# Patient Record
Sex: MALE | Race: ASIAN | NOT HISPANIC OR LATINO | URBAN - METROPOLITAN AREA
[De-identification: names, ages, dates, MRNs, and addresses within clinical notes are randomized per-mention and may not be internally consistent; named-entity substitution may affect disease eponyms.]

---

## 2017-01-19 ENCOUNTER — OUTPATIENT (OUTPATIENT)
Dept: OUTPATIENT SERVICES | Facility: HOSPITAL | Age: 77
LOS: 1 days | Discharge: HOME | End: 2017-01-19

## 2017-01-19 DIAGNOSIS — C34.90 MALIGNANT NEOPLASM OF UNSPECIFIED PART OF UNSPECIFIED BRONCHUS OR LUNG: ICD-10-CM

## 2017-01-19 DIAGNOSIS — C44.311 BASAL CELL CARCINOMA OF SKIN OF NOSE: Chronic | ICD-10-CM

## 2017-01-22 ENCOUNTER — RESULT REVIEW (OUTPATIENT)
Age: 77
End: 2017-01-22

## 2017-01-23 ENCOUNTER — APPOINTMENT (OUTPATIENT)
Dept: HEMATOLOGY ONCOLOGY | Facility: CLINIC | Age: 77
End: 2017-01-23

## 2017-01-23 VITALS
OXYGEN SATURATION: 95 % | RESPIRATION RATE: 16 BRPM | HEART RATE: 89 BPM | TEMPERATURE: 98.2 F | BODY MASS INDEX: 24.55 KG/M2 | SYSTOLIC BLOOD PRESSURE: 149 MMHG | DIASTOLIC BLOOD PRESSURE: 91 MMHG | WEIGHT: 166.23 LBS

## 2017-01-23 LAB
ALBUMIN SERPL ELPH-MCNC: 4.3 G/DL — SIGNIFICANT CHANGE UP (ref 3.3–5)
ALP SERPL-CCNC: 69 U/L — SIGNIFICANT CHANGE UP (ref 40–120)
ALT FLD-CCNC: 21 U/L — SIGNIFICANT CHANGE UP (ref 10–45)
ANION GAP SERPL CALC-SCNC: 15 MMOL/L — SIGNIFICANT CHANGE UP (ref 5–17)
AST SERPL-CCNC: 24 U/L — SIGNIFICANT CHANGE UP (ref 10–40)
BILIRUB SERPL-MCNC: 0.8 MG/DL — SIGNIFICANT CHANGE UP (ref 0.2–1.2)
BUN SERPL-MCNC: 21 MG/DL — SIGNIFICANT CHANGE UP (ref 7–23)
CALCIUM SERPL-MCNC: 9.7 MG/DL — SIGNIFICANT CHANGE UP (ref 8.4–10.5)
CEA SERPL-MCNC: 4.3 NG/ML — HIGH (ref 0–3.8)
CHLORIDE SERPL-SCNC: 101 MMOL/L — SIGNIFICANT CHANGE UP (ref 96–108)
CO2 SERPL-SCNC: 29 MMOL/L — SIGNIFICANT CHANGE UP (ref 22–31)
CREAT SERPL-MCNC: 1.12 MG/DL — SIGNIFICANT CHANGE UP (ref 0.5–1.3)
GLUCOSE SERPL-MCNC: 112 MG/DL — HIGH (ref 70–99)
HCT VFR BLD CALC: 45.1 % — SIGNIFICANT CHANGE UP (ref 39–50)
HGB BLD-MCNC: 14.8 G/DL — SIGNIFICANT CHANGE UP (ref 13–17)
MCHC RBC-ENTMCNC: 30.2 PG — SIGNIFICANT CHANGE UP (ref 27–34)
MCHC RBC-ENTMCNC: 32.8 GM/DL — SIGNIFICANT CHANGE UP (ref 32–36)
MCV RBC AUTO: 92.1 FL — SIGNIFICANT CHANGE UP (ref 80–100)
PLATELET # BLD AUTO: 207 K/UL — SIGNIFICANT CHANGE UP (ref 150–400)
POTASSIUM SERPL-MCNC: 3.9 MMOL/L — SIGNIFICANT CHANGE UP (ref 3.5–5.3)
POTASSIUM SERPL-SCNC: 3.9 MMOL/L — SIGNIFICANT CHANGE UP (ref 3.5–5.3)
PROT SERPL-MCNC: 7.4 G/DL — SIGNIFICANT CHANGE UP (ref 6–8.3)
RBC # BLD: 4.9 M/UL — SIGNIFICANT CHANGE UP (ref 4.2–5.8)
RBC # FLD: 12.4 % — SIGNIFICANT CHANGE UP (ref 10.3–14.5)
SODIUM SERPL-SCNC: 145 MMOL/L — SIGNIFICANT CHANGE UP (ref 135–145)
WBC # BLD: 9.6 K/UL — SIGNIFICANT CHANGE UP (ref 3.8–10.5)
WBC # FLD AUTO: 9.6 K/UL — SIGNIFICANT CHANGE UP (ref 3.8–10.5)

## 2017-01-24 LAB
BASOPHILS # BLD AUTO: 0 K/UL — SIGNIFICANT CHANGE UP (ref 0–0.2)
BASOPHILS NFR BLD AUTO: 0.4 % — SIGNIFICANT CHANGE UP (ref 0–2)
EOSINOPHIL # BLD AUTO: 0.3 K/UL — SIGNIFICANT CHANGE UP (ref 0–0.5)
EOSINOPHIL NFR BLD AUTO: 2.7 % — SIGNIFICANT CHANGE UP (ref 0–6)
LYMPHOCYTES # BLD AUTO: 1.9 K/UL — SIGNIFICANT CHANGE UP (ref 1–3.3)
LYMPHOCYTES # BLD AUTO: 20 % — SIGNIFICANT CHANGE UP (ref 13–44)
MONOCYTES # BLD AUTO: 0.7 K/UL — SIGNIFICANT CHANGE UP (ref 0–0.9)
MONOCYTES NFR BLD AUTO: 7 % — SIGNIFICANT CHANGE UP (ref 2–14)
NEUTROPHILS # BLD AUTO: 6.8 K/UL — SIGNIFICANT CHANGE UP (ref 1.8–7.4)
NEUTROPHILS NFR BLD AUTO: 70 % — SIGNIFICANT CHANGE UP (ref 43–77)

## 2017-02-13 ENCOUNTER — RX RENEWAL (OUTPATIENT)
Age: 77
End: 2017-02-13

## 2017-02-24 ENCOUNTER — RX RENEWAL (OUTPATIENT)
Age: 77
End: 2017-02-24

## 2017-02-28 ENCOUNTER — OUTPATIENT (OUTPATIENT)
Dept: OUTPATIENT SERVICES | Facility: HOSPITAL | Age: 77
LOS: 1 days | Discharge: HOME | End: 2017-02-28

## 2017-02-28 DIAGNOSIS — C34.90 MALIGNANT NEOPLASM OF UNSPECIFIED PART OF UNSPECIFIED BRONCHUS OR LUNG: ICD-10-CM

## 2017-02-28 DIAGNOSIS — C44.311 BASAL CELL CARCINOMA OF SKIN OF NOSE: Chronic | ICD-10-CM

## 2017-03-01 ENCOUNTER — OUTPATIENT (OUTPATIENT)
Dept: OUTPATIENT SERVICES | Facility: HOSPITAL | Age: 77
LOS: 1 days | Discharge: HOME | End: 2017-03-01

## 2017-03-01 DIAGNOSIS — C44.311 BASAL CELL CARCINOMA OF SKIN OF NOSE: Chronic | ICD-10-CM

## 2017-03-01 DIAGNOSIS — C34.90 MALIGNANT NEOPLASM OF UNSPECIFIED PART OF UNSPECIFIED BRONCHUS OR LUNG: ICD-10-CM

## 2017-03-02 ENCOUNTER — INPATIENT (INPATIENT)
Facility: HOSPITAL | Age: 77
LOS: 10 days | Discharge: HOME | End: 2017-03-13
Attending: HOSPITALIST | Admitting: HOSPITALIST
Payer: MEDICAID

## 2017-03-02 ENCOUNTER — APPOINTMENT (OUTPATIENT)
Dept: HEMATOLOGY ONCOLOGY | Facility: CLINIC | Age: 77
End: 2017-03-02

## 2017-03-02 VITALS
SYSTOLIC BLOOD PRESSURE: 188 MMHG | HEART RATE: 118 BPM | OXYGEN SATURATION: 99 % | TEMPERATURE: 98 F | DIASTOLIC BLOOD PRESSURE: 131 MMHG | RESPIRATION RATE: 20 BRPM

## 2017-03-02 DIAGNOSIS — R07.9 CHEST PAIN, UNSPECIFIED: ICD-10-CM

## 2017-03-02 DIAGNOSIS — R04.2 HEMOPTYSIS: ICD-10-CM

## 2017-03-02 DIAGNOSIS — J44.9 CHRONIC OBSTRUCTIVE PULMONARY DISEASE, UNSPECIFIED: ICD-10-CM

## 2017-03-02 DIAGNOSIS — K21.9 GASTRO-ESOPHAGEAL REFLUX DISEASE WITHOUT ESOPHAGITIS: ICD-10-CM

## 2017-03-02 DIAGNOSIS — C34.90 MALIGNANT NEOPLASM OF UNSPECIFIED PART OF UNSPECIFIED BRONCHUS OR LUNG: ICD-10-CM

## 2017-03-02 DIAGNOSIS — C44.311 BASAL CELL CARCINOMA OF SKIN OF NOSE: Chronic | ICD-10-CM

## 2017-03-02 DIAGNOSIS — E11.9 TYPE 2 DIABETES MELLITUS WITHOUT COMPLICATIONS: ICD-10-CM

## 2017-03-02 DIAGNOSIS — Z79.01 LONG TERM (CURRENT) USE OF ANTICOAGULANTS: ICD-10-CM

## 2017-03-02 DIAGNOSIS — E78.5 HYPERLIPIDEMIA, UNSPECIFIED: ICD-10-CM

## 2017-03-02 DIAGNOSIS — I10 ESSENTIAL (PRIMARY) HYPERTENSION: ICD-10-CM

## 2017-03-02 DIAGNOSIS — N40.0 BENIGN PROSTATIC HYPERPLASIA WITHOUT LOWER URINARY TRACT SYMPTOMS: ICD-10-CM

## 2017-03-02 DIAGNOSIS — K56.7 ILEUS, UNSPECIFIED: ICD-10-CM

## 2017-03-02 LAB
ALBUMIN SERPL ELPH-MCNC: 3.8 G/DL — SIGNIFICANT CHANGE UP (ref 3.3–5)
ALP SERPL-CCNC: 89 U/L — SIGNIFICANT CHANGE UP (ref 40–120)
ALT FLD-CCNC: 24 U/L — SIGNIFICANT CHANGE UP (ref 4–41)
APTT BLD: 32.2 SEC — SIGNIFICANT CHANGE UP (ref 27.5–37.4)
AST SERPL-CCNC: 34 U/L — SIGNIFICANT CHANGE UP (ref 4–40)
BASE EXCESS BLDV CALC-SCNC: 0.4 MMOL/L — SIGNIFICANT CHANGE UP
BASOPHILS # BLD AUTO: 0.04 K/UL — SIGNIFICANT CHANGE UP (ref 0–0.2)
BASOPHILS NFR BLD AUTO: 0.4 % — SIGNIFICANT CHANGE UP (ref 0–2)
BILIRUB SERPL-MCNC: 1.6 MG/DL — HIGH (ref 0.2–1.2)
BLOOD GAS VENOUS - CREATININE: 1.1 MG/DL — SIGNIFICANT CHANGE UP (ref 0.5–1.3)
BUN SERPL-MCNC: 21 MG/DL — SIGNIFICANT CHANGE UP (ref 7–23)
CALCIUM SERPL-MCNC: 9.5 MG/DL — SIGNIFICANT CHANGE UP (ref 8.4–10.5)
CHLORIDE BLDV-SCNC: 100 MMOL/L — SIGNIFICANT CHANGE UP (ref 96–108)
CHLORIDE SERPL-SCNC: 100 MMOL/L — SIGNIFICANT CHANGE UP (ref 98–107)
CK MB BLD-MCNC: 2.03 NG/ML — SIGNIFICANT CHANGE UP (ref 1–6.6)
CK MB BLD-MCNC: 2.44 NG/ML — SIGNIFICANT CHANGE UP (ref 1–6.6)
CK SERPL-CCNC: 109 U/L — SIGNIFICANT CHANGE UP (ref 30–200)
CK SERPL-CCNC: 123 U/L — SIGNIFICANT CHANGE UP (ref 30–200)
CO2 SERPL-SCNC: 25 MMOL/L — SIGNIFICANT CHANGE UP (ref 22–31)
CREAT SERPL-MCNC: 1.25 MG/DL — SIGNIFICANT CHANGE UP (ref 0.5–1.3)
EOSINOPHIL # BLD AUTO: 0.16 K/UL — SIGNIFICANT CHANGE UP (ref 0–0.5)
EOSINOPHIL NFR BLD AUTO: 1.6 % — SIGNIFICANT CHANGE UP (ref 0–6)
GAS PNL BLDV: 121 MMOL/L — LOW (ref 136–146)
GLUCOSE BLDV-MCNC: 137 — HIGH (ref 70–99)
GLUCOSE SERPL-MCNC: 144 MG/DL — HIGH (ref 70–99)
HCO3 BLDV-SCNC: 23 MMOL/L — SIGNIFICANT CHANGE UP (ref 20–27)
HCT VFR BLD CALC: 48.6 % — SIGNIFICANT CHANGE UP (ref 39–50)
HCT VFR BLDV CALC: 51.2 % — HIGH (ref 39–51)
HGB BLD-MCNC: 16.1 G/DL — SIGNIFICANT CHANGE UP (ref 13–17)
HGB BLDV-MCNC: 16.7 G/DL — SIGNIFICANT CHANGE UP (ref 13–17)
IMM GRANULOCYTES NFR BLD AUTO: 0.3 % — SIGNIFICANT CHANGE UP (ref 0–1.5)
INR BLD: 1.07 — SIGNIFICANT CHANGE UP (ref 0.87–1.18)
LACTATE BLDV-MCNC: 2.9 MMOL/L — HIGH (ref 0.5–2)
LYMPHOCYTES # BLD AUTO: 2.75 K/UL — SIGNIFICANT CHANGE UP (ref 1–3.3)
LYMPHOCYTES # BLD AUTO: 26.6 % — SIGNIFICANT CHANGE UP (ref 13–44)
MCHC RBC-ENTMCNC: 30.5 PG — SIGNIFICANT CHANGE UP (ref 27–34)
MCHC RBC-ENTMCNC: 33.1 % — SIGNIFICANT CHANGE UP (ref 32–36)
MCV RBC AUTO: 92 FL — SIGNIFICANT CHANGE UP (ref 80–100)
MONOCYTES # BLD AUTO: 0.59 K/UL — SIGNIFICANT CHANGE UP (ref 0–0.9)
MONOCYTES NFR BLD AUTO: 5.7 % — SIGNIFICANT CHANGE UP (ref 2–14)
NEUTROPHILS # BLD AUTO: 6.75 K/UL — SIGNIFICANT CHANGE UP (ref 1.8–7.4)
NEUTROPHILS NFR BLD AUTO: 65.4 % — SIGNIFICANT CHANGE UP (ref 43–77)
PCO2 BLDV: 52 MMHG — HIGH (ref 41–51)
PH BLDV: 7.32 PH — SIGNIFICANT CHANGE UP (ref 7.32–7.43)
PLATELET # BLD AUTO: 185 K/UL — SIGNIFICANT CHANGE UP (ref 150–400)
PMV BLD: 10.9 FL — SIGNIFICANT CHANGE UP (ref 7–13)
PO2 BLDV: 33 MMHG — LOW (ref 35–40)
POTASSIUM BLDV-SCNC: > 14 MMOL/L — CRITICAL HIGH (ref 3.4–4.5)
POTASSIUM SERPL-MCNC: 4.3 MMOL/L — SIGNIFICANT CHANGE UP (ref 3.5–5.3)
POTASSIUM SERPL-SCNC: 4.3 MMOL/L — SIGNIFICANT CHANGE UP (ref 3.5–5.3)
PROT SERPL-MCNC: 7.7 G/DL — SIGNIFICANT CHANGE UP (ref 6–8.3)
PROTHROM AB SERPL-ACNC: 12.2 SEC — SIGNIFICANT CHANGE UP (ref 10–13.1)
RBC # BLD: 5.28 M/UL — SIGNIFICANT CHANGE UP (ref 4.2–5.8)
RBC # FLD: 13.5 % — SIGNIFICANT CHANGE UP (ref 10.3–14.5)
SAO2 % BLDV: 62 % — SIGNIFICANT CHANGE UP (ref 60–85)
SODIUM SERPL-SCNC: 142 MMOL/L — SIGNIFICANT CHANGE UP (ref 135–145)
TROPONIN T SERPL-MCNC: < 0.06 NG/ML — SIGNIFICANT CHANGE UP (ref 0–0.06)
TROPONIN T SERPL-MCNC: < 0.06 NG/ML — SIGNIFICANT CHANGE UP (ref 0–0.06)
WBC # BLD: 10.32 K/UL — SIGNIFICANT CHANGE UP (ref 3.8–10.5)
WBC # FLD AUTO: 10.32 K/UL — SIGNIFICANT CHANGE UP (ref 3.8–10.5)

## 2017-03-02 PROCEDURE — 71275 CT ANGIOGRAPHY CHEST: CPT | Mod: 26

## 2017-03-02 PROCEDURE — 71010: CPT | Mod: 26

## 2017-03-02 PROCEDURE — 99223 1ST HOSP IP/OBS HIGH 75: CPT | Mod: GC

## 2017-03-02 RX ORDER — DEXTROSE 50 % IN WATER 50 %
1 SYRINGE (ML) INTRAVENOUS ONCE
Qty: 0 | Refills: 0 | Status: DISCONTINUED | OUTPATIENT
Start: 2017-03-02 | End: 2017-03-13

## 2017-03-02 RX ORDER — ONDANSETRON 8 MG/1
4 TABLET, FILM COATED ORAL EVERY 8 HOURS
Qty: 0 | Refills: 0 | Status: DISCONTINUED | OUTPATIENT
Start: 2017-03-02 | End: 2017-03-08

## 2017-03-02 RX ORDER — SIMVASTATIN 20 MG/1
20 TABLET, FILM COATED ORAL AT BEDTIME
Qty: 0 | Refills: 0 | Status: DISCONTINUED | OUTPATIENT
Start: 2017-03-02 | End: 2017-03-13

## 2017-03-02 RX ORDER — LORATADINE 10 MG/1
10 TABLET ORAL DAILY
Qty: 0 | Refills: 0 | Status: DISCONTINUED | OUTPATIENT
Start: 2017-03-02 | End: 2017-03-02

## 2017-03-02 RX ORDER — DEXTROSE 50 % IN WATER 50 %
25 SYRINGE (ML) INTRAVENOUS ONCE
Qty: 0 | Refills: 0 | Status: DISCONTINUED | OUTPATIENT
Start: 2017-03-02 | End: 2017-03-13

## 2017-03-02 RX ORDER — INSULIN LISPRO 100/ML
VIAL (ML) SUBCUTANEOUS EVERY 6 HOURS
Qty: 0 | Refills: 0 | Status: DISCONTINUED | OUTPATIENT
Start: 2017-03-02 | End: 2017-03-05

## 2017-03-02 RX ORDER — MORPHINE SULFATE 50 MG/1
4 CAPSULE, EXTENDED RELEASE ORAL ONCE
Qty: 0 | Refills: 0 | Status: DISCONTINUED | OUTPATIENT
Start: 2017-03-02 | End: 2017-03-02

## 2017-03-02 RX ORDER — SODIUM CHLORIDE 9 MG/ML
1000 INJECTION INTRAMUSCULAR; INTRAVENOUS; SUBCUTANEOUS ONCE
Qty: 0 | Refills: 0 | Status: COMPLETED | OUTPATIENT
Start: 2017-03-02 | End: 2017-03-02

## 2017-03-02 RX ORDER — HYDROCORTISONE 1 %
1 OINTMENT (GRAM) TOPICAL EVERY 8 HOURS
Qty: 0 | Refills: 0 | Status: DISCONTINUED | OUTPATIENT
Start: 2017-03-02 | End: 2017-03-13

## 2017-03-02 RX ORDER — SUCRALFATE 1 G
1 TABLET ORAL THREE TIMES A DAY
Qty: 0 | Refills: 0 | Status: DISCONTINUED | OUTPATIENT
Start: 2017-03-02 | End: 2017-03-07

## 2017-03-02 RX ORDER — TAMSULOSIN HYDROCHLORIDE 0.4 MG/1
0.4 CAPSULE ORAL AT BEDTIME
Qty: 0 | Refills: 0 | Status: DISCONTINUED | OUTPATIENT
Start: 2017-03-02 | End: 2017-03-13

## 2017-03-02 RX ORDER — MORPHINE SULFATE 50 MG/1
2 CAPSULE, EXTENDED RELEASE ORAL ONCE
Qty: 0 | Refills: 0 | Status: DISCONTINUED | OUTPATIENT
Start: 2017-03-02 | End: 2017-03-02

## 2017-03-02 RX ORDER — LORATADINE 10 MG/1
10 TABLET ORAL DAILY
Qty: 0 | Refills: 0 | Status: DISCONTINUED | OUTPATIENT
Start: 2017-03-02 | End: 2017-03-13

## 2017-03-02 RX ORDER — CLINDAMYCIN PHOSPHATE GEL USP, 1% 10 MG/G
1 GEL TOPICAL THREE TIMES A DAY
Qty: 0 | Refills: 0 | Status: DISCONTINUED | OUTPATIENT
Start: 2017-03-02 | End: 2017-03-13

## 2017-03-02 RX ORDER — GLUCAGON INJECTION, SOLUTION 0.5 MG/.1ML
1 INJECTION, SOLUTION SUBCUTANEOUS ONCE
Qty: 0 | Refills: 0 | Status: DISCONTINUED | OUTPATIENT
Start: 2017-03-02 | End: 2017-03-13

## 2017-03-02 RX ORDER — HYDROMORPHONE HYDROCHLORIDE 2 MG/ML
0.5 INJECTION INTRAMUSCULAR; INTRAVENOUS; SUBCUTANEOUS ONCE
Qty: 0 | Refills: 0 | Status: DISCONTINUED | OUTPATIENT
Start: 2017-03-02 | End: 2017-03-02

## 2017-03-02 RX ORDER — DEXTROSE 50 % IN WATER 50 %
12.5 SYRINGE (ML) INTRAVENOUS ONCE
Qty: 0 | Refills: 0 | Status: DISCONTINUED | OUTPATIENT
Start: 2017-03-02 | End: 2017-03-13

## 2017-03-02 RX ORDER — ENOXAPARIN SODIUM 100 MG/ML
40 INJECTION SUBCUTANEOUS EVERY 24 HOURS
Qty: 0 | Refills: 0 | Status: DISCONTINUED | OUTPATIENT
Start: 2017-03-02 | End: 2017-03-13

## 2017-03-02 RX ORDER — ACETAMINOPHEN 500 MG
650 TABLET ORAL EVERY 6 HOURS
Qty: 0 | Refills: 0 | Status: DISCONTINUED | OUTPATIENT
Start: 2017-03-02 | End: 2017-03-13

## 2017-03-02 RX ORDER — SODIUM CHLORIDE 9 MG/ML
1000 INJECTION, SOLUTION INTRAVENOUS
Qty: 0 | Refills: 0 | Status: DISCONTINUED | OUTPATIENT
Start: 2017-03-02 | End: 2017-03-13

## 2017-03-02 RX ORDER — FENTANYL CITRATE 50 UG/ML
1 INJECTION INTRAVENOUS
Qty: 0 | Refills: 0 | Status: DISCONTINUED | OUTPATIENT
Start: 2017-03-02 | End: 2017-03-07

## 2017-03-02 RX ORDER — OXYCODONE HYDROCHLORIDE 5 MG/1
10 TABLET ORAL EVERY 6 HOURS
Qty: 0 | Refills: 0 | Status: DISCONTINUED | OUTPATIENT
Start: 2017-03-02 | End: 2017-03-05

## 2017-03-02 RX ORDER — LOSARTAN POTASSIUM 100 MG/1
12.5 TABLET, FILM COATED ORAL DAILY
Qty: 0 | Refills: 0 | Status: DISCONTINUED | OUTPATIENT
Start: 2017-03-02 | End: 2017-03-03

## 2017-03-02 RX ORDER — OXYCODONE HYDROCHLORIDE 5 MG/1
5 TABLET ORAL EVERY 4 HOURS
Qty: 0 | Refills: 0 | Status: DISCONTINUED | OUTPATIENT
Start: 2017-03-02 | End: 2017-03-02

## 2017-03-02 RX ORDER — CHOLECALCIFEROL (VITAMIN D3) 125 MCG
1000 CAPSULE ORAL
Qty: 0 | Refills: 0 | Status: DISCONTINUED | OUTPATIENT
Start: 2017-03-02 | End: 2017-03-02

## 2017-03-02 RX ORDER — SODIUM CHLORIDE 9 MG/ML
1000 INJECTION INTRAMUSCULAR; INTRAVENOUS; SUBCUTANEOUS
Qty: 0 | Refills: 0 | Status: DISCONTINUED | OUTPATIENT
Start: 2017-03-02 | End: 2017-03-03

## 2017-03-02 RX ADMIN — Medication 1 APPLICATION(S): at 23:24

## 2017-03-02 RX ADMIN — OXYCODONE HYDROCHLORIDE 5 MILLIGRAM(S): 5 TABLET ORAL at 16:44

## 2017-03-02 RX ADMIN — TAMSULOSIN HYDROCHLORIDE 0.4 MILLIGRAM(S): 0.4 CAPSULE ORAL at 21:13

## 2017-03-02 RX ADMIN — LORATADINE 10 MILLIGRAM(S): 10 TABLET ORAL at 21:11

## 2017-03-02 RX ADMIN — MORPHINE SULFATE 4 MILLIGRAM(S): 50 CAPSULE, EXTENDED RELEASE ORAL at 08:15

## 2017-03-02 RX ADMIN — CLINDAMYCIN PHOSPHATE GEL USP, 1% 1 APPLICATION(S): 10 GEL TOPICAL at 23:23

## 2017-03-02 RX ADMIN — MORPHINE SULFATE 2 MILLIGRAM(S): 50 CAPSULE, EXTENDED RELEASE ORAL at 08:20

## 2017-03-02 RX ADMIN — OXYCODONE HYDROCHLORIDE 5 MILLIGRAM(S): 5 TABLET ORAL at 22:00

## 2017-03-02 RX ADMIN — LOSARTAN POTASSIUM 12.5 MILLIGRAM(S): 100 TABLET, FILM COATED ORAL at 22:24

## 2017-03-02 RX ADMIN — MORPHINE SULFATE 4 MILLIGRAM(S): 50 CAPSULE, EXTENDED RELEASE ORAL at 08:04

## 2017-03-02 RX ADMIN — MORPHINE SULFATE 2 MILLIGRAM(S): 50 CAPSULE, EXTENDED RELEASE ORAL at 08:38

## 2017-03-02 RX ADMIN — OXYCODONE HYDROCHLORIDE 5 MILLIGRAM(S): 5 TABLET ORAL at 21:11

## 2017-03-02 RX ADMIN — SIMVASTATIN 20 MILLIGRAM(S): 20 TABLET, FILM COATED ORAL at 21:11

## 2017-03-02 RX ADMIN — HYDROMORPHONE HYDROCHLORIDE 0.5 MILLIGRAM(S): 2 INJECTION INTRAMUSCULAR; INTRAVENOUS; SUBCUTANEOUS at 23:22

## 2017-03-02 RX ADMIN — HYDROMORPHONE HYDROCHLORIDE 0.5 MILLIGRAM(S): 2 INJECTION INTRAMUSCULAR; INTRAVENOUS; SUBCUTANEOUS at 23:34

## 2017-03-02 RX ADMIN — Medication 1 GRAM(S): at 21:13

## 2017-03-02 RX ADMIN — SODIUM CHLORIDE 100 MILLILITER(S): 9 INJECTION INTRAMUSCULAR; INTRAVENOUS; SUBCUTANEOUS at 18:11

## 2017-03-02 RX ADMIN — ENOXAPARIN SODIUM 40 MILLIGRAM(S): 100 INJECTION SUBCUTANEOUS at 21:12

## 2017-03-02 RX ADMIN — SODIUM CHLORIDE 1000 MILLILITER(S): 9 INJECTION INTRAMUSCULAR; INTRAVENOUS; SUBCUTANEOUS at 10:22

## 2017-03-02 RX ADMIN — SODIUM CHLORIDE 100 MILLILITER(S): 9 INJECTION INTRAMUSCULAR; INTRAVENOUS; SUBCUTANEOUS at 21:13

## 2017-03-02 RX ADMIN — OXYCODONE HYDROCHLORIDE 5 MILLIGRAM(S): 5 TABLET ORAL at 17:22

## 2017-03-02 NOTE — H&P ADULT. - PROBLEM SELECTOR PLAN 1
-NPO, NG tube if continues to vomit   -maintenance IVF   -nausea control w/ zofran  -limit opiod use if possible -NPO, NG tube if continues to vomit   -maintenance IVF   -nausea control w/ zofran  -limit opioid use if possible  -Surgical evaluation given ileus

## 2017-03-02 NOTE — ED ADULT NURSE NOTE - CHIEF COMPLAINT QUOTE
Pt c/o midsternal sharp CP x2 days, Pt appears diaphoretic/uncomfortable, also reports c+/SOB, n/v intermitt, hx Stage IV Lung CA treated at [facility], on oral chemo, also DM2. Kaitlyne spkg, son @ bedside to translate.  EKG obtained in triage. Charge RN Notified to pt condition, brought to rm 24.

## 2017-03-02 NOTE — ED PROVIDER NOTE - ATTENDING CONTRIBUTION TO CARE
pt with episodes of pain and hemoptysis with history of CA.  Concern is for PE vs erosion of mass into vessel or airway in lung.  Exam shows pt uncomfortable but with no significant lung findings.  As such will get CT looking for PE.  Will require admission.

## 2017-03-02 NOTE — PATIENT PROFILE ADULT. - IF HCP IS NOT ON CHART, IDENTIFY THE PERSONS NAME AND PHONE NUMBER CONTACTED TO BRING IN DOCUMENT
03/05/17 ADELINE spoke to patient's grandson Dileep Sainz who stated patient's son is patient's HCP and will tell patient's son to bring document

## 2017-03-02 NOTE — ED PROVIDER NOTE - FAMILY HISTORY
<<-----Click on this checkbox to enter Family History Family history of liver cancer     Sibling  Still living? Yes, Estimated age: Age Unknown  Family history of stomach cancer, Age at diagnosis: Age Unknown

## 2017-03-02 NOTE — ED ADULT TRIAGE NOTE - CHIEF COMPLAINT QUOTE
Pt c/o midsternal sharp CP x2 days, Pt appears diaphoretic/uncomfortable, also reports c+/SOB, n/v intermitt, hx Stage IV Lung CA treated at [facility], on oral chemo, also DM2. EKG obtained in triage. Pt c/o midsternal sharp CP x2 days, Pt appears diaphoretic/uncomfortable, also reports c+/SOB, n/v intermitt, hx Stage IV Lung CA treated at [facility], on oral chemo, also DM2. Kaitlyne spkg, son @ bedside to translate.  EKG obtained in triage. Charge RN Notified to pt condition. Pt c/o midsternal sharp CP x2 days, Pt appears diaphoretic/uncomfortable, also reports c+/SOB, n/v intermitt, hx Stage IV Lung CA treated at [facility], on oral chemo, also DM2. Kaitlyne spkg, son @ bedside to translate.  EKG obtained in triage. Charge RN Notified to pt condition, brought to rm 24.

## 2017-03-02 NOTE — ED PROVIDER NOTE - PROGRESS NOTE DETAILS
Herve: pt resting with family at the bedside. Continues to c/o epigastric and left sided c/p. Both have been present for a period of time. Prefers family translation. No SOB, diaphoresis or change in mental status. BP stable, pt aware and alert to surroundings. Appropriate answers to questions. Rapid regular HR ~90, /79. No recurrence of reported hemoptysis. Awaiting CT study to try and R/O PE vs Dissection. Will admit after testing, to insure appropriate service. patient reports improvement in symptoms though still with some abd pain, no further episodes of hemoptysis, CT neg for PE and dissection, likely ca eroding into bronchial tree. will admit for further w/u.

## 2017-03-02 NOTE — H&P ADULT. - GASTROINTESTINAL DETAILS
no distention/no guarding/no organomegaly/no rigidity/soft/no masses palpable/no rebound tenderness/mild epigastric tenderness

## 2017-03-02 NOTE — ED PROVIDER NOTE - MEDICAL DECISION MAKING DETAILS
76M with chest pain, hemoptysis. Given active malignancy, pulmonary embolism concerning potential diagnosis. Ddx also includes bleeding from pulmonary mass with hemoptysis, aortic dissection. Will obtain cbc, cmp, cardiac enzymes, coags, ekg, cxr, cta, admission. 76M with chest pain, hemoptysis. Given active malignancy, pulmonary embolism concerning potential diagnosis. Ddx also includes bleeding from pulmonary mass with hemoptysis, aortic dissection. Will obtain cbc, cmp, cardiac enzymes, coags, ekg, cxr, cta, admission. Provide analgesia.

## 2017-03-02 NOTE — H&P ADULT. - PROBLEM SELECTOR PLAN 4
-will hold tarceva 2/2 to above  -discuss with onc -will hold Tarceva 2/2 to above  -discuss with onc

## 2017-03-02 NOTE — H&P ADULT. - PROBLEM SELECTOR PLAN 3
-likely 2/2 to progressively worsening lung ca, doubt acs at this time, will r/o ACS   -trop negx1- EKG w/o ischemic changes  -trend Vanna

## 2017-03-02 NOTE — H&P ADULT. - LAB RESULTS AND INTERPRETATION
CBC, BMP, coags WNL. Total bili 1.6.   lactate 2.9. Trop neg x1. Labs personally reviewed, CBC, BMP, coags WNL. Total bili 1.6.   lactate 2.9. Trop neg x1.

## 2017-03-02 NOTE — H&P ADULT. - ATTENDING COMMENTS
I personally saw and evaluated the patient at bedside. I used [employer] Par[place]onese-speaking [id number].  I agree with the above A&P. In brief, this is  Cantonese speaking M w/ hx DM2, HTN BPH, COPD not on home O2, and Stage IV lung adenocarcinoma dx 4/2016 on daily Tarceva, former-smoker presenting with hemoptysis x 1 and chest pain as well as nausea, vomiting, and epigastric pain since yesterday. Pt reported he had BM yesterday to resident but to me stated he was not passing gas and could not remember his last BM. Hemoptysis and chest pain are not uncommon for him 2/2 to his adenocarcinoma. CT-angio C/A/P negative for PE but revealed small bowel ileus which I suspect is related to chronic narcotic use from malignancy pain.  Will keep NPO, IVF, surgical evaluation.  Pt's oncologist notified patient is here. Will contact oncology in AM given likely malignancy-related cause of hemoptysis and chest pain. Asked pt to avoid narcotics as able but if he was in pain he could take them.  Case d/w pt, wife, and son.

## 2017-03-02 NOTE — H&P ADULT. - ASSESSMENT
75 yo Cantonese speaking M w/ hx HTN, HLD, T2DM, BPH, COPD not on home O2, and Stage IV lung adenocarcinoma dx 4/2016 on daily tarceva p/w one episode of hemoptysis and sharp CP likely 2/2 lung ca progression and worsening N/V over the past week 2/2 small bowel ileus in setting of chronic opioid use, currently hemodynamically stable:

## 2017-03-02 NOTE — H&P ADULT. - HISTORY OF PRESENT ILLNESS
77 yo Cantonese speaking M w/ hx HTN, HLD, T2DM, BPH, COPD not on home O2, and Stage IV lung adenocarcinoma dx 4/2016 on daily tarceva p/w progessively worsening N/V over the past week, one episode of hemoptysis and sharp CP. Patient has had similar CP in the past, described as sharp, cutting like sensation, non-radiating, not related to position or worse with exertion. Improved w/ home oxycodone use and morphine in the ED. Pt has had no cough, fevers, sick contacts, SOB, LE swelling, recent travel. Episode of hemoptysis yesterday x1. Small amount of blood, described as table spoon. Last episode occurred months ago. N/V has been occuring throughout the day, patient unable to keep food and liquid down w/o vomitting. Unrelieved by home anti-nausea medications. Able to pass gas and last bowel movment day PTA. Pt has been on tarceva for at least the past 9 months per family without significant N/V. Pt treats rash associated w/ tarceva w/ steroid creams.     In ED vitals: 75 yo Cantonese speaking M w/ hx HTN, HLD, T2DM, BPH, COPD not on home O2, and Stage IV lung adenocarcinoma dx 4/2016 on daily tarceva p/w progessively worsening N/V over the past week, one episode of hemoptysis and sharp CP. Patient has had similar CP in the past, described as sharp, cutting like sensation, non-radiating, not related to position or worse with exertion. Improved w/ home oxycodone use and morphine in the ED. Pt has had no cough, fevers, sick contacts, SOB, LE swelling, recent travel. Episode of hemoptysis yesterday x1. Small amount of blood, described as table spoon. Last episode occurred months ago. N/V has been occuring throughout the day, patient unable to keep food and liquid down w/o vomitting. Unrelieved by home anti-nausea medications. Able to pass gas and last bowel movment day PTA. Pt has been on tarceva for at least the past 9 months per family without significant N/V. Pt treats rash associated w/ tarceva w/ steroid creams.     In ED vitals: tmep 98.1, HR, 118, /131, RR20, 99 RA. CBC, BMP, coags WNL. Total bili 1.6.   lactate 2.9. Trop neg x1.  EKG NSR w/ no ischemic chagnes. CXR w/ linear atelectasis. CTA chest w/ no PE or aortic dissection. Interval increase in size LLL nodule .4 to 1.4 mm, w/ small interval increase left upper lobe mass. S/p 1L NS bolus and morphine. 75 yo Cantonese speaking M w/ hx HTN, HLD, T2DM, BPH, COPD not on home O2, and Stage IV lung adenocarcinoma dx 4/2016 on daily tarceva p/w progessively worsening N/V over the past week, one episode of hemoptysis and sharp CP. Patient has had similar CP in the past, described as sharp, cutting like sensation, non-radiating, not related to position or worse with exertion. Improved w/ home oxycodone use and morphine in the ED. Pt has had no cough, fevers, sick contacts, SOB, LE swelling, recent travel. Episode of hemoptysis yesterday x1. Small amount of blood, described as table spoon. Last episode occurred months ago. N/V has been occuring throughout the day, patient unable to keep food and liquid down w/o vomitting. Unrelieved by home anti-nausea medications. Associated w/ intermittent epigastric abdominal pain, non-radiating. Able to pass gas and last bowel movment day PTA. Pt has been on tarceva for at least the past 9 months per family without significant N/V. Pt treats rash associated w/ tarceva w/ steroid creams.     In ED vitals: tmep 98.1, HR, 118, /131, RR20, 99 RA. CBC, BMP, coags WNL. Total bili 1.6.   lactate 2.9. Trop neg x1.  EKG NSR w/ no ischemic chagnes. CXR w/ linear atelectasis. CTA chest w/ no PE or aortic dissection. Interval increase in size LLL nodule .4 to 1.4 mm, w/ small interval increase left upper lobe mass. S/p 1L NS bolus and morphine. 75 yo Cantonese speaking M w/ hx HTN, HLD, T2DM, BPH, COPD not on home O2, and Stage IV lung adenocarcinoma dx 4/2016 on daily tarceva p/w progessively worsening N/V over the past week, one episode of hemoptysis and sharp CP. Patient has had similar CP in the past, described as sharp, cutting like sensation, non-radiating, not related to position or worse with exertion. Improved w/ home oxycodone use and morphine in the ED. Pt has had no cough, fevers, sick contacts, SOB, LE swelling, recent travel. Episode of hemoptysis yesterday x1. Small amount of blood, described as table spoon. Last episode occurred months ago. N/V has been occuring throughout the day, patient unable to keep food and liquid down w/o vomitting. Unrelieved by home anti-nausea medications. Associated w/ intermittent epigastric abdominal pain, non-radiating. Able to pass gas and last bowel movment day PTA. Pt has been on tarceva for at least the past 9 months per family without significant N/V. Pt treats rash associated w/ tarceva w/ steroid creams.     In ED vitals: tmep 98.1, HR, 118, /131, RR20, 99 RA. CBC, BMP, coags WNL. Total bili 1.6.   lactate 2.9. Trop neg x1.  EKG NSR w/ no ischemic chagnes. CXR w/ linear atelectasis. CTA chest w/ no PE or aortic dissection, mildly dilated small bowel loops likely representing ileus. Interval increase in size LLL nodule .4 to 1.4 mm, w/ small interval increase left upper lobe mass. S/p 1L NS bolus and morphine. 77 yo Cantonese speaking M w/ hx HTN, HLD, T2DM, BPH, COPD not on home O2, and Stage IV lung adenocarcinoma dx 4/2016 on daily tarceva p/w progessively worsening N/V over the past week, one episode of hemoptysis and sharp CP. Patient has had similar CP in the past, described as sharp, cutting like sensation, non-radiating, not related to position or worse with exertion. Improved w/ home oxycodone use and morphine in the ED. Pt has had no cough, fevers, sick contacts, SOB, LE swelling, recent travel. Episode of hemoptysis yesterday x1. Small amount of blood, described as table spoon. Last episode occurred months ago. N/V has been occuring throughout the day, patient unable to keep food and liquid down w/o vomitting. Unrelieved by home anti-nausea medications. Associated w/ intermittent epigastric abdominal pain, non-radiating. Able to pass gas and last bowel movment day PTA. Pt has been on Tarceva for at least the past 9 months per family without significant N/V. Pt treats rash associated w/ Tarceva w/ steroid creams. Patient denies fevers, chills, sick contacts.    In ED vitals: tmep 98.1, HR, 118, /131, RR20, 99 RA. CBC, BMP, coags WNL. Total bili 1.6.   lactate 2.9. Trop neg x1.  EKG NSR w/ no ischemic chagnes. CXR w/ linear atelectasis. CTA chest w/ no PE or aortic dissection, mildly dilated small bowel loops likely representing ileus. Interval increase in size LLL nodule .4 to 1.4 mm, w/ small interval increase left upper lobe mass. S/p 1L NS bolus and morphine.

## 2017-03-02 NOTE — ED ADULT NURSE NOTE - OBJECTIVE STATEMENT
received to room 24. mandarin/cantonese speaking. requests for son to translate. complains of generalized chest pain that started this am, associated with abd pain, nausea, vomiting, and shortness of breath. appears very uncomfortable. pain is constant, sharp, non radiating. states he vomited several times today and yesterday coughed up bright red blood. states stool is normal, non-bloody. also feels dizzy and weak, this is baseline. NSR on monitor. awaits md leung. received to room 24. mandarin/cantonese speaking. requests for son to translate. complains of generalized chest pain that started this am, associated with abd pain, nausea, vomiting, and shortness of breath. appears very uncomfortable. pain is constant, sharp, non radiating. states he vomited several times today and yesterday coughed up bright red blood. states stool is normal, non-bloody. also feels dizzy and weak, this is baseline. no edema. NSR on monitor. awaits md leung. labs sent. iv access established. medicated as ordered.

## 2017-03-02 NOTE — H&P ADULT. - PROBLEM SELECTOR PLAN 2
-one episode yesterday, likely 2/2 progessive lung ca, CTA chest negative for PE  -trend H/H, transfuse hgb<7, active T&S  -hold tarceva -one episode yesterday, likely 2/2 progessive lung ca, CTA chest negative for PE  -trend H/H, transfuse hgb<7, active T&S  -hold Tarceva

## 2017-03-02 NOTE — H&P ADULT. - RADIOLOGY RESULTS AND INTERPRETATION
CBC, BMP, coags WNL. Total bili 1.6.   lactate 2.9. Trop neg x1. CXR w/ linear atelectasis. CTA chest w/ no PE or aortic dissection, mildly dilated small bowel loops likely representing ileus. Interval increase in size LLL nodule .4 to 1.4 mm, w/ small interval increase left upper lobe mass. Labs personally reviewed, CXR w/ linear atelectasis. CTA chest w/ no PE or aortic dissection, mildly dilated small bowel loops likely representing ileus. Interval increase in size LLL nodule .4 to 1.4 mm, w/ small interval increase left upper lobe mass.

## 2017-03-02 NOTE — ED PROVIDER NOTE - OBJECTIVE STATEMENT
76M PMH HTN, DM, stage 4 lung CA currently on chemo (last dose yesterday 10am) p/w 2 days sharp nonradiating substernal chest pain a/w hemoptysis, now this morning also with mid abdominal pain (also nonradiating) and 1 episode emesis. No dark/bloody stools. No diarrhea, fever/chills/SOB. No prior hx of similar symptoms. No recent travel, leg swelling or rashes. No hx of DVT/PE in the past.

## 2017-03-02 NOTE — ED PROVIDER NOTE - PMH
BPH (benign prostatic hyperplasia)    COPD (chronic obstructive pulmonary disease)    DM (diabetes mellitus)    GERD (gastroesophageal reflux disease)    HLD (hyperlipidemia)    HTN (hypertension)    Lung cancer

## 2017-03-03 LAB
BUN SERPL-MCNC: 12 MG/DL — SIGNIFICANT CHANGE UP (ref 7–23)
CALCIUM SERPL-MCNC: 8.5 MG/DL — SIGNIFICANT CHANGE UP (ref 8.4–10.5)
CHLORIDE SERPL-SCNC: 106 MMOL/L — SIGNIFICANT CHANGE UP (ref 98–107)
CO2 SERPL-SCNC: 22 MMOL/L — SIGNIFICANT CHANGE UP (ref 22–31)
CREAT SERPL-MCNC: 0.82 MG/DL — SIGNIFICANT CHANGE UP (ref 0.5–1.3)
GLUCOSE SERPL-MCNC: 115 MG/DL — HIGH (ref 70–99)
HBA1C BLD-MCNC: 5.6 % — SIGNIFICANT CHANGE UP (ref 4–5.6)
HCT VFR BLD CALC: 43.8 % — SIGNIFICANT CHANGE UP (ref 39–50)
HGB BLD-MCNC: 14.3 G/DL — SIGNIFICANT CHANGE UP (ref 13–17)
MAGNESIUM SERPL-MCNC: 1.7 MG/DL — SIGNIFICANT CHANGE UP (ref 1.6–2.6)
MCHC RBC-ENTMCNC: 29.8 PG — SIGNIFICANT CHANGE UP (ref 27–34)
MCHC RBC-ENTMCNC: 32.6 % — SIGNIFICANT CHANGE UP (ref 32–36)
MCV RBC AUTO: 91.3 FL — SIGNIFICANT CHANGE UP (ref 80–100)
PHOSPHATE SERPL-MCNC: 2.7 MG/DL — SIGNIFICANT CHANGE UP (ref 2.5–4.5)
PLATELET # BLD AUTO: 141 K/UL — LOW (ref 150–400)
PMV BLD: 10.1 FL — SIGNIFICANT CHANGE UP (ref 7–13)
POTASSIUM SERPL-MCNC: 3.6 MMOL/L — SIGNIFICANT CHANGE UP (ref 3.5–5.3)
POTASSIUM SERPL-SCNC: 3.6 MMOL/L — SIGNIFICANT CHANGE UP (ref 3.5–5.3)
RBC # BLD: 4.8 M/UL — SIGNIFICANT CHANGE UP (ref 4.2–5.8)
RBC # FLD: 13.3 % — SIGNIFICANT CHANGE UP (ref 10.3–14.5)
SODIUM SERPL-SCNC: 142 MMOL/L — SIGNIFICANT CHANGE UP (ref 135–145)
WBC # BLD: 7.5 K/UL — SIGNIFICANT CHANGE UP (ref 3.8–10.5)
WBC # FLD AUTO: 7.5 K/UL — SIGNIFICANT CHANGE UP (ref 3.8–10.5)

## 2017-03-03 PROCEDURE — 99233 SBSQ HOSP IP/OBS HIGH 50: CPT | Mod: GC

## 2017-03-03 RX ORDER — HYDRALAZINE HCL 50 MG
5 TABLET ORAL ONCE
Qty: 0 | Refills: 0 | Status: COMPLETED | OUTPATIENT
Start: 2017-03-03 | End: 2017-03-03

## 2017-03-03 RX ORDER — MAGNESIUM SULFATE 500 MG/ML
1 VIAL (ML) INJECTION ONCE
Qty: 0 | Refills: 0 | Status: COMPLETED | OUTPATIENT
Start: 2017-03-03 | End: 2017-03-03

## 2017-03-03 RX ORDER — LOSARTAN POTASSIUM 100 MG/1
25 TABLET, FILM COATED ORAL DAILY
Qty: 0 | Refills: 0 | Status: DISCONTINUED | OUTPATIENT
Start: 2017-03-03 | End: 2017-03-13

## 2017-03-03 RX ORDER — POTASSIUM CHLORIDE 20 MEQ
10 PACKET (EA) ORAL
Qty: 0 | Refills: 0 | Status: COMPLETED | OUTPATIENT
Start: 2017-03-03 | End: 2017-03-03

## 2017-03-03 RX ORDER — OXYCODONE HYDROCHLORIDE 5 MG/1
5 TABLET ORAL EVERY 6 HOURS
Qty: 0 | Refills: 0 | Status: DISCONTINUED | OUTPATIENT
Start: 2017-03-03 | End: 2017-03-05

## 2017-03-03 RX ORDER — SODIUM CHLORIDE 9 MG/ML
1000 INJECTION INTRAMUSCULAR; INTRAVENOUS; SUBCUTANEOUS
Qty: 0 | Refills: 0 | Status: DISCONTINUED | OUTPATIENT
Start: 2017-03-03 | End: 2017-03-04

## 2017-03-03 RX ADMIN — Medication 1 APPLICATION(S): at 12:46

## 2017-03-03 RX ADMIN — Medication 100 MILLIEQUIVALENT(S): at 13:36

## 2017-03-03 RX ADMIN — CLINDAMYCIN PHOSPHATE GEL USP, 1% 1 APPLICATION(S): 10 GEL TOPICAL at 06:27

## 2017-03-03 RX ADMIN — OXYCODONE HYDROCHLORIDE 10 MILLIGRAM(S): 5 TABLET ORAL at 18:14

## 2017-03-03 RX ADMIN — Medication 1 GRAM(S): at 06:23

## 2017-03-03 RX ADMIN — OXYCODONE HYDROCHLORIDE 5 MILLIGRAM(S): 5 TABLET ORAL at 08:10

## 2017-03-03 RX ADMIN — Medication 1 GRAM(S): at 12:46

## 2017-03-03 RX ADMIN — SODIUM CHLORIDE 100 MILLILITER(S): 9 INJECTION INTRAMUSCULAR; INTRAVENOUS; SUBCUTANEOUS at 14:56

## 2017-03-03 RX ADMIN — Medication 100 GRAM(S): at 10:15

## 2017-03-03 RX ADMIN — ENOXAPARIN SODIUM 40 MILLIGRAM(S): 100 INJECTION SUBCUTANEOUS at 21:25

## 2017-03-03 RX ADMIN — SODIUM CHLORIDE 100 MILLILITER(S): 9 INJECTION INTRAMUSCULAR; INTRAVENOUS; SUBCUTANEOUS at 22:00

## 2017-03-03 RX ADMIN — SODIUM CHLORIDE 100 MILLILITER(S): 9 INJECTION INTRAMUSCULAR; INTRAVENOUS; SUBCUTANEOUS at 06:23

## 2017-03-03 RX ADMIN — TAMSULOSIN HYDROCHLORIDE 0.4 MILLIGRAM(S): 0.4 CAPSULE ORAL at 21:25

## 2017-03-03 RX ADMIN — CLINDAMYCIN PHOSPHATE GEL USP, 1% 1 APPLICATION(S): 10 GEL TOPICAL at 13:36

## 2017-03-03 RX ADMIN — LORATADINE 10 MILLIGRAM(S): 10 TABLET ORAL at 12:45

## 2017-03-03 RX ADMIN — Medication 1 APPLICATION(S): at 21:26

## 2017-03-03 RX ADMIN — Medication 1 APPLICATION(S): at 06:25

## 2017-03-03 RX ADMIN — ONDANSETRON 4 MILLIGRAM(S): 8 TABLET, FILM COATED ORAL at 07:00

## 2017-03-03 RX ADMIN — Medication 5 MILLIGRAM(S): at 01:04

## 2017-03-03 RX ADMIN — FENTANYL CITRATE 1 PATCH: 50 INJECTION INTRAVENOUS at 06:24

## 2017-03-03 RX ADMIN — LOSARTAN POTASSIUM 25 MILLIGRAM(S): 100 TABLET, FILM COATED ORAL at 06:24

## 2017-03-03 RX ADMIN — CLINDAMYCIN PHOSPHATE GEL USP, 1% 1 APPLICATION(S): 10 GEL TOPICAL at 21:26

## 2017-03-03 RX ADMIN — OXYCODONE HYDROCHLORIDE 10 MILLIGRAM(S): 5 TABLET ORAL at 17:14

## 2017-03-03 RX ADMIN — Medication 100 MILLIEQUIVALENT(S): at 11:32

## 2017-03-03 RX ADMIN — OXYCODONE HYDROCHLORIDE 5 MILLIGRAM(S): 5 TABLET ORAL at 01:15

## 2017-03-03 RX ADMIN — OXYCODONE HYDROCHLORIDE 5 MILLIGRAM(S): 5 TABLET ORAL at 02:00

## 2017-03-03 RX ADMIN — SIMVASTATIN 20 MILLIGRAM(S): 20 TABLET, FILM COATED ORAL at 21:25

## 2017-03-03 RX ADMIN — Medication 1 GRAM(S): at 21:25

## 2017-03-03 RX ADMIN — Medication 100 MILLIEQUIVALENT(S): at 12:45

## 2017-03-03 RX ADMIN — OXYCODONE HYDROCHLORIDE 5 MILLIGRAM(S): 5 TABLET ORAL at 09:00

## 2017-03-03 RX ADMIN — SODIUM CHLORIDE 100 MILLILITER(S): 9 INJECTION INTRAMUSCULAR; INTRAVENOUS; SUBCUTANEOUS at 08:30

## 2017-03-03 NOTE — PROVIDER CONTACT NOTE (OTHER) - RECOMMENDATIONS
will administer cordividol as ordered and medicate for pain will administer cozaar as ordered and medicate for pain

## 2017-03-04 LAB
ALBUMIN SERPL ELPH-MCNC: 3.1 G/DL — LOW (ref 3.3–5)
ALP SERPL-CCNC: 72 U/L — SIGNIFICANT CHANGE UP (ref 40–120)
ALT FLD-CCNC: 17 U/L — SIGNIFICANT CHANGE UP (ref 4–41)
AST SERPL-CCNC: 22 U/L — SIGNIFICANT CHANGE UP (ref 4–40)
BILIRUB SERPL-MCNC: 1 MG/DL — SIGNIFICANT CHANGE UP (ref 0.2–1.2)
BUN SERPL-MCNC: 10 MG/DL — SIGNIFICANT CHANGE UP (ref 7–23)
CALCIUM SERPL-MCNC: 8.6 MG/DL — SIGNIFICANT CHANGE UP (ref 8.4–10.5)
CHLORIDE SERPL-SCNC: 107 MMOL/L — SIGNIFICANT CHANGE UP (ref 98–107)
CO2 SERPL-SCNC: 23 MMOL/L — SIGNIFICANT CHANGE UP (ref 22–31)
CREAT SERPL-MCNC: 0.8 MG/DL — SIGNIFICANT CHANGE UP (ref 0.5–1.3)
GLUCOSE SERPL-MCNC: 80 MG/DL — SIGNIFICANT CHANGE UP (ref 70–99)
HCT VFR BLD CALC: 43.7 % — SIGNIFICANT CHANGE UP (ref 39–50)
HGB BLD-MCNC: 14.5 G/DL — SIGNIFICANT CHANGE UP (ref 13–17)
MAGNESIUM SERPL-MCNC: 1.9 MG/DL — SIGNIFICANT CHANGE UP (ref 1.6–2.6)
MCHC RBC-ENTMCNC: 30.5 PG — SIGNIFICANT CHANGE UP (ref 27–34)
MCHC RBC-ENTMCNC: 33.2 % — SIGNIFICANT CHANGE UP (ref 32–36)
MCV RBC AUTO: 92 FL — SIGNIFICANT CHANGE UP (ref 80–100)
PHOSPHATE SERPL-MCNC: 2.2 MG/DL — LOW (ref 2.5–4.5)
PLATELET # BLD AUTO: 147 K/UL — LOW (ref 150–400)
PMV BLD: 10.6 FL — SIGNIFICANT CHANGE UP (ref 7–13)
POTASSIUM SERPL-MCNC: 3.9 MMOL/L — SIGNIFICANT CHANGE UP (ref 3.5–5.3)
POTASSIUM SERPL-SCNC: 3.9 MMOL/L — SIGNIFICANT CHANGE UP (ref 3.5–5.3)
PROT SERPL-MCNC: 6.3 G/DL — SIGNIFICANT CHANGE UP (ref 6–8.3)
RBC # BLD: 4.75 M/UL — SIGNIFICANT CHANGE UP (ref 4.2–5.8)
RBC # FLD: 13.1 % — SIGNIFICANT CHANGE UP (ref 10.3–14.5)
SODIUM SERPL-SCNC: 143 MMOL/L — SIGNIFICANT CHANGE UP (ref 135–145)
WBC # BLD: 6.88 K/UL — SIGNIFICANT CHANGE UP (ref 3.8–10.5)
WBC # FLD AUTO: 6.88 K/UL — SIGNIFICANT CHANGE UP (ref 3.8–10.5)

## 2017-03-04 PROCEDURE — 93010 ELECTROCARDIOGRAM REPORT: CPT

## 2017-03-04 PROCEDURE — 99233 SBSQ HOSP IP/OBS HIGH 50: CPT | Mod: GC

## 2017-03-04 PROCEDURE — 99497 ADVNCD CARE PLAN 30 MIN: CPT | Mod: GC

## 2017-03-04 RX ORDER — POTASSIUM PHOSPHATE, MONOBASIC POTASSIUM PHOSPHATE, DIBASIC 236; 224 MG/ML; MG/ML
15 INJECTION, SOLUTION INTRAVENOUS ONCE
Qty: 0 | Refills: 0 | Status: COMPLETED | OUTPATIENT
Start: 2017-03-04 | End: 2017-03-04

## 2017-03-04 RX ORDER — MORPHINE SULFATE 50 MG/1
2 CAPSULE, EXTENDED RELEASE ORAL ONCE
Qty: 0 | Refills: 0 | Status: DISCONTINUED | OUTPATIENT
Start: 2017-03-04 | End: 2017-03-04

## 2017-03-04 RX ORDER — SODIUM CHLORIDE 9 MG/ML
1000 INJECTION, SOLUTION INTRAVENOUS
Qty: 0 | Refills: 0 | Status: DISCONTINUED | OUTPATIENT
Start: 2017-03-04 | End: 2017-03-05

## 2017-03-04 RX ORDER — SODIUM CHLORIDE 9 MG/ML
1000 INJECTION, SOLUTION INTRAVENOUS
Qty: 0 | Refills: 0 | Status: DISCONTINUED | OUTPATIENT
Start: 2017-03-04 | End: 2017-03-04

## 2017-03-04 RX ADMIN — Medication 1 GRAM(S): at 06:30

## 2017-03-04 RX ADMIN — Medication 1 GRAM(S): at 14:34

## 2017-03-04 RX ADMIN — SIMVASTATIN 20 MILLIGRAM(S): 20 TABLET, FILM COATED ORAL at 21:49

## 2017-03-04 RX ADMIN — MORPHINE SULFATE 2 MILLIGRAM(S): 50 CAPSULE, EXTENDED RELEASE ORAL at 22:01

## 2017-03-04 RX ADMIN — OXYCODONE HYDROCHLORIDE 10 MILLIGRAM(S): 5 TABLET ORAL at 03:30

## 2017-03-04 RX ADMIN — CLINDAMYCIN PHOSPHATE GEL USP, 1% 1 APPLICATION(S): 10 GEL TOPICAL at 21:52

## 2017-03-04 RX ADMIN — Medication 1 APPLICATION(S): at 06:31

## 2017-03-04 RX ADMIN — SODIUM CHLORIDE 75 MILLILITER(S): 9 INJECTION, SOLUTION INTRAVENOUS at 09:34

## 2017-03-04 RX ADMIN — Medication 1 APPLICATION(S): at 21:49

## 2017-03-04 RX ADMIN — CLINDAMYCIN PHOSPHATE GEL USP, 1% 1 APPLICATION(S): 10 GEL TOPICAL at 06:31

## 2017-03-04 RX ADMIN — MORPHINE SULFATE 2 MILLIGRAM(S): 50 CAPSULE, EXTENDED RELEASE ORAL at 09:34

## 2017-03-04 RX ADMIN — LOSARTAN POTASSIUM 25 MILLIGRAM(S): 100 TABLET, FILM COATED ORAL at 06:31

## 2017-03-04 RX ADMIN — LORATADINE 10 MILLIGRAM(S): 10 TABLET ORAL at 12:05

## 2017-03-04 RX ADMIN — Medication 1 GRAM(S): at 21:49

## 2017-03-04 RX ADMIN — OXYCODONE HYDROCHLORIDE 10 MILLIGRAM(S): 5 TABLET ORAL at 17:50

## 2017-03-04 RX ADMIN — OXYCODONE HYDROCHLORIDE 10 MILLIGRAM(S): 5 TABLET ORAL at 02:49

## 2017-03-04 RX ADMIN — OXYCODONE HYDROCHLORIDE 10 MILLIGRAM(S): 5 TABLET ORAL at 09:08

## 2017-03-04 RX ADMIN — MORPHINE SULFATE 2 MILLIGRAM(S): 50 CAPSULE, EXTENDED RELEASE ORAL at 09:50

## 2017-03-04 RX ADMIN — OXYCODONE HYDROCHLORIDE 10 MILLIGRAM(S): 5 TABLET ORAL at 18:45

## 2017-03-04 RX ADMIN — ENOXAPARIN SODIUM 40 MILLIGRAM(S): 100 INJECTION SUBCUTANEOUS at 21:50

## 2017-03-04 RX ADMIN — TAMSULOSIN HYDROCHLORIDE 0.4 MILLIGRAM(S): 0.4 CAPSULE ORAL at 21:49

## 2017-03-04 RX ADMIN — CLINDAMYCIN PHOSPHATE GEL USP, 1% 1 APPLICATION(S): 10 GEL TOPICAL at 14:34

## 2017-03-04 RX ADMIN — OXYCODONE HYDROCHLORIDE 10 MILLIGRAM(S): 5 TABLET ORAL at 09:35

## 2017-03-04 RX ADMIN — POTASSIUM PHOSPHATE, MONOBASIC POTASSIUM PHOSPHATE, DIBASIC 62.5 MILLIMOLE(S): 236; 224 INJECTION, SOLUTION INTRAVENOUS at 12:05

## 2017-03-04 RX ADMIN — Medication 1 APPLICATION(S): at 14:32

## 2017-03-04 RX ADMIN — MORPHINE SULFATE 2 MILLIGRAM(S): 50 CAPSULE, EXTENDED RELEASE ORAL at 21:50

## 2017-03-04 RX ADMIN — ONDANSETRON 4 MILLIGRAM(S): 8 TABLET, FILM COATED ORAL at 09:39

## 2017-03-04 RX ADMIN — SODIUM CHLORIDE 75 MILLILITER(S): 9 INJECTION, SOLUTION INTRAVENOUS at 21:50

## 2017-03-05 ENCOUNTER — TRANSCRIPTION ENCOUNTER (OUTPATIENT)
Age: 77
End: 2017-03-05

## 2017-03-05 LAB
BUN SERPL-MCNC: 8 MG/DL — SIGNIFICANT CHANGE UP (ref 7–23)
CALCIUM SERPL-MCNC: 8.6 MG/DL — SIGNIFICANT CHANGE UP (ref 8.4–10.5)
CHLORIDE SERPL-SCNC: 106 MMOL/L — SIGNIFICANT CHANGE UP (ref 98–107)
CO2 SERPL-SCNC: 26 MMOL/L — SIGNIFICANT CHANGE UP (ref 22–31)
CREAT SERPL-MCNC: 0.88 MG/DL — SIGNIFICANT CHANGE UP (ref 0.5–1.3)
GLUCOSE SERPL-MCNC: 139 MG/DL — HIGH (ref 70–99)
MAGNESIUM SERPL-MCNC: 1.8 MG/DL — SIGNIFICANT CHANGE UP (ref 1.6–2.6)
PHOSPHATE SERPL-MCNC: 2.5 MG/DL — SIGNIFICANT CHANGE UP (ref 2.5–4.5)
POTASSIUM SERPL-MCNC: 3.6 MMOL/L — SIGNIFICANT CHANGE UP (ref 3.5–5.3)
POTASSIUM SERPL-SCNC: 3.6 MMOL/L — SIGNIFICANT CHANGE UP (ref 3.5–5.3)
SODIUM SERPL-SCNC: 145 MMOL/L — SIGNIFICANT CHANGE UP (ref 135–145)

## 2017-03-05 PROCEDURE — 74000: CPT | Mod: 26

## 2017-03-05 PROCEDURE — 99233 SBSQ HOSP IP/OBS HIGH 50: CPT | Mod: GC

## 2017-03-05 RX ORDER — MORPHINE SULFATE 50 MG/1
2 CAPSULE, EXTENDED RELEASE ORAL ONCE
Qty: 0 | Refills: 0 | Status: DISCONTINUED | OUTPATIENT
Start: 2017-03-05 | End: 2017-03-05

## 2017-03-05 RX ORDER — OXYCODONE HYDROCHLORIDE 5 MG/1
10 TABLET ORAL EVERY 4 HOURS
Qty: 0 | Refills: 0 | Status: DISCONTINUED | OUTPATIENT
Start: 2017-03-05 | End: 2017-03-09

## 2017-03-05 RX ORDER — SENNA PLUS 8.6 MG/1
2 TABLET ORAL AT BEDTIME
Qty: 0 | Refills: 0 | Status: DISCONTINUED | OUTPATIENT
Start: 2017-03-05 | End: 2017-03-13

## 2017-03-05 RX ORDER — OXYCODONE HYDROCHLORIDE 5 MG/1
5 TABLET ORAL EVERY 4 HOURS
Qty: 0 | Refills: 0 | Status: DISCONTINUED | OUTPATIENT
Start: 2017-03-05 | End: 2017-03-09

## 2017-03-05 RX ORDER — INSULIN LISPRO 100/ML
VIAL (ML) SUBCUTANEOUS
Qty: 0 | Refills: 0 | Status: DISCONTINUED | OUTPATIENT
Start: 2017-03-05 | End: 2017-03-13

## 2017-03-05 RX ORDER — DOCUSATE SODIUM 100 MG
100 CAPSULE ORAL
Qty: 0 | Refills: 0 | Status: DISCONTINUED | OUTPATIENT
Start: 2017-03-05 | End: 2017-03-07

## 2017-03-05 RX ORDER — INSULIN LISPRO 100/ML
VIAL (ML) SUBCUTANEOUS AT BEDTIME
Qty: 0 | Refills: 0 | Status: DISCONTINUED | OUTPATIENT
Start: 2017-03-05 | End: 2017-03-13

## 2017-03-05 RX ADMIN — Medication 1 APPLICATION(S): at 23:18

## 2017-03-05 RX ADMIN — Medication 1 GRAM(S): at 14:54

## 2017-03-05 RX ADMIN — CLINDAMYCIN PHOSPHATE GEL USP, 1% 1 APPLICATION(S): 10 GEL TOPICAL at 07:01

## 2017-03-05 RX ADMIN — SENNA PLUS 2 TABLET(S): 8.6 TABLET ORAL at 22:15

## 2017-03-05 RX ADMIN — OXYCODONE HYDROCHLORIDE 10 MILLIGRAM(S): 5 TABLET ORAL at 12:37

## 2017-03-05 RX ADMIN — MORPHINE SULFATE 2 MILLIGRAM(S): 50 CAPSULE, EXTENDED RELEASE ORAL at 07:52

## 2017-03-05 RX ADMIN — CLINDAMYCIN PHOSPHATE GEL USP, 1% 1 APPLICATION(S): 10 GEL TOPICAL at 14:54

## 2017-03-05 RX ADMIN — Medication 1 APPLICATION(S): at 07:01

## 2017-03-05 RX ADMIN — MORPHINE SULFATE 2 MILLIGRAM(S): 50 CAPSULE, EXTENDED RELEASE ORAL at 08:05

## 2017-03-05 RX ADMIN — CLINDAMYCIN PHOSPHATE GEL USP, 1% 1 APPLICATION(S): 10 GEL TOPICAL at 23:18

## 2017-03-05 RX ADMIN — LORATADINE 10 MILLIGRAM(S): 10 TABLET ORAL at 12:40

## 2017-03-05 RX ADMIN — OXYCODONE HYDROCHLORIDE 10 MILLIGRAM(S): 5 TABLET ORAL at 11:39

## 2017-03-05 RX ADMIN — SIMVASTATIN 20 MILLIGRAM(S): 20 TABLET, FILM COATED ORAL at 22:16

## 2017-03-05 RX ADMIN — OXYCODONE HYDROCHLORIDE 10 MILLIGRAM(S): 5 TABLET ORAL at 04:30

## 2017-03-05 RX ADMIN — Medication 100 MILLIGRAM(S): at 17:40

## 2017-03-05 RX ADMIN — LOSARTAN POTASSIUM 25 MILLIGRAM(S): 100 TABLET, FILM COATED ORAL at 07:12

## 2017-03-05 RX ADMIN — Medication 1 APPLICATION(S): at 14:54

## 2017-03-05 RX ADMIN — OXYCODONE HYDROCHLORIDE 10 MILLIGRAM(S): 5 TABLET ORAL at 03:45

## 2017-03-05 RX ADMIN — OXYCODONE HYDROCHLORIDE 10 MILLIGRAM(S): 5 TABLET ORAL at 15:39

## 2017-03-05 RX ADMIN — OXYCODONE HYDROCHLORIDE 5 MILLIGRAM(S): 5 TABLET ORAL at 23:19

## 2017-03-05 RX ADMIN — SODIUM CHLORIDE 75 MILLILITER(S): 9 INJECTION, SOLUTION INTRAVENOUS at 07:12

## 2017-03-05 RX ADMIN — Medication 1 GRAM(S): at 22:15

## 2017-03-05 RX ADMIN — TAMSULOSIN HYDROCHLORIDE 0.4 MILLIGRAM(S): 0.4 CAPSULE ORAL at 22:16

## 2017-03-05 RX ADMIN — ENOXAPARIN SODIUM 40 MILLIGRAM(S): 100 INJECTION SUBCUTANEOUS at 22:15

## 2017-03-05 RX ADMIN — OXYCODONE HYDROCHLORIDE 10 MILLIGRAM(S): 5 TABLET ORAL at 16:35

## 2017-03-05 RX ADMIN — Medication 1 GRAM(S): at 07:12

## 2017-03-05 NOTE — PROVIDER CONTACT NOTE (MEDICATION) - ASSESSMENT
pain management satisfactoruy post pain med bp remains elevated despite med given
bp remains high see flow shheet for detailed data
pain med ordered every 6 hours as needed, dose not yet due
75 yo Cantonese speaking M p/w progressively worsening N/V over the past week, one episode of hemoptysis and sharp CP. Issue 1: Cardiovascular adverse events for Tarceva reported per Roxana: Cerebrovascular accidents, MI, and myocardial ischemia; cardiac issues in pt must be ruled out. GI perforation may be other cause of chest pain, as this is GI adverse reaction per Roxana. Presentation of GI perforation include some degree of neck pain/dysphagia or chest/abdominal discomfort. If GI perforation definitively diagnosed, permanently discontinue Tarceva.  Issue 2: Pt currently on sucralfate and Vitamin D. Drug-Drug interaction is present between these 2 medications, as Vit D analogs may increase serum concentration of Sucralfate. Specifically, the absorption of aluminum from sucralfate may be increased, leading to possible aluminum toxicity.

## 2017-03-05 NOTE — PROVIDER CONTACT NOTE (MEDICATION) - RECOMMENDATIONS
md to evaluate med regimen and order additional med
md to order additional dose of morphine
pain med to be given dilaudid x1 dose
Based on proper diagnosis, evaluate Ariel's contribution as specified above. If CP related to acid reflux, avoid Tarveca and use of a PPI, use H2 blocker instead (i.e.:ranitidine). Separate dosing.

## 2017-03-05 NOTE — DISCHARGE NOTE ADULT - SECONDARY DIAGNOSIS.
Chest pain, unspecified type Hemoptysis Type 2 diabetes mellitus without complication, without long-term current use of insulin Essential hypertension Benign prostatic hyperplasia, presence of lower urinary tract symptoms unspecified, unspecified morphology Hyperlipidemia, unspecified hyperlipidemia type Malignant neoplasm of lung, unspecified laterality, unspecified part of lung

## 2017-03-05 NOTE — DISCHARGE NOTE ADULT - CARE PROVIDER_API CALL
Demario Cydney  [facility]  Phone: (   )    -  Fax: (   )    - Cydney Hanks  [facility]  Phone: (   )    -  Fax: (   )    -    [facility],   Phone: [phone]  Fax: (   )    -

## 2017-03-05 NOTE — DISCHARGE NOTE ADULT - MEDICATION SUMMARY - MEDICATIONS TO CHANGE
I will SWITCH the dose or number of times a day I take the medications listed below when I get home from the hospital:    fentaNYL 50 mcg/hr transdermal film, extended release  -- 1 patch by transdermal patch every 72 hours

## 2017-03-05 NOTE — DISCHARGE NOTE ADULT - PLAN OF CARE
medically managed follow-up at [facility] with Dr. Hanks follow-up Noe Continue to take your Tarceva as prescribed. Continue to take flomax as prescribed. Continue to take benicar as prescribed. Continue to take metformin as prescribed. Continue to take tarceva as prescribed and follow-up with Dr. Hanks at [facility]. You were admitted for chest pain that was found to be secondary to your underlying malignancy. You had no pulmonary embolism seen on CAT scan of the chest or cardiac etiology of the chest pain. Please continue to take your pain medications as needed and as prescribed. You were admitted for nausea and vomiting found to be caused by abdominal ileus. This resolved by eating nothing for several days and advancing your diet slowly. Your nausea was controlled with zofran as needed and you were given IV fluids. Please try to limit the use of your pain medications and only use them when needed. Please follow-up at [facility]. You were admitted for nausea and vomiting found to be caused by abdominal ileus. This resolved by eating nothing for several days and advancing your diet slowly. Your nausea was controlled with zofran as needed and you were given IV fluids. Please try to limit the use of your pain medications and only use them when needed. Please follow-up at [facility]. Continue bowel regimen. You were admitted for nausea and vomiting found to be caused by abdominal ileus. This resolved by eating nothing for several days and advancing your diet slowly. Your nausea was controlled with zofran with meals and  as needed and you were given IV fluids. Please try to limit the use of your pain medications and only use them when needed. Please follow-up at [facility]. Continue bowel regimen.

## 2017-03-05 NOTE — DISCHARGE NOTE ADULT - MEDICATION SUMMARY - MEDICATIONS TO TAKE
I will START or STAY ON the medications listed below when I get home from the hospital:    Hydrocortisone 2.5% External Cream  -- Apply on skin to affected area every 8 hours  -- Indication: For Skin care    oxyCODONE 10 mg oral tablet  -- 1 tab(s) by mouth every 4 hours, As needed, Severe Pain (7 - 10) MDD:6 tabs  -- Indication: For Pain    Benicar 5 mg oral tablet  -- 1 tab(s) by mouth once a day  -- Indication: For HTN    Flomax 0.4 mg oral capsule  -- 1 cap(s) by mouth once a day   -- Indication: For BPH    metFORMIN 500 mg oral tablet  -- 1 tab(s) by mouth 2 times a day  -- Indication: For DM    metoclopramide 10 mg oral tablet  -- 1 tab(s) by mouth 4 times a day (before meals and at bedtime), As Needed  -- Indication: For Nausea    Claritin 10 mg oral tablet  -- 1 tab(s) by mouth once a day  -- Indication: For antihistamine    simvastatin 20 mg oral tablet  -- 1 tab(s) by mouth once a day (at bedtime)  -- Indication: For HLD    clindamycin 1% topical gel  -- Apply on skin to affected area 3 times a day  -- Indication: For Skin care    Tarceva 150 mg oral tablet  -- 1 tab(s) by mouth once a day  -- Indication: For Benign prostatic hyperplasia, presence of lower urinary tract symptoms unspecified, unspecified morphology    docusate sodium 100 mg oral capsule  -- 1 cap(s) by mouth 3 times a day  -- Indication: For Constipation    polyethylene glycol 3350 oral powder for reconstitution  -- 17 gram(s) by mouth 2 times a day  -- Indication: For Constipation    senna oral tablet  -- 2 tab(s) by mouth once a day (at bedtime)  -- Indication: For Constipation    sucralfate 1 g oral tablet  -- 1 tab(s) by mouth 3 times a day  -- Indication: For Gastroesophageal reflux disease, esophagitis presence not specified    Vitamin D3 1000 intl units oral tablet  -- 1 tab(s) by mouth 2 times a day  -- Indication: For Supplement I will START or STAY ON the medications listed below when I get home from the hospital:    Hydrocortisone 2.5% External Cream  -- Apply on skin to affected area every 8 hours  -- Indication: For Skin care    dexamethasone 4 mg oral tablet  -- 1 tab(s) by mouth every 12 hours  -- Indication: For Nausea    fentaNYL 75 mcg/hr transdermal film, extended release  -- 1 patch by transdermal patch every 72 hours MDD:1 patch every 3 days  -- Indication: For Pain    oxyCODONE 10 mg oral tablet  -- 1 tab(s) by mouth every 4 hours, As needed, Severe Pain (7 - 10) MDD:6 tabs  -- Indication: For Pain    Benicar 5 mg oral tablet  -- 1 tab(s) by mouth once a day  -- Indication: For HTN    Flomax 0.4 mg oral capsule  -- 1 cap(s) by mouth once a day   -- Indication: For BPH    metFORMIN 500 mg oral tablet  -- 1 tab(s) by mouth 2 times a day  -- Indication: For DM    metoclopramide 10 mg oral tablet  -- 1 tab(s) by mouth 4 times a day (before meals and at bedtime), As Needed  -- Indication: For Nausea    Zofran ODT 4 mg oral tablet, disintegrating  -- 1 tab(s) by mouth every 8 hours  -- Indication: For Nausea    Claritin 10 mg oral tablet  -- 1 tab(s) by mouth once a day  -- Indication: For antihistamine    simvastatin 20 mg oral tablet  -- 1 tab(s) by mouth once a day (at bedtime)  -- Indication: For HLD    clindamycin 1% topical gel  -- Apply on skin to affected area 3 times a day  -- Indication: For Skin care    Tarceva 150 mg oral tablet  -- 1 tab(s) by mouth once a day  -- Indication: For Benign prostatic hyperplasia, presence of lower urinary tract symptoms unspecified, unspecified morphology    famotidine 20 mg oral tablet  -- 1 tab(s) by mouth 2 times a day  -- Indication: For GI PPx    docusate sodium 100 mg oral capsule  -- 1 cap(s) by mouth 3 times a day  -- Indication: For Constipation    polyethylene glycol 3350 oral powder for reconstitution  -- 17 gram(s) by mouth 2 times a day  -- Indication: For Constipation    senna oral tablet  -- 2 tab(s) by mouth once a day (at bedtime)  -- Indication: For Constipation    sucralfate 1 g oral tablet  -- 1 tab(s) by mouth 3 times a day  -- Indication: For Gastroesophageal reflux disease, esophagitis presence not specified    pantoprazole 40 mg oral delayed release tablet  -- 1 tab(s) by mouth once a day (before a meal)  -- Indication: For Gastroesophageal reflux disease, esophagitis presence not specified    Vitamin D3 1000 intl units oral tablet  -- 1 tab(s) by mouth 2 times a day  -- Indication: For Supplement

## 2017-03-05 NOTE — DISCHARGE NOTE ADULT - HOSPITAL COURSE
75 yo Cantonese speaking M w/ hx HTN, HLD, T2DM, BPH, COPD not on home O2, and Stage IV lung adenocarcinoma dx 4/2016 on daily tarceva p/w progessively worsening N/V over the past week, one episode of hemoptysis and sharp CP. Patient has had similar CP in the past, described as sharp, cutting like sensation, non-radiating, not related to position or worse with exertion. Improved w/ home oxycodone use and morphine in the ED. Pt has had no cough, fevers, sick contacts, SOB, LE swelling, recent travel. Episode of hemoptysis yesterday x1. Small amount of blood, described as table spoon. Last episode occurred months ago. N/V has been occuring throughout the day, patient unable to keep food and liquid down w/o vomitting. Unrelieved by home anti-nausea medications. Associated w/ intermittent epigastric abdominal pain, non-radiating. Able to pass gas and last bowel movment day PTA. Pt has been on Tarceva for at least the past 9 months per family without significant N/V. Pt treats rash associated w/ Tarceva w/ steroid creams. Patient denies fevers, chills, sick contacts.    In ED vitals: tmep 98.1, HR, 118, /131, RR20, 99 RA. CBC, BMP, coags WNL. Total bili 1.6.   lactate 2.9. Trop neg x1.  EKG NSR w/ no ischemic chagnes. CXR w/ linear atelectasis. CTA chest w/ no PE or aortic dissection, mildly dilated small bowel loops likely representing ileus. Interval increase in size LLL nodule .4 to 1.4 mm, w/ small interval increase left upper lobe mass. S/p 1L NS bolus and morphine. 77 yo [place]uzhenese speaking M w/ hx HTN, HLD, T2DM, BPH, COPD not on home O2, and Stage IV lung adenocarcinoma dx 4/2016 on daily tarceva p/w progessively worsening N/V over the past week, one episode of hemoptysis and sharp CP. Patient has had similar CP in the past, described as sharp, cutting like sensation, non-radiating, not related to position or worse with exertion. Improved w/ home oxycodone use and morphine in the ED. Pt has had no cough, fevers, sick contacts, SOB, LE swelling, recent travel. Episode of hemoptysis yesterday x1. Small amount of blood, described as table spoon in quantity. Last episode occurred months ago. N/V has been occuring throughout the day, patient unable to keep food and liquid down w/o vomitting. Unrelieved by home anti-nausea medications. Associated w/ intermittent epigastric abdominal pain, non-radiating. Able to pass gas and last bowel movment day PTA. Pt has been on Tarceva for at least the past 9 months per family without significant N/V. Pt treats rash associated w/ Tarceva w/ steroid creams. Patient denies fevers, chills, sick contacts.    In ED vitals: temp 98.1, HR, 118, /131, RR20, 99 RA. CBC, BMP, coags WNL. Total bili 1.6.   lactate 2.9. Trop neg x1.  EKG NSR w/ no ischemic chagnes. CXR w/ linear atelectasis. CTA C/A/P w/ no PE or aortic dissection, mildly dilated small bowel loops likely representing ileus. Interval increase in size LLL nodule .4 to 1.4 mm, w/ small interval increase left upper lobe mass. S/p 1L NS bolus and morphine. Admitted to medicine. 75 yo [place] speaking M w/ hx HTN, HLD, T2DM, BPH, COPD not on home O2, and Stage IV lung adenocarcinoma dx 4/2016 on daily tarceva p/w progessively worsening N/V over the past week, one episode of hemoptysis and sharp CP. Patient has had similar CP in the past, described as sharp, cutting like sensation, non-radiating, not related to position or worse with exertion. Improved w/ home oxycodone use and morphine in the ED. Pt has had no cough, fevers, sick contacts, SOB, LE swelling, recent travel. Episode of hemoptysis yesterday x1. Small amount of blood, described as table spoon in quantity. Last episode occurred months ago. N/V has been occuring throughout the day, patient unable to keep food and liquid down w/o vomitting. Unrelieved by home anti-nausea medications. Associated w/ intermittent epigastric abdominal pain, non-radiating. Able to pass gas and last bowel movment day PTA. Pt has been on Tarceva for at least the past 9 months per family without significant N/V. Pt treats rash associated w/ Tarceva w/ steroid creams. Patient denies fevers, chills, sick contacts.    In ED vitals: temp 98.1, HR, 118, /131, RR20, 99 RA. CBC, BMP, coags WNL. Total bili 1.6.   lactate 2.9. Trop neg x1.  EKG NSR w/ no ischemic chagnes. CXR w/ linear atelectasis. CTA C/A/P w/ no PE or aortic dissection, mildly dilated small bowel loops likely representing ileus. Interval increase in size LLL nodule .4 to 1.4 mm, w/ small interval increase left upper lobe mass. S/p 1L NS bolus and morphine. Admitted to medicine.     General surgery was consulted for patien'ts ileus. Oncology saw patient and recommended discontinuing Tarceva until ileus resolved. Patient kept NPO, nausea controlled with zofran. Pain controlled fentanyl patch and PO oxycodone, IV morphine pushes as needed. Repeat EKG for chest pain remained unchanged from previous w/ no ischemic changes. Abn x-ray showed no signs of obstruction. Patient's diet was advanced as tolerated. He had no episodes of hemoptysis and remained hemodynamically stable throughout the admission.   Patient is medically stable for discharge with follow-up at [facility] with Dr. Hanks. 75 yo [place]hen[place] speaking M w/ hx HTN, HLD, T2DM, BPH, COPD not on home O2, and Stage IV lung adenocarcinoma dx 4/2016 on daily tarceva p/w progessively worsening N/V over the past week, one episode of hemoptysis and sharp CP. Patient has had similar CP in the past, described as sharp, cutting like sensation, non-radiating, not related to position or worse with exertion. Improved w/ home oxycodone use and morphine in the ED. Pt has had no cough, fevers, sick contacts, SOB, LE swelling, recent travel. Episode of hemoptysis yesterday x1. Small amount of blood, described as table spoon in quantity. Last episode occurred months ago. N/V has been occuring throughout the day, patient unable to keep food and liquid down w/o vomitting. Unrelieved by home anti-nausea medications. Associated w/ intermittent epigastric abdominal pain, non-radiating. Able to pass gas and last bowel movment day PTA. Pt has been on Tarceva for at least the past 9 months per family without significant N/V. Pt treats rash associated w/ Tarceva w/ steroid creams. Patient denies fevers, chills, sick contacts.    In ED vitals: temp 98.1, HR, 118, /131, RR20, 99 RA. CBC, BMP, coags WNL. Total bili 1.6.   lactate 2.9. Trop neg x1.  EKG NSR w/ no ischemic chagnes. CXR w/ linear atelectasis. CTA C/A/P w/ no PE or aortic dissection, mildly dilated small bowel loops likely representing ileus. Interval increase in size LLL nodule .4 to 1.4 mm, w/ small interval increase left upper lobe mass. S/p 1L NS bolus and morphine. Admitted to medicine.     General surgery was consulted for patien'ts ileus. Oncology saw patient and recommended discontinuing Tarceva until ileus resolved. Patient kept NPO, nausea itially  controlled with zofran. Pain controlled fentanyl patch and PO oxycodone, IV morphine pushes as needed. Repeat EKG for chest pain remained unchanged from previous w/ no ischemic changes. Abn x-ray showed no signs of obstruction. Patient's diet was advanced as tolerated. He had no episodes of hemoptysis and remained hemodynamically stable throughout the admission.   Patient after resolution of Ileus had N/V that was intractable preventing disposition. Seen By Gi and Oncology. Treated with Protonix/Malloax/ Zofran/Reglan but eventually responded to decadron 4 mg bid and Zofran before meal.  Patient now optimized for HOME 3/13/17  Patient is medically stable for discharge with follow-up at [facility] with Dr. Hanks. 77 yo [place] speaking M w/ hx HTN, HLD, T2DM, BPH, COPD not on home O2, and Stage IV lung adenocarcinoma dx 4/2016 on daily tarceva p/w progessively worsening N/V over the past week, one episode of hemoptysis and sharp CP. Patient has had similar CP in the past, described as sharp, cutting like sensation, non-radiating, not related to position or worse with exertion. Improved w/ home oxycodone use and morphine in the ED. Pt has had no cough, fevers, sick contacts, SOB, LE swelling, recent travel. Episode of hemoptysis yesterday x1. Small amount of blood, described as table spoon in quantity. Last episode occurred months ago. N/V has been occuring throughout the day, patient unable to keep food and liquid down w/o vomitting. Unrelieved by home anti-nausea medications. Associated w/ intermittent epigastric abdominal pain, non-radiating. Able to pass gas and last bowel movment day PTA. Pt has been on Tarceva for at least the past 9 months per family without significant N/V. Pt treats rash associated w/ Tarceva w/ steroid creams. Patient denies fevers, chills, sick contacts.    In ED vitals: temp 98.1, HR, 118, /131, RR20, 99 RA. CBC, BMP, coags WNL. Total bili 1.6.   lactate 2.9. Trop neg x1.  EKG NSR w/ no ischemic chagnes. CXR w/ linear atelectasis. CTA C/A/P w/ no PE or aortic dissection, mildly dilated small bowel loops likely representing ileus. Interval increase in size LLL nodule .4 to 1.4 mm, w/ small interval increase left upper lobe mass. S/p 1L NS bolus and morphine. Admitted to medicine.     General surgery was consulted for patien'ts ileus. Oncology saw patient and recommended discontinuing Tarceva until ileus resolved. Patient kept NPO, nausea itially  controlled with zofran. Pain controlled fentanyl patch and PO oxycodone, IV morphine pushes as needed. Repeat EKG for chest pain remained unchanged from previous w/ no ischemic changes. Abn x-ray showed no signs of obstruction. Patient's diet was advanced as tolerated. He had no episodes of hemoptysis and remained hemodynamically stable throughout the admission.   Patient after resolution of Ileus had N/V that was intractable preventing disposition. Seen By Gi and Oncology. Treated with Protonix/Malloax/ Zofran/Reglan but eventually responded to decadron 4 mg bid and Zofran before meal.  Patient now optimized for HOME 3/13/17  Patient is medically stable for discharge with follow-up at [facility] with Dr. Hanks.   Hospital course and d/c plans d/w grand-daughter Karen [phone] 75 yo [place]hen[place] speaking M w/ hx HTN, HLD, T2DM, BPH, COPD not on home O2, and Stage IV lung adenocarcinoma dx 4/2016 on daily tarceva p/w progessively worsening N/V over the past week, one episode of hemoptysis and sharp CP. Patient has had similar CP in the past, described as sharp, cutting like sensation, non-radiating, not related to position or worse with exertion. Improved w/ home oxycodone use and morphine in the ED. Pt has had no cough, fevers, sick contacts, SOB, LE swelling, recent travel. Episode of hemoptysis yesterday x1. Small amount of blood, described as table spoon in quantity. Last episode occurred months ago. N/V has been occuring throughout the day, patient unable to keep food and liquid down w/o vomitting. Unrelieved by home anti-nausea medications. Associated w/ intermittent epigastric abdominal pain, non-radiating. Able to pass gas and last bowel movment day PTA. Pt has been on Tarceva for at least the past 9 months per family without significant N/V. Pt treats rash associated w/ Tarceva w/ steroid creams. Patient denies fevers, chills, sick contacts.    In ED vitals: temp 98.1, HR, 118, /131, RR20, 99 RA. CBC, BMP, coags WNL. Total bili 1.6.   lactate 2.9. Trop neg x1.  EKG NSR w/ no ischemic chagnes. CXR w/ linear atelectasis. CTA C/A/P w/ no PE or aortic dissection, mildly dilated small bowel loops likely representing ileus. Interval increase in size LLL nodule .4 to 1.4 mm, w/ small interval increase left upper lobe mass. S/p 1L NS bolus and morphine. Admitted to medicine.     General surgery was consulted for patien'ts ileus. Oncology saw patient and recommended discontinuing Tarceva until ileus resolved. Patient kept NPO, nausea itially  controlled with zofran. Pain controlled fentanyl patch and PO oxycodone, IV morphine pushes as needed. Repeat EKG for chest pain remained unchanged from previous w/ no ischemic changes. Abn x-ray showed no signs of obstruction. Patient's diet was advanced as tolerated. He had no episodes of hemoptysis and remained hemodynamically stable throughout the admission.   Patient after resolution of Ileus had N/V that was intractable preventing disposition. Seen By Gi and Oncology. Treated with Protonix/Malloax/ Zofran/Reglan but eventually responded to decadron 4 mg bid and Zofran before meal.  Patient now optimized for HOME 3/13/17  Patient is medically stable for discharge with follow-up at [facility] with Dr. Hanks.   Hospital course and d/c plans d/w grand-daughter Karen [phone]      I Stop checked: [id number]

## 2017-03-05 NOTE — DISCHARGE NOTE ADULT - CARE PLAN
Principal Discharge DX:	Ileus  Secondary Diagnosis:	Chest pain, unspecified type  Secondary Diagnosis:	Hemoptysis  Secondary Diagnosis:	Type 2 diabetes mellitus without complication, without long-term current use of insulin  Secondary Diagnosis:	Essential hypertension  Secondary Diagnosis:	Benign prostatic hyperplasia, presence of lower urinary tract symptoms unspecified, unspecified morphology  Secondary Diagnosis:	Hyperlipidemia, unspecified hyperlipidemia type Principal Discharge DX:	Ileus  Goal:	medically managed  Instructions for follow-up, activity and diet:	You were admitted for nausea and vomiting found to be caused by abdominal ileus. This resolved by eating nothing for several days and advancing your diet slowly. Your nausea was controlled with zofran as needed and you were given IV fluids. Please try to limit the use of your pain medications and only use them when needed. Please follow-up at [facility].  Secondary Diagnosis:	Chest pain, unspecified type  Goal:	medically managed  Instructions for follow-up, activity and diet:	You were admitted for chest pain that was found to be secondary to your underlying malignancy. You had no pulmonary embolism seen on CAT scan of the chest or cardiac etiology of the chest pain. Please continue to take your pain medications as needed and as prescribed.  Secondary Diagnosis:	Hemoptysis  Goal:	follow-up at [facility] with Dr. Hanks  Instructions for follow-up, activity and diet:	Continue to take tarceva as prescribed and follow-up with Dr. Hanks at [facility].  Secondary Diagnosis:	Type 2 diabetes mellitus without complication, without long-term current use of insulin  Goal:	medically managed  Instructions for follow-up, activity and diet:	Continue to take metformin as prescribed.  Secondary Diagnosis:	Essential hypertension  Goal:	medically managed  Instructions for follow-up, activity and diet:	Continue to take benicar as prescribed.  Secondary Diagnosis:	Benign prostatic hyperplasia, presence of lower urinary tract symptoms unspecified, unspecified morphology  Goal:	medically managed  Instructions for follow-up, activity and diet:	Continue to take flomax as prescribed.  Secondary Diagnosis:	Malignant neoplasm of lung, unspecified laterality, unspecified part of lung  Goal:	follow-up [facility]  Instructions for follow-up, activity and diet:	Continue to take your Tarceva as prescribed. Principal Discharge DX:	Ileus  Goal:	medically managed  Instructions for follow-up, activity and diet:	You were admitted for nausea and vomiting found to be caused by abdominal ileus. This resolved by eating nothing for several days and advancing your diet slowly. Your nausea was controlled with zofran as needed and you were given IV fluids. Please try to limit the use of your pain medications and only use them when needed. Please follow-up at [facility].  Secondary Diagnosis:	Chest pain, unspecified type  Goal:	medically managed  Instructions for follow-up, activity and diet:	You were admitted for chest pain that was found to be secondary to your underlying malignancy. You had no pulmonary embolism seen on CAT scan of the chest or cardiac etiology of the chest pain. Please continue to take your pain medications as needed and as prescribed.  Secondary Diagnosis:	Hemoptysis  Goal:	follow-up at [facility] with Dr. Hanks  Instructions for follow-up, activity and diet:	Continue to take tarceva as prescribed and follow-up with Dr. Hanks at [facility].  Secondary Diagnosis:	Type 2 diabetes mellitus without complication, without long-term current use of insulin  Goal:	medically managed  Instructions for follow-up, activity and diet:	Continue to take metformin as prescribed.  Secondary Diagnosis:	Essential hypertension  Goal:	medically managed  Instructions for follow-up, activity and diet:	Continue to take benicar as prescribed.  Secondary Diagnosis:	Benign prostatic hyperplasia, presence of lower urinary tract symptoms unspecified, unspecified morphology  Goal:	medically managed  Instructions for follow-up, activity and diet:	Continue to take flomax as prescribed.  Secondary Diagnosis:	Malignant neoplasm of lung, unspecified laterality, unspecified part of lung  Goal:	follow-up [facility]  Instructions for follow-up, activity and diet:	Continue to take your Tarceva as prescribed. Principal Discharge DX:	Ileus  Goal:	medically managed  Instructions for follow-up, activity and diet:	You were admitted for nausea and vomiting found to be caused by abdominal ileus. This resolved by eating nothing for several days and advancing your diet slowly. Your nausea was controlled with zofran as needed and you were given IV fluids. Please try to limit the use of your pain medications and only use them when needed. Please follow-up at [facility].  Secondary Diagnosis:	Chest pain, unspecified type  Goal:	medically managed  Instructions for follow-up, activity and diet:	You were admitted for chest pain that was found to be secondary to your underlying malignancy. You had no pulmonary embolism seen on CAT scan of the chest or cardiac etiology of the chest pain. Please continue to take your pain medications as needed and as prescribed.  Secondary Diagnosis:	Hemoptysis  Goal:	follow-up at [facility] with Dr. Hanks  Instructions for follow-up, activity and diet:	Continue to take tarceva as prescribed and follow-up with Dr. Hanks at [facility].  Secondary Diagnosis:	Type 2 diabetes mellitus without complication, without long-term current use of insulin  Goal:	medically managed  Instructions for follow-up, activity and diet:	Continue to take metformin as prescribed.  Secondary Diagnosis:	Essential hypertension  Goal:	medically managed  Instructions for follow-up, activity and diet:	Continue to take benicar as prescribed.  Secondary Diagnosis:	Benign prostatic hyperplasia, presence of lower urinary tract symptoms unspecified, unspecified morphology  Goal:	medically managed  Instructions for follow-up, activity and diet:	Continue to take flomax as prescribed.  Secondary Diagnosis:	Malignant neoplasm of lung, unspecified laterality, unspecified part of lung  Goal:	follow-up [facility]  Instructions for follow-up, activity and diet:	Continue to take your Tarceva as prescribed. Principal Discharge DX:	Ileus  Goal:	medically managed  Instructions for follow-up, activity and diet:	You were admitted for nausea and vomiting found to be caused by abdominal ileus. This resolved by eating nothing for several days and advancing your diet slowly. Your nausea was controlled with zofran as needed and you were given IV fluids. Please try to limit the use of your pain medications and only use them when needed. Please follow-up at [facility]. Continue bowel regimen.  Secondary Diagnosis:	Chest pain, unspecified type  Goal:	medically managed  Instructions for follow-up, activity and diet:	You were admitted for chest pain that was found to be secondary to your underlying malignancy. You had no pulmonary embolism seen on CAT scan of the chest or cardiac etiology of the chest pain. Please continue to take your pain medications as needed and as prescribed.  Secondary Diagnosis:	Hemoptysis  Goal:	follow-up at [facility] with Dr. Hanks  Instructions for follow-up, activity and diet:	Continue to take tarceva as prescribed and follow-up with Dr. Hanks at [facility].  Secondary Diagnosis:	Type 2 diabetes mellitus without complication, without long-term current use of insulin  Goal:	medically managed  Instructions for follow-up, activity and diet:	Continue to take metformin as prescribed.  Secondary Diagnosis:	Essential hypertension  Goal:	medically managed  Instructions for follow-up, activity and diet:	Continue to take benicar as prescribed.  Secondary Diagnosis:	Benign prostatic hyperplasia, presence of lower urinary tract symptoms unspecified, unspecified morphology  Goal:	medically managed  Instructions for follow-up, activity and diet:	Continue to take flomax as prescribed.  Secondary Diagnosis:	Malignant neoplasm of lung, unspecified laterality, unspecified part of lung  Goal:	follow-up [facility]  Instructions for follow-up, activity and diet:	Continue to take your Tarceva as prescribed. Principal Discharge DX:	Ileus  Goal:	medically managed  Instructions for follow-up, activity and diet:	You were admitted for nausea and vomiting found to be caused by abdominal ileus. This resolved by eating nothing for several days and advancing your diet slowly. Your nausea was controlled with zofran with meals and  as needed and you were given IV fluids. Please try to limit the use of your pain medications and only use them when needed. Please follow-up at [facility]. Continue bowel regimen.  Secondary Diagnosis:	Chest pain, unspecified type  Goal:	medically managed  Instructions for follow-up, activity and diet:	You were admitted for chest pain that was found to be secondary to your underlying malignancy. You had no pulmonary embolism seen on CAT scan of the chest or cardiac etiology of the chest pain. Please continue to take your pain medications as needed and as prescribed.  Secondary Diagnosis:	Hemoptysis  Goal:	follow-up at [facility] with Dr. Hanks  Instructions for follow-up, activity and diet:	Continue to take tarceva as prescribed and follow-up with Dr. Hanks at [facility].  Secondary Diagnosis:	Type 2 diabetes mellitus without complication, without long-term current use of insulin  Goal:	medically managed  Instructions for follow-up, activity and diet:	Continue to take metformin as prescribed.  Secondary Diagnosis:	Essential hypertension  Goal:	medically managed  Instructions for follow-up, activity and diet:	Continue to take benicar as prescribed.  Secondary Diagnosis:	Benign prostatic hyperplasia, presence of lower urinary tract symptoms unspecified, unspecified morphology  Goal:	medically managed  Instructions for follow-up, activity and diet:	Continue to take flomax as prescribed.  Secondary Diagnosis:	Malignant neoplasm of lung, unspecified laterality, unspecified part of lung  Goal:	follow-up [facility]  Instructions for follow-up, activity and diet:	Continue to take your Tarceva as prescribed. Principal Discharge DX:	Ileus  Goal:	medically managed  Instructions for follow-up, activity and diet:	You were admitted for nausea and vomiting found to be caused by abdominal ileus. This resolved by eating nothing for several days and advancing your diet slowly. Your nausea was controlled with zofran with meals and  as needed and you were given IV fluids. Please try to limit the use of your pain medications and only use them when needed. Please follow-up at [facility]. Continue bowel regimen.  Secondary Diagnosis:	Chest pain, unspecified type  Goal:	medically managed  Instructions for follow-up, activity and diet:	You were admitted for chest pain that was found to be secondary to your underlying malignancy. You had no pulmonary embolism seen on CAT scan of the chest or cardiac etiology of the chest pain. Please continue to take your pain medications as needed and as prescribed.  Secondary Diagnosis:	Hemoptysis  Goal:	follow-up at [facility] with Dr. Hanks  Instructions for follow-up, activity and diet:	Continue to take tarceva as prescribed and follow-up with Dr. Hanks at [facility].  Secondary Diagnosis:	Type 2 diabetes mellitus without complication, without long-term current use of insulin  Goal:	medically managed  Instructions for follow-up, activity and diet:	Continue to take metformin as prescribed.  Secondary Diagnosis:	Essential hypertension  Goal:	medically managed  Instructions for follow-up, activity and diet:	Continue to take benicar as prescribed.  Secondary Diagnosis:	Benign prostatic hyperplasia, presence of lower urinary tract symptoms unspecified, unspecified morphology  Goal:	medically managed  Instructions for follow-up, activity and diet:	Continue to take flomax as prescribed.  Secondary Diagnosis:	Malignant neoplasm of lung, unspecified laterality, unspecified part of lung  Goal:	follow-up [facility]  Instructions for follow-up, activity and diet:	Continue to take your Tarceva as prescribed. Principal Discharge DX:	Ileus  Goal:	medically managed  Instructions for follow-up, activity and diet:	You were admitted for nausea and vomiting found to be caused by abdominal ileus. This resolved by eating nothing for several days and advancing your diet slowly. Your nausea was controlled with zofran with meals and  as needed and you were given IV fluids. Please try to limit the use of your pain medications and only use them when needed. Please follow-up at [facility]. Continue bowel regimen.  Secondary Diagnosis:	Chest pain, unspecified type  Goal:	medically managed  Instructions for follow-up, activity and diet:	You were admitted for chest pain that was found to be secondary to your underlying malignancy. You had no pulmonary embolism seen on CAT scan of the chest or cardiac etiology of the chest pain. Please continue to take your pain medications as needed and as prescribed.  Secondary Diagnosis:	Hemoptysis  Goal:	follow-up at [facility] with Dr. Hanks  Instructions for follow-up, activity and diet:	Continue to take tarceva as prescribed and follow-up with Dr. Hanks at [facility].  Secondary Diagnosis:	Type 2 diabetes mellitus without complication, without long-term current use of insulin  Goal:	medically managed  Instructions for follow-up, activity and diet:	Continue to take metformin as prescribed.  Secondary Diagnosis:	Essential hypertension  Goal:	medically managed  Instructions for follow-up, activity and diet:	Continue to take benicar as prescribed.  Secondary Diagnosis:	Benign prostatic hyperplasia, presence of lower urinary tract symptoms unspecified, unspecified morphology  Goal:	medically managed  Instructions for follow-up, activity and diet:	Continue to take flomax as prescribed.  Secondary Diagnosis:	Malignant neoplasm of lung, unspecified laterality, unspecified part of lung  Goal:	follow-up [facility]  Instructions for follow-up, activity and diet:	Continue to take your Tarceva as prescribed. Principal Discharge DX:	Ileus  Goal:	medically managed  Instructions for follow-up, activity and diet:	You were admitted for nausea and vomiting found to be caused by abdominal ileus. This resolved by eating nothing for several days and advancing your diet slowly. Your nausea was controlled with zofran with meals and  as needed and you were given IV fluids. Please try to limit the use of your pain medications and only use them when needed. Please follow-up at [facility]. Continue bowel regimen.  Secondary Diagnosis:	Chest pain, unspecified type  Goal:	medically managed  Instructions for follow-up, activity and diet:	You were admitted for chest pain that was found to be secondary to your underlying malignancy. You had no pulmonary embolism seen on CAT scan of the chest or cardiac etiology of the chest pain. Please continue to take your pain medications as needed and as prescribed.  Secondary Diagnosis:	Hemoptysis  Goal:	follow-up at [facility] with Dr. Hanks  Instructions for follow-up, activity and diet:	Continue to take tarceva as prescribed and follow-up with Dr. Hanks at [facility].  Secondary Diagnosis:	Type 2 diabetes mellitus without complication, without long-term current use of insulin  Goal:	medically managed  Instructions for follow-up, activity and diet:	Continue to take metformin as prescribed.  Secondary Diagnosis:	Essential hypertension  Goal:	medically managed  Instructions for follow-up, activity and diet:	Continue to take benicar as prescribed.  Secondary Diagnosis:	Benign prostatic hyperplasia, presence of lower urinary tract symptoms unspecified, unspecified morphology  Goal:	medically managed  Instructions for follow-up, activity and diet:	Continue to take flomax as prescribed.  Secondary Diagnosis:	Malignant neoplasm of lung, unspecified laterality, unspecified part of lung  Goal:	follow-up [facility]  Instructions for follow-up, activity and diet:	Continue to take your Tarceva as prescribed. Principal Discharge DX:	Ileus  Goal:	medically managed  Instructions for follow-up, activity and diet:	You were admitted for nausea and vomiting found to be caused by abdominal ileus. This resolved by eating nothing for several days and advancing your diet slowly. Your nausea was controlled with zofran with meals and  as needed and you were given IV fluids. Please try to limit the use of your pain medications and only use them when needed. Please follow-up at [facility]. Continue bowel regimen.  Secondary Diagnosis:	Chest pain, unspecified type  Goal:	medically managed  Instructions for follow-up, activity and diet:	You were admitted for chest pain that was found to be secondary to your underlying malignancy. You had no pulmonary embolism seen on CAT scan of the chest or cardiac etiology of the chest pain. Please continue to take your pain medications as needed and as prescribed.  Secondary Diagnosis:	Hemoptysis  Goal:	follow-up at [facility] with Dr. Hanks  Instructions for follow-up, activity and diet:	Continue to take tarceva as prescribed and follow-up with Dr. Hanks at [facility].  Secondary Diagnosis:	Type 2 diabetes mellitus without complication, without long-term current use of insulin  Goal:	medically managed  Instructions for follow-up, activity and diet:	Continue to take metformin as prescribed.  Secondary Diagnosis:	Essential hypertension  Goal:	medically managed  Instructions for follow-up, activity and diet:	Continue to take benicar as prescribed.  Secondary Diagnosis:	Benign prostatic hyperplasia, presence of lower urinary tract symptoms unspecified, unspecified morphology  Goal:	medically managed  Instructions for follow-up, activity and diet:	Continue to take flomax as prescribed.  Secondary Diagnosis:	Malignant neoplasm of lung, unspecified laterality, unspecified part of lung  Goal:	follow-up [facility]  Instructions for follow-up, activity and diet:	Continue to take your Tarceva as prescribed. Principal Discharge DX:	Ileus  Goal:	medically managed  Instructions for follow-up, activity and diet:	You were admitted for nausea and vomiting found to be caused by abdominal ileus. This resolved by eating nothing for several days and advancing your diet slowly. Your nausea was controlled with zofran with meals and  as needed and you were given IV fluids. Please try to limit the use of your pain medications and only use them when needed. Please follow-up at [facility]. Continue bowel regimen.  Secondary Diagnosis:	Chest pain, unspecified type  Goal:	medically managed  Instructions for follow-up, activity and diet:	You were admitted for chest pain that was found to be secondary to your underlying malignancy. You had no pulmonary embolism seen on CAT scan of the chest or cardiac etiology of the chest pain. Please continue to take your pain medications as needed and as prescribed.  Secondary Diagnosis:	Hemoptysis  Goal:	follow-up at [facility] with Dr. Hanks  Instructions for follow-up, activity and diet:	Continue to take tarceva as prescribed and follow-up with Dr. Hanks at [facility].  Secondary Diagnosis:	Type 2 diabetes mellitus without complication, without long-term current use of insulin  Goal:	medically managed  Instructions for follow-up, activity and diet:	Continue to take metformin as prescribed.  Secondary Diagnosis:	Essential hypertension  Goal:	medically managed  Instructions for follow-up, activity and diet:	Continue to take benicar as prescribed.  Secondary Diagnosis:	Benign prostatic hyperplasia, presence of lower urinary tract symptoms unspecified, unspecified morphology  Goal:	medically managed  Instructions for follow-up, activity and diet:	Continue to take flomax as prescribed.  Secondary Diagnosis:	Malignant neoplasm of lung, unspecified laterality, unspecified part of lung  Goal:	follow-up [facility]  Instructions for follow-up, activity and diet:	Continue to take your Tarceva as prescribed. Principal Discharge DX:	Ileus  Goal:	medically managed  Instructions for follow-up, activity and diet:	You were admitted for nausea and vomiting found to be caused by abdominal ileus. This resolved by eating nothing for several days and advancing your diet slowly. Your nausea was controlled with zofran with meals and  as needed and you were given IV fluids. Please try to limit the use of your pain medications and only use them when needed. Please follow-up at [facility]. Continue bowel regimen.  Secondary Diagnosis:	Chest pain, unspecified type  Goal:	medically managed  Instructions for follow-up, activity and diet:	You were admitted for chest pain that was found to be secondary to your underlying malignancy. You had no pulmonary embolism seen on CAT scan of the chest or cardiac etiology of the chest pain. Please continue to take your pain medications as needed and as prescribed.  Secondary Diagnosis:	Hemoptysis  Goal:	follow-up at [facility] with Dr. Hanks  Instructions for follow-up, activity and diet:	Continue to take tarceva as prescribed and follow-up with Dr. Hanks at [facility].  Secondary Diagnosis:	Type 2 diabetes mellitus without complication, without long-term current use of insulin  Goal:	medically managed  Instructions for follow-up, activity and diet:	Continue to take metformin as prescribed.  Secondary Diagnosis:	Essential hypertension  Goal:	medically managed  Instructions for follow-up, activity and diet:	Continue to take benicar as prescribed.  Secondary Diagnosis:	Benign prostatic hyperplasia, presence of lower urinary tract symptoms unspecified, unspecified morphology  Goal:	medically managed  Instructions for follow-up, activity and diet:	Continue to take flomax as prescribed.  Secondary Diagnosis:	Malignant neoplasm of lung, unspecified laterality, unspecified part of lung  Goal:	follow-up [facility]  Instructions for follow-up, activity and diet:	Continue to take your Tarceva as prescribed.

## 2017-03-05 NOTE — PROVIDER CONTACT NOTE (MEDICATION) - BACKGROUND
High risk patient, as determined by history of HTN, HLD, T2DM, BPH, COPD not on home O2, and Stage IV lung adenocarcinoma (diagnosed 4/2016) on daily Tarceva  to be admitted to [facility].
chest pain continued complaint pain med with some relief but pain level at present 6/10
freq c/o pain
 said he will check with resident to be sure it is to be given.

## 2017-03-05 NOTE — PROVIDER CONTACT NOTE (MEDICATION) - SITUATION
Pharmacist performed medication history on behalf of provider.
bp elevated 189/93
bp elevated pt c/o pain
pt requesting additiional pain medicaton
K= 3.6  Mg=1.7

## 2017-03-05 NOTE — DISCHARGE NOTE ADULT - PROVIDER TOKENS
FREE:[LAST:[Demario],FIRST:[Cydney],PHONE:[(   )    -],FAX:[(   )    -],ADDRESS:[[facility]]] FREE:[LAST:[Demario],FIRST:[Cydney],PHONE:[(   )    -],FAX:[(   )    -],ADDRESS:[[facility]]],FREE:[LAST:[[facility]],PHONE:[phone]],FAX:[(   )    -]]

## 2017-03-05 NOTE — DISCHARGE NOTE ADULT - PATIENT PORTAL LINK FT
“You can access the Follow[facility] Patient Portal, offered by [facility], by registering with the following website: [email or web address]”

## 2017-03-05 NOTE — PROVIDER CONTACT NOTE (OTHER) - DATE AND TIME:
02-Mar-2017 21:00
03-Mar-2017 06:45
04-Mar-2017
05-Mar-2017 10:00
05-Mar-2017 15:00
03-Mar-2017 10:00

## 2017-03-06 LAB
BUN SERPL-MCNC: 5 MG/DL — LOW (ref 7–23)
CALCIUM SERPL-MCNC: 8.7 MG/DL — SIGNIFICANT CHANGE UP (ref 8.4–10.5)
CHLORIDE SERPL-SCNC: 107 MMOL/L — SIGNIFICANT CHANGE UP (ref 98–107)
CO2 SERPL-SCNC: 24 MMOL/L — SIGNIFICANT CHANGE UP (ref 22–31)
CREAT SERPL-MCNC: 0.87 MG/DL — SIGNIFICANT CHANGE UP (ref 0.5–1.3)
GLUCOSE SERPL-MCNC: 100 MG/DL — HIGH (ref 70–99)
HCT VFR BLD CALC: 41.7 % — SIGNIFICANT CHANGE UP (ref 39–50)
HGB BLD-MCNC: 13.5 G/DL — SIGNIFICANT CHANGE UP (ref 13–17)
MAGNESIUM SERPL-MCNC: 1.9 MG/DL — SIGNIFICANT CHANGE UP (ref 1.6–2.6)
MCHC RBC-ENTMCNC: 29.6 PG — SIGNIFICANT CHANGE UP (ref 27–34)
MCHC RBC-ENTMCNC: 32.4 % — SIGNIFICANT CHANGE UP (ref 32–36)
MCV RBC AUTO: 91.4 FL — SIGNIFICANT CHANGE UP (ref 80–100)
PHOSPHATE SERPL-MCNC: 3 MG/DL — SIGNIFICANT CHANGE UP (ref 2.5–4.5)
PLATELET # BLD AUTO: 140 K/UL — LOW (ref 150–400)
PMV BLD: 10.5 FL — SIGNIFICANT CHANGE UP (ref 7–13)
POTASSIUM SERPL-MCNC: 3.8 MMOL/L — SIGNIFICANT CHANGE UP (ref 3.5–5.3)
POTASSIUM SERPL-SCNC: 3.8 MMOL/L — SIGNIFICANT CHANGE UP (ref 3.5–5.3)
RBC # BLD: 4.56 M/UL — SIGNIFICANT CHANGE UP (ref 4.2–5.8)
RBC # FLD: 13.3 % — SIGNIFICANT CHANGE UP (ref 10.3–14.5)
SODIUM SERPL-SCNC: 144 MMOL/L — SIGNIFICANT CHANGE UP (ref 135–145)
WBC # BLD: 5.81 K/UL — SIGNIFICANT CHANGE UP (ref 3.8–10.5)
WBC # FLD AUTO: 5.81 K/UL — SIGNIFICANT CHANGE UP (ref 3.8–10.5)

## 2017-03-06 PROCEDURE — 99223 1ST HOSP IP/OBS HIGH 75: CPT

## 2017-03-06 PROCEDURE — 99233 SBSQ HOSP IP/OBS HIGH 50: CPT

## 2017-03-06 RX ADMIN — ENOXAPARIN SODIUM 40 MILLIGRAM(S): 100 INJECTION SUBCUTANEOUS at 22:45

## 2017-03-06 RX ADMIN — OXYCODONE HYDROCHLORIDE 10 MILLIGRAM(S): 5 TABLET ORAL at 19:11

## 2017-03-06 RX ADMIN — FENTANYL CITRATE 1 PATCH: 50 INJECTION INTRAVENOUS at 05:59

## 2017-03-06 RX ADMIN — OXYCODONE HYDROCHLORIDE 10 MILLIGRAM(S): 5 TABLET ORAL at 23:59

## 2017-03-06 RX ADMIN — OXYCODONE HYDROCHLORIDE 10 MILLIGRAM(S): 5 TABLET ORAL at 18:11

## 2017-03-06 RX ADMIN — CLINDAMYCIN PHOSPHATE GEL USP, 1% 1 APPLICATION(S): 10 GEL TOPICAL at 18:11

## 2017-03-06 RX ADMIN — CLINDAMYCIN PHOSPHATE GEL USP, 1% 1 APPLICATION(S): 10 GEL TOPICAL at 22:46

## 2017-03-06 RX ADMIN — SENNA PLUS 2 TABLET(S): 8.6 TABLET ORAL at 22:46

## 2017-03-06 RX ADMIN — Medication 1 GRAM(S): at 22:46

## 2017-03-06 RX ADMIN — OXYCODONE HYDROCHLORIDE 5 MILLIGRAM(S): 5 TABLET ORAL at 06:53

## 2017-03-06 RX ADMIN — OXYCODONE HYDROCHLORIDE 5 MILLIGRAM(S): 5 TABLET ORAL at 05:53

## 2017-03-06 RX ADMIN — Medication 1 APPLICATION(S): at 22:46

## 2017-03-06 RX ADMIN — LOSARTAN POTASSIUM 25 MILLIGRAM(S): 100 TABLET, FILM COATED ORAL at 05:57

## 2017-03-06 RX ADMIN — OXYCODONE HYDROCHLORIDE 5 MILLIGRAM(S): 5 TABLET ORAL at 00:19

## 2017-03-06 RX ADMIN — Medication 1 APPLICATION(S): at 08:43

## 2017-03-06 RX ADMIN — Medication 1 GRAM(S): at 13:29

## 2017-03-06 RX ADMIN — OXYCODONE HYDROCHLORIDE 10 MILLIGRAM(S): 5 TABLET ORAL at 13:10

## 2017-03-06 RX ADMIN — SIMVASTATIN 20 MILLIGRAM(S): 20 TABLET, FILM COATED ORAL at 22:46

## 2017-03-06 RX ADMIN — CLINDAMYCIN PHOSPHATE GEL USP, 1% 1 APPLICATION(S): 10 GEL TOPICAL at 08:42

## 2017-03-06 RX ADMIN — OXYCODONE HYDROCHLORIDE 10 MILLIGRAM(S): 5 TABLET ORAL at 12:10

## 2017-03-06 RX ADMIN — Medication 100 MILLIGRAM(S): at 05:57

## 2017-03-06 RX ADMIN — Medication 1 GRAM(S): at 05:57

## 2017-03-06 RX ADMIN — Medication 100 MILLIGRAM(S): at 18:11

## 2017-03-06 RX ADMIN — Medication 1 APPLICATION(S): at 18:12

## 2017-03-06 RX ADMIN — LORATADINE 10 MILLIGRAM(S): 10 TABLET ORAL at 12:10

## 2017-03-06 RX ADMIN — TAMSULOSIN HYDROCHLORIDE 0.4 MILLIGRAM(S): 0.4 CAPSULE ORAL at 22:46

## 2017-03-07 LAB
BASOPHILS # BLD AUTO: 0.02 K/UL — SIGNIFICANT CHANGE UP (ref 0–0.2)
BASOPHILS NFR BLD AUTO: 0.3 % — SIGNIFICANT CHANGE UP (ref 0–2)
BUN SERPL-MCNC: 8 MG/DL — SIGNIFICANT CHANGE UP (ref 7–23)
CALCIUM SERPL-MCNC: 8.9 MG/DL — SIGNIFICANT CHANGE UP (ref 8.4–10.5)
CHLORIDE SERPL-SCNC: 105 MMOL/L — SIGNIFICANT CHANGE UP (ref 98–107)
CO2 SERPL-SCNC: 25 MMOL/L — SIGNIFICANT CHANGE UP (ref 22–31)
CREAT SERPL-MCNC: 0.88 MG/DL — SIGNIFICANT CHANGE UP (ref 0.5–1.3)
EOSINOPHIL # BLD AUTO: 0.19 K/UL — SIGNIFICANT CHANGE UP (ref 0–0.5)
EOSINOPHIL NFR BLD AUTO: 2.6 % — SIGNIFICANT CHANGE UP (ref 0–6)
GLUCOSE SERPL-MCNC: 120 MG/DL — HIGH (ref 70–99)
HCT VFR BLD CALC: 41.9 % — SIGNIFICANT CHANGE UP (ref 39–50)
HGB BLD-MCNC: 13.7 G/DL — SIGNIFICANT CHANGE UP (ref 13–17)
IMM GRANULOCYTES NFR BLD AUTO: 0.3 % — SIGNIFICANT CHANGE UP (ref 0–1.5)
LYMPHOCYTES # BLD AUTO: 2.24 K/UL — SIGNIFICANT CHANGE UP (ref 1–3.3)
LYMPHOCYTES # BLD AUTO: 30.3 % — SIGNIFICANT CHANGE UP (ref 13–44)
MCHC RBC-ENTMCNC: 30.1 PG — SIGNIFICANT CHANGE UP (ref 27–34)
MCHC RBC-ENTMCNC: 32.7 % — SIGNIFICANT CHANGE UP (ref 32–36)
MCV RBC AUTO: 92.1 FL — SIGNIFICANT CHANGE UP (ref 80–100)
MONOCYTES # BLD AUTO: 0.43 K/UL — SIGNIFICANT CHANGE UP (ref 0–0.9)
MONOCYTES NFR BLD AUTO: 5.8 % — SIGNIFICANT CHANGE UP (ref 2–14)
NEUTROPHILS # BLD AUTO: 4.5 K/UL — SIGNIFICANT CHANGE UP (ref 1.8–7.4)
NEUTROPHILS NFR BLD AUTO: 60.7 % — SIGNIFICANT CHANGE UP (ref 43–77)
PLATELET # BLD AUTO: 144 K/UL — LOW (ref 150–400)
PMV BLD: 10.8 FL — SIGNIFICANT CHANGE UP (ref 7–13)
POTASSIUM SERPL-MCNC: 4 MMOL/L — SIGNIFICANT CHANGE UP (ref 3.5–5.3)
POTASSIUM SERPL-SCNC: 4 MMOL/L — SIGNIFICANT CHANGE UP (ref 3.5–5.3)
RBC # BLD: 4.55 M/UL — SIGNIFICANT CHANGE UP (ref 4.2–5.8)
RBC # FLD: 13.1 % — SIGNIFICANT CHANGE UP (ref 10.3–14.5)
SODIUM SERPL-SCNC: 146 MMOL/L — HIGH (ref 135–145)
WBC # BLD: 7.4 K/UL — SIGNIFICANT CHANGE UP (ref 3.8–10.5)
WBC # FLD AUTO: 7.4 K/UL — SIGNIFICANT CHANGE UP (ref 3.8–10.5)

## 2017-03-07 PROCEDURE — 99232 SBSQ HOSP IP/OBS MODERATE 35: CPT

## 2017-03-07 PROCEDURE — 99233 SBSQ HOSP IP/OBS HIGH 50: CPT

## 2017-03-07 RX ORDER — DOCUSATE SODIUM 100 MG
100 CAPSULE ORAL THREE TIMES A DAY
Qty: 0 | Refills: 0 | Status: DISCONTINUED | OUTPATIENT
Start: 2017-03-07 | End: 2017-03-13

## 2017-03-07 RX ORDER — FENTANYL CITRATE 50 UG/ML
1 INJECTION INTRAVENOUS
Qty: 0 | Refills: 0 | Status: DISCONTINUED | OUTPATIENT
Start: 2017-03-07 | End: 2017-03-09

## 2017-03-07 RX ORDER — OXYCODONE HYDROCHLORIDE 5 MG/1
5 TABLET ORAL ONCE
Qty: 0 | Refills: 0 | Status: DISCONTINUED | OUTPATIENT
Start: 2017-03-07 | End: 2017-03-07

## 2017-03-07 RX ORDER — POLYETHYLENE GLYCOL 3350 17 G/17G
17 POWDER, FOR SOLUTION ORAL DAILY
Qty: 0 | Refills: 0 | Status: DISCONTINUED | OUTPATIENT
Start: 2017-03-07 | End: 2017-03-08

## 2017-03-07 RX ORDER — MORPHINE SULFATE 50 MG/1
4 CAPSULE, EXTENDED RELEASE ORAL ONCE
Qty: 0 | Refills: 0 | Status: DISCONTINUED | OUTPATIENT
Start: 2017-03-07 | End: 2017-03-07

## 2017-03-07 RX ORDER — PANTOPRAZOLE SODIUM 20 MG/1
40 TABLET, DELAYED RELEASE ORAL
Qty: 0 | Refills: 0 | Status: DISCONTINUED | OUTPATIENT
Start: 2017-03-07 | End: 2017-03-13

## 2017-03-07 RX ORDER — LIDOCAINE 4 G/100G
1 CREAM TOPICAL DAILY
Qty: 0 | Refills: 0 | Status: DISCONTINUED | OUTPATIENT
Start: 2017-03-07 | End: 2017-03-13

## 2017-03-07 RX ORDER — SUCRALFATE 1 G
1 TABLET ORAL
Qty: 0 | Refills: 0 | Status: DISCONTINUED | OUTPATIENT
Start: 2017-03-07 | End: 2017-03-09

## 2017-03-07 RX ADMIN — CLINDAMYCIN PHOSPHATE GEL USP, 1% 1 APPLICATION(S): 10 GEL TOPICAL at 05:51

## 2017-03-07 RX ADMIN — LIDOCAINE 1 PATCH: 4 CREAM TOPICAL at 22:36

## 2017-03-07 RX ADMIN — CLINDAMYCIN PHOSPHATE GEL USP, 1% 1 APPLICATION(S): 10 GEL TOPICAL at 22:37

## 2017-03-07 RX ADMIN — Medication 100 MILLIGRAM(S): at 22:37

## 2017-03-07 RX ADMIN — LORATADINE 10 MILLIGRAM(S): 10 TABLET ORAL at 12:18

## 2017-03-07 RX ADMIN — MORPHINE SULFATE 4 MILLIGRAM(S): 50 CAPSULE, EXTENDED RELEASE ORAL at 10:19

## 2017-03-07 RX ADMIN — Medication 1 GRAM(S): at 17:23

## 2017-03-07 RX ADMIN — LIDOCAINE 1 PATCH: 4 CREAM TOPICAL at 10:19

## 2017-03-07 RX ADMIN — SENNA PLUS 2 TABLET(S): 8.6 TABLET ORAL at 22:37

## 2017-03-07 RX ADMIN — Medication 1 APPLICATION(S): at 05:51

## 2017-03-07 RX ADMIN — Medication 1 GRAM(S): at 12:17

## 2017-03-07 RX ADMIN — OXYCODONE HYDROCHLORIDE 10 MILLIGRAM(S): 5 TABLET ORAL at 20:28

## 2017-03-07 RX ADMIN — Medication 1 APPLICATION(S): at 13:01

## 2017-03-07 RX ADMIN — OXYCODONE HYDROCHLORIDE 10 MILLIGRAM(S): 5 TABLET ORAL at 08:09

## 2017-03-07 RX ADMIN — ENOXAPARIN SODIUM 40 MILLIGRAM(S): 100 INJECTION SUBCUTANEOUS at 22:37

## 2017-03-07 RX ADMIN — FENTANYL CITRATE 1 PATCH: 50 INJECTION INTRAVENOUS at 12:59

## 2017-03-07 RX ADMIN — Medication 1 GRAM(S): at 05:52

## 2017-03-07 RX ADMIN — Medication 100 MILLIGRAM(S): at 05:51

## 2017-03-07 RX ADMIN — TAMSULOSIN HYDROCHLORIDE 0.4 MILLIGRAM(S): 0.4 CAPSULE ORAL at 22:37

## 2017-03-07 RX ADMIN — SIMVASTATIN 20 MILLIGRAM(S): 20 TABLET, FILM COATED ORAL at 22:37

## 2017-03-07 RX ADMIN — POLYETHYLENE GLYCOL 3350 17 GRAM(S): 17 POWDER, FOR SOLUTION ORAL at 12:18

## 2017-03-07 RX ADMIN — MORPHINE SULFATE 4 MILLIGRAM(S): 50 CAPSULE, EXTENDED RELEASE ORAL at 10:34

## 2017-03-07 RX ADMIN — ONDANSETRON 4 MILLIGRAM(S): 8 TABLET, FILM COATED ORAL at 10:25

## 2017-03-07 RX ADMIN — Medication 100 MILLIGRAM(S): at 13:01

## 2017-03-07 RX ADMIN — LOSARTAN POTASSIUM 25 MILLIGRAM(S): 100 TABLET, FILM COATED ORAL at 05:52

## 2017-03-07 RX ADMIN — CLINDAMYCIN PHOSPHATE GEL USP, 1% 1 APPLICATION(S): 10 GEL TOPICAL at 13:01

## 2017-03-07 RX ADMIN — FENTANYL CITRATE 1 PATCH: 50 INJECTION INTRAVENOUS at 13:00

## 2017-03-07 RX ADMIN — Medication 1 APPLICATION(S): at 22:37

## 2017-03-07 RX ADMIN — OXYCODONE HYDROCHLORIDE 10 MILLIGRAM(S): 5 TABLET ORAL at 00:29

## 2017-03-07 RX ADMIN — OXYCODONE HYDROCHLORIDE 10 MILLIGRAM(S): 5 TABLET ORAL at 19:28

## 2017-03-07 RX ADMIN — OXYCODONE HYDROCHLORIDE 10 MILLIGRAM(S): 5 TABLET ORAL at 07:39

## 2017-03-08 PROCEDURE — 99233 SBSQ HOSP IP/OBS HIGH 50: CPT | Mod: GC

## 2017-03-08 PROCEDURE — 74000: CPT | Mod: 26

## 2017-03-08 PROCEDURE — 99233 SBSQ HOSP IP/OBS HIGH 50: CPT

## 2017-03-08 RX ORDER — MORPHINE SULFATE 50 MG/1
4 CAPSULE, EXTENDED RELEASE ORAL
Qty: 0 | Refills: 0 | Status: DISCONTINUED | OUTPATIENT
Start: 2017-03-08 | End: 2017-03-09

## 2017-03-08 RX ORDER — FAMOTIDINE 10 MG/ML
20 INJECTION INTRAVENOUS
Qty: 0 | Refills: 0 | Status: DISCONTINUED | OUTPATIENT
Start: 2017-03-08 | End: 2017-03-09

## 2017-03-08 RX ORDER — POLYETHYLENE GLYCOL 3350 17 G/17G
17 POWDER, FOR SOLUTION ORAL
Qty: 0 | Refills: 0 | Status: DISCONTINUED | OUTPATIENT
Start: 2017-03-08 | End: 2017-03-13

## 2017-03-08 RX ORDER — ONDANSETRON 8 MG/1
4 TABLET, FILM COATED ORAL EVERY 6 HOURS
Qty: 0 | Refills: 0 | Status: DISCONTINUED | OUTPATIENT
Start: 2017-03-08 | End: 2017-03-08

## 2017-03-08 RX ORDER — METOCLOPRAMIDE HCL 10 MG
10 TABLET ORAL EVERY 6 HOURS
Qty: 0 | Refills: 0 | Status: DISCONTINUED | OUTPATIENT
Start: 2017-03-08 | End: 2017-03-13

## 2017-03-08 RX ADMIN — POLYETHYLENE GLYCOL 3350 17 GRAM(S): 17 POWDER, FOR SOLUTION ORAL at 18:37

## 2017-03-08 RX ADMIN — Medication 1 APPLICATION(S): at 21:44

## 2017-03-08 RX ADMIN — Medication 1 GRAM(S): at 12:23

## 2017-03-08 RX ADMIN — Medication 1 APPLICATION(S): at 06:40

## 2017-03-08 RX ADMIN — ENOXAPARIN SODIUM 40 MILLIGRAM(S): 100 INJECTION SUBCUTANEOUS at 21:44

## 2017-03-08 RX ADMIN — Medication 100 MILLIGRAM(S): at 06:40

## 2017-03-08 RX ADMIN — FAMOTIDINE 20 MILLIGRAM(S): 10 INJECTION INTRAVENOUS at 18:37

## 2017-03-08 RX ADMIN — TAMSULOSIN HYDROCHLORIDE 0.4 MILLIGRAM(S): 0.4 CAPSULE ORAL at 21:44

## 2017-03-08 RX ADMIN — Medication 1 GRAM(S): at 06:40

## 2017-03-08 RX ADMIN — CLINDAMYCIN PHOSPHATE GEL USP, 1% 1 APPLICATION(S): 10 GEL TOPICAL at 06:39

## 2017-03-08 RX ADMIN — Medication 1 APPLICATION(S): at 14:22

## 2017-03-08 RX ADMIN — OXYCODONE HYDROCHLORIDE 5 MILLIGRAM(S): 5 TABLET ORAL at 18:36

## 2017-03-08 RX ADMIN — ONDANSETRON 4 MILLIGRAM(S): 8 TABLET, FILM COATED ORAL at 09:08

## 2017-03-08 RX ADMIN — LORATADINE 10 MILLIGRAM(S): 10 TABLET ORAL at 12:23

## 2017-03-08 RX ADMIN — Medication 10 MILLIGRAM(S): at 18:37

## 2017-03-08 RX ADMIN — OXYCODONE HYDROCHLORIDE 5 MILLIGRAM(S): 5 TABLET ORAL at 19:30

## 2017-03-08 RX ADMIN — Medication 100 MILLIGRAM(S): at 21:44

## 2017-03-08 RX ADMIN — OXYCODONE HYDROCHLORIDE 5 MILLIGRAM(S): 5 TABLET ORAL at 13:04

## 2017-03-08 RX ADMIN — OXYCODONE HYDROCHLORIDE 10 MILLIGRAM(S): 5 TABLET ORAL at 06:46

## 2017-03-08 RX ADMIN — OXYCODONE HYDROCHLORIDE 5 MILLIGRAM(S): 5 TABLET ORAL at 12:23

## 2017-03-08 RX ADMIN — LIDOCAINE 1 PATCH: 4 CREAM TOPICAL at 23:25

## 2017-03-08 RX ADMIN — SENNA PLUS 2 TABLET(S): 8.6 TABLET ORAL at 21:44

## 2017-03-08 RX ADMIN — CLINDAMYCIN PHOSPHATE GEL USP, 1% 1 APPLICATION(S): 10 GEL TOPICAL at 21:44

## 2017-03-08 RX ADMIN — CLINDAMYCIN PHOSPHATE GEL USP, 1% 1 APPLICATION(S): 10 GEL TOPICAL at 14:24

## 2017-03-08 RX ADMIN — Medication 10 MILLIGRAM(S): at 21:44

## 2017-03-08 RX ADMIN — LOSARTAN POTASSIUM 25 MILLIGRAM(S): 100 TABLET, FILM COATED ORAL at 06:40

## 2017-03-08 RX ADMIN — PANTOPRAZOLE SODIUM 40 MILLIGRAM(S): 20 TABLET, DELAYED RELEASE ORAL at 06:40

## 2017-03-08 RX ADMIN — OXYCODONE HYDROCHLORIDE 10 MILLIGRAM(S): 5 TABLET ORAL at 07:33

## 2017-03-08 RX ADMIN — Medication 1 GRAM(S): at 18:36

## 2017-03-08 RX ADMIN — Medication 100 MILLIGRAM(S): at 14:22

## 2017-03-08 RX ADMIN — LIDOCAINE 1 PATCH: 4 CREAM TOPICAL at 12:23

## 2017-03-08 RX ADMIN — SIMVASTATIN 20 MILLIGRAM(S): 20 TABLET, FILM COATED ORAL at 21:44

## 2017-03-08 NOTE — PROVIDER CONTACT NOTE (OTHER) - ACTION/TREATMENT ORDERED:
ADS to let me know if labs necessary
Begin with clears; advance as tolerated
oncology to see pt
Will follow and reassess
will sabrina monitor and reassess
Will continue to flow and reassess.
Will continue to follow and reassess.
md to evaluate pt will closely monitor

## 2017-03-08 NOTE — PROVIDER CONTACT NOTE (OTHER) - SITUATION
Pt has been on po chemo pill tarceva for about 9months. Last dose was wed.
Pt with no diet order
bp elevated 192/104 pulse 81
chest pain Bp 176/96
no BM since 3/1
pt c/o nausea and pain
pt refused heparin sub q

## 2017-03-08 NOTE — PROVIDER CONTACT NOTE (OTHER) - REASON
No labs ordered
Patient without BM since 3/1
bp elevated, pt c/o mid sterna lchest pain
chest pain
diet order
nausea
pt refused heparin luque/bq
Son wants oncology to see pt

## 2017-03-09 LAB
BASOPHILS # BLD AUTO: 0.02 K/UL — SIGNIFICANT CHANGE UP (ref 0–0.2)
BASOPHILS NFR BLD AUTO: 0.3 % — SIGNIFICANT CHANGE UP (ref 0–2)
BUN SERPL-MCNC: 11 MG/DL — SIGNIFICANT CHANGE UP (ref 7–23)
CALCIUM SERPL-MCNC: 9 MG/DL — SIGNIFICANT CHANGE UP (ref 8.4–10.5)
CHLORIDE SERPL-SCNC: 104 MMOL/L — SIGNIFICANT CHANGE UP (ref 98–107)
CO2 SERPL-SCNC: 27 MMOL/L — SIGNIFICANT CHANGE UP (ref 22–31)
CREAT SERPL-MCNC: 1.02 MG/DL — SIGNIFICANT CHANGE UP (ref 0.5–1.3)
EOSINOPHIL # BLD AUTO: 0.12 K/UL — SIGNIFICANT CHANGE UP (ref 0–0.5)
EOSINOPHIL NFR BLD AUTO: 1.9 % — SIGNIFICANT CHANGE UP (ref 0–6)
GLUCOSE SERPL-MCNC: 140 MG/DL — HIGH (ref 70–99)
HCT VFR BLD CALC: 40.2 % — SIGNIFICANT CHANGE UP (ref 39–50)
HGB BLD-MCNC: 13 G/DL — SIGNIFICANT CHANGE UP (ref 13–17)
IMM GRANULOCYTES NFR BLD AUTO: 0 % — SIGNIFICANT CHANGE UP (ref 0–1.5)
LYMPHOCYTES # BLD AUTO: 1.83 K/UL — SIGNIFICANT CHANGE UP (ref 1–3.3)
LYMPHOCYTES # BLD AUTO: 29.1 % — SIGNIFICANT CHANGE UP (ref 13–44)
MCHC RBC-ENTMCNC: 30 PG — SIGNIFICANT CHANGE UP (ref 27–34)
MCHC RBC-ENTMCNC: 32.3 % — SIGNIFICANT CHANGE UP (ref 32–36)
MCV RBC AUTO: 92.6 FL — SIGNIFICANT CHANGE UP (ref 80–100)
MONOCYTES # BLD AUTO: 0.3 K/UL — SIGNIFICANT CHANGE UP (ref 0–0.9)
MONOCYTES NFR BLD AUTO: 4.8 % — SIGNIFICANT CHANGE UP (ref 2–14)
NEUTROPHILS # BLD AUTO: 4.01 K/UL — SIGNIFICANT CHANGE UP (ref 1.8–7.4)
NEUTROPHILS NFR BLD AUTO: 63.9 % — SIGNIFICANT CHANGE UP (ref 43–77)
PLATELET # BLD AUTO: 147 K/UL — LOW (ref 150–400)
PMV BLD: 10.2 FL — SIGNIFICANT CHANGE UP (ref 7–13)
POTASSIUM SERPL-MCNC: 3.9 MMOL/L — SIGNIFICANT CHANGE UP (ref 3.5–5.3)
POTASSIUM SERPL-SCNC: 3.9 MMOL/L — SIGNIFICANT CHANGE UP (ref 3.5–5.3)
RBC # BLD: 4.34 M/UL — SIGNIFICANT CHANGE UP (ref 4.2–5.8)
RBC # FLD: 13.2 % — SIGNIFICANT CHANGE UP (ref 10.3–14.5)
SODIUM SERPL-SCNC: 143 MMOL/L — SIGNIFICANT CHANGE UP (ref 135–145)
WBC # BLD: 6.28 K/UL — SIGNIFICANT CHANGE UP (ref 3.8–10.5)
WBC # FLD AUTO: 6.28 K/UL — SIGNIFICANT CHANGE UP (ref 3.8–10.5)

## 2017-03-09 PROCEDURE — 93010 ELECTROCARDIOGRAM REPORT: CPT

## 2017-03-09 PROCEDURE — 99233 SBSQ HOSP IP/OBS HIGH 50: CPT

## 2017-03-09 RX ORDER — MORPHINE SULFATE 50 MG/1
4 CAPSULE, EXTENDED RELEASE ORAL
Qty: 0 | Refills: 0 | Status: DISCONTINUED | OUTPATIENT
Start: 2017-03-09 | End: 2017-03-11

## 2017-03-09 RX ORDER — FENTANYL CITRATE 50 UG/ML
1 INJECTION INTRAVENOUS
Qty: 0 | Refills: 0 | Status: DISCONTINUED | OUTPATIENT
Start: 2017-03-09 | End: 2017-03-13

## 2017-03-09 RX ORDER — SUCRALFATE 1 G
1 TABLET ORAL
Qty: 0 | Refills: 0 | Status: DISCONTINUED | OUTPATIENT
Start: 2017-03-09 | End: 2017-03-13

## 2017-03-09 RX ORDER — OXYCODONE HYDROCHLORIDE 5 MG/1
10 TABLET ORAL EVERY 4 HOURS
Qty: 0 | Refills: 0 | Status: DISCONTINUED | OUTPATIENT
Start: 2017-03-09 | End: 2017-03-13

## 2017-03-09 RX ORDER — OXYCODONE HYDROCHLORIDE 5 MG/1
5 TABLET ORAL EVERY 4 HOURS
Qty: 0 | Refills: 0 | Status: DISCONTINUED | OUTPATIENT
Start: 2017-03-09 | End: 2017-03-13

## 2017-03-09 RX ORDER — FENTANYL CITRATE 50 UG/ML
1 INJECTION INTRAVENOUS
Qty: 0 | Refills: 0 | Status: DISCONTINUED | OUTPATIENT
Start: 2017-03-09 | End: 2017-03-09

## 2017-03-09 RX ORDER — FAMOTIDINE 10 MG/ML
20 INJECTION INTRAVENOUS
Qty: 0 | Refills: 0 | Status: DISCONTINUED | OUTPATIENT
Start: 2017-03-09 | End: 2017-03-13

## 2017-03-09 RX ADMIN — CLINDAMYCIN PHOSPHATE GEL USP, 1% 1 APPLICATION(S): 10 GEL TOPICAL at 06:38

## 2017-03-09 RX ADMIN — Medication 1 APPLICATION(S): at 06:38

## 2017-03-09 RX ADMIN — Medication 1 APPLICATION(S): at 23:50

## 2017-03-09 RX ADMIN — Medication 100 MILLIGRAM(S): at 22:04

## 2017-03-09 RX ADMIN — Medication 10 MILLIGRAM(S): at 23:50

## 2017-03-09 RX ADMIN — OXYCODONE HYDROCHLORIDE 5 MILLIGRAM(S): 5 TABLET ORAL at 18:37

## 2017-03-09 RX ADMIN — FAMOTIDINE 20 MILLIGRAM(S): 10 INJECTION INTRAVENOUS at 06:38

## 2017-03-09 RX ADMIN — OXYCODONE HYDROCHLORIDE 5 MILLIGRAM(S): 5 TABLET ORAL at 07:43

## 2017-03-09 RX ADMIN — FAMOTIDINE 20 MILLIGRAM(S): 10 INJECTION INTRAVENOUS at 18:36

## 2017-03-09 RX ADMIN — TAMSULOSIN HYDROCHLORIDE 0.4 MILLIGRAM(S): 0.4 CAPSULE ORAL at 22:04

## 2017-03-09 RX ADMIN — OXYCODONE HYDROCHLORIDE 5 MILLIGRAM(S): 5 TABLET ORAL at 22:42

## 2017-03-09 RX ADMIN — Medication 1 GRAM(S): at 22:04

## 2017-03-09 RX ADMIN — CLINDAMYCIN PHOSPHATE GEL USP, 1% 1 APPLICATION(S): 10 GEL TOPICAL at 22:05

## 2017-03-09 RX ADMIN — FENTANYL CITRATE 1 PATCH: 50 INJECTION INTRAVENOUS at 17:24

## 2017-03-09 RX ADMIN — Medication 10 MILLIGRAM(S): at 18:37

## 2017-03-09 RX ADMIN — Medication 1 GRAM(S): at 18:28

## 2017-03-09 RX ADMIN — FENTANYL CITRATE 1 PATCH: 50 INJECTION INTRAVENOUS at 14:45

## 2017-03-09 RX ADMIN — LORATADINE 10 MILLIGRAM(S): 10 TABLET ORAL at 12:20

## 2017-03-09 RX ADMIN — Medication 100 MILLIGRAM(S): at 06:38

## 2017-03-09 RX ADMIN — FENTANYL CITRATE 1 PATCH: 50 INJECTION INTRAVENOUS at 14:44

## 2017-03-09 RX ADMIN — OXYCODONE HYDROCHLORIDE 5 MILLIGRAM(S): 5 TABLET ORAL at 06:43

## 2017-03-09 RX ADMIN — Medication 10 MILLIGRAM(S): at 06:38

## 2017-03-09 RX ADMIN — LIDOCAINE 1 PATCH: 4 CREAM TOPICAL at 12:21

## 2017-03-09 RX ADMIN — Medication 1 APPLICATION(S): at 18:29

## 2017-03-09 RX ADMIN — POLYETHYLENE GLYCOL 3350 17 GRAM(S): 17 POWDER, FOR SOLUTION ORAL at 18:37

## 2017-03-09 RX ADMIN — SENNA PLUS 2 TABLET(S): 8.6 TABLET ORAL at 22:04

## 2017-03-09 RX ADMIN — LOSARTAN POTASSIUM 25 MILLIGRAM(S): 100 TABLET, FILM COATED ORAL at 06:38

## 2017-03-09 RX ADMIN — CLINDAMYCIN PHOSPHATE GEL USP, 1% 1 APPLICATION(S): 10 GEL TOPICAL at 18:28

## 2017-03-09 RX ADMIN — SIMVASTATIN 20 MILLIGRAM(S): 20 TABLET, FILM COATED ORAL at 22:05

## 2017-03-09 RX ADMIN — Medication 1 GRAM(S): at 12:20

## 2017-03-09 RX ADMIN — ENOXAPARIN SODIUM 40 MILLIGRAM(S): 100 INJECTION SUBCUTANEOUS at 22:04

## 2017-03-09 RX ADMIN — Medication 1 GRAM(S): at 06:38

## 2017-03-09 RX ADMIN — OXYCODONE HYDROCHLORIDE 5 MILLIGRAM(S): 5 TABLET ORAL at 12:20

## 2017-03-09 RX ADMIN — OXYCODONE HYDROCHLORIDE 5 MILLIGRAM(S): 5 TABLET ORAL at 19:13

## 2017-03-09 RX ADMIN — OXYCODONE HYDROCHLORIDE 5 MILLIGRAM(S): 5 TABLET ORAL at 13:15

## 2017-03-09 RX ADMIN — Medication 10 MILLIGRAM(S): at 12:20

## 2017-03-09 RX ADMIN — LIDOCAINE 1 PATCH: 4 CREAM TOPICAL at 23:26

## 2017-03-09 RX ADMIN — Medication 100 MILLIGRAM(S): at 14:45

## 2017-03-09 RX ADMIN — OXYCODONE HYDROCHLORIDE 5 MILLIGRAM(S): 5 TABLET ORAL at 23:42

## 2017-03-09 RX ADMIN — POLYETHYLENE GLYCOL 3350 17 GRAM(S): 17 POWDER, FOR SOLUTION ORAL at 06:43

## 2017-03-09 RX ADMIN — PANTOPRAZOLE SODIUM 40 MILLIGRAM(S): 20 TABLET, DELAYED RELEASE ORAL at 06:38

## 2017-03-10 LAB
BASOPHILS # BLD AUTO: 0.01 K/UL — SIGNIFICANT CHANGE UP (ref 0–0.2)
BASOPHILS NFR BLD AUTO: 0.1 % — SIGNIFICANT CHANGE UP (ref 0–2)
BUN SERPL-MCNC: 8 MG/DL — SIGNIFICANT CHANGE UP (ref 7–23)
CALCIUM SERPL-MCNC: 9.1 MG/DL — SIGNIFICANT CHANGE UP (ref 8.4–10.5)
CHLORIDE SERPL-SCNC: 102 MMOL/L — SIGNIFICANT CHANGE UP (ref 98–107)
CO2 SERPL-SCNC: 27 MMOL/L — SIGNIFICANT CHANGE UP (ref 22–31)
CREAT SERPL-MCNC: 0.91 MG/DL — SIGNIFICANT CHANGE UP (ref 0.5–1.3)
EOSINOPHIL # BLD AUTO: 0.08 K/UL — SIGNIFICANT CHANGE UP (ref 0–0.5)
EOSINOPHIL NFR BLD AUTO: 0.7 % — SIGNIFICANT CHANGE UP (ref 0–6)
GLUCOSE SERPL-MCNC: 134 MG/DL — HIGH (ref 70–99)
HCT VFR BLD CALC: 42.1 % — SIGNIFICANT CHANGE UP (ref 39–50)
HGB BLD-MCNC: 13.7 G/DL — SIGNIFICANT CHANGE UP (ref 13–17)
IMM GRANULOCYTES NFR BLD AUTO: 0.2 % — SIGNIFICANT CHANGE UP (ref 0–1.5)
LYMPHOCYTES # BLD AUTO: 1.57 K/UL — SIGNIFICANT CHANGE UP (ref 1–3.3)
LYMPHOCYTES # BLD AUTO: 14.1 % — SIGNIFICANT CHANGE UP (ref 13–44)
MCHC RBC-ENTMCNC: 30.2 PG — SIGNIFICANT CHANGE UP (ref 27–34)
MCHC RBC-ENTMCNC: 32.5 % — SIGNIFICANT CHANGE UP (ref 32–36)
MCV RBC AUTO: 92.7 FL — SIGNIFICANT CHANGE UP (ref 80–100)
MONOCYTES # BLD AUTO: 0.43 K/UL — SIGNIFICANT CHANGE UP (ref 0–0.9)
MONOCYTES NFR BLD AUTO: 3.9 % — SIGNIFICANT CHANGE UP (ref 2–14)
NEUTROPHILS # BLD AUTO: 9.01 K/UL — HIGH (ref 1.8–7.4)
NEUTROPHILS NFR BLD AUTO: 81 % — HIGH (ref 43–77)
PLATELET # BLD AUTO: 152 K/UL — SIGNIFICANT CHANGE UP (ref 150–400)
PMV BLD: 10.5 FL — SIGNIFICANT CHANGE UP (ref 7–13)
POTASSIUM SERPL-MCNC: 3.8 MMOL/L — SIGNIFICANT CHANGE UP (ref 3.5–5.3)
POTASSIUM SERPL-SCNC: 3.8 MMOL/L — SIGNIFICANT CHANGE UP (ref 3.5–5.3)
RBC # BLD: 4.54 M/UL — SIGNIFICANT CHANGE UP (ref 4.2–5.8)
RBC # FLD: 13.2 % — SIGNIFICANT CHANGE UP (ref 10.3–14.5)
SODIUM SERPL-SCNC: 143 MMOL/L — SIGNIFICANT CHANGE UP (ref 135–145)
WBC # BLD: 11.12 K/UL — HIGH (ref 3.8–10.5)
WBC # FLD AUTO: 11.12 K/UL — HIGH (ref 3.8–10.5)

## 2017-03-10 PROCEDURE — 99222 1ST HOSP IP/OBS MODERATE 55: CPT | Mod: GC

## 2017-03-10 PROCEDURE — 99233 SBSQ HOSP IP/OBS HIGH 50: CPT

## 2017-03-10 RX ORDER — DEXAMETHASONE 0.5 MG/5ML
4 ELIXIR ORAL EVERY 12 HOURS
Qty: 0 | Refills: 0 | Status: DISCONTINUED | OUTPATIENT
Start: 2017-03-11 | End: 2017-03-13

## 2017-03-10 RX ORDER — ONDANSETRON 8 MG/1
4 TABLET, FILM COATED ORAL EVERY 6 HOURS
Qty: 0 | Refills: 0 | Status: DISCONTINUED | OUTPATIENT
Start: 2017-03-10 | End: 2017-03-11

## 2017-03-10 RX ORDER — DEXAMETHASONE 0.5 MG/5ML
4 ELIXIR ORAL EVERY 12 HOURS
Qty: 0 | Refills: 0 | Status: COMPLETED | OUTPATIENT
Start: 2017-03-10 | End: 2017-03-11

## 2017-03-10 RX ADMIN — LIDOCAINE 1 PATCH: 4 CREAM TOPICAL at 23:11

## 2017-03-10 RX ADMIN — POLYETHYLENE GLYCOL 3350 17 GRAM(S): 17 POWDER, FOR SOLUTION ORAL at 05:23

## 2017-03-10 RX ADMIN — Medication 10 MILLIGRAM(S): at 17:32

## 2017-03-10 RX ADMIN — Medication 100 MILLIGRAM(S): at 14:46

## 2017-03-10 RX ADMIN — Medication 100 MILLIGRAM(S): at 21:57

## 2017-03-10 RX ADMIN — CLINDAMYCIN PHOSPHATE GEL USP, 1% 1 APPLICATION(S): 10 GEL TOPICAL at 21:51

## 2017-03-10 RX ADMIN — Medication 1 GRAM(S): at 12:26

## 2017-03-10 RX ADMIN — MORPHINE SULFATE 4 MILLIGRAM(S): 50 CAPSULE, EXTENDED RELEASE ORAL at 03:55

## 2017-03-10 RX ADMIN — SENNA PLUS 2 TABLET(S): 8.6 TABLET ORAL at 21:57

## 2017-03-10 RX ADMIN — LOSARTAN POTASSIUM 25 MILLIGRAM(S): 100 TABLET, FILM COATED ORAL at 05:21

## 2017-03-10 RX ADMIN — LORATADINE 10 MILLIGRAM(S): 10 TABLET ORAL at 11:58

## 2017-03-10 RX ADMIN — Medication 1 GRAM(S): at 17:30

## 2017-03-10 RX ADMIN — FAMOTIDINE 20 MILLIGRAM(S): 10 INJECTION INTRAVENOUS at 05:27

## 2017-03-10 RX ADMIN — ENOXAPARIN SODIUM 40 MILLIGRAM(S): 100 INJECTION SUBCUTANEOUS at 21:52

## 2017-03-10 RX ADMIN — Medication 10 MILLIGRAM(S): at 05:24

## 2017-03-10 RX ADMIN — TAMSULOSIN HYDROCHLORIDE 0.4 MILLIGRAM(S): 0.4 CAPSULE ORAL at 21:52

## 2017-03-10 RX ADMIN — MORPHINE SULFATE 4 MILLIGRAM(S): 50 CAPSULE, EXTENDED RELEASE ORAL at 03:40

## 2017-03-10 RX ADMIN — Medication 1 APPLICATION(S): at 21:52

## 2017-03-10 RX ADMIN — Medication 1: at 12:26

## 2017-03-10 RX ADMIN — Medication 4 MILLIGRAM(S): at 17:30

## 2017-03-10 RX ADMIN — Medication 30 MILLILITER(S): at 05:18

## 2017-03-10 RX ADMIN — OXYCODONE HYDROCHLORIDE 10 MILLIGRAM(S): 5 TABLET ORAL at 22:50

## 2017-03-10 RX ADMIN — Medication 1 APPLICATION(S): at 09:20

## 2017-03-10 RX ADMIN — CLINDAMYCIN PHOSPHATE GEL USP, 1% 1 APPLICATION(S): 10 GEL TOPICAL at 05:23

## 2017-03-10 RX ADMIN — Medication 1 GRAM(S): at 09:24

## 2017-03-10 RX ADMIN — Medication 10 MILLIGRAM(S): at 11:59

## 2017-03-10 RX ADMIN — Medication 100 MILLIGRAM(S): at 05:23

## 2017-03-10 RX ADMIN — LIDOCAINE 1 PATCH: 4 CREAM TOPICAL at 11:59

## 2017-03-10 RX ADMIN — PANTOPRAZOLE SODIUM 40 MILLIGRAM(S): 20 TABLET, DELAYED RELEASE ORAL at 05:27

## 2017-03-10 RX ADMIN — Medication 1 GRAM(S): at 21:52

## 2017-03-10 RX ADMIN — Medication 10 MILLIGRAM(S): at 23:11

## 2017-03-10 RX ADMIN — POLYETHYLENE GLYCOL 3350 17 GRAM(S): 17 POWDER, FOR SOLUTION ORAL at 17:32

## 2017-03-10 RX ADMIN — Medication 1 APPLICATION(S): at 14:46

## 2017-03-10 RX ADMIN — OXYCODONE HYDROCHLORIDE 10 MILLIGRAM(S): 5 TABLET ORAL at 21:50

## 2017-03-10 RX ADMIN — OXYCODONE HYDROCHLORIDE 10 MILLIGRAM(S): 5 TABLET ORAL at 05:17

## 2017-03-10 RX ADMIN — OXYCODONE HYDROCHLORIDE 10 MILLIGRAM(S): 5 TABLET ORAL at 06:17

## 2017-03-10 RX ADMIN — CLINDAMYCIN PHOSPHATE GEL USP, 1% 1 APPLICATION(S): 10 GEL TOPICAL at 14:45

## 2017-03-10 RX ADMIN — FAMOTIDINE 20 MILLIGRAM(S): 10 INJECTION INTRAVENOUS at 17:32

## 2017-03-10 NOTE — DIETITIAN INITIAL EVALUATION ADULT. - NS AS NUTRI INTERV MEALS SNACK
1) Advance diet as medically feasible and tolerated- Soft     2) If patient is able to tolerate, consider addition of Glucerna Shake supplements    3) If patient persists with inability to tolerate adequate PO, consider temporary alternate means of nutrition support     4) Although unable to meet criteria for diagnosis of malnutrition at this time due to limited history obtained, patient remains at risk for malnutrition and dietitian remains available for further recommendations as needed

## 2017-03-10 NOTE — DIETITIAN INITIAL EVALUATION ADULT. - OTHER INFO
Nutrition assessment completed for length of stay; admitted for hemoptysis. Patient with Stage IV lung adenocarcinoma (diagnosed in 4/2016), presents with persistent nausea and vomiting. Patient is Cantonese-speaking, as this Dietitian also fluent patient prefers speaking without using  services. Diet just advanced from Clear LIquids to Full liquids; as discussed with RN, patient is eager to receive food despite nausea. Reports that he cannot tolerate ice water which further exacerbated symptoms of GI distress this morning. He would like to try receiving more solids and has preference for warm fluids only. Patient cannot recall UBW nor report any known weight changes. No food allergies and no difficulty chewing/swallowing reported otherwise.

## 2017-03-10 NOTE — DIETITIAN INITIAL EVALUATION ADULT. - PROBLEM SELECTOR PLAN 1
-NPO, NG tube if continues to vomit   -maintenance IVF   -nausea control w/ zofran  -limit opioid use if possible  -Surgical evaluation given ileus

## 2017-03-10 NOTE — DIETITIAN INITIAL EVALUATION ADULT. - PROBLEM SELECTOR PLAN 2
-one episode yesterday, likely 2/2 progessive lung ca, CTA chest negative for PE  -trend H/H, transfuse hgb<7, active T&S  -hold Tarceva

## 2017-03-11 LAB
BUN SERPL-MCNC: 8 MG/DL — SIGNIFICANT CHANGE UP (ref 7–23)
CALCIUM SERPL-MCNC: 9 MG/DL — SIGNIFICANT CHANGE UP (ref 8.4–10.5)
CHLORIDE SERPL-SCNC: 103 MMOL/L — SIGNIFICANT CHANGE UP (ref 98–107)
CO2 SERPL-SCNC: 25 MMOL/L — SIGNIFICANT CHANGE UP (ref 22–31)
CREAT SERPL-MCNC: 0.88 MG/DL — SIGNIFICANT CHANGE UP (ref 0.5–1.3)
GLUCOSE SERPL-MCNC: 138 MG/DL — HIGH (ref 70–99)
HCT VFR BLD CALC: 40.8 % — SIGNIFICANT CHANGE UP (ref 39–50)
HGB BLD-MCNC: 13.1 G/DL — SIGNIFICANT CHANGE UP (ref 13–17)
MCHC RBC-ENTMCNC: 30 PG — SIGNIFICANT CHANGE UP (ref 27–34)
MCHC RBC-ENTMCNC: 32.1 % — SIGNIFICANT CHANGE UP (ref 32–36)
MCV RBC AUTO: 93.4 FL — SIGNIFICANT CHANGE UP (ref 80–100)
PLATELET # BLD AUTO: 168 K/UL — SIGNIFICANT CHANGE UP (ref 150–400)
PMV BLD: 10.4 FL — SIGNIFICANT CHANGE UP (ref 7–13)
POTASSIUM SERPL-MCNC: 4.7 MMOL/L — SIGNIFICANT CHANGE UP (ref 3.5–5.3)
POTASSIUM SERPL-SCNC: 4.7 MMOL/L — SIGNIFICANT CHANGE UP (ref 3.5–5.3)
RBC # BLD: 4.37 M/UL — SIGNIFICANT CHANGE UP (ref 4.2–5.8)
RBC # FLD: 13.3 % — SIGNIFICANT CHANGE UP (ref 10.3–14.5)
SODIUM SERPL-SCNC: 142 MMOL/L — SIGNIFICANT CHANGE UP (ref 135–145)
WBC # BLD: 11.48 K/UL — HIGH (ref 3.8–10.5)
WBC # FLD AUTO: 11.48 K/UL — HIGH (ref 3.8–10.5)

## 2017-03-11 PROCEDURE — 99232 SBSQ HOSP IP/OBS MODERATE 35: CPT

## 2017-03-11 RX ORDER — ONDANSETRON 8 MG/1
4 TABLET, FILM COATED ORAL THREE TIMES A DAY
Qty: 0 | Refills: 0 | Status: DISCONTINUED | OUTPATIENT
Start: 2017-03-11 | End: 2017-03-13

## 2017-03-11 RX ADMIN — LORATADINE 10 MILLIGRAM(S): 10 TABLET ORAL at 12:48

## 2017-03-11 RX ADMIN — LIDOCAINE 1 PATCH: 4 CREAM TOPICAL at 12:46

## 2017-03-11 RX ADMIN — Medication 100 MILLIGRAM(S): at 22:06

## 2017-03-11 RX ADMIN — ONDANSETRON 4 MILLIGRAM(S): 8 TABLET, FILM COATED ORAL at 14:28

## 2017-03-11 RX ADMIN — Medication 1 GRAM(S): at 12:46

## 2017-03-11 RX ADMIN — FAMOTIDINE 20 MILLIGRAM(S): 10 INJECTION INTRAVENOUS at 05:49

## 2017-03-11 RX ADMIN — Medication 1 GRAM(S): at 08:58

## 2017-03-11 RX ADMIN — Medication 4 MILLIGRAM(S): at 17:20

## 2017-03-11 RX ADMIN — CLINDAMYCIN PHOSPHATE GEL USP, 1% 1 APPLICATION(S): 10 GEL TOPICAL at 22:07

## 2017-03-11 RX ADMIN — CLINDAMYCIN PHOSPHATE GEL USP, 1% 1 APPLICATION(S): 10 GEL TOPICAL at 14:28

## 2017-03-11 RX ADMIN — TAMSULOSIN HYDROCHLORIDE 0.4 MILLIGRAM(S): 0.4 CAPSULE ORAL at 22:06

## 2017-03-11 RX ADMIN — OXYCODONE HYDROCHLORIDE 10 MILLIGRAM(S): 5 TABLET ORAL at 12:47

## 2017-03-11 RX ADMIN — ONDANSETRON 4 MILLIGRAM(S): 8 TABLET, FILM COATED ORAL at 22:06

## 2017-03-11 RX ADMIN — Medication 10 MILLIGRAM(S): at 17:20

## 2017-03-11 RX ADMIN — Medication 1 APPLICATION(S): at 22:06

## 2017-03-11 RX ADMIN — Medication 100 MILLIGRAM(S): at 05:49

## 2017-03-11 RX ADMIN — SIMVASTATIN 20 MILLIGRAM(S): 20 TABLET, FILM COATED ORAL at 22:06

## 2017-03-11 RX ADMIN — SENNA PLUS 2 TABLET(S): 8.6 TABLET ORAL at 22:06

## 2017-03-11 RX ADMIN — POLYETHYLENE GLYCOL 3350 17 GRAM(S): 17 POWDER, FOR SOLUTION ORAL at 05:53

## 2017-03-11 RX ADMIN — Medication 1 APPLICATION(S): at 05:52

## 2017-03-11 RX ADMIN — Medication 1: at 12:49

## 2017-03-11 RX ADMIN — Medication 100 MILLIGRAM(S): at 14:29

## 2017-03-11 RX ADMIN — Medication 1 APPLICATION(S): at 14:29

## 2017-03-11 RX ADMIN — Medication 1: at 17:20

## 2017-03-11 RX ADMIN — Medication 10 MILLIGRAM(S): at 23:44

## 2017-03-11 RX ADMIN — ENOXAPARIN SODIUM 40 MILLIGRAM(S): 100 INJECTION SUBCUTANEOUS at 22:06

## 2017-03-11 RX ADMIN — CLINDAMYCIN PHOSPHATE GEL USP, 1% 1 APPLICATION(S): 10 GEL TOPICAL at 05:53

## 2017-03-11 RX ADMIN — Medication 10 MILLIGRAM(S): at 05:49

## 2017-03-11 RX ADMIN — Medication 4 MILLIGRAM(S): at 05:49

## 2017-03-11 RX ADMIN — FAMOTIDINE 20 MILLIGRAM(S): 10 INJECTION INTRAVENOUS at 17:20

## 2017-03-11 RX ADMIN — PANTOPRAZOLE SODIUM 40 MILLIGRAM(S): 20 TABLET, DELAYED RELEASE ORAL at 05:52

## 2017-03-11 RX ADMIN — POLYETHYLENE GLYCOL 3350 17 GRAM(S): 17 POWDER, FOR SOLUTION ORAL at 17:20

## 2017-03-11 RX ADMIN — Medication 10 MILLIGRAM(S): at 12:48

## 2017-03-11 RX ADMIN — LOSARTAN POTASSIUM 25 MILLIGRAM(S): 100 TABLET, FILM COATED ORAL at 05:49

## 2017-03-11 RX ADMIN — OXYCODONE HYDROCHLORIDE 10 MILLIGRAM(S): 5 TABLET ORAL at 06:50

## 2017-03-11 RX ADMIN — OXYCODONE HYDROCHLORIDE 10 MILLIGRAM(S): 5 TABLET ORAL at 13:17

## 2017-03-11 RX ADMIN — Medication 1 GRAM(S): at 22:06

## 2017-03-11 RX ADMIN — Medication 1 GRAM(S): at 17:20

## 2017-03-11 RX ADMIN — OXYCODONE HYDROCHLORIDE 10 MILLIGRAM(S): 5 TABLET ORAL at 05:50

## 2017-03-12 PROCEDURE — 99233 SBSQ HOSP IP/OBS HIGH 50: CPT

## 2017-03-12 RX ADMIN — ONDANSETRON 4 MILLIGRAM(S): 8 TABLET, FILM COATED ORAL at 14:40

## 2017-03-12 RX ADMIN — LIDOCAINE 1 PATCH: 4 CREAM TOPICAL at 23:20

## 2017-03-12 RX ADMIN — Medication 10 MILLIGRAM(S): at 06:23

## 2017-03-12 RX ADMIN — LIDOCAINE 1 PATCH: 4 CREAM TOPICAL at 11:05

## 2017-03-12 RX ADMIN — Medication 10 MILLIGRAM(S): at 11:06

## 2017-03-12 RX ADMIN — FAMOTIDINE 20 MILLIGRAM(S): 10 INJECTION INTRAVENOUS at 17:46

## 2017-03-12 RX ADMIN — ENOXAPARIN SODIUM 40 MILLIGRAM(S): 100 INJECTION SUBCUTANEOUS at 21:42

## 2017-03-12 RX ADMIN — TAMSULOSIN HYDROCHLORIDE 0.4 MILLIGRAM(S): 0.4 CAPSULE ORAL at 21:42

## 2017-03-12 RX ADMIN — POLYETHYLENE GLYCOL 3350 17 GRAM(S): 17 POWDER, FOR SOLUTION ORAL at 06:22

## 2017-03-12 RX ADMIN — Medication 1 GRAM(S): at 21:42

## 2017-03-12 RX ADMIN — LORATADINE 10 MILLIGRAM(S): 10 TABLET ORAL at 11:05

## 2017-03-12 RX ADMIN — Medication 1 APPLICATION(S): at 14:40

## 2017-03-12 RX ADMIN — FENTANYL CITRATE 1 PATCH: 50 INJECTION INTRAVENOUS at 14:42

## 2017-03-12 RX ADMIN — Medication 4 MILLIGRAM(S): at 06:23

## 2017-03-12 RX ADMIN — OXYCODONE HYDROCHLORIDE 10 MILLIGRAM(S): 5 TABLET ORAL at 01:45

## 2017-03-12 RX ADMIN — ONDANSETRON 4 MILLIGRAM(S): 8 TABLET, FILM COATED ORAL at 06:29

## 2017-03-12 RX ADMIN — Medication 10 MILLIGRAM(S): at 23:20

## 2017-03-12 RX ADMIN — Medication 2: at 12:39

## 2017-03-12 RX ADMIN — Medication 1 APPLICATION(S): at 06:22

## 2017-03-12 RX ADMIN — Medication 1: at 17:45

## 2017-03-12 RX ADMIN — CLINDAMYCIN PHOSPHATE GEL USP, 1% 1 APPLICATION(S): 10 GEL TOPICAL at 21:43

## 2017-03-12 RX ADMIN — Medication 100 MILLIGRAM(S): at 14:40

## 2017-03-12 RX ADMIN — Medication 1 APPLICATION(S): at 23:20

## 2017-03-12 RX ADMIN — OXYCODONE HYDROCHLORIDE 10 MILLIGRAM(S): 5 TABLET ORAL at 11:06

## 2017-03-12 RX ADMIN — Medication 1 GRAM(S): at 17:45

## 2017-03-12 RX ADMIN — Medication 10 MILLIGRAM(S): at 17:45

## 2017-03-12 RX ADMIN — Medication 4 MILLIGRAM(S): at 17:46

## 2017-03-12 RX ADMIN — Medication 1 GRAM(S): at 11:05

## 2017-03-12 RX ADMIN — Medication 100 MILLIGRAM(S): at 06:23

## 2017-03-12 RX ADMIN — OXYCODONE HYDROCHLORIDE 10 MILLIGRAM(S): 5 TABLET ORAL at 03:45

## 2017-03-12 RX ADMIN — LOSARTAN POTASSIUM 25 MILLIGRAM(S): 100 TABLET, FILM COATED ORAL at 06:23

## 2017-03-12 RX ADMIN — ONDANSETRON 4 MILLIGRAM(S): 8 TABLET, FILM COATED ORAL at 21:42

## 2017-03-12 RX ADMIN — POLYETHYLENE GLYCOL 3350 17 GRAM(S): 17 POWDER, FOR SOLUTION ORAL at 17:46

## 2017-03-12 RX ADMIN — SIMVASTATIN 20 MILLIGRAM(S): 20 TABLET, FILM COATED ORAL at 21:42

## 2017-03-12 RX ADMIN — CLINDAMYCIN PHOSPHATE GEL USP, 1% 1 APPLICATION(S): 10 GEL TOPICAL at 14:40

## 2017-03-12 RX ADMIN — OXYCODONE HYDROCHLORIDE 10 MILLIGRAM(S): 5 TABLET ORAL at 11:36

## 2017-03-12 RX ADMIN — LIDOCAINE 1 PATCH: 4 CREAM TOPICAL at 00:00

## 2017-03-12 RX ADMIN — FAMOTIDINE 20 MILLIGRAM(S): 10 INJECTION INTRAVENOUS at 06:23

## 2017-03-12 RX ADMIN — Medication 1 GRAM(S): at 08:58

## 2017-03-12 RX ADMIN — CLINDAMYCIN PHOSPHATE GEL USP, 1% 1 APPLICATION(S): 10 GEL TOPICAL at 06:22

## 2017-03-12 RX ADMIN — PANTOPRAZOLE SODIUM 40 MILLIGRAM(S): 20 TABLET, DELAYED RELEASE ORAL at 06:23

## 2017-03-12 RX ADMIN — FENTANYL CITRATE 1 PATCH: 50 INJECTION INTRAVENOUS at 14:44

## 2017-03-13 VITALS
HEART RATE: 74 BPM | SYSTOLIC BLOOD PRESSURE: 110 MMHG | RESPIRATION RATE: 18 BRPM | TEMPERATURE: 98 F | OXYGEN SATURATION: 100 % | DIASTOLIC BLOOD PRESSURE: 61 MMHG

## 2017-03-13 LAB
BUN SERPL-MCNC: 21 MG/DL — SIGNIFICANT CHANGE UP (ref 7–23)
CALCIUM SERPL-MCNC: 8.4 MG/DL — SIGNIFICANT CHANGE UP (ref 8.4–10.5)
CHLORIDE SERPL-SCNC: 104 MMOL/L — SIGNIFICANT CHANGE UP (ref 98–107)
CO2 SERPL-SCNC: 27 MMOL/L — SIGNIFICANT CHANGE UP (ref 22–31)
CREAT SERPL-MCNC: 0.87 MG/DL — SIGNIFICANT CHANGE UP (ref 0.5–1.3)
GLUCOSE SERPL-MCNC: 182 MG/DL — HIGH (ref 70–99)
HCT VFR BLD CALC: 39.4 % — SIGNIFICANT CHANGE UP (ref 39–50)
HGB BLD-MCNC: 12.6 G/DL — LOW (ref 13–17)
MCHC RBC-ENTMCNC: 29.9 PG — SIGNIFICANT CHANGE UP (ref 27–34)
MCHC RBC-ENTMCNC: 32 % — SIGNIFICANT CHANGE UP (ref 32–36)
MCV RBC AUTO: 93.6 FL — SIGNIFICANT CHANGE UP (ref 80–100)
PLATELET # BLD AUTO: 190 K/UL — SIGNIFICANT CHANGE UP (ref 150–400)
PMV BLD: 10.1 FL — SIGNIFICANT CHANGE UP (ref 7–13)
POTASSIUM SERPL-MCNC: 4.2 MMOL/L — SIGNIFICANT CHANGE UP (ref 3.5–5.3)
POTASSIUM SERPL-SCNC: 4.2 MMOL/L — SIGNIFICANT CHANGE UP (ref 3.5–5.3)
RBC # BLD: 4.21 M/UL — SIGNIFICANT CHANGE UP (ref 4.2–5.8)
RBC # FLD: 13.6 % — SIGNIFICANT CHANGE UP (ref 10.3–14.5)
SODIUM SERPL-SCNC: 141 MMOL/L — SIGNIFICANT CHANGE UP (ref 135–145)
WBC # BLD: 14.19 K/UL — HIGH (ref 3.8–10.5)
WBC # FLD AUTO: 14.19 K/UL — HIGH (ref 3.8–10.5)

## 2017-03-13 PROCEDURE — 99239 HOSP IP/OBS DSCHRG MGMT >30: CPT

## 2017-03-13 RX ORDER — FENTANYL CITRATE 50 UG/ML
1 INJECTION INTRAVENOUS
Qty: 5 | Refills: 0 | OUTPATIENT
Start: 2017-03-13 | End: 2017-04-12

## 2017-03-13 RX ORDER — PANTOPRAZOLE SODIUM 20 MG/1
1 TABLET, DELAYED RELEASE ORAL
Qty: 30 | Refills: 0 | OUTPATIENT
Start: 2017-03-13 | End: 2017-04-12

## 2017-03-13 RX ORDER — DEXAMETHASONE 0.5 MG/5ML
1 ELIXIR ORAL
Qty: 4 | Refills: 0 | OUTPATIENT
Start: 2017-03-13 | End: 2017-03-15

## 2017-03-13 RX ORDER — ONDANSETRON 8 MG/1
1 TABLET, FILM COATED ORAL
Qty: 90 | Refills: 0 | OUTPATIENT
Start: 2017-03-13 | End: 2017-04-12

## 2017-03-13 RX ORDER — OXYCODONE HYDROCHLORIDE 5 MG/1
1 TABLET ORAL
Qty: 0 | Refills: 0 | COMMUNITY

## 2017-03-13 RX ORDER — SENNA PLUS 8.6 MG/1
2 TABLET ORAL
Qty: 0 | Refills: 0 | COMMUNITY
Start: 2017-03-13

## 2017-03-13 RX ORDER — FENTANYL CITRATE 50 UG/ML
1 INJECTION INTRAVENOUS
Qty: 0 | Refills: 0 | COMMUNITY

## 2017-03-13 RX ORDER — DOCUSATE SODIUM 100 MG
1 CAPSULE ORAL
Qty: 0 | Refills: 0 | COMMUNITY
Start: 2017-03-13

## 2017-03-13 RX ORDER — OXYCODONE HYDROCHLORIDE 5 MG/1
1 TABLET ORAL
Qty: 42 | Refills: 0 | OUTPATIENT
Start: 2017-03-13 | End: 2017-03-20

## 2017-03-13 RX ORDER — FAMOTIDINE 10 MG/ML
1 INJECTION INTRAVENOUS
Qty: 60 | Refills: 0 | OUTPATIENT
Start: 2017-03-13 | End: 2017-04-12

## 2017-03-13 RX ORDER — POLYETHYLENE GLYCOL 3350 17 G/17G
17 POWDER, FOR SOLUTION ORAL
Qty: 0 | Refills: 0 | COMMUNITY
Start: 2017-03-13

## 2017-03-13 RX ADMIN — Medication 100 MILLIGRAM(S): at 06:04

## 2017-03-13 RX ADMIN — CLINDAMYCIN PHOSPHATE GEL USP, 1% 1 APPLICATION(S): 10 GEL TOPICAL at 06:06

## 2017-03-13 RX ADMIN — ONDANSETRON 4 MILLIGRAM(S): 8 TABLET, FILM COATED ORAL at 15:11

## 2017-03-13 RX ADMIN — Medication 2: at 12:29

## 2017-03-13 RX ADMIN — POLYETHYLENE GLYCOL 3350 17 GRAM(S): 17 POWDER, FOR SOLUTION ORAL at 06:05

## 2017-03-13 RX ADMIN — Medication 4 MILLIGRAM(S): at 06:04

## 2017-03-13 RX ADMIN — Medication 1 GRAM(S): at 06:05

## 2017-03-13 RX ADMIN — CLINDAMYCIN PHOSPHATE GEL USP, 1% 1 APPLICATION(S): 10 GEL TOPICAL at 15:11

## 2017-03-13 RX ADMIN — Medication 1 APPLICATION(S): at 06:06

## 2017-03-13 RX ADMIN — OXYCODONE HYDROCHLORIDE 10 MILLIGRAM(S): 5 TABLET ORAL at 10:31

## 2017-03-13 RX ADMIN — LORATADINE 10 MILLIGRAM(S): 10 TABLET ORAL at 11:11

## 2017-03-13 RX ADMIN — PANTOPRAZOLE SODIUM 40 MILLIGRAM(S): 20 TABLET, DELAYED RELEASE ORAL at 06:04

## 2017-03-13 RX ADMIN — ONDANSETRON 4 MILLIGRAM(S): 8 TABLET, FILM COATED ORAL at 06:06

## 2017-03-13 RX ADMIN — Medication 10 MILLIGRAM(S): at 06:05

## 2017-03-13 RX ADMIN — Medication 100 MILLIGRAM(S): at 15:10

## 2017-03-13 RX ADMIN — Medication 1 GRAM(S): at 11:10

## 2017-03-13 RX ADMIN — Medication 10 MILLIGRAM(S): at 11:11

## 2017-03-13 RX ADMIN — LOSARTAN POTASSIUM 25 MILLIGRAM(S): 100 TABLET, FILM COATED ORAL at 06:04

## 2017-03-13 RX ADMIN — Medication 1 APPLICATION(S): at 15:11

## 2017-03-13 RX ADMIN — LIDOCAINE 1 PATCH: 4 CREAM TOPICAL at 11:10

## 2017-03-13 RX ADMIN — OXYCODONE HYDROCHLORIDE 10 MILLIGRAM(S): 5 TABLET ORAL at 09:31

## 2017-03-13 RX ADMIN — FAMOTIDINE 20 MILLIGRAM(S): 10 INJECTION INTRAVENOUS at 06:05

## 2017-03-15 ENCOUNTER — RESULT REVIEW (OUTPATIENT)
Age: 77
End: 2017-03-15

## 2017-03-16 ENCOUNTER — APPOINTMENT (OUTPATIENT)
Dept: HEMATOLOGY ONCOLOGY | Facility: CLINIC | Age: 77
End: 2017-03-16

## 2017-03-16 VITALS
BODY MASS INDEX: 23.9 KG/M2 | RESPIRATION RATE: 16 BRPM | SYSTOLIC BLOOD PRESSURE: 142 MMHG | WEIGHT: 161.82 LBS | OXYGEN SATURATION: 93 % | TEMPERATURE: 97.3 F | HEART RATE: 106 BPM | DIASTOLIC BLOOD PRESSURE: 91 MMHG

## 2017-03-16 LAB
ALBUMIN SERPL ELPH-MCNC: 4.1 G/DL — SIGNIFICANT CHANGE UP (ref 3.3–5)
ALP SERPL-CCNC: 63 U/L — SIGNIFICANT CHANGE UP (ref 40–120)
ALT FLD-CCNC: 39 U/L — SIGNIFICANT CHANGE UP (ref 10–45)
ANION GAP SERPL CALC-SCNC: 17 MMOL/L — SIGNIFICANT CHANGE UP (ref 5–17)
APTT BLD: 29.1 SEC — SIGNIFICANT CHANGE UP (ref 27.5–37.4)
AST SERPL-CCNC: 19 U/L — SIGNIFICANT CHANGE UP (ref 10–40)
BASOPHILS # BLD AUTO: 0.1 K/UL — SIGNIFICANT CHANGE UP (ref 0–0.2)
BASOPHILS NFR BLD AUTO: 0.3 % — SIGNIFICANT CHANGE UP (ref 0–2)
BILIRUB SERPL-MCNC: 0.5 MG/DL — SIGNIFICANT CHANGE UP (ref 0.2–1.2)
BUN SERPL-MCNC: 24 MG/DL — HIGH (ref 7–23)
CALCIUM SERPL-MCNC: 9.8 MG/DL — SIGNIFICANT CHANGE UP (ref 8.4–10.5)
CHLORIDE SERPL-SCNC: 101 MMOL/L — SIGNIFICANT CHANGE UP (ref 96–108)
CO2 SERPL-SCNC: 30 MMOL/L — SIGNIFICANT CHANGE UP (ref 22–31)
CREAT SERPL-MCNC: 1.21 MG/DL — SIGNIFICANT CHANGE UP (ref 0.5–1.3)
EOSINOPHIL # BLD AUTO: 0.2 K/UL — SIGNIFICANT CHANGE UP (ref 0–0.5)
EOSINOPHIL NFR BLD AUTO: 1.2 % — SIGNIFICANT CHANGE UP (ref 0–6)
GLUCOSE SERPL-MCNC: 143 MG/DL — HIGH (ref 70–99)
HCT VFR BLD CALC: 45.4 % — SIGNIFICANT CHANGE UP (ref 39–50)
HGB BLD-MCNC: 14.6 G/DL — SIGNIFICANT CHANGE UP (ref 13–17)
INR BLD: 1.04 RATIO — SIGNIFICANT CHANGE UP (ref 0.88–1.16)
LYMPHOCYTES # BLD AUTO: 15.9 % — SIGNIFICANT CHANGE UP (ref 13–44)
LYMPHOCYTES # BLD AUTO: 2.8 K/UL — SIGNIFICANT CHANGE UP (ref 1–3.3)
MCHC RBC-ENTMCNC: 30.3 PG — SIGNIFICANT CHANGE UP (ref 27–34)
MCHC RBC-ENTMCNC: 32.2 G/DL — SIGNIFICANT CHANGE UP (ref 32–36)
MCV RBC AUTO: 94.2 FL — SIGNIFICANT CHANGE UP (ref 80–100)
MONOCYTES # BLD AUTO: 1.3 K/UL — HIGH (ref 0–0.9)
MONOCYTES NFR BLD AUTO: 7.4 % — SIGNIFICANT CHANGE UP (ref 2–14)
NEUTROPHILS # BLD AUTO: 13.2 K/UL — HIGH (ref 1.8–7.4)
NEUTROPHILS NFR BLD AUTO: 75.3 % — SIGNIFICANT CHANGE UP (ref 43–77)
PLATELET # BLD AUTO: 224 K/UL — SIGNIFICANT CHANGE UP (ref 150–400)
POTASSIUM SERPL-MCNC: 4 MMOL/L — SIGNIFICANT CHANGE UP (ref 3.5–5.3)
POTASSIUM SERPL-SCNC: 4 MMOL/L — SIGNIFICANT CHANGE UP (ref 3.5–5.3)
PROT SERPL-MCNC: 7.1 G/DL — SIGNIFICANT CHANGE UP (ref 6–8.3)
PROTHROM AB SERPL-ACNC: 11.8 SEC — SIGNIFICANT CHANGE UP (ref 10–13.1)
RBC # BLD: 4.82 M/UL — SIGNIFICANT CHANGE UP (ref 4.2–5.8)
RBC # FLD: 12.7 % — SIGNIFICANT CHANGE UP (ref 10.3–14.5)
SODIUM SERPL-SCNC: 148 MMOL/L — HIGH (ref 135–145)
WBC # BLD: 17.5 K/UL — HIGH (ref 3.8–10.5)
WBC # FLD AUTO: 17.5 K/UL — HIGH (ref 3.8–10.5)

## 2017-03-20 ENCOUNTER — APPOINTMENT (OUTPATIENT)
Dept: INTERVENTIONAL RADIOLOGY/VASCULAR | Facility: CLINIC | Age: 77
End: 2017-03-20

## 2017-03-20 VITALS
RESPIRATION RATE: 16 BRPM | WEIGHT: 155 LBS | HEIGHT: 69 IN | OXYGEN SATURATION: 94 % | HEART RATE: 83 BPM | BODY MASS INDEX: 22.96 KG/M2 | DIASTOLIC BLOOD PRESSURE: 79 MMHG | SYSTOLIC BLOOD PRESSURE: 122 MMHG

## 2017-03-22 ENCOUNTER — APPOINTMENT (OUTPATIENT)
Dept: HEMATOLOGY ONCOLOGY | Facility: CLINIC | Age: 77
End: 2017-03-22

## 2017-04-03 ENCOUNTER — INPATIENT (INPATIENT)
Facility: HOSPITAL | Age: 77
LOS: 2 days | Discharge: HOME HEALTH | DRG: 176 | End: 2017-04-06
Attending: INTERNAL MEDICINE | Admitting: INTERNAL MEDICINE
Payer: MEDICAID

## 2017-04-03 VITALS
SYSTOLIC BLOOD PRESSURE: 178 MMHG | OXYGEN SATURATION: 98 % | DIASTOLIC BLOOD PRESSURE: 98 MMHG | HEART RATE: 105 BPM | RESPIRATION RATE: 16 BRPM

## 2017-04-03 DIAGNOSIS — C34.90 MALIGNANT NEOPLASM OF UNSPECIFIED PART OF UNSPECIFIED BRONCHUS OR LUNG: ICD-10-CM

## 2017-04-03 DIAGNOSIS — I26.99 OTHER PULMONARY EMBOLISM WITHOUT ACUTE COR PULMONALE: ICD-10-CM

## 2017-04-03 DIAGNOSIS — C44.311 BASAL CELL CARCINOMA OF SKIN OF NOSE: Chronic | ICD-10-CM

## 2017-04-03 DIAGNOSIS — I10 ESSENTIAL (PRIMARY) HYPERTENSION: ICD-10-CM

## 2017-04-03 LAB
ALBUMIN SERPL ELPH-MCNC: 3.8 G/DL — SIGNIFICANT CHANGE UP (ref 3.3–5)
ALP SERPL-CCNC: 66 U/L — SIGNIFICANT CHANGE UP (ref 40–120)
ALT FLD-CCNC: 20 U/L RC — SIGNIFICANT CHANGE UP (ref 10–45)
ANION GAP SERPL CALC-SCNC: 16 MMOL/L — SIGNIFICANT CHANGE UP (ref 5–17)
APPEARANCE UR: CLEAR — SIGNIFICANT CHANGE UP
APTT BLD: 30.7 SEC — SIGNIFICANT CHANGE UP (ref 27.5–37.4)
AST SERPL-CCNC: 53 U/L — HIGH (ref 10–40)
BASOPHILS # BLD AUTO: 0 K/UL — SIGNIFICANT CHANGE UP (ref 0–0.2)
BASOPHILS NFR BLD AUTO: 0.3 % — SIGNIFICANT CHANGE UP (ref 0–2)
BILIRUB SERPL-MCNC: 0.9 MG/DL — SIGNIFICANT CHANGE UP (ref 0.2–1.2)
BILIRUB UR-MCNC: NEGATIVE — SIGNIFICANT CHANGE UP
BUN SERPL-MCNC: 18 MG/DL — SIGNIFICANT CHANGE UP (ref 7–23)
CALCIUM SERPL-MCNC: 9.3 MG/DL — SIGNIFICANT CHANGE UP (ref 8.4–10.5)
CHLORIDE SERPL-SCNC: 103 MMOL/L — SIGNIFICANT CHANGE UP (ref 96–108)
CK MB BLD-MCNC: 1.3 % — SIGNIFICANT CHANGE UP (ref 0–3.5)
CK MB CFR SERPL CALC: 1.8 NG/ML — SIGNIFICANT CHANGE UP (ref 0–6.7)
CK SERPL-CCNC: 136 U/L — SIGNIFICANT CHANGE UP (ref 30–200)
CO2 SERPL-SCNC: 22 MMOL/L — SIGNIFICANT CHANGE UP (ref 22–31)
COLOR SPEC: YELLOW — SIGNIFICANT CHANGE UP
CREAT SERPL-MCNC: 1.05 MG/DL — SIGNIFICANT CHANGE UP (ref 0.5–1.3)
D DIMER BLD IA.RAPID-MCNC: 2511 NG/ML DDU — HIGH
DIFF PNL FLD: NEGATIVE — SIGNIFICANT CHANGE UP
EOSINOPHIL # BLD AUTO: 0 K/UL — SIGNIFICANT CHANGE UP (ref 0–0.5)
EOSINOPHIL NFR BLD AUTO: 0.5 % — SIGNIFICANT CHANGE UP (ref 0–6)
GAS PNL BLDV: SIGNIFICANT CHANGE UP
GLUCOSE SERPL-MCNC: 132 MG/DL — HIGH (ref 70–99)
GLUCOSE UR QL: NEGATIVE — SIGNIFICANT CHANGE UP
HCT VFR BLD CALC: 44.7 % — SIGNIFICANT CHANGE UP (ref 39–50)
HGB BLD-MCNC: 14.6 G/DL — SIGNIFICANT CHANGE UP (ref 13–17)
INR BLD: 1.08 RATIO — SIGNIFICANT CHANGE UP (ref 0.88–1.16)
KETONES UR-MCNC: ABNORMAL
LEUKOCYTE ESTERASE UR-ACNC: NEGATIVE — SIGNIFICANT CHANGE UP
LYMPHOCYTES # BLD AUTO: 1.4 K/UL — SIGNIFICANT CHANGE UP (ref 1–3.3)
LYMPHOCYTES # BLD AUTO: 14.3 % — SIGNIFICANT CHANGE UP (ref 13–44)
MCHC RBC-ENTMCNC: 30.3 PG — SIGNIFICANT CHANGE UP (ref 27–34)
MCHC RBC-ENTMCNC: 32.7 GM/DL — SIGNIFICANT CHANGE UP (ref 32–36)
MCV RBC AUTO: 92.9 FL — SIGNIFICANT CHANGE UP (ref 80–100)
MONOCYTES # BLD AUTO: 0.5 K/UL — SIGNIFICANT CHANGE UP (ref 0–0.9)
MONOCYTES NFR BLD AUTO: 5.4 % — SIGNIFICANT CHANGE UP (ref 2–14)
NEUTROPHILS # BLD AUTO: 7.6 K/UL — HIGH (ref 1.8–7.4)
NEUTROPHILS NFR BLD AUTO: 79.6 % — HIGH (ref 43–77)
NITRITE UR-MCNC: NEGATIVE — SIGNIFICANT CHANGE UP
PH UR: 7 — SIGNIFICANT CHANGE UP (ref 4.8–8)
PLATELET # BLD AUTO: 193 K/UL — SIGNIFICANT CHANGE UP (ref 150–400)
POTASSIUM SERPL-MCNC: 6.1 MMOL/L — HIGH (ref 3.5–5.3)
POTASSIUM SERPL-SCNC: 6.1 MMOL/L — HIGH (ref 3.5–5.3)
PROT SERPL-MCNC: 7.4 G/DL — SIGNIFICANT CHANGE UP (ref 6–8.3)
PROT UR-MCNC: SIGNIFICANT CHANGE UP
PROTHROM AB SERPL-ACNC: 11.7 SEC — SIGNIFICANT CHANGE UP (ref 9.8–12.7)
RBC # BLD: 4.81 M/UL — SIGNIFICANT CHANGE UP (ref 4.2–5.8)
RBC # FLD: 12.6 % — SIGNIFICANT CHANGE UP (ref 10.3–14.5)
RBC CASTS # UR COMP ASSIST: SIGNIFICANT CHANGE UP /HPF (ref 0–2)
SODIUM SERPL-SCNC: 141 MMOL/L — SIGNIFICANT CHANGE UP (ref 135–145)
SP GR SPEC: 1.02 — SIGNIFICANT CHANGE UP (ref 1.01–1.02)
TROPONIN T SERPL-MCNC: <0.01 NG/ML — SIGNIFICANT CHANGE UP (ref 0–0.06)
UROBILINOGEN FLD QL: 1
WBC # BLD: 9.5 K/UL — SIGNIFICANT CHANGE UP (ref 3.8–10.5)
WBC # FLD AUTO: 9.5 K/UL — SIGNIFICANT CHANGE UP (ref 3.8–10.5)
WBC UR QL: SIGNIFICANT CHANGE UP /HPF (ref 0–5)

## 2017-04-03 PROCEDURE — 99285 EMERGENCY DEPT VISIT HI MDM: CPT

## 2017-04-03 PROCEDURE — 99223 1ST HOSP IP/OBS HIGH 75: CPT

## 2017-04-03 PROCEDURE — 70450 CT HEAD/BRAIN W/O DYE: CPT | Mod: 26

## 2017-04-03 PROCEDURE — 71010: CPT | Mod: 26

## 2017-04-03 PROCEDURE — 99223 1ST HOSP IP/OBS HIGH 75: CPT | Mod: AI

## 2017-04-03 PROCEDURE — 71275 CT ANGIOGRAPHY CHEST: CPT | Mod: 26

## 2017-04-03 RX ORDER — ENOXAPARIN SODIUM 100 MG/ML
70 INJECTION SUBCUTANEOUS EVERY 12 HOURS
Qty: 0 | Refills: 0 | Status: DISCONTINUED | OUTPATIENT
Start: 2017-04-03 | End: 2017-04-05

## 2017-04-03 RX ORDER — ENOXAPARIN SODIUM 100 MG/ML
68 INJECTION SUBCUTANEOUS ONCE
Qty: 0 | Refills: 0 | Status: DISCONTINUED | OUTPATIENT
Start: 2017-04-03 | End: 2017-04-03

## 2017-04-03 RX ORDER — MORPHINE SULFATE 50 MG/1
4 CAPSULE, EXTENDED RELEASE ORAL ONCE
Qty: 0 | Refills: 0 | Status: DISCONTINUED | OUTPATIENT
Start: 2017-04-03 | End: 2017-04-03

## 2017-04-03 RX ORDER — ENOXAPARIN SODIUM 100 MG/ML
70 INJECTION SUBCUTANEOUS ONCE
Qty: 0 | Refills: 0 | Status: COMPLETED | OUTPATIENT
Start: 2017-04-03 | End: 2017-04-03

## 2017-04-03 RX ORDER — ACETAMINOPHEN 500 MG
1000 TABLET ORAL ONCE
Qty: 0 | Refills: 0 | Status: COMPLETED | OUTPATIENT
Start: 2017-04-03 | End: 2017-04-03

## 2017-04-03 RX ADMIN — Medication 400 MILLIGRAM(S): at 18:00

## 2017-04-03 RX ADMIN — Medication 1000 MILLIGRAM(S): at 19:17

## 2017-04-03 RX ADMIN — MORPHINE SULFATE 4 MILLIGRAM(S): 50 CAPSULE, EXTENDED RELEASE ORAL at 22:01

## 2017-04-03 RX ADMIN — ENOXAPARIN SODIUM 70 MILLIGRAM(S): 100 INJECTION SUBCUTANEOUS at 23:23

## 2017-04-03 NOTE — ED PROVIDER NOTE - MEDICAL DECISION MAKING DETAILS
Dr. Gutierrez (Attending Physician)  [facility] lung ca, htn, DM, pw intermittent nonexertional nonpleuritic chest pain and fatigue.  Denies fever, chills, ha, nausea, vomiting, blurred vision, neck pain.  Normal neuro exam strength and sensation normal, pupils equal and reactive, lungs clear.  Will CTA chest to eval PE, CT brain to eval for mets. Check labs including BNP and trop. ECG with sinus tachycardia but no signs of PE.

## 2017-04-03 NOTE — H&P ADULT. - ASSESSMENT
76 with PMH lung ca on tarceva, HTN, HLD, DM, GERD, BPH p/w chest discomfort, dizziness and nausea admitted with b/l acute PE and uncontrolled HTN.

## 2017-04-03 NOTE — ED PROVIDER NOTE - ATTENDING CONTRIBUTION TO CARE
Dr. Gutierrez (Attending Physician)  I performed a history and physical exam of the patient and discussed their management with the resident. I reviewed the resident's note and agree with the documented findings and plan of care. My medical decision making and observations are found above.

## 2017-04-03 NOTE — H&P ADULT. - PMH
Diabetes    Hypertension    Lung cancer BPH (benign prostatic hypertrophy)    Diabetes    GERD (gastroesophageal reflux disease)    HLD (hyperlipidemia)    Hypertension    Lung cancer

## 2017-04-03 NOTE — H&P ADULT. - PROBLEM SELECTOR PLAN 1
acute b/l PE likely due to underlying malignancy  - will treat with Lovenox 1mg/kg Q12 for now  - Onc to discuss possible oral regimen upon discharge, since compliance is a problem with him

## 2017-04-03 NOTE — H&P ADULT. - HISTORY OF PRESENT ILLNESS
76 with PMH lung ca on tarceva, htn, DM, found by EMS lying on grass this AM, Has had chest pain since last night, nonradiating, sometimes dull sometimes sharp, dyspnea, not exertional, happens at rest. Has had episodes of chest pain for several weeks. Patient is [place] speaking, phone  and family at bedside used for translation.    ROS: Denies fever, palpitations, chills, recent sickness, HA, vision changes, cough, abdominal pain, n/v/d/c, dysuria, hematuria, rash, new joint aches, sick contacts, and recent travel Patient is [facility] Chinese speaking, son (Parmjit Davidson [phone]) at bedside used for translation per patient's request.    76 with PMH lung ca on tarceva, HTN, HLD, DM, GERD, BPH BIBA found sitting on grass this AM.  Patient has sharp chest pain across at baseline due to lung cancer, on Fentanyl patch and oxycodone prn at home, but this am, he had an episode of chest discomfort (not similar to baseline pain) "uncomfortable feeling" which he never had before, occurred when he went out for a walk near an elementary school yard.  It was non radiation, associated with some lightheadedness and nausea, but no SOB or diaphoresis. He then sat down on the grass, lean his head against the pole, when someone called EMS.  Denies fever, palpitations, chills, recent sickness, HA, vision changes, LOC, cough, abdominal pain, dysuria.

## 2017-04-03 NOTE — ED ADULT NURSE REASSESSMENT NOTE - NS ED NURSE REASSESS COMMENT FT1
Received report from LORA horn. Safety checked. No c/o pain or discomfort at this time. Comfort care provided. Pt encouraged to call for assist when needed. pending dispo

## 2017-04-03 NOTE — ED PROVIDER NOTE - OBJECTIVE STATEMENT
Patient is 76 with PMH lung ca on tarceva, htn, DM, found by EMS lying on grass this AM, told EMS he was just going for a walk and laid down because he was tired. Has had chest pain since last night, nonradiating, sometimes dull sometimes sharp, dyspnea, not exertional, happens at rest. Has had episodes of chest pain for several weeks.     PMD: Susana Elizondo  Onc: Cydney Hanks  ROS: Denies fever, palpitations, chills, recent sickness, HA, vision changes, cough, SOB, chest pain, abdominal pain, n/v/d/c, dysuria, hematuria, rash, new joint aches, sick contacts, and recent travel. Patient is 76 with PMH lung ca on tarceva, htn, DM, found by EMS lying on grass this AM, told EMS he was just going for a walk and laid down because he was tired. Has had chest pain since last night, nonradiating, sometimes dull sometimes sharp, dyspnea, not exertional, happens at rest. Has had episodes of chest pain for several weeks. Patient is [place] speaking, phone  and family at bedside used for translation.    PMD: Susana Elizondo  Onc: Cydney Hanks  ROS: Denies fever, palpitations, chills, recent sickness, HA, vision changes, cough, abdominal pain, n/v/d/c, dysuria, hematuria, rash, new joint aches, sick contacts, and recent travel.

## 2017-04-03 NOTE — ED ADULT NURSE NOTE - OBJECTIVE STATEMENT
Pt is a 77 yo M who was brought to the ED via EMS c/o chest pain and dizziness since last night. Pt was found lying in a schoolyard by his house where he normally goes walking, and per EMS, the school staff had difficulty arousing patient. Pt states he was walking and felt dizzy and tired so he lay down to rest. Pt denies head inj/loc, no headache/palpitations, no diaphoresis, no n/v.

## 2017-04-03 NOTE — H&P ADULT. - NEGATIVE NEUROLOGICAL SYMPTOMS
no confusion/no weakness/no loss of consciousness/no headache/no focal seizures/no difficulty walking

## 2017-04-03 NOTE — H&P ADULT. - PROBLEM SELECTOR PLAN 2
uncontrolled, has not been taking anti HTN med for weeks, partly due to dizziness and non compliance  - will resume ARB and monitor vitals closely

## 2017-04-03 NOTE — H&P ADULT. - PROBLEM SELECTOR PLAN 3
Onc madhu appreciated  - will stop Tarceva since refractory, NSCLCA progressed on Tarceva  - plan to start chemo as outpatient  - pain control with Fentanyl patch (25mcg patch and 12 mcg patch =37mcg).  Patient has been cutting 75mcg patch to half, pharmacy advised not to do.

## 2017-04-03 NOTE — H&P ADULT. - FAMILY HISTORY
Mother  Still living? No  Family history of liver cancer, Age at diagnosis: Age Unknown     Sibling  Still living? Yes, Estimated age: Age Unknown  Family history of diabetes mellitus, Age at diagnosis: Age Unknown

## 2017-04-04 DIAGNOSIS — I82.409 ACUTE EMBOLISM AND THROMBOSIS OF UNSPECIFIED DEEP VEINS OF UNSPECIFIED LOWER EXTREMITY: ICD-10-CM

## 2017-04-04 DIAGNOSIS — E11.9 TYPE 2 DIABETES MELLITUS WITHOUT COMPLICATIONS: ICD-10-CM

## 2017-04-04 DIAGNOSIS — D04.9 CARCINOMA IN SITU OF SKIN, UNSPECIFIED: Chronic | ICD-10-CM

## 2017-04-04 DIAGNOSIS — E78.5 HYPERLIPIDEMIA, UNSPECIFIED: ICD-10-CM

## 2017-04-04 DIAGNOSIS — K21.9 GASTRO-ESOPHAGEAL REFLUX DISEASE WITHOUT ESOPHAGITIS: ICD-10-CM

## 2017-04-04 DIAGNOSIS — N40.0 BENIGN PROSTATIC HYPERPLASIA WITHOUT LOWER URINARY TRACT SYMPTOMS: ICD-10-CM

## 2017-04-04 LAB
ANION GAP SERPL CALC-SCNC: 13 MMOL/L — SIGNIFICANT CHANGE UP (ref 5–17)
BASOPHILS # BLD AUTO: 0 K/UL — SIGNIFICANT CHANGE UP (ref 0–0.2)
BASOPHILS NFR BLD AUTO: 0.4 % — SIGNIFICANT CHANGE UP (ref 0–2)
BUN SERPL-MCNC: 13 MG/DL — SIGNIFICANT CHANGE UP (ref 7–23)
CALCIUM SERPL-MCNC: 9 MG/DL — SIGNIFICANT CHANGE UP (ref 8.4–10.5)
CHLORIDE SERPL-SCNC: 106 MMOL/L — SIGNIFICANT CHANGE UP (ref 96–108)
CO2 SERPL-SCNC: 25 MMOL/L — SIGNIFICANT CHANGE UP (ref 22–31)
CREAT SERPL-MCNC: 0.77 MG/DL — SIGNIFICANT CHANGE UP (ref 0.5–1.3)
CULTURE RESULTS: SIGNIFICANT CHANGE UP
EOSINOPHIL # BLD AUTO: 0.2 K/UL — SIGNIFICANT CHANGE UP (ref 0–0.5)
EOSINOPHIL NFR BLD AUTO: 2.4 % — SIGNIFICANT CHANGE UP (ref 0–6)
GLUCOSE SERPL-MCNC: 104 MG/DL — HIGH (ref 70–99)
HCT VFR BLD CALC: 42.3 % — SIGNIFICANT CHANGE UP (ref 39–50)
HGB BLD-MCNC: 14 G/DL — SIGNIFICANT CHANGE UP (ref 13–17)
LYMPHOCYTES # BLD AUTO: 1.7 K/UL — SIGNIFICANT CHANGE UP (ref 1–3.3)
LYMPHOCYTES # BLD AUTO: 22.9 % — SIGNIFICANT CHANGE UP (ref 13–44)
MCHC RBC-ENTMCNC: 30.7 PG — SIGNIFICANT CHANGE UP (ref 27–34)
MCHC RBC-ENTMCNC: 33.1 GM/DL — SIGNIFICANT CHANGE UP (ref 32–36)
MCV RBC AUTO: 92.6 FL — SIGNIFICANT CHANGE UP (ref 80–100)
MONOCYTES # BLD AUTO: 0.6 K/UL — SIGNIFICANT CHANGE UP (ref 0–0.9)
MONOCYTES NFR BLD AUTO: 7.6 % — SIGNIFICANT CHANGE UP (ref 2–14)
NEUTROPHILS # BLD AUTO: 4.9 K/UL — SIGNIFICANT CHANGE UP (ref 1.8–7.4)
NEUTROPHILS NFR BLD AUTO: 66.8 % — SIGNIFICANT CHANGE UP (ref 43–77)
PLATELET # BLD AUTO: 196 K/UL — SIGNIFICANT CHANGE UP (ref 150–400)
POTASSIUM SERPL-MCNC: 3.8 MMOL/L — SIGNIFICANT CHANGE UP (ref 3.5–5.3)
POTASSIUM SERPL-SCNC: 3.8 MMOL/L — SIGNIFICANT CHANGE UP (ref 3.5–5.3)
RBC # BLD: 4.57 M/UL — SIGNIFICANT CHANGE UP (ref 4.2–5.8)
RBC # FLD: 12.4 % — SIGNIFICANT CHANGE UP (ref 10.3–14.5)
SODIUM SERPL-SCNC: 144 MMOL/L — SIGNIFICANT CHANGE UP (ref 135–145)
SPECIMEN SOURCE: SIGNIFICANT CHANGE UP
WBC # BLD: 7.3 K/UL — SIGNIFICANT CHANGE UP (ref 3.8–10.5)
WBC # FLD AUTO: 7.3 K/UL — SIGNIFICANT CHANGE UP (ref 3.8–10.5)

## 2017-04-04 PROCEDURE — 99233 SBSQ HOSP IP/OBS HIGH 50: CPT

## 2017-04-04 RX ORDER — INSULIN LISPRO 100/ML
VIAL (ML) SUBCUTANEOUS
Qty: 0 | Refills: 0 | Status: DISCONTINUED | OUTPATIENT
Start: 2017-04-04 | End: 2017-04-06

## 2017-04-04 RX ORDER — SODIUM CHLORIDE 9 MG/ML
1000 INJECTION, SOLUTION INTRAVENOUS
Qty: 0 | Refills: 0 | Status: DISCONTINUED | OUTPATIENT
Start: 2017-04-04 | End: 2017-04-04

## 2017-04-04 RX ORDER — DEXTROSE 50 % IN WATER 50 %
25 SYRINGE (ML) INTRAVENOUS ONCE
Qty: 0 | Refills: 0 | Status: DISCONTINUED | OUTPATIENT
Start: 2017-04-04 | End: 2017-04-04

## 2017-04-04 RX ORDER — PANTOPRAZOLE SODIUM 20 MG/1
40 TABLET, DELAYED RELEASE ORAL
Qty: 0 | Refills: 0 | Status: DISCONTINUED | OUTPATIENT
Start: 2017-04-04 | End: 2017-04-06

## 2017-04-04 RX ORDER — DOCUSATE SODIUM 100 MG
100 CAPSULE ORAL THREE TIMES A DAY
Qty: 0 | Refills: 0 | Status: DISCONTINUED | OUTPATIENT
Start: 2017-04-04 | End: 2017-04-06

## 2017-04-04 RX ORDER — ONDANSETRON 8 MG/1
4 TABLET, FILM COATED ORAL EVERY 6 HOURS
Qty: 0 | Refills: 0 | Status: DISCONTINUED | OUTPATIENT
Start: 2017-04-04 | End: 2017-04-06

## 2017-04-04 RX ORDER — FENTANYL CITRATE 50 UG/ML
0.5 INJECTION INTRAVENOUS
Qty: 0 | Refills: 0 | Status: DISCONTINUED | OUTPATIENT
Start: 2017-04-04 | End: 2017-04-04

## 2017-04-04 RX ORDER — DEXTROSE 50 % IN WATER 50 %
1 SYRINGE (ML) INTRAVENOUS ONCE
Qty: 0 | Refills: 0 | Status: DISCONTINUED | OUTPATIENT
Start: 2017-04-04 | End: 2017-04-04

## 2017-04-04 RX ORDER — SIMVASTATIN 20 MG/1
20 TABLET, FILM COATED ORAL AT BEDTIME
Qty: 0 | Refills: 0 | Status: DISCONTINUED | OUTPATIENT
Start: 2017-04-04 | End: 2017-04-06

## 2017-04-04 RX ORDER — GLUCAGON INJECTION, SOLUTION 0.5 MG/.1ML
1 INJECTION, SOLUTION SUBCUTANEOUS ONCE
Qty: 0 | Refills: 0 | Status: DISCONTINUED | OUTPATIENT
Start: 2017-04-04 | End: 2017-04-04

## 2017-04-04 RX ORDER — FENTANYL CITRATE 50 UG/ML
1 INJECTION INTRAVENOUS
Qty: 0 | Refills: 0 | Status: DISCONTINUED | OUTPATIENT
Start: 2017-04-04 | End: 2017-04-06

## 2017-04-04 RX ORDER — FAMOTIDINE 10 MG/ML
20 INJECTION INTRAVENOUS
Qty: 0 | Refills: 0 | Status: DISCONTINUED | OUTPATIENT
Start: 2017-04-04 | End: 2017-04-06

## 2017-04-04 RX ORDER — SENNA PLUS 8.6 MG/1
2 TABLET ORAL AT BEDTIME
Qty: 0 | Refills: 0 | Status: DISCONTINUED | OUTPATIENT
Start: 2017-04-04 | End: 2017-04-06

## 2017-04-04 RX ORDER — CLINDAMYCIN PHOSPHATE GEL USP, 1% 10 MG/G
1 GEL TOPICAL THREE TIMES A DAY
Qty: 0 | Refills: 0 | Status: DISCONTINUED | OUTPATIENT
Start: 2017-04-04 | End: 2017-04-06

## 2017-04-04 RX ORDER — METOCLOPRAMIDE HCL 10 MG
10 TABLET ORAL
Qty: 0 | Refills: 0 | Status: DISCONTINUED | OUTPATIENT
Start: 2017-04-04 | End: 2017-04-06

## 2017-04-04 RX ORDER — INSULIN LISPRO 100/ML
VIAL (ML) SUBCUTANEOUS AT BEDTIME
Qty: 0 | Refills: 0 | Status: DISCONTINUED | OUTPATIENT
Start: 2017-04-04 | End: 2017-04-06

## 2017-04-04 RX ORDER — TAMSULOSIN HYDROCHLORIDE 0.4 MG/1
0.4 CAPSULE ORAL AT BEDTIME
Qty: 0 | Refills: 0 | Status: DISCONTINUED | OUTPATIENT
Start: 2017-04-04 | End: 2017-04-06

## 2017-04-04 RX ORDER — DEXTROSE 50 % IN WATER 50 %
12.5 SYRINGE (ML) INTRAVENOUS ONCE
Qty: 0 | Refills: 0 | Status: DISCONTINUED | OUTPATIENT
Start: 2017-04-04 | End: 2017-04-04

## 2017-04-04 RX ORDER — LOSARTAN POTASSIUM 100 MG/1
25 TABLET, FILM COATED ORAL DAILY
Qty: 0 | Refills: 0 | Status: DISCONTINUED | OUTPATIENT
Start: 2017-04-04 | End: 2017-04-06

## 2017-04-04 RX ORDER — HYDROCORTISONE 1 %
1 OINTMENT (GRAM) TOPICAL THREE TIMES A DAY
Qty: 0 | Refills: 0 | Status: DISCONTINUED | OUTPATIENT
Start: 2017-04-04 | End: 2017-04-06

## 2017-04-04 RX ORDER — CHOLECALCIFEROL (VITAMIN D3) 125 MCG
1000 CAPSULE ORAL
Qty: 0 | Refills: 0 | Status: DISCONTINUED | OUTPATIENT
Start: 2017-04-04 | End: 2017-04-06

## 2017-04-04 RX ORDER — OXYCODONE HYDROCHLORIDE 5 MG/1
10 TABLET ORAL EVERY 6 HOURS
Qty: 0 | Refills: 0 | Status: DISCONTINUED | OUTPATIENT
Start: 2017-04-04 | End: 2017-04-05

## 2017-04-04 RX ADMIN — ENOXAPARIN SODIUM 70 MILLIGRAM(S): 100 INJECTION SUBCUTANEOUS at 05:41

## 2017-04-04 RX ADMIN — Medication 1 APPLICATION(S): at 05:42

## 2017-04-04 RX ADMIN — Medication 100 MILLIGRAM(S): at 05:42

## 2017-04-04 RX ADMIN — PANTOPRAZOLE SODIUM 40 MILLIGRAM(S): 20 TABLET, DELAYED RELEASE ORAL at 09:26

## 2017-04-04 RX ADMIN — FAMOTIDINE 20 MILLIGRAM(S): 10 INJECTION INTRAVENOUS at 05:42

## 2017-04-04 RX ADMIN — Medication 1000 UNIT(S): at 05:42

## 2017-04-04 RX ADMIN — OXYCODONE HYDROCHLORIDE 10 MILLIGRAM(S): 5 TABLET ORAL at 19:20

## 2017-04-04 RX ADMIN — FENTANYL CITRATE 1 PATCH: 50 INJECTION INTRAVENOUS at 03:08

## 2017-04-04 RX ADMIN — FAMOTIDINE 20 MILLIGRAM(S): 10 INJECTION INTRAVENOUS at 17:21

## 2017-04-04 RX ADMIN — Medication 10 MILLIGRAM(S): at 11:27

## 2017-04-04 RX ADMIN — LOSARTAN POTASSIUM 25 MILLIGRAM(S): 100 TABLET, FILM COATED ORAL at 05:42

## 2017-04-04 RX ADMIN — Medication 1 APPLICATION(S): at 23:33

## 2017-04-04 RX ADMIN — Medication 1000 UNIT(S): at 17:21

## 2017-04-04 RX ADMIN — CLINDAMYCIN PHOSPHATE GEL USP, 1% 1 APPLICATION(S): 10 GEL TOPICAL at 23:33

## 2017-04-04 RX ADMIN — Medication 1: at 13:46

## 2017-04-04 RX ADMIN — Medication 1 APPLICATION(S): at 14:02

## 2017-04-04 RX ADMIN — Medication 100 MILLIGRAM(S): at 22:39

## 2017-04-04 RX ADMIN — Medication 100 MILLIGRAM(S): at 13:46

## 2017-04-04 RX ADMIN — OXYCODONE HYDROCHLORIDE 10 MILLIGRAM(S): 5 TABLET ORAL at 21:10

## 2017-04-04 RX ADMIN — TAMSULOSIN HYDROCHLORIDE 0.4 MILLIGRAM(S): 0.4 CAPSULE ORAL at 22:39

## 2017-04-04 RX ADMIN — CLINDAMYCIN PHOSPHATE GEL USP, 1% 1 APPLICATION(S): 10 GEL TOPICAL at 05:42

## 2017-04-04 RX ADMIN — CLINDAMYCIN PHOSPHATE GEL USP, 1% 1 APPLICATION(S): 10 GEL TOPICAL at 14:02

## 2017-04-04 RX ADMIN — SENNA PLUS 2 TABLET(S): 8.6 TABLET ORAL at 22:39

## 2017-04-04 RX ADMIN — ENOXAPARIN SODIUM 70 MILLIGRAM(S): 100 INJECTION SUBCUTANEOUS at 17:21

## 2017-04-04 RX ADMIN — SIMVASTATIN 20 MILLIGRAM(S): 20 TABLET, FILM COATED ORAL at 22:39

## 2017-04-04 NOTE — DIETITIAN INITIAL EVALUATION ADULT. - NS AS NUTRI INTERV ED CONTENT
Discussed importance of adequate intake and wt maintenance. Discussed importance of adequate intake & consuming protein w/ all meals and snacks for optimal nutrition & glycemic control.

## 2017-04-04 NOTE — DIETITIAN INITIAL EVALUATION ADULT. - OTHER INFO
pt seen for consult for FTT. pt and son report he weighed about 162 pounds 2.5 months ago and has been losing wt, did not know exactly how much wt but has noticed his face "getting skinnier." pt reports NKFA. Was taking vitamin D3 supplement at home. Reports pt has started to take a supplement at home but could not recall name, willing to take Glucerna shake ordered here- will provide flavors for preference check. Reports he was not checking his Finger sticks at home and has not seen his primary care doctor in a few months and is unsure of A1C. Noted pt was taking Metformin at home- pt and son confirmed.

## 2017-04-04 NOTE — DIETITIAN INITIAL EVALUATION ADULT. - DIET TYPE
c Glucerna shake x 3 daily/DASH/TLC (sodium and cholesterol restricted diet)/consistent carbohydrate (no snacks)

## 2017-04-04 NOTE — DIETITIAN INITIAL EVALUATION ADULT. - PHYSICAL APPEARANCE
thin appearance; nutrition focused physical exam conducted: mild temporal wasting, moderate wasting noted around shoulders and calves; mild-moderate fat loss around orbital and buccal region

## 2017-04-04 NOTE — DIETITIAN INITIAL EVALUATION ADULT. - ORAL INTAKE PTA
fair/usual intake: breakfast: congee c sweet potato; lunch: noodles c broth; dinner: rice, vegetables and chicken or fish; pt does not like beef since he was a teenager

## 2017-04-04 NOTE — DIETITIAN INITIAL EVALUATION ADULT. - SOURCE
family/significant other/son, Parmjit Emeli ([phone]) family/significant other/son, Parmjit Sainz ([phone]) acted as  on pt phone per pt request- pt speaks [place]

## 2017-04-04 NOTE — DIETITIAN INITIAL EVALUATION ADULT. - ADHERENCE
fair/consistent CHO, DASH diet- pt does not add salt at the table but it is used in cooking; avoids concentrated sweets but does not have any particular eating pattern

## 2017-04-04 NOTE — DIETITIAN INITIAL EVALUATION ADULT. - FACTORS AFF FOOD INTAKE
son reports pt did consume 1/2 turkey sandwich today and all of soup brought in from outside; pt reports overall appetite is suboptimal- was eating smaller amounts at home too; denies any chewing/swallowing issues-reports he wears dentures and needs to have them readjusted but is chewing without any issues

## 2017-04-05 LAB — HBA1C BLD-MCNC: 6.3 % — HIGH (ref 4–5.6)

## 2017-04-05 PROCEDURE — 99233 SBSQ HOSP IP/OBS HIGH 50: CPT

## 2017-04-05 PROCEDURE — 99232 SBSQ HOSP IP/OBS MODERATE 35: CPT

## 2017-04-05 RX ORDER — ENOXAPARIN SODIUM 100 MG/ML
100 INJECTION SUBCUTANEOUS DAILY
Qty: 0 | Refills: 0 | Status: DISCONTINUED | OUTPATIENT
Start: 2017-04-06 | End: 2017-04-06

## 2017-04-05 RX ORDER — OXYCODONE HYDROCHLORIDE 5 MG/1
10 TABLET ORAL EVERY 4 HOURS
Qty: 0 | Refills: 0 | Status: DISCONTINUED | OUTPATIENT
Start: 2017-04-05 | End: 2017-04-06

## 2017-04-05 RX ORDER — ENOXAPARIN SODIUM 100 MG/ML
70 INJECTION SUBCUTANEOUS ONCE
Qty: 0 | Refills: 0 | Status: COMPLETED | OUTPATIENT
Start: 2017-04-05 | End: 2017-04-05

## 2017-04-05 RX ADMIN — Medication 1 APPLICATION(S): at 05:50

## 2017-04-05 RX ADMIN — ENOXAPARIN SODIUM 70 MILLIGRAM(S): 100 INJECTION SUBCUTANEOUS at 05:51

## 2017-04-05 RX ADMIN — Medication 1 APPLICATION(S): at 21:13

## 2017-04-05 RX ADMIN — Medication 1000 UNIT(S): at 05:52

## 2017-04-05 RX ADMIN — CLINDAMYCIN PHOSPHATE GEL USP, 1% 1 APPLICATION(S): 10 GEL TOPICAL at 05:51

## 2017-04-05 RX ADMIN — TAMSULOSIN HYDROCHLORIDE 0.4 MILLIGRAM(S): 0.4 CAPSULE ORAL at 21:11

## 2017-04-05 RX ADMIN — Medication 1000 UNIT(S): at 17:30

## 2017-04-05 RX ADMIN — Medication 100 MILLIGRAM(S): at 13:03

## 2017-04-05 RX ADMIN — Medication 100 MILLIGRAM(S): at 21:11

## 2017-04-05 RX ADMIN — OXYCODONE HYDROCHLORIDE 10 MILLIGRAM(S): 5 TABLET ORAL at 18:05

## 2017-04-05 RX ADMIN — SENNA PLUS 2 TABLET(S): 8.6 TABLET ORAL at 21:11

## 2017-04-05 RX ADMIN — CLINDAMYCIN PHOSPHATE GEL USP, 1% 1 APPLICATION(S): 10 GEL TOPICAL at 13:03

## 2017-04-05 RX ADMIN — Medication 100 MILLIGRAM(S): at 05:52

## 2017-04-05 RX ADMIN — CLINDAMYCIN PHOSPHATE GEL USP, 1% 1 APPLICATION(S): 10 GEL TOPICAL at 21:13

## 2017-04-05 RX ADMIN — SIMVASTATIN 20 MILLIGRAM(S): 20 TABLET, FILM COATED ORAL at 21:11

## 2017-04-05 RX ADMIN — Medication 1 APPLICATION(S): at 13:03

## 2017-04-05 RX ADMIN — LOSARTAN POTASSIUM 25 MILLIGRAM(S): 100 TABLET, FILM COATED ORAL at 05:51

## 2017-04-05 RX ADMIN — FAMOTIDINE 20 MILLIGRAM(S): 10 INJECTION INTRAVENOUS at 21:11

## 2017-04-05 RX ADMIN — ENOXAPARIN SODIUM 70 MILLIGRAM(S): 100 INJECTION SUBCUTANEOUS at 17:30

## 2017-04-05 RX ADMIN — Medication 1: at 13:02

## 2017-04-05 RX ADMIN — OXYCODONE HYDROCHLORIDE 10 MILLIGRAM(S): 5 TABLET ORAL at 13:07

## 2017-04-05 RX ADMIN — FAMOTIDINE 20 MILLIGRAM(S): 10 INJECTION INTRAVENOUS at 13:03

## 2017-04-05 RX ADMIN — OXYCODONE HYDROCHLORIDE 10 MILLIGRAM(S): 5 TABLET ORAL at 14:00

## 2017-04-05 NOTE — PHYSICAL THERAPY INITIAL EVALUATION ADULT - ADDITIONAL COMMENTS
Pt lives at home with spouse and son & family, +flight of stairs in home, PTA ind amb and ADls, +std cane at home. family able to assist as needed

## 2017-04-05 NOTE — PHYSICAL THERAPY INITIAL EVALUATION ADULT - PERTINENT HX OF CURRENT PROBLEM, REHAB EVAL
Pt is a 76 male admitted to [facility] 4/3/17 for sharp chest pain (baseline 2* lung Ca), +fentanyl patch and oxycodone prn at home. Pt with chest discomfort "uncomfortable feeling." Non-radiation, some lightheadedness, nausea, no SOB or diaphoresis.

## 2017-04-05 NOTE — PHYSICAL THERAPY INITIAL EVALUATION ADULT - ACTIVE RANGE OF MOTION EXAMINATION, REHAB EVAL
Left UE Active ROM was WFL (within functional limits)/Left LE Active ROM was WFL (within functional limits)/BUE and BLE WFL/Right UE Active ROM was WFL (within functional limits)/Right LE Active ROM was WFL (within functional limits)

## 2017-04-06 VITALS
SYSTOLIC BLOOD PRESSURE: 166 MMHG | OXYGEN SATURATION: 96 % | HEART RATE: 77 BPM | RESPIRATION RATE: 18 BRPM | DIASTOLIC BLOOD PRESSURE: 93 MMHG | TEMPERATURE: 98 F

## 2017-04-06 PROCEDURE — 99239 HOSP IP/OBS DSCHRG MGMT >30: CPT

## 2017-04-06 RX ORDER — FENTANYL CITRATE 50 UG/ML
1 INJECTION INTRAVENOUS
Qty: 5 | Refills: 0 | OUTPATIENT
Start: 2017-04-06 | End: 2017-04-21

## 2017-04-06 RX ORDER — OXYCODONE HYDROCHLORIDE 5 MG/1
1 TABLET ORAL
Qty: 40 | Refills: 0 | OUTPATIENT
Start: 2017-04-06 | End: 2017-04-16

## 2017-04-06 RX ORDER — ENOXAPARIN SODIUM 100 MG/ML
100 INJECTION SUBCUTANEOUS
Qty: 1 | Refills: 0 | OUTPATIENT
Start: 2017-04-06 | End: 2017-05-06

## 2017-04-06 RX ORDER — OLMESARTAN MEDOXOMIL 5 MG/1
1 TABLET, FILM COATED ORAL
Qty: 30 | Refills: 0 | OUTPATIENT
Start: 2017-04-06 | End: 2017-05-06

## 2017-04-06 RX ORDER — FENTANYL CITRATE 50 UG/ML
1 INJECTION INTRAVENOUS
Qty: 0 | Refills: 0 | Status: DISCONTINUED | OUTPATIENT
Start: 2017-04-06 | End: 2017-04-06

## 2017-04-06 RX ADMIN — ENOXAPARIN SODIUM 100 MILLIGRAM(S): 100 INJECTION SUBCUTANEOUS at 09:34

## 2017-04-06 RX ADMIN — Medication 30 MILLILITER(S): at 16:39

## 2017-04-06 RX ADMIN — FAMOTIDINE 20 MILLIGRAM(S): 10 INJECTION INTRAVENOUS at 17:50

## 2017-04-06 RX ADMIN — FENTANYL CITRATE 1 PATCH: 50 INJECTION INTRAVENOUS at 16:45

## 2017-04-06 RX ADMIN — Medication: at 18:14

## 2017-04-06 RX ADMIN — Medication 1 APPLICATION(S): at 05:51

## 2017-04-06 RX ADMIN — PANTOPRAZOLE SODIUM 40 MILLIGRAM(S): 20 TABLET, DELAYED RELEASE ORAL at 05:50

## 2017-04-06 RX ADMIN — CLINDAMYCIN PHOSPHATE GEL USP, 1% 1 APPLICATION(S): 10 GEL TOPICAL at 13:06

## 2017-04-06 RX ADMIN — OXYCODONE HYDROCHLORIDE 10 MILLIGRAM(S): 5 TABLET ORAL at 13:05

## 2017-04-06 RX ADMIN — OXYCODONE HYDROCHLORIDE 10 MILLIGRAM(S): 5 TABLET ORAL at 09:40

## 2017-04-06 RX ADMIN — OXYCODONE HYDROCHLORIDE 10 MILLIGRAM(S): 5 TABLET ORAL at 08:40

## 2017-04-06 RX ADMIN — Medication 1000 UNIT(S): at 05:50

## 2017-04-06 RX ADMIN — FENTANYL CITRATE 1 PATCH: 50 INJECTION INTRAVENOUS at 16:49

## 2017-04-06 RX ADMIN — Medication 1000 UNIT(S): at 17:50

## 2017-04-06 RX ADMIN — Medication 1 APPLICATION(S): at 13:06

## 2017-04-06 RX ADMIN — Medication 100 MILLIGRAM(S): at 13:06

## 2017-04-06 RX ADMIN — LOSARTAN POTASSIUM 25 MILLIGRAM(S): 100 TABLET, FILM COATED ORAL at 05:50

## 2017-04-06 RX ADMIN — FAMOTIDINE 20 MILLIGRAM(S): 10 INJECTION INTRAVENOUS at 05:50

## 2017-04-06 RX ADMIN — Medication 100 MILLIGRAM(S): at 05:50

## 2017-04-06 RX ADMIN — CLINDAMYCIN PHOSPHATE GEL USP, 1% 1 APPLICATION(S): 10 GEL TOPICAL at 05:51

## 2017-04-06 RX ADMIN — OXYCODONE HYDROCHLORIDE 10 MILLIGRAM(S): 5 TABLET ORAL at 13:53

## 2017-04-06 NOTE — DISCHARGE NOTE ADULT - CARE PROVIDERS DIRECT ADDRESSES
[email or web address]

## 2017-04-06 NOTE — DISCHARGE NOTE ADULT - ADDITIONAL INSTRUCTIONS
Follow-up with Oncology for Lovenox refill and further monitoring Follow-up with Oncology Dr. Hanks as scheduled 4/10/17, and   for Lovenox refill.

## 2017-04-06 NOTE — DISCHARGE NOTE ADULT - PATIENT PORTAL LINK FT
“You can access the FollowHealth Patient Portal, offered by [facility], by registering with the following website: [email or web address]”

## 2017-04-06 NOTE — DISCHARGE NOTE ADULT - MEDICATION SUMMARY - MEDICATIONS TO TAKE
I will START or STAY ON the medications listed below when I get home from the hospital:    oxyCODONE 10 mg oral tablet  -- 1 tab(s) by mouth every 6 hours prn  -- Indication: For Pain     fentanyl topical  -- 1 patch subcutaneous every 72 hours  -- Indication: For Pain     Benicar 5 mg oral tablet  -- 1 tab(s) by mouth once a day  -- Indication: For Htn    Flomax 0.4 mg oral capsule  -- 1 cap(s) by mouth once a day  -- Indication: For BPH (benign prostatic hypertrophy)    enoxaparin 100 mg/mL injectable solution  -- 100 milligram(s) injectable once a day  -- It is very important that you take or use this exactly as directed.  Do not skip doses or discontinue unless directed by your doctor.    -- Indication: For Pulmonary embolism    metFORMIN 500 mg oral tablet  -- 1 tab(s) by mouth 2 times a day  -- Indication: For Diabetes    ondansetron 4 mg oral tablet, disintegrating  -- 1 tab(s) by mouth 3 times a day  -- Indication: For n/v    metoclopramide 10 mg oral tablet  --  by mouth   -- Indication: For n/v    Zocor 20 mg oral tablet  -- 1 tab(s) by mouth once a day (at bedtime)  -- Indication: For HLD (hyperlipidemia)    clindamycin 1% topical gel  -- Apply on skin to affected area 3 times a day  -- Indication: For rash     hydrocortisone butyrate 0.1% topical cream  -- Apply on skin to affected area 3 times a day  -- Indication: For rash     famotidine 20 mg oral tablet  -- 1 tab(s) by mouth 2 times a day  -- Indication: For GERD (gastroesophageal reflux disease)    Colace 100 mg oral capsule  -- 1 cap(s) by mouth 3 times a day  -- Indication: For constipation     senna 8.6 mg oral tablet  -- 1 tab(s) by mouth once a day (at bedtime)  -- Indication: For constipation     pantoprazole 40 mg oral delayed release tablet  -- 1 tab(s) by mouth once a day  -- Indication: For GERD (gastroesophageal reflux disease)    Vitamin D3 1000 intl units oral capsule  -- 1 cap(s) by mouth 2 times a day  -- Indication: For Supplement I will START or STAY ON the medications listed below when I get home from the hospital:    fentaNYL 50 mcg/hr transdermal film, extended release  -- 1 patch by transdermal patch every 72 hours MDD:1 patch  -- Indication: For Pain    oxyCODONE 10 mg oral tablet  -- 1 tab(s) by mouth every 6 hours as needed for pain MDD:4 tabs  -- Indication: For Pain    Benicar 5 mg oral tablet  -- 1 tab(s) by mouth once a day  -- Indication: For Htn    Flomax 0.4 mg oral capsule  -- 1 cap(s) by mouth once a day  -- Indication: For BPH (benign prostatic hypertrophy)    enoxaparin 100 mg/mL injectable solution  -- 100 milligram(s) injectable once a day  -- It is very important that you take or use this exactly as directed.  Do not skip doses or discontinue unless directed by your doctor.    -- Indication: For Pulmonary embolism    metFORMIN 500 mg oral tablet  -- 1 tab(s) by mouth 2 times a day  -- Indication: For Dm2    ondansetron 4 mg oral tablet, disintegrating  -- 1 tab(s) by mouth 3 times a day  -- Indication: For n/v    metoclopramide 10 mg oral tablet  --  by mouth   -- Indication: For n/v     Zocor 20 mg oral tablet  -- 1 tab(s) by mouth once a day (at bedtime)  -- Indication: For Hyperlipedemia     clindamycin 1% topical gel  -- Apply on skin to affected area 3 times a day  -- Indication: For rash     hydrocortisone butyrate 0.1% topical cream  -- Apply on skin to affected area 3 times a day  -- Indication: For rash     famotidine 20 mg oral tablet  -- 1 tab(s) by mouth 2 times a day  -- Indication: For GERD (gastroesophageal reflux disease)    Colace 100 mg oral capsule  -- 1 cap(s) by mouth 3 times a day  -- Indication: For constipation     senna 8.6 mg oral tablet  -- 1 tab(s) by mouth once a day (at bedtime)  -- Indication: For constipation     pantoprazole 40 mg oral delayed release tablet  -- 1 tab(s) by mouth once a day  -- Indication: For GERD (gastroesophageal reflux disease)    Vitamin D3 1000 intl units oral capsule  -- 1 cap(s) by mouth 2 times a day  -- Indication: For Supplement

## 2017-04-06 NOTE — DISCHARGE NOTE ADULT - PLAN OF CARE
continue Lovenox injection as prescribed Follow-up with Oncology for Lovenox refill and further monitoring continue home medication HgA1C this admission.  Make sure you get your HgA1c checked every three months.  If you take oral diabetes medications, check your blood glucose two times a day.  If you take insulin, check your blood glucose before meals and at bedtime.  It's important not to skip any meals.  Keep a log of your blood glucose results and always take it with you to your doctor appointments.  Keep a list of your current medications including injectables and over the counter medications and bring this medication list with you to all your doctor appointments.  If you have not seen your ophthalmologist this year call for appointment.  Check your feet daily for redness, sores, or openings. Do not self treat. If no improvement in two days call your primary care physician for an appointment.  Low blood sugar (hypoglycemia) is a blood sugar below 70mg/dl. Check your blood sugar if you feel signs/symptoms of hypoglycemia. If your blood sugar is below 70 take 15 grams of carbohydrates (ex 4 oz of apple juice, 3-4 glucose tablets, or 4-6 oz of regular soda) wait 15 minutes and repeat blood sugar to make sure it comes up above 70.  If your blood sugar is above 70 and you are due for a meal, have a meal.  If you are not due for a meal have a snack.  This snack helps keeps your blood sugar at a safe range. Follow up with PCP for treatment goals, continue medication, have liver function testing every 3 months as anti lipid medications can cause liver irritation, eat low fat, low cholesterol meals Low salt diet  Activity as tolerated.  Take all medication as prescribed.  Follow up with your medical doctor for routine blood pressure monitoring at your next visit.  Notify your doctor if you have any of the following symptoms:   Dizziness, Lightheadedness, Blurry vision, Headache, Chest pain, Shortness of breath Follow-up with your primary care provider

## 2017-04-06 NOTE — DISCHARGE NOTE ADULT - CARE PROVIDER_API CALL
Kimberly Peck (DO), HematologyOncology; Internal Medicine  [address] [place] [address]  Phone: [phone]  Fax: [phone] Kimberly Peck (DO), HematologyOncology; Internal Medicine  [address] [place] [address]  Phone: [phone]  Fax: [phone]

## 2017-04-06 NOTE — DISCHARGE NOTE ADULT - CARE PLAN
Principal Discharge DX:	Pulmonary embolism  Secondary Diagnosis:	Diabetes  Secondary Diagnosis:	HLD (hyperlipidemia)  Secondary Diagnosis:	Hypertension  Secondary Diagnosis:	Lung cancer  Secondary Diagnosis:	GERD (gastroesophageal reflux disease) Principal Discharge DX:	Pulmonary embolism  Goal:	continue Lovenox injection as prescribed  Instructions for follow-up, activity and diet:	Follow-up with Oncology for Lovenox refill and further monitoring  Secondary Diagnosis:	Diabetes  Goal:	continue home medication  Instructions for follow-up, activity and diet:	HgA1C this admission.  Make sure you get your HgA1c checked every three months.  If you take oral diabetes medications, check your blood glucose two times a day.  If you take insulin, check your blood glucose before meals and at bedtime.  It's important not to skip any meals.  Keep a log of your blood glucose results and always take it with you to your doctor appointments.  Keep a list of your current medications including injectables and over the counter medications and bring this medication list with you to all your doctor appointments.  If you have not seen your ophthalmologist this year call for appointment.  Check your feet daily for redness, sores, or openings. Do not self treat. If no improvement in two days call your primary care physician for an appointment.  Low blood sugar (hypoglycemia) is a blood sugar below 70mg/dl. Check your blood sugar if you feel signs/symptoms of hypoglycemia. If your blood sugar is below 70 take 15 grams of carbohydrates (ex 4 oz of apple juice, 3-4 glucose tablets, or 4-6 oz of regular soda) wait 15 minutes and repeat blood sugar to make sure it comes up above 70.  If your blood sugar is above 70 and you are due for a meal, have a meal.  If you are not due for a meal have a snack.  This snack helps keeps your blood sugar at a safe range.  Secondary Diagnosis:	HLD (hyperlipidemia)  Goal:	continue home medication  Instructions for follow-up, activity and diet:	Follow up with PCP for treatment goals, continue medication, have liver function testing every 3 months as anti lipid medications can cause liver irritation, eat low fat, low cholesterol meals  Secondary Diagnosis:	Hypertension  Goal:	continue home medication  Instructions for follow-up, activity and diet:	Low salt diet  Activity as tolerated.  Take all medication as prescribed.  Follow up with your medical doctor for routine blood pressure monitoring at your next visit.  Notify your doctor if you have any of the following symptoms:   Dizziness, Lightheadedness, Blurry vision, Headache, Chest pain, Shortness of breath  Secondary Diagnosis:	Lung cancer  Instructions for follow-up, activity and diet:	Follow-up with your primary care provider  Secondary Diagnosis:	GERD (gastroesophageal reflux disease)  Goal:	continue home medication

## 2017-04-06 NOTE — DISCHARGE NOTE ADULT - MEDICATION SUMMARY - MEDICATIONS TO STOP TAKING
I will STOP taking the medications listed below when I get home from the hospital:    minocycline 100 mg oral tablet  -- 1 tab(s) by mouth twice a day    Tarceva  --  by mouth

## 2017-04-06 NOTE — DISCHARGE NOTE ADULT - MEDICATION SUMMARY - MEDICATIONS TO CHANGE
I will SWITCH the dose or number of times a day I take the medications listed below when I get home from the hospital:  None I will SWITCH the dose or number of times a day I take the medications listed below when I get home from the hospital:    oxyCODONE 10 mg oral tablet  -- 1 tab(s) by mouth every 6 hours prn    fentanyl topical  -- 75 mcg patch

## 2017-04-06 NOTE — DISCHARGE NOTE ADULT - HOSPITAL COURSE
**********Attending to complete************* Metastatic lung cancer    Acute PE 77 yo with metastatic lung ca on Tarceva pw chest pain and dizziness.     CTA revealed right upper lobe segmental/subsegmental and left lower lobe segmental pulmonary emboli.    Pt initially admitted to tele. Started on lovenox. Pulse ox and vital monitored, remained hemodynamically stable.     Had persistent pain from mets, fentanyl patch increased.     D/jack with follow up.     Diagnoses: Acute pulmonary embolism; Lung cancer; Uncontrolled htn; Pain due to metastases; Lung metastases; Lymph node metastases;  DM-2; Moderate protein calorie malnutrition; GERD 77 yo with metastatic lung ca on Tarceva pw chest pain and dizziness.     CTA revealed right upper lobe segmental/subsegmental and left lower lobe segmental pulmonary emboli.    Pt initially admitted to tele. Started on lovenox. Pulse ox and vital monitored, remained hemodynamically stable.     Had persistent pain from mets, fentanyl patch increased. Seen by onc, recommended d/cing Tarceva given progression of disease, to f/u outpt to discuss treatment.     D/jack with follow up.     Diagnoses: Acute pulmonary embolism; Near syncope; Lung cancer; Uncontrolled htn; Pain due to metastases; Lung metastases; Lymph node metastases;  DM-2; Moderate protein calorie malnutrition; GERD 77 yo with metastatic lung ca on Tarceva pw chest pain and dizziness.     CTA revealed right upper lobe segmental/subsegmental and left lower lobe segmental pulmonary emboli.    Pt initially admitted to tele. Started on lovenox. Pulse ox and vital monitored, remained hemodynamically stable.     Had persistent pain from mets, fentanyl patch increased. Seen by onc, recommended d/cing Tarceva given progression of disease, to f/u outpt to discuss treatment.     D/jack with follow up.     Diagnoses: Acute pulmonary embolism; Near syncope; Lung cancer; Uncontrolled htn; Pain due to metastases; Lung metastases; Lymph node metastases; COPD; DM-2; Moderate protein calorie malnutrition; GERD

## 2017-04-07 ENCOUNTER — OUTPATIENT (OUTPATIENT)
Dept: OUTPATIENT SERVICES | Facility: HOSPITAL | Age: 77
LOS: 1 days | Discharge: HOME | End: 2017-04-07

## 2017-04-07 DIAGNOSIS — C44.311 BASAL CELL CARCINOMA OF SKIN OF NOSE: Chronic | ICD-10-CM

## 2017-04-07 DIAGNOSIS — C34.90 MALIGNANT NEOPLASM OF UNSPECIFIED PART OF UNSPECIFIED BRONCHUS OR LUNG: ICD-10-CM

## 2017-04-07 DIAGNOSIS — D04.9 CARCINOMA IN SITU OF SKIN, UNSPECIFIED: Chronic | ICD-10-CM

## 2017-04-09 ENCOUNTER — RESULT REVIEW (OUTPATIENT)
Age: 77
End: 2017-04-09

## 2017-04-10 ENCOUNTER — APPOINTMENT (OUTPATIENT)
Dept: HEMATOLOGY ONCOLOGY | Facility: CLINIC | Age: 77
End: 2017-04-10

## 2017-04-10 VITALS
BODY MASS INDEX: 22.79 KG/M2 | RESPIRATION RATE: 16 BRPM | WEIGHT: 154.32 LBS | DIASTOLIC BLOOD PRESSURE: 71 MMHG | OXYGEN SATURATION: 94 % | HEART RATE: 72 BPM | SYSTOLIC BLOOD PRESSURE: 105 MMHG | TEMPERATURE: 97.6 F

## 2017-04-10 LAB
ALBUMIN SERPL ELPH-MCNC: 4 G/DL — SIGNIFICANT CHANGE UP (ref 3.3–5)
ALP SERPL-CCNC: 60 U/L — SIGNIFICANT CHANGE UP (ref 40–120)
ALT FLD-CCNC: 20 U/L — SIGNIFICANT CHANGE UP (ref 10–45)
ANION GAP SERPL CALC-SCNC: 13 MMOL/L — SIGNIFICANT CHANGE UP (ref 5–17)
APTT BLD: 33.5 SEC — SIGNIFICANT CHANGE UP (ref 27.5–37.4)
AST SERPL-CCNC: 19 U/L — SIGNIFICANT CHANGE UP (ref 10–40)
BILIRUB SERPL-MCNC: 0.3 MG/DL — SIGNIFICANT CHANGE UP (ref 0.2–1.2)
BUN SERPL-MCNC: 17 MG/DL — SIGNIFICANT CHANGE UP (ref 7–23)
CALCIUM SERPL-MCNC: 9.4 MG/DL — SIGNIFICANT CHANGE UP (ref 8.4–10.5)
CEA SERPL-MCNC: 3.8 NG/ML — SIGNIFICANT CHANGE UP (ref 0–3.8)
CHLORIDE SERPL-SCNC: 101 MMOL/L — SIGNIFICANT CHANGE UP (ref 96–108)
CO2 SERPL-SCNC: 27 MMOL/L — SIGNIFICANT CHANGE UP (ref 22–31)
CREAT SERPL-MCNC: 0.92 MG/DL — SIGNIFICANT CHANGE UP (ref 0.5–1.3)
GLUCOSE SERPL-MCNC: 147 MG/DL — HIGH (ref 70–99)
HCT VFR BLD CALC: 40.4 % — SIGNIFICANT CHANGE UP (ref 39–50)
HGB BLD-MCNC: 13.6 G/DL — SIGNIFICANT CHANGE UP (ref 13–17)
INR BLD: 0.95 RATIO — SIGNIFICANT CHANGE UP (ref 0.88–1.16)
MCHC RBC-ENTMCNC: 30.4 PG — SIGNIFICANT CHANGE UP (ref 27–34)
MCHC RBC-ENTMCNC: 33.5 G/DL — SIGNIFICANT CHANGE UP (ref 32–36)
MCV RBC AUTO: 90.6 FL — SIGNIFICANT CHANGE UP (ref 80–100)
PLATELET # BLD AUTO: 168 K/UL — SIGNIFICANT CHANGE UP (ref 150–400)
POTASSIUM SERPL-MCNC: 4.2 MMOL/L — SIGNIFICANT CHANGE UP (ref 3.5–5.3)
POTASSIUM SERPL-SCNC: 4.2 MMOL/L — SIGNIFICANT CHANGE UP (ref 3.5–5.3)
PROT SERPL-MCNC: 6.4 G/DL — SIGNIFICANT CHANGE UP (ref 6–8.3)
PROTHROM AB SERPL-ACNC: 10.7 SEC — SIGNIFICANT CHANGE UP (ref 10–13.1)
RBC # BLD: 4.46 M/UL — SIGNIFICANT CHANGE UP (ref 4.2–5.8)
RBC # FLD: 12.4 % — SIGNIFICANT CHANGE UP (ref 10.3–14.5)
SODIUM SERPL-SCNC: 141 MMOL/L — SIGNIFICANT CHANGE UP (ref 135–145)
WBC # BLD: 6.8 K/UL — SIGNIFICANT CHANGE UP (ref 3.8–10.5)
WBC # FLD AUTO: 6.8 K/UL — SIGNIFICANT CHANGE UP (ref 3.8–10.5)

## 2017-04-11 LAB
BASOPHILS # BLD AUTO: 0 K/UL — SIGNIFICANT CHANGE UP (ref 0–0.2)
BASOPHILS NFR BLD AUTO: 0.3 % — SIGNIFICANT CHANGE UP (ref 0–2)
EOSINOPHIL # BLD AUTO: 0.1 K/UL — SIGNIFICANT CHANGE UP (ref 0–0.5)
EOSINOPHIL NFR BLD AUTO: 1.6 % — SIGNIFICANT CHANGE UP (ref 0–6)
HAV IGM SER-ACNC: SIGNIFICANT CHANGE UP
HBV CORE AB SER-ACNC: REACTIVE
HBV CORE IGM SER-ACNC: SIGNIFICANT CHANGE UP
HBV SURFACE AB SER-ACNC: REACTIVE
HBV SURFACE AG SER-ACNC: SIGNIFICANT CHANGE UP
HBV SURFACE AG SER-ACNC: SIGNIFICANT CHANGE UP
HCV AB S/CO SERPL IA: 0.22 S/CO — SIGNIFICANT CHANGE UP
HCV AB SERPL-IMP: SIGNIFICANT CHANGE UP
LYMPHOCYTES # BLD AUTO: 2 K/UL — SIGNIFICANT CHANGE UP (ref 1–3.3)
LYMPHOCYTES # BLD AUTO: 29.5 % — SIGNIFICANT CHANGE UP (ref 13–44)
MONOCYTES # BLD AUTO: 0.4 K/UL — SIGNIFICANT CHANGE UP (ref 0–0.9)
MONOCYTES NFR BLD AUTO: 6.4 % — SIGNIFICANT CHANGE UP (ref 2–14)
NEUTROPHILS # BLD AUTO: 4.2 K/UL — SIGNIFICANT CHANGE UP (ref 1.8–7.4)
NEUTROPHILS NFR BLD AUTO: 62.2 % — SIGNIFICANT CHANGE UP (ref 43–77)

## 2017-04-16 ENCOUNTER — RESULT REVIEW (OUTPATIENT)
Age: 77
End: 2017-04-16

## 2017-04-17 ENCOUNTER — APPOINTMENT (OUTPATIENT)
Dept: HEMATOLOGY ONCOLOGY | Facility: CLINIC | Age: 77
End: 2017-04-17

## 2017-04-17 LAB
HBV CORE IGM SER-ACNC: SIGNIFICANT CHANGE UP
HCT VFR BLD CALC: 39 % — SIGNIFICANT CHANGE UP (ref 39–50)
HGB BLD-MCNC: 13.1 G/DL — SIGNIFICANT CHANGE UP (ref 13–17)
MCHC RBC-ENTMCNC: 31.1 PG — SIGNIFICANT CHANGE UP (ref 27–34)
MCHC RBC-ENTMCNC: 33.5 G/DL — SIGNIFICANT CHANGE UP (ref 32–36)
MCV RBC AUTO: 92.9 FL — SIGNIFICANT CHANGE UP (ref 80–100)
PLATELET # BLD AUTO: 175 K/UL — SIGNIFICANT CHANGE UP (ref 150–400)
RBC # BLD: 4.2 M/UL — SIGNIFICANT CHANGE UP (ref 4.2–5.8)
RBC # FLD: 12.6 % — SIGNIFICANT CHANGE UP (ref 10.3–14.5)
WBC # BLD: 6.4 K/UL — SIGNIFICANT CHANGE UP (ref 3.8–10.5)
WBC # FLD AUTO: 6.4 K/UL — SIGNIFICANT CHANGE UP (ref 3.8–10.5)

## 2017-04-18 LAB
HBV E AB SER-ACNC: POSITIVE
HBV E AG SER-ACNC: NEGATIVE — SIGNIFICANT CHANGE UP

## 2017-04-19 LAB
HBV DNA # SERPL NAA+PROBE: SIGNIFICANT CHANGE UP IU/ML
HBV DNA SERPL NAA+PROBE-LOG#: ABNORMAL LOGIU/ML

## 2017-04-20 ENCOUNTER — APPOINTMENT (OUTPATIENT)
Dept: HEMATOLOGY ONCOLOGY | Facility: CLINIC | Age: 77
End: 2017-04-20

## 2017-04-20 VITALS
BODY MASS INDEX: 23.6 KG/M2 | RESPIRATION RATE: 16 BRPM | DIASTOLIC BLOOD PRESSURE: 78 MMHG | TEMPERATURE: 98.6 F | HEART RATE: 77 BPM | OXYGEN SATURATION: 93 % | WEIGHT: 159.83 LBS | SYSTOLIC BLOOD PRESSURE: 127 MMHG

## 2017-04-20 RX ORDER — ERLOTINIB HYDROCHLORIDE 100 MG/1
0 TABLET, FILM COATED ORAL
Qty: 0 | Refills: 0 | COMMUNITY

## 2017-04-20 RX ORDER — MINOCYCLINE HYDROCHLORIDE 45 MG/1
1 TABLET, EXTENDED RELEASE ORAL
Qty: 0 | Refills: 0 | COMMUNITY

## 2017-04-20 RX ORDER — OXYCODONE HYDROCHLORIDE 5 MG/1
1 TABLET ORAL
Qty: 0 | Refills: 0 | COMMUNITY

## 2017-04-20 RX ORDER — ONDANSETRON 8 MG/1
1 TABLET, FILM COATED ORAL
Qty: 0 | Refills: 0 | COMMUNITY

## 2017-04-20 RX ORDER — METOCLOPRAMIDE HCL 10 MG
0 TABLET ORAL
Qty: 0 | Refills: 0 | COMMUNITY

## 2017-04-20 RX ORDER — OLMESARTAN MEDOXOMIL 5 MG/1
1 TABLET, FILM COATED ORAL
Qty: 0 | Refills: 0 | COMMUNITY

## 2017-04-23 ENCOUNTER — RESULT REVIEW (OUTPATIENT)
Age: 77
End: 2017-04-23

## 2017-04-24 ENCOUNTER — APPOINTMENT (OUTPATIENT)
Dept: INFUSION THERAPY | Facility: HOSPITAL | Age: 77
End: 2017-04-24

## 2017-04-24 PROBLEM — I10 ESSENTIAL (PRIMARY) HYPERTENSION: Chronic | Status: ACTIVE | Noted: 2017-04-03

## 2017-04-24 PROBLEM — E11.9 TYPE 2 DIABETES MELLITUS WITHOUT COMPLICATIONS: Chronic | Status: ACTIVE | Noted: 2017-04-03

## 2017-04-24 LAB
HCT VFR BLD CALC: 40.6 % — SIGNIFICANT CHANGE UP (ref 39–50)
HGB BLD-MCNC: 13.2 G/DL — SIGNIFICANT CHANGE UP (ref 13–17)
MCHC RBC-ENTMCNC: 29.8 PG — SIGNIFICANT CHANGE UP (ref 27–34)
MCHC RBC-ENTMCNC: 32.6 G/DL — SIGNIFICANT CHANGE UP (ref 32–36)
MCV RBC AUTO: 91.3 FL — SIGNIFICANT CHANGE UP (ref 80–100)
PLATELET # BLD AUTO: 203 K/UL — SIGNIFICANT CHANGE UP (ref 150–400)
RBC # BLD: 4.44 M/UL — SIGNIFICANT CHANGE UP (ref 4.2–5.8)
RBC # FLD: 12.8 % — SIGNIFICANT CHANGE UP (ref 10.3–14.5)
WBC # BLD: 19.7 K/UL — HIGH (ref 3.8–10.5)
WBC # FLD AUTO: 19.7 K/UL — HIGH (ref 3.8–10.5)

## 2017-04-24 RX ORDER — TAMSULOSIN HYDROCHLORIDE 0.4 MG/1
1 CAPSULE ORAL
Qty: 0 | Refills: 0 | COMMUNITY

## 2017-04-24 RX ORDER — PANTOPRAZOLE SODIUM 20 MG/1
1 TABLET, DELAYED RELEASE ORAL
Qty: 0 | Refills: 0 | COMMUNITY

## 2017-04-25 ENCOUNTER — RX RENEWAL (OUTPATIENT)
Age: 77
End: 2017-04-25

## 2017-04-25 DIAGNOSIS — R11.2 NAUSEA WITH VOMITING, UNSPECIFIED: ICD-10-CM

## 2017-04-25 DIAGNOSIS — Z51.11 ENCOUNTER FOR ANTINEOPLASTIC CHEMOTHERAPY: ICD-10-CM

## 2017-04-29 ENCOUNTER — RX RENEWAL (OUTPATIENT)
Age: 77
End: 2017-04-29

## 2017-05-01 ENCOUNTER — APPOINTMENT (OUTPATIENT)
Dept: HEMATOLOGY ONCOLOGY | Facility: CLINIC | Age: 77
End: 2017-05-01

## 2017-05-01 ENCOUNTER — RESULT REVIEW (OUTPATIENT)
Age: 77
End: 2017-05-01

## 2017-05-01 VITALS
WEIGHT: 157.41 LBS | DIASTOLIC BLOOD PRESSURE: 76 MMHG | SYSTOLIC BLOOD PRESSURE: 111 MMHG | OXYGEN SATURATION: 95 % | TEMPERATURE: 97.6 F | RESPIRATION RATE: 16 BRPM | HEART RATE: 83 BPM | BODY MASS INDEX: 23.25 KG/M2

## 2017-05-02 ENCOUNTER — APPOINTMENT (OUTPATIENT)
Dept: HEMATOLOGY ONCOLOGY | Facility: CLINIC | Age: 77
End: 2017-05-02

## 2017-05-02 LAB
ALBUMIN SERPL ELPH-MCNC: 3.9 G/DL — SIGNIFICANT CHANGE UP (ref 3.3–5)
ALP SERPL-CCNC: 52 U/L — SIGNIFICANT CHANGE UP (ref 40–120)
ALT FLD-CCNC: 24 U/L — SIGNIFICANT CHANGE UP (ref 10–45)
ANION GAP SERPL CALC-SCNC: 13 MMOL/L — SIGNIFICANT CHANGE UP (ref 5–17)
AST SERPL-CCNC: 20 U/L — SIGNIFICANT CHANGE UP (ref 10–40)
BILIRUB SERPL-MCNC: 0.2 MG/DL — SIGNIFICANT CHANGE UP (ref 0.2–1.2)
BUN SERPL-MCNC: 16 MG/DL — SIGNIFICANT CHANGE UP (ref 7–23)
CALCIUM SERPL-MCNC: 9 MG/DL — SIGNIFICANT CHANGE UP (ref 8.4–10.5)
CEA SERPL-MCNC: 4.9 NG/ML — HIGH (ref 0–3.8)
CHLORIDE SERPL-SCNC: 99 MMOL/L — SIGNIFICANT CHANGE UP (ref 96–108)
CO2 SERPL-SCNC: 29 MMOL/L — SIGNIFICANT CHANGE UP (ref 22–31)
CREAT SERPL-MCNC: 0.89 MG/DL — SIGNIFICANT CHANGE UP (ref 0.5–1.3)
GLUCOSE SERPL-MCNC: 103 MG/DL — HIGH (ref 70–99)
HCT VFR BLD CALC: 38.6 % — LOW (ref 39–50)
HGB BLD-MCNC: 13.2 G/DL — SIGNIFICANT CHANGE UP (ref 13–17)
MCHC RBC-ENTMCNC: 31.4 PG — SIGNIFICANT CHANGE UP (ref 27–34)
MCHC RBC-ENTMCNC: 34.1 G/DL — SIGNIFICANT CHANGE UP (ref 32–36)
MCV RBC AUTO: 92.1 FL — SIGNIFICANT CHANGE UP (ref 80–100)
PLATELET # BLD AUTO: 146 K/UL — LOW (ref 150–400)
POTASSIUM SERPL-MCNC: 4.1 MMOL/L — SIGNIFICANT CHANGE UP (ref 3.5–5.3)
POTASSIUM SERPL-SCNC: 4.1 MMOL/L — SIGNIFICANT CHANGE UP (ref 3.5–5.3)
PROT SERPL-MCNC: 6.7 G/DL — SIGNIFICANT CHANGE UP (ref 6–8.3)
RBC # BLD: 4.19 M/UL — LOW (ref 4.2–5.8)
RBC # FLD: 12.6 % — SIGNIFICANT CHANGE UP (ref 10.3–14.5)
SODIUM SERPL-SCNC: 141 MMOL/L — SIGNIFICANT CHANGE UP (ref 135–145)
WBC # BLD: 4.8 K/UL — SIGNIFICANT CHANGE UP (ref 3.8–10.5)
WBC # FLD AUTO: 4.8 K/UL — SIGNIFICANT CHANGE UP (ref 3.8–10.5)

## 2017-05-03 LAB
BASOPHILS # BLD AUTO: 0 K/UL — SIGNIFICANT CHANGE UP (ref 0–0.2)
BASOPHILS NFR BLD AUTO: 0.4 % — SIGNIFICANT CHANGE UP (ref 0–2)
EOSINOPHIL # BLD AUTO: 0.1 K/UL — SIGNIFICANT CHANGE UP (ref 0–0.5)
EOSINOPHIL NFR BLD AUTO: 1.4 % — SIGNIFICANT CHANGE UP (ref 0–6)
LYMPHOCYTES # BLD AUTO: 1.6 K/UL — SIGNIFICANT CHANGE UP (ref 1–3.3)
LYMPHOCYTES # BLD AUTO: 34 % — SIGNIFICANT CHANGE UP (ref 13–44)
MONOCYTES # BLD AUTO: 0.5 K/UL — SIGNIFICANT CHANGE UP (ref 0–0.9)
MONOCYTES NFR BLD AUTO: 10.7 % — SIGNIFICANT CHANGE UP (ref 2–14)
NEUTROPHILS # BLD AUTO: 2.6 K/UL — SIGNIFICANT CHANGE UP (ref 1.8–7.4)
NEUTROPHILS NFR BLD AUTO: 53.5 % — SIGNIFICANT CHANGE UP (ref 43–77)

## 2017-05-12 ENCOUNTER — APPOINTMENT (OUTPATIENT)
Dept: GERIATRICS | Facility: CLINIC | Age: 77
End: 2017-05-12

## 2017-05-12 ENCOUNTER — OUTPATIENT (OUTPATIENT)
Dept: OUTPATIENT SERVICES | Facility: HOSPITAL | Age: 77
LOS: 1 days | Discharge: HOME | End: 2017-05-12

## 2017-05-12 VITALS
DIASTOLIC BLOOD PRESSURE: 93 MMHG | TEMPERATURE: 98.2 F | RESPIRATION RATE: 16 BRPM | WEIGHT: 160.94 LBS | SYSTOLIC BLOOD PRESSURE: 148 MMHG | OXYGEN SATURATION: 94 % | BODY MASS INDEX: 23.77 KG/M2

## 2017-05-12 DIAGNOSIS — D04.9 CARCINOMA IN SITU OF SKIN, UNSPECIFIED: Chronic | ICD-10-CM

## 2017-05-12 DIAGNOSIS — C34.90 MALIGNANT NEOPLASM OF UNSPECIFIED PART OF UNSPECIFIED BRONCHUS OR LUNG: ICD-10-CM

## 2017-05-12 DIAGNOSIS — C44.311 BASAL CELL CARCINOMA OF SKIN OF NOSE: Chronic | ICD-10-CM

## 2017-05-15 ENCOUNTER — APPOINTMENT (OUTPATIENT)
Dept: INFUSION THERAPY | Facility: HOSPITAL | Age: 77
End: 2017-05-15

## 2017-05-15 ENCOUNTER — RESULT REVIEW (OUTPATIENT)
Age: 77
End: 2017-05-15

## 2017-05-15 LAB
HCT VFR BLD CALC: 39.3 % — SIGNIFICANT CHANGE UP (ref 39–50)
HGB BLD-MCNC: 13.6 G/DL — SIGNIFICANT CHANGE UP (ref 13–17)
MCHC RBC-ENTMCNC: 31.4 PG — SIGNIFICANT CHANGE UP (ref 27–34)
MCHC RBC-ENTMCNC: 34.7 G/DL — SIGNIFICANT CHANGE UP (ref 32–36)
MCV RBC AUTO: 90.7 FL — SIGNIFICANT CHANGE UP (ref 80–100)
PLATELET # BLD AUTO: 272 K/UL — SIGNIFICANT CHANGE UP (ref 150–400)
RBC # BLD: 4.34 M/UL — SIGNIFICANT CHANGE UP (ref 4.2–5.8)
RBC # FLD: 13.1 % — SIGNIFICANT CHANGE UP (ref 10.3–14.5)
WBC # BLD: 16.2 K/UL — HIGH (ref 3.8–10.5)
WBC # FLD AUTO: 16.2 K/UL — HIGH (ref 3.8–10.5)

## 2017-05-15 RX ORDER — CHOLECALCIFEROL (VITAMIN D3) 125 MCG
1 CAPSULE ORAL
Qty: 0 | Refills: 0 | COMMUNITY

## 2017-05-15 RX ORDER — CLINDAMYCIN PHOSPHATE GEL USP, 1% 10 MG/G
1 GEL TOPICAL
Qty: 0 | Refills: 0 | COMMUNITY

## 2017-05-15 RX ORDER — SIMVASTATIN 20 MG/1
1 TABLET, FILM COATED ORAL
Qty: 0 | Refills: 0 | COMMUNITY

## 2017-05-15 RX ORDER — SUCRALFATE 1 G
1 TABLET ORAL
Qty: 0 | Refills: 0 | COMMUNITY

## 2017-05-15 RX ORDER — HYDROCORTISONE 1 %
1 OINTMENT (GRAM) TOPICAL
Qty: 0 | Refills: 0 | COMMUNITY

## 2017-05-15 RX ORDER — ERLOTINIB HYDROCHLORIDE 100 MG/1
1 TABLET, FILM COATED ORAL
Qty: 0 | Refills: 0 | COMMUNITY

## 2017-05-15 RX ORDER — METFORMIN HYDROCHLORIDE 850 MG/1
1 TABLET ORAL
Qty: 0 | Refills: 0 | COMMUNITY

## 2017-05-15 RX ORDER — METOCLOPRAMIDE HCL 10 MG
1 TABLET ORAL
Qty: 0 | Refills: 0 | COMMUNITY

## 2017-05-15 RX ORDER — LORATADINE 10 MG/1
1 TABLET ORAL
Qty: 0 | Refills: 0 | COMMUNITY

## 2017-05-16 DIAGNOSIS — Z51.11 ENCOUNTER FOR ANTINEOPLASTIC CHEMOTHERAPY: ICD-10-CM

## 2017-05-16 DIAGNOSIS — R11.2 NAUSEA WITH VOMITING, UNSPECIFIED: ICD-10-CM

## 2017-05-26 ENCOUNTER — APPOINTMENT (OUTPATIENT)
Dept: GERIATRICS | Facility: CLINIC | Age: 77
End: 2017-05-26

## 2017-06-01 ENCOUNTER — APPOINTMENT (OUTPATIENT)
Dept: HEMATOLOGY ONCOLOGY | Facility: CLINIC | Age: 77
End: 2017-06-01

## 2017-06-01 ENCOUNTER — RESULT REVIEW (OUTPATIENT)
Age: 77
End: 2017-06-01

## 2017-06-01 VITALS
DIASTOLIC BLOOD PRESSURE: 89 MMHG | SYSTOLIC BLOOD PRESSURE: 160 MMHG | BODY MASS INDEX: 23.99 KG/M2 | RESPIRATION RATE: 16 BRPM | OXYGEN SATURATION: 96 % | TEMPERATURE: 98.2 F | WEIGHT: 162.48 LBS | HEART RATE: 83 BPM

## 2017-06-01 VITALS — DIASTOLIC BLOOD PRESSURE: 91 MMHG | SYSTOLIC BLOOD PRESSURE: 150 MMHG

## 2017-06-01 LAB
ALBUMIN SERPL ELPH-MCNC: 4.2 G/DL — SIGNIFICANT CHANGE UP (ref 3.3–5)
ALP SERPL-CCNC: 57 U/L — SIGNIFICANT CHANGE UP (ref 40–120)
ALT FLD-CCNC: 25 U/L — SIGNIFICANT CHANGE UP (ref 10–45)
ANION GAP SERPL CALC-SCNC: 16 MMOL/L — SIGNIFICANT CHANGE UP (ref 5–17)
AST SERPL-CCNC: 21 U/L — SIGNIFICANT CHANGE UP (ref 10–40)
BILIRUB SERPL-MCNC: 0.2 MG/DL — SIGNIFICANT CHANGE UP (ref 0.2–1.2)
BUN SERPL-MCNC: 13 MG/DL — SIGNIFICANT CHANGE UP (ref 7–23)
CALCIUM SERPL-MCNC: 9.1 MG/DL — SIGNIFICANT CHANGE UP (ref 8.4–10.5)
CHLORIDE SERPL-SCNC: 103 MMOL/L — SIGNIFICANT CHANGE UP (ref 96–108)
CO2 SERPL-SCNC: 24 MMOL/L — SIGNIFICANT CHANGE UP (ref 22–31)
CREAT SERPL-MCNC: 0.95 MG/DL — SIGNIFICANT CHANGE UP (ref 0.5–1.3)
GLUCOSE SERPL-MCNC: 177 MG/DL — HIGH (ref 70–99)
POTASSIUM SERPL-MCNC: 4.2 MMOL/L — SIGNIFICANT CHANGE UP (ref 3.5–5.3)
POTASSIUM SERPL-SCNC: 4.2 MMOL/L — SIGNIFICANT CHANGE UP (ref 3.5–5.3)
PROT SERPL-MCNC: 7.2 G/DL — SIGNIFICANT CHANGE UP (ref 6–8.3)
SODIUM SERPL-SCNC: 143 MMOL/L — SIGNIFICANT CHANGE UP (ref 135–145)

## 2017-06-02 LAB
BASOPHILS # BLD AUTO: 0 K/UL — SIGNIFICANT CHANGE UP (ref 0–0.2)
BASOPHILS NFR BLD AUTO: 0.2 % — SIGNIFICANT CHANGE UP (ref 0–2)
EOSINOPHIL # BLD AUTO: 0.1 K/UL — SIGNIFICANT CHANGE UP (ref 0–0.5)
EOSINOPHIL NFR BLD AUTO: 1.6 % — SIGNIFICANT CHANGE UP (ref 0–6)
LYMPHOCYTES # BLD AUTO: 2 K/UL — SIGNIFICANT CHANGE UP (ref 1–3.3)
LYMPHOCYTES # BLD AUTO: 45.6 % — HIGH (ref 13–44)
MONOCYTES # BLD AUTO: 0.6 K/UL — SIGNIFICANT CHANGE UP (ref 0–0.9)
MONOCYTES NFR BLD AUTO: 12.5 % — SIGNIFICANT CHANGE UP (ref 2–14)
NEUTROPHILS # BLD AUTO: 1.8 K/UL — SIGNIFICANT CHANGE UP (ref 1.8–7.4)
NEUTROPHILS NFR BLD AUTO: 40 % — LOW (ref 43–77)

## 2017-06-05 ENCOUNTER — RESULT REVIEW (OUTPATIENT)
Age: 77
End: 2017-06-05

## 2017-06-05 ENCOUNTER — APPOINTMENT (OUTPATIENT)
Dept: INFUSION THERAPY | Facility: HOSPITAL | Age: 77
End: 2017-06-05

## 2017-06-05 LAB
HCT VFR BLD CALC: 40.2 % — SIGNIFICANT CHANGE UP (ref 39–50)
HGB BLD-MCNC: 13.7 G/DL — SIGNIFICANT CHANGE UP (ref 13–17)
MCHC RBC-ENTMCNC: 31.2 PG — SIGNIFICANT CHANGE UP (ref 27–34)
MCHC RBC-ENTMCNC: 34 G/DL — SIGNIFICANT CHANGE UP (ref 32–36)
MCV RBC AUTO: 91.9 FL — SIGNIFICANT CHANGE UP (ref 80–100)
PLATELET # BLD AUTO: 246 K/UL — SIGNIFICANT CHANGE UP (ref 150–400)
RBC # BLD: 4.37 M/UL — SIGNIFICANT CHANGE UP (ref 4.2–5.8)
RBC # FLD: 13.7 % — SIGNIFICANT CHANGE UP (ref 10.3–14.5)
WBC # BLD: 11.8 K/UL — HIGH (ref 3.8–10.5)
WBC # FLD AUTO: 11.8 K/UL — HIGH (ref 3.8–10.5)

## 2017-06-12 ENCOUNTER — RESULT REVIEW (OUTPATIENT)
Age: 77
End: 2017-06-12

## 2017-06-12 ENCOUNTER — APPOINTMENT (OUTPATIENT)
Dept: GERIATRICS | Facility: CLINIC | Age: 77
End: 2017-06-12

## 2017-06-12 ENCOUNTER — OUTPATIENT (OUTPATIENT)
Dept: OUTPATIENT SERVICES | Facility: HOSPITAL | Age: 77
LOS: 1 days | Discharge: HOME | End: 2017-06-12

## 2017-06-12 ENCOUNTER — APPOINTMENT (OUTPATIENT)
Dept: HEMATOLOGY ONCOLOGY | Facility: CLINIC | Age: 77
End: 2017-06-12

## 2017-06-12 VITALS
HEIGHT: 69.02 IN | RESPIRATION RATE: 16 BRPM | SYSTOLIC BLOOD PRESSURE: 106 MMHG | TEMPERATURE: 98.4 F | DIASTOLIC BLOOD PRESSURE: 69 MMHG | BODY MASS INDEX: 23.54 KG/M2 | HEART RATE: 96 BPM | OXYGEN SATURATION: 96 % | WEIGHT: 158.95 LBS

## 2017-06-12 VITALS
BODY MASS INDEX: 23.54 KG/M2 | DIASTOLIC BLOOD PRESSURE: 69 MMHG | SYSTOLIC BLOOD PRESSURE: 106 MMHG | HEART RATE: 96 BPM | OXYGEN SATURATION: 96 % | TEMPERATURE: 98.4 F | RESPIRATION RATE: 16 BRPM | HEIGHT: 69.02 IN | WEIGHT: 158.95 LBS

## 2017-06-12 DIAGNOSIS — D04.9 CARCINOMA IN SITU OF SKIN, UNSPECIFIED: Chronic | ICD-10-CM

## 2017-06-12 DIAGNOSIS — B37.0 CANDIDAL STOMATITIS: ICD-10-CM

## 2017-06-12 DIAGNOSIS — C44.311 BASAL CELL CARCINOMA OF SKIN OF NOSE: Chronic | ICD-10-CM

## 2017-06-12 DIAGNOSIS — C34.90 MALIGNANT NEOPLASM OF UNSPECIFIED PART OF UNSPECIFIED BRONCHUS OR LUNG: ICD-10-CM

## 2017-06-12 LAB
ALBUMIN SERPL ELPH-MCNC: 4.2 G/DL — SIGNIFICANT CHANGE UP (ref 3.3–5)
ALP SERPL-CCNC: 53 U/L — SIGNIFICANT CHANGE UP (ref 40–120)
ALT FLD-CCNC: 24 U/L — SIGNIFICANT CHANGE UP (ref 10–45)
ANION GAP SERPL CALC-SCNC: 15 MMOL/L — SIGNIFICANT CHANGE UP (ref 5–17)
AST SERPL-CCNC: 25 U/L — SIGNIFICANT CHANGE UP (ref 10–40)
BILIRUB SERPL-MCNC: 0.4 MG/DL — SIGNIFICANT CHANGE UP (ref 0.2–1.2)
BUN SERPL-MCNC: 19 MG/DL — SIGNIFICANT CHANGE UP (ref 7–23)
CALCIUM SERPL-MCNC: 9.8 MG/DL — SIGNIFICANT CHANGE UP (ref 8.4–10.5)
CHLORIDE SERPL-SCNC: 97 MMOL/L — SIGNIFICANT CHANGE UP (ref 96–108)
CO2 SERPL-SCNC: 24 MMOL/L — SIGNIFICANT CHANGE UP (ref 22–31)
CREAT SERPL-MCNC: 1.06 MG/DL — SIGNIFICANT CHANGE UP (ref 0.5–1.3)
GLUCOSE SERPL-MCNC: 187 MG/DL — HIGH (ref 70–99)
HCT VFR BLD CALC: 37.4 % — LOW (ref 39–50)
HGB BLD-MCNC: 13.1 G/DL — SIGNIFICANT CHANGE UP (ref 13–17)
MCHC RBC-ENTMCNC: 32.3 PG — SIGNIFICANT CHANGE UP (ref 27–34)
MCHC RBC-ENTMCNC: 34.9 G/DL — SIGNIFICANT CHANGE UP (ref 32–36)
MCV RBC AUTO: 92.4 FL — SIGNIFICANT CHANGE UP (ref 80–100)
PLATELET # BLD AUTO: 100 K/UL — LOW (ref 150–400)
POTASSIUM SERPL-MCNC: 4.7 MMOL/L — SIGNIFICANT CHANGE UP (ref 3.5–5.3)
POTASSIUM SERPL-SCNC: 4.7 MMOL/L — SIGNIFICANT CHANGE UP (ref 3.5–5.3)
PROT SERPL-MCNC: 6.8 G/DL — SIGNIFICANT CHANGE UP (ref 6–8.3)
RBC # BLD: 4.04 M/UL — LOW (ref 4.2–5.8)
RBC # FLD: 13.2 % — SIGNIFICANT CHANGE UP (ref 10.3–14.5)
SODIUM SERPL-SCNC: 136 MMOL/L — SIGNIFICANT CHANGE UP (ref 135–145)
WBC # BLD: 6.4 K/UL — SIGNIFICANT CHANGE UP (ref 3.8–10.5)
WBC # FLD AUTO: 6.4 K/UL — SIGNIFICANT CHANGE UP (ref 3.8–10.5)

## 2017-06-12 RX ORDER — NYSTATIN 100000 [USP'U]/ML
100000 SUSPENSION ORAL 4 TIMES DAILY
Qty: 1 | Refills: 0 | Status: DISCONTINUED | COMMUNITY
Start: 2017-06-12 | End: 2017-06-12

## 2017-06-22 ENCOUNTER — RESULT REVIEW (OUTPATIENT)
Age: 77
End: 2017-06-22

## 2017-06-22 ENCOUNTER — APPOINTMENT (OUTPATIENT)
Dept: HEMATOLOGY ONCOLOGY | Facility: CLINIC | Age: 77
End: 2017-06-22

## 2017-06-22 VITALS
WEIGHT: 154.98 LBS | BODY MASS INDEX: 22.88 KG/M2 | DIASTOLIC BLOOD PRESSURE: 75 MMHG | TEMPERATURE: 98.6 F | HEART RATE: 100 BPM | SYSTOLIC BLOOD PRESSURE: 110 MMHG | RESPIRATION RATE: 18 BRPM | OXYGEN SATURATION: 95 %

## 2017-06-22 LAB
BASOPHILS # BLD AUTO: 0 K/UL — SIGNIFICANT CHANGE UP (ref 0–0.2)
BASOPHILS NFR BLD AUTO: 0.1 % — SIGNIFICANT CHANGE UP (ref 0–2)
EOSINOPHIL # BLD AUTO: 0.1 K/UL — SIGNIFICANT CHANGE UP (ref 0–0.5)
EOSINOPHIL NFR BLD AUTO: 0.4 % — SIGNIFICANT CHANGE UP (ref 0–6)
HCT VFR BLD CALC: 38.2 % — LOW (ref 39–50)
HGB BLD-MCNC: 13.4 G/DL — SIGNIFICANT CHANGE UP (ref 13–17)
LYMPHOCYTES # BLD AUTO: 1.3 K/UL — SIGNIFICANT CHANGE UP (ref 1–3.3)
LYMPHOCYTES # BLD AUTO: 8.5 % — LOW (ref 13–44)
MCHC RBC-ENTMCNC: 32.6 PG — SIGNIFICANT CHANGE UP (ref 27–34)
MCHC RBC-ENTMCNC: 35.2 G/DL — SIGNIFICANT CHANGE UP (ref 32–36)
MCV RBC AUTO: 92.8 FL — SIGNIFICANT CHANGE UP (ref 80–100)
MONOCYTES # BLD AUTO: 0.8 K/UL — SIGNIFICANT CHANGE UP (ref 0–0.9)
MONOCYTES NFR BLD AUTO: 5.4 % — SIGNIFICANT CHANGE UP (ref 2–14)
NEUTROPHILS # BLD AUTO: 13.3 K/UL — HIGH (ref 1.8–7.4)
NEUTROPHILS NFR BLD AUTO: 85.5 % — HIGH (ref 43–77)
PLATELET # BLD AUTO: 156 K/UL — SIGNIFICANT CHANGE UP (ref 150–400)
RBC # BLD: 4.12 M/UL — LOW (ref 4.2–5.8)
RBC # FLD: 14.4 % — SIGNIFICANT CHANGE UP (ref 10.3–14.5)
WBC # BLD: 15.6 K/UL — HIGH (ref 3.8–10.5)
WBC # FLD AUTO: 15.6 K/UL — HIGH (ref 3.8–10.5)

## 2017-06-22 RX ORDER — DEXAMETHASONE 4 MG/1
4 TABLET ORAL DAILY
Qty: 5 | Refills: 0 | Status: DISCONTINUED | COMMUNITY
Start: 2017-04-20 | End: 2017-06-22

## 2017-06-26 ENCOUNTER — RESULT REVIEW (OUTPATIENT)
Age: 77
End: 2017-06-26

## 2017-06-26 ENCOUNTER — APPOINTMENT (OUTPATIENT)
Dept: INFUSION THERAPY | Facility: HOSPITAL | Age: 77
End: 2017-06-26

## 2017-06-26 LAB
HCT VFR BLD CALC: 34.8 % — LOW (ref 39–50)
HGB BLD-MCNC: 12.2 G/DL — LOW (ref 13–17)
MCHC RBC-ENTMCNC: 32.4 PG — SIGNIFICANT CHANGE UP (ref 27–34)
MCHC RBC-ENTMCNC: 35.2 G/DL — SIGNIFICANT CHANGE UP (ref 32–36)
MCV RBC AUTO: 92 FL — SIGNIFICANT CHANGE UP (ref 80–100)
PLATELET # BLD AUTO: 201 K/UL — SIGNIFICANT CHANGE UP (ref 150–400)
RBC # BLD: 3.78 M/UL — LOW (ref 4.2–5.8)
RBC # FLD: 14.2 % — SIGNIFICANT CHANGE UP (ref 10.3–14.5)
WBC # BLD: 16.1 K/UL — HIGH (ref 3.8–10.5)
WBC # FLD AUTO: 16.1 K/UL — HIGH (ref 3.8–10.5)

## 2017-06-26 RX ORDER — OLMESARTAN MEDOXOMIL 5 MG/1
1 TABLET, FILM COATED ORAL
Qty: 0 | Refills: 0 | COMMUNITY

## 2017-06-26 RX ORDER — CLINDAMYCIN PHOSPHATE GEL USP, 1% 10 MG/G
1 GEL TOPICAL
Qty: 0 | Refills: 0 | COMMUNITY

## 2017-06-27 DIAGNOSIS — Z51.11 ENCOUNTER FOR ANTINEOPLASTIC CHEMOTHERAPY: ICD-10-CM

## 2017-06-27 DIAGNOSIS — R11.2 NAUSEA WITH VOMITING, UNSPECIFIED: ICD-10-CM

## 2017-06-30 ENCOUNTER — INPATIENT (INPATIENT)
Facility: HOSPITAL | Age: 77
LOS: 2 days | Discharge: HOME | DRG: 948 | End: 2017-07-03
Attending: HOSPITALIST | Admitting: HOSPITALIST
Payer: MEDICAID

## 2017-06-30 VITALS
DIASTOLIC BLOOD PRESSURE: 88 MMHG | OXYGEN SATURATION: 93 % | RESPIRATION RATE: 20 BRPM | TEMPERATURE: 99 F | HEART RATE: 116 BPM | SYSTOLIC BLOOD PRESSURE: 131 MMHG

## 2017-06-30 DIAGNOSIS — D04.9 CARCINOMA IN SITU OF SKIN, UNSPECIFIED: Chronic | ICD-10-CM

## 2017-06-30 DIAGNOSIS — E78.5 HYPERLIPIDEMIA, UNSPECIFIED: ICD-10-CM

## 2017-06-30 DIAGNOSIS — R07.9 CHEST PAIN, UNSPECIFIED: ICD-10-CM

## 2017-06-30 DIAGNOSIS — C34.90 MALIGNANT NEOPLASM OF UNSPECIFIED PART OF UNSPECIFIED BRONCHUS OR LUNG: ICD-10-CM

## 2017-06-30 DIAGNOSIS — C44.311 BASAL CELL CARCINOMA OF SKIN OF NOSE: Chronic | ICD-10-CM

## 2017-06-30 DIAGNOSIS — Z29.9 ENCOUNTER FOR PROPHYLACTIC MEASURES, UNSPECIFIED: ICD-10-CM

## 2017-06-30 DIAGNOSIS — I26.99 OTHER PULMONARY EMBOLISM WITHOUT ACUTE COR PULMONALE: ICD-10-CM

## 2017-06-30 DIAGNOSIS — N40.0 BENIGN PROSTATIC HYPERPLASIA WITHOUT LOWER URINARY TRACT SYMPTOMS: ICD-10-CM

## 2017-06-30 DIAGNOSIS — E11.8 TYPE 2 DIABETES MELLITUS WITH UNSPECIFIED COMPLICATIONS: ICD-10-CM

## 2017-06-30 LAB
APTT BLD: 30.8 SEC — SIGNIFICANT CHANGE UP (ref 27.5–37.4)
BASE EXCESS BLDV CALC-SCNC: 5.9 MMOL/L — HIGH (ref -2–2)
BASOPHILS # BLD AUTO: 0 K/UL — SIGNIFICANT CHANGE UP (ref 0–0.2)
BASOPHILS NFR BLD AUTO: 0.1 % — SIGNIFICANT CHANGE UP (ref 0–2)
CA-I SERPL-SCNC: 1.24 MMOL/L — SIGNIFICANT CHANGE UP (ref 1.12–1.3)
CHLORIDE BLDV-SCNC: 101 MMOL/L — SIGNIFICANT CHANGE UP (ref 96–108)
CK MB BLD-MCNC: 3.7 % — HIGH (ref 0–3.5)
CK MB CFR SERPL CALC: 1.1 NG/ML — SIGNIFICANT CHANGE UP (ref 0–6.7)
CK SERPL-CCNC: 30 U/L — SIGNIFICANT CHANGE UP (ref 30–200)
CO2 BLDV-SCNC: 32 MMOL/L — HIGH (ref 22–30)
EOSINOPHIL # BLD AUTO: 0 K/UL — SIGNIFICANT CHANGE UP (ref 0–0.5)
EOSINOPHIL NFR BLD AUTO: 0.3 % — SIGNIFICANT CHANGE UP (ref 0–6)
GAS PNL BLDV: 139 MMOL/L — SIGNIFICANT CHANGE UP (ref 136–145)
GAS PNL BLDV: SIGNIFICANT CHANGE UP
GLUCOSE BLDV-MCNC: 156 MG/DL — HIGH (ref 70–99)
HCO3 BLDV-SCNC: 31 MMOL/L — HIGH (ref 21–29)
HCT VFR BLD CALC: 39.4 % — SIGNIFICANT CHANGE UP (ref 39–50)
HCT VFR BLDA CALC: 41 % — SIGNIFICANT CHANGE UP (ref 39–50)
HGB BLD CALC-MCNC: 13.5 G/DL — SIGNIFICANT CHANGE UP (ref 13–17)
HGB BLD-MCNC: 13.4 G/DL — SIGNIFICANT CHANGE UP (ref 13–17)
INR BLD: 1.13 RATIO — SIGNIFICANT CHANGE UP (ref 0.88–1.16)
LACTATE BLDV-MCNC: 2.4 MMOL/L — HIGH (ref 0.7–2)
LACTATE SERPL-SCNC: 2.8 MMOL/L — HIGH (ref 0.7–2)
LYMPHOCYTES # BLD AUTO: 1.2 K/UL — SIGNIFICANT CHANGE UP (ref 1–3.3)
LYMPHOCYTES # BLD AUTO: 15.6 % — SIGNIFICANT CHANGE UP (ref 13–44)
MCHC RBC-ENTMCNC: 32 PG — SIGNIFICANT CHANGE UP (ref 27–34)
MCHC RBC-ENTMCNC: 33.9 GM/DL — SIGNIFICANT CHANGE UP (ref 32–36)
MCV RBC AUTO: 94.5 FL — SIGNIFICANT CHANGE UP (ref 80–100)
MONOCYTES # BLD AUTO: 0.1 K/UL — SIGNIFICANT CHANGE UP (ref 0–0.9)
MONOCYTES NFR BLD AUTO: 0.8 % — LOW (ref 2–14)
NEUTROPHILS # BLD AUTO: 6.6 K/UL — SIGNIFICANT CHANGE UP (ref 1.8–7.4)
NEUTROPHILS NFR BLD AUTO: 83.1 % — HIGH (ref 43–77)
NT-PROBNP SERPL-SCNC: 102 PG/ML — SIGNIFICANT CHANGE UP (ref 0–300)
PCO2 BLDV: 47 MMHG — SIGNIFICANT CHANGE UP (ref 35–50)
PH BLDV: 7.43 — SIGNIFICANT CHANGE UP (ref 7.35–7.45)
PLATELET # BLD AUTO: 184 K/UL — SIGNIFICANT CHANGE UP (ref 150–400)
PO2 BLDV: 68 MMHG — HIGH (ref 25–45)
POTASSIUM BLDV-SCNC: 3.6 MMOL/L — SIGNIFICANT CHANGE UP (ref 3.5–5)
PROTHROM AB SERPL-ACNC: 12.3 SEC — SIGNIFICANT CHANGE UP (ref 9.8–12.7)
RBC # BLD: 4.17 M/UL — LOW (ref 4.2–5.8)
RBC # FLD: 14 % — SIGNIFICANT CHANGE UP (ref 10.3–14.5)
SAO2 % BLDV: 94 % — HIGH (ref 67–88)
TROPONIN T SERPL-MCNC: <0.01 NG/ML — SIGNIFICANT CHANGE UP (ref 0–0.06)
TROPONIN T SERPL-MCNC: <0.01 NG/ML — SIGNIFICANT CHANGE UP (ref 0–0.06)
WBC # BLD: 8 K/UL — SIGNIFICANT CHANGE UP (ref 3.8–10.5)
WBC # FLD AUTO: 8 K/UL — SIGNIFICANT CHANGE UP (ref 3.8–10.5)

## 2017-06-30 PROCEDURE — 71275 CT ANGIOGRAPHY CHEST: CPT | Mod: 26

## 2017-06-30 PROCEDURE — 99223 1ST HOSP IP/OBS HIGH 75: CPT

## 2017-06-30 PROCEDURE — 93010 ELECTROCARDIOGRAM REPORT: CPT

## 2017-06-30 PROCEDURE — 71020: CPT | Mod: 26

## 2017-06-30 PROCEDURE — 99285 EMERGENCY DEPT VISIT HI MDM: CPT | Mod: 25

## 2017-06-30 RX ORDER — SIMVASTATIN 20 MG/1
20 TABLET, FILM COATED ORAL AT BEDTIME
Qty: 0 | Refills: 0 | Status: DISCONTINUED | OUTPATIENT
Start: 2017-06-30 | End: 2017-07-03

## 2017-06-30 RX ORDER — SODIUM CHLORIDE 9 MG/ML
1000 INJECTION, SOLUTION INTRAVENOUS
Qty: 0 | Refills: 0 | Status: DISCONTINUED | OUTPATIENT
Start: 2017-06-30 | End: 2017-07-03

## 2017-06-30 RX ORDER — SODIUM CHLORIDE 9 MG/ML
1000 INJECTION INTRAMUSCULAR; INTRAVENOUS; SUBCUTANEOUS ONCE
Qty: 0 | Refills: 0 | Status: COMPLETED | OUTPATIENT
Start: 2017-06-30 | End: 2017-06-30

## 2017-06-30 RX ORDER — METOCLOPRAMIDE HCL 10 MG
10 TABLET ORAL
Qty: 0 | Refills: 0 | Status: DISCONTINUED | OUTPATIENT
Start: 2017-06-30 | End: 2017-07-03

## 2017-06-30 RX ORDER — SENNA PLUS 8.6 MG/1
2 TABLET ORAL AT BEDTIME
Qty: 0 | Refills: 0 | Status: DISCONTINUED | OUTPATIENT
Start: 2017-06-30 | End: 2017-07-03

## 2017-06-30 RX ORDER — FLUCONAZOLE 150 MG/1
100 TABLET ORAL DAILY
Qty: 0 | Refills: 0 | Status: DISCONTINUED | OUTPATIENT
Start: 2017-06-30 | End: 2017-07-03

## 2017-06-30 RX ORDER — PANTOPRAZOLE SODIUM 20 MG/1
40 TABLET, DELAYED RELEASE ORAL
Qty: 0 | Refills: 0 | Status: DISCONTINUED | OUTPATIENT
Start: 2017-06-30 | End: 2017-07-03

## 2017-06-30 RX ORDER — DEXTROSE 50 % IN WATER 50 %
25 SYRINGE (ML) INTRAVENOUS ONCE
Qty: 0 | Refills: 0 | Status: DISCONTINUED | OUTPATIENT
Start: 2017-06-30 | End: 2017-07-03

## 2017-06-30 RX ORDER — INSULIN LISPRO 100/ML
VIAL (ML) SUBCUTANEOUS
Qty: 0 | Refills: 0 | Status: DISCONTINUED | OUTPATIENT
Start: 2017-06-30 | End: 2017-07-03

## 2017-06-30 RX ORDER — ENOXAPARIN SODIUM 100 MG/ML
100 INJECTION SUBCUTANEOUS DAILY
Qty: 0 | Refills: 0 | Status: DISCONTINUED | OUTPATIENT
Start: 2017-06-30 | End: 2017-07-01

## 2017-06-30 RX ORDER — DEXAMETHASONE 0.5 MG/5ML
4 ELIXIR ORAL DAILY
Qty: 0 | Refills: 0 | Status: DISCONTINUED | OUTPATIENT
Start: 2017-06-30 | End: 2017-07-03

## 2017-06-30 RX ORDER — GLUCAGON INJECTION, SOLUTION 0.5 MG/.1ML
1 INJECTION, SOLUTION SUBCUTANEOUS ONCE
Qty: 0 | Refills: 0 | Status: DISCONTINUED | OUTPATIENT
Start: 2017-06-30 | End: 2017-07-03

## 2017-06-30 RX ORDER — TAMSULOSIN HYDROCHLORIDE 0.4 MG/1
0.4 CAPSULE ORAL AT BEDTIME
Qty: 0 | Refills: 0 | Status: DISCONTINUED | OUTPATIENT
Start: 2017-06-30 | End: 2017-07-03

## 2017-06-30 RX ORDER — FOLIC ACID 0.8 MG
1 TABLET ORAL DAILY
Qty: 0 | Refills: 0 | Status: DISCONTINUED | OUTPATIENT
Start: 2017-06-30 | End: 2017-07-03

## 2017-06-30 RX ORDER — SIMETHICONE 80 MG/1
80 TABLET, CHEWABLE ORAL EVERY 6 HOURS
Qty: 0 | Refills: 0 | Status: DISCONTINUED | OUTPATIENT
Start: 2017-06-30 | End: 2017-07-03

## 2017-06-30 RX ORDER — MORPHINE SULFATE 50 MG/1
4 CAPSULE, EXTENDED RELEASE ORAL ONCE
Qty: 0 | Refills: 0 | Status: DISCONTINUED | OUTPATIENT
Start: 2017-06-30 | End: 2017-06-30

## 2017-06-30 RX ORDER — METFORMIN HYDROCHLORIDE 850 MG/1
1 TABLET ORAL
Qty: 0 | Refills: 0 | COMMUNITY

## 2017-06-30 RX ORDER — IBUPROFEN 200 MG
200 TABLET ORAL EVERY 6 HOURS
Qty: 0 | Refills: 0 | Status: DISCONTINUED | OUTPATIENT
Start: 2017-06-30 | End: 2017-07-01

## 2017-06-30 RX ORDER — MORPHINE SULFATE 50 MG/1
4 CAPSULE, EXTENDED RELEASE ORAL EVERY 4 HOURS
Qty: 0 | Refills: 0 | Status: DISCONTINUED | OUTPATIENT
Start: 2017-06-30 | End: 2017-07-02

## 2017-06-30 RX ORDER — ONDANSETRON 8 MG/1
4 TABLET, FILM COATED ORAL THREE TIMES A DAY
Qty: 0 | Refills: 0 | Status: DISCONTINUED | OUTPATIENT
Start: 2017-06-30 | End: 2017-07-03

## 2017-06-30 RX ORDER — DOCUSATE SODIUM 100 MG
100 CAPSULE ORAL THREE TIMES A DAY
Qty: 0 | Refills: 0 | Status: DISCONTINUED | OUTPATIENT
Start: 2017-06-30 | End: 2017-07-03

## 2017-06-30 RX ORDER — FAMOTIDINE 10 MG/ML
20 INJECTION INTRAVENOUS
Qty: 0 | Refills: 0 | Status: DISCONTINUED | OUTPATIENT
Start: 2017-06-30 | End: 2017-07-03

## 2017-06-30 RX ORDER — TENOFOVIR DISOPROXIL FUMARATE 300 MG/1
300 TABLET, FILM COATED ORAL DAILY
Qty: 0 | Refills: 0 | Status: DISCONTINUED | OUTPATIENT
Start: 2017-06-30 | End: 2017-07-03

## 2017-06-30 RX ORDER — OXYCODONE HYDROCHLORIDE 5 MG/1
15 TABLET ORAL EVERY 4 HOURS
Qty: 0 | Refills: 0 | Status: DISCONTINUED | OUTPATIENT
Start: 2017-06-30 | End: 2017-07-03

## 2017-06-30 RX ORDER — DEXTROSE 50 % IN WATER 50 %
1 SYRINGE (ML) INTRAVENOUS ONCE
Qty: 0 | Refills: 0 | Status: DISCONTINUED | OUTPATIENT
Start: 2017-06-30 | End: 2017-07-03

## 2017-06-30 RX ORDER — CHOLECALCIFEROL (VITAMIN D3) 125 MCG
1 CAPSULE ORAL
Qty: 0 | Refills: 0 | COMMUNITY

## 2017-06-30 RX ORDER — SENNA PLUS 8.6 MG/1
1 TABLET ORAL
Qty: 0 | Refills: 0 | COMMUNITY

## 2017-06-30 RX ORDER — DEXTROSE 50 % IN WATER 50 %
12.5 SYRINGE (ML) INTRAVENOUS ONCE
Qty: 0 | Refills: 0 | Status: DISCONTINUED | OUTPATIENT
Start: 2017-06-30 | End: 2017-07-03

## 2017-06-30 RX ADMIN — SENNA PLUS 2 TABLET(S): 8.6 TABLET ORAL at 23:28

## 2017-06-30 RX ADMIN — MORPHINE SULFATE 4 MILLIGRAM(S): 50 CAPSULE, EXTENDED RELEASE ORAL at 17:00

## 2017-06-30 RX ADMIN — MORPHINE SULFATE 4 MILLIGRAM(S): 50 CAPSULE, EXTENDED RELEASE ORAL at 09:43

## 2017-06-30 RX ADMIN — OXYCODONE HYDROCHLORIDE 15 MILLIGRAM(S): 5 TABLET ORAL at 20:06

## 2017-06-30 RX ADMIN — ENOXAPARIN SODIUM 100 MILLIGRAM(S): 100 INJECTION SUBCUTANEOUS at 17:02

## 2017-06-30 RX ADMIN — FLUCONAZOLE 100 MILLIGRAM(S): 150 TABLET ORAL at 23:30

## 2017-06-30 RX ADMIN — Medication 1 MILLIGRAM(S): at 17:03

## 2017-06-30 RX ADMIN — Medication 10 MILLIGRAM(S): at 17:03

## 2017-06-30 RX ADMIN — MORPHINE SULFATE 4 MILLIGRAM(S): 50 CAPSULE, EXTENDED RELEASE ORAL at 16:25

## 2017-06-30 RX ADMIN — SIMVASTATIN 20 MILLIGRAM(S): 20 TABLET, FILM COATED ORAL at 23:29

## 2017-06-30 RX ADMIN — OXYCODONE HYDROCHLORIDE 15 MILLIGRAM(S): 5 TABLET ORAL at 20:41

## 2017-06-30 RX ADMIN — TAMSULOSIN HYDROCHLORIDE 0.4 MILLIGRAM(S): 0.4 CAPSULE ORAL at 23:29

## 2017-06-30 RX ADMIN — Medication 4 MILLIGRAM(S): at 17:03

## 2017-06-30 RX ADMIN — SIMETHICONE 80 MILLIGRAM(S): 80 TABLET, CHEWABLE ORAL at 20:06

## 2017-06-30 RX ADMIN — MORPHINE SULFATE 4 MILLIGRAM(S): 50 CAPSULE, EXTENDED RELEASE ORAL at 09:06

## 2017-06-30 RX ADMIN — FAMOTIDINE 20 MILLIGRAM(S): 10 INJECTION INTRAVENOUS at 17:03

## 2017-06-30 RX ADMIN — TENOFOVIR DISOPROXIL FUMARATE 300 MILLIGRAM(S): 300 TABLET, FILM COATED ORAL at 23:26

## 2017-06-30 RX ADMIN — SODIUM CHLORIDE 1000 MILLILITER(S): 9 INJECTION INTRAMUSCULAR; INTRAVENOUS; SUBCUTANEOUS at 09:43

## 2017-06-30 RX ADMIN — Medication 100 MILLIGRAM(S): at 23:29

## 2017-06-30 NOTE — H&P ADULT - NSHPLABSRESULTS_GEN_ALL_CORE
Troponin T, Serum: <0.01: Reference Interval for Troponin T    Complete Blood Count + Automated Diff (06.30.17 @ 09:02)    WBC Count: 8.0 K/uL    RBC Count: 4.17 M/uL    Hemoglobin: 13.4 g/dL    Hematocrit: 39.4 %    Mean Cell Volume: 94.5 fl    Mean Cell Hemoglobin: 32.0 pg    Mean Cell Hemoglobin Conc: 33.9 gm/dL    Red Cell Distrib Width: 14.0 %    Platelet Count - Automated: 184 K/uL    Auto Neutrophil #: 6.6 K/uL    Auto Lymphocyte #: 1.2 K/uL    Auto Monocyte #: 0.1 K/uL    Auto Eosinophil #: 0.0 K/uL    Auto Basophil #: 0.0 K/uL    Auto Neutrophil %: 83.1: Differential percentages must be correlated with absolute numbers for  clinical significance. %    Auto Lymphocyte %: 15.6 %    Auto Monocyte %: 0.8 %    Auto Eosinophil %: 0.3 %    Auto Basophil %: 0.1 %    Comprehensive Metabolic Panel (06.30.17 @ 09:02)    Sodium, Serum: 141 mmol/L    Potassium, Serum: 3.8 mmol/L    Chloride, Serum: 100 mmol/L    Carbon Dioxide, Serum: 28 mmol/L    Anion Gap, Serum: 13 mmol/L    Blood Urea Nitrogen, Serum: 16 mg/dL    Creatinine, Serum: 0.80 mg/dL    Glucose, Serum: 156 mg/dL    Calcium, Total Serum: 9.9 mg/dL    Protein Total, Serum: 6.6 g/dL    Albumin, Serum: 3.9 g/dL    Bilirubin Total, Serum: 0.3 mg/dL    Alkaline Phosphatase, Serum: 43 U/L    Aspartate Aminotransferase (AST/SGOT): 15 U/L    Alanine Aminotransferase (ALT/SGPT): 17 U/L RC    EKG : personally reviewed; no acute ischemic changes.    CT ANGIO CHEST (W)AW IC                     No pulmonary embolus is noted.    3.4 cm lobulated opacity in the left upper lobe likely corresponds to the   patient's known history of lung cancer.    Increase in number of multiple pulmonary nodules suggestive of metastasis.

## 2017-06-30 NOTE — ED ADULT NURSE NOTE - OBJECTIVE STATEMENT
76 y old male with a history of metastatic lung CA (last chemo 6/26), PE, DM, HTN presents to ED with chest pain. Patient states that pain has been present for several days but today became intolerable so came to ED. Last month patient was taken off of transdermal fentanyl (50mgc/hr) and switched to PO oxycodone and morphine. Patient also reports several episodes of vomiting last night. +MAST.  Patient denies fever, abdominal pain, diarrhea.

## 2017-06-30 NOTE — ED PROVIDER NOTE - CONSTITUTIONAL, MLM
normal... , awake, alert, oriented to person, place, time/situation and in no apparent distress. look chronically ill

## 2017-06-30 NOTE — H&P ADULT - HISTORY OF PRESENT ILLNESS
Patient is [facility] Chinese speaking, son (Parmjit Davidson [phone]) at bedside used for translation per patient's request.    76 with PMH lung ca on tarceva, HTN, HLD, DM, GERD, BPH BIBA found sitting on grass this AM.  Patient has sharp chest pain across at baseline due to lung cancer, on Fentanyl patch and oxycodone prn at home, but this am, he had an episode of chest discomfort (not similar to baseline pain) "uncomfortable feeling" which he never had before, occurred when he went out for a walk [place].  It was non radiation, associated with some lightheadedness and nausea, but no SOB or diaphoresis. He then sat down on the grass, lean his head against the pole, when someone called EMS.  Denies fever, palpitations, chills, recent sickness, HA, vision changes, LOC, cough, abdominal pain, dysuria. Patient is Chinese speaking; Translation done by his son (Parmjit Davidson [phone]) ; per patient's request.    76M with h/o  Lung CA, previously  on Tarceva, now on chemotherapy ( s/p 4 cycles) , newly diagnosed PE (4/2017)  on Lovenox, HTN, HLD, DMT2, GERD, BPH who presents with c/o worsening chest pain and SOB x 2 days. CP is right sided, sharp, intermittent , non exertional, no aggravating or relieving factors .  Patient has chest pain at baseline due to lung cancer and was previously on Fentanyl patch and oxycodone prn until about 1 month ago when Fentanyl patch was discontinued because pt had lost about 20lbs. It was replaced with Morphine ER 15 mg tid. Oxycodone 10mg q4 prn was continued. Pt reports that the new pain regimen does not control his pain. He also reports new abd pain which he believes started at about the same time Morphine was introduced. He was recently referred to Pain Management and follows with Dr Best Marquez.  Pt also reports associated SOB which is non exertional. He denies palpitations, diaphoresis, nausea, dizziness.   Denies fever, chills, recent sickness, HA, vision changes, LOC, cough, abdominal pain, dysuria. Patient is Chinese speaking; Translation done by his son (Parmjit Davidson [phone]) ; per patient's request.    76M with h/o  Lung CA, previously  on Tarceva, now on chemotherapy ( s/p 4 cycles) , newly diagnosed PE (4/2017)  on Lovenox, HTN, HLD, DMT2, GERD, BPH who presents with c/o worsening chest pain and SOB x 2 days. CP is right sided, sharp, intermittent , non exertional, no aggravating or relieving factors .  Patient has chest pain at baseline due to lung cancer and was previously on Fentanyl patch and oxycodone prn until about 1 month ago when Fentanyl patch was discontinued because pt had lost about 20lbs. It was replaced with Morphine ER 15 mg tid. Oxycodone 10mg q4 prn was continued. Pt reports that the new pain regimen does not control his pain. He also reports new abd pain which he believes started at about the same time Morphine was introduced. He was recently referred to Pain Management and follows with Dr Best Marquez.  Pt also reports associated SOB which is non exertional. He denies palpitations, diaphoresis, nausea, dizziness.   Denies fever, chills, recent sickness, HA, vision changes, LOC, cough, abdominal pain, dysuria.  Currently reports that CP is about 5/10, notes that SOB has resolved.    ED course  VS : 131/88  116  20  T 99F O2 93% on room air  Labs : wbc 8 trop < 0.01  bnp 102  EKG : sinus tachycardia, no acute ischemic changes  CT angio chest : No pulmonary embolus is noted.  3.4 cm lobulated opacity in the left upper lobe likely corresponds to the   patient's known history of lung cancer.  Increase in number of multiple pulmonary nodules suggestive of metastasis.  Treatment : Morphine 4 mg IVP x 1, Patient is Chinese speaking; Translation done by his son (Parmjit Davidson [phone]) ; per patient's request.    76M with h/o  Lung CA, previously  on Tarceva, now on chemotherapy  , newly diagnosed PE (4/2017)  on Lovenox, HTN, HLD, DMT2, GERD, BPH who presents with c/o worsening chest pain and SOB x 2 days. CP is right sided, sharp, intermittent , non exertional, no aggravating or relieving factors .  Patient has chest pain at baseline due to lung cancer and was previously on Fentanyl patch and oxycodone prn until about 1 month ago when Fentanyl patch was discontinued because pt had lost about 20lbs. It was replaced with Morphine ER 15 mg tid. Oxycodone 10mg q4 prn was continued. Pt reports that the new pain regimen does not control his pain. He also reports new abd pain which he believes started at about the same time Morphine was introduced. He was recently referred to Pain Management and follows with Dr Best Marquez.  Pt also reports associated SOB which is non exertional. He denies palpitations, diaphoresis, nausea, dizziness.   Denies fever, chills, recent sickness, HA, vision changes, LOC, cough, abdominal pain, dysuria.  Currently reports that CP is about 5/10, notes that SOB has resolved.    ED course  VS : 131/88  116  20  T 99F O2 93% on room air  Labs : wbc 8 trop < 0.01  bnp 102  EKG : sinus tachycardia, no acute ischemic changes  CT angio chest : No pulmonary embolus is noted.  3.4 cm lobulated opacity in the left upper lobe likely corresponds to the   patient's known history of lung cancer.  Increase in number of multiple pulmonary nodules suggestive of metastasis.  Treatment : Morphine 4 mg IVP x 1, 76M with h/o  Lung CA, previously  on Tarceva, now on chemotherapy  , newly diagnosed PE (4/2017)  on Lovenox, HTN, HLD, DMT2, GERD, BPH who presents with c/o worsening chest pain and SOB x 2 days. CP is right sided, sharp, intermittent , non exertional, no aggravating or relieving factors .  Patient has chest pain at baseline due to lung cancer and was previously on Fentanyl patch and oxycodone prn until about 1 month ago when Fentanyl patch was discontinued because pt had lost about 20lbs. It was replaced with Morphine ER 15 mg tid. Oxycodone 10mg q4 prn was continued. Pt reports that the new pain regimen does not control his pain. He also reports new abd pain which he believes started at about the same time Morphine was introduced. He was recently referred to Pain Management and follows with Dr Best Marquez.  Pt also reports associated SOB which is non exertional. He denies palpitations, diaphoresis, nausea, dizziness.   Denies fever, chills, recent sickness, HA, vision changes, LOC, cough, abdominal pain, dysuria.  Currently reports that CP is about 5/10, notes that SOB has resolved.    ED course  VS : 131/88  116  20  T 99F O2 93% on room air  Labs : wbc 8 trop < 0.01  bnp 102  EKG : sinus tachycardia, no acute ischemic changes  CT angio chest : No pulmonary embolus is noted.  3.4 cm lobulated opacity in the left upper lobe likely corresponds to the   patient's known history of lung cancer.  Increase in number of multiple pulmonary nodules suggestive of metastasis.  Treatment : Morphine 4 mg IVP x 1,

## 2017-06-30 NOTE — CONSULT NOTE ADULT - ASSESSMENT
76 M w/ met NSCL CA, s/p Tarceva with POD, now s/p 4 cycles of Carbo/Pemetrexed a/w worsening chest pain, found to have worsening metastatic disease

## 2017-06-30 NOTE — H&P ADULT - FAMILY HISTORY
<<-----Click on this checkbox to enter Family History Family history of liver cancer     Family history of liver cancer, Mother      Sibling  Still living? Yes, Estimated age: Age Unknown  Family history of stomach cancer, Age at diagnosis: Age Unknown     Sibling  Still living? Yes, Estimated age: Age Unknown  Family history of diabetes mellitus, Age at diagnosis: Age Unknown

## 2017-06-30 NOTE — CONSULT NOTE ADULT - SUBJECTIVE AND OBJECTIVE BOX
76M with h/o metastatic Lung CA with EGFR mutated, progressed on on Tarceva, started on Carboplatin/Pemetrexed in 2017, s/p 4 cycles (last dose ), PE (dx in 2017) on Lovenox. Pt has chronic chest pain, follows with palliative as out pt and recently had pain regimen adjusted. Pt now a/w worsening chest pain and SOB x 2 days. CP is right sided, sharp, intermittent , non exertional, no aggravating or relieving factors. Pt reports that the new pain regimen does not control his pain. + associated SOB which is non exertional. He denies palpitations, diaphoresis, nausea, dizziness. CTA in ER c/w progression of pulmonary mets.     Denies fever, chills, recent sickness, HA, vision changes, LOC, cough, abdominal pain, dysuria.    ROS: all other ROS as negative except as aboe.       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 1000 milliLiter(s) (50 mL/Hr) IV Continuous <Continuous>  dextrose 50% Injectable 12.5 Gram(s) IV Push once  dextrose 50% Injectable 25 Gram(s) IV Push once  dextrose 50% Injectable 25 Gram(s) IV Push once  enoxaparin Injectable 100 milliGRAM(s) SubCutaneous daily  dexamethasone     Tablet 4 milliGRAM(s) Oral daily  tamsulosin 0.4 milliGRAM(s) Oral at bedtime  fluconAZOLE   Tablet 100 milliGRAM(s) Oral daily  simvastatin 20 milliGRAM(s) Oral at bedtime  tenofovir 300 milliGRAM(s) Oral daily  famotidine    Tablet 20 milliGRAM(s) Oral two times a day  docusate sodium 100 milliGRAM(s) Oral three times a day  senna 2 Tablet(s) Oral at bedtime  pantoprazole    Tablet 40 milliGRAM(s) Oral before breakfast  folic acid 1 milliGRAM(s) Oral daily    MEDICATIONS  (PRN):  morphine  - Injectable 4 milliGRAM(s) IV Push every 4 hours PRN Severe Pain (7 - 10)  oxyCODONE IR 15 milliGRAM(s) Oral every 4 hours PRN Moderate Pain (4 - 6)  dextrose Gel 1 Dose(s) Oral once PRN Blood Glucose LESS THAN 70 milliGRAM(s)/deciliter  glucagon  Injectable 1 milliGRAM(s) IntraMuscular once PRN Glucose LESS THAN 70 milligrams/deciliter  ibuprofen  Tablet 200 milliGRAM(s) Oral every 6 hours PRN mild pain ( 1-3)  metoclopramide 10 milliGRAM(s) Oral Before meals and at bedtime PRN nausea  ondansetron   Disintegrating Tablet 4 milliGRAM(s) Oral three times a day PRN Nausea and/or Vomiting  simethicone 80 milliGRAM(s) Chew every 6 hours PRN Dyspepsia      Allergies    No Known Allergies    Intolerances        Vital Signs Last 24 Hrs  T(C): 36.7 (2017 16:55), Max: 37.2 (2017 08:14)  T(F): 98 (2017 16:55), Max: 99 (2017 08:14)  HR: 81 (2017 16:55) (81 - 116)  BP: 134/84 (2017 16:55) (126/81 - 173/86)  BP(mean): 97 (2017 14:14) (97 - 97)  RR: 16 (2017 16:55) (16 - 20)  SpO2: 95% (2017 16:55) (93% - 98%)    PHYSICAL EXAM  General: adult in NAD  HEENT: clear oropharynx, anicteric sclera, pink conjunctiva  Neck: supple  CV: normal S1/S2 with no murmur rubs or gallops  Lungs: positive air movement b/l ant lungs,clear to auscultation, no wheezes, no rales  Abdomen: soft non-tender non-distended, no hepatosplenomegaly  Ext: no clubbing cyanosis or edema  Skin: no rashes and no petechiae  Neuro: alert and oriented X 4, no focal deficits      LABS:                          13.4   8.0   )-----------( 184      ( 2017 09:02 )             39.4         Mean Cell Volume : 94.5 fl  Mean Cell Hemoglobin : 32.0 pg  Mean Cell Hemoglobin Concentration : 33.9 gm/dL  Auto Neutrophil # : 6.6 K/uL  Auto Lymphocyte # : 1.2 K/uL  Auto Monocyte # : 0.1 K/uL  Auto Eosinophil # : 0.0 K/uL  Auto Basophil # : 0.0 K/uL  Auto Neutrophil % : 83.1 %  Auto Lymphocyte % : 15.6 %  Auto Monocyte % : 0.8 %  Auto Eosinophil % : 0.3 %  Auto Basophil % : 0.1 %      Serial CBC's   @ 09:02  Hct-39.4 / Hgb-13.4 / Plat-184 / RBC-4.17 / WBC-8.0          141  |  100  |  16  ----------------------------<  156<H>  3.8   |  28  |  0.80    Ca    9.9      2017 09:02    TPro  6.6  /  Alb  3.9  /  TBili  0.3  /  DBili  x   /  AST  15  /  ALT  17  /  AlkPhos  43        PT/INR - ( 2017 09:02 )   PT: 12.3 sec;   INR: 1.13 ratio         PTT - ( 2017 09:02 )  PTT:30.8 sec      RADIOLOGY & ADDITIONAL STUDIES:< from: CT Angio Chest w/ IV Cont (17 @ 10:23) >  EXAM:  CT ANGIO CHEST (W)AW IC                            PROCEDURE DATE:  2017            INTERPRETATION:  Clinical information: History of lung cancer. Patient   now has chest pain. Evaluate for pulmonary embolus. Exam is compared to   previous study of 4/3/2017.    CT angiogram of the chest was obtained following administration of   intravenous contrast. Approximately 90 cc of Omnipaque 350 was   administered and 10 cc was discarded. Coronal, sagittal and MIP images   were submitted for review.    No hilar and/or mediastinal adenopathy is noted.     Heart is normal in size. No pericardial effusion is noted. Pulmonary   arteries are normal in caliber. No filling defects are noted. The   previously noted filling defects are no longerpresent.    No endobronchial lesions are noted. Centrilobular emphysema is noted   bilaterally. Compared to the previous examination, the lobulated opacity   in the left upper lobe has increased in size. On the current examination   it measures 3.4 x2 cm while on the previous examination it measured   approximately 1.6 x 1.5 cm. Multiple nodules, some with cavitation, are   noted within both lungs, more so in the left lower lobe. They have   increased in number when compared to previous exam. Nopleural effusions   are noted.    Below the diaphragm, visualized portions of the abdomen are unremarkable.     Degenerative changes of the spine are noted.    Impression: No pulmonary embolus is noted.    3.4 cm lobulated opacity in the left upper lobe likely corresponds to the   patient's known history of lung cancer.    Increase in number of multiple pulmonary nodules suggestive of metastasis.                    KATHLEEN GACRIA M.D., ATTENDING RADIOLOGIST  This document has been electronically signed. 2017 11:29AM          < end of copied text > 76M with h/o metastatic Lung CA with EGFR mutated, progressed on on Tarceva, started on Carboplatin/Pemetrexed in 2017, s/p 4 cycles (last dose ), PE (dx in 2017) on Lovenox. Pt has chronic chest pain, follows with palliative as out pt and recently had pain regimen adjusted. Pt now a/w worsening chest pain and SOB x 2 days. CP is right sided, sharp, intermittent , non exertional, no aggravating or relieving factors. Pt reports that the new pain regimen does not control his pain. + associated SOB which is non exertional. He denies palpitations, diaphoresis, nausea, dizziness. CTA in ER c/w progression of pulmonary mets.     Currently, c/o burning in chest and epigastric abdominal pain. Last BM yesterday. No constipation or diarrhea.     Denies fever, chills, recent sickness, HA, vision changes, LOC, cough, dysuria.    ROS: all other ROS as negative except as aboe.       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 1000 milliLiter(s) (50 mL/Hr) IV Continuous <Continuous>  dextrose 50% Injectable 12.5 Gram(s) IV Push once  dextrose 50% Injectable 25 Gram(s) IV Push once  dextrose 50% Injectable 25 Gram(s) IV Push once  enoxaparin Injectable 100 milliGRAM(s) SubCutaneous daily  dexamethasone     Tablet 4 milliGRAM(s) Oral daily  tamsulosin 0.4 milliGRAM(s) Oral at bedtime  fluconAZOLE   Tablet 100 milliGRAM(s) Oral daily  simvastatin 20 milliGRAM(s) Oral at bedtime  tenofovir 300 milliGRAM(s) Oral daily  famotidine    Tablet 20 milliGRAM(s) Oral two times a day  docusate sodium 100 milliGRAM(s) Oral three times a day  senna 2 Tablet(s) Oral at bedtime  pantoprazole    Tablet 40 milliGRAM(s) Oral before breakfast  folic acid 1 milliGRAM(s) Oral daily    MEDICATIONS  (PRN):  morphine  - Injectable 4 milliGRAM(s) IV Push every 4 hours PRN Severe Pain (7 - 10)  oxyCODONE IR 15 milliGRAM(s) Oral every 4 hours PRN Moderate Pain (4 - 6)  dextrose Gel 1 Dose(s) Oral once PRN Blood Glucose LESS THAN 70 milliGRAM(s)/deciliter  glucagon  Injectable 1 milliGRAM(s) IntraMuscular once PRN Glucose LESS THAN 70 milligrams/deciliter  ibuprofen  Tablet 200 milliGRAM(s) Oral every 6 hours PRN mild pain ( 1-3)  metoclopramide 10 milliGRAM(s) Oral Before meals and at bedtime PRN nausea  ondansetron   Disintegrating Tablet 4 milliGRAM(s) Oral three times a day PRN Nausea and/or Vomiting  simethicone 80 milliGRAM(s) Chew every 6 hours PRN Dyspepsia      Allergies    No Known Allergies    Intolerances        Vital Signs Last 24 Hrs  T(C): 36.7 (2017 16:55), Max: 37.2 (2017 08:14)  T(F): 98 (2017 16:55), Max: 99 (2017 08:14)  HR: 81 (2017 16:55) (81 - 116)  BP: 134/84 (2017 16:55) (126/81 - 173/86)  BP(mean): 97 (2017 14:14) (97 - 97)  RR: 16 (2017 16:55) (16 - 20)  SpO2: 95% (2017 16:55) (93% - 98%)    PHYSICAL EXAM  General: adult in NAD  HEENT: clear oropharynx, anicteric sclera, pink conjunctiva  Neck: supple  CV: normal S1/S2 with no murmur rubs or gallops  Lungs: positive air movement b/l ant lungs,clear to auscultation, no wheezes, no rales  Abdomen: soft TTP in epigastric area non-distended, no hepatosplenomegaly  Ext: no clubbing cyanosis or edema  Skin: no rashes and no petechiae  Neuro: alert and oriented X 4, no focal deficits      LABS:                          13.4   8.0   )-----------( 184      ( 2017 09:02 )             39.4         Mean Cell Volume : 94.5 fl  Mean Cell Hemoglobin : 32.0 pg  Mean Cell Hemoglobin Concentration : 33.9 gm/dL  Auto Neutrophil # : 6.6 K/uL  Auto Lymphocyte # : 1.2 K/uL  Auto Monocyte # : 0.1 K/uL  Auto Eosinophil # : 0.0 K/uL  Auto Basophil # : 0.0 K/uL  Auto Neutrophil % : 83.1 %  Auto Lymphocyte % : 15.6 %  Auto Monocyte % : 0.8 %  Auto Eosinophil % : 0.3 %  Auto Basophil % : 0.1 %      Serial CBC's   @ 09:02  Hct-39.4 / Hgb-13.4 / Plat-184 / RBC-4.17 / WBC-8.0          141  |  100  |  16  ----------------------------<  156<H>  3.8   |  28  |  0.80    Ca    9.9      2017 09:02    TPro  6.6  /  Alb  3.9  /  TBili  0.3  /  DBili  x   /  AST  15  /  ALT  17  /  AlkPhos  43        PT/INR - ( 2017 09:02 )   PT: 12.3 sec;   INR: 1.13 ratio         PTT - ( 2017 09:02 )  PTT:30.8 sec      RADIOLOGY & ADDITIONAL STUDIES:< from: CT Angio Chest w/ IV Cont (17 @ 10:23) >  EXAM:  CT ANGIO CHEST (W)AW IC                            PROCEDURE DATE:  2017            INTERPRETATION:  Clinical information: History of lung cancer. Patient   now has chest pain. Evaluate for pulmonary embolus. Exam is compared to   previous study of 4/3/2017.    CT angiogram of the chest was obtained following administration of   intravenous contrast. Approximately 90 cc of Omnipaque 350 was   administered and 10 cc was discarded. Coronal, sagittal and MIP images   were submitted for review.    No hilar and/or mediastinal adenopathy is noted.     Heart is normal in size. No pericardial effusion is noted. Pulmonary   arteries are normal in caliber. No filling defects are noted. The   previously noted filling defects are no longerpresent.    No endobronchial lesions are noted. Centrilobular emphysema is noted   bilaterally. Compared to the previous examination, the lobulated opacity   in the left upper lobe has increased in size. On the current examination   it measures 3.4 x2 cm while on the previous examination it measured   approximately 1.6 x 1.5 cm. Multiple nodules, some with cavitation, are   noted within both lungs, more so in the left lower lobe. They have   increased in number when compared to previous exam. Nopleural effusions   are noted.    Below the diaphragm, visualized portions of the abdomen are unremarkable.     Degenerative changes of the spine are noted.    Impression: No pulmonary embolus is noted.    3.4 cm lobulated opacity in the left upper lobe likely corresponds to the   patient's known history of lung cancer.    Increase in number of multiple pulmonary nodules suggestive of metastasis.                    KATHLEEN GARCIA M.D., ATTENDING RADIOLOGIST  This document has been electronically signed. 2017 11:29AM          < end of copied text >

## 2017-06-30 NOTE — H&P ADULT - NEGATIVE CARDIOVASCULAR SYMPTOMS
no orthopnea/no palpitations/no dyspnea on exertion/no peripheral edema/no paroxysmal nocturnal dyspnea

## 2017-06-30 NOTE — H&P ADULT - NSHPSOCIALHISTORY_GEN_ALL_CORE
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

## 2017-06-30 NOTE — CONSULT NOTE ADULT - PROBLEM SELECTOR RECOMMENDATION 9
POD noted after 4 cycles of Carbo/Pem. Plan for immunotherapy as out pt as long as pt's performance status allows it.   f/u with Dr. Tracy upon d/c

## 2017-06-30 NOTE — ED ADULT NURSE NOTE - PSH
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  s/p resection L face  Basal cell carcinoma of nostril  left nostril

## 2017-06-30 NOTE — CONSULT NOTE ADULT - PROBLEM SELECTOR RECOMMENDATION 2
Cancer related pain with ?superimposed GERD (on decadron)  Famotidine daily, Maalax PRN  IV morphine effective in controlling pain for approximately 5-7 hours per son.   Would d/c Motrin  Will need titration of home regimen based on usage of opiods in the hospital. Consult palliative care if pt's pain is not well controlled.   To f/u with Dr. Robertson.

## 2017-06-30 NOTE — ED PROVIDER NOTE - OBJECTIVE STATEMENT
76 y old male with a history of metastatic  lung ca treated at [facility] by dr Hanks [id number] last chemo 6/26 ,Hx of PE ds 4/17 , DM ,hypertension came in complains of severe chest pain and SOB ,has been on Fentanyl patch for pain but not working ,No fever or chills .has been on Tarciva and lovenox

## 2017-06-30 NOTE — H&P ADULT - PROBLEM SELECTOR PLAN 1
likely 2/2 to lung CA, new metastastic lesions  Morphine 4 mg IVP q 4 prn for severe pain  Oxycodone IR 15 mg PO q 4 prn for mod pain

## 2017-06-30 NOTE — H&P ADULT - ASSESSMENT
76M with h/o  Lung CA, previously  on Tarceva, now on chemotherapy  , newly diagnosed PE (4/2017)  on Lovenox, HTN, HLD, DMT2, GERD, BPH who presents with c/o worsening chest pain and SOB x 2 days. Physical exam is notable for periumbilical abd tenderness. CE are negative x 1. CTA chest is negative for PE and demonstrates known left upper lobe lesion with an increase in numberof pulmonary nodules suggestive of metastasis.

## 2017-06-30 NOTE — ED ADULT NURSE NOTE - PMH
BPH (benign prostatic hyperplasia)    BPH (benign prostatic hypertrophy)    COPD (chronic obstructive pulmonary disease)    Diabetes    DM (diabetes mellitus)    GERD (gastroesophageal reflux disease)    GERD (gastroesophageal reflux disease)    HLD (hyperlipidemia)    HLD (hyperlipidemia)    HTN (hypertension)    Hypertension    Lung cancer    Lung cancer

## 2017-07-01 ENCOUNTER — OUTPATIENT (OUTPATIENT)
Dept: OUTPATIENT SERVICES | Facility: HOSPITAL | Age: 77
LOS: 1 days | End: 2017-07-01
Payer: MEDICAID

## 2017-07-01 DIAGNOSIS — C44.311 BASAL CELL CARCINOMA OF SKIN OF NOSE: Chronic | ICD-10-CM

## 2017-07-01 DIAGNOSIS — D04.9 CARCINOMA IN SITU OF SKIN, UNSPECIFIED: Chronic | ICD-10-CM

## 2017-07-01 LAB
ANION GAP SERPL CALC-SCNC: 14 MMOL/L — SIGNIFICANT CHANGE UP (ref 5–17)
BUN SERPL-MCNC: 16 MG/DL — SIGNIFICANT CHANGE UP (ref 7–23)
CALCIUM SERPL-MCNC: 9.8 MG/DL — SIGNIFICANT CHANGE UP (ref 8.4–10.5)
CHLORIDE SERPL-SCNC: 97 MMOL/L — SIGNIFICANT CHANGE UP (ref 96–108)
CK MB BLD-MCNC: 3.4 % — SIGNIFICANT CHANGE UP (ref 0–3.5)
CK MB CFR SERPL CALC: 1 NG/ML — SIGNIFICANT CHANGE UP (ref 0–6.7)
CK SERPL-CCNC: 29 U/L — LOW (ref 30–200)
CO2 SERPL-SCNC: 27 MMOL/L — SIGNIFICANT CHANGE UP (ref 22–31)
CREAT SERPL-MCNC: 0.77 MG/DL — SIGNIFICANT CHANGE UP (ref 0.5–1.3)
GLUCOSE SERPL-MCNC: 189 MG/DL — HIGH (ref 70–99)
HBA1C BLD-MCNC: 7.2 % — HIGH (ref 4–5.6)
HCT VFR BLD CALC: 36.8 % — LOW (ref 39–50)
HGB BLD-MCNC: 12.1 G/DL — LOW (ref 13–17)
MCHC RBC-ENTMCNC: 30.5 PG — SIGNIFICANT CHANGE UP (ref 27–34)
MCHC RBC-ENTMCNC: 32.9 GM/DL — SIGNIFICANT CHANGE UP (ref 32–36)
MCV RBC AUTO: 92.7 FL — SIGNIFICANT CHANGE UP (ref 80–100)
PLATELET # BLD AUTO: 177 K/UL — SIGNIFICANT CHANGE UP (ref 150–400)
POTASSIUM SERPL-MCNC: 4.3 MMOL/L — SIGNIFICANT CHANGE UP (ref 3.5–5.3)
POTASSIUM SERPL-SCNC: 4.3 MMOL/L — SIGNIFICANT CHANGE UP (ref 3.5–5.3)
RBC # BLD: 3.97 M/UL — LOW (ref 4.2–5.8)
RBC # FLD: 14.6 % — HIGH (ref 10.3–14.5)
SODIUM SERPL-SCNC: 138 MMOL/L — SIGNIFICANT CHANGE UP (ref 135–145)
TROPONIN T SERPL-MCNC: <0.01 NG/ML — SIGNIFICANT CHANGE UP (ref 0–0.06)
WBC # BLD: 5.52 K/UL — SIGNIFICANT CHANGE UP (ref 3.8–10.5)
WBC # FLD AUTO: 5.52 K/UL — SIGNIFICANT CHANGE UP (ref 3.8–10.5)

## 2017-07-01 PROCEDURE — 93010 ELECTROCARDIOGRAM REPORT: CPT

## 2017-07-01 PROCEDURE — 99233 SBSQ HOSP IP/OBS HIGH 50: CPT

## 2017-07-01 RX ORDER — ENOXAPARIN SODIUM 100 MG/ML
110 INJECTION SUBCUTANEOUS DAILY
Qty: 0 | Refills: 0 | Status: DISCONTINUED | OUTPATIENT
Start: 2017-07-01 | End: 2017-07-03

## 2017-07-01 RX ADMIN — FLUCONAZOLE 100 MILLIGRAM(S): 150 TABLET ORAL at 11:54

## 2017-07-01 RX ADMIN — Medication 1: at 11:54

## 2017-07-01 RX ADMIN — Medication 100 MILLIGRAM(S): at 21:46

## 2017-07-01 RX ADMIN — FAMOTIDINE 20 MILLIGRAM(S): 10 INJECTION INTRAVENOUS at 17:10

## 2017-07-01 RX ADMIN — SENNA PLUS 2 TABLET(S): 8.6 TABLET ORAL at 21:46

## 2017-07-01 RX ADMIN — OXYCODONE HYDROCHLORIDE 15 MILLIGRAM(S): 5 TABLET ORAL at 22:00

## 2017-07-01 RX ADMIN — Medication 4 MILLIGRAM(S): at 06:15

## 2017-07-01 RX ADMIN — TENOFOVIR DISOPROXIL FUMARATE 300 MILLIGRAM(S): 300 TABLET, FILM COATED ORAL at 11:55

## 2017-07-01 RX ADMIN — Medication 1: at 07:59

## 2017-07-01 RX ADMIN — FAMOTIDINE 20 MILLIGRAM(S): 10 INJECTION INTRAVENOUS at 06:15

## 2017-07-01 RX ADMIN — PANTOPRAZOLE SODIUM 40 MILLIGRAM(S): 20 TABLET, DELAYED RELEASE ORAL at 06:15

## 2017-07-01 RX ADMIN — OXYCODONE HYDROCHLORIDE 15 MILLIGRAM(S): 5 TABLET ORAL at 06:45

## 2017-07-01 RX ADMIN — OXYCODONE HYDROCHLORIDE 15 MILLIGRAM(S): 5 TABLET ORAL at 06:14

## 2017-07-01 RX ADMIN — MORPHINE SULFATE 4 MILLIGRAM(S): 50 CAPSULE, EXTENDED RELEASE ORAL at 02:55

## 2017-07-01 RX ADMIN — MORPHINE SULFATE 4 MILLIGRAM(S): 50 CAPSULE, EXTENDED RELEASE ORAL at 14:15

## 2017-07-01 RX ADMIN — SIMVASTATIN 20 MILLIGRAM(S): 20 TABLET, FILM COATED ORAL at 21:46

## 2017-07-01 RX ADMIN — MORPHINE SULFATE 4 MILLIGRAM(S): 50 CAPSULE, EXTENDED RELEASE ORAL at 14:30

## 2017-07-01 RX ADMIN — MORPHINE SULFATE 4 MILLIGRAM(S): 50 CAPSULE, EXTENDED RELEASE ORAL at 02:38

## 2017-07-01 RX ADMIN — TAMSULOSIN HYDROCHLORIDE 0.4 MILLIGRAM(S): 0.4 CAPSULE ORAL at 21:46

## 2017-07-01 RX ADMIN — Medication 100 MILLIGRAM(S): at 06:15

## 2017-07-01 RX ADMIN — ENOXAPARIN SODIUM 110 MILLIGRAM(S): 100 INJECTION SUBCUTANEOUS at 11:53

## 2017-07-01 RX ADMIN — Medication 100 MILLIGRAM(S): at 13:45

## 2017-07-01 RX ADMIN — Medication 2: at 17:10

## 2017-07-01 RX ADMIN — Medication 1 MILLIGRAM(S): at 11:54

## 2017-07-01 RX ADMIN — OXYCODONE HYDROCHLORIDE 15 MILLIGRAM(S): 5 TABLET ORAL at 22:40

## 2017-07-01 NOTE — PROGRESS NOTE ADULT - SUBJECTIVE AND OBJECTIVE BOX
Patient is a 76y old  Male who presents with a chief complaint of chest pain, SOB (30 Jun 2017 14:14)      SUBJECTIVE / OVERNIGHT EVENTS: Pt seen with family at bedside.  No acute events overnight. Pt required Morphine 4 mg IVP twice and Oxycodone IR 15 mg PO twice over the past 12 hrs.  Seen by Hem/Onc; reccs appreciated.    MEDICATIONS  (STANDING):  insulin lispro (HumaLOG) corrective regimen sliding scale   SubCutaneous three times a day before meals  dextrose 5%. 1000 milliLiter(s) (50 mL/Hr) IV Continuous <Continuous>  dextrose 50% Injectable 12.5 Gram(s) IV Push once  dextrose 50% Injectable 25 Gram(s) IV Push once  dextrose 50% Injectable 25 Gram(s) IV Push once  dexamethasone     Tablet 4 milliGRAM(s) Oral daily  tamsulosin 0.4 milliGRAM(s) Oral at bedtime  fluconAZOLE   Tablet 100 milliGRAM(s) Oral daily  simvastatin 20 milliGRAM(s) Oral at bedtime  tenofovir 300 milliGRAM(s) Oral daily  famotidine    Tablet 20 milliGRAM(s) Oral two times a day  docusate sodium 100 milliGRAM(s) Oral three times a day  senna 2 Tablet(s) Oral at bedtime  pantoprazole    Tablet 40 milliGRAM(s) Oral before breakfast  folic acid 1 milliGRAM(s) Oral daily  enoxaparin Injectable 110 milliGRAM(s) SubCutaneous daily    MEDICATIONS  (PRN):  morphine  - Injectable 4 milliGRAM(s) IV Push every 4 hours PRN Severe Pain (7 - 10)  oxyCODONE IR 15 milliGRAM(s) Oral every 4 hours PRN Moderate Pain (4 - 6)  dextrose Gel 1 Dose(s) Oral once PRN Blood Glucose LESS THAN 70 milliGRAM(s)/deciliter  glucagon  Injectable 1 milliGRAM(s) IntraMuscular once PRN Glucose LESS THAN 70 milligrams/deciliter  metoclopramide 10 milliGRAM(s) Oral Before meals and at bedtime PRN nausea  ondansetron   Disintegrating Tablet 4 milliGRAM(s) Oral three times a day PRN Nausea and/or Vomiting  simethicone 80 milliGRAM(s) Chew every 6 hours PRN Dyspepsia  aluminum hydroxide/magnesium hydroxide/simethicone Suspension 30 milliLiter(s) Oral every 4 hours PRN Dyspepsia      Vital Signs Last 24 Hrs  T(C): 36.9 (07-01-17 @ 14:25), Max: 36.9 (06-30-17 @ 19:56)  HR: 87 (07-01-17 @ 14:25) (81 - 90)  BP: 110/70 (07-01-17 @ 14:25) (110/70 - 146/89)  RR: 16 (07-01-17 @ 14:25) (16 - 18)  SpO2: 95% (07-01-17 @ 14:25) (95% - 98%)  CAPILLARY BLOOD GLUCOSE  192 (01 Jul 2017 11:49)  153 (01 Jul 2017 07:31)  243 (30 Jun 2017 22:22)  150 (30 Jun 2017 16:54)        I&O's Summary    30 Jun 2017 07:01  -  01 Jul 2017 07:00  --------------------------------------------------------  IN: 540 mL / OUT: 450 mL / NET: 90 mL    01 Jul 2017 07:01  -  01 Jul 2017 15:12  --------------------------------------------------------  IN: 360 mL / OUT: 600 mL / NET: -240 mL        PHYSICAL EXAM:  GENERAL: NAD, well-developed  HEAD:  Atraumatic, Normocephalic  EYES: EOMI, PERRLA, conjunctiva and sclera clear  NECK: Supple, No JVD  CHEST/LUNG: Clear to auscultation bilaterally; No wheeze  HEART: Regular rate and rhythm; No murmurs, rubs, or gallops  ABDOMEN: Soft, Nontender, Nondistended; Bowel sounds present  EXTREMITIES:  2+ Peripheral Pulses, No clubbing, cyanosis, or edema  PSYCH: AAOx3  NEUROLOGY: non-focal  SKIN: No rashes or lesions    LABS:                        12.1   5.52  )-----------( 177      ( 01 Jul 2017 08:52 )             36.8     07-01    138  |  97  |  16  ----------------------------<  189<H>  4.3   |  27  |  0.77    Ca    9.8      01 Jul 2017 04:40    TPro  6.6  /  Alb  3.9  /  TBili  0.3  /  DBili  x   /  AST  15  /  ALT  17  /  AlkPhos  43  06-30    PT/INR - ( 30 Jun 2017 09:02 )   PT: 12.3 sec;   INR: 1.13 ratio         PTT - ( 30 Jun 2017 09:02 )  PTT:30.8 sec  CARDIAC MARKERS ( 01 Jul 2017 04:40 )  x     / <0.01 ng/mL / 29 U/L / x     / 1.0 ng/mL  CARDIAC MARKERS ( 30 Jun 2017 21:50 )  x     / <0.01 ng/mL / 30 U/L / x     / 1.1 ng/mL  CARDIAC MARKERS ( 30 Jun 2017 09:02 )  x     / <0.01 ng/mL / x     / x     / x              RADIOLOGY & ADDITIONAL TESTS:    Consultant(s) Notes Reviewed:  Hem/Onc

## 2017-07-01 NOTE — PROVIDER CONTACT NOTE (OTHER) - ASSESSMENT
phone used access #[id number] & [id number], c/o chest pain non radiating to arms, denies nausea/vomiting or shortness of breath. c/o chest pain 8/10 on 10 scale that reduced to 4/10

## 2017-07-01 NOTE — CHART NOTE - NSCHARTNOTEFT_GEN_A_CORE
Night Medicine PA    Notified by RN; Pt. is complaining of chest pain. Pt. is Cantonese so  phone used. Seen and examined at bedside. Pt. states that pain is rated 8/10 and is nonradiating. Pain is located in right portion of the chest and is neither alleviated nor provoked by any factors. Pain is described as sharp and nonexertional. Pt. does have a history of chest pain associated with lung cancer and was admitted for the same type of pain. Cardiac enzymes after two sets have so far been negative. Denies sob, abdominal pain, N/V, diarrhea, lightheadedness, palpitations, and syncope. Pt. is alert, attentive, and follows commands.     Vital Signs Last 24 Hrs  T(C): 36.7 (06-30-17 @ 20:41), Max: 37.2 (06-30-17 @ 08:14)  HR: 87 (07-01-17 @ 02:17) (81 - 116)  BP: 121/76 (07-01-17 @ 02:17) (121/76 - 173/86)  RR: 17 (07-01-17 @ 02:17) (16 - 20)  SpO2: 97% (07-01-17 @ 02:17) (93% - 98%)  CAPILLARY BLOOD GLUCOSE  243 (30 Jun 2017 22:22)    PHYSICAL EXAM:  GENERAL: A+O x3; NAD, well-developed  CHEST/LUNG: Clear to auscultation bilaterally; No wheeze  HEART: S1, S2. Regular rate and rhythm; No murmurs, rubs, or gallops  ABDOMEN: Soft, Nontender, Nondistended; Bowel sounds present    LABS:                        13.4   8.0   )-----------( 184      ( 30 Jun 2017 09:02 )             39.4     06-30    141  |  100  |  16  ----------------------------<  156<H>  3.8   |  28  |  0.80    Ca    9.9      30 Jun 2017 09:02    TPro  6.6  /  Alb  3.9  /  TBili  0.3  /  DBili  x   /  AST  15  /  ALT  17  /  AlkPhos  43  06-30    PT/INR - ( 30 Jun 2017 09:02 )   PT: 12.3 sec;   INR: 1.13 ratio         PTT - ( 30 Jun 2017 09:02 )  PTT:30.8 sec  CARDIAC MARKERS ( 30 Jun 2017 21:50 )  x     / <0.01 ng/mL / 30 U/L / x     / 1.1 ng/mL  CARDIAC MARKERS ( 30 Jun 2017 09:02 )  x     / <0.01 ng/mL / x     / x     / x        EKG: NSR at 88 bpm       Assessment/Plan:  Pt. is a 76M with h/o  Lung CA, previously  on Tarceva, now on chemotherapy  , newly diagnosed PE (4/2017)  on Lovenox, HTN, HLD, DMT2, GERD, BPH who presents with c/o worsening chest pain and SOB x 2 days is now being seen for acute chest pain.  1) chest pain r/o ACS vs secondary to lung malignancy   -Pt. seen and examined at bedside. Currently stable.  -EKG is normal and unchanged from previous EKG   -Cardiac enzymes, bmp, cbc sent to lab  -Pt. is prescribed IV morphine PRN 4 mg  -Will continue to monitor patient throughout the night and on telemetry  -F/u w/ primary team in AM.    -Sabas Driver PA-C. #[id number]

## 2017-07-02 ENCOUNTER — APPOINTMENT (OUTPATIENT)
Dept: CT IMAGING | Facility: IMAGING CENTER | Age: 77
End: 2017-07-02

## 2017-07-02 PROCEDURE — 99233 SBSQ HOSP IP/OBS HIGH 50: CPT

## 2017-07-02 RX ORDER — MORPHINE SULFATE 50 MG/1
30 CAPSULE, EXTENDED RELEASE ORAL EVERY 12 HOURS
Qty: 0 | Refills: 0 | Status: DISCONTINUED | OUTPATIENT
Start: 2017-07-02 | End: 2017-07-03

## 2017-07-02 RX ADMIN — Medication 1: at 17:18

## 2017-07-02 RX ADMIN — SENNA PLUS 2 TABLET(S): 8.6 TABLET ORAL at 21:09

## 2017-07-02 RX ADMIN — MORPHINE SULFATE 30 MILLIGRAM(S): 50 CAPSULE, EXTENDED RELEASE ORAL at 18:00

## 2017-07-02 RX ADMIN — OXYCODONE HYDROCHLORIDE 15 MILLIGRAM(S): 5 TABLET ORAL at 03:45

## 2017-07-02 RX ADMIN — FAMOTIDINE 20 MILLIGRAM(S): 10 INJECTION INTRAVENOUS at 17:19

## 2017-07-02 RX ADMIN — Medication 4 MILLIGRAM(S): at 05:33

## 2017-07-02 RX ADMIN — PANTOPRAZOLE SODIUM 40 MILLIGRAM(S): 20 TABLET, DELAYED RELEASE ORAL at 05:35

## 2017-07-02 RX ADMIN — Medication 100 MILLIGRAM(S): at 05:34

## 2017-07-02 RX ADMIN — ENOXAPARIN SODIUM 110 MILLIGRAM(S): 100 INJECTION SUBCUTANEOUS at 11:32

## 2017-07-02 RX ADMIN — SIMVASTATIN 20 MILLIGRAM(S): 20 TABLET, FILM COATED ORAL at 21:09

## 2017-07-02 RX ADMIN — FLUCONAZOLE 100 MILLIGRAM(S): 150 TABLET ORAL at 11:30

## 2017-07-02 RX ADMIN — Medication 1 MILLIGRAM(S): at 11:30

## 2017-07-02 RX ADMIN — TAMSULOSIN HYDROCHLORIDE 0.4 MILLIGRAM(S): 0.4 CAPSULE ORAL at 21:09

## 2017-07-02 RX ADMIN — Medication 100 MILLIGRAM(S): at 13:17

## 2017-07-02 RX ADMIN — OXYCODONE HYDROCHLORIDE 15 MILLIGRAM(S): 5 TABLET ORAL at 20:40

## 2017-07-02 RX ADMIN — Medication 2: at 11:30

## 2017-07-02 RX ADMIN — OXYCODONE HYDROCHLORIDE 15 MILLIGRAM(S): 5 TABLET ORAL at 20:09

## 2017-07-02 RX ADMIN — OXYCODONE HYDROCHLORIDE 15 MILLIGRAM(S): 5 TABLET ORAL at 03:07

## 2017-07-02 RX ADMIN — Medication 100 MILLIGRAM(S): at 21:09

## 2017-07-02 RX ADMIN — Medication 1: at 07:34

## 2017-07-02 RX ADMIN — TENOFOVIR DISOPROXIL FUMARATE 300 MILLIGRAM(S): 300 TABLET, FILM COATED ORAL at 11:30

## 2017-07-02 RX ADMIN — MORPHINE SULFATE 30 MILLIGRAM(S): 50 CAPSULE, EXTENDED RELEASE ORAL at 17:19

## 2017-07-02 RX ADMIN — FAMOTIDINE 20 MILLIGRAM(S): 10 INJECTION INTRAVENOUS at 05:34

## 2017-07-02 NOTE — PROGRESS NOTE ADULT - SUBJECTIVE AND OBJECTIVE BOX
Patient is a 76y old  Male who presents with a chief complaint of chest pain, SOB (30 Jun 2017 14:14)      SUBJECTIVE / OVERNIGHT EVENTS:   No acute events overnight. Reports improvement in pain on current regimen.      MEDICATIONS  (STANDING):  insulin lispro (HumaLOG) corrective regimen sliding scale   SubCutaneous three times a day before meals  dextrose 5%. 1000 milliLiter(s) (50 mL/Hr) IV Continuous <Continuous>  dextrose 50% Injectable 12.5 Gram(s) IV Push once  dextrose 50% Injectable 25 Gram(s) IV Push once  dextrose 50% Injectable 25 Gram(s) IV Push once  dexamethasone     Tablet 4 milliGRAM(s) Oral daily  tamsulosin 0.4 milliGRAM(s) Oral at bedtime  fluconAZOLE   Tablet 100 milliGRAM(s) Oral daily  simvastatin 20 milliGRAM(s) Oral at bedtime  tenofovir 300 milliGRAM(s) Oral daily  famotidine    Tablet 20 milliGRAM(s) Oral two times a day  docusate sodium 100 milliGRAM(s) Oral three times a day  senna 2 Tablet(s) Oral at bedtime  pantoprazole    Tablet 40 milliGRAM(s) Oral before breakfast  folic acid 1 milliGRAM(s) Oral daily  enoxaparin Injectable 110 milliGRAM(s) SubCutaneous daily    MEDICATIONS  (PRN):  morphine  - Injectable 4 milliGRAM(s) IV Push every 4 hours PRN Severe Pain (7 - 10)  oxyCODONE IR 15 milliGRAM(s) Oral every 4 hours PRN Moderate Pain (4 - 6)  dextrose Gel 1 Dose(s) Oral once PRN Blood Glucose LESS THAN 70 milliGRAM(s)/deciliter  glucagon  Injectable 1 milliGRAM(s) IntraMuscular once PRN Glucose LESS THAN 70 milligrams/deciliter  metoclopramide 10 milliGRAM(s) Oral Before meals and at bedtime PRN nausea  ondansetron   Disintegrating Tablet 4 milliGRAM(s) Oral three times a day PRN Nausea and/or Vomiting  simethicone 80 milliGRAM(s) Chew every 6 hours PRN Dyspepsia  aluminum hydroxide/magnesium hydroxide/simethicone Suspension 30 milliLiter(s) Oral every 4 hours PRN Dyspepsia      Vital Signs Last 24 Hrs  T(C): 36.9 (07-01-17 @ 14:25), Max: 36.9 (06-30-17 @ 19:56)  HR: 87 (07-01-17 @ 14:25) (81 - 90)  BP: 110/70 (07-01-17 @ 14:25) (110/70 - 146/89)  RR: 16 (07-01-17 @ 14:25) (16 - 18)  SpO2: 95% (07-01-17 @ 14:25) (95% - 98%)  CAPILLARY BLOOD GLUCOSE  192 (01 Jul 2017 11:49)  153 (01 Jul 2017 07:31)  243 (30 Jun 2017 22:22)  150 (30 Jun 2017 16:54)        I&O's Summary    30 Jun 2017 07:01  -  01 Jul 2017 07:00  --------------------------------------------------------  IN: 540 mL / OUT: 450 mL / NET: 90 mL    01 Jul 2017 07:01  -  01 Jul 2017 15:12  --------------------------------------------------------  IN: 360 mL / OUT: 600 mL / NET: -240 mL        PHYSICAL EXAM:  GENERAL: NAD, well-developed  HEAD:  Atraumatic, Normocephalic  EYES: EOMI, PERRLA, conjunctiva and sclera clear  NECK: Supple, No JVD  CHEST/LUNG: Clear to auscultation bilaterally; No wheeze  HEART: Regular rate and rhythm; No murmurs, rubs, or gallops  ABDOMEN: Soft, Nontender, Nondistended; Bowel sounds present  EXTREMITIES:  2+ Peripheral Pulses, No clubbing, cyanosis, or edema  PSYCH: AAOx3  NEUROLOGY: non-focal  SKIN: No rashes or lesions    LABS:                        12.1   5.52  )-----------( 177      ( 01 Jul 2017 08:52 )             36.8     07-01    138  |  97  |  16  ----------------------------<  189<H>  4.3   |  27  |  0.77    Ca    9.8      01 Jul 2017 04:40    TPro  6.6  /  Alb  3.9  /  TBili  0.3  /  DBili  x   /  AST  15  /  ALT  17  /  AlkPhos  43  06-30    PT/INR - ( 30 Jun 2017 09:02 )   PT: 12.3 sec;   INR: 1.13 ratio         PTT - ( 30 Jun 2017 09:02 )  PTT:30.8 sec  CARDIAC MARKERS ( 01 Jul 2017 04:40 )  x     / <0.01 ng/mL / 29 U/L / x     / 1.0 ng/mL  CARDIAC MARKERS ( 30 Jun 2017 21:50 )  x     / <0.01 ng/mL / 30 U/L / x     / 1.1 ng/mL  CARDIAC MARKERS ( 30 Jun 2017 09:02 )  x     / <0.01 ng/mL / x     / x     / x              RADIOLOGY & ADDITIONAL TESTS:    Consultant(s) Notes Reviewed:  Hem/Onc

## 2017-07-03 VITALS
RESPIRATION RATE: 16 BRPM | TEMPERATURE: 98 F | OXYGEN SATURATION: 96 % | DIASTOLIC BLOOD PRESSURE: 77 MMHG | SYSTOLIC BLOOD PRESSURE: 119 MMHG | HEART RATE: 84 BPM

## 2017-07-03 DIAGNOSIS — R69 ILLNESS, UNSPECIFIED: ICD-10-CM

## 2017-07-03 PROCEDURE — 99232 SBSQ HOSP IP/OBS MODERATE 35: CPT

## 2017-07-03 PROCEDURE — 99239 HOSP IP/OBS DSCHRG MGMT >30: CPT

## 2017-07-03 RX ORDER — OXYCODONE HYDROCHLORIDE 5 MG/1
0 TABLET ORAL
Qty: 42 | Refills: 0 | COMMUNITY

## 2017-07-03 RX ORDER — OXYCODONE HYDROCHLORIDE 5 MG/1
1 TABLET ORAL
Qty: 18 | Refills: 0 | OUTPATIENT
Start: 2017-07-03 | End: 2017-07-06

## 2017-07-03 RX ORDER — IBUPROFEN 200 MG
1 TABLET ORAL
Qty: 0 | Refills: 0 | COMMUNITY

## 2017-07-03 RX ORDER — MORPHINE SULFATE 50 MG/1
1 CAPSULE, EXTENDED RELEASE ORAL
Qty: 10 | Refills: 0 | OUTPATIENT
Start: 2017-07-03 | End: 2017-07-08

## 2017-07-03 RX ADMIN — Medication 100 MILLIGRAM(S): at 05:28

## 2017-07-03 RX ADMIN — MORPHINE SULFATE 30 MILLIGRAM(S): 50 CAPSULE, EXTENDED RELEASE ORAL at 05:28

## 2017-07-03 RX ADMIN — MORPHINE SULFATE 30 MILLIGRAM(S): 50 CAPSULE, EXTENDED RELEASE ORAL at 06:02

## 2017-07-03 RX ADMIN — PANTOPRAZOLE SODIUM 40 MILLIGRAM(S): 20 TABLET, DELAYED RELEASE ORAL at 05:27

## 2017-07-03 RX ADMIN — OXYCODONE HYDROCHLORIDE 15 MILLIGRAM(S): 5 TABLET ORAL at 11:37

## 2017-07-03 RX ADMIN — Medication 100 MILLIGRAM(S): at 13:22

## 2017-07-03 RX ADMIN — Medication 4 MILLIGRAM(S): at 05:29

## 2017-07-03 RX ADMIN — Medication 1 MILLIGRAM(S): at 11:28

## 2017-07-03 RX ADMIN — Medication 3: at 11:44

## 2017-07-03 RX ADMIN — ONDANSETRON 4 MILLIGRAM(S): 8 TABLET, FILM COATED ORAL at 05:28

## 2017-07-03 RX ADMIN — OXYCODONE HYDROCHLORIDE 15 MILLIGRAM(S): 5 TABLET ORAL at 12:30

## 2017-07-03 RX ADMIN — FLUCONAZOLE 100 MILLIGRAM(S): 150 TABLET ORAL at 11:28

## 2017-07-03 RX ADMIN — Medication 2: at 08:03

## 2017-07-03 RX ADMIN — FAMOTIDINE 20 MILLIGRAM(S): 10 INJECTION INTRAVENOUS at 05:27

## 2017-07-03 RX ADMIN — TENOFOVIR DISOPROXIL FUMARATE 300 MILLIGRAM(S): 300 TABLET, FILM COATED ORAL at 11:28

## 2017-07-03 RX ADMIN — ENOXAPARIN SODIUM 110 MILLIGRAM(S): 100 INJECTION SUBCUTANEOUS at 11:28

## 2017-07-03 NOTE — PROGRESS NOTE ADULT - PROBLEM SELECTOR PLAN 1
likely 2/2 to lung CA, new metastastic lesions  c/w Morphine ER 30 mg PO bid  c/w Oxycodone 15 mg q 4 prn  - f/u Dr Limon
likely 2/2 to lung CA, new metastastic lesions  continue current pain regimen
likely 2/2 to lung CA, new metastastic lesions  will start Morphine ER 30 mg PO bid  c/w Oxycodone 15 mg q 4 prn
POD noted on recent scan. Plan to f/u with oncology as out pt for next line of treatment which will likely be immunotherapy.  f/u with Dr. Tracy at [facility] [place]

## 2017-07-03 NOTE — DISCHARGE NOTE ADULT - ADDITIONAL INSTRUCTIONS
Follow up with Primary care doctor in one week  Follow up with Dr. Tracy at [facility]  Please see Dr Limon this week, to get longer prescription for pain medications (pain management)

## 2017-07-03 NOTE — DISCHARGE NOTE ADULT - CARE PROVIDER_API CALL
Nery Kramer), [facility] Medicine; Internal Medicine  [address] [place] [address]  Phone: [phone]  Fax: [phone]    Austin Tracy), Internal MedHematology Onc  [address] [place] [address]  Phone: [phone]  Fax: [phone]

## 2017-07-03 NOTE — PROGRESS NOTE ADULT - SUBJECTIVE AND OBJECTIVE BOX
Patient is a 76y old  Male who presents with a chief complaint of chest pain, SOB (30 Jun 2017 14:14)        SUBJECTIVE / OVERNIGHT EVENTS: Pain is improved, better controlled. Denies fevers, chills, cough, sob. Will d/w family, planning to d/c home today.      MEDICATIONS  (STANDING):  insulin lispro (HumaLOG) corrective regimen sliding scale   SubCutaneous three times a day before meals  dextrose 5%. 1000 milliLiter(s) (50 mL/Hr) IV Continuous <Continuous>  dextrose 50% Injectable 12.5 Gram(s) IV Push once  dextrose 50% Injectable 25 Gram(s) IV Push once  dextrose 50% Injectable 25 Gram(s) IV Push once  dexamethasone     Tablet 4 milliGRAM(s) Oral daily  tamsulosin 0.4 milliGRAM(s) Oral at bedtime  fluconAZOLE   Tablet 100 milliGRAM(s) Oral daily  simvastatin 20 milliGRAM(s) Oral at bedtime  tenofovir 300 milliGRAM(s) Oral daily  famotidine    Tablet 20 milliGRAM(s) Oral two times a day  docusate sodium 100 milliGRAM(s) Oral three times a day  senna 2 Tablet(s) Oral at bedtime  pantoprazole    Tablet 40 milliGRAM(s) Oral before breakfast  folic acid 1 milliGRAM(s) Oral daily  enoxaparin Injectable 110 milliGRAM(s) SubCutaneous daily  morphine ER Tablet 30 milliGRAM(s) Oral every 12 hours    MEDICATIONS  (PRN):  oxyCODONE IR 15 milliGRAM(s) Oral every 4 hours PRN Moderate Pain (4 - 6)  dextrose Gel 1 Dose(s) Oral once PRN Blood Glucose LESS THAN 70 milliGRAM(s)/deciliter  glucagon  Injectable 1 milliGRAM(s) IntraMuscular once PRN Glucose LESS THAN 70 milligrams/deciliter  metoclopramide 10 milliGRAM(s) Oral Before meals and at bedtime PRN nausea  ondansetron   Disintegrating Tablet 4 milliGRAM(s) Oral three times a day PRN Nausea and/or Vomiting  simethicone 80 milliGRAM(s) Chew every 6 hours PRN Dyspepsia  aluminum hydroxide/magnesium hydroxide/simethicone Suspension 30 milliLiter(s) Oral every 4 hours PRN Dyspepsia      Vital Signs Last 24 Hrs  T(C): 36.6 (07-03-17 @ 04:14), Max: 36.6 (07-03-17 @ 04:14)  HR: 83 (07-03-17 @ 04:14) (73 - 83)  BP: 115/73 (07-03-17 @ 04:14) (115/73 - 117/79)  RR: 18 (07-03-17 @ 04:14) (16 - 18)  SpO2: 94% (07-03-17 @ 04:14) (94% - 98%)  CAPILLARY BLOOD GLUCOSE  260 (03 Jul 2017 11:40)  204 (03 Jul 2017 07:37)  221 (02 Jul 2017 22:28)  176 (02 Jul 2017 17:09)        I&O's Summary    02 Jul 2017 07:01  -  03 Jul 2017 07:00  --------------------------------------------------------  IN: 600 mL / OUT: 0 mL / NET: 600 mL    03 Jul 2017 07:01  -  03 Jul 2017 12:49  --------------------------------------------------------  IN: 360 mL / OUT: 0 mL / NET: 360 mL        PHYSICAL EXAM  GENERAL: NAD, well-developed  CHEST/LUNG: Clear to auscultation bilaterally; No wheeze  HEART: Regular rate and rhythm; No murmurs  ABDOMEN: Soft, Nontender, Nondistended; Bowel sounds present  EXTREMITIES:  No edema    LABS:                    RADIOLOGY & ADDITIONAL TESTS:    Imaging Personally Reviewed:  Consultant(s) Notes Reviewed:    Care Discussed with Consultants/Other Providers:

## 2017-07-03 NOTE — PROGRESS NOTE ADULT - PROBLEM SELECTOR PROBLEM 1
Chest pain, unspecified type
Malignant neoplasm of lung, unspecified laterality, unspecified part of lung

## 2017-07-03 NOTE — DISCHARGE NOTE ADULT - PLAN OF CARE
Follow up with Dr. Tracy at [facility] You pain is related to progression of your lung cancer. We increased your pain medications. Please see Dr Limon this week, to get longer prescription for pain medications Continue to take lovenox as prescribed. Follow up at [facility] for further treatment

## 2017-07-03 NOTE — DISCHARGE NOTE ADULT - PATIENT PORTAL LINK FT
“You can access the Follow[employer] Patient Portal, offered by [facility], by registering with the following website: [email or web address]”

## 2017-07-03 NOTE — PROGRESS NOTE ADULT - ASSESSMENT
76M with h/o  Lung CA, previously  on Tarceva, now on chemotherapy  , newly diagnosed PE (4/2017)  on Lovenox, HTN, HLD, DMT2, GERD, BPH being managed for chest pain likely 2/2 new lung metastasis.  Pain is currently fairly well controlled on Morphine 4 mg IVP q 4 prn (severe pain) and Oxycodone 15 mg q 4 prn for moderate pain.
76M with h/o  Lung CA, previously  on Tarceva, now on chemotherapy  , newly diagnosed PE (4/2017)  on Lovenox, HTN, HLD, DMT2, GERD, BPH who presents with c/o worsening chest pain and SOB x 2 days. CE are negative x 3 CTA chest is negative for PE and demonstrates increase in number of pulmonary nodules suggestive of metastasis. Pain is currently fairly well controlled on Morphine 4 mg IVP q 4 prn (severe pain) and Oxycodone 15 mg q 4 prn for moderate pain.
76M with h/o  Lung CA, previously  on Tarceva, s/p 4 cycles of Carbo/Pemetrexed  , newly diagnosed PE (4/2017)  on Lovenox, HTN, HLD, DMT2, GERD, BPH being managed for chest pain likely 2/2 new lung metastasis.  Pain is currently well controlled on PO meds. Plan to d/c home today.
76 M w/ met NSCL CA, s/p Tarceva with POD, now s/p 4 cycles of Carbo/Pemetrexed a/w worsening chest pain, found to have worsening metastatic disease

## 2017-07-03 NOTE — PROGRESS NOTE ADULT - SUBJECTIVE AND OBJECTIVE BOX
Chief Complaint: lung cancer     INTERVAL HPI/OVERNIGHT EVENTS: Used  phone - please note, pt's dialect is [facility]. Pain better controlled. Rates 4/5    MEDICATIONS  (STANDING):  insulin lispro (HumaLOG) corrective regimen sliding scale   SubCutaneous three times a day before meals  dextrose 5%. 1000 milliLiter(s) (50 mL/Hr) IV Continuous <Continuous>  dextrose 50% Injectable 12.5 Gram(s) IV Push once  dextrose 50% Injectable 25 Gram(s) IV Push once  dextrose 50% Injectable 25 Gram(s) IV Push once  dexamethasone     Tablet 4 milliGRAM(s) Oral daily  tamsulosin 0.4 milliGRAM(s) Oral at bedtime  fluconAZOLE   Tablet 100 milliGRAM(s) Oral daily  simvastatin 20 milliGRAM(s) Oral at bedtime  tenofovir 300 milliGRAM(s) Oral daily  famotidine    Tablet 20 milliGRAM(s) Oral two times a day  docusate sodium 100 milliGRAM(s) Oral three times a day  senna 2 Tablet(s) Oral at bedtime  pantoprazole    Tablet 40 milliGRAM(s) Oral before breakfast  folic acid 1 milliGRAM(s) Oral daily  enoxaparin Injectable 110 milliGRAM(s) SubCutaneous daily  morphine ER Tablet 30 milliGRAM(s) Oral every 12 hours    MEDICATIONS  (PRN):  oxyCODONE IR 15 milliGRAM(s) Oral every 4 hours PRN Moderate Pain (4 - 6)  dextrose Gel 1 Dose(s) Oral once PRN Blood Glucose LESS THAN 70 milliGRAM(s)/deciliter  glucagon  Injectable 1 milliGRAM(s) IntraMuscular once PRN Glucose LESS THAN 70 milligrams/deciliter  metoclopramide 10 milliGRAM(s) Oral Before meals and at bedtime PRN nausea  ondansetron   Disintegrating Tablet 4 milliGRAM(s) Oral three times a day PRN Nausea and/or Vomiting  simethicone 80 milliGRAM(s) Chew every 6 hours PRN Dyspepsia  aluminum hydroxide/magnesium hydroxide/simethicone Suspension 30 milliLiter(s) Oral every 4 hours PRN Dyspepsia      Allergies    No Known Allergies    Intolerances        ROS: as above     Vital Signs Last 24 Hrs  T(C): 36.6 (03 Jul 2017 04:14), Max: 36.6 (03 Jul 2017 04:14)  T(F): 97.9 (03 Jul 2017 04:14), Max: 97.9 (03 Jul 2017 04:14)  HR: 83 (03 Jul 2017 04:14) (73 - 83)  BP: 115/73 (03 Jul 2017 04:14) (115/73 - 117/79)  BP(mean): --  RR: 18 (03 Jul 2017 04:14) (16 - 18)  SpO2: 94% (03 Jul 2017 04:14) (94% - 98%)    Physical Exam:   AAO x 3, NAD   RRR S1S2  CTA b/l   soft NTNDBS+  no c/c/e    LABS: Chief Complaint: lung cancer     INTERVAL HPI/OVERNIGHT EVENTS: Used  phone - please note, [facility]. chest pain better controlled. Rates 4/5. Still feels SOB with exertion.     MEDICATIONS  (STANDING):  insulin lispro (HumaLOG) corrective regimen sliding scale   SubCutaneous three times a day before meals  dextrose 5%. 1000 milliLiter(s) (50 mL/Hr) IV Continuous <Continuous>  dextrose 50% Injectable 12.5 Gram(s) IV Push once  dextrose 50% Injectable 25 Gram(s) IV Push once  dextrose 50% Injectable 25 Gram(s) IV Push once  dexamethasone     Tablet 4 milliGRAM(s) Oral daily  tamsulosin 0.4 milliGRAM(s) Oral at bedtime  fluconAZOLE   Tablet 100 milliGRAM(s) Oral daily  simvastatin 20 milliGRAM(s) Oral at bedtime  tenofovir 300 milliGRAM(s) Oral daily  famotidine    Tablet 20 milliGRAM(s) Oral two times a day  docusate sodium 100 milliGRAM(s) Oral three times a day  senna 2 Tablet(s) Oral at bedtime  pantoprazole    Tablet 40 milliGRAM(s) Oral before breakfast  folic acid 1 milliGRAM(s) Oral daily  enoxaparin Injectable 110 milliGRAM(s) SubCutaneous daily  morphine ER Tablet 30 milliGRAM(s) Oral every 12 hours    MEDICATIONS  (PRN):  oxyCODONE IR 15 milliGRAM(s) Oral every 4 hours PRN Moderate Pain (4 - 6)  dextrose Gel 1 Dose(s) Oral once PRN Blood Glucose LESS THAN 70 milliGRAM(s)/deciliter  glucagon  Injectable 1 milliGRAM(s) IntraMuscular once PRN Glucose LESS THAN 70 milligrams/deciliter  metoclopramide 10 milliGRAM(s) Oral Before meals and at bedtime PRN nausea  ondansetron   Disintegrating Tablet 4 milliGRAM(s) Oral three times a day PRN Nausea and/or Vomiting  simethicone 80 milliGRAM(s) Chew every 6 hours PRN Dyspepsia  aluminum hydroxide/magnesium hydroxide/simethicone Suspension 30 milliLiter(s) Oral every 4 hours PRN Dyspepsia      Allergies    No Known Allergies    Intolerances        ROS: as above     Vital Signs Last 24 Hrs  T(C): 36.6 (03 Jul 2017 04:14), Max: 36.6 (03 Jul 2017 04:14)  T(F): 97.9 (03 Jul 2017 04:14), Max: 97.9 (03 Jul 2017 04:14)  HR: 83 (03 Jul 2017 04:14) (73 - 83)  BP: 115/73 (03 Jul 2017 04:14) (115/73 - 117/79)  BP(mean): --  RR: 18 (03 Jul 2017 04:14) (16 - 18)  SpO2: 94% (03 Jul 2017 04:14) (94% - 98%)    Physical Exam:   AAO x 3, NAD   RRR S1S2  CTA b/l   soft NTNDBS+  no c/c/e    LABS:

## 2017-07-03 NOTE — DISCHARGE NOTE ADULT - MEDICATION SUMMARY - MEDICATIONS TO TAKE
I will START or STAY ON the medications listed below when I get home from the hospital:    dexamethasone 4 mg oral tablet  -- 1  by mouth   -- Indication: For Lung cancer    morphine 30 mg/12 hr oral tablet, extended release  -- 1 tab(s) by mouth every 12 hours MDD:2  -- Indication: For Pain control    oxyCODONE 15 mg oral tablet  -- 1 tab(s) by mouth every 4 hours, As needed, Moderate Pain (4 - 6) MDD:6  -- Indication: For Pain control    Flomax 0.4 mg oral capsule  -- 1 cap(s) by mouth once a day   -- Indication: For BPH    Lovenox 100 mg/mL injectable solution  -- 100 milligram(s) injectable once a day  -- Indication: For Pulmonary embolism    metFORMIN 500 mg oral tablet  -- 1 tab(s) by mouth once a day  -- Indication: For Diabetes    metoclopramide 10 mg oral tablet  -- 1 tab(s) by mouth 4 times a day (before meals and at bedtime), As Needed  -- Indication: For Anti-nausea    ondansetron 4 mg oral tablet, disintegrating  -- 1 tab(s) by mouth 3 times a day, As Needed  -- Indication: For Anti-nausea    fluconazole 100 mg oral tablet  -- 1 tab(s) by mouth once a day  -- Indication: For Prophylactic    simvastatin 20 mg oral tablet  -- 1 tab(s) by mouth once a day (at bedtime)  -- Indication: For HLD    Viread 300 mg oral tablet  -- 1 tab(s) by mouth once a day  -- Indication: For Prophylactic     famotidine 20 mg oral tablet  -- 1 tab(s) by mouth 2 times a day  -- Indication: For GERD    Colace 100 mg oral capsule  -- 1 cap(s) by mouth 3 times a day  -- Indication: For Constipation    senna 8.6 mg oral tablet  -- 2 tab(s) by mouth once a day (at bedtime)  -- Indication: For Constipation    simethicone 80 mg oral tablet, chewable  -- 1 tab(s) by mouth 4 times a day (after meals and at bedtime), As Needed  -- Indication: For GERD    Protonix 40 mg oral delayed release tablet  -- 1 tab(s) by mouth once a day  -- Indication: For GERD    folic acid 1 mg oral tablet  -- 1 tab(s) by mouth once a day  -- Indication: For Supplement

## 2017-07-03 NOTE — DISCHARGE NOTE ADULT - CARE PLAN
Principal Discharge DX:	Lung cancer  Goal:	Follow up at [facility] for further treatment  Instructions for follow-up, activity and diet:	Follow up with Dr. Tracy at [facility]  Secondary Diagnosis:	Pain  Instructions for follow-up, activity and diet:	You pain is related to progression of your lung cancer. We increased your pain medications. Please see Dr Limon this week, to get longer prescription for pain medications  Secondary Diagnosis:	Other pulmonary embolism without acute cor pulmonale, unspecified chronicity  Instructions for follow-up, activity and diet:	Continue to take lovenox as prescribed. Principal Discharge DX:	Lung cancer  Goal:	Follow up at [facility] for further treatment  Instructions for follow-up, activity and diet:	Follow up with Dr. Tracy at [facility]  Secondary Diagnosis:	Pain  Instructions for follow-up, activity and diet:	You pain is related to progression of your lung cancer. We increased your pain medications. Please see Dr Limon this week, to get longer prescription for pain medications  Secondary Diagnosis:	Other pulmonary embolism without acute cor pulmonale, unspecified chronicity  Instructions for follow-up, activity and diet:	Continue to take lovenox as prescribed. Principal Discharge DX:	Lung cancer  Goal:	Follow up at [facility] for further treatment  Instructions for follow-up, activity and diet:	Follow up with Dr. Tracy at [facility]  Secondary Diagnosis:	Pain  Instructions for follow-up, activity and diet:	You pain is related to progression of your lung cancer. We increased your pain medications. Please see Dr Limon this week, to get longer prescription for pain medications  Secondary Diagnosis:	Other pulmonary embolism without acute cor pulmonale, unspecified chronicity  Instructions for follow-up, activity and diet:	Continue to take lovenox as prescribed.

## 2017-07-03 NOTE — PROGRESS NOTE ADULT - PROBLEM SELECTOR PROBLEM 2
Chest pain
Malignant neoplasm of lung, unspecified laterality, unspecified part of lung

## 2017-07-03 NOTE — DISCHARGE NOTE ADULT - MEDICATION SUMMARY - MEDICATIONS TO STOP TAKING
I will STOP taking the medications listed below when I get home from the hospital:    ibuprofen 200 mg oral tablet  -- 1 tab(s) by mouth every 6 hours, As Needed

## 2017-07-03 NOTE — DISCHARGE NOTE ADULT - HOSPITAL COURSE
Patient admitted w/ chest pain, r/o for ACS, new PE, was found to have new lung nodules c/w lung mets. Pain medications were increased and patient is more comfortable now. Was seen by onc here. Plan to follow up with onc and palliative out-pt.

## 2017-07-03 NOTE — PROGRESS NOTE ADULT - PROBLEM SELECTOR PLAN 2
with new metastastic lesions  Seen by Onc; reccs noted
with new metastastic lesions  Seen by Onc; reccs noted  Plan for immunotherapy as out pt  To follow up Dr Tracy on discharge
with new metastastic lesions  Seen by Onc; reccs noted  To follow up Dr Tracy on discharge
Improved with new pain regimen. Plan for d/c today.  f/u with Dr. Robertson for out pt palliative care

## 2017-07-03 NOTE — DISCHARGE NOTE ADULT - MEDICATION SUMMARY - MEDICATIONS TO CHANGE
I will SWITCH the dose or number of times a day I take the medications listed below when I get home from the hospital:    morphine 15 mg/8 hr oral tablet, extended release  -- 1 tab(s) by mouth every 8 hours    oxyCODONE 5 mg oral tablet  -- 1-2 tabs by mouth every 4-6 hours as needed

## 2017-07-06 ENCOUNTER — APPOINTMENT (OUTPATIENT)
Dept: HEMATOLOGY ONCOLOGY | Facility: CLINIC | Age: 77
End: 2017-07-06

## 2017-07-06 VITALS
BODY MASS INDEX: 23.17 KG/M2 | RESPIRATION RATE: 16 BRPM | HEART RATE: 88 BPM | WEIGHT: 156.97 LBS | SYSTOLIC BLOOD PRESSURE: 121 MMHG | OXYGEN SATURATION: 96 % | DIASTOLIC BLOOD PRESSURE: 80 MMHG | TEMPERATURE: 98.3 F

## 2017-07-07 ENCOUNTER — APPOINTMENT (OUTPATIENT)
Dept: GERIATRICS | Facility: CLINIC | Age: 77
End: 2017-07-07

## 2017-07-07 ENCOUNTER — OUTPATIENT (OUTPATIENT)
Dept: OUTPATIENT SERVICES | Facility: HOSPITAL | Age: 77
LOS: 1 days | End: 2017-07-07
Payer: MEDICAID

## 2017-07-07 VITALS
HEART RATE: 90 BPM | OXYGEN SATURATION: 96 % | BODY MASS INDEX: 23.43 KG/M2 | DIASTOLIC BLOOD PRESSURE: 70 MMHG | SYSTOLIC BLOOD PRESSURE: 110 MMHG | RESPIRATION RATE: 16 BRPM | TEMPERATURE: 98 F | WEIGHT: 158.73 LBS

## 2017-07-07 DIAGNOSIS — C44.311 BASAL CELL CARCINOMA OF SKIN OF NOSE: Chronic | ICD-10-CM

## 2017-07-07 DIAGNOSIS — D04.9 CARCINOMA IN SITU OF SKIN, UNSPECIFIED: Chronic | ICD-10-CM

## 2017-07-07 PROCEDURE — 88342 IMHCHEM/IMCYTCHM 1ST ANTB: CPT

## 2017-07-10 ENCOUNTER — OUTPATIENT (OUTPATIENT)
Dept: OUTPATIENT SERVICES | Facility: HOSPITAL | Age: 77
LOS: 1 days | Discharge: HOME | End: 2017-07-10

## 2017-07-10 DIAGNOSIS — C34.90 MALIGNANT NEOPLASM OF UNSPECIFIED PART OF UNSPECIFIED BRONCHUS OR LUNG: ICD-10-CM

## 2017-07-10 DIAGNOSIS — D04.9 CARCINOMA IN SITU OF SKIN, UNSPECIFIED: Chronic | ICD-10-CM

## 2017-07-10 DIAGNOSIS — C44.311 BASAL CELL CARCINOMA OF SKIN OF NOSE: Chronic | ICD-10-CM

## 2017-07-13 ENCOUNTER — APPOINTMENT (OUTPATIENT)
Dept: INFUSION THERAPY | Facility: HOSPITAL | Age: 77
End: 2017-07-13

## 2017-07-13 ENCOUNTER — RESULT REVIEW (OUTPATIENT)
Age: 77
End: 2017-07-13

## 2017-07-13 ENCOUNTER — APPOINTMENT (OUTPATIENT)
Dept: HEMATOLOGY ONCOLOGY | Facility: CLINIC | Age: 77
End: 2017-07-13

## 2017-07-13 VITALS
OXYGEN SATURATION: 98 % | TEMPERATURE: 98 F | HEIGHT: 69.02 IN | DIASTOLIC BLOOD PRESSURE: 88 MMHG | WEIGHT: 157.63 LBS | HEART RATE: 84 BPM | BODY MASS INDEX: 23.35 KG/M2 | SYSTOLIC BLOOD PRESSURE: 144 MMHG | RESPIRATION RATE: 16 BRPM

## 2017-07-13 LAB
HCT VFR BLD CALC: 33.8 % — LOW (ref 39–50)
HGB BLD-MCNC: 12.2 G/DL — LOW (ref 13–17)
MCHC RBC-ENTMCNC: 34.1 PG — HIGH (ref 27–34)
MCHC RBC-ENTMCNC: 36 G/DL — SIGNIFICANT CHANGE UP (ref 32–36)
MCV RBC AUTO: 94.6 FL — SIGNIFICANT CHANGE UP (ref 80–100)
PLATELET # BLD AUTO: 158 K/UL — SIGNIFICANT CHANGE UP (ref 150–400)
RBC # BLD: 3.58 M/UL — LOW (ref 4.2–5.8)
RBC # FLD: 14.8 % — HIGH (ref 10.3–14.5)
WBC # BLD: 5.7 K/UL — SIGNIFICANT CHANGE UP (ref 3.8–10.5)
WBC # FLD AUTO: 5.7 K/UL — SIGNIFICANT CHANGE UP (ref 3.8–10.5)

## 2017-07-13 RX ORDER — DEXAMETHASONE 0.5 MG/5ML
1 ELIXIR ORAL
Qty: 0 | Refills: 0 | COMMUNITY

## 2017-07-14 DIAGNOSIS — Z51.11 ENCOUNTER FOR ANTINEOPLASTIC CHEMOTHERAPY: ICD-10-CM

## 2017-07-24 ENCOUNTER — APPOINTMENT (OUTPATIENT)
Dept: GERIATRICS | Facility: CLINIC | Age: 77
End: 2017-07-24

## 2017-07-24 VITALS
TEMPERATURE: 97.8 F | BODY MASS INDEX: 23.69 KG/M2 | DIASTOLIC BLOOD PRESSURE: 86 MMHG | OXYGEN SATURATION: 94 % | HEART RATE: 86 BPM | WEIGHT: 160.47 LBS | RESPIRATION RATE: 16 BRPM | SYSTOLIC BLOOD PRESSURE: 130 MMHG

## 2017-07-24 DIAGNOSIS — K59.00 CONSTIPATION, UNSPECIFIED: ICD-10-CM

## 2017-07-27 ENCOUNTER — APPOINTMENT (OUTPATIENT)
Dept: HEMATOLOGY ONCOLOGY | Facility: CLINIC | Age: 77
End: 2017-07-27

## 2017-07-27 VITALS
RESPIRATION RATE: 16 BRPM | TEMPERATURE: 97.7 F | HEART RATE: 94 BPM | WEIGHT: 158.73 LBS | SYSTOLIC BLOOD PRESSURE: 110 MMHG | BODY MASS INDEX: 23.43 KG/M2 | DIASTOLIC BLOOD PRESSURE: 70 MMHG | OXYGEN SATURATION: 94 %

## 2017-08-01 PROCEDURE — G9001: CPT

## 2017-08-03 ENCOUNTER — APPOINTMENT (OUTPATIENT)
Dept: INFUSION THERAPY | Facility: HOSPITAL | Age: 77
End: 2017-08-03

## 2017-08-03 ENCOUNTER — RESULT REVIEW (OUTPATIENT)
Age: 77
End: 2017-08-03

## 2017-08-03 LAB
ALBUMIN SERPL ELPH-MCNC: 4 G/DL — SIGNIFICANT CHANGE UP (ref 3.3–5)
ALP SERPL-CCNC: 55 U/L — SIGNIFICANT CHANGE UP (ref 40–120)
ALT FLD-CCNC: 20 U/L — SIGNIFICANT CHANGE UP (ref 10–45)
ANION GAP SERPL CALC-SCNC: 17 MMOL/L — SIGNIFICANT CHANGE UP (ref 5–17)
AST SERPL-CCNC: 16 U/L — SIGNIFICANT CHANGE UP (ref 10–40)
BILIRUB SERPL-MCNC: 0.4 MG/DL — SIGNIFICANT CHANGE UP (ref 0.2–1.2)
BUN SERPL-MCNC: 14 MG/DL — SIGNIFICANT CHANGE UP (ref 7–23)
CALCIUM SERPL-MCNC: 9.5 MG/DL — SIGNIFICANT CHANGE UP (ref 8.4–10.5)
CHLORIDE SERPL-SCNC: 98 MMOL/L — SIGNIFICANT CHANGE UP (ref 96–108)
CO2 SERPL-SCNC: 24 MMOL/L — SIGNIFICANT CHANGE UP (ref 22–31)
CREAT SERPL-MCNC: 0.95 MG/DL — SIGNIFICANT CHANGE UP (ref 0.5–1.3)
GLUCOSE SERPL-MCNC: 176 MG/DL — HIGH (ref 70–99)
HCT VFR BLD CALC: 32.1 % — LOW (ref 39–50)
HGB BLD-MCNC: 11.2 G/DL — LOW (ref 13–17)
MAGNESIUM SERPL-MCNC: 1.6 MG/DL — SIGNIFICANT CHANGE UP (ref 1.6–2.6)
MCHC RBC-ENTMCNC: 33.2 PG — SIGNIFICANT CHANGE UP (ref 27–34)
MCHC RBC-ENTMCNC: 34.7 G/DL — SIGNIFICANT CHANGE UP (ref 32–36)
MCV RBC AUTO: 95.6 FL — SIGNIFICANT CHANGE UP (ref 80–100)
PHOSPHATE SERPL-MCNC: 3.4 MG/DL — SIGNIFICANT CHANGE UP (ref 2.5–4.5)
PLATELET # BLD AUTO: 137 K/UL — LOW (ref 150–400)
POTASSIUM SERPL-MCNC: 4.2 MMOL/L — SIGNIFICANT CHANGE UP (ref 3.5–5.3)
POTASSIUM SERPL-SCNC: 4.2 MMOL/L — SIGNIFICANT CHANGE UP (ref 3.5–5.3)
PROT SERPL-MCNC: 6.6 G/DL — SIGNIFICANT CHANGE UP (ref 6–8.3)
RBC # BLD: 3.36 M/UL — LOW (ref 4.2–5.8)
RBC # FLD: 15.6 % — HIGH (ref 10.3–14.5)
SODIUM SERPL-SCNC: 139 MMOL/L — SIGNIFICANT CHANGE UP (ref 135–145)
WBC # BLD: 10.2 K/UL — SIGNIFICANT CHANGE UP (ref 3.8–10.5)
WBC # FLD AUTO: 10.2 K/UL — SIGNIFICANT CHANGE UP (ref 3.8–10.5)

## 2017-08-04 DIAGNOSIS — R11.2 NAUSEA WITH VOMITING, UNSPECIFIED: ICD-10-CM

## 2017-08-07 ENCOUNTER — OUTPATIENT (OUTPATIENT)
Dept: OUTPATIENT SERVICES | Facility: HOSPITAL | Age: 77
LOS: 1 days | Discharge: HOME | End: 2017-08-07

## 2017-08-07 ENCOUNTER — APPOINTMENT (OUTPATIENT)
Dept: HEMATOLOGY ONCOLOGY | Facility: CLINIC | Age: 77
End: 2017-08-07

## 2017-08-07 DIAGNOSIS — D04.9 CARCINOMA IN SITU OF SKIN, UNSPECIFIED: Chronic | ICD-10-CM

## 2017-08-07 DIAGNOSIS — C44.311 BASAL CELL CARCINOMA OF SKIN OF NOSE: Chronic | ICD-10-CM

## 2017-08-07 DIAGNOSIS — C34.90 MALIGNANT NEOPLASM OF UNSPECIFIED PART OF UNSPECIFIED BRONCHUS OR LUNG: ICD-10-CM

## 2017-08-10 ENCOUNTER — RESULT REVIEW (OUTPATIENT)
Age: 77
End: 2017-08-10

## 2017-08-10 ENCOUNTER — APPOINTMENT (OUTPATIENT)
Dept: HEMATOLOGY ONCOLOGY | Facility: CLINIC | Age: 77
End: 2017-08-10

## 2017-08-10 VITALS
WEIGHT: 161.38 LBS | HEART RATE: 88 BPM | HEIGHT: 69.02 IN | SYSTOLIC BLOOD PRESSURE: 151 MMHG | DIASTOLIC BLOOD PRESSURE: 87 MMHG | TEMPERATURE: 97.8 F | BODY MASS INDEX: 23.9 KG/M2 | RESPIRATION RATE: 16 BRPM | OXYGEN SATURATION: 92 %

## 2017-08-10 LAB
ALBUMIN SERPL ELPH-MCNC: 4.2 G/DL — SIGNIFICANT CHANGE UP (ref 3.3–5)
ALP SERPL-CCNC: 58 U/L — SIGNIFICANT CHANGE UP (ref 40–120)
ALT FLD-CCNC: 23 U/L — SIGNIFICANT CHANGE UP (ref 10–45)
ANION GAP SERPL CALC-SCNC: 13 MMOL/L — SIGNIFICANT CHANGE UP (ref 5–17)
AST SERPL-CCNC: 23 U/L — SIGNIFICANT CHANGE UP (ref 10–40)
BASOPHILS # BLD AUTO: 0 K/UL — SIGNIFICANT CHANGE UP (ref 0–0.2)
BASOPHILS NFR BLD AUTO: 0.1 % — SIGNIFICANT CHANGE UP (ref 0–2)
BILIRUB SERPL-MCNC: 0.5 MG/DL — SIGNIFICANT CHANGE UP (ref 0.2–1.2)
BUN SERPL-MCNC: 11 MG/DL — SIGNIFICANT CHANGE UP (ref 7–23)
CALCIUM SERPL-MCNC: 8.9 MG/DL — SIGNIFICANT CHANGE UP (ref 8.4–10.5)
CHLORIDE SERPL-SCNC: 96 MMOL/L — SIGNIFICANT CHANGE UP (ref 96–108)
CO2 SERPL-SCNC: 27 MMOL/L — SIGNIFICANT CHANGE UP (ref 22–31)
CREAT SERPL-MCNC: 0.93 MG/DL — SIGNIFICANT CHANGE UP (ref 0.5–1.3)
EOSINOPHIL # BLD AUTO: 0 K/UL — SIGNIFICANT CHANGE UP (ref 0–0.5)
EOSINOPHIL NFR BLD AUTO: 0.3 % — SIGNIFICANT CHANGE UP (ref 0–6)
GLUCOSE SERPL-MCNC: 175 MG/DL — HIGH (ref 70–99)
HBA1C BLD-MCNC: 6.6 % — HIGH (ref 4–5.6)
HCT VFR BLD CALC: 34.3 % — LOW (ref 39–50)
HGB BLD-MCNC: 11.4 G/DL — LOW (ref 13–17)
LYMPHOCYTES # BLD AUTO: 1.4 K/UL — SIGNIFICANT CHANGE UP (ref 1–3.3)
LYMPHOCYTES # BLD AUTO: 12 % — LOW (ref 13–44)
MCHC RBC-ENTMCNC: 31.8 PG — SIGNIFICANT CHANGE UP (ref 27–34)
MCHC RBC-ENTMCNC: 33.1 G/DL — SIGNIFICANT CHANGE UP (ref 32–36)
MCV RBC AUTO: 96.1 FL — SIGNIFICANT CHANGE UP (ref 80–100)
MONOCYTES # BLD AUTO: 0.7 K/UL — SIGNIFICANT CHANGE UP (ref 0–0.9)
MONOCYTES NFR BLD AUTO: 6 % — SIGNIFICANT CHANGE UP (ref 2–14)
NEUTROPHILS # BLD AUTO: 9.2 K/UL — HIGH (ref 1.8–7.4)
NEUTROPHILS NFR BLD AUTO: 81.6 % — HIGH (ref 43–77)
PLATELET # BLD AUTO: 167 K/UL — SIGNIFICANT CHANGE UP (ref 150–400)
POTASSIUM SERPL-MCNC: 4 MMOL/L — SIGNIFICANT CHANGE UP (ref 3.5–5.3)
POTASSIUM SERPL-SCNC: 4 MMOL/L — SIGNIFICANT CHANGE UP (ref 3.5–5.3)
PROT SERPL-MCNC: 7.1 G/DL — SIGNIFICANT CHANGE UP (ref 6–8.3)
RBC # BLD: 3.57 M/UL — LOW (ref 4.2–5.8)
RBC # FLD: 15.1 % — HIGH (ref 10.3–14.5)
SODIUM SERPL-SCNC: 136 MMOL/L — SIGNIFICANT CHANGE UP (ref 135–145)
WBC # BLD: 11.3 K/UL — HIGH (ref 3.8–10.5)
WBC # FLD AUTO: 11.3 K/UL — HIGH (ref 3.8–10.5)

## 2017-08-11 LAB — TSH SERPL-MCNC: 0.67 UIU/ML — SIGNIFICANT CHANGE UP (ref 0.27–4.2)

## 2017-08-24 ENCOUNTER — RESULT REVIEW (OUTPATIENT)
Age: 77
End: 2017-08-24

## 2017-08-24 ENCOUNTER — APPOINTMENT (OUTPATIENT)
Dept: HEMATOLOGY ONCOLOGY | Facility: CLINIC | Age: 77
End: 2017-08-24

## 2017-08-24 ENCOUNTER — APPOINTMENT (OUTPATIENT)
Dept: INFUSION THERAPY | Facility: HOSPITAL | Age: 77
End: 2017-08-24

## 2017-08-24 RX ORDER — FAMOTIDINE 10 MG/ML
1 INJECTION INTRAVENOUS
Qty: 0 | Refills: 0 | COMMUNITY

## 2017-08-24 RX ORDER — DOCUSATE SODIUM 100 MG
1 CAPSULE ORAL
Qty: 0 | Refills: 0 | COMMUNITY

## 2017-08-24 RX ORDER — ONDANSETRON 8 MG/1
1 TABLET, FILM COATED ORAL
Qty: 0 | Refills: 0 | COMMUNITY

## 2017-08-25 DIAGNOSIS — Z51.11 ENCOUNTER FOR ANTINEOPLASTIC CHEMOTHERAPY: ICD-10-CM

## 2017-08-28 ENCOUNTER — APPOINTMENT (OUTPATIENT)
Dept: GERIATRICS | Facility: CLINIC | Age: 77
End: 2017-08-28
Payer: MEDICAID

## 2017-08-28 VITALS
OXYGEN SATURATION: 94 % | HEART RATE: 105 BPM | WEIGHT: 159.83 LBS | SYSTOLIC BLOOD PRESSURE: 119 MMHG | DIASTOLIC BLOOD PRESSURE: 81 MMHG | TEMPERATURE: 97.4 F | BODY MASS INDEX: 23.59 KG/M2 | RESPIRATION RATE: 17 BRPM

## 2017-08-28 PROCEDURE — 99215 OFFICE O/P EST HI 40 MIN: CPT

## 2017-09-12 ENCOUNTER — OUTPATIENT (OUTPATIENT)
Dept: OUTPATIENT SERVICES | Facility: HOSPITAL | Age: 77
LOS: 1 days | Discharge: HOME | End: 2017-09-12

## 2017-09-12 DIAGNOSIS — C44.311 BASAL CELL CARCINOMA OF SKIN OF NOSE: Chronic | ICD-10-CM

## 2017-09-12 DIAGNOSIS — D04.9 CARCINOMA IN SITU OF SKIN, UNSPECIFIED: Chronic | ICD-10-CM

## 2017-09-12 DIAGNOSIS — C34.90 MALIGNANT NEOPLASM OF UNSPECIFIED PART OF UNSPECIFIED BRONCHUS OR LUNG: ICD-10-CM

## 2017-09-14 ENCOUNTER — APPOINTMENT (OUTPATIENT)
Dept: INFUSION THERAPY | Facility: HOSPITAL | Age: 77
End: 2017-09-14

## 2017-09-14 ENCOUNTER — RESULT REVIEW (OUTPATIENT)
Age: 77
End: 2017-09-14

## 2017-09-14 ENCOUNTER — APPOINTMENT (OUTPATIENT)
Dept: HEMATOLOGY ONCOLOGY | Facility: CLINIC | Age: 77
End: 2017-09-14

## 2017-09-14 VITALS
RESPIRATION RATE: 18 BRPM | SYSTOLIC BLOOD PRESSURE: 126 MMHG | DIASTOLIC BLOOD PRESSURE: 83 MMHG | WEIGHT: 164.02 LBS | BODY MASS INDEX: 24.21 KG/M2 | HEART RATE: 80 BPM | TEMPERATURE: 97.2 F | OXYGEN SATURATION: 96 %

## 2017-09-14 LAB
ALBUMIN SERPL ELPH-MCNC: 3.9 G/DL — SIGNIFICANT CHANGE UP (ref 3.3–5)
ALP SERPL-CCNC: 56 U/L — SIGNIFICANT CHANGE UP (ref 40–120)
ALT FLD-CCNC: 16 U/L — SIGNIFICANT CHANGE UP (ref 10–45)
ANION GAP SERPL CALC-SCNC: 15 MMOL/L — SIGNIFICANT CHANGE UP (ref 5–17)
AST SERPL-CCNC: 19 U/L — SIGNIFICANT CHANGE UP (ref 10–40)
BILIRUB SERPL-MCNC: 0.2 MG/DL — SIGNIFICANT CHANGE UP (ref 0.2–1.2)
BUN SERPL-MCNC: 10 MG/DL — SIGNIFICANT CHANGE UP (ref 7–23)
CALCIUM SERPL-MCNC: 9.4 MG/DL — SIGNIFICANT CHANGE UP (ref 8.4–10.5)
CHLORIDE SERPL-SCNC: 103 MMOL/L — SIGNIFICANT CHANGE UP (ref 96–108)
CO2 SERPL-SCNC: 25 MMOL/L — SIGNIFICANT CHANGE UP (ref 22–31)
CREAT SERPL-MCNC: 1.03 MG/DL — SIGNIFICANT CHANGE UP (ref 0.5–1.3)
GLUCOSE SERPL-MCNC: 199 MG/DL — HIGH (ref 70–99)
MAGNESIUM SERPL-MCNC: 1.9 MG/DL — SIGNIFICANT CHANGE UP (ref 1.6–2.6)
PHOSPHATE SERPL-MCNC: 3.3 MG/DL — SIGNIFICANT CHANGE UP (ref 2.5–4.5)
POTASSIUM SERPL-MCNC: 4.3 MMOL/L — SIGNIFICANT CHANGE UP (ref 3.5–5.3)
POTASSIUM SERPL-SCNC: 4.3 MMOL/L — SIGNIFICANT CHANGE UP (ref 3.5–5.3)
PROT SERPL-MCNC: 6.7 G/DL — SIGNIFICANT CHANGE UP (ref 6–8.3)
SODIUM SERPL-SCNC: 143 MMOL/L — SIGNIFICANT CHANGE UP (ref 135–145)

## 2017-09-15 DIAGNOSIS — Z51.11 ENCOUNTER FOR ANTINEOPLASTIC CHEMOTHERAPY: ICD-10-CM

## 2017-09-25 ENCOUNTER — APPOINTMENT (OUTPATIENT)
Dept: CT IMAGING | Facility: IMAGING CENTER | Age: 77
End: 2017-09-25

## 2017-09-25 ENCOUNTER — APPOINTMENT (OUTPATIENT)
Dept: GERIATRICS | Facility: CLINIC | Age: 77
End: 2017-09-25
Payer: MEDICAID

## 2017-09-25 VITALS
SYSTOLIC BLOOD PRESSURE: 118 MMHG | TEMPERATURE: 97.9 F | WEIGHT: 160.06 LBS | HEART RATE: 79 BPM | BODY MASS INDEX: 23.63 KG/M2 | OXYGEN SATURATION: 96 % | RESPIRATION RATE: 18 BRPM | DIASTOLIC BLOOD PRESSURE: 79 MMHG

## 2017-09-25 DIAGNOSIS — R14.2 ERUCTATION: ICD-10-CM

## 2017-09-25 PROCEDURE — 99215 OFFICE O/P EST HI 40 MIN: CPT

## 2017-09-25 RX ORDER — MINERAL OIL/UREA/PEG/WATER
2.5-1 LOTION (ML) TOPICAL 3 TIMES DAILY
Qty: 1 | Refills: 0 | Status: ACTIVE | COMMUNITY
Start: 2017-09-25 | End: 1900-01-01

## 2017-09-28 PROBLEM — R14.2 BELCHING: Status: ACTIVE | Noted: 2017-05-12

## 2017-10-04 NOTE — ED ADULT NURSE NOTE - INTERPRETATION SERVICES DECLINED
Patient/Caregiver requests family/friend to interpret. (0) understands/communicates without difficulty

## 2017-10-05 ENCOUNTER — APPOINTMENT (OUTPATIENT)
Dept: INFUSION THERAPY | Facility: HOSPITAL | Age: 77
End: 2017-10-05

## 2017-10-09 ENCOUNTER — APPOINTMENT (OUTPATIENT)
Dept: HEMATOLOGY ONCOLOGY | Facility: CLINIC | Age: 77
End: 2017-10-09

## 2017-10-09 VITALS
SYSTOLIC BLOOD PRESSURE: 105 MMHG | OXYGEN SATURATION: 95 % | BODY MASS INDEX: 23.92 KG/M2 | WEIGHT: 162.04 LBS | HEART RATE: 83 BPM | TEMPERATURE: 97.8 F | DIASTOLIC BLOOD PRESSURE: 71 MMHG | RESPIRATION RATE: 16 BRPM

## 2017-10-10 ENCOUNTER — OUTPATIENT (OUTPATIENT)
Dept: OUTPATIENT SERVICES | Facility: HOSPITAL | Age: 77
LOS: 1 days | Discharge: HOME | End: 2017-10-10

## 2017-10-10 DIAGNOSIS — D04.9 CARCINOMA IN SITU OF SKIN, UNSPECIFIED: Chronic | ICD-10-CM

## 2017-10-10 DIAGNOSIS — C34.90 MALIGNANT NEOPLASM OF UNSPECIFIED PART OF UNSPECIFIED BRONCHUS OR LUNG: ICD-10-CM

## 2017-10-10 DIAGNOSIS — C44.311 BASAL CELL CARCINOMA OF SKIN OF NOSE: Chronic | ICD-10-CM

## 2017-10-12 ENCOUNTER — FORM ENCOUNTER (OUTPATIENT)
Age: 77
End: 2017-10-12

## 2017-10-12 ENCOUNTER — TRANSCRIPTION ENCOUNTER (OUTPATIENT)
Age: 77
End: 2017-10-12

## 2017-10-13 ENCOUNTER — APPOINTMENT (OUTPATIENT)
Dept: CT IMAGING | Facility: IMAGING CENTER | Age: 77
End: 2017-10-13
Payer: MEDICAID

## 2017-10-13 ENCOUNTER — OUTPATIENT (OUTPATIENT)
Dept: OUTPATIENT SERVICES | Facility: HOSPITAL | Age: 77
LOS: 1 days | End: 2017-10-13
Payer: MEDICAID

## 2017-10-13 DIAGNOSIS — D04.9 CARCINOMA IN SITU OF SKIN, UNSPECIFIED: Chronic | ICD-10-CM

## 2017-10-13 DIAGNOSIS — C44.311 BASAL CELL CARCINOMA OF SKIN OF NOSE: Chronic | ICD-10-CM

## 2017-10-13 DIAGNOSIS — Z00.8 ENCOUNTER FOR OTHER GENERAL EXAMINATION: ICD-10-CM

## 2017-10-13 DIAGNOSIS — C34.90 MALIGNANT NEOPLASM OF UNSPECIFIED PART OF UNSPECIFIED BRONCHUS OR LUNG: ICD-10-CM

## 2017-10-13 PROCEDURE — 82565 ASSAY OF CREATININE: CPT

## 2017-10-13 PROCEDURE — 71260 CT THORAX DX C+: CPT | Mod: 26

## 2017-10-13 PROCEDURE — 71260 CT THORAX DX C+: CPT

## 2017-10-16 ENCOUNTER — APPOINTMENT (OUTPATIENT)
Dept: HEMATOLOGY ONCOLOGY | Facility: CLINIC | Age: 77
End: 2017-10-16

## 2017-10-16 ENCOUNTER — RESULT REVIEW (OUTPATIENT)
Age: 77
End: 2017-10-16

## 2017-10-16 VITALS
TEMPERATURE: 97.7 F | OXYGEN SATURATION: 96 % | RESPIRATION RATE: 16 BRPM | BODY MASS INDEX: 23.92 KG/M2 | HEART RATE: 86 BPM | DIASTOLIC BLOOD PRESSURE: 82 MMHG | WEIGHT: 162.04 LBS | SYSTOLIC BLOOD PRESSURE: 135 MMHG

## 2017-10-16 LAB
ALBUMIN SERPL ELPH-MCNC: 4.8 G/DL — SIGNIFICANT CHANGE UP (ref 3.3–5)
ALP SERPL-CCNC: 85 U/L — SIGNIFICANT CHANGE UP (ref 40–120)
ALT FLD-CCNC: 23 U/L — SIGNIFICANT CHANGE UP (ref 10–45)
ANION GAP SERPL CALC-SCNC: 16 MMOL/L — SIGNIFICANT CHANGE UP (ref 5–17)
APTT BLD: 29.7 SEC — SIGNIFICANT CHANGE UP (ref 27.5–37.4)
AST SERPL-CCNC: 14 U/L — SIGNIFICANT CHANGE UP (ref 10–40)
BASOPHILS # BLD AUTO: 0 K/UL — SIGNIFICANT CHANGE UP (ref 0–0.2)
BILIRUB SERPL-MCNC: 0.2 MG/DL — SIGNIFICANT CHANGE UP (ref 0.2–1.2)
BUN SERPL-MCNC: 23 MG/DL — SIGNIFICANT CHANGE UP (ref 7–23)
CALCIUM SERPL-MCNC: 10.1 MG/DL — SIGNIFICANT CHANGE UP (ref 8.4–10.5)
CHLORIDE SERPL-SCNC: 102 MMOL/L — SIGNIFICANT CHANGE UP (ref 96–108)
CO2 SERPL-SCNC: 26 MMOL/L — SIGNIFICANT CHANGE UP (ref 22–31)
CREAT SERPL-MCNC: 1.03 MG/DL — SIGNIFICANT CHANGE UP (ref 0.5–1.3)
EOSINOPHIL # BLD AUTO: 0.1 K/UL — SIGNIFICANT CHANGE UP (ref 0–0.5)
GLUCOSE SERPL-MCNC: 163 MG/DL — HIGH (ref 70–99)
INR BLD: 0.91 RATIO — SIGNIFICANT CHANGE UP (ref 0.88–1.16)
LYMPHOCYTES # BLD AUTO: 10 % — LOW (ref 13–44)
LYMPHOCYTES # BLD AUTO: 2.2 K/UL — SIGNIFICANT CHANGE UP (ref 1–3.3)
MONOCYTES # BLD AUTO: 1.3 K/UL — HIGH (ref 0–0.9)
MONOCYTES NFR BLD AUTO: 4 % — SIGNIFICANT CHANGE UP (ref 2–14)
NEUTROPHILS # BLD AUTO: 22.9 K/UL — HIGH (ref 1.8–7.4)
NEUTROPHILS NFR BLD AUTO: 86 % — HIGH (ref 43–77)
PLAT MORPH BLD: NORMAL — SIGNIFICANT CHANGE UP
POTASSIUM SERPL-MCNC: 4.6 MMOL/L — SIGNIFICANT CHANGE UP (ref 3.5–5.3)
POTASSIUM SERPL-SCNC: 4.6 MMOL/L — SIGNIFICANT CHANGE UP (ref 3.5–5.3)
PROT SERPL-MCNC: 8.2 G/DL — SIGNIFICANT CHANGE UP (ref 6–8.3)
PROTHROM AB SERPL-ACNC: 10.3 SEC — SIGNIFICANT CHANGE UP (ref 10–13.1)
RBC BLD AUTO: SIGNIFICANT CHANGE UP
SODIUM SERPL-SCNC: 144 MMOL/L — SIGNIFICANT CHANGE UP (ref 135–145)

## 2017-10-16 RX ORDER — GUAIFENESIN 600 MG/1
600 TABLET, EXTENDED RELEASE ORAL
Qty: 1 | Refills: 3 | Status: DISCONTINUED | COMMUNITY
Start: 2017-08-10 | End: 2017-10-16

## 2017-10-16 RX ORDER — LACTOSE-REDUCED FOOD
LIQUID (ML) ORAL
Qty: 1 | Refills: 0 | Status: DISCONTINUED | COMMUNITY
Start: 2017-05-01 | End: 2017-10-16

## 2017-10-16 RX ORDER — SUCRALFATE 1 G/1
1 TABLET ORAL
Refills: 0 | Status: DISCONTINUED | COMMUNITY
End: 2017-10-16

## 2017-10-16 RX ORDER — PANTOPRAZOLE SODIUM 40 MG/1
40 TABLET, DELAYED RELEASE ORAL
Refills: 0 | Status: DISCONTINUED | COMMUNITY
End: 2017-10-16

## 2017-10-16 RX ORDER — FLUCONAZOLE 100 MG/1
100 TABLET ORAL
Qty: 14 | Refills: 0 | Status: DISCONTINUED | COMMUNITY
Start: 2017-06-12 | End: 2017-10-16

## 2017-10-16 RX ORDER — PANTOPRAZOLE 40 MG/1
40 TABLET, DELAYED RELEASE ORAL TWICE DAILY
Qty: 60 | Refills: 3 | Status: DISCONTINUED | COMMUNITY
Start: 2017-05-12 | End: 2017-10-16

## 2017-10-16 RX ORDER — ONDANSETRON 4 MG/1
4 TABLET ORAL EVERY 6 HOURS
Qty: 360 | Refills: 3 | Status: DISCONTINUED | COMMUNITY
End: 2017-10-16

## 2017-10-16 RX ORDER — OMEGA-3/DHA/EPA/FISH OIL 500-1000MG
500 CAPSULE ORAL DAILY
Qty: 1 | Refills: 3 | Status: DISCONTINUED | COMMUNITY
Start: 2017-07-27 | End: 2017-10-16

## 2017-10-16 RX ORDER — SIMETHICONE 80 MG/1
80 TABLET, CHEWABLE ORAL
Qty: 120 | Refills: 0 | Status: DISCONTINUED | COMMUNITY
Start: 2017-05-12 | End: 2017-10-16

## 2017-10-16 RX ORDER — FAMOTIDINE 20 MG/1
20 TABLET, FILM COATED ORAL
Refills: 0 | Status: DISCONTINUED | COMMUNITY
End: 2017-10-16

## 2017-10-16 RX ORDER — ALBUTEROL SULFATE 108 UG/1
108 (90 BASE) AEROSOL, METERED RESPIRATORY (INHALATION)
Qty: 1 | Refills: 3 | Status: DISCONTINUED | COMMUNITY
Start: 2017-08-24 | End: 2017-10-16

## 2017-10-23 ENCOUNTER — APPOINTMENT (OUTPATIENT)
Dept: GERIATRICS | Facility: CLINIC | Age: 77
End: 2017-10-23
Payer: MEDICAID

## 2017-10-23 VITALS
DIASTOLIC BLOOD PRESSURE: 80 MMHG | TEMPERATURE: 97 F | OXYGEN SATURATION: 94 % | WEIGHT: 163.14 LBS | SYSTOLIC BLOOD PRESSURE: 120 MMHG | HEART RATE: 83 BPM | RESPIRATION RATE: 16 BRPM | BODY MASS INDEX: 24.08 KG/M2

## 2017-10-23 PROCEDURE — 99215 OFFICE O/P EST HI 40 MIN: CPT

## 2017-10-24 ENCOUNTER — APPOINTMENT (OUTPATIENT)
Dept: DERMATOLOGY | Facility: CLINIC | Age: 77
End: 2017-10-24
Payer: MEDICAID

## 2017-10-24 VITALS — SYSTOLIC BLOOD PRESSURE: 108 MMHG | BODY MASS INDEX: 23.62 KG/M2 | WEIGHT: 160 LBS | DIASTOLIC BLOOD PRESSURE: 64 MMHG

## 2017-10-24 DIAGNOSIS — H91.90 UNSPECIFIED HEARING LOSS, UNSPECIFIED EAR: ICD-10-CM

## 2017-10-24 DIAGNOSIS — Z91.89 OTHER SPECIFIED PERSONAL RISK FACTORS, NOT ELSEWHERE CLASSIFIED: ICD-10-CM

## 2017-10-24 PROCEDURE — 99203 OFFICE O/P NEW LOW 30 MIN: CPT | Mod: GC

## 2017-10-26 ENCOUNTER — APPOINTMENT (OUTPATIENT)
Dept: INFUSION THERAPY | Facility: HOSPITAL | Age: 77
End: 2017-10-26

## 2017-10-30 ENCOUNTER — APPOINTMENT (OUTPATIENT)
Dept: HEMATOLOGY ONCOLOGY | Facility: CLINIC | Age: 77
End: 2017-10-30

## 2017-11-10 ENCOUNTER — APPOINTMENT (OUTPATIENT)
Dept: GERIATRICS | Facility: CLINIC | Age: 77
End: 2017-11-10
Payer: MEDICAID

## 2017-11-10 VITALS
HEART RATE: 86 BPM | DIASTOLIC BLOOD PRESSURE: 78 MMHG | WEIGHT: 159.83 LBS | SYSTOLIC BLOOD PRESSURE: 125 MMHG | RESPIRATION RATE: 16 BRPM | BODY MASS INDEX: 23.95 KG/M2 | TEMPERATURE: 97.5 F | OXYGEN SATURATION: 94 % | HEIGHT: 68.7 IN

## 2017-11-10 DIAGNOSIS — E11.9 TYPE 2 DIABETES MELLITUS W/OUT COMPLICATIONS: ICD-10-CM

## 2017-11-10 PROCEDURE — 99215 OFFICE O/P EST HI 40 MIN: CPT

## 2017-11-13 ENCOUNTER — OUTPATIENT (OUTPATIENT)
Dept: OUTPATIENT SERVICES | Facility: HOSPITAL | Age: 77
LOS: 1 days | Discharge: HOME | End: 2017-11-13

## 2017-11-13 DIAGNOSIS — C44.311 BASAL CELL CARCINOMA OF SKIN OF NOSE: Chronic | ICD-10-CM

## 2017-11-13 DIAGNOSIS — C34.90 MALIGNANT NEOPLASM OF UNSPECIFIED PART OF UNSPECIFIED BRONCHUS OR LUNG: ICD-10-CM

## 2017-11-13 DIAGNOSIS — D04.9 CARCINOMA IN SITU OF SKIN, UNSPECIFIED: Chronic | ICD-10-CM

## 2017-11-16 ENCOUNTER — RESULT REVIEW (OUTPATIENT)
Age: 77
End: 2017-11-16

## 2017-11-16 ENCOUNTER — APPOINTMENT (OUTPATIENT)
Dept: HEMATOLOGY ONCOLOGY | Facility: CLINIC | Age: 77
End: 2017-11-16

## 2017-11-16 ENCOUNTER — APPOINTMENT (OUTPATIENT)
Dept: INFUSION THERAPY | Facility: HOSPITAL | Age: 77
End: 2017-11-16

## 2017-11-16 VITALS
BODY MASS INDEX: 24.1 KG/M2 | TEMPERATURE: 98.2 F | WEIGHT: 161.82 LBS | OXYGEN SATURATION: 96 % | DIASTOLIC BLOOD PRESSURE: 97 MMHG | RESPIRATION RATE: 16 BRPM | HEART RATE: 84 BPM | SYSTOLIC BLOOD PRESSURE: 142 MMHG

## 2017-11-16 LAB
HCT VFR BLD CALC: 41.1 % — SIGNIFICANT CHANGE UP (ref 39–50)
HGB BLD-MCNC: 13.9 G/DL — SIGNIFICANT CHANGE UP (ref 13–17)
MCHC RBC-ENTMCNC: 30.7 PG — SIGNIFICANT CHANGE UP (ref 27–34)
MCHC RBC-ENTMCNC: 33.7 G/DL — SIGNIFICANT CHANGE UP (ref 32–36)
MCV RBC AUTO: 91 FL — SIGNIFICANT CHANGE UP (ref 80–100)
PLATELET # BLD AUTO: 120 K/UL — LOW (ref 150–400)
RBC # BLD: 4.52 M/UL — SIGNIFICANT CHANGE UP (ref 4.2–5.8)
RBC # FLD: 12.7 % — SIGNIFICANT CHANGE UP (ref 10.3–14.5)
WBC # BLD: 10.2 K/UL — SIGNIFICANT CHANGE UP (ref 3.8–10.5)
WBC # FLD AUTO: 10.2 K/UL — SIGNIFICANT CHANGE UP (ref 3.8–10.5)

## 2017-11-17 LAB
ALBUMIN SERPL ELPH-MCNC: 4.1 G/DL — SIGNIFICANT CHANGE UP (ref 3.3–5)
ALP SERPL-CCNC: 60 U/L — SIGNIFICANT CHANGE UP (ref 40–120)
ALT FLD-CCNC: 24 U/L — SIGNIFICANT CHANGE UP (ref 10–45)
ANION GAP SERPL CALC-SCNC: 18 MMOL/L — HIGH (ref 5–17)
AST SERPL-CCNC: 17 U/L — SIGNIFICANT CHANGE UP (ref 10–40)
BASOPHILS # BLD AUTO: 0 K/UL — SIGNIFICANT CHANGE UP (ref 0–0.2)
BASOPHILS NFR BLD AUTO: 0.2 % — SIGNIFICANT CHANGE UP (ref 0–2)
BILIRUB SERPL-MCNC: 0.2 MG/DL — SIGNIFICANT CHANGE UP (ref 0.2–1.2)
BUN SERPL-MCNC: 17 MG/DL — SIGNIFICANT CHANGE UP (ref 7–23)
CALCIUM SERPL-MCNC: 9.5 MG/DL — SIGNIFICANT CHANGE UP (ref 8.4–10.5)
CHLORIDE SERPL-SCNC: 97 MMOL/L — SIGNIFICANT CHANGE UP (ref 96–108)
CO2 SERPL-SCNC: 24 MMOL/L — SIGNIFICANT CHANGE UP (ref 22–31)
CREAT SERPL-MCNC: 1.15 MG/DL — SIGNIFICANT CHANGE UP (ref 0.5–1.3)
EOSINOPHIL # BLD AUTO: 0 K/UL — SIGNIFICANT CHANGE UP (ref 0–0.5)
EOSINOPHIL NFR BLD AUTO: 0.4 % — SIGNIFICANT CHANGE UP (ref 0–6)
GLUCOSE SERPL-MCNC: 263 MG/DL — HIGH (ref 70–99)
LYMPHOCYTES # BLD AUTO: 1.6 K/UL — SIGNIFICANT CHANGE UP (ref 1–3.3)
LYMPHOCYTES # BLD AUTO: 15.7 % — SIGNIFICANT CHANGE UP (ref 13–44)
MONOCYTES # BLD AUTO: 0.4 K/UL — SIGNIFICANT CHANGE UP (ref 0–0.9)
MONOCYTES NFR BLD AUTO: 4.4 % — SIGNIFICANT CHANGE UP (ref 2–14)
NEUTROPHILS # BLD AUTO: 8.1 K/UL — HIGH (ref 1.8–7.4)
NEUTROPHILS NFR BLD AUTO: 79.3 % — HIGH (ref 43–77)
POTASSIUM SERPL-MCNC: 3.9 MMOL/L — SIGNIFICANT CHANGE UP (ref 3.5–5.3)
POTASSIUM SERPL-SCNC: 3.9 MMOL/L — SIGNIFICANT CHANGE UP (ref 3.5–5.3)
PROT SERPL-MCNC: 6.3 G/DL — SIGNIFICANT CHANGE UP (ref 6–8.3)
SODIUM SERPL-SCNC: 139 MMOL/L — SIGNIFICANT CHANGE UP (ref 135–145)

## 2017-11-17 NOTE — ED PROVIDER NOTE - MEDICAL DECISION MAKING DETAILS
Patient is calling in for a refill of her medication. She would like a quantity of 50. Please call when this has been completed.   76yoM hx dm, htn, hld, lung ca diagnosed last yr, on tarciva and lovenox for PE that he had in april, came in with severe CP, SOB, tachycardia, with no acute changes on EKG. doubtful cardiac event, but will send enzymes and bnp. propbably pain related to mets. will obtain blood work, INR, CXR, CTA, even though PE is doubtful. and pt is on anticoagulation.-ZR

## 2017-11-30 DIAGNOSIS — G89.3 NEOPLASM RELATED PAIN (ACUTE) (CHRONIC): ICD-10-CM

## 2017-12-07 ENCOUNTER — OUTPATIENT (OUTPATIENT)
Dept: OUTPATIENT SERVICES | Facility: HOSPITAL | Age: 77
LOS: 1 days | Discharge: HOME | End: 2017-12-07
Payer: MEDICAID

## 2017-12-07 DIAGNOSIS — C44.311 BASAL CELL CARCINOMA OF SKIN OF NOSE: Chronic | ICD-10-CM

## 2017-12-07 DIAGNOSIS — C34.90 MALIGNANT NEOPLASM OF UNSPECIFIED PART OF UNSPECIFIED BRONCHUS OR LUNG: ICD-10-CM

## 2017-12-07 DIAGNOSIS — D04.9 CARCINOMA IN SITU OF SKIN, UNSPECIFIED: Chronic | ICD-10-CM

## 2017-12-08 ENCOUNTER — APPOINTMENT (OUTPATIENT)
Dept: GERIATRICS | Facility: CLINIC | Age: 77
End: 2017-12-08
Payer: MEDICAID

## 2017-12-08 VITALS
RESPIRATION RATE: 16 BRPM | HEART RATE: 82 BPM | BODY MASS INDEX: 23.81 KG/M2 | DIASTOLIC BLOOD PRESSURE: 78 MMHG | OXYGEN SATURATION: 97 % | SYSTOLIC BLOOD PRESSURE: 120 MMHG | WEIGHT: 159.81 LBS | TEMPERATURE: 96.9 F

## 2017-12-08 DIAGNOSIS — R21 RASH AND OTHER NONSPECIFIC SKIN ERUPTION: ICD-10-CM

## 2017-12-08 DIAGNOSIS — M17.10 UNILATERAL PRIMARY OSTEOARTHRITIS, UNSPECIFIED KNEE: ICD-10-CM

## 2017-12-08 PROCEDURE — 99215 OFFICE O/P EST HI 40 MIN: CPT

## 2017-12-08 PROCEDURE — 93010 ELECTROCARDIOGRAM REPORT: CPT

## 2017-12-14 ENCOUNTER — RESULT REVIEW (OUTPATIENT)
Age: 77
End: 2017-12-14

## 2017-12-14 ENCOUNTER — APPOINTMENT (OUTPATIENT)
Dept: HEMATOLOGY ONCOLOGY | Facility: CLINIC | Age: 77
End: 2017-12-14

## 2017-12-14 VITALS
SYSTOLIC BLOOD PRESSURE: 127 MMHG | BODY MASS INDEX: 23.84 KG/M2 | HEART RATE: 99 BPM | WEIGHT: 160.06 LBS | DIASTOLIC BLOOD PRESSURE: 87 MMHG | OXYGEN SATURATION: 95 % | TEMPERATURE: 97.5 F | RESPIRATION RATE: 16 BRPM

## 2017-12-14 LAB
ALBUMIN SERPL ELPH-MCNC: 4 G/DL — SIGNIFICANT CHANGE UP (ref 3.3–5)
ALP SERPL-CCNC: 63 U/L — SIGNIFICANT CHANGE UP (ref 40–120)
ALT FLD-CCNC: 26 U/L — SIGNIFICANT CHANGE UP (ref 10–45)
ANION GAP SERPL CALC-SCNC: 14 MMOL/L — SIGNIFICANT CHANGE UP (ref 5–17)
AST SERPL-CCNC: 15 U/L — SIGNIFICANT CHANGE UP (ref 10–40)
BASOPHILS # BLD AUTO: 0.1 K/UL — SIGNIFICANT CHANGE UP (ref 0–0.2)
BASOPHILS NFR BLD AUTO: 0.5 % — SIGNIFICANT CHANGE UP (ref 0–2)
BILIRUB SERPL-MCNC: 0.5 MG/DL — SIGNIFICANT CHANGE UP (ref 0.2–1.2)
BUN SERPL-MCNC: 11 MG/DL — SIGNIFICANT CHANGE UP (ref 7–23)
CALCIUM SERPL-MCNC: 9.5 MG/DL — SIGNIFICANT CHANGE UP (ref 8.4–10.5)
CHLORIDE SERPL-SCNC: 101 MMOL/L — SIGNIFICANT CHANGE UP (ref 96–108)
CO2 SERPL-SCNC: 26 MMOL/L — SIGNIFICANT CHANGE UP (ref 22–31)
CREAT SERPL-MCNC: 1.07 MG/DL — SIGNIFICANT CHANGE UP (ref 0.5–1.3)
EOSINOPHIL # BLD AUTO: 0.1 K/UL — SIGNIFICANT CHANGE UP (ref 0–0.5)
EOSINOPHIL NFR BLD AUTO: 0.6 % — SIGNIFICANT CHANGE UP (ref 0–6)
GLUCOSE SERPL-MCNC: 151 MG/DL — HIGH (ref 70–99)
HCT VFR BLD CALC: 40 % — SIGNIFICANT CHANGE UP (ref 39–50)
HGB BLD-MCNC: 13.7 G/DL — SIGNIFICANT CHANGE UP (ref 13–17)
LYMPHOCYTES # BLD AUTO: 1.7 K/UL — SIGNIFICANT CHANGE UP (ref 1–3.3)
LYMPHOCYTES # BLD AUTO: 16.4 % — SIGNIFICANT CHANGE UP (ref 13–44)
MAGNESIUM SERPL-MCNC: 1.7 MG/DL — SIGNIFICANT CHANGE UP (ref 1.6–2.6)
MCHC RBC-ENTMCNC: 31 PG — SIGNIFICANT CHANGE UP (ref 27–34)
MCHC RBC-ENTMCNC: 34.1 G/DL — SIGNIFICANT CHANGE UP (ref 32–36)
MCV RBC AUTO: 90.9 FL — SIGNIFICANT CHANGE UP (ref 80–100)
MONOCYTES # BLD AUTO: 0.6 K/UL — SIGNIFICANT CHANGE UP (ref 0–0.9)
MONOCYTES NFR BLD AUTO: 6 % — SIGNIFICANT CHANGE UP (ref 2–14)
NEUTROPHILS # BLD AUTO: 8 K/UL — HIGH (ref 1.8–7.4)
NEUTROPHILS NFR BLD AUTO: 76.6 % — SIGNIFICANT CHANGE UP (ref 43–77)
PHOSPHATE SERPL-MCNC: 2.6 MG/DL — SIGNIFICANT CHANGE UP (ref 2.5–4.5)
PLATELET # BLD AUTO: 145 K/UL — LOW (ref 150–400)
POTASSIUM SERPL-MCNC: 4.2 MMOL/L — SIGNIFICANT CHANGE UP (ref 3.5–5.3)
POTASSIUM SERPL-SCNC: 4.2 MMOL/L — SIGNIFICANT CHANGE UP (ref 3.5–5.3)
PROT SERPL-MCNC: 6.7 G/DL — SIGNIFICANT CHANGE UP (ref 6–8.3)
RBC # BLD: 4.4 M/UL — SIGNIFICANT CHANGE UP (ref 4.2–5.8)
RBC # FLD: 13.7 % — SIGNIFICANT CHANGE UP (ref 10.3–14.5)
SODIUM SERPL-SCNC: 141 MMOL/L — SIGNIFICANT CHANGE UP (ref 135–145)
WBC # BLD: 10.4 K/UL — SIGNIFICANT CHANGE UP (ref 3.8–10.5)
WBC # FLD AUTO: 10.4 K/UL — SIGNIFICANT CHANGE UP (ref 3.8–10.5)

## 2017-12-14 RX ORDER — FOLIC ACID 1 MG/1
1 TABLET ORAL DAILY
Qty: 30 | Refills: 5 | Status: DISCONTINUED | COMMUNITY
Start: 2017-04-20 | End: 2017-12-14

## 2017-12-14 RX ORDER — PREDNISONE 20 MG/1
20 TABLET ORAL DAILY
Qty: 120 | Refills: 0 | Status: DISCONTINUED | COMMUNITY
Start: 2017-10-09 | End: 2017-12-14

## 2017-12-15 PROCEDURE — 82550 ASSAY OF CK (CPK): CPT

## 2017-12-15 PROCEDURE — 83036 HEMOGLOBIN GLYCOSYLATED A1C: CPT

## 2017-12-15 PROCEDURE — 84484 ASSAY OF TROPONIN QUANT: CPT

## 2017-12-15 PROCEDURE — 82947 ASSAY GLUCOSE BLOOD QUANT: CPT

## 2017-12-15 PROCEDURE — G0378: CPT

## 2017-12-15 PROCEDURE — 83605 ASSAY OF LACTIC ACID: CPT

## 2017-12-15 PROCEDURE — 96374 THER/PROPH/DIAG INJ IV PUSH: CPT | Mod: XU

## 2017-12-15 PROCEDURE — 82803 BLOOD GASES ANY COMBINATION: CPT

## 2017-12-15 PROCEDURE — 99285 EMERGENCY DEPT VISIT HI MDM: CPT | Mod: 25

## 2017-12-15 PROCEDURE — 93005 ELECTROCARDIOGRAM TRACING: CPT

## 2017-12-15 PROCEDURE — 71275 CT ANGIOGRAPHY CHEST: CPT

## 2017-12-15 PROCEDURE — 80048 BASIC METABOLIC PNL TOTAL CA: CPT

## 2017-12-15 PROCEDURE — 82330 ASSAY OF CALCIUM: CPT

## 2017-12-15 PROCEDURE — 85027 COMPLETE CBC AUTOMATED: CPT

## 2017-12-15 PROCEDURE — 84132 ASSAY OF SERUM POTASSIUM: CPT

## 2017-12-15 PROCEDURE — 71046 X-RAY EXAM CHEST 2 VIEWS: CPT

## 2017-12-15 PROCEDURE — 85014 HEMATOCRIT: CPT

## 2017-12-15 PROCEDURE — 82435 ASSAY OF BLOOD CHLORIDE: CPT

## 2017-12-15 PROCEDURE — 82553 CREATINE MB FRACTION: CPT

## 2017-12-15 PROCEDURE — 80053 COMPREHEN METABOLIC PANEL: CPT

## 2017-12-15 PROCEDURE — 83880 ASSAY OF NATRIURETIC PEPTIDE: CPT

## 2017-12-15 PROCEDURE — 85730 THROMBOPLASTIN TIME PARTIAL: CPT

## 2017-12-15 PROCEDURE — 85610 PROTHROMBIN TIME: CPT

## 2017-12-15 PROCEDURE — 84295 ASSAY OF SERUM SODIUM: CPT

## 2017-12-18 ENCOUNTER — MOBILE ON CALL (OUTPATIENT)
Age: 77
End: 2017-12-18

## 2017-12-27 ENCOUNTER — APPOINTMENT (OUTPATIENT)
Dept: INTERVENTIONAL RADIOLOGY/VASCULAR | Facility: CLINIC | Age: 77
End: 2017-12-27

## 2017-12-27 ENCOUNTER — EMERGENCY (EMERGENCY)
Facility: HOSPITAL | Age: 77
LOS: 1 days | Discharge: HOME | End: 2017-12-27
Attending: EMERGENCY MEDICINE | Admitting: EMERGENCY MEDICINE
Payer: MEDICAID

## 2017-12-27 VITALS
TEMPERATURE: 98 F | DIASTOLIC BLOOD PRESSURE: 80 MMHG | RESPIRATION RATE: 18 BRPM | SYSTOLIC BLOOD PRESSURE: 127 MMHG | HEART RATE: 91 BPM | OXYGEN SATURATION: 96 %

## 2017-12-27 VITALS
RESPIRATION RATE: 17 BRPM | TEMPERATURE: 98 F | DIASTOLIC BLOOD PRESSURE: 68 MMHG | WEIGHT: 158.07 LBS | HEART RATE: 101 BPM | SYSTOLIC BLOOD PRESSURE: 107 MMHG | OXYGEN SATURATION: 95 %

## 2017-12-27 DIAGNOSIS — R21 RASH AND OTHER NONSPECIFIC SKIN ERUPTION: ICD-10-CM

## 2017-12-27 DIAGNOSIS — D04.9 CARCINOMA IN SITU OF SKIN, UNSPECIFIED: Chronic | ICD-10-CM

## 2017-12-27 DIAGNOSIS — C34.90 MALIGNANT NEOPLASM OF UNSPECIFIED PART OF UNSPECIFIED BRONCHUS OR LUNG: ICD-10-CM

## 2017-12-27 DIAGNOSIS — C44.311 BASAL CELL CARCINOMA OF SKIN OF NOSE: Chronic | ICD-10-CM

## 2017-12-27 LAB
ALBUMIN SERPL ELPH-MCNC: 3.9 G/DL — SIGNIFICANT CHANGE UP (ref 3.3–5)
ALP SERPL-CCNC: 64 U/L — SIGNIFICANT CHANGE UP (ref 40–120)
ALT FLD-CCNC: 20 U/L RC — SIGNIFICANT CHANGE UP (ref 10–45)
ANION GAP SERPL CALC-SCNC: 11 MMOL/L — SIGNIFICANT CHANGE UP (ref 5–17)
AST SERPL-CCNC: 18 U/L — SIGNIFICANT CHANGE UP (ref 10–40)
BASOPHILS # BLD AUTO: 0 K/UL — SIGNIFICANT CHANGE UP (ref 0–0.2)
BASOPHILS NFR BLD AUTO: 0.3 % — SIGNIFICANT CHANGE UP (ref 0–2)
BILIRUB SERPL-MCNC: 0.3 MG/DL — SIGNIFICANT CHANGE UP (ref 0.2–1.2)
BUN SERPL-MCNC: 12 MG/DL — SIGNIFICANT CHANGE UP (ref 7–23)
CALCIUM SERPL-MCNC: 9.4 MG/DL — SIGNIFICANT CHANGE UP (ref 8.4–10.5)
CHLORIDE SERPL-SCNC: 102 MMOL/L — SIGNIFICANT CHANGE UP (ref 96–108)
CO2 SERPL-SCNC: 28 MMOL/L — SIGNIFICANT CHANGE UP (ref 22–31)
CREAT SERPL-MCNC: 1.01 MG/DL — SIGNIFICANT CHANGE UP (ref 0.5–1.3)
EOSINOPHIL # BLD AUTO: 0.1 K/UL — SIGNIFICANT CHANGE UP (ref 0–0.5)
EOSINOPHIL NFR BLD AUTO: 1.4 % — SIGNIFICANT CHANGE UP (ref 0–6)
GLUCOSE SERPL-MCNC: 155 MG/DL — HIGH (ref 70–99)
HCT VFR BLD CALC: 40.7 % — SIGNIFICANT CHANGE UP (ref 39–50)
HGB BLD-MCNC: 13.8 G/DL — SIGNIFICANT CHANGE UP (ref 13–17)
LYMPHOCYTES # BLD AUTO: 1.6 K/UL — SIGNIFICANT CHANGE UP (ref 1–3.3)
LYMPHOCYTES # BLD AUTO: 20.6 % — SIGNIFICANT CHANGE UP (ref 13–44)
MCHC RBC-ENTMCNC: 32 PG — SIGNIFICANT CHANGE UP (ref 27–34)
MCHC RBC-ENTMCNC: 34 GM/DL — SIGNIFICANT CHANGE UP (ref 32–36)
MCV RBC AUTO: 94 FL — SIGNIFICANT CHANGE UP (ref 80–100)
MONOCYTES # BLD AUTO: 0.5 K/UL — SIGNIFICANT CHANGE UP (ref 0–0.9)
MONOCYTES NFR BLD AUTO: 6.3 % — SIGNIFICANT CHANGE UP (ref 2–14)
NEUTROPHILS # BLD AUTO: 5.6 K/UL — SIGNIFICANT CHANGE UP (ref 1.8–7.4)
NEUTROPHILS NFR BLD AUTO: 71.3 % — SIGNIFICANT CHANGE UP (ref 43–77)
PLATELET # BLD AUTO: 213 K/UL — SIGNIFICANT CHANGE UP (ref 150–400)
POTASSIUM SERPL-MCNC: 4.2 MMOL/L — SIGNIFICANT CHANGE UP (ref 3.5–5.3)
POTASSIUM SERPL-SCNC: 4.2 MMOL/L — SIGNIFICANT CHANGE UP (ref 3.5–5.3)
PROT SERPL-MCNC: 7.4 G/DL — SIGNIFICANT CHANGE UP (ref 6–8.3)
RBC # BLD: 4.33 M/UL — SIGNIFICANT CHANGE UP (ref 4.2–5.8)
RBC # FLD: 13.5 % — SIGNIFICANT CHANGE UP (ref 10.3–14.5)
SODIUM SERPL-SCNC: 141 MMOL/L — SIGNIFICANT CHANGE UP (ref 135–145)
WBC # BLD: 7.8 K/UL — SIGNIFICANT CHANGE UP (ref 3.8–10.5)
WBC # FLD AUTO: 7.8 K/UL — SIGNIFICANT CHANGE UP (ref 3.8–10.5)

## 2017-12-27 PROCEDURE — 87040 BLOOD CULTURE FOR BACTERIA: CPT

## 2017-12-27 PROCEDURE — 74177 CT ABD & PELVIS W/CONTRAST: CPT

## 2017-12-27 PROCEDURE — 99284 EMERGENCY DEPT VISIT MOD MDM: CPT | Mod: 25

## 2017-12-27 PROCEDURE — 71010: CPT | Mod: 26

## 2017-12-27 PROCEDURE — 96374 THER/PROPH/DIAG INJ IV PUSH: CPT | Mod: XU

## 2017-12-27 PROCEDURE — 85027 COMPLETE CBC AUTOMATED: CPT

## 2017-12-27 PROCEDURE — 80053 COMPREHEN METABOLIC PANEL: CPT

## 2017-12-27 PROCEDURE — 71045 X-RAY EXAM CHEST 1 VIEW: CPT

## 2017-12-27 PROCEDURE — 99285 EMERGENCY DEPT VISIT HI MDM: CPT

## 2017-12-27 PROCEDURE — 74177 CT ABD & PELVIS W/CONTRAST: CPT | Mod: 26

## 2017-12-27 PROCEDURE — 96375 TX/PRO/DX INJ NEW DRUG ADDON: CPT | Mod: XU

## 2017-12-27 PROCEDURE — 71260 CT THORAX DX C+: CPT | Mod: 26

## 2017-12-27 PROCEDURE — 71260 CT THORAX DX C+: CPT

## 2017-12-27 RX ORDER — DIPHENHYDRAMINE HCL 50 MG
25 CAPSULE ORAL ONCE
Qty: 0 | Refills: 0 | Status: COMPLETED | OUTPATIENT
Start: 2017-12-27 | End: 2017-12-27

## 2017-12-27 RX ORDER — ONDANSETRON 8 MG/1
4 TABLET, FILM COATED ORAL ONCE
Qty: 0 | Refills: 0 | Status: COMPLETED | OUTPATIENT
Start: 2017-12-27 | End: 2017-12-27

## 2017-12-27 RX ORDER — SODIUM CHLORIDE 9 MG/ML
1000 INJECTION INTRAMUSCULAR; INTRAVENOUS; SUBCUTANEOUS ONCE
Qty: 0 | Refills: 0 | Status: COMPLETED | OUTPATIENT
Start: 2017-12-27 | End: 2017-12-27

## 2017-12-27 RX ADMIN — ONDANSETRON 4 MILLIGRAM(S): 8 TABLET, FILM COATED ORAL at 12:26

## 2017-12-27 RX ADMIN — Medication 25 MILLIGRAM(S): at 12:26

## 2017-12-27 RX ADMIN — SODIUM CHLORIDE 1000 MILLILITER(S): 9 INJECTION INTRAMUSCULAR; INTRAVENOUS; SUBCUTANEOUS at 12:26

## 2017-12-27 NOTE — ED PROVIDER NOTE - OBJECTIVE STATEMENT
76 y/o M pmhx primary lung CA previously on Tarceva then on Tecentriq, currently not on therapy pending lung biopsy, presenting for worsening rash for the last 10 days. Pt's son translates at patient's request. Pt reports that he has developed rash to the palms of his hands that has been causing sloughing of the skin for many months, rash to the rest of body has been present for 10 days. Rash is red, very itchy and occasionally painful for patient. It is located around his neckline, pantline, belly button and on his sides. He states something similar occurred after a chemo treatment once, but now he has not been on any treatment for over 2 months and does not know why this started. He denies any new detergents, sheets, clothing. He states that he was scheduled today for a lung biopsy to determine what treatment would be appropriate for him to start next, but wasn't sure if he should move forward with that without knowing what was going on with his rash. He denies any fevers, chills, nausea, vomiting, diarrhea.

## 2017-12-27 NOTE — ED PROVIDER NOTE - PLAN OF CARE
1. Continue home medication regimen   2. Take benadryl as directed as needed for itching. Use hydrocortisone cream to affected areas to decrease itching   3. Follow-up with your oncologist this week for continued evaluation   4. Return to the ER for any new or worsening symptoms

## 2017-12-27 NOTE — ED PROVIDER NOTE - PROGRESS NOTE DETAILS
patient's ct c/a/p without any acute disease, showing increase in size of lung mass. discussed with oncology fellow as they have not come to see patient. paged dermatology again to discuss with them patient seeing them in clinic tomorrow. advised on taking benadryl and use of steroid cream to affected area until follow-up with dermatology. patient's son aware and understands nina perez with plan. -Zuleika Trejo PA-C

## 2017-12-27 NOTE — CONSULT NOTE ADULT - PROBLEM SELECTOR RECOMMENDATION 2
Adenocarcinoma of the lung s/p 3 lines of therapy with progression of disease. No treatment since September  - patient to reschedule IR appointment for biopsy evaluation.  - will follow up with Dr Tracy as an outpatient.

## 2017-12-27 NOTE — ED PROVIDER NOTE - NS ED ROS FT
Constitutional: No fever or chills  Eyes: No visual changes, eye pain or redness  HEENT: No throat pain, ear pain, nasal pain. No nose bleeding.  CV: No chest pain or lower extremity edema  Resp: No SOB no cough  GI: No abd pain. No nausea or vomiting. No diarrhea. No constipation.   : No dysuria, hematuria.   MSK: No musculoskeletal pain  Skin: +rash to palms, neck line and pant line and trunk, red and itchy  Neuro: No headache. No numbness or tingling. No weakness.

## 2017-12-27 NOTE — ED PROVIDER NOTE - MEDICAL DECISION MAKING DETAILS
76 y/o M pmhx lung CA not on treatment now pending lung biopsy to determine appropriate treatment p/w rash for 10 days to body appearing ? contact dermatitis given distribution of neck line and pant line and without any recent chemotherapy. +abdominal tenderness and distension causing nausea. will discuss with oncology, set up derm follow-up, obtain labs, ct a/p control itch and pain and reassess. 76 y/o M pmhx lung CA not on treatment now pending lung biopsy to determine appropriate treatment p/w rash for 10 days to body appearing ? contact dermatitis given distribution of neck line and pant line and without any recent chemotherapy. +abdominal tenderness and distension causing nausea. will discuss with oncology, set up derm follow-up, obtain labs, ct a/p control itch and pain and reassess.  Attending Kimble: 76 y/o male diagnosed with lung ca with mets being evaluated for new treatment with h/o rash in the past prsenting with rash to abdomen, and dryness with peeling to hands. +uticaria. no recent abx. rash does not appear infectious, less likely cellulitis. negative nickosky sign. no recent abx use making drug reaction unlikely additionally pt has not started no chemo regiment. suspect likely dermatiis. d/w dermatology who will see pt first thing next day. additionally pt did have abdominal ttp on exam. ct perormed to further evaluate which was negative. plan for pt to follow up with derm

## 2017-12-27 NOTE — ED PROVIDER NOTE - CARE PLAN
Principal Discharge DX:	Contact dermatitis  Instructions for follow-up, activity and diet:	1. Continue home medication regimen   2. Take benadryl as directed as needed for itching. Use hydrocortisone cream to affected areas to decrease itching   3. Follow-up with your oncologist this week for continued evaluation   4. Return to the ER for any new or worsening symptoms

## 2017-12-27 NOTE — ED ADULT NURSE NOTE - OBJECTIVE STATEMENT
77 y m came to the ed with a rash on his body. family states the rash started 10 days ago on his hands and has now spread to his torso with spots worse around the neck, and groin area. states the rash is itchy and painful. abdomen is soft but tender which patient states has been there for two weeks. skin peeling on hands. denies any fevers, chills, chest pain, sob. skin is warm and dry. states having this once in the past which was cured with steroids.

## 2017-12-27 NOTE — ED PROVIDER NOTE - PHYSICAL EXAMINATION
GEN: Well Appearing, Nontoxic, NAD  HEENT: Symm Facies, PERRL, EOMI, MMM, posterior pharynx clear  CV: No JVD/Bruits or stridor;  RRR w/o m/g/r  RESP: CTAB w/o w/r/r  ABD: mild tenderness to mildly distended abdomen, caused nausea when palpating. No CVAT, no RUQ tenderness.   EXT: No lower extremity edema or calf tenderness. WWP, palpable pulses. FROMx4  SKIN: Maculopapular, erythematous raised rash to neck line, pant line, patch on R side of trunk and around/inside umbilicus. No cellulitic areas. No warmth.   Neuro: Grossly intact, AOX3 with normal speech, CN II-XII intact; Sensation intact, motor 5/5 throughout; gait normal GEN: Well Appearing, Nontoxic, NAD  HEENT: Symm Facies, PERRL, EOMI, MMM, posterior pharynx clear  CV: No JVD/Bruits or stridor;  RRR w/o m/g/r  RESP: CTAB w/o w/r/r  ABD: mild tenderness to mildly distended abdomen, caused nausea when palpating. No CVAT, no RUQ tenderness.   EXT: No lower extremity edema or calf tenderness. WWP, palpable pulses. FROMx4  SKIN: Maculopapular, erythematous raised rash to neck line, pant line, patch on R side of trunk and around/inside umbilicus. No cellulitic areas. No warmth.   Neuro: Grossly intact, AOX3 with normal speech, CN II-XII intact; Sensation intact, motor 5/5 throughout; gait normal  Attending Kimble: Gen: NAD, heent: atrauamtic, eomi, perrla, mmm, op pink, uvula midline, neck; nttp, no nuchal rigidity, chest: nttp, no crepitus, cv: rrr, no murmurs, lungs: ctab, abd: soft,mild ttp LLQ, nondistended, no peritoneal signs, +BS, no guarding, ext: wwp, neg homans, skin:erythema to umbilical area and suprapubic area with dryness, peeling to palms and soles, negative nickolsky signno lesions to mucous membranes, neuro: awake and alert, following commands, speech clear, sensation and strength intact, no focal deficits

## 2017-12-27 NOTE — CONSULT NOTE ADULT - PROBLEM SELECTOR RECOMMENDATION 9
Patient has a history of desquamation of hands with atezolizumab though he is now 3 months out from his last infusion (9/14/17). Rash may be related to atezo v a different drug (history of chinese herb use) v infection.  - appreciate dermatology input

## 2017-12-27 NOTE — CONSULT NOTE ADULT - SUBJECTIVE AND OBJECTIVE BOX
Oncology Consult Note    HPI:  78yo M with EGFR mutated NSCLC s/p 3 lines of therapy who was sent to the ER for rash. Son and wife at bedside, with son translating. The patient noted peeling of his hands about 1 month prior to this visit. It initially improved with steroids from his oncologist, Dr Tracy. In the past 10 days the peeling got worse followed by appearance of a rash. The rash started 3 days prior on his abdomen and spread from there to his chest, neck and groin. It is pruritis and he reports tenderness over the right side of the abdomen. His mouth dry but he reports no oral pain/ulcers. The patient, of note, has been coughing for frequently and had 1-2 episodes of hemoptysis in the past few days. No recent episodes of epistaxis and he had 1-2 episodes of hemoptysis a few months ago as well. Other than the recent course of steroids the patient has not had any new prescribed medications. His son does say that he took a chinese herb recently (cannot remember the name) to try and boost the immune system. He denies fevers but possibly had the chills one day prior to presenting to the ER. No recent travel or sick contacts. No history of underlying skin disorder such as psoriasis    In terms of his NSCLC: He was diagnosed with stage IV EGFR mutated adenocarcinoma. Initial therapy with erlotinib in early  followed by Carboplatin/Pemetrexed. Most recent therapy was with atezolizumab which he received from - 17. This was discontinued due to progression of disease. It is also noted that he was having dry skin/peeling of hands while receiving the atezolizumab. Currently he was to be evaluated by IR for potential lung biopsy today, to determine if he is again EGFR mutated. T790M negative.    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    REVIEW OF SYSTEMS:    CONSTITUTIONAL: No weakness, fevers or chills  EYES/ENT: No visual changes;  No vertigo or throat pain   NECK: No pain or stiffness  RESPIRATORY: cough, hemoptysis; No shortness of breath  CARDIOVASCULAR: No chest pain or palpitations  GASTROINTESTINAL: No abdominal or epigastric pain. No nausea, vomiting, or hematemesis; No diarrhea or constipation.  GENITOURINARY: No dysuria, frequency or hematuria  NEUROLOGICAL: No numbness or weakness  SKIN: dry skin, rash.    All other review of systems is negative unless indicated above.      Weight (kg): 71.7 ( @ 11:29)    T(F): 98.2 (17 @ 12:11), Max: 98.4 (17 @ 11:29)  HR: 94 (17 @ 12:11)  BP: 142/92 (17 @ 12:11)  RR: 19 (17 @ 12:11)  SpO2: 98% (17 @ 12:11)  Wt(kg): --    GENERAL: NAD, well-developed  HEAD:  Atraumatic, Normocephalic  EYES: EOMI, PERRLA, conjunctiva and sclera clear  ENT: oropharynx dry but clear - no erythema, ulcers. + dentures  NECK: Supple, No JVD, No LAD  CHEST/LUNG: Clear to auscultation bilaterally  HEART: Regular rate and rhythm  ABDOMEN: Soft, tender to palpation of RUQ and right flank.  EXTREMITIES:  2+ Peripheral Pulses, No clubbing, cyanosis, or edema  NEUROLOGY: non-focal, AOx3  SKIN: desquamation of bilateral hands, non tender, no swelling. dry skin on soles of feet, no desquamation. discrete areas of raised erythema (RUQ/right flank, suprapubic and bilateral groin, left side of abdomen) RUQ lesion specifically tender to palpation. fine maculopapular rash across abdomen between larger patches described above. maculopapular rash extends across chest and back of the neck along the hair line. No evidence of rash along the back, buttocks or lower extremities. dry skin on elbows.                           13.8   7.8   )-----------( 213      ( 27 Dec 2017 12:17 )             40.7           141  |  102  |  12  ----------------------------<  155<H>  4.2   |  28  |  1.01    Ca    9.4      27 Dec 2017 12:17    TPro  7.4  /  Alb  3.9  /  TBili  0.3  /  DBili  x   /  AST  18  /  ALT  20  /  AlkPhos  64   Oncology Consult Note    HPI:  76yo M with EGFR mutated NSCLC s/p 3 lines of therapy who was sent to the ER for rash. Son and wife at bedside, with son translating. The patient noted peeling of his hands about 1 month prior to this visit. It initially improved with steroids from his oncologist, Dr Tracy. In the past 10 days the peeling got worse followed by appearance of a rash. The rash started 3 days prior on his abdomen and spread from there to his chest, neck and groin. It is pruritic and he reports tenderness over the right side of the abdomen. His mouth dry but he reports no oral pain/ulcers. The patient, of note, has been coughing for frequently and had 1-2 episodes of hemoptysis in the past few days. No recent episodes of epistaxis and he had 1-2 episodes of hemoptysis a few months ago as well. Other than the recent course of steroids the patient has not had any new prescribed medications. His son does say that he took a chinese herb recently (cannot remember the name) to try and boost the immune system. He denies fevers but possibly had the chills one day prior to presenting to the ER. No recent travel or sick contacts. No history of underlying skin disorder such as psoriasis    In terms of his NSCLC: He was diagnosed with stage IV EGFR mutated adenocarcinoma. Initial therapy with erlotinib in early  followed by Carboplatin/Pemetrexed. Most recent therapy was with atezolizumab which he received from - 17. This was discontinued due to progression of disease. It is also noted that he was having dry skin/peeling of hands while receiving the atezolizumab. Currently he was to be evaluated by IR for potential lung biopsy today, to determine if he is again EGFR mutated. T790M negative.    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    REVIEW OF SYSTEMS:    CONSTITUTIONAL: No weakness, fevers or chills  EYES/ENT: No visual changes;  No vertigo or throat pain   NECK: No pain or stiffness  RESPIRATORY: cough, hemoptysis; No shortness of breath  CARDIOVASCULAR: No chest pain or palpitations  GASTROINTESTINAL: No abdominal or epigastric pain. No nausea, vomiting, or hematemesis; No diarrhea or constipation.  GENITOURINARY: No dysuria, frequency or hematuria  NEUROLOGICAL: No numbness or weakness  SKIN: dry skin, rash.    All other review of systems is negative unless indicated above.      Weight (kg): 71.7 ( @ 11:29)    T(F): 98.2 (17 @ 12:11), Max: 98.4 (17 @ 11:29)  HR: 94 (17 @ 12:11)  BP: 142/92 (17 @ 12:11)  RR: 19 (17 @ 12:11)  SpO2: 98% (17 @ 12:11)  Wt(kg): --    GENERAL: NAD, well-developed  HEAD:  Atraumatic, Normocephalic  EYES: EOMI, PERRLA, conjunctiva and sclera clear  ENT: oropharynx dry but clear - no erythema, ulcers. + dentures  NECK: Supple, No JVD, No LAD  CHEST/LUNG: Clear to auscultation bilaterally  HEART: Regular rate and rhythm  ABDOMEN: Soft, tender to palpation of RUQ and right flank.  EXTREMITIES:  2+ Peripheral Pulses, No clubbing, cyanosis, or edema  NEUROLOGY: non-focal, AOx3  SKIN: desquamation of bilateral hands, non tender, no swelling. dry skin on soles of feet, no desquamation. discrete areas of raised erythema (RUQ/right flank, suprapubic and bilateral groin, left side of abdomen) RUQ lesion specifically tender to palpation. fine maculopapular rash across abdomen between larger patches described above. maculopapular rash extends across chest and back of the neck along the hair line. No evidence of rash along the back, buttocks or lower extremities. dry skin on elbows.                           13.8   7.8   )-----------( 213      ( 27 Dec 2017 12:17 )             40.7           141  |  102  |  12  ----------------------------<  155<H>  4.2   |  28  |  1.01    Ca    9.4      27 Dec 2017 12:17    TPro  7.4  /  Alb  3.9  /  TBili  0.3  /  DBili  x   /  AST  18  /  ALT  20  /  AlkPhos  64   Oncology Consult Note    HPI:  78yo M with EGFR mutated NSCLC s/p 3 lines of therapy who was sent to the ER for rash. Son and wife at bedside, with son translating. The patient noted peeling of his hands about 1 month prior to this visit. It initially improved with steroids from his oncologist, Dr Tracy. In the past 10 days the peeling got worse followed by appearance of a rash. The rash started 3 days prior on his abdomen and spread from there to his chest, neck and groin. It is pruritic and he reports tenderness over the right side of the abdomen. His mouth dry but he reports no oral pain/ulcers. The patient, of note, has been coughing for frequently and had 1-2 episodes of hemoptysis in the past few days. No recent episodes of epistaxis and he had 1-2 episodes of hemoptysis a few months ago as well. Other than the recent course of steroids the patient has not had any new prescribed medications. His son does say that he took a chinese herb recently (cannot remember the name) to try and boost the immune system. He denies fevers but possibly had the chills one day prior to presenting to the ER. No recent travel or sick contacts. No history of underlying skin disorder such as psoriasis    In terms of his NSCLC: He was diagnosed with stage IV EGFR mutated adenocarcinoma. Initial therapy with erlotinib in early  followed by Carboplatin/Pemetrexed. Most recent therapy was with atezolizumab which he received from - 17. This was discontinued due to progression of disease. It is also noted that he was having dry skin/peeling of hands while receiving the atezolizumab. Currently he was to be evaluated by IR for potential lung biopsy today, to determine if he is again EGFR mutated. T790M negative.    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    REVIEW OF SYSTEMS:    CONSTITUTIONAL: No weakness, fevers or chills  EYES/ENT: No visual changes;  No vertigo or throat pain   NECK: No pain or stiffness  RESPIRATORY: cough, hemoptysis; No shortness of breath  CARDIOVASCULAR: No chest pain or palpitations  GASTROINTESTINAL: No abdominal or epigastric pain. No nausea, vomiting, or hematemesis; No diarrhea or constipation.  GENITOURINARY: No dysuria, frequency or hematuria  NEUROLOGICAL: No numbness or weakness  SKIN: dry skin, rash.    All other review of systems is negative unless indicated above.      Weight (kg): 71.7 ( @ 11:29)    T(F): 98.2 (17 @ 12:11), Max: 98.4 (17 @ 11:29)  HR: 94 (17 @ 12:11)  BP: 142/92 (17 @ 12:11)  RR: 19 (17 @ 12:11)  SpO2: 98% (17 @ 12:11)  Wt(kg): --    GENERAL: NAD, well-developed  HEAD:  Atraumatic, Normocephalic  EYES: EOMI, PERRLA, conjunctiva and sclera clear  ENT: oropharynx dry but clear - no erythema, ulcers. + dentures  NECK: Supple, No JVD, No LAD  CHEST/LUNG: Clear to auscultation bilaterally  HEART: Regular rate and rhythm  ABDOMEN: Soft, tender to palpation of RUQ and right flank.  EXTREMITIES:  2+ Peripheral Pulses, No clubbing, cyanosis, or edema  NEUROLOGY: non-focal, AOx3  SKIN: desquamation of bilateral hands, non tender, no swelling. dry skin on soles of feet, no desquamation. discrete areas of raised erythema (RUQ/right flank, umbilicus suprapubic and bilateral groin, left side of abdomen) RUQ lesion specifically tender to palpation. fine maculopapular rash across abdomen between larger patches described above. maculopapular rash extends across chest and back of the neck along the hair line. No evidence of rash along the back, buttocks or lower extremities. dry skin on elbows.                           13.8   7.8   )-----------( 213      ( 27 Dec 2017 12:17 )             40.7           141  |  102  |  12  ----------------------------<  155<H>  4.2   |  28  |  1.01    Ca    9.4      27 Dec 2017 12:17    TPro  7.4  /  Alb  3.9  /  TBili  0.3  /  DBili  x   /  AST  18  /  ALT  20  /  AlkPhos  64

## 2017-12-28 ENCOUNTER — CHART COPY (OUTPATIENT)
Age: 77
End: 2017-12-28

## 2017-12-28 ENCOUNTER — APPOINTMENT (OUTPATIENT)
Dept: INTERVENTIONAL RADIOLOGY/VASCULAR | Facility: CLINIC | Age: 77
End: 2017-12-28
Payer: MEDICAID

## 2017-12-28 ENCOUNTER — APPOINTMENT (OUTPATIENT)
Dept: DERMATOLOGY | Facility: CLINIC | Age: 77
End: 2017-12-28
Payer: MEDICAID

## 2017-12-28 VITALS
DIASTOLIC BLOOD PRESSURE: 78 MMHG | HEIGHT: 68.7 IN | HEART RATE: 99 BPM | RESPIRATION RATE: 18 BRPM | SYSTOLIC BLOOD PRESSURE: 120 MMHG | OXYGEN SATURATION: 95 %

## 2017-12-28 VITALS — BODY MASS INDEX: 23.54 KG/M2 | WEIGHT: 158 LBS

## 2017-12-28 VITALS — DIASTOLIC BLOOD PRESSURE: 64 MMHG | SYSTOLIC BLOOD PRESSURE: 108 MMHG

## 2017-12-28 PROCEDURE — 99214 OFFICE O/P EST MOD 30 MIN: CPT

## 2017-12-28 PROCEDURE — 99213 OFFICE O/P EST LOW 20 MIN: CPT

## 2017-12-29 LAB
APTT BLD: 36.7 SEC
INR PPP: 0.95 RATIO
PT BLD: 10.7 SEC

## 2018-01-01 ENCOUNTER — FORM ENCOUNTER (OUTPATIENT)
Age: 78
End: 2018-01-01

## 2018-01-01 LAB
CULTURE RESULTS: SIGNIFICANT CHANGE UP
CULTURE RESULTS: SIGNIFICANT CHANGE UP
SPECIMEN SOURCE: SIGNIFICANT CHANGE UP
SPECIMEN SOURCE: SIGNIFICANT CHANGE UP

## 2018-01-02 ENCOUNTER — FORM ENCOUNTER (OUTPATIENT)
Age: 78
End: 2018-01-02

## 2018-01-02 ENCOUNTER — OUTPATIENT (OUTPATIENT)
Dept: OUTPATIENT SERVICES | Facility: HOSPITAL | Age: 78
LOS: 1 days | End: 2018-01-02
Payer: MEDICAID

## 2018-01-02 ENCOUNTER — RESULT REVIEW (OUTPATIENT)
Age: 78
End: 2018-01-02

## 2018-01-02 DIAGNOSIS — C44.311 BASAL CELL CARCINOMA OF SKIN OF NOSE: Chronic | ICD-10-CM

## 2018-01-02 DIAGNOSIS — D04.9 CARCINOMA IN SITU OF SKIN, UNSPECIFIED: Chronic | ICD-10-CM

## 2018-01-02 DIAGNOSIS — C34.90 MALIGNANT NEOPLASM OF UNSPECIFIED PART OF UNSPECIFIED BRONCHUS OR LUNG: ICD-10-CM

## 2018-01-02 LAB — GLUCOSE BLDC GLUCOMTR-MCNC: 136 MG/DL — HIGH (ref 70–99)

## 2018-01-02 PROCEDURE — 32405: CPT

## 2018-01-02 PROCEDURE — 77012 CT SCAN FOR NEEDLE BIOPSY: CPT | Mod: 26,59

## 2018-01-02 PROCEDURE — 71045 X-RAY EXAM CHEST 1 VIEW: CPT | Mod: 26

## 2018-01-02 PROCEDURE — 32557 INSERT CATH PLEURA W/ IMAGE: CPT | Mod: LT

## 2018-01-02 PROCEDURE — 71045 X-RAY EXAM CHEST 1 VIEW: CPT | Mod: 26,77

## 2018-01-02 PROCEDURE — 88305 TISSUE EXAM BY PATHOLOGIST: CPT | Mod: 26

## 2018-01-03 ENCOUNTER — APPOINTMENT (OUTPATIENT)
Dept: RADIOLOGY | Facility: HOSPITAL | Age: 78
End: 2018-01-03

## 2018-01-03 ENCOUNTER — APPOINTMENT (OUTPATIENT)
Dept: INTERVENTIONAL RADIOLOGY/VASCULAR | Facility: CLINIC | Age: 78
End: 2018-01-03
Payer: MEDICAID

## 2018-01-03 ENCOUNTER — CHART COPY (OUTPATIENT)
Age: 78
End: 2018-01-03

## 2018-01-03 ENCOUNTER — OUTPATIENT (OUTPATIENT)
Dept: OUTPATIENT SERVICES | Facility: HOSPITAL | Age: 78
LOS: 1 days | End: 2018-01-03
Payer: MEDICAID

## 2018-01-03 VITALS
DIASTOLIC BLOOD PRESSURE: 80 MMHG | OXYGEN SATURATION: 95 % | SYSTOLIC BLOOD PRESSURE: 118 MMHG | BODY MASS INDEX: 23.95 KG/M2 | WEIGHT: 158 LBS | HEART RATE: 90 BPM | RESPIRATION RATE: 18 BRPM | HEIGHT: 68 IN

## 2018-01-03 VITALS — HEART RATE: 89 BPM | DIASTOLIC BLOOD PRESSURE: 88 MMHG | OXYGEN SATURATION: 95 % | SYSTOLIC BLOOD PRESSURE: 128 MMHG

## 2018-01-03 DIAGNOSIS — C44.311 BASAL CELL CARCINOMA OF SKIN OF NOSE: Chronic | ICD-10-CM

## 2018-01-03 DIAGNOSIS — C34.90 MALIGNANT NEOPLASM OF UNSPECIFIED PART OF UNSPECIFIED BRONCHUS OR LUNG: ICD-10-CM

## 2018-01-03 DIAGNOSIS — J93.9 PNEUMOTHORAX, UNSPECIFIED: ICD-10-CM

## 2018-01-03 DIAGNOSIS — D04.9 CARCINOMA IN SITU OF SKIN, UNSPECIFIED: Chronic | ICD-10-CM

## 2018-01-03 LAB — NON-GYNECOLOGICAL CYTOLOGY STUDY: SIGNIFICANT CHANGE UP

## 2018-01-03 PROCEDURE — 99214 OFFICE O/P EST MOD 30 MIN: CPT

## 2018-01-03 PROCEDURE — 71046 X-RAY EXAM CHEST 2 VIEWS: CPT | Mod: 26

## 2018-01-03 PROCEDURE — 71046 X-RAY EXAM CHEST 2 VIEWS: CPT | Mod: 26,77

## 2018-01-05 ENCOUNTER — APPOINTMENT (OUTPATIENT)
Dept: GERIATRICS | Facility: CLINIC | Age: 78
End: 2018-01-05
Payer: MEDICAID

## 2018-01-05 ENCOUNTER — APPOINTMENT (OUTPATIENT)
Dept: RADIOLOGY | Facility: IMAGING CENTER | Age: 78
End: 2018-01-05
Payer: MEDICAID

## 2018-01-05 ENCOUNTER — OUTPATIENT (OUTPATIENT)
Dept: OUTPATIENT SERVICES | Facility: HOSPITAL | Age: 78
LOS: 1 days | End: 2018-01-05
Payer: MEDICAID

## 2018-01-05 VITALS
TEMPERATURE: 97 F | RESPIRATION RATE: 16 BRPM | DIASTOLIC BLOOD PRESSURE: 80 MMHG | WEIGHT: 160.92 LBS | OXYGEN SATURATION: 93 % | BODY MASS INDEX: 24.47 KG/M2 | SYSTOLIC BLOOD PRESSURE: 120 MMHG | HEART RATE: 102 BPM

## 2018-01-05 DIAGNOSIS — C44.311 BASAL CELL CARCINOMA OF SKIN OF NOSE: Chronic | ICD-10-CM

## 2018-01-05 DIAGNOSIS — R06.00 DYSPNEA, UNSPECIFIED: ICD-10-CM

## 2018-01-05 DIAGNOSIS — D04.9 CARCINOMA IN SITU OF SKIN, UNSPECIFIED: Chronic | ICD-10-CM

## 2018-01-05 PROCEDURE — 99215 OFFICE O/P EST HI 40 MIN: CPT

## 2018-01-05 PROCEDURE — 71046 X-RAY EXAM CHEST 2 VIEWS: CPT | Mod: 26

## 2018-01-05 PROCEDURE — 71046 X-RAY EXAM CHEST 2 VIEWS: CPT

## 2018-01-10 LAB — MISCELLANEOUS TEST NAME: SIGNIFICANT CHANGE UP

## 2018-01-11 ENCOUNTER — APPOINTMENT (OUTPATIENT)
Dept: DERMATOLOGY | Facility: CLINIC | Age: 78
End: 2018-01-11
Payer: MEDICAID

## 2018-01-11 VITALS — SYSTOLIC BLOOD PRESSURE: 122 MMHG | DIASTOLIC BLOOD PRESSURE: 86 MMHG

## 2018-01-11 DIAGNOSIS — R91.8 OTHER NONSPECIFIC ABNORMAL FINDING OF LUNG FIELD: ICD-10-CM

## 2018-01-11 DIAGNOSIS — C34.90 MALIGNANT NEOPLASM OF UNSPECIFIED PART OF UNSPECIFIED BRONCHUS OR LUNG: ICD-10-CM

## 2018-01-11 DIAGNOSIS — J95.811 POSTPROCEDURAL PNEUMOTHORAX: ICD-10-CM

## 2018-01-11 PROCEDURE — 99212 OFFICE O/P EST SF 10 MIN: CPT

## 2018-01-16 ENCOUNTER — OUTPATIENT (OUTPATIENT)
Dept: OUTPATIENT SERVICES | Facility: HOSPITAL | Age: 78
LOS: 1 days | Discharge: HOME | End: 2018-01-16

## 2018-01-16 DIAGNOSIS — C44.311 BASAL CELL CARCINOMA OF SKIN OF NOSE: Chronic | ICD-10-CM

## 2018-01-16 DIAGNOSIS — D04.9 CARCINOMA IN SITU OF SKIN, UNSPECIFIED: Chronic | ICD-10-CM

## 2018-01-16 DIAGNOSIS — C34.90 MALIGNANT NEOPLASM OF UNSPECIFIED PART OF UNSPECIFIED BRONCHUS OR LUNG: ICD-10-CM

## 2018-01-18 ENCOUNTER — RESULT REVIEW (OUTPATIENT)
Age: 78
End: 2018-01-18

## 2018-01-18 ENCOUNTER — APPOINTMENT (OUTPATIENT)
Dept: HEMATOLOGY ONCOLOGY | Facility: CLINIC | Age: 78
End: 2018-01-18

## 2018-01-18 VITALS
DIASTOLIC BLOOD PRESSURE: 77 MMHG | SYSTOLIC BLOOD PRESSURE: 177 MMHG | HEART RATE: 86 BPM | OXYGEN SATURATION: 95 % | RESPIRATION RATE: 16 BRPM | BODY MASS INDEX: 24.47 KG/M2 | TEMPERATURE: 97.6 F | WEIGHT: 160.94 LBS

## 2018-01-29 LAB — NON-GYNECOLOGICAL CYTOLOGY STUDY: SIGNIFICANT CHANGE UP

## 2018-02-01 ENCOUNTER — APPOINTMENT (OUTPATIENT)
Dept: HEMATOLOGY ONCOLOGY | Facility: CLINIC | Age: 78
End: 2018-02-01

## 2018-02-01 LAB — NON-GYNECOLOGICAL CYTOLOGY STUDY: SIGNIFICANT CHANGE UP

## 2018-02-08 ENCOUNTER — RESULT REVIEW (OUTPATIENT)
Age: 78
End: 2018-02-08

## 2018-02-08 ENCOUNTER — APPOINTMENT (OUTPATIENT)
Dept: HEMATOLOGY ONCOLOGY | Facility: CLINIC | Age: 78
End: 2018-02-08

## 2018-02-08 VITALS
HEART RATE: 91 BPM | BODY MASS INDEX: 24.47 KG/M2 | SYSTOLIC BLOOD PRESSURE: 134 MMHG | DIASTOLIC BLOOD PRESSURE: 84 MMHG | RESPIRATION RATE: 16 BRPM | WEIGHT: 160.93 LBS | TEMPERATURE: 98.6 F | OXYGEN SATURATION: 96 %

## 2018-02-16 ENCOUNTER — APPOINTMENT (OUTPATIENT)
Dept: GERIATRICS | Facility: CLINIC | Age: 78
End: 2018-02-16

## 2018-02-27 ENCOUNTER — MEDICATION RENEWAL (OUTPATIENT)
Age: 78
End: 2018-02-27

## 2018-02-27 ENCOUNTER — OUTPATIENT (OUTPATIENT)
Dept: OUTPATIENT SERVICES | Facility: HOSPITAL | Age: 78
LOS: 1 days | Discharge: HOME | End: 2018-02-27

## 2018-02-27 DIAGNOSIS — D04.9 CARCINOMA IN SITU OF SKIN, UNSPECIFIED: Chronic | ICD-10-CM

## 2018-02-27 DIAGNOSIS — C44.311 BASAL CELL CARCINOMA OF SKIN OF NOSE: Chronic | ICD-10-CM

## 2018-02-27 DIAGNOSIS — C34.90 MALIGNANT NEOPLASM OF UNSPECIFIED PART OF UNSPECIFIED BRONCHUS OR LUNG: ICD-10-CM

## 2018-02-27 RX ORDER — TRIAMCINOLONE ACETONIDE 1 MG/G
0.1 OINTMENT TOPICAL
Qty: 454 | Refills: 0 | Status: ACTIVE | COMMUNITY
Start: 2017-12-28 | End: 1900-01-01

## 2018-03-05 ENCOUNTER — RESULT REVIEW (OUTPATIENT)
Age: 78
End: 2018-03-05

## 2018-03-05 ENCOUNTER — APPOINTMENT (OUTPATIENT)
Dept: INFUSION THERAPY | Facility: HOSPITAL | Age: 78
End: 2018-03-05

## 2018-03-05 ENCOUNTER — APPOINTMENT (OUTPATIENT)
Dept: HEMATOLOGY ONCOLOGY | Facility: CLINIC | Age: 78
End: 2018-03-05

## 2018-03-05 VITALS
OXYGEN SATURATION: 96 % | TEMPERATURE: 97.5 F | DIASTOLIC BLOOD PRESSURE: 62 MMHG | RESPIRATION RATE: 16 BRPM | WEIGHT: 152 LBS | BODY MASS INDEX: 23.11 KG/M2 | HEART RATE: 107 BPM | SYSTOLIC BLOOD PRESSURE: 93 MMHG

## 2018-03-05 LAB
ALBUMIN SERPL ELPH-MCNC: 4.2 G/DL — SIGNIFICANT CHANGE UP (ref 3.3–5)
ALP SERPL-CCNC: 52 U/L — SIGNIFICANT CHANGE UP (ref 40–120)
ALT FLD-CCNC: 18 U/L — SIGNIFICANT CHANGE UP (ref 10–45)
ANION GAP SERPL CALC-SCNC: 17 MMOL/L — SIGNIFICANT CHANGE UP (ref 5–17)
AST SERPL-CCNC: 20 U/L — SIGNIFICANT CHANGE UP (ref 10–40)
BASOPHILS # BLD AUTO: 0 K/UL — SIGNIFICANT CHANGE UP (ref 0–0.2)
BASOPHILS NFR BLD AUTO: 0.4 % — SIGNIFICANT CHANGE UP (ref 0–2)
BILIRUB SERPL-MCNC: 0.3 MG/DL — SIGNIFICANT CHANGE UP (ref 0.2–1.2)
BUN SERPL-MCNC: 7 MG/DL — SIGNIFICANT CHANGE UP (ref 7–23)
CALCIUM SERPL-MCNC: 9.6 MG/DL — SIGNIFICANT CHANGE UP (ref 8.4–10.5)
CHLORIDE SERPL-SCNC: 103 MMOL/L — SIGNIFICANT CHANGE UP (ref 96–108)
CO2 SERPL-SCNC: 25 MMOL/L — SIGNIFICANT CHANGE UP (ref 22–31)
CREAT SERPL-MCNC: 1.22 MG/DL — SIGNIFICANT CHANGE UP (ref 0.5–1.3)
EOSINOPHIL # BLD AUTO: 0.1 K/UL — SIGNIFICANT CHANGE UP (ref 0–0.5)
EOSINOPHIL NFR BLD AUTO: 1.1 % — SIGNIFICANT CHANGE UP (ref 0–6)
GLUCOSE SERPL-MCNC: 86 MG/DL — SIGNIFICANT CHANGE UP (ref 70–99)
HCT VFR BLD CALC: 43.3 % — SIGNIFICANT CHANGE UP (ref 39–50)
HGB BLD-MCNC: 14.8 G/DL — SIGNIFICANT CHANGE UP (ref 13–17)
LYMPHOCYTES # BLD AUTO: 1.6 K/UL — SIGNIFICANT CHANGE UP (ref 1–3.3)
LYMPHOCYTES # BLD AUTO: 17.8 % — SIGNIFICANT CHANGE UP (ref 13–44)
MAGNESIUM SERPL-MCNC: 1.8 MG/DL — SIGNIFICANT CHANGE UP (ref 1.6–2.6)
MCHC RBC-ENTMCNC: 31.4 PG — SIGNIFICANT CHANGE UP (ref 27–34)
MCHC RBC-ENTMCNC: 34.2 G/DL — SIGNIFICANT CHANGE UP (ref 32–36)
MCV RBC AUTO: 91.8 FL — SIGNIFICANT CHANGE UP (ref 80–100)
MONOCYTES # BLD AUTO: 0.5 K/UL — SIGNIFICANT CHANGE UP (ref 0–0.9)
MONOCYTES NFR BLD AUTO: 5.5 % — SIGNIFICANT CHANGE UP (ref 2–14)
NEUTROPHILS # BLD AUTO: 6.5 K/UL — SIGNIFICANT CHANGE UP (ref 1.8–7.4)
NEUTROPHILS NFR BLD AUTO: 75.1 % — SIGNIFICANT CHANGE UP (ref 43–77)
PHOSPHATE SERPL-MCNC: 2.8 MG/DL — SIGNIFICANT CHANGE UP (ref 2.5–4.5)
PLATELET # BLD AUTO: 233 K/UL — SIGNIFICANT CHANGE UP (ref 150–400)
POTASSIUM SERPL-MCNC: 3.7 MMOL/L — SIGNIFICANT CHANGE UP (ref 3.5–5.3)
POTASSIUM SERPL-SCNC: 3.7 MMOL/L — SIGNIFICANT CHANGE UP (ref 3.5–5.3)
PROT SERPL-MCNC: 7.2 G/DL — SIGNIFICANT CHANGE UP (ref 6–8.3)
RBC # BLD: 4.71 M/UL — SIGNIFICANT CHANGE UP (ref 4.2–5.8)
RBC # FLD: 12.1 % — SIGNIFICANT CHANGE UP (ref 10.3–14.5)
SODIUM SERPL-SCNC: 145 MMOL/L — SIGNIFICANT CHANGE UP (ref 135–145)
WBC # BLD: 8.7 K/UL — SIGNIFICANT CHANGE UP (ref 3.8–10.5)
WBC # FLD AUTO: 8.7 K/UL — SIGNIFICANT CHANGE UP (ref 3.8–10.5)

## 2018-03-05 RX ORDER — PANTOPRAZOLE SODIUM 20 MG/1
1 TABLET, DELAYED RELEASE ORAL
Qty: 0 | Refills: 0 | COMMUNITY

## 2018-03-05 RX ORDER — METOCLOPRAMIDE HCL 10 MG
1 TABLET ORAL
Qty: 0 | Refills: 0 | COMMUNITY

## 2018-03-06 DIAGNOSIS — E86.0 DEHYDRATION: ICD-10-CM

## 2018-03-09 ENCOUNTER — APPOINTMENT (OUTPATIENT)
Dept: DERMATOLOGY | Facility: CLINIC | Age: 78
End: 2018-03-09
Payer: MEDICAID

## 2018-03-09 VITALS — SYSTOLIC BLOOD PRESSURE: 120 MMHG | DIASTOLIC BLOOD PRESSURE: 64 MMHG

## 2018-03-09 DIAGNOSIS — L71.0 PERIORAL DERMATITIS: ICD-10-CM

## 2018-03-09 PROCEDURE — 99213 OFFICE O/P EST LOW 20 MIN: CPT

## 2018-03-09 RX ORDER — MUPIROCIN 20 MG/G
2 OINTMENT TOPICAL
Qty: 1 | Refills: 0 | Status: ACTIVE | COMMUNITY
Start: 2018-03-09 | End: 1900-01-01

## 2018-03-19 ENCOUNTER — APPOINTMENT (OUTPATIENT)
Dept: HEMATOLOGY ONCOLOGY | Facility: CLINIC | Age: 78
End: 2018-03-19

## 2018-03-19 VITALS
RESPIRATION RATE: 16 BRPM | OXYGEN SATURATION: 92 % | TEMPERATURE: 97.5 F | WEIGHT: 152.12 LBS | DIASTOLIC BLOOD PRESSURE: 69 MMHG | SYSTOLIC BLOOD PRESSURE: 109 MMHG | HEART RATE: 98 BPM | BODY MASS INDEX: 23.13 KG/M2

## 2018-03-19 DIAGNOSIS — R19.7 DIARRHEA, UNSPECIFIED: ICD-10-CM

## 2018-03-19 RX ORDER — AFATINIB 40 MG/1
40 TABLET, FILM COATED ORAL DAILY
Qty: 30 | Refills: 5 | Status: DISCONTINUED | COMMUNITY
Start: 2018-02-08 | End: 2018-03-19

## 2018-03-29 ENCOUNTER — APPOINTMENT (OUTPATIENT)
Dept: HEMATOLOGY ONCOLOGY | Facility: CLINIC | Age: 78
End: 2018-03-29

## 2018-03-29 VITALS
WEIGHT: 151.99 LBS | RESPIRATION RATE: 16 BRPM | HEART RATE: 93 BPM | SYSTOLIC BLOOD PRESSURE: 130 MMHG | OXYGEN SATURATION: 97 % | TEMPERATURE: 97.8 F | DIASTOLIC BLOOD PRESSURE: 85 MMHG | BODY MASS INDEX: 23.11 KG/M2

## 2018-04-09 ENCOUNTER — OUTPATIENT (OUTPATIENT)
Dept: OUTPATIENT SERVICES | Facility: HOSPITAL | Age: 78
LOS: 1 days | Discharge: HOME | End: 2018-04-09

## 2018-04-09 DIAGNOSIS — C44.311 BASAL CELL CARCINOMA OF SKIN OF NOSE: Chronic | ICD-10-CM

## 2018-04-09 DIAGNOSIS — D04.9 CARCINOMA IN SITU OF SKIN, UNSPECIFIED: Chronic | ICD-10-CM

## 2018-04-09 DIAGNOSIS — C34.90 MALIGNANT NEOPLASM OF UNSPECIFIED PART OF UNSPECIFIED BRONCHUS OR LUNG: ICD-10-CM

## 2018-04-12 ENCOUNTER — APPOINTMENT (OUTPATIENT)
Dept: HEMATOLOGY ONCOLOGY | Facility: CLINIC | Age: 78
End: 2018-04-12

## 2018-04-23 ENCOUNTER — RX RENEWAL (OUTPATIENT)
Age: 78
End: 2018-04-23

## 2018-04-26 ENCOUNTER — APPOINTMENT (OUTPATIENT)
Dept: HEMATOLOGY ONCOLOGY | Facility: CLINIC | Age: 78
End: 2018-04-26

## 2018-04-26 VITALS
HEART RATE: 93 BPM | BODY MASS INDEX: 23.97 KG/M2 | DIASTOLIC BLOOD PRESSURE: 75 MMHG | WEIGHT: 157.63 LBS | OXYGEN SATURATION: 96 % | RESPIRATION RATE: 16 BRPM | SYSTOLIC BLOOD PRESSURE: 123 MMHG | TEMPERATURE: 98.2 F

## 2018-04-27 ENCOUNTER — APPOINTMENT (OUTPATIENT)
Dept: DERMATOLOGY | Facility: CLINIC | Age: 78
End: 2018-04-27
Payer: MEDICAID

## 2018-04-27 VITALS — DIASTOLIC BLOOD PRESSURE: 64 MMHG | SYSTOLIC BLOOD PRESSURE: 102 MMHG

## 2018-04-27 DIAGNOSIS — L30.9 DERMATITIS, UNSPECIFIED: ICD-10-CM

## 2018-04-27 PROCEDURE — 99213 OFFICE O/P EST LOW 20 MIN: CPT

## 2018-05-03 RX ORDER — ALOGLIPTIN AND METFORMIN HYDROCHLORIDE 12.5; 5 MG/1; MG/1
12.5-5 TABLET, FILM COATED ORAL
Qty: 30 | Refills: 0 | Status: COMPLETED | COMMUNITY
Start: 2018-01-20

## 2018-05-03 RX ORDER — LIDOCAINE AND PRILOCAINE 25; 25 MG/G; MG/G
2.5-2.5 CREAM TOPICAL
Qty: 30 | Refills: 0 | Status: COMPLETED | COMMUNITY
Start: 2018-03-03

## 2018-05-29 ENCOUNTER — OUTPATIENT (OUTPATIENT)
Dept: OUTPATIENT SERVICES | Facility: HOSPITAL | Age: 78
LOS: 1 days | Discharge: HOME | End: 2018-05-29

## 2018-05-29 DIAGNOSIS — C34.90 MALIGNANT NEOPLASM OF UNSPECIFIED PART OF UNSPECIFIED BRONCHUS OR LUNG: ICD-10-CM

## 2018-05-29 DIAGNOSIS — D04.9 CARCINOMA IN SITU OF SKIN, UNSPECIFIED: Chronic | ICD-10-CM

## 2018-05-29 DIAGNOSIS — C44.311 BASAL CELL CARCINOMA OF SKIN OF NOSE: Chronic | ICD-10-CM

## 2018-06-04 ENCOUNTER — RESULT REVIEW (OUTPATIENT)
Age: 78
End: 2018-06-04

## 2018-06-04 ENCOUNTER — APPOINTMENT (OUTPATIENT)
Dept: GERIATRICS | Facility: CLINIC | Age: 78
End: 2018-06-04
Payer: MEDICAID

## 2018-06-04 ENCOUNTER — APPOINTMENT (OUTPATIENT)
Dept: HEMATOLOGY ONCOLOGY | Facility: CLINIC | Age: 78
End: 2018-06-04

## 2018-06-04 VITALS
RESPIRATION RATE: 16 BRPM | WEIGHT: 154.32 LBS | OXYGEN SATURATION: 93 % | SYSTOLIC BLOOD PRESSURE: 125 MMHG | BODY MASS INDEX: 23.46 KG/M2 | DIASTOLIC BLOOD PRESSURE: 83 MMHG | HEART RATE: 98 BPM | TEMPERATURE: 97.7 F

## 2018-06-04 DIAGNOSIS — F32.9 MAJOR DEPRESSIVE DISORDER, SINGLE EPISODE, UNSPECIFIED: ICD-10-CM

## 2018-06-04 LAB
ALBUMIN SERPL ELPH-MCNC: 4.2 G/DL — SIGNIFICANT CHANGE UP (ref 3.3–5)
ALP SERPL-CCNC: 49 U/L — SIGNIFICANT CHANGE UP (ref 40–120)
ALT FLD-CCNC: 7 U/L — LOW (ref 10–45)
ANION GAP SERPL CALC-SCNC: 15 MMOL/L — SIGNIFICANT CHANGE UP (ref 5–17)
AST SERPL-CCNC: 13 U/L — SIGNIFICANT CHANGE UP (ref 10–40)
BASOPHILS # BLD AUTO: 0 K/UL — SIGNIFICANT CHANGE UP (ref 0–0.2)
BASOPHILS NFR BLD AUTO: 0.1 % — SIGNIFICANT CHANGE UP (ref 0–2)
BILIRUB SERPL-MCNC: 0.2 MG/DL — SIGNIFICANT CHANGE UP (ref 0.2–1.2)
BUN SERPL-MCNC: 14 MG/DL — SIGNIFICANT CHANGE UP (ref 7–23)
CALCIUM SERPL-MCNC: 9.4 MG/DL — SIGNIFICANT CHANGE UP (ref 8.4–10.5)
CHLORIDE SERPL-SCNC: 102 MMOL/L — SIGNIFICANT CHANGE UP (ref 96–108)
CO2 SERPL-SCNC: 25 MMOL/L — SIGNIFICANT CHANGE UP (ref 22–31)
CREAT SERPL-MCNC: 1.11 MG/DL — SIGNIFICANT CHANGE UP (ref 0.5–1.3)
EOSINOPHIL # BLD AUTO: 0.1 K/UL — SIGNIFICANT CHANGE UP (ref 0–0.5)
EOSINOPHIL NFR BLD AUTO: 0.7 % — SIGNIFICANT CHANGE UP (ref 0–6)
GLUCOSE SERPL-MCNC: 135 MG/DL — HIGH (ref 70–99)
LYMPHOCYTES # BLD AUTO: 1.6 K/UL — SIGNIFICANT CHANGE UP (ref 1–3.3)
LYMPHOCYTES # BLD AUTO: 11.4 % — LOW (ref 13–44)
MONOCYTES # BLD AUTO: 0.5 K/UL — SIGNIFICANT CHANGE UP (ref 0–0.9)
MONOCYTES NFR BLD AUTO: 3.9 % — SIGNIFICANT CHANGE UP (ref 2–14)
NEUTROPHILS # BLD AUTO: 11.5 K/UL — HIGH (ref 1.8–7.4)
NEUTROPHILS NFR BLD AUTO: 83.9 % — HIGH (ref 43–77)
POTASSIUM SERPL-MCNC: 4.3 MMOL/L — SIGNIFICANT CHANGE UP (ref 3.5–5.3)
POTASSIUM SERPL-SCNC: 4.3 MMOL/L — SIGNIFICANT CHANGE UP (ref 3.5–5.3)
PROT SERPL-MCNC: 7.1 G/DL — SIGNIFICANT CHANGE UP (ref 6–8.3)
SODIUM SERPL-SCNC: 142 MMOL/L — SIGNIFICANT CHANGE UP (ref 135–145)

## 2018-06-04 PROCEDURE — 99215 OFFICE O/P EST HI 40 MIN: CPT

## 2018-06-04 RX ORDER — SIMVASTATIN 10 MG/1
10 TABLET, FILM COATED ORAL
Qty: 30 | Refills: 0 | Status: COMPLETED | COMMUNITY
Start: 2018-01-20

## 2018-06-04 RX ORDER — FLUTICASONE FUROATE 100 UG/1
100 POWDER RESPIRATORY (INHALATION)
Qty: 30 | Refills: 0 | Status: DISCONTINUED | COMMUNITY
Start: 2018-01-20 | End: 2018-06-04

## 2018-06-04 RX ORDER — METOPROLOL SUCCINATE 25 MG/1
25 TABLET, EXTENDED RELEASE ORAL
Qty: 30 | Refills: 0 | Status: COMPLETED | COMMUNITY
Start: 2018-01-20

## 2018-06-07 ENCOUNTER — FORM ENCOUNTER (OUTPATIENT)
Age: 78
End: 2018-06-07

## 2018-06-08 ENCOUNTER — OUTPATIENT (OUTPATIENT)
Dept: OUTPATIENT SERVICES | Facility: HOSPITAL | Age: 78
LOS: 1 days | End: 2018-06-08
Payer: MEDICAID

## 2018-06-08 ENCOUNTER — APPOINTMENT (OUTPATIENT)
Dept: CT IMAGING | Facility: IMAGING CENTER | Age: 78
End: 2018-06-08

## 2018-06-08 DIAGNOSIS — C44.311 BASAL CELL CARCINOMA OF SKIN OF NOSE: Chronic | ICD-10-CM

## 2018-06-08 DIAGNOSIS — C34.90 MALIGNANT NEOPLASM OF UNSPECIFIED PART OF UNSPECIFIED BRONCHUS OR LUNG: ICD-10-CM

## 2018-06-08 DIAGNOSIS — D04.9 CARCINOMA IN SITU OF SKIN, UNSPECIFIED: Chronic | ICD-10-CM

## 2018-06-08 PROCEDURE — 71250 CT THORAX DX C-: CPT

## 2018-06-08 PROCEDURE — 71250 CT THORAX DX C-: CPT | Mod: 26

## 2018-06-14 ENCOUNTER — APPOINTMENT (OUTPATIENT)
Dept: HEMATOLOGY ONCOLOGY | Facility: CLINIC | Age: 78
End: 2018-06-14
Payer: MEDICAID

## 2018-06-14 VITALS
TEMPERATURE: 97.6 F | RESPIRATION RATE: 16 BRPM | HEART RATE: 88 BPM | SYSTOLIC BLOOD PRESSURE: 112 MMHG | BODY MASS INDEX: 22.59 KG/M2 | DIASTOLIC BLOOD PRESSURE: 74 MMHG | OXYGEN SATURATION: 96 % | WEIGHT: 148.57 LBS

## 2018-06-14 PROCEDURE — 99215 OFFICE O/P EST HI 40 MIN: CPT

## 2018-06-14 RX ORDER — BLOOD SUGAR DIAGNOSTIC
STRIP MISCELLANEOUS
Qty: 100 | Refills: 0 | Status: DISCONTINUED | COMMUNITY
Start: 2018-01-27

## 2018-06-14 RX ORDER — SOFT LENS DISINFECTANT
SOLUTION, NON-ORAL MISCELLANEOUS
Qty: 1 | Refills: 0 | Status: ACTIVE | COMMUNITY
Start: 2018-06-14 | End: 1900-01-01

## 2018-06-28 ENCOUNTER — OUTPATIENT (OUTPATIENT)
Dept: OUTPATIENT SERVICES | Facility: HOSPITAL | Age: 78
LOS: 1 days | Discharge: HOME | End: 2018-06-28

## 2018-06-28 DIAGNOSIS — C44.311 BASAL CELL CARCINOMA OF SKIN OF NOSE: Chronic | ICD-10-CM

## 2018-06-28 DIAGNOSIS — D04.9 CARCINOMA IN SITU OF SKIN, UNSPECIFIED: Chronic | ICD-10-CM

## 2018-06-28 DIAGNOSIS — C34.90 MALIGNANT NEOPLASM OF UNSPECIFIED PART OF UNSPECIFIED BRONCHUS OR LUNG: ICD-10-CM

## 2018-07-01 ENCOUNTER — OUTPATIENT (OUTPATIENT)
Dept: OUTPATIENT SERVICES | Facility: HOSPITAL | Age: 78
LOS: 1 days | End: 2018-07-01
Payer: MEDICAID

## 2018-07-01 DIAGNOSIS — C44.311 BASAL CELL CARCINOMA OF SKIN OF NOSE: Chronic | ICD-10-CM

## 2018-07-01 DIAGNOSIS — D04.9 CARCINOMA IN SITU OF SKIN, UNSPECIFIED: Chronic | ICD-10-CM

## 2018-07-02 ENCOUNTER — APPOINTMENT (OUTPATIENT)
Dept: GERIATRICS | Facility: CLINIC | Age: 78
End: 2018-07-02

## 2018-07-05 ENCOUNTER — INPATIENT (INPATIENT)
Facility: HOSPITAL | Age: 78
LOS: 9 days | Discharge: HOME | DRG: 871 | End: 2018-07-15
Attending: INTERNAL MEDICINE | Admitting: HOSPITALIST
Payer: MEDICAID

## 2018-07-05 ENCOUNTER — APPOINTMENT (OUTPATIENT)
Dept: HEMATOLOGY ONCOLOGY | Facility: CLINIC | Age: 78
End: 2018-07-05

## 2018-07-05 VITALS
RESPIRATION RATE: 24 BRPM | TEMPERATURE: 101 F | DIASTOLIC BLOOD PRESSURE: 79 MMHG | SYSTOLIC BLOOD PRESSURE: 137 MMHG | HEART RATE: 145 BPM | OXYGEN SATURATION: 90 %

## 2018-07-05 DIAGNOSIS — C44.311 BASAL CELL CARCINOMA OF SKIN OF NOSE: Chronic | ICD-10-CM

## 2018-07-05 DIAGNOSIS — A41.9 SEPSIS, UNSPECIFIED ORGANISM: ICD-10-CM

## 2018-07-05 DIAGNOSIS — D04.9 CARCINOMA IN SITU OF SKIN, UNSPECIFIED: Chronic | ICD-10-CM

## 2018-07-05 LAB
ALBUMIN SERPL ELPH-MCNC: 3.8 G/DL — SIGNIFICANT CHANGE UP (ref 3.3–5)
ALP SERPL-CCNC: 56 U/L — SIGNIFICANT CHANGE UP (ref 40–120)
ALT FLD-CCNC: 8 U/L — LOW (ref 10–45)
ANION GAP SERPL CALC-SCNC: 14 MMOL/L — SIGNIFICANT CHANGE UP (ref 5–17)
ANION GAP SERPL CALC-SCNC: 15 MMOL/L — SIGNIFICANT CHANGE UP (ref 5–17)
APPEARANCE UR: CLEAR — SIGNIFICANT CHANGE UP
APTT BLD: 36.2 SEC — SIGNIFICANT CHANGE UP (ref 27.5–37.4)
APTT BLD: 37.3 SEC — SIGNIFICANT CHANGE UP (ref 27.5–37.4)
AST SERPL-CCNC: 6 U/L — LOW (ref 10–40)
BASOPHILS # BLD AUTO: 0 K/UL — SIGNIFICANT CHANGE UP (ref 0–0.2)
BASOPHILS NFR BLD AUTO: 0.1 % — SIGNIFICANT CHANGE UP (ref 0–2)
BILIRUB SERPL-MCNC: 0.5 MG/DL — SIGNIFICANT CHANGE UP (ref 0.2–1.2)
BILIRUB UR-MCNC: NEGATIVE — SIGNIFICANT CHANGE UP
BUN SERPL-MCNC: 15 MG/DL — SIGNIFICANT CHANGE UP (ref 7–23)
BUN SERPL-MCNC: 23 MG/DL — SIGNIFICANT CHANGE UP (ref 7–23)
CALCIUM SERPL-MCNC: 8.3 MG/DL — LOW (ref 8.4–10.5)
CALCIUM SERPL-MCNC: 9.1 MG/DL — SIGNIFICANT CHANGE UP (ref 8.4–10.5)
CHLORIDE SERPL-SCNC: 105 MMOL/L — SIGNIFICANT CHANGE UP (ref 96–108)
CHLORIDE SERPL-SCNC: 97 MMOL/L — SIGNIFICANT CHANGE UP (ref 96–108)
CO2 SERPL-SCNC: 22 MMOL/L — SIGNIFICANT CHANGE UP (ref 22–31)
CO2 SERPL-SCNC: 25 MMOL/L — SIGNIFICANT CHANGE UP (ref 22–31)
COLOR SPEC: YELLOW — SIGNIFICANT CHANGE UP
CREAT SERPL-MCNC: 1.05 MG/DL — SIGNIFICANT CHANGE UP (ref 0.5–1.3)
CREAT SERPL-MCNC: 1.36 MG/DL — HIGH (ref 0.5–1.3)
DIFF PNL FLD: ABNORMAL
EOSINOPHIL # BLD AUTO: 0 K/UL — SIGNIFICANT CHANGE UP (ref 0–0.5)
EOSINOPHIL NFR BLD AUTO: 0 % — SIGNIFICANT CHANGE UP (ref 0–6)
GAS PNL BLDV: SIGNIFICANT CHANGE UP
GAS PNL BLDV: SIGNIFICANT CHANGE UP
GLUCOSE BLDC GLUCOMTR-MCNC: 132 MG/DL — HIGH (ref 70–99)
GLUCOSE BLDC GLUCOMTR-MCNC: 139 MG/DL — HIGH (ref 70–99)
GLUCOSE SERPL-MCNC: 183 MG/DL — HIGH (ref 70–99)
GLUCOSE SERPL-MCNC: 232 MG/DL — HIGH (ref 70–99)
GLUCOSE UR QL: 50 MG/DL
HBA1C BLD-MCNC: 6.2 % — HIGH (ref 4–5.6)
HCT VFR BLD CALC: 32.7 % — LOW (ref 39–50)
HCT VFR BLD CALC: 40.2 % — SIGNIFICANT CHANGE UP (ref 39–50)
HGB BLD-MCNC: 10.9 G/DL — LOW (ref 13–17)
HGB BLD-MCNC: 13.3 G/DL — SIGNIFICANT CHANGE UP (ref 13–17)
INR BLD: 1.4 RATIO — HIGH (ref 0.88–1.16)
INR BLD: 1.41 RATIO — HIGH (ref 0.88–1.16)
KETONES UR-MCNC: NEGATIVE — SIGNIFICANT CHANGE UP
LEUKOCYTE ESTERASE UR-ACNC: NEGATIVE — SIGNIFICANT CHANGE UP
LYMPHOCYTES # BLD AUTO: 1 K/UL — SIGNIFICANT CHANGE UP (ref 1–3.3)
LYMPHOCYTES # BLD AUTO: 4.2 % — LOW (ref 13–44)
MAGNESIUM SERPL-MCNC: 1.6 MG/DL — SIGNIFICANT CHANGE UP (ref 1.6–2.6)
MCHC RBC-ENTMCNC: 29.9 PG — SIGNIFICANT CHANGE UP (ref 27–34)
MCHC RBC-ENTMCNC: 30.4 PG — SIGNIFICANT CHANGE UP (ref 27–34)
MCHC RBC-ENTMCNC: 33.1 GM/DL — SIGNIFICANT CHANGE UP (ref 32–36)
MCHC RBC-ENTMCNC: 33.4 GM/DL — SIGNIFICANT CHANGE UP (ref 32–36)
MCV RBC AUTO: 90.3 FL — SIGNIFICANT CHANGE UP (ref 80–100)
MCV RBC AUTO: 91 FL — SIGNIFICANT CHANGE UP (ref 80–100)
MONOCYTES # BLD AUTO: 0.7 K/UL — SIGNIFICANT CHANGE UP (ref 0–0.9)
MONOCYTES NFR BLD AUTO: 2.9 % — SIGNIFICANT CHANGE UP (ref 2–14)
NEUTROPHILS # BLD AUTO: 23.1 K/UL — HIGH (ref 1.8–7.4)
NEUTROPHILS NFR BLD AUTO: 92.8 % — HIGH (ref 43–77)
NITRITE UR-MCNC: NEGATIVE — SIGNIFICANT CHANGE UP
PH UR: 6 — SIGNIFICANT CHANGE UP (ref 5–8)
PHOSPHATE SERPL-MCNC: 2.2 MG/DL — LOW (ref 2.5–4.5)
PLATELET # BLD AUTO: 136 K/UL — LOW (ref 150–400)
PLATELET # BLD AUTO: 165 K/UL — SIGNIFICANT CHANGE UP (ref 150–400)
POTASSIUM SERPL-MCNC: 4.3 MMOL/L — SIGNIFICANT CHANGE UP (ref 3.5–5.3)
POTASSIUM SERPL-MCNC: 4.5 MMOL/L — SIGNIFICANT CHANGE UP (ref 3.5–5.3)
POTASSIUM SERPL-SCNC: 4.3 MMOL/L — SIGNIFICANT CHANGE UP (ref 3.5–5.3)
POTASSIUM SERPL-SCNC: 4.5 MMOL/L — SIGNIFICANT CHANGE UP (ref 3.5–5.3)
PROT SERPL-MCNC: 7.5 G/DL — SIGNIFICANT CHANGE UP (ref 6–8.3)
PROT UR-MCNC: 30 MG/DL
PROTHROM AB SERPL-ACNC: 15.2 SEC — HIGH (ref 9.8–12.7)
PROTHROM AB SERPL-ACNC: 15.3 SEC — HIGH (ref 9.8–12.7)
RAPID RVP RESULT: SIGNIFICANT CHANGE UP
RBC # BLD: 3.6 M/UL — LOW (ref 4.2–5.8)
RBC # BLD: 4.45 M/UL — SIGNIFICANT CHANGE UP (ref 4.2–5.8)
RBC # FLD: 12.3 % — SIGNIFICANT CHANGE UP (ref 10.3–14.5)
RBC # FLD: 12.4 % — SIGNIFICANT CHANGE UP (ref 10.3–14.5)
RBC CASTS # UR COMP ASSIST: ABNORMAL /HPF (ref 0–2)
SODIUM SERPL-SCNC: 137 MMOL/L — SIGNIFICANT CHANGE UP (ref 135–145)
SODIUM SERPL-SCNC: 141 MMOL/L — SIGNIFICANT CHANGE UP (ref 135–145)
SP GR SPEC: 1.02 — SIGNIFICANT CHANGE UP (ref 1.01–1.02)
UROBILINOGEN FLD QL: NEGATIVE — SIGNIFICANT CHANGE UP
WBC # BLD: 16.1 K/UL — HIGH (ref 3.8–10.5)
WBC # BLD: 24.9 K/UL — HIGH (ref 3.8–10.5)
WBC # FLD AUTO: 16.1 K/UL — HIGH (ref 3.8–10.5)
WBC # FLD AUTO: 24.9 K/UL — HIGH (ref 3.8–10.5)
WBC UR QL: SIGNIFICANT CHANGE UP /HPF (ref 0–5)

## 2018-07-05 PROCEDURE — 70450 CT HEAD/BRAIN W/O DYE: CPT | Mod: 26

## 2018-07-05 PROCEDURE — 71045 X-RAY EXAM CHEST 1 VIEW: CPT | Mod: 26

## 2018-07-05 PROCEDURE — 93010 ELECTROCARDIOGRAM REPORT: CPT

## 2018-07-05 PROCEDURE — 99285 EMERGENCY DEPT VISIT HI MDM: CPT | Mod: 25

## 2018-07-05 PROCEDURE — 71275 CT ANGIOGRAPHY CHEST: CPT | Mod: 26

## 2018-07-05 PROCEDURE — 72125 CT NECK SPINE W/O DYE: CPT | Mod: 26

## 2018-07-05 RX ORDER — SODIUM CHLORIDE 9 MG/ML
1000 INJECTION INTRAMUSCULAR; INTRAVENOUS; SUBCUTANEOUS ONCE
Qty: 0 | Refills: 0 | Status: COMPLETED | OUTPATIENT
Start: 2018-07-05 | End: 2018-07-05

## 2018-07-05 RX ORDER — INSULIN LISPRO 100/ML
VIAL (ML) SUBCUTANEOUS
Qty: 0 | Refills: 0 | Status: DISCONTINUED | OUTPATIENT
Start: 2018-07-05 | End: 2018-07-15

## 2018-07-05 RX ORDER — CEFEPIME 1 G/1
2000 INJECTION, POWDER, FOR SOLUTION INTRAMUSCULAR; INTRAVENOUS EVERY 12 HOURS
Qty: 0 | Refills: 0 | Status: DISCONTINUED | OUTPATIENT
Start: 2018-07-05 | End: 2018-07-12

## 2018-07-05 RX ORDER — NOREPINEPHRINE BITARTRATE/D5W 8 MG/250ML
0.05 PLASTIC BAG, INJECTION (ML) INTRAVENOUS
Qty: 8 | Refills: 0 | Status: DISCONTINUED | OUTPATIENT
Start: 2018-07-05 | End: 2018-07-06

## 2018-07-05 RX ORDER — ENOXAPARIN SODIUM 100 MG/ML
30 INJECTION SUBCUTANEOUS DAILY
Qty: 0 | Refills: 0 | Status: DISCONTINUED | OUTPATIENT
Start: 2018-07-05 | End: 2018-07-06

## 2018-07-05 RX ORDER — SODIUM CHLORIDE 9 MG/ML
2000 INJECTION, SOLUTION INTRAVENOUS ONCE
Qty: 0 | Refills: 0 | Status: COMPLETED | OUTPATIENT
Start: 2018-07-05 | End: 2018-07-05

## 2018-07-05 RX ORDER — IPRATROPIUM/ALBUTEROL SULFATE 18-103MCG
3 AEROSOL WITH ADAPTER (GRAM) INHALATION ONCE
Qty: 0 | Refills: 0 | Status: COMPLETED | OUTPATIENT
Start: 2018-07-05 | End: 2018-07-05

## 2018-07-05 RX ORDER — TAMSULOSIN HYDROCHLORIDE 0.4 MG/1
0.4 CAPSULE ORAL AT BEDTIME
Qty: 0 | Refills: 0 | Status: DISCONTINUED | OUTPATIENT
Start: 2018-07-05 | End: 2018-07-15

## 2018-07-05 RX ORDER — DEXTROSE 50 % IN WATER 50 %
25 SYRINGE (ML) INTRAVENOUS ONCE
Qty: 0 | Refills: 0 | Status: DISCONTINUED | OUTPATIENT
Start: 2018-07-05 | End: 2018-07-15

## 2018-07-05 RX ORDER — MIRTAZAPINE 45 MG/1
15 TABLET, ORALLY DISINTEGRATING ORAL AT BEDTIME
Qty: 0 | Refills: 0 | Status: DISCONTINUED | OUTPATIENT
Start: 2018-07-05 | End: 2018-07-15

## 2018-07-05 RX ORDER — SODIUM,POTASSIUM PHOSPHATES 278-250MG
1 POWDER IN PACKET (EA) ORAL
Qty: 0 | Refills: 0 | Status: COMPLETED | OUTPATIENT
Start: 2018-07-05 | End: 2018-07-06

## 2018-07-05 RX ORDER — DEXTROSE 50 % IN WATER 50 %
15 SYRINGE (ML) INTRAVENOUS ONCE
Qty: 0 | Refills: 0 | Status: DISCONTINUED | OUTPATIENT
Start: 2018-07-05 | End: 2018-07-15

## 2018-07-05 RX ORDER — AZITHROMYCIN 500 MG/1
500 TABLET, FILM COATED ORAL DAILY
Qty: 0 | Refills: 0 | Status: DISCONTINUED | OUTPATIENT
Start: 2018-07-05 | End: 2018-07-05

## 2018-07-05 RX ORDER — MAGNESIUM SULFATE 500 MG/ML
2 VIAL (ML) INJECTION ONCE
Qty: 0 | Refills: 0 | Status: COMPLETED | OUTPATIENT
Start: 2018-07-05 | End: 2018-07-05

## 2018-07-05 RX ORDER — SODIUM CHLORIDE 9 MG/ML
3 INJECTION INTRAMUSCULAR; INTRAVENOUS; SUBCUTANEOUS ONCE
Qty: 0 | Refills: 0 | Status: COMPLETED | OUTPATIENT
Start: 2018-07-05 | End: 2018-07-05

## 2018-07-05 RX ORDER — SENNA PLUS 8.6 MG/1
2 TABLET ORAL AT BEDTIME
Qty: 0 | Refills: 0 | Status: DISCONTINUED | OUTPATIENT
Start: 2018-07-05 | End: 2018-07-15

## 2018-07-05 RX ORDER — CEFEPIME 1 G/1
2000 INJECTION, POWDER, FOR SOLUTION INTRAMUSCULAR; INTRAVENOUS ONCE
Qty: 0 | Refills: 0 | Status: COMPLETED | OUTPATIENT
Start: 2018-07-05 | End: 2018-07-05

## 2018-07-05 RX ORDER — TENOFOVIR DISOPROXIL FUMARATE 300 MG/1
300 TABLET, FILM COATED ORAL DAILY
Qty: 0 | Refills: 0 | Status: DISCONTINUED | OUTPATIENT
Start: 2018-07-05 | End: 2018-07-15

## 2018-07-05 RX ORDER — INSULIN LISPRO 100/ML
VIAL (ML) SUBCUTANEOUS AT BEDTIME
Qty: 0 | Refills: 0 | Status: DISCONTINUED | OUTPATIENT
Start: 2018-07-05 | End: 2018-07-15

## 2018-07-05 RX ORDER — SIMVASTATIN 20 MG/1
10 TABLET, FILM COATED ORAL AT BEDTIME
Qty: 0 | Refills: 0 | Status: DISCONTINUED | OUTPATIENT
Start: 2018-07-05 | End: 2018-07-15

## 2018-07-05 RX ORDER — DEXTROSE 50 % IN WATER 50 %
12.5 SYRINGE (ML) INTRAVENOUS ONCE
Qty: 0 | Refills: 0 | Status: DISCONTINUED | OUTPATIENT
Start: 2018-07-05 | End: 2018-07-15

## 2018-07-05 RX ORDER — GLUCAGON INJECTION, SOLUTION 0.5 MG/.1ML
1 INJECTION, SOLUTION SUBCUTANEOUS ONCE
Qty: 0 | Refills: 0 | Status: DISCONTINUED | OUTPATIENT
Start: 2018-07-05 | End: 2018-07-15

## 2018-07-05 RX ORDER — GABAPENTIN 400 MG/1
100 CAPSULE ORAL AT BEDTIME
Qty: 0 | Refills: 0 | Status: DISCONTINUED | OUTPATIENT
Start: 2018-07-05 | End: 2018-07-06

## 2018-07-05 RX ORDER — METFORMIN HYDROCHLORIDE 850 MG/1
1 TABLET ORAL
Qty: 0 | Refills: 0 | COMMUNITY

## 2018-07-05 RX ORDER — ENOXAPARIN SODIUM 100 MG/ML
100 INJECTION SUBCUTANEOUS
Qty: 0 | Refills: 0 | COMMUNITY

## 2018-07-05 RX ORDER — SODIUM CHLORIDE 9 MG/ML
1000 INJECTION, SOLUTION INTRAVENOUS
Qty: 0 | Refills: 0 | Status: DISCONTINUED | OUTPATIENT
Start: 2018-07-05 | End: 2018-07-15

## 2018-07-05 RX ORDER — VANCOMYCIN HCL 1 G
1000 VIAL (EA) INTRAVENOUS EVERY 24 HOURS
Qty: 0 | Refills: 0 | Status: DISCONTINUED | OUTPATIENT
Start: 2018-07-06 | End: 2018-07-06

## 2018-07-05 RX ORDER — ONDANSETRON 8 MG/1
4 TABLET, FILM COATED ORAL EVERY 8 HOURS
Qty: 0 | Refills: 0 | Status: DISCONTINUED | OUTPATIENT
Start: 2018-07-05 | End: 2018-07-15

## 2018-07-05 RX ORDER — SODIUM CHLORIDE 9 MG/ML
1000 INJECTION, SOLUTION INTRAVENOUS
Qty: 0 | Refills: 0 | Status: DISCONTINUED | OUTPATIENT
Start: 2018-07-05 | End: 2018-07-05

## 2018-07-05 RX ORDER — SODIUM CHLORIDE 9 MG/ML
1000 INJECTION INTRAMUSCULAR; INTRAVENOUS; SUBCUTANEOUS
Qty: 0 | Refills: 0 | Status: DISCONTINUED | OUTPATIENT
Start: 2018-07-05 | End: 2018-07-05

## 2018-07-05 RX ORDER — CEFEPIME 1 G/1
2000 INJECTION, POWDER, FOR SOLUTION INTRAMUSCULAR; INTRAVENOUS EVERY 12 HOURS
Qty: 0 | Refills: 0 | Status: DISCONTINUED | OUTPATIENT
Start: 2018-07-05 | End: 2018-07-05

## 2018-07-05 RX ORDER — PIPERACILLIN AND TAZOBACTAM 4; .5 G/20ML; G/20ML
3.38 INJECTION, POWDER, LYOPHILIZED, FOR SOLUTION INTRAVENOUS EVERY 8 HOURS
Qty: 0 | Refills: 0 | Status: DISCONTINUED | OUTPATIENT
Start: 2018-07-05 | End: 2018-07-05

## 2018-07-05 RX ORDER — NOREPINEPHRINE BITARTRATE/D5W 8 MG/250ML
0.05 PLASTIC BAG, INJECTION (ML) INTRAVENOUS
Qty: 8 | Refills: 0 | Status: DISCONTINUED | OUTPATIENT
Start: 2018-07-05 | End: 2018-07-05

## 2018-07-05 RX ORDER — VANCOMYCIN HCL 1 G
1000 VIAL (EA) INTRAVENOUS ONCE
Qty: 0 | Refills: 0 | Status: COMPLETED | OUTPATIENT
Start: 2018-07-05 | End: 2018-07-05

## 2018-07-05 RX ADMIN — SIMVASTATIN 10 MILLIGRAM(S): 20 TABLET, FILM COATED ORAL at 23:27

## 2018-07-05 RX ADMIN — SODIUM CHLORIDE 150 MILLILITER(S): 9 INJECTION, SOLUTION INTRAVENOUS at 10:30

## 2018-07-05 RX ADMIN — Medication 50 GRAM(S): at 18:14

## 2018-07-05 RX ADMIN — Medication 3 MILLILITER(S): at 10:53

## 2018-07-05 RX ADMIN — SODIUM CHLORIDE 1000 MILLILITER(S): 9 INJECTION INTRAMUSCULAR; INTRAVENOUS; SUBCUTANEOUS at 08:15

## 2018-07-05 RX ADMIN — CEFEPIME 100 MILLIGRAM(S): 1 INJECTION, POWDER, FOR SOLUTION INTRAMUSCULAR; INTRAVENOUS at 18:14

## 2018-07-05 RX ADMIN — SODIUM CHLORIDE 3 MILLILITER(S): 9 INJECTION INTRAMUSCULAR; INTRAVENOUS; SUBCUTANEOUS at 07:30

## 2018-07-05 RX ADMIN — TAMSULOSIN HYDROCHLORIDE 0.4 MILLIGRAM(S): 0.4 CAPSULE ORAL at 23:27

## 2018-07-05 RX ADMIN — Medication 1 TABLET(S): at 23:26

## 2018-07-05 RX ADMIN — SODIUM CHLORIDE 3000 MILLILITER(S): 9 INJECTION INTRAMUSCULAR; INTRAVENOUS; SUBCUTANEOUS at 12:55

## 2018-07-05 RX ADMIN — SENNA PLUS 2 TABLET(S): 8.6 TABLET ORAL at 23:26

## 2018-07-05 RX ADMIN — Medication 250 MILLIGRAM(S): at 08:15

## 2018-07-05 RX ADMIN — GABAPENTIN 100 MILLIGRAM(S): 400 CAPSULE ORAL at 23:26

## 2018-07-05 RX ADMIN — MIRTAZAPINE 15 MILLIGRAM(S): 45 TABLET, ORALLY DISINTEGRATING ORAL at 23:58

## 2018-07-05 RX ADMIN — CEFEPIME 100 MILLIGRAM(S): 1 INJECTION, POWDER, FOR SOLUTION INTRAMUSCULAR; INTRAVENOUS at 07:40

## 2018-07-05 RX ADMIN — TENOFOVIR DISOPROXIL FUMARATE 300 MILLIGRAM(S): 300 TABLET, FILM COATED ORAL at 18:14

## 2018-07-05 RX ADMIN — SODIUM CHLORIDE 2000 MILLILITER(S): 9 INJECTION, SOLUTION INTRAVENOUS at 07:35

## 2018-07-05 NOTE — H&P ADULT - NSHPLABSRESULTS_GEN_ALL_CORE
CT brain:    No acute intracranial hemorrhage, mass effect, vasogenic edema, or   evidence of acute territorial infarct. No lytic or destructive osseous   lesion.    Moderate white matter microvascular ischemic disease.    CT cervical spine:    No acute fracture or traumatic subluxation. No prevertebral soft tissue   swelling. Degenerative changes.    CTA:  No pulmonary embolism.    New consolidative opacities in the bilateral lower lobes, likely   pneumonia.     Additional masslike consolidation throughout the bilateral lungs   demonstrate increase in size compared to 6/8/2018.    Extensive bilateral lower lobe and central airway secretions.    New conglomerate adenopathy in the subcarinal region. LABS:                        10.9   16.1  )-----------( 136      ( 2018 15:50 )             32.7     07-05    141  |  105  |  15  ----------------------------<  183<H>  4.3   |  22  |  1.05    Ca    8.3<L>      2018 15:50  Phos  2.2     07-  Mg     1.6     -    TPro  7.5  /  Alb  3.8  /  TBili  0.5  /  DBili  x   /  AST  6<L>  /  ALT  8<L>  /  AlkPhos  56  07-05    PT/INR - ( 2018 15:50 )   PT: 15.2 sec;   INR: 1.40 ratio         PTT - ( 2018 15:50 )  PTT:36.2 sec      Urinalysis Basic - ( 2018 12:36 )    Color: Yellow / Appearance: Clear / S.025 / pH: x  Gluc: x / Ketone: Negative  / Bili: Negative / Urobili: Negative   Blood: x / Protein: 30 mg/dL / Nitrite: Negative   Leuk Esterase: Negative / RBC: 5-10 /HPF / WBC 0-2 /HPF   Sq Epi: x / Non Sq Epi: x / Bacteria: x        CT brain:    No acute intracranial hemorrhage, mass effect, vasogenic edema, or   evidence of acute territorial infarct. No lytic or destructive osseous   lesion.    Moderate white matter microvascular ischemic disease.    CT cervical spine:    No acute fracture or traumatic subluxation. No prevertebral soft tissue   swelling. Degenerative changes.    CTA:  No pulmonary embolism.    New consolidative opacities in the bilateral lower lobes, likely   pneumonia.     Additional masslike consolidation throughout the bilateral lungs   demonstrate increase in size compared to 2018.    Extensive bilateral lower lobe and central airway secretions.    New conglomerate adenopathy in the subcarinal region.

## 2018-07-05 NOTE — ED ADULT NURSE NOTE - OBJECTIVE STATEMENT
76 y/o M pt w/ pmh of lung ca, DM, HTN, HLD BPH, COPD, present AMS, confusion, diaphoresis. Per family pt fell yesterday, vomited and seemed confused after falling, pt slept downstairs on mattress, this morning pt still confused, hard to aroused and was diaphoretic and warm so pt brought family into hospital.  As pe EMS, pt unresponsive, was found to be 88% on RA, placed on 100% NRB and hypotensive initially, here in ED on exam pt lethargy, responsive to all stimuli, A&Ox1, pupils pinpoint, 1 and reactive to light, lungs b/l expiratory wheezing, Rhonchi to the bases, sating 97% on 100% NRB, pt febrile 102.5 rectal, tachycardic 140's, placed CM, EKG done and given to MD, heplock placed, labs drawn and sent, started on IV fluids, IV Tylenol and antibiotics 76 y/o M pt w/ pmh of lung ca, DM, HTN, HLD BPH, COPD, present AMS, confusion, diaphoresis. Per family pt fell yesterday, vomited and seemed confused after falling, pt slept downstairs on mattress, this morning pt still confused, hard to aroused and was diaphoretic and warm so pt brought family into hospital.  As pe EMS, pt unresponsive, was found to be 88% on RA, placed on 100% NRB and hypotensive initially, here in ED on exam pt lethargy, responsive to all stimuli, A&Ox1, pupils pinpoint, 1 and reactive to light, lungs b/l expiratory wheezing, Rhonchi to the bases, sating 97% on 100% NRB, pt febrile 102.5 rectal, tachycardic 140's, placed CM, EKG done and given to MD, jackson placed, labs drawn and sent, started on IV fluids, IV Tylenol and antibiotics    0830: pt became hypotensive 80's/60's, MD Rangel at bedside, pt given 3rd bolus NS, pt tachycardic 130's, pt respond to bolus, b/p now 104/64, will continue to monitor pt    1205: pt b/p 80's/50's, Jennifer Urrutia at bedside, 1L NS ordered on given with no effect, pt started on Levo drip .01mcg, b/p now 120's/70's, on CM sinus tachy, admitted to MICU, awaits bed, will continue to monitor

## 2018-07-05 NOTE — H&P ADULT - NSHPSOCIALHISTORY_GEN_ALL_CORE
Immigrated from [place] 4[place]5 years ago Immigrated from [place] 4[place]5 years ago.  Former smoker, 50 years, stopped 2 years ago

## 2018-07-05 NOTE — H&P ADULT - NSHPOUTPATIENTPROVIDERS_GEN_ALL_CORE
Dr. Gipson (hematologist/oncologist) Dr. Brandan Gross (hematologist/oncologist, [facility])

## 2018-07-05 NOTE — H&P ADULT - HISTORY OF PRESENT ILLNESS
78 y/o male PMHx of COPD, HTN, HLD, Diabetes, and lung cancer being treated with chemotherapy presented to the ED via ambulance for difficulty to arouse. As per family, the patient had a fall due to dizziness yesterday, and had an episode of vomiting, followed by confusion. The night prior to presentation, patient was extremely diaphoretic. When the patient was difficult to arouse, he was brought in by ambulance. As per EMS, the patient was 88% so the patient was started on a non-rebreather. In the ED, his temperature was 102.5 and he was tachycardic into the 110's. In addition, he was hypotensive, originally responsive to fluids. A head and spine CT revealed no abnormalities. A CT angiography of the chest revealed new bilateral lower lobe opacities, as well as nodules and adenopathy related to malignancy. The patient was started on antibiotics. His blood pressure began to drop, remaining unresponsive to fluids, so a levophed drip was started. The patient was transferred to the MICU for septic shock 2/2 to CAP, requiring pressor support Translated by son per patient request.    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 As per family, the patient had a fall due to dizziness yesterday (no palpitations), and had an episode of vomiting, followed by confusion. The night prior to presentation, patient was extremely diaphoretic. When the patient was difficult to arouse, he was brought in by ambulance. The family notes that the patient has been dizzy for a few days with diaphoresis, and has been having an increasing NP but wet cough without SOB or CP. No diarrhea or dysuria. He also endorses fevers and chills. Appetite has been good. He lives at home with his wife.  Of note, he had a CT chest performed a few weeks ago and was supposed to have an appointment outpatient to discuss the results of the scan. He is still on active chemotherapy, PO without a port.    As per EMS, the patient was 88%, and was started on a non-rebreather. In the ED, his temperature was 102.5 and he was tachycardic to the 140's and improved to the 110s. In addition, he was hypotensive to 83/57, originally responsive to fluids to 100s/60s. He was weaned down to a NC, 4L. MICU was consulted 2 hours prior to this admission for hypotension and rejected initially after 3L IVFs had been given with improved BPs and were reconsulted when his pressures decreased down to the 80s SBP.    He was given Cefepime and Vancomycin in the ED.

## 2018-07-05 NOTE — H&P ADULT - ASSESSMENT
76 y/o male with PMHx of COPD, HTN, HLD, and lung cancer receiving chemotherapy admitted to the MICU from the ED for septic shock secondary to CAP, with hypotension requiring pressor support.     Neuro:  1. patient satting well on NC  2. difficult to arouse, unable to answer questions (not his baseline as per family)  3. not intubated    CV:  1. blood pressures unresponsive to fluids, on levophed   2. no cardiovascular issues     Pulm:  1. not intubated  2. satting well on NC   3. CTA showed bilateral upper lobe consolidations, likely pneumonia, along with numerous nodules and adenopathy likely representative of known malignancy  4. started on Zosyn and Azithromycin for ppx  5. Testing for TB (sent for quantiferon and sputum AFB)    GI:  1. no active issues    Endocrine:  1. T2DM  2. put on Insulin sliding scale    Heme:  1. 76 y/o male with PMHx of COPD, HTN, HLD, and lung cancer receiving chemotherapy admitted to the MICU from the ED for septic shock secondary to CAP, with hypotension requiring pressor support.     Neuro:  1. patient satting well on NC  2. difficult to arouse, unable to answer questions (not his baseline as per family)  3. not intubated    CV:  1. blood pressures unresponsive to fluids, on levophed   2. no cardiovascular issues     Pulm:  1. not intubated  2. satting well on NC   3. CTA showed bilateral upper lobe consolidations, likely pneumonia, along with numerous nodules and adenopathy likely representative of known malignancy  4. started on Zosyn and Azithromycin for ppx  5. Testing for TB (sent for quantiferon and sputum AFB)    GI:  1. no active issues    Endocrine:  1. T2DM  2. put on Insulin sliding scale    Heme:  1. Hgb stable   2. WBC increased to 24- started on antibiotics    Renal:  1. Slight LUX to 1.3 (baseline renal function 1.2)  2. 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 admitted to the MICU from the ED for septic shock secondary to CAP, with hypotension requiring pressor support.     Neuro:  1. Difficult to arouse, unable to answer questions (not his baseline as per family). Metabolic encephalopathy from sepsis. Treatment as noted below.  2. No need for sedation, not agitated  3. Mirtazapine for depression    CV:  1. Septic shock likely 2/2 PNA. Blood pressures initially unresponsive to fluids, now on levophed, MAP > 65   2. Received 4L IVFs    Pulm:  1. Not intubated  2. Satting well on NC  3. CTA showed large L sided mass with new R sided consolidations, that appear to be due to pneumonia, along with numerous nodules and adenopathy likely representative of known malignancy. Pneumonia may be post-obstructive.  4. C/w Cefepime and Vanc for abx in the setting of immunsuppression from chemotherapy  5. Tessalon perrles PRN for cough, chest PT, periodic suctioning  6. Testing for TB (sent for Quantiferon and sputum AFB)    GI:  1. HBV - c/w Tenofovir  2. Diabetic diet  3. Zofran PRN for nausea    ID:  1. Septic shock 2/2 PNA  2. Patient is not neutropenic but is immunosuppressed. Will continue Cefepime and Vancomycin, check Vanc troughs Q24  3. F/u blood cx, AFB x3, and Quant Gold as noted above. Airborne precautions  4. F/u urine legionella and RVP    Endocrine:  1. T2DM, on Metformin at home  2. Put on Insulin sliding scale  3. Check A1C    Heme/Onc:  1. Patient with stage IV SCC (although no known mets to the brain or bone). On Ifatinib, not on palliative chemo.  2. Patient is not neutropenic  3. Oncology consulted (Noe), will see patient in AM, hold chemo  4. Hgb dropped but appears to be dilutional    Renal:  1. Slight LUX to creatinine of 1.3 (baseline renal function 1.2), but now improved to 1.0  2. S/p 4L IVFs    DVT PPX:  Lovenox

## 2018-07-05 NOTE — H&P ADULT - FAMILY HISTORY
Family history of liver cancer     Family history of liver cancer, Mother      Sibling  Still living? Yes, Estimated age: Age Unknown  Family history of stomach cancer, Age at diagnosis: Age Unknown     Sibling  Still living? Yes, Estimated age: Age Unknown  Family history of diabetes mellitus, Age at diagnosis: Age Unknown

## 2018-07-05 NOTE — H&P ADULT - NSHPPHYSICALEXAM_GEN_ALL_CORE
· CONSTITUTIONAL: ill appearing, awake, responsive to verbal stimuli, following commands,  · EYES: Clear bilaterally, pupils equal, round and reactive to light. 2mm reactive b/l.  · CARDIAC: tachy rate, regular rhythm.  · RESPIRATORY: - - -  · Respiratory Distress: YES  · Breath Sounds: RHONCHI  · Rhonchi: DIFFUSE  · GASTROINTESTINAL: Abdomen soft, non-tender, no guarding.  · MUSCULOSKELETAL: Spine appears normal, range of motion is not limited, no muscle or joint tenderness  · NEUROLOGICAL: Alert and oriented, no focal deficits, no motor or sensory deficits.  · SKIN: Skin normal color for race, warm, dry and intact. No evidence of rash. · CONSTITUTIONAL: ill appearing, awake, responsive to verbal stimuli, following commands,  · EYES: Clear bilaterally, pupils equal, round and reactive to light. 2mm reactive b/l.  · CARDIAC: tachy rate, regular rhythm.  · RESPIRATORY: - - -  · Respiratory Distress: YES  · Breath Sounds: RHONCHI  · Rhonchi: DIFFUSE  · GASTROINTESTINAL: Abdomen soft, non-tender, no guarding.  · MUSCULOSKELETAL: Spine appears normal, range of motion is not limited, no muscle or joint tenderness  · NEUROLOGICAL: Difficult to arouse  · SKIN: Skin normal color for race, warm, dry and intact. No evidence of rash. GENERAL: Initially no acute distress, then more lethargic on repeat exam  HEENT: PERRL, EOMI, MMM, no oropharyngeal lesions  NECK: supple, no stiffness, no JVD, no thyromegaly  PULM: respirations non-labored, anterior wheezing bilaterally with posterior wheezing upper fields bilaterally and posterior lower lobe rhonchi bilaterally  CV: regular rate and rhythm, no murmurs, gallops, or rubs  GI: abdomen soft, nontender, nondistended, no masses felt, normal bowel sounds  MSK: no joint swelling, erythema, or warmth.  LYMPH: no anterior cervical, posterior cervical, supraclavicular, or inguinal lymphadenopathy  NEURO: A&Ox3 initially, then more lethargic, minimally responsive to commands, no tremors  SKIN: no rashes, lesions, or edema

## 2018-07-05 NOTE — ED PROVIDER NOTE - MEDICAL DECISION MAKING DETAILS
76 y/o male with history of lung CA currently on chemotherapy presenting to the ED after he was found unresponsive by his wife this morning. Family reports that the patient was feeling ill yesterday and was having trouble walking last night, falling to the floor on one occasion without hitting his head. Upon arrival, patient i 78 y/o male with history of lung CA currently on chemotherapy presenting to the ED after he was found unresponsive by his wife this morning. Family reports that the patient was feeling ill yesterday and was having trouble walking last night, falling to the floor on one occasion without hitting his head. Upon arrival, patient is found with decreased LOC, febrile, and tachycardic. EKG shows sinus tachycardia with normal ST segments. After 1 L Ringer's lactate, patient became more responsive. CXR shows multiple opacities on right hemithorax and a mass on left hemithorax, for which the opacities at the right hemithorax were not present compared to a previous x-ray done this year. Patient appears to be septic secondary to CAP. Will order labs and start the patient on antibiotics. CT scan of the head was ordered for screening for secondary causes of patient's decreased LOC and chest CTA.

## 2018-07-05 NOTE — ED PROVIDER NOTE - PROGRESS NOTE DETAILS
Pt was hypotensive 80s/50s after 2L, 3L ordered and MICU consulted Pressure now 100s/60 after 3L and on maintenance. Pt does not need ICU per MICU team will admit to medicine. Tele secondary to syncopal event yesterday.

## 2018-07-05 NOTE — ED PROVIDER NOTE - OBJECTIVE STATEMENT
77 YOM pmh of lung ca p/w AMS, confusion, diaphoresis. Per family pt fell yesterday, vomited and seemed confused after falling, pt slept downstairs on mattress, this morning pt remained confused and was diaphoretic and warm so pt brought family into hospital. Pt is on lovenox, pt has hx of COPD. Pt not answering questions, on non-rebreather, was found to be 88% on RA by EMS and hypotensive initially, first BP is 140s/70 here.

## 2018-07-06 LAB
ANION GAP SERPL CALC-SCNC: 10 MMOL/L — SIGNIFICANT CHANGE UP (ref 5–17)
APTT BLD: 33.1 SEC — SIGNIFICANT CHANGE UP (ref 27.5–37.4)
BUN SERPL-MCNC: 11 MG/DL — SIGNIFICANT CHANGE UP (ref 7–23)
CALCIUM SERPL-MCNC: 8.2 MG/DL — LOW (ref 8.4–10.5)
CHLORIDE SERPL-SCNC: 105 MMOL/L — SIGNIFICANT CHANGE UP (ref 96–108)
CO2 SERPL-SCNC: 24 MMOL/L — SIGNIFICANT CHANGE UP (ref 22–31)
CREAT SERPL-MCNC: 0.83 MG/DL — SIGNIFICANT CHANGE UP (ref 0.5–1.3)
CULTURE RESULTS: NO GROWTH — SIGNIFICANT CHANGE UP
GLUCOSE BLDC GLUCOMTR-MCNC: 130 MG/DL — HIGH (ref 70–99)
GLUCOSE BLDC GLUCOMTR-MCNC: 143 MG/DL — HIGH (ref 70–99)
GLUCOSE BLDC GLUCOMTR-MCNC: 149 MG/DL — HIGH (ref 70–99)
GLUCOSE BLDC GLUCOMTR-MCNC: 167 MG/DL — HIGH (ref 70–99)
GLUCOSE SERPL-MCNC: 139 MG/DL — HIGH (ref 70–99)
HCT VFR BLD CALC: 35.4 % — LOW (ref 39–50)
HGB BLD-MCNC: 11.8 G/DL — LOW (ref 13–17)
INR BLD: 1.36 RATIO — HIGH (ref 0.88–1.16)
MAGNESIUM SERPL-MCNC: 1.8 MG/DL — SIGNIFICANT CHANGE UP (ref 1.6–2.6)
MCHC RBC-ENTMCNC: 30.4 PG — SIGNIFICANT CHANGE UP (ref 27–34)
MCHC RBC-ENTMCNC: 33.3 GM/DL — SIGNIFICANT CHANGE UP (ref 32–36)
MCV RBC AUTO: 91.1 FL — SIGNIFICANT CHANGE UP (ref 80–100)
NIGHT BLUE STAIN TISS: SIGNIFICANT CHANGE UP
NIGHT BLUE STAIN TISS: SIGNIFICANT CHANGE UP
PHOSPHATE SERPL-MCNC: 1.3 MG/DL — LOW (ref 2.5–4.5)
PLATELET # BLD AUTO: 138 K/UL — LOW (ref 150–400)
POTASSIUM SERPL-MCNC: 3.9 MMOL/L — SIGNIFICANT CHANGE UP (ref 3.5–5.3)
POTASSIUM SERPL-SCNC: 3.9 MMOL/L — SIGNIFICANT CHANGE UP (ref 3.5–5.3)
PROTHROM AB SERPL-ACNC: 14.8 SEC — HIGH (ref 9.8–12.7)
RBC # BLD: 3.89 M/UL — LOW (ref 4.2–5.8)
RBC # FLD: 12.4 % — SIGNIFICANT CHANGE UP (ref 10.3–14.5)
SODIUM SERPL-SCNC: 139 MMOL/L — SIGNIFICANT CHANGE UP (ref 135–145)
SPECIMEN SOURCE: SIGNIFICANT CHANGE UP
WBC # BLD: 15.8 K/UL — HIGH (ref 3.8–10.5)
WBC # FLD AUTO: 15.8 K/UL — HIGH (ref 3.8–10.5)

## 2018-07-06 PROCEDURE — 99291 CRITICAL CARE FIRST HOUR: CPT

## 2018-07-06 PROCEDURE — 74177 CT ABD & PELVIS W/CONTRAST: CPT | Mod: 26

## 2018-07-06 PROCEDURE — 99223 1ST HOSP IP/OBS HIGH 75: CPT | Mod: GC

## 2018-07-06 RX ORDER — SODIUM CHLORIDE 9 MG/ML
1000 INJECTION INTRAMUSCULAR; INTRAVENOUS; SUBCUTANEOUS
Qty: 0 | Refills: 0 | Status: DISCONTINUED | OUTPATIENT
Start: 2018-07-06 | End: 2018-07-08

## 2018-07-06 RX ORDER — GABAPENTIN 400 MG/1
100 CAPSULE ORAL
Qty: 0 | Refills: 0 | Status: DISCONTINUED | OUTPATIENT
Start: 2018-07-06 | End: 2018-07-15

## 2018-07-06 RX ORDER — OXYCODONE AND ACETAMINOPHEN 5; 325 MG/1; MG/1
1 TABLET ORAL EVERY 4 HOURS
Qty: 0 | Refills: 0 | Status: DISCONTINUED | OUTPATIENT
Start: 2018-07-06 | End: 2018-07-07

## 2018-07-06 RX ORDER — OXYCODONE AND ACETAMINOPHEN 5; 325 MG/1; MG/1
1 TABLET ORAL ONCE
Qty: 0 | Refills: 0 | Status: DISCONTINUED | OUTPATIENT
Start: 2018-07-06 | End: 2018-07-06

## 2018-07-06 RX ORDER — VANCOMYCIN HCL 1 G
1000 VIAL (EA) INTRAVENOUS EVERY 12 HOURS
Qty: 0 | Refills: 0 | Status: DISCONTINUED | OUTPATIENT
Start: 2018-07-06 | End: 2018-07-08

## 2018-07-06 RX ORDER — AZITHROMYCIN 500 MG/1
500 TABLET, FILM COATED ORAL EVERY 24 HOURS
Qty: 0 | Refills: 0 | Status: DISCONTINUED | OUTPATIENT
Start: 2018-07-06 | End: 2018-07-08

## 2018-07-06 RX ORDER — POTASSIUM PHOSPHATE, MONOBASIC POTASSIUM PHOSPHATE, DIBASIC 236; 224 MG/ML; MG/ML
15 INJECTION, SOLUTION INTRAVENOUS ONCE
Qty: 0 | Refills: 0 | Status: COMPLETED | OUTPATIENT
Start: 2018-07-06 | End: 2018-07-06

## 2018-07-06 RX ORDER — ENOXAPARIN SODIUM 100 MG/ML
40 INJECTION SUBCUTANEOUS DAILY
Qty: 0 | Refills: 0 | Status: DISCONTINUED | OUTPATIENT
Start: 2018-07-06 | End: 2018-07-15

## 2018-07-06 RX ORDER — BENZOCAINE AND MENTHOL 5; 1 G/100ML; G/100ML
1 LIQUID ORAL THREE TIMES A DAY
Qty: 0 | Refills: 0 | Status: DISCONTINUED | OUTPATIENT
Start: 2018-07-06 | End: 2018-07-15

## 2018-07-06 RX ORDER — OXYCODONE AND ACETAMINOPHEN 5; 325 MG/1; MG/1
1 TABLET ORAL EVERY 6 HOURS
Qty: 0 | Refills: 0 | Status: DISCONTINUED | OUTPATIENT
Start: 2018-07-06 | End: 2018-07-06

## 2018-07-06 RX ORDER — POTASSIUM PHOSPHATE, MONOBASIC POTASSIUM PHOSPHATE, DIBASIC 236; 224 MG/ML; MG/ML
15 INJECTION, SOLUTION INTRAVENOUS ONCE
Qty: 0 | Refills: 0 | Status: DISCONTINUED | OUTPATIENT
Start: 2018-07-06 | End: 2018-07-06

## 2018-07-06 RX ADMIN — OXYCODONE AND ACETAMINOPHEN 1 TABLET(S): 5; 325 TABLET ORAL at 06:45

## 2018-07-06 RX ADMIN — SIMVASTATIN 10 MILLIGRAM(S): 20 TABLET, FILM COATED ORAL at 21:05

## 2018-07-06 RX ADMIN — GABAPENTIN 100 MILLIGRAM(S): 400 CAPSULE ORAL at 17:54

## 2018-07-06 RX ADMIN — OXYCODONE AND ACETAMINOPHEN 1 TABLET(S): 5; 325 TABLET ORAL at 05:42

## 2018-07-06 RX ADMIN — ENOXAPARIN SODIUM 40 MILLIGRAM(S): 100 INJECTION SUBCUTANEOUS at 12:30

## 2018-07-06 RX ADMIN — TENOFOVIR DISOPROXIL FUMARATE 300 MILLIGRAM(S): 300 TABLET, FILM COATED ORAL at 12:30

## 2018-07-06 RX ADMIN — Medication 1: at 09:21

## 2018-07-06 RX ADMIN — Medication 1 TABLET(S): at 17:54

## 2018-07-06 RX ADMIN — OXYCODONE AND ACETAMINOPHEN 1 TABLET(S): 5; 325 TABLET ORAL at 17:15

## 2018-07-06 RX ADMIN — Medication 250 MILLIGRAM(S): at 07:46

## 2018-07-06 RX ADMIN — OXYCODONE AND ACETAMINOPHEN 1 TABLET(S): 5; 325 TABLET ORAL at 13:30

## 2018-07-06 RX ADMIN — Medication 1 TABLET(S): at 12:30

## 2018-07-06 RX ADMIN — SODIUM CHLORIDE 75 MILLILITER(S): 9 INJECTION INTRAMUSCULAR; INTRAVENOUS; SUBCUTANEOUS at 21:06

## 2018-07-06 RX ADMIN — Medication 250 MILLIGRAM(S): at 17:55

## 2018-07-06 RX ADMIN — POTASSIUM PHOSPHATE, MONOBASIC POTASSIUM PHOSPHATE, DIBASIC 62.5 MILLIMOLE(S): 236; 224 INJECTION, SOLUTION INTRAVENOUS at 01:47

## 2018-07-06 RX ADMIN — CEFEPIME 100 MILLIGRAM(S): 1 INJECTION, POWDER, FOR SOLUTION INTRAMUSCULAR; INTRAVENOUS at 17:55

## 2018-07-06 RX ADMIN — OXYCODONE AND ACETAMINOPHEN 1 TABLET(S): 5; 325 TABLET ORAL at 16:28

## 2018-07-06 RX ADMIN — OXYCODONE AND ACETAMINOPHEN 1 TABLET(S): 5; 325 TABLET ORAL at 21:22

## 2018-07-06 RX ADMIN — OXYCODONE AND ACETAMINOPHEN 1 TABLET(S): 5; 325 TABLET ORAL at 22:22

## 2018-07-06 RX ADMIN — OXYCODONE AND ACETAMINOPHEN 1 TABLET(S): 5; 325 TABLET ORAL at 12:29

## 2018-07-06 RX ADMIN — Medication 1 TABLET(S): at 07:46

## 2018-07-06 RX ADMIN — AZITHROMYCIN 250 MILLIGRAM(S): 500 TABLET, FILM COATED ORAL at 21:05

## 2018-07-06 RX ADMIN — CEFEPIME 100 MILLIGRAM(S): 1 INJECTION, POWDER, FOR SOLUTION INTRAMUSCULAR; INTRAVENOUS at 05:24

## 2018-07-06 RX ADMIN — MIRTAZAPINE 15 MILLIGRAM(S): 45 TABLET, ORALLY DISINTEGRATING ORAL at 22:25

## 2018-07-06 RX ADMIN — TAMSULOSIN HYDROCHLORIDE 0.4 MILLIGRAM(S): 0.4 CAPSULE ORAL at 21:05

## 2018-07-06 NOTE — CONSULT NOTE ADULT - ASSESSMENT
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     #CAP  Being treated with cefepime and vancomycin.   Pressor requirements improved.   Afebrile at this time.   Supportive care as per MICU.     #Adenocarcinoma lung  Patient with progression of disease on CT chest.   Will discuss next step with Dr. Gross.   Patient has abdominal pain, on imaging in the past did have pancreatic mass which was indeterminate. Would recommend repeating a CT Abdomen/Pelvis to evaluate abdominal pain if stable to go to CT.   Recommend pain control with low dose IV morphine if blood pressure stable off pressors given goals of palliation at this point. 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     #CAP  Being treated with cefepime and vancomycin.   Pressor requirements improved.   Afebrile at this time.   Supportive care as per MICU.     #Adenocarcinoma lung  Patient with progression of disease on CT chest.   Holding afatinib due to infection, but also in the setting of progression of disease.   Will discuss next step with Dr. Gross.   Patient has abdominal pain, on imaging in the past did have pancreatic mass which was indeterminate. Would recommend repeating a CT Abdomen/Pelvis to evaluate abdominal pain if stable to go to CT.   Recommend pain control with low dose IV morphine if blood pressure stable off pressors given goals of palliation at this point.

## 2018-07-06 NOTE — CONSULT NOTE ADULT - ATTENDING COMMENTS
Agree with above.   Pt with metastatic EGFR adenoca of the lung a/w septic shock 2/2 PNA. Pt c/o chronic chest and new abdominal pain.   CT chest with ?POD however not clear if infection is confounding the picture. Recommend out pt revaluation once stable for d/c. Pt was supposed to have PET/CT done. Can  f/u with Dr. Gross upon d.c.

## 2018-07-06 NOTE — CONSULT NOTE ADULT - SUBJECTIVE AND OBJECTIVE BOX
HPI:  This is a 76 y/o M with history of stage IV EGFR mutated NSCLC (adenocarcinoma) s/p treatment with erlotinib from 2016-2017, subsequently with treatment with carboplatin and pemetrexed from 2017-2017, and then with atezolizumab 2017/, and currently on 4th line afatinib since 2018. Initially when started on afatinib it was at 40 mg daily however could not tolerate due to diarrhea and bloating so decreased to 20 mg daily at that time. Multiple NGS samples from peripheral blood have shown persistent L858R EGFR mutation with no T790M. Last CT scan on 2018 showed evidence of slow progression but the comparison was with scan from 2017 which was a few months before afatinib was started. Plan was for a PET scan and MRI brain.    Patient also with history of HBV (on Tenofovir) and COPD, and he presented to the ED for difficulty to arouse. As per family, the patient had a fall due to dizziness yesterday (no palpitations), and had an episode of vomiting, followed by confusion. The night prior to presentation, patient was extremely diaphoretic. When the patient was difficult to arouse, he was brought in by ambulance. The family notes that the patient has been dizzy for a few days with diaphoresis, and has been having an increasing NP but wet cough without SOB or CP. No diarrhea or dysuria. He also endorses fevers and chills. Appetite has been good. He lives at home with his wife. As per EMS, the patient was 88%, and was started on a non-rebreather. In the ED, his temperature was 102.5 and he was tachycardic to the 140's and improved to the 110s. In addition, he was hypotensive to 83/57, originally responsive to fluids to 100s/60s. He was weaned down to a NC, 4L. MICU was consulted 2 hours prior to this admission for hypotension and rejected initially after 3L IVFs had been given with improved BPs and were reconsulted when his pressures decreased down to the 80s SBP. Was started on levophed. CT scan shows evidence of bilateral pneumonia in lower lobes and worsening disease. Patient now complaining of abdominal pain. Patient treated with cefepime and vancomycin.     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 1000 milliLiter(s) (50 mL/Hr) IV Continuous <Continuous>  dextrose 50% Injectable 12.5 Gram(s) IV Push once  dextrose 50% Injectable 25 Gram(s) IV Push once  dextrose 50% Injectable 25 Gram(s) IV Push once  enoxaparin Injectable 40 milliGRAM(s) SubCutaneous daily  gabapentin 100 milliGRAM(s) Oral two times a day  insulin lispro (HumaLOG) corrective regimen sliding scale   SubCutaneous three times a day before meals  insulin lispro (HumaLOG) corrective regimen sliding scale   SubCutaneous at bedtime  mirtazapine Soltab 15 milliGRAM(s) Oral at bedtime  potassium acid phosphate/sodium acid phosphate tablet (K-PHOS No. 2) 1 Tablet(s) Oral four times a day with meals  senna 2 Tablet(s) Oral at bedtime  simvastatin 10 milliGRAM(s) Oral at bedtime  tamsulosin 0.4 milliGRAM(s) Oral at bedtime  tenofovir disoproxil fumarate (VIREAD) 300 milliGRAM(s) Oral daily  vancomycin  IVPB 1000 milliGRAM(s) IV Intermittent every 12 hours    MEDICATIONS  (PRN):  benzocaine 15 mG/menthol 3.6 mG Lozenge 1 Lozenge Oral three times a day PRN Sore Throat  benzonatate 100 milliGRAM(s) Oral three times a day PRN Cough  dextrose 40% Gel 15 Gram(s) Oral once PRN Blood Glucose LESS THAN 70 milliGRAM(s)/deciliter  glucagon  Injectable 1 milliGRAM(s) IntraMuscular once PRN Glucose LESS THAN 70 milligrams/deciliter  ondansetron Injectable 4 milliGRAM(s) IV Push every 8 hours PRN Nausea and/or Vomiting      FAMILY HISTORY:  Family history of diabetes mellitus (Sibling): brother - living  Family history of liver cancer: Mother   Family history of liver cancer  Family history of stomach cancer (Sibling): brother      SOCIAL HISTORY: No EtOH, + former smoker.     REVIEW OF SYSTEMS:  ROS unobtainable at this time.     Height (cm): 172.72 ( @ 13:00)  Weight (kg): 70.9 ( @ :)  BMI (kg/m2): 23.8 ( @ :)  BSA (m2): 1.84 (:)    T(F): 97.9 (18 @ 08:00), Max: 98.6 (18 @ 20:00)  HR: 107 (18 @ 11:00)  BP: 135/84 (18 @ 11:00)  RR: 26 (18 @ 11:00)  SpO2: 98% (18:00)  Wt(kg): --    GENERAL: patient diaphoretic, mild respiratory distress, mild distress due to pain, cachectic  HEAD:  Atraumatic, Normocephalic  EYES: EOMI, PERRLA, conjunctiva and sclera clear  NECK: Supple, No JVD  CHEST/LUNG: B/L diffuse crackles  HEART: tachycardic  ABDOMEN: Soft, Nontender, Nondistended; Bowel sounds present  EXTREMITIES:  2+ Peripheral Pulses, No clubbing, cyanosis, or edema  NEUROLOGY: non-focal  SKIN: No rashes or lesions                          11.8   15.8  )-----------( 138      ( 2018 00:58 )             35.4           139  |  105  |  11  ----------------------------<  139<H>  3.9   |  24  |  0.83    Ca    8.2<L>      2018 00:58  Phos  1.3       Mg     1.8         TPro  7.5  /  Alb  3.8  /  TBili  0.5  /  DBili  x   /  AST  6<L>  /  ALT  8<L>  /  AlkPhos  56        Magnesium, Serum: 1.8 mg/dL ( @ 00:58)  Phosphorus Level, Serum: 1.3 mg/dL (07-06 @ 00:58)  Magnesium, Serum: 1.6 mg/dL ( @ 15:50)  Phosphorus Level, Serum: 2.2 mg/dL ( @ 15:50)      PT/INR - ( 2018 00:58 )   PT: 14.8 sec;   INR: 1.36 ratio         PTT - ( 2018 00:58 )  PTT:33.1 sec    .Blood Blood-Peripheral   @ 09:37   No growth to date.  --  --      < from: CT Cervical Spine No Cont (18 @ 08:53) >  IMPRESSION:    CT brain:    No acute intracranial hemorrhage, mass effect, vasogenic edema, or   evidence of acute territorial infarct. No lytic or destructive osseous   lesion.    Moderate white matter microvascular ischemic disease.    CT cervical spine:    No acute fracture or traumatic subluxation. No prevertebral soft tissue   swelling. Degenerative changes.    < end of copied text >        < from: CT Angio Chest w/ IV Cont (18 @ 08:42) >    IMPRESSION:    No pulmonary embolism.    New consolidative opacities in the bilateral lower lobes, likely   pneumonia.     Additional masslike consolidation throughout the bilateral lungs   demonstrate increase in size compared to 2018.    Extensive bilateral lower lobe and central airway secretions.    New conglomerate adenopathy in the subcarinal region.    < end of copied text >

## 2018-07-06 NOTE — PROGRESS NOTE ADULT - SUBJECTIVE AND OBJECTIVE BOX
CHIEF COMPLAINT: sepsis 2/2 CAP PNA    Interval Events: Overnight the patient is off pressors. He continues to endorse generalized pain. He received one dose of 5 mg oxycodon. However, the wife gave him an extra dose of oxycodon from the home meds. He also complains about 4 episodes of soft stool and dysuria. No other acute complaints.     OBJECTIVE:  ICU Vital Signs Last 24 Hrs  T(C): 36.6 (2018 08:00), Max: 37 (2018 20:00)  T(F): 97.9 (2018 08:00), Max: 98.6 (2018 20:00)  HR: 101 (2018 08:00) (96 - 121)  BP: 150/63 (:00) (83/50 - 150/63)  BP(mean): 91 (2018 08:00) (61 - 102)  ABP: --  ABP(mean): --  RR: 32 (2018 08:00) (16 - 32)  SpO2: 95% (2018 08:00) (90% - 100%)         @ 07:  -   @ 07:00  --------------------------------------------------------  IN: 1160 mL / OUT: 2100 mL / NET: -940 mL     @ 07: @ 08:48  --------------------------------------------------------  IN: 310 mL / OUT: 525 mL / NET: -215 mL      CAPILLARY BLOOD GLUCOSE      POCT Blood Glucose.: 139 mg/dL (2018 23:32)      GENERAL: NAD, well-developed  HEAD:  Atraumatic, Normocephalic  NECK: Supple, No JVD  CHEST/LUNG: Clear to auscultation bilaterally; No wheeze  HEART: Regular rate and rhythm; No murmurs, rubs, or gallops  ABDOMEN: Soft, Nontender, Nondistended; Bowel sounds present  EXTREMITIES:  2+ Peripheral Pulses, No clubbing, cyanosis, or edema  PSYCH: AAOx3  NEUROLOGY: non-focal  SKIN: No rashes or lesions        HOSPITAL MEDICATIONS:  Standing Meds:  cefepime   IVPB 2000 milliGRAM(s) IV Intermittent every 12 hours  dextrose 5%. 1000 milliLiter(s) IV Continuous <Continuous>  dextrose 50% Injectable 12.5 Gram(s) IV Push once  dextrose 50% Injectable 25 Gram(s) IV Push once  dextrose 50% Injectable 25 Gram(s) IV Push once  enoxaparin Injectable 30 milliGRAM(s) SubCutaneous daily  gabapentin 100 milliGRAM(s) Oral at bedtime  insulin lispro (HumaLOG) corrective regimen sliding scale   SubCutaneous three times a day before meals  insulin lispro (HumaLOG) corrective regimen sliding scale   SubCutaneous at bedtime  mirtazapine Soltab 15 milliGRAM(s) Oral at bedtime  potassium acid phosphate/sodium acid phosphate tablet (K-PHOS No. 2) 1 Tablet(s) Oral four times a day with meals  senna 2 Tablet(s) Oral at bedtime  simvastatin 10 milliGRAM(s) Oral at bedtime  tamsulosin 0.4 milliGRAM(s) Oral at bedtime  tenofovir disoproxil fumarate (VIREAD) 300 milliGRAM(s) Oral daily  vancomycin  IVPB 1000 milliGRAM(s) IV Intermittent every 24 hours      PRN Meds:  benzonatate 100 milliGRAM(s) Oral three times a day PRN  dextrose 40% Gel 15 Gram(s) Oral once PRN  glucagon  Injectable 1 milliGRAM(s) IntraMuscular once PRN  ondansetron Injectable 4 milliGRAM(s) IV Push every 8 hours PRN      LABS:                        11.8   15.8  )-----------( 138      ( 2018 00:58 )             35.4     Hgb Trend: 11.8<--, 10.9<--, 13.3<--  07-06    139  |  105  |  11  ----------------------------<  139<H>  3.9   |  24  |  0.83    Ca    8.2<L>      2018 00:58  Phos  1.3     07-06  Mg     1.8     07-06    TPro  7.5  /  Alb  3.8  /  TBili  0.5  /  DBili  x   /  AST  6<L>  /  ALT  8<L>  /  AlkPhos  56      Creatinine Trend: 0.83<--, 1.05<--, 1.36<--  PT/INR - ( 2018 00:58 )   PT: 14.8 sec;   INR: 1.36 ratio         PTT - ( 2018 00:58 )  PTT:33.1 sec  Urinalysis Basic - ( 2018 12:36 )    Color: Yellow / Appearance: Clear / S.025 / pH: x  Gluc: x / Ketone: Negative  / Bili: Negative / Urobili: Negative   Blood: x / Protein: 30 mg/dL / Nitrite: Negative   Leuk Esterase: Negative / RBC: 5-10 /HPF / WBC 0-2 /HPF   Sq Epi: x / Non Sq Epi: x / Bacteria: x        Venous Blood Gas:   @ 15:44  7.35/48/47//82  VBG Lactate: 2.8  Venous Blood Gas:   @ 07:34  7.36/49/43//77  VBG Lactate: 2.3

## 2018-07-06 NOTE — PROGRESS NOTE ADULT - ASSESSMENT
78 y/o male PMH of SCC (stage IV, dx'd 05/2016, currently being treated with chemotherapy), HBV (on Tenofovir) COPD, HTN, HLD, DM2 (not on insulin), presented to the ED for difficulty to arouse. admitted to the MICU from the ED for septic shock secondary to CAP, with hypotension requiring pressor support.     Neuro:  1. Pt alert and awake. A&O x3.   2. No need for sedation, not agitated  3. Mirtazapine for depression    CV:  1. Septic shock likely 2/2 PNA. Blood pressures initially unresponsive to fluids, was on levophed, MAP > 65.  2. Received 4L IVFs  3. D/jack Levophed    Pulm:  1. Not intubated  2. Satting well on NC  3. CTA showed large L sided mass with new R sided consolidations, that appear to be due to pneumonia, along with numerous nodules and adenopathy likely representative of known malignancy. Pneumonia may be post-obstructive.  4. C/w Cefepime and Vanc for abx in the setting of immunsuppression from chemotherapy  5. Tessalon perrles PRN for cough, chest PT, periodic suctioning  6. CT is concerning for acute TB.  Testing for TB (sent for Quantiferon and sputum AFB). airborne precautions.   7. f/u with legionella.    GI:  1. HBV - c/w Tenofovir  2. Diabetic diet  3. Zofran PRN for nausea    ID:  1. Septic shock 2/2 PNA  2. Patient is not neutropenic but is immunosuppressed. Will continue Cefepime and Vancomycin, check Vanc troughs Q24  3. F/u blood cx, AFB x3, and Quant Gold as noted above. Airborne precautions  4. F/u urine legionella and RVP    Endocrine:  1. T2DM, on Metformin at home  2. Put on Insulin sliding scale  3. hgbA1c 6.2     Heme/Onc:  1. Patient with stage IV SCC (although no known mets to the brain or bone). On Ifatinib, not on palliative chemo.  2. Patient is not neutropenic  3. Oncology consulted (Noe), will see patient in AM, hold chemo  4. Hgb dropped but appears to be dilutional    Renal:  1. Slight LUX to creatinine of 1.3 (baseline renal function 1.2), but now improved to 0.83  2. S/p 4L IVFs    DVT PPX:  40 mg Enoxaparin

## 2018-07-07 LAB
ANION GAP SERPL CALC-SCNC: 15 MMOL/L — SIGNIFICANT CHANGE UP (ref 5–17)
APTT BLD: 33.5 SEC — SIGNIFICANT CHANGE UP (ref 27.5–37.4)
BUN SERPL-MCNC: 9 MG/DL — SIGNIFICANT CHANGE UP (ref 7–23)
CALCIUM SERPL-MCNC: 8.5 MG/DL — SIGNIFICANT CHANGE UP (ref 8.4–10.5)
CHLORIDE SERPL-SCNC: 102 MMOL/L — SIGNIFICANT CHANGE UP (ref 96–108)
CO2 SERPL-SCNC: 22 MMOL/L — SIGNIFICANT CHANGE UP (ref 22–31)
CREAT SERPL-MCNC: 0.82 MG/DL — SIGNIFICANT CHANGE UP (ref 0.5–1.3)
GLUCOSE BLDC GLUCOMTR-MCNC: 145 MG/DL — HIGH (ref 70–99)
GLUCOSE BLDC GLUCOMTR-MCNC: 152 MG/DL — HIGH (ref 70–99)
GLUCOSE BLDC GLUCOMTR-MCNC: 179 MG/DL — HIGH (ref 70–99)
GLUCOSE BLDC GLUCOMTR-MCNC: 239 MG/DL — HIGH (ref 70–99)
GLUCOSE SERPL-MCNC: 130 MG/DL — HIGH (ref 70–99)
HCT VFR BLD CALC: 38 % — LOW (ref 39–50)
HGB BLD-MCNC: 13.5 G/DL — SIGNIFICANT CHANGE UP (ref 13–17)
INR BLD: 1.18 RATIO — HIGH (ref 0.88–1.16)
LEGIONELLA AG UR QL: NEGATIVE — SIGNIFICANT CHANGE UP
M TB TUBERC IFN-G BLD QL: 0.02 IU/ML — SIGNIFICANT CHANGE UP
M TB TUBERC IFN-G BLD QL: 0.41 IU/ML — SIGNIFICANT CHANGE UP
M TB TUBERC IFN-G BLD QL: POSITIVE
MAGNESIUM SERPL-MCNC: 1.7 MG/DL — SIGNIFICANT CHANGE UP (ref 1.6–2.6)
MCHC RBC-ENTMCNC: 32.6 PG — SIGNIFICANT CHANGE UP (ref 27–34)
MCHC RBC-ENTMCNC: 35.6 GM/DL — SIGNIFICANT CHANGE UP (ref 32–36)
MCV RBC AUTO: 91.6 FL — SIGNIFICANT CHANGE UP (ref 80–100)
MITOGEN IGNF BCKGRD COR BLD-ACNC: 1.72 IU/ML — SIGNIFICANT CHANGE UP
NIGHT BLUE STAIN TISS: SIGNIFICANT CHANGE UP
PHOSPHATE SERPL-MCNC: 1.9 MG/DL — LOW (ref 2.5–4.5)
PLATELET # BLD AUTO: 188 K/UL — SIGNIFICANT CHANGE UP (ref 150–400)
POTASSIUM SERPL-MCNC: 3.4 MMOL/L — LOW (ref 3.5–5.3)
POTASSIUM SERPL-SCNC: 3.4 MMOL/L — LOW (ref 3.5–5.3)
PROTHROM AB SERPL-ACNC: 12.9 SEC — HIGH (ref 9.8–12.7)
RBC # BLD: 4.15 M/UL — LOW (ref 4.2–5.8)
RBC # FLD: 12.3 % — SIGNIFICANT CHANGE UP (ref 10.3–14.5)
SODIUM SERPL-SCNC: 139 MMOL/L — SIGNIFICANT CHANGE UP (ref 135–145)
SPECIMEN SOURCE: SIGNIFICANT CHANGE UP
VANCOMYCIN TROUGH SERPL-MCNC: 7.8 UG/ML — LOW (ref 10–20)
WBC # BLD: 16.5 K/UL — HIGH (ref 3.8–10.5)
WBC # FLD AUTO: 16.5 K/UL — HIGH (ref 3.8–10.5)

## 2018-07-07 PROCEDURE — 99233 SBSQ HOSP IP/OBS HIGH 50: CPT | Mod: GC

## 2018-07-07 RX ORDER — MORPHINE SULFATE 50 MG/1
30 CAPSULE, EXTENDED RELEASE ORAL THREE TIMES A DAY
Qty: 0 | Refills: 0 | Status: DISCONTINUED | OUTPATIENT
Start: 2018-07-07 | End: 2018-07-11

## 2018-07-07 RX ORDER — POTASSIUM PHOSPHATE, MONOBASIC POTASSIUM PHOSPHATE, DIBASIC 236; 224 MG/ML; MG/ML
30 INJECTION, SOLUTION INTRAVENOUS ONCE
Qty: 0 | Refills: 0 | Status: COMPLETED | OUTPATIENT
Start: 2018-07-07 | End: 2018-07-07

## 2018-07-07 RX ORDER — ACETAMINOPHEN 500 MG
650 TABLET ORAL EVERY 6 HOURS
Qty: 0 | Refills: 0 | Status: DISCONTINUED | OUTPATIENT
Start: 2018-07-07 | End: 2018-07-15

## 2018-07-07 RX ORDER — POTASSIUM CHLORIDE 20 MEQ
40 PACKET (EA) ORAL ONCE
Qty: 0 | Refills: 0 | Status: COMPLETED | OUTPATIENT
Start: 2018-07-07 | End: 2018-07-07

## 2018-07-07 RX ORDER — OXYCODONE HYDROCHLORIDE 5 MG/1
10 TABLET ORAL
Qty: 0 | Refills: 0 | Status: DISCONTINUED | OUTPATIENT
Start: 2018-07-07 | End: 2018-07-07

## 2018-07-07 RX ORDER — OXYCODONE HYDROCHLORIDE 5 MG/1
5 TABLET ORAL EVERY 4 HOURS
Qty: 0 | Refills: 0 | Status: DISCONTINUED | OUTPATIENT
Start: 2018-07-07 | End: 2018-07-10

## 2018-07-07 RX ORDER — OXYCODONE HYDROCHLORIDE 5 MG/1
5 TABLET ORAL EVERY 4 HOURS
Qty: 0 | Refills: 0 | Status: DISCONTINUED | OUTPATIENT
Start: 2018-07-07 | End: 2018-07-07

## 2018-07-07 RX ORDER — MORPHINE SULFATE 50 MG/1
30 CAPSULE, EXTENDED RELEASE ORAL THREE TIMES A DAY
Qty: 0 | Refills: 0 | Status: DISCONTINUED | OUTPATIENT
Start: 2018-07-07 | End: 2018-07-07

## 2018-07-07 RX ORDER — MAGNESIUM SULFATE 500 MG/ML
2 VIAL (ML) INJECTION ONCE
Qty: 0 | Refills: 0 | Status: COMPLETED | OUTPATIENT
Start: 2018-07-07 | End: 2018-07-07

## 2018-07-07 RX ADMIN — Medication 40 MILLIEQUIVALENT(S): at 08:06

## 2018-07-07 RX ADMIN — TAMSULOSIN HYDROCHLORIDE 0.4 MILLIGRAM(S): 0.4 CAPSULE ORAL at 21:35

## 2018-07-07 RX ADMIN — TENOFOVIR DISOPROXIL FUMARATE 300 MILLIGRAM(S): 300 TABLET, FILM COATED ORAL at 12:40

## 2018-07-07 RX ADMIN — SIMVASTATIN 10 MILLIGRAM(S): 20 TABLET, FILM COATED ORAL at 21:35

## 2018-07-07 RX ADMIN — OXYCODONE HYDROCHLORIDE 5 MILLIGRAM(S): 5 TABLET ORAL at 12:10

## 2018-07-07 RX ADMIN — SODIUM CHLORIDE 75 MILLILITER(S): 9 INJECTION INTRAMUSCULAR; INTRAVENOUS; SUBCUTANEOUS at 21:39

## 2018-07-07 RX ADMIN — Medication 250 MILLIGRAM(S): at 08:06

## 2018-07-07 RX ADMIN — OXYCODONE HYDROCHLORIDE 5 MILLIGRAM(S): 5 TABLET ORAL at 17:30

## 2018-07-07 RX ADMIN — OXYCODONE HYDROCHLORIDE 5 MILLIGRAM(S): 5 TABLET ORAL at 13:10

## 2018-07-07 RX ADMIN — Medication 2: at 12:47

## 2018-07-07 RX ADMIN — Medication 1: at 18:27

## 2018-07-07 RX ADMIN — MORPHINE SULFATE 30 MILLIGRAM(S): 50 CAPSULE, EXTENDED RELEASE ORAL at 21:34

## 2018-07-07 RX ADMIN — POTASSIUM PHOSPHATE, MONOBASIC POTASSIUM PHOSPHATE, DIBASIC 83.33 MILLIMOLE(S): 236; 224 INJECTION, SOLUTION INTRAVENOUS at 04:33

## 2018-07-07 RX ADMIN — ENOXAPARIN SODIUM 40 MILLIGRAM(S): 100 INJECTION SUBCUTANEOUS at 12:27

## 2018-07-07 RX ADMIN — OXYCODONE AND ACETAMINOPHEN 1 TABLET(S): 5; 325 TABLET ORAL at 05:33

## 2018-07-07 RX ADMIN — MORPHINE SULFATE 30 MILLIGRAM(S): 50 CAPSULE, EXTENDED RELEASE ORAL at 17:00

## 2018-07-07 RX ADMIN — OXYCODONE HYDROCHLORIDE 5 MILLIGRAM(S): 5 TABLET ORAL at 18:29

## 2018-07-07 RX ADMIN — GABAPENTIN 100 MILLIGRAM(S): 400 CAPSULE ORAL at 05:14

## 2018-07-07 RX ADMIN — MORPHINE SULFATE 30 MILLIGRAM(S): 50 CAPSULE, EXTENDED RELEASE ORAL at 16:00

## 2018-07-07 RX ADMIN — Medication 50 GRAM(S): at 04:09

## 2018-07-07 RX ADMIN — GABAPENTIN 100 MILLIGRAM(S): 400 CAPSULE ORAL at 18:04

## 2018-07-07 RX ADMIN — Medication 250 MILLIGRAM(S): at 18:28

## 2018-07-07 RX ADMIN — MIRTAZAPINE 15 MILLIGRAM(S): 45 TABLET, ORALLY DISINTEGRATING ORAL at 22:32

## 2018-07-07 RX ADMIN — MORPHINE SULFATE 30 MILLIGRAM(S): 50 CAPSULE, EXTENDED RELEASE ORAL at 22:30

## 2018-07-07 RX ADMIN — CEFEPIME 100 MILLIGRAM(S): 1 INJECTION, POWDER, FOR SOLUTION INTRAMUSCULAR; INTRAVENOUS at 18:28

## 2018-07-07 RX ADMIN — CEFEPIME 100 MILLIGRAM(S): 1 INJECTION, POWDER, FOR SOLUTION INTRAMUSCULAR; INTRAVENOUS at 05:14

## 2018-07-07 RX ADMIN — AZITHROMYCIN 250 MILLIGRAM(S): 500 TABLET, FILM COATED ORAL at 21:36

## 2018-07-07 RX ADMIN — OXYCODONE AND ACETAMINOPHEN 1 TABLET(S): 5; 325 TABLET ORAL at 04:33

## 2018-07-07 NOTE — CHART NOTE - NSCHARTNOTEFT_GEN_A_CORE
MICU Transfer Note    Transfer from: MICU    Transfer to: (x) Medicine    (  ) Telemetry     (   ) RCU        (    ) Palliative         (   ) Stroke Unit          (   ) __________________    Accepting Physician: Jovanni  Signout given to:     MICU COURSE:  Pt is a 76 y/o male PMH of NSCLC (stage IV, dx'd 05/2016, currently being treated with chemotherapy), HBV (on Tenofovir) COPD, HTN, HLD, DM2 (not on insulin), presented to the ED for difficulty to arouse at home. Pt was admitted to the MICU on 7/5/18 from the ED for septic shock secondary to CAP requiring pressors. Upon presentation to the ED pt was febrile to 102.5, tachycardic (140s) and BP 83/57, initially fluid responsive and subsequently dropping back to SBP 80s. Levophed drip was started. In the ambulance pt was satting 88%, which improved to 100% on non-rebreather and weaned down to nasal cannula in the ED. In the MICU pt was weaned from Levophed overnight from 7/5 to 7/6. Empiric antibiotic treatment was started for CAP with vancomycin, azithromycin, and cefepime (pt has not been neutropenic but was immunosuppressed on his chemo regimen incl afaltinib). Chest CT could not rule out TB, though index of suspicion was low; pt was placed on airborne precautions, acid fast bacilli is now negative x2. Initially pt had mild LUX (Cr 1.36), now resolved s/p IV hydration. Pt is currently stable for transfer to medicine.       ASSESSMENT & PLAN:   76 y/o male PMH of NSCLC (stage IV, dx'd 05/2016, currently being treated with chemotherapy), HBV (on Tenofovir) COPD, HTN, HLD, DM2 (not on insulin), presented to the ED for difficulty to arouse. admitted to the MICU from the ED for septic shock secondary to CAP requiring pressors, now hemodynamically stable off pressors.     Neuro:  1. Pt alert and awake. A&O x3.   2. No need for sedation, not agitated  3. Mirtazapine for depression  4. Pain- generalized, miguel to chest. Has been on Percocet 5mg q4 with inadequate pain control. Switch to oxycodone 5mg q4h, Tylenol prn for fever. Restart home dose morphine 30mg TID.    CV:  1. Septic shock likely 2/2 PNA. Blood pressures initially unresponsive to fluids, was on levophed, MAP > 65. Now resolved, off pressors, MAP 90-100s.    Pulm:  1. Not intubated  2. Satting well on NC  3. CTA showed large L sided mass with new R sided consolidations, that appear to be due to pneumonia, along with numerous nodules and adenopathy likely representative of known malignancy. Pneumonia may be post-obstructive.  4. C/w Cefepime and Vanc for abx in the setting of immunsuppression from chemotherapy  5. Tessalon perrles PRN for cough, chest PT, periodic suctioning    GI:  1. HBV - c/w Tenofovir  2. Diabetic diet  3. Zofran PRN for nausea    ID:  1. Septic shock 2/2 PNA. Legionella neg. D/c azithro  2. Patient is not neutropenic but is immunosuppressed. Will continue Cefepime and Vancomycin, check Vanc troughs Q24  3. F/u blood cx, AFB x3, and Quant Gold. Now AFB neg x2    Endocrine:  1. T2DM, on Metformin at home  2. Put on Insulin sliding scale  3. hgbA1c 6.2     Heme/Onc:  1. Patient with stage IV SCC (although no known mets to the brain or bone). On Ifatinib, not on palliative chemo.  2. Patient is not neutropenic  3. Oncology following  4. Hgb now stable    Renal:  1. Slight LUX to creatinine of 1.3 (baseline renal function 1.2), but now improved to 0.82, stable    DVT PPX:  40 mg Enoxaparin     Ethics- full code      FOR FOLLOW UP:  -Acid fast bacilli neg x2. Can remove airborne precautions pending 3rd neg result. MICU Transfer Note    Transfer from: MICU    Transfer to: (x) Medicine    (  ) Telemetry     (   ) RCU        (    ) Palliative         (   ) Stroke Unit          (   ) __________________    Accepting Physician: Jovanni  Signout given to:     MICU COURSE:  Pt is a 76 y/o male PMH of NSCLC (stage IV, dx'd 05/2016, currently being treated with chemotherapy), HBV (on Tenofovir) COPD, HTN, HLD, DM2 (not on insulin), presented to the ED for difficulty to arouse at home. Pt was admitted to the MICU on 7/5/18 from the ED for septic shock secondary to CAP requiring pressors. Upon presentation to the ED pt was febrile to 102.5, tachycardic (140s) and BP 83/57, initially fluid responsive and subsequently dropping back to SBP 80s. Levophed drip was started. In the ambulance pt was satting 88%, which improved to 100% on non-rebreather and weaned down to nasal cannula in the ED. In the MICU pt was weaned from Levophed overnight from 7/5 to 7/6. Empiric antibiotic treatment was started for CAP with vancomycin, azithromycin, and cefepime (pt has not been neutropenic but was immunosuppressed on his chemo regimen incl afaltinib). Chest CT could not rule out TB, though index of suspicion was low; pt was placed on airborne precautions, acid fast bacilli is now negative x2. Initially pt had mild LUX (Cr 1.36), now resolved s/p IV hydration. Pt is currently stable for transfer to medicine.     ASSESSMENT & PLAN:   76 y/o male PMH of NSCLC (stage IV, dx'd 05/2016, currently being treated with chemotherapy), HBV (on Tenofovir) COPD, HTN, HLD, DM2 (not on insulin), presented to the ED for difficulty to arouse. admitted to the MICU from the ED for septic shock secondary to CAP requiring pressors, now hemodynamically stable off pressors.     Neuro:  1. Pt alert and awake. A&O x3.   2. No need for sedation, not agitated  3. Mirtazapine for depression  4. Pain- generalized, miguel to chest. Has been on Percocet 5mg q4 with inadequate pain control. Switch to oxycodone 5mg q4h, Tylenol prn for fever. Restart home dose morphine 30mg TID.    CV:  1. Septic shock likely 2/2 PNA. Blood pressures initially unresponsive to fluids, was on levophed, MAP > 65. Now resolved, off pressors, MAP 90-100s.    Pulm:  1. Not intubated  2. Satting well on NC  3. CTA showed large L sided mass with new R sided consolidations, that appear to be due to pneumonia, along with numerous nodules and adenopathy likely representative of known malignancy. Pneumonia may be post-obstructive.  4. C/w Cefepime and Vanc for abx in the setting of immunsuppression from chemotherapy  5. Tessalon perrles PRN for cough, chest PT, periodic suctioning    GI:  1. HBV - c/w Tenofovir  2. Diabetic diet  3. Zofran PRN for nausea    ID:  1. Septic shock 2/2 PNA.   2. Patient is not neutropenic but is immunosuppressed.   3. BCx  negative, sputum cultures AFB negative x3, legionella negative, discontinued vancomycin and azithromycin, c/w cefepime    Endocrine:  1. T2DM, on Metformin at home  2. Put on Insulin sliding scale  3. hgbA1c 6.2     Heme/Onc:  1. Patient with stage IV SCC (although no known mets to the brain or bone). On Ifatinib, not on palliative chemo.  2. Patient is not neutropenic  3. Oncology following  4. Hgb now stable    Renal:  1. Slight LUX to creatinine of 1.3 (baseline renal function 1.2), but now improved to 0.82, stable    DVT PPX:  40 mg Enoxaparin     Ethics- full code MICU Transfer Note    Transfer from: MICU    Transfer to: (x) Medicine    (  ) Telemetry     (   ) RCU        (    ) Palliative         (   ) Stroke Unit          (   ) __________________    Accepting Physician: Jovanni  Signout given to:     MICU COURSE:  Pt is a 78 y/o male PMH of NSCLC (stage IV, dx'd 05/2016, currently being treated with chemotherapy), HBV (on Tenofovir) COPD, HTN, HLD, DM2 (not on insulin), presented to the ED for difficulty to arouse at home. Pt was admitted to the MICU on 7/5/18 from the ED for septic shock secondary to CAP requiring pressors. Upon presentation to the ED pt was febrile to 102.5, tachycardic (140s) and BP 83/57, initially fluid responsive and subsequently dropping back to SBP 80s. Levophed drip was started. In the ambulance pt was satting 88%, which improved to 100% on non-rebreather and weaned down to nasal cannula in the ED. In the MICU pt was weaned from Levophed overnight from 7/5 to 7/6. Empiric antibiotic treatment was started for CAP with vancomycin, azithromycin, and cefepime (pt has not been neutropenic but was immunosuppressed on his chemo regimen incl afaltinib). Chest CT could not rule out TB, though index of suspicion was low; pt was placed on airborne precautions, acid fast bacilli is now negative x2. Initially pt had mild LUX (Cr 1.36), now resolved s/p IV hydration. Pt is currently stable for transfer to medicine.     ASSESSMENT & PLAN:   78 y/o male PMH of NSCLC (stage IV, dx'd 05/2016, currently being treated with chemotherapy), HBV (on Tenofovir) COPD, HTN, HLD, DM2 (not on insulin), presented to the ED for difficulty to arouse. admitted to the MICU from the ED for septic shock secondary to CAP requiring pressors, now hemodynamically stable off pressors.     Neuro:  1. Pt alert and awake. A&O x3.   2. No need for sedation, not agitated  3. Mirtazapine for depression  4. Pain- generalized, miguel to chest. Has been on Percocet 5mg q4 with inadequate pain control. Switch to oxycodone 5mg q4h, Tylenol prn for fever. Restart home dose morphine 30mg TID.    CV:  1. Septic shock likely 2/2 PNA. Blood pressures initially unresponsive to fluids, was on levophed, MAP > 65. Now resolved, off pressors, MAP 90-100s.    Pulm:  1. Not intubated  2. Satting well on NC  3. CTA showed large L sided mass with new R sided consolidations, that appear to be due to pneumonia, along with numerous nodules and adenopathy likely representative of known malignancy. Pneumonia may be post-obstructive.  4. C/w Cefepime in the setting of immunsuppression from chemotherapy  5. Tessalon perrles PRN for cough, chest PT, periodic suctioning    GI:  1. HBV - c/w Tenofovir  2. Diabetic diet  3. Zofran PRN for nausea    ID:  1. Septic shock 2/2 PNA.   2. Patient is not neutropenic but is immunosuppressed.   3. BCx  negative, sputum cultures AFB negative x3, legionella negative, discontinued vancomycin and azithromycin, c/w cefepime    Endocrine:  1. T2DM, on Metformin at home  2. Put on Insulin sliding scale  3. hgbA1c 6.2     Heme/Onc:  1. Patient with stage IV SCC (although no known mets to the brain or bone). On Afatinib, not on palliative chemo.  2. Patient is not neutropenic  3. Oncology following  4. Hgb now stable    Renal:  1. Slight LUX to creatinine of 1.3 (baseline renal function 1.2), but now improved to 0.82, stable    DVT PPX:  40 mg Enoxaparin     Ethics- full code

## 2018-07-07 NOTE — PROGRESS NOTE ADULT - ASSESSMENT
76 y/o male PMH of NSCLC (stage IV, dx'd 05/2016, currently being treated with chemotherapy), HBV (on Tenofovir) COPD, HTN, HLD, DM2 (not on insulin), presented to the ED for difficulty to arouse. admitted to the MICU from the ED for septic shock secondary to CAP requiring pressors, now hemodynamically stable off pressors.     Neuro:  1. Pt alert and awake. A&O x3.   2. No need for sedation, not agitated  3. Mirtazapine for depression  4. Pain- generalized, miguel to chest. Has been on Percocet 5mg q4 with inadequate pain control.    CV:  1. Septic shock likely 2/2 PNA. Blood pressures initially unresponsive to fluids, was on levophed, MAP > 65. Now resolved, off pressors, MAP 90-100s.    Pulm:  1. Not intubated  2. Satting well on NC  3. CTA showed large L sided mass with new R sided consolidations, that appear to be due to pneumonia, along with numerous nodules and adenopathy likely representative of known malignancy. Pneumonia may be post-obstructive.  4. C/w Cefepime and Vanc for abx in the setting of immunsuppression from chemotherapy  5. Tessalon perrles PRN for cough, chest PT, periodic suctioning  6. CT was concerning for acute TB.  Testing for TB (sent for Quantiferon and sputum AFB). airborne precautions.   7. f/u with legionella.    GI:  1. HBV - c/w Tenofovir  2. Diabetic diet  3. Zofran PRN for nausea    ID:  1. Septic shock 2/2 PNA. Legionella neg. D/c azithro  2. Patient is not neutropenic but is immunosuppressed. Will continue Cefepime and Vancomycin, check Vanc troughs Q24  3. F/u blood cx, AFB x3, and Quant Gold. Now AFB neg x2    Endocrine:  1. T2DM, on Metformin at home  2. Put on Insulin sliding scale  3. hgbA1c 6.2     Heme/Onc:  1. Patient with stage IV SCC (although no known mets to the brain or bone). On Ifatinib, not on palliative chemo.  2. Patient is not neutropenic  3. Oncology consulted (Noe), will see patient in AM, hold chemo  4. Hgb now stable    Renal:  1. Slight LUX to creatinine of 1.3 (baseline renal function 1.2), but now improved to 0.82, stable    DVT PPX:  40 mg Enoxaparin 76 y/o male PMH of NSCLC (stage IV, dx'd 05/2016, currently being treated with chemotherapy), HBV (on Tenofovir) COPD, HTN, HLD, DM2 (not on insulin), presented to the ED for difficulty to arouse. admitted to the MICU from the ED for septic shock secondary to CAP requiring pressors, now hemodynamically stable off pressors.     Neuro:  1. Pt alert and awake. A&O x3.   2. No need for sedation, not agitated  3. Mirtazapine for depression  4. Pain- generalized, miguel to chest. Has been on Percocet 5mg q4 with inadequate pain control. Switch to oxycodone 5mg q4h, Tylenol prn for fever. Add home dose morphine 30mg TID.    CV:  1. Septic shock likely 2/2 PNA. Blood pressures initially unresponsive to fluids, was on levophed, MAP > 65. Now resolved, off pressors, MAP 90-100s.    Pulm:  1. Not intubated  2. Satting well on NC  3. CTA showed large L sided mass with new R sided consolidations, that appear to be due to pneumonia, along with numerous nodules and adenopathy likely representative of known malignancy. Pneumonia may be post-obstructive.  4. C/w Cefepime and Vanc for abx in the setting of immunsuppression from chemotherapy  5. Tessalon perrles PRN for cough, chest PT, periodic suctioning    GI:  1. HBV - c/w Tenofovir  2. Diabetic diet  3. Zofran PRN for nausea    ID:  1. Septic shock 2/2 PNA. Legionella neg. D/c charissa  2. Patient is not neutropenic but is immunosuppressed. Will continue Cefepime and Vancomycin, check Vanc troughs Q24  3. F/u blood cx, AFB x3, and Quant Gold. Now AFB neg x2    Endocrine:  1. T2DM, on Metformin at home  2. Put on Insulin sliding scale  3. hgbA1c 6.2     Heme/Onc:  1. Patient with stage IV SCC (although no known mets to the brain or bone). On Ifatinib, not on palliative chemo.  2. Patient is not neutropenic  3. Oncology following  4. Hgb now stable    Renal:  1. Slight LUX to creatinine of 1.3 (baseline renal function 1.2), but now improved to 0.82, stable    DVT PPX:  40 mg Enoxaparin     Ethics- full code

## 2018-07-08 DIAGNOSIS — E11.8 TYPE 2 DIABETES MELLITUS WITH UNSPECIFIED COMPLICATIONS: ICD-10-CM

## 2018-07-08 DIAGNOSIS — B19.10 UNSPECIFIED VIRAL HEPATITIS B WITHOUT HEPATIC COMA: ICD-10-CM

## 2018-07-08 DIAGNOSIS — C34.90 MALIGNANT NEOPLASM OF UNSPECIFIED PART OF UNSPECIFIED BRONCHUS OR LUNG: ICD-10-CM

## 2018-07-08 DIAGNOSIS — N40.0 BENIGN PROSTATIC HYPERPLASIA WITHOUT LOWER URINARY TRACT SYMPTOMS: ICD-10-CM

## 2018-07-08 DIAGNOSIS — R52 PAIN, UNSPECIFIED: ICD-10-CM

## 2018-07-08 DIAGNOSIS — F32.9 MAJOR DEPRESSIVE DISORDER, SINGLE EPISODE, UNSPECIFIED: ICD-10-CM

## 2018-07-08 DIAGNOSIS — J15.9 UNSPECIFIED BACTERIAL PNEUMONIA: ICD-10-CM

## 2018-07-08 LAB
ANION GAP SERPL CALC-SCNC: 10 MMOL/L — SIGNIFICANT CHANGE UP (ref 5–17)
APTT BLD: 31.2 SEC — SIGNIFICANT CHANGE UP (ref 27.5–37.4)
BUN SERPL-MCNC: 9 MG/DL — SIGNIFICANT CHANGE UP (ref 7–23)
CALCIUM SERPL-MCNC: 8.4 MG/DL — SIGNIFICANT CHANGE UP (ref 8.4–10.5)
CHLORIDE SERPL-SCNC: 105 MMOL/L — SIGNIFICANT CHANGE UP (ref 96–108)
CO2 SERPL-SCNC: 25 MMOL/L — SIGNIFICANT CHANGE UP (ref 22–31)
CREAT SERPL-MCNC: 0.83 MG/DL — SIGNIFICANT CHANGE UP (ref 0.5–1.3)
GLUCOSE BLDC GLUCOMTR-MCNC: 131 MG/DL — HIGH (ref 70–99)
GLUCOSE BLDC GLUCOMTR-MCNC: 146 MG/DL — HIGH (ref 70–99)
GLUCOSE BLDC GLUCOMTR-MCNC: 166 MG/DL — HIGH (ref 70–99)
GLUCOSE BLDC GLUCOMTR-MCNC: 173 MG/DL — HIGH (ref 70–99)
GLUCOSE SERPL-MCNC: 130 MG/DL — HIGH (ref 70–99)
HCT VFR BLD CALC: 36.1 % — LOW (ref 39–50)
HGB BLD-MCNC: 12 G/DL — LOW (ref 13–17)
INR BLD: 1.15 RATIO — SIGNIFICANT CHANGE UP (ref 0.88–1.16)
MAGNESIUM SERPL-MCNC: 1.7 MG/DL — SIGNIFICANT CHANGE UP (ref 1.6–2.6)
MCHC RBC-ENTMCNC: 30.1 PG — SIGNIFICANT CHANGE UP (ref 27–34)
MCHC RBC-ENTMCNC: 33.3 GM/DL — SIGNIFICANT CHANGE UP (ref 32–36)
MCV RBC AUTO: 90.5 FL — SIGNIFICANT CHANGE UP (ref 80–100)
PHOSPHATE SERPL-MCNC: 2.5 MG/DL — SIGNIFICANT CHANGE UP (ref 2.5–4.5)
PLATELET # BLD AUTO: 209 K/UL — SIGNIFICANT CHANGE UP (ref 150–400)
POTASSIUM SERPL-MCNC: 3.4 MMOL/L — LOW (ref 3.5–5.3)
POTASSIUM SERPL-SCNC: 3.4 MMOL/L — LOW (ref 3.5–5.3)
PROTHROM AB SERPL-ACNC: 12.5 SEC — SIGNIFICANT CHANGE UP (ref 9.8–12.7)
RBC # BLD: 3.99 M/UL — LOW (ref 4.2–5.8)
RBC # FLD: 12.2 % — SIGNIFICANT CHANGE UP (ref 10.3–14.5)
SODIUM SERPL-SCNC: 140 MMOL/L — SIGNIFICANT CHANGE UP (ref 135–145)
WBC # BLD: 12.6 K/UL — HIGH (ref 3.8–10.5)
WBC # FLD AUTO: 12.6 K/UL — HIGH (ref 3.8–10.5)

## 2018-07-08 PROCEDURE — 99233 SBSQ HOSP IP/OBS HIGH 50: CPT | Mod: GC

## 2018-07-08 PROCEDURE — 99223 1ST HOSP IP/OBS HIGH 75: CPT

## 2018-07-08 RX ORDER — POTASSIUM CHLORIDE 20 MEQ
40 PACKET (EA) ORAL ONCE
Qty: 0 | Refills: 0 | Status: COMPLETED | OUTPATIENT
Start: 2018-07-08 | End: 2018-07-08

## 2018-07-08 RX ORDER — POTASSIUM CHLORIDE 20 MEQ
40 PACKET (EA) ORAL ONCE
Qty: 0 | Refills: 0 | Status: DISCONTINUED | OUTPATIENT
Start: 2018-07-08 | End: 2018-07-08

## 2018-07-08 RX ADMIN — CEFEPIME 100 MILLIGRAM(S): 1 INJECTION, POWDER, FOR SOLUTION INTRAMUSCULAR; INTRAVENOUS at 17:10

## 2018-07-08 RX ADMIN — MORPHINE SULFATE 30 MILLIGRAM(S): 50 CAPSULE, EXTENDED RELEASE ORAL at 14:04

## 2018-07-08 RX ADMIN — OXYCODONE HYDROCHLORIDE 5 MILLIGRAM(S): 5 TABLET ORAL at 17:12

## 2018-07-08 RX ADMIN — Medication 250 MILLIGRAM(S): at 05:43

## 2018-07-08 RX ADMIN — GABAPENTIN 100 MILLIGRAM(S): 400 CAPSULE ORAL at 05:45

## 2018-07-08 RX ADMIN — SIMVASTATIN 10 MILLIGRAM(S): 20 TABLET, FILM COATED ORAL at 21:51

## 2018-07-08 RX ADMIN — OXYCODONE HYDROCHLORIDE 5 MILLIGRAM(S): 5 TABLET ORAL at 09:17

## 2018-07-08 RX ADMIN — MIRTAZAPINE 15 MILLIGRAM(S): 45 TABLET, ORALLY DISINTEGRATING ORAL at 22:28

## 2018-07-08 RX ADMIN — TAMSULOSIN HYDROCHLORIDE 0.4 MILLIGRAM(S): 0.4 CAPSULE ORAL at 21:51

## 2018-07-08 RX ADMIN — SENNA PLUS 2 TABLET(S): 8.6 TABLET ORAL at 21:51

## 2018-07-08 RX ADMIN — Medication 1: at 17:06

## 2018-07-08 RX ADMIN — MORPHINE SULFATE 30 MILLIGRAM(S): 50 CAPSULE, EXTENDED RELEASE ORAL at 13:49

## 2018-07-08 RX ADMIN — GABAPENTIN 100 MILLIGRAM(S): 400 CAPSULE ORAL at 17:10

## 2018-07-08 RX ADMIN — CEFEPIME 100 MILLIGRAM(S): 1 INJECTION, POWDER, FOR SOLUTION INTRAMUSCULAR; INTRAVENOUS at 05:44

## 2018-07-08 RX ADMIN — Medication 40 MILLIEQUIVALENT(S): at 05:46

## 2018-07-08 RX ADMIN — MORPHINE SULFATE 30 MILLIGRAM(S): 50 CAPSULE, EXTENDED RELEASE ORAL at 21:50

## 2018-07-08 RX ADMIN — MORPHINE SULFATE 30 MILLIGRAM(S): 50 CAPSULE, EXTENDED RELEASE ORAL at 22:20

## 2018-07-08 RX ADMIN — OXYCODONE HYDROCHLORIDE 5 MILLIGRAM(S): 5 TABLET ORAL at 18:16

## 2018-07-08 RX ADMIN — OXYCODONE HYDROCHLORIDE 5 MILLIGRAM(S): 5 TABLET ORAL at 00:12

## 2018-07-08 RX ADMIN — OXYCODONE HYDROCHLORIDE 5 MILLIGRAM(S): 5 TABLET ORAL at 08:16

## 2018-07-08 RX ADMIN — ENOXAPARIN SODIUM 40 MILLIGRAM(S): 100 INJECTION SUBCUTANEOUS at 11:15

## 2018-07-08 RX ADMIN — OXYCODONE HYDROCHLORIDE 5 MILLIGRAM(S): 5 TABLET ORAL at 20:40

## 2018-07-08 RX ADMIN — MORPHINE SULFATE 30 MILLIGRAM(S): 50 CAPSULE, EXTENDED RELEASE ORAL at 05:45

## 2018-07-08 RX ADMIN — Medication 1: at 08:15

## 2018-07-08 RX ADMIN — MORPHINE SULFATE 30 MILLIGRAM(S): 50 CAPSULE, EXTENDED RELEASE ORAL at 06:40

## 2018-07-08 RX ADMIN — OXYCODONE HYDROCHLORIDE 5 MILLIGRAM(S): 5 TABLET ORAL at 00:40

## 2018-07-08 RX ADMIN — OXYCODONE HYDROCHLORIDE 5 MILLIGRAM(S): 5 TABLET ORAL at 21:10

## 2018-07-08 RX ADMIN — TENOFOVIR DISOPROXIL FUMARATE 300 MILLIGRAM(S): 300 TABLET, FILM COATED ORAL at 11:15

## 2018-07-08 NOTE — PHYSICAL THERAPY INITIAL EVALUATION ADULT - LIVES WITH, PROFILE
children/As per chart, pt typically resides with spouse and other son in an apartment in [place]. However, patient recently residing with son, Mariano Davidson, as it is closer to his medical appointments at [facility]. Patient independent with ADLs and ambulation pta. children/As per chart, pt typically resides with spouse and other son in an apartment in [place]. However, patient recently residing with son, Mariano Davidson, as it is closer to his medical appointments at [facility], 11 stairs to entrance +HR. Patient independent with ADLs and ambulation pta.

## 2018-07-08 NOTE — PROGRESS NOTE ADULT - SUBJECTIVE AND OBJECTIVE BOX
MEDICINE ACCEPTANCE NOTE - TRANSFER FROM MICU    HPI: 77yoM who lives at home with his wife w/ PMHx significant for Stage IV EGFR mutated NSCLC/adenocarcinoma (on 4th line of tx w/ PO Afatinib daily since 2/12/2018), HBV (on Tenofovir), COPD (not on home O2), HTN, HLD, NIDDM, BPH, and depression (on Mirtazapine), who presented to the ED on 7/5/18 after his family had difficulty wakening him in the setting of dizziness causing a fall 1day PTA in addition to one episode of NBNB vomiting. En route patient was hypoxic to 88% requiring EMS to initiate O2 supplementation with non-breather. In the ED the patient was febrile to Tmax of 102.5F, tachycardic to the 140s, and hypotensive, with hypertension persisting despite IVF resuscitation. Along with broad spectrum antibiotics, he was then started on hemodynamic support with pressors, and was admitted to the MICU.    MICU Course: Admitted for septic shock 2/2 likely post-obstructive CAP (CTA showed large L sided mass with new R sided consolidations, that appear to be due to pneumonia, along with numerous nodules and adenopathy likely representative of known malignancy) tx with IV Cefepime, IV Vancomycin, and IV Azithromycin (downgraded to IV Cefepime currently, off pressors, hemodynamically stable), acute metabolic encephalopathy (now resolved), acute respiratory failure never requiring intubation (now resolved), and LUX (now resolved). Additionally, patient was complaining of persistent severe pain, generalized but notable chest, likely related to ongoing infection. Analgesia was titrated to a regimen including: ATC Morphine ER, PRN Tylenol/Oxycodone IR, and Gabapentin. Finally, there was an initial concern to r/o active TB: AFB negative x3, and respiratory isolation was discontinued.       SUBJECTIVE:    ROS:  · Negative General Symptoms: no fever; no chills  · Negative Skin Symptoms: no rash; no itching  · Negative Ophthalmologic Symptoms: no scleral injection L; no scleral injection R  · Negative ENMT Symptoms: no throat pain; no dysphagia; no nasal congestion  · Negative Respiratory and Thorax Symptoms: no cough; no wheezing; + pleuritic chest pain  · Negative Cardiovascular Symptoms: + chest pain; no palpitations; no peripheral edema  · Negative Gastrointestinal Symptoms: no nausea; no vomiting; no diarrhea; no constipation; no change in bowel habits; no abdominal pain  · Negative General Genitourinary Symptoms: no flank pain L; no flank pain R; no dysuria  · Negative Musculoskeletal Symptoms: no joint swelling; no neck pain; no back pain  · Neurological: negative  · Psychiatric: negative  · Hematology/Lymphatics: negative  · Endocrine: negative    OBJECTIVE:    Vital Signs Last 24 Hrs  T(F): 97.5 (08 Jul 2018 15:00), Max: 98.3 (07 Jul 2018 20:00)  HR: 89 (08 Jul 2018 13:00) (75 - 90)  BP: 126/77 (08 Jul 2018 15:00) (107/55 - 168/87)  RR: 20 (08 Jul 2018 15:00) (15 - 40)  SpO2: 96% (08 Jul 2018 15:00) (91% - 98%)    Physical Examination      I&O's Summary  07 Jul 2018 07:01  -  08 Jul 2018 07:00  --------------------------------------------------------  IN: 2552.5 mL / OUT: 1920 mL / NET: 632.5 mL    08 Jul 2018 07:01  -  08 Jul 2018 15:56  --------------------------------------------------------  IN: 300 mL / OUT: 250 mL / NET: 50 mL    CAPILLARY BLOOD GLUCOSE  POCT Blood Glucose.: 146 mg/dL (08 Jul 2018 11:24)  POCT Blood Glucose.: 173 mg/dL (08 Jul 2018 08:08)  POCT Blood Glucose.: 179 mg/dL (07 Jul 2018 21:32)  POCT Blood Glucose.: 152 mg/dL (07 Jul 2018 18:06)    Labs                        12.0   12.6  )-----------( 209      ( 08 Jul 2018 00:34 )             36.1     07-08    140  |  105  |  9   ----------------------------<  130<H>  3.4<L>   |  25  |  0.83    Ca    8.4      08 Jul 2018 00:34  Phos  2.5     07-08  Mg     1.7     07-08    PT/INR - ( 08 Jul 2018 00:34 )   PT: 12.5 sec;   INR: 1.15 ratio    PTT - ( 08 Jul 2018 00:34 )  PTT:31.2 sec    Consultant(s) Notes Reviewed: Hematology-Oncology    MEDICATIONS  (STANDING):  cefepime   IVPB 2000 milliGRAM(s) IV Intermittent every 12 hours  dextrose 5%. 1000 milliLiter(s) (50 mL/Hr) IV Continuous <Continuous>  dextrose 50% Injectable 12.5 Gram(s) IV Push once  dextrose 50% Injectable 25 Gram(s) IV Push once  dextrose 50% Injectable 25 Gram(s) IV Push once  enoxaparin Injectable 40 milliGRAM(s) SubCutaneous daily  gabapentin 100 milliGRAM(s) Oral two times a day  insulin lispro (HumaLOG) corrective regimen sliding scale   SubCutaneous three times a day before meals  insulin lispro (HumaLOG) corrective regimen sliding scale   SubCutaneous at bedtime  mirtazapine Soltab 15 milliGRAM(s) Oral at bedtime  morphine ER Tablet 30 milliGRAM(s) Oral three times a day  senna 2 Tablet(s) Oral at bedtime  simvastatin 10 milliGRAM(s) Oral at bedtime  tamsulosin 0.4 milliGRAM(s) Oral at bedtime  tenofovir disoproxil fumarate (VIREAD) 300 milliGRAM(s) Oral daily    MEDICATIONS  (PRN):  acetaminophen   Tablet 650 milliGRAM(s) Oral every 6 hours PRN For Temp greater than 38 C (100.4 F)  benzocaine 15 mG/menthol 3.6 mG Lozenge 1 Lozenge Oral three times a day PRN Sore Throat  benzonatate 100 milliGRAM(s) Oral three times a day PRN Cough  dextrose 40% Gel 15 Gram(s) Oral once PRN Blood Glucose LESS THAN 70 milliGRAM(s)/deciliter  glucagon  Injectable 1 milliGRAM(s) IntraMuscular once PRN Glucose LESS THAN 70 milligrams/deciliter  ondansetron Injectable 4 milliGRAM(s) IV Push every 8 hours PRN Nausea and/or Vomiting  oxyCODONE    IR 5 milliGRAM(s) Oral every 4 hours PRN Moderate Pain (4 - 6)  oxyCODONE    IR 5 milliGRAM(s) Oral every 4 hours PRN Severe Pain (7 - 10) MEDICINE ACCEPTANCE NOTE - TRANSFER FROM MICU      HPI: 77yoM who lives at home with his wife w/ PMHx significant for Stage IV EGFR mutated NSCLC/adenocarcinoma (on 4th line of tx w/ PO Afatinib daily since 2/12/2018), HBV (on Tenofovir), COPD (not on home O2), HTN, HLD, NIDDM, BPH, and depression (on Mirtazapine), who presented to the ED on 7/5/18 after his family had difficulty wakening him in the setting of dizziness causing a fall 1day PTA in addition to one episode of NBNB vomiting. En route patient was hypoxic to 88% requiring EMS to initiate O2 supplementation with non-breather. In the ED the patient was febrile to Tmax of 102.5F, tachycardic to the 140s, and hypotensive, with hypertension persisting despite IVF resuscitation. Along with broad spectrum antibiotics, he was then started on hemodynamic support with pressors, and was admitted to the MICU.    MICU Course: Admitted for septic shock 2/2 likely post-obstructive CAP (CTA showed large L sided mass with new R sided consolidations, that appear to be due to pneumonia, along with numerous nodules and adenopathy likely representative of known malignancy) tx with IV Cefepime, IV Vancomycin, and IV Azithromycin (downgraded to IV Cefepime currently, off pressors, hemodynamically stable), acute metabolic encephalopathy (now resolved), acute respiratory failure never requiring intubation (now resolved), and LUX (now resolved). Additionally, patient was complaining of persistent severe pain, generalized but notable chest, likely related to ongoing infection. Analgesia was titrated to a regimen including: ATC Morphine ER, PRN Tylenol/Oxycodone IR, and Gabapentin. Finally, there was an initial concern to r/o active TB: AFB negative x3, and respiratory isolation was discontinued.       SUBJECTIVE: Patient seen and examined. No acute subjective complaints at this time.    ROS:  · Negative General Symptoms: no fever; no chills  · Negative Skin Symptoms: no rash; no itching  · Negative Ophthalmologic Symptoms: no scleral injection L; no scleral injection R  · Negative ENMT Symptoms: no throat pain; no dysphagia; no nasal congestion  · Negative Respiratory and Thorax Symptoms: no cough; no wheezing; + pleuritic chest pain  · Negative Cardiovascular Symptoms: + chest pain; no palpitations; no peripheral edema  · Negative Gastrointestinal Symptoms: no nausea; no vomiting; no diarrhea; no constipation; no change in bowel habits; no abdominal pain  · Negative General Genitourinary Symptoms: no flank pain L; no flank pain R; no dysuria  · Negative Musculoskeletal Symptoms: no joint swelling; no neck pain; no back pain  · Neurological: negative  · Psychiatric: negative  · Hematology/Lymphatics: negative  · Endocrine: negative      OBJECTIVE:    Vital Signs Last 24 Hrs  T(F): 97.5 (08 Jul 2018 15:00), Max: 98.3 (07 Jul 2018 20:00)  HR: 89 (08 Jul 2018 13:00) (75 - 90)  BP: 126/77 (08 Jul 2018 15:00) (107/55 - 168/87)  RR: 20 (08 Jul 2018 15:00) (15 - 40)  SpO2: 96% (08 Jul 2018 15:00) (91% - 98%)    Physical Examination  GEN: elderly man, laying in bed, in NAD  PSYCH: A&Ox3, mood and affect appear appropriate  NEURO: no focal neurologic deficits appreciated  SKIN: intact, no e/o rash  EYES: PERRL, anicteric  HEAD: NC, AT  NECK: supple  RESPI: no accessory muscle use, B/L air entry, CTAB except for bibasilar rales  CARDIO: regular rate/rhythm  ABD: soft, NT, ND, +BS  EXT: patient able to move all extremities spontaneously  VASC: peripheral pulses palpated    I&O's Summary  07 Jul 2018 07:01  -  08 Jul 2018 07:00  --------------------------------------------------------  IN: 2552.5 mL / OUT: 1920 mL / NET: 632.5 mL    08 Jul 2018 07:01  -  08 Jul 2018 15:56  --------------------------------------------------------  IN: 300 mL / OUT: 250 mL / NET: 50 mL      CAPILLARY BLOOD GLUCOSE  POCT Blood Glucose.: 146 mg/dL (08 Jul 2018 11:24)  POCT Blood Glucose.: 173 mg/dL (08 Jul 2018 08:08)  POCT Blood Glucose.: 179 mg/dL (07 Jul 2018 21:32)  POCT Blood Glucose.: 152 mg/dL (07 Jul 2018 18:06)      Labs                        12.0   12.6  )-----------( 209      ( 08 Jul 2018 00:34 )             36.1     07-08    140  |  105  |  9   ----------------------------<  130<H>  3.4<L>   |  25  |  0.83    Ca    8.4      08 Jul 2018 00:34  Phos  2.5     07-08  Mg     1.7     07-08    PT/INR - ( 08 Jul 2018 00:34 )   PT: 12.5 sec;   INR: 1.15 ratio    PTT - ( 08 Jul 2018 00:34 )  PTT:31.2 sec    Consultant(s) Notes Reviewed: Hematology-Oncology    MEDICATIONS  (STANDING):  cefepime   IVPB 2000 milliGRAM(s) IV Intermittent every 12 hours  dextrose 5%. 1000 milliLiter(s) (50 mL/Hr) IV Continuous <Continuous>  dextrose 50% Injectable 12.5 Gram(s) IV Push once  dextrose 50% Injectable 25 Gram(s) IV Push once  dextrose 50% Injectable 25 Gram(s) IV Push once  enoxaparin Injectable 40 milliGRAM(s) SubCutaneous daily  gabapentin 100 milliGRAM(s) Oral two times a day  insulin lispro (HumaLOG) corrective regimen sliding scale   SubCutaneous three times a day before meals  insulin lispro (HumaLOG) corrective regimen sliding scale   SubCutaneous at bedtime  mirtazapine Soltab 15 milliGRAM(s) Oral at bedtime  morphine ER Tablet 30 milliGRAM(s) Oral three times a day  senna 2 Tablet(s) Oral at bedtime  simvastatin 10 milliGRAM(s) Oral at bedtime  tamsulosin 0.4 milliGRAM(s) Oral at bedtime  tenofovir disoproxil fumarate (VIREAD) 300 milliGRAM(s) Oral daily    MEDICATIONS  (PRN):  acetaminophen   Tablet 650 milliGRAM(s) Oral every 6 hours PRN For Temp greater than 38 C (100.4 F)  benzocaine 15 mG/menthol 3.6 mG Lozenge 1 Lozenge Oral three times a day PRN Sore Throat  benzonatate 100 milliGRAM(s) Oral three times a day PRN Cough  dextrose 40% Gel 15 Gram(s) Oral once PRN Blood Glucose LESS THAN 70 milliGRAM(s)/deciliter  glucagon  Injectable 1 milliGRAM(s) IntraMuscular once PRN Glucose LESS THAN 70 milligrams/deciliter  ondansetron Injectable 4 milliGRAM(s) IV Push every 8 hours PRN Nausea and/or Vomiting  oxyCODONE    IR 5 milliGRAM(s) Oral every 4 hours PRN Moderate Pain (4 - 6)  oxyCODONE    IR 5 milliGRAM(s) Oral every 4 hours PRN Severe Pain (7 - 10)

## 2018-07-08 NOTE — PHYSICAL THERAPY INITIAL EVALUATION ADULT - PERTINENT HX OF CURRENT PROBLEM, REHAB EVAL
77M with COPD, HTN, HLD, DM, chronic hepatitis B on tenofovir lung cancer on oral chemo presented to ED via ambulace for AMS, difficult to arouse per family, fall day prior to admission with vomiting, excessive sweating o/n and difficulty arousing, CTA with new lower lobe opacities. Admitted to the MICU from the ED for septic shock secondary to CAP requiring pressors.

## 2018-07-08 NOTE — PHYSICAL THERAPY INITIAL EVALUATION ADULT - CRITERIA FOR SKILLED THERAPEUTIC INTERVENTIONS
functional limitations in following categories/risk reduction/prevention/anticipated discharge recommendation/rehab potential/impairments found/therapy frequency/predicted duration of therapy intervention/anticipated equipment needs at discharge

## 2018-07-08 NOTE — PROGRESS NOTE ADULT - PROBLEM SELECTOR PLAN 1
- septic shock, metabolic encephalopathy, acute respiratory failure, and acute kidney injury present on admission, now resolved  - pt remains hemodynamically stable  - continue Abx w/ IV Cefepime for likely post-obstructive pneumonia in the setting of immunosuppression  - Tessalon perrles PRN for cough along with Cepachol lozenges, chest PT, periodic suctioning

## 2018-07-08 NOTE — PROGRESS NOTE ADULT - ASSESSMENT
78 y/o male PMH of NSCLC (stage IV, dx'd 05/2016, currently being treated with chemotherapy), HBV (on Tenofovir) COPD, HTN, HLD, DM2 (not on insulin), presented to the ED for difficulty to arouse. admitted to the MICU from the ED for septic shock secondary to CAP requiring pressors, now hemodynamically stable off pressors.     Neuro:  1. Pt alert and awake. A&O x3.   2. No need for sedation, not agitated  3. Mirtazapine for depression  4. Pain- Continue with oxycodone 5mg q4h, Tylenol prn for fever. Add home dose morphine 30mg TID.    CV:  1. Septic shock likely 2/2 PNA. Blood pressures initially unresponsive to fluids, was on levophed, MAP > 65. Now resolved, off pressors, MAP 90-100s.    Pulm:  1. Not intubated  2. Satting well on NC  3. CTA showed large L sided mass with new R sided consolidations, that appear to be due to pneumonia, along with numerous nodules and adenopathy likely representative of known malignancy. Pneumonia may be post-obstructive.  4. C/w Cefepime. D/c Vanc and azithromycin.   5. Tessalon perrles PRN for cough, chest PT, periodic suctioning    GI:  1. HBV - c/w Tenofovir  2. Diabetic diet  3. Zofran PRN for nausea    ID:  1. Septic shock 2/2 PNA. Legionella neg. D/c azithro  2. Patient is not neutropenic but is immunosuppressed. Will continue Cefepime  3. AFB stain neg x2. quant gold positive.     Endocrine:  1. T2DM, on Metformin at home  2. Put on Insulin sliding scale  3. hgbA1c 6.2     Heme/Onc:  1. Patient with stage IV SCC (although no known mets to the brain or bone). On Ifatinib, not on palliative chemo.  2. Patient is not neutropenic  3. Oncology following  4. Hgb now stable    Renal:  1. Slight LUX to creatinine of 1.3 (baseline renal function 1.2), but now improved to 0.82, stable    DVT PPX:  40 mg Enoxaparin     Ethics- full code

## 2018-07-08 NOTE — PROGRESS NOTE ADULT - PROBLEM SELECTOR PLAN 2
- continue analgesia with PO Morphine ER ATC, PO Gabapentin ATC, and PO Oxycodone IR PRN, and PO Tylenol PRN  - regular assessment and titration of pain Rx as required  - bowel regimen  - PRN Zofran for n/v

## 2018-07-08 NOTE — PROGRESS NOTE ADULT - ASSESSMENT
77yoM who lives at home with his wife w/ PMHx significant for Stage IV EGFR mutated NSCLC/adenocarcinoma (on 4th line of tx w/ PO Afatinib daily since 2/12/2018), HBV (on Tenofovir), COPD (not on home O2), HTN, HLD, NIDDM, BPH, and depression (on Mirtazapine), who was admitted to the MICU on 7/5/18 for septic shock 2/2 likely post-obstructive CAP tx with IV Cefepime, IV Vancomycin, and IV Azithromycin (downgraded to IV Cefepime currently, off pressors, hemodynamically stable), acute metabolic encephalopathy (now resolved), acute respiratory failure never requiring intubation (now resolved), and LUX (now resolved).

## 2018-07-08 NOTE — PHYSICAL THERAPY INITIAL EVALUATION ADULT - ADDITIONAL COMMENTS
7/05 CTA HEAD: No pulmonary embolism. New consolidative opacities in the bilateral lower lobes, likely pneumonia.  Additional masslike consolidation throughout the bilateral lungs demonstrate increase in size compared to 6/8/2018. Extensive bilateral lower lobe and central airway secretions. New conglomerate adenopathy in the subcarinal region.

## 2018-07-08 NOTE — PROGRESS NOTE ADULT - SUBJECTIVE AND OBJECTIVE BOX
CHIEF COMPLAINT: 78 yo M with pmhx of stage IV NSCLC on oral chemo presented with sepsis 2/2 HAP.    Interval Events: No acute events overnight. Pt reports that his pain has decreased and his diarrhea has resolved. No other acute complaints.     ROS is negative for all 13 system except for generalized pain in UE and chest.     OBJECTIVE:  ICU Vital Signs Last 24 Hrs  T(C): 36.7 (08 Jul 2018 05:00), Max: 37 (07 Jul 2018 12:00)  T(F): 98.1 (08 Jul 2018 05:00), Max: 98.6 (07 Jul 2018 12:00)  HR: 81 (08 Jul 2018 08:00) (75 - 105)  BP: 126/72 (08 Jul 2018 08:00) (107/55 - 166/87)  BP(mean): 93 (08 Jul 2018 08:00) (76 - 121)  ABP: --  ABP(mean): --  RR: 25 (08 Jul 2018 08:00) (15 - 37)  SpO2: 97% (08 Jul 2018 08:00) (91% - 99%)        07-07 @ 07:01  -  07-08 @ 07:00  --------------------------------------------------------  IN: 2552.5 mL / OUT: 1920 mL / NET: 632.5 mL      CAPILLARY BLOOD GLUCOSE      POCT Blood Glucose.: 173 mg/dL (08 Jul 2018 08:08)      PHYSICAL EXAM:  General: Alert and oriented. NAD.   HEENT: PERRLA, oral mucosa moist w/o any exudate.   Neck: supple without any lymphadenopathy.  Respiratory: Crackles b/l at the base of the lungs  Cardiovascular:   Abdomen:   Extremities:   Skin:   Neurological:  Psychiatry:    LINES:    HOSPITAL MEDICATIONS:  Standing Meds:  azithromycin  IVPB 500 milliGRAM(s) IV Intermittent every 24 hours  cefepime   IVPB 2000 milliGRAM(s) IV Intermittent every 12 hours  dextrose 5%. 1000 milliLiter(s) IV Continuous <Continuous>  dextrose 50% Injectable 12.5 Gram(s) IV Push once  dextrose 50% Injectable 25 Gram(s) IV Push once  dextrose 50% Injectable 25 Gram(s) IV Push once  enoxaparin Injectable 40 milliGRAM(s) SubCutaneous daily  gabapentin 100 milliGRAM(s) Oral two times a day  insulin lispro (HumaLOG) corrective regimen sliding scale   SubCutaneous three times a day before meals  insulin lispro (HumaLOG) corrective regimen sliding scale   SubCutaneous at bedtime  mirtazapine Soltab 15 milliGRAM(s) Oral at bedtime  morphine ER Tablet 30 milliGRAM(s) Oral three times a day  senna 2 Tablet(s) Oral at bedtime  simvastatin 10 milliGRAM(s) Oral at bedtime  sodium chloride 0.9%. 1000 milliLiter(s) IV Continuous <Continuous>  tamsulosin 0.4 milliGRAM(s) Oral at bedtime  tenofovir disoproxil fumarate (VIREAD) 300 milliGRAM(s) Oral daily  vancomycin  IVPB 1000 milliGRAM(s) IV Intermittent every 12 hours      PRN Meds:  acetaminophen   Tablet 650 milliGRAM(s) Oral every 6 hours PRN  benzocaine 15 mG/menthol 3.6 mG Lozenge 1 Lozenge Oral three times a day PRN  benzonatate 100 milliGRAM(s) Oral three times a day PRN  dextrose 40% Gel 15 Gram(s) Oral once PRN  glucagon  Injectable 1 milliGRAM(s) IntraMuscular once PRN  ondansetron Injectable 4 milliGRAM(s) IV Push every 8 hours PRN  oxyCODONE    IR 5 milliGRAM(s) Oral every 4 hours PRN  oxyCODONE    IR 5 milliGRAM(s) Oral every 4 hours PRN      LABS:                        12.0   12.6  )-----------( 209      ( 08 Jul 2018 00:34 )             36.1     Hgb Trend: 12.0<--, 13.5<--, 11.8<--, 10.9<--, 13.3<--  07-08    140  |  105  |  9   ----------------------------<  130<H>  3.4<L>   |  25  |  0.83    Ca    8.4      08 Jul 2018 00:34  Phos  2.5     07-08  Mg     1.7     07-08      Creatinine Trend: 0.83<--, 0.82<--, 0.83<--, 1.05<--, 1.36<--  PT/INR - ( 08 Jul 2018 00:34 )   PT: 12.5 sec;   INR: 1.15 ratio         PTT - ( 08 Jul 2018 00:34 )  PTT:31.2 sec          MICROBIOLOGY:     RADIOLOGY:  [ ] Reviewed and interpreted by me    EKG: CHIEF COMPLAINT: 78 yo M with pmhx of stage IV NSCLC on oral chemo presented with sepsis 2/2 HAP.    Interval Events: No acute events overnight. Pt reports that his pain has decreased and his diarrhea has resolved. No other acute complaints.     ROS is negative for all 13 system except for generalized pain in UE and chest.     OBJECTIVE:  ICU Vital Signs Last 24 Hrs  T(C): 36.7 (08 Jul 2018 05:00), Max: 37 (07 Jul 2018 12:00)  T(F): 98.1 (08 Jul 2018 05:00), Max: 98.6 (07 Jul 2018 12:00)  HR: 81 (08 Jul 2018 08:00) (75 - 105)  BP: 126/72 (08 Jul 2018 08:00) (107/55 - 166/87)  BP(mean): 93 (08 Jul 2018 08:00) (76 - 121)  ABP: --  ABP(mean): --  RR: 25 (08 Jul 2018 08:00) (15 - 37)  SpO2: 97% (08 Jul 2018 08:00) (91% - 99%)        07-07 @ 07:01  -  07-08 @ 07:00  --------------------------------------------------------  IN: 2552.5 mL / OUT: 1920 mL / NET: 632.5 mL      CAPILLARY BLOOD GLUCOSE      POCT Blood Glucose.: 173 mg/dL (08 Jul 2018 08:08)      PHYSICAL EXAM:  General: Alert and oriented. NAD.   HEENT: PERRLA, oral mucosa moist w/o any exudate.   Neck: supple without any lymphadenopathy.  Respiratory: Crackles b/l at the base of the lungs  Cardiovascular: Normal S1 and S2 sound without any murmurs  Abdomen: Nondistended. Mildly tender to palpation. +BS.   Extremities: No edema       LINES: [facility] MEDICATIONS:  Standing Meds:  azithromycin  IVPB 500 milliGRAM(s) IV Intermittent every 24 hours  cefepime   IVPB 2000 milliGRAM(s) IV Intermittent every 12 hours  dextrose 5%. 1000 milliLiter(s) IV Continuous <Continuous>  dextrose 50% Injectable 12.5 Gram(s) IV Push once  dextrose 50% Injectable 25 Gram(s) IV Push once  dextrose 50% Injectable 25 Gram(s) IV Push once  enoxaparin Injectable 40 milliGRAM(s) SubCutaneous daily  gabapentin 100 milliGRAM(s) Oral two times a day  insulin lispro (HumaLOG) corrective regimen sliding scale   SubCutaneous three times a day before meals  insulin lispro (HumaLOG) corrective regimen sliding scale   SubCutaneous at bedtime  mirtazapine Soltab 15 milliGRAM(s) Oral at bedtime  morphine ER Tablet 30 milliGRAM(s) Oral three times a day  senna 2 Tablet(s) Oral at bedtime  simvastatin 10 milliGRAM(s) Oral at bedtime  sodium chloride 0.9%. 1000 milliLiter(s) IV Continuous <Continuous>  tamsulosin 0.4 milliGRAM(s) Oral at bedtime  tenofovir disoproxil fumarate (VIREAD) 300 milliGRAM(s) Oral daily  vancomycin  IVPB 1000 milliGRAM(s) IV Intermittent every 12 hours      PRN Meds:  acetaminophen   Tablet 650 milliGRAM(s) Oral every 6 hours PRN  benzocaine 15 mG/menthol 3.6 mG Lozenge 1 Lozenge Oral three times a day PRN  benzonatate 100 milliGRAM(s) Oral three times a day PRN  dextrose 40% Gel 15 Gram(s) Oral once PRN  glucagon  Injectable 1 milliGRAM(s) IntraMuscular once PRN  ondansetron Injectable 4 milliGRAM(s) IV Push every 8 hours PRN  oxyCODONE    IR 5 milliGRAM(s) Oral every 4 hours PRN  oxyCODONE    IR 5 milliGRAM(s) Oral every 4 hours PRN      LABS:                        12.0   12.6  )-----------( 209      ( 08 Jul 2018 00:34 )             36.1     Hgb Trend: 12.0<--, 13.5<--, 11.8<--, 10.9<--, 13.3<--  07-08    140  |  105  |  9   ----------------------------<  130<H>  3.4<L>   |  25  |  0.83    Ca    8.4      08 Jul 2018 00:34  Phos  2.5     07-08  Mg     1.7     07-08      Creatinine Trend: 0.83<--, 0.82<--, 0.83<--, 1.05<--, 1.36<--  PT/INR - ( 08 Jul 2018 00:34 )   PT: 12.5 sec;   INR: 1.15 ratio         PTT - ( 08 Jul 2018 00:34 )  PTT:31.2 sec

## 2018-07-08 NOTE — PHYSICAL THERAPY INITIAL EVALUATION ADULT - PLANNED THERAPY INTERVENTIONS, PT EVAL
transfer training/balance training/bed mobility training/gait training/stair training; GOAL: Pt will negotiate up & down 11 stairs independently with unilateral HR & least restrictive AD, in 2 weeks.

## 2018-07-08 NOTE — PHYSICAL THERAPY INITIAL EVALUATION ADULT - REFERRING PHYSICIAN, REHAB EVAL
Jose Peck Consult- PT evaluate and treat, Activity- increase as tolerated, Activity- may dangle at b/s

## 2018-07-09 LAB
ANION GAP SERPL CALC-SCNC: 14 MMOL/L — SIGNIFICANT CHANGE UP (ref 5–17)
BASOPHILS # BLD AUTO: 0.02 K/UL — SIGNIFICANT CHANGE UP (ref 0–0.2)
BASOPHILS NFR BLD AUTO: 0.2 % — SIGNIFICANT CHANGE UP (ref 0–2)
BUN SERPL-MCNC: 11 MG/DL — SIGNIFICANT CHANGE UP (ref 7–23)
CALCIUM SERPL-MCNC: 9.4 MG/DL — SIGNIFICANT CHANGE UP (ref 8.4–10.5)
CHLORIDE SERPL-SCNC: 99 MMOL/L — SIGNIFICANT CHANGE UP (ref 96–108)
CO2 SERPL-SCNC: 27 MMOL/L — SIGNIFICANT CHANGE UP (ref 22–31)
CREAT SERPL-MCNC: 0.82 MG/DL — SIGNIFICANT CHANGE UP (ref 0.5–1.3)
EOSINOPHIL # BLD AUTO: 0.16 K/UL — SIGNIFICANT CHANGE UP (ref 0–0.5)
EOSINOPHIL NFR BLD AUTO: 1.4 % — SIGNIFICANT CHANGE UP (ref 0–6)
GLUCOSE BLDC GLUCOMTR-MCNC: 143 MG/DL — HIGH (ref 70–99)
GLUCOSE BLDC GLUCOMTR-MCNC: 153 MG/DL — HIGH (ref 70–99)
GLUCOSE BLDC GLUCOMTR-MCNC: 159 MG/DL — HIGH (ref 70–99)
GLUCOSE BLDC GLUCOMTR-MCNC: 187 MG/DL — HIGH (ref 70–99)
GLUCOSE SERPL-MCNC: 144 MG/DL — HIGH (ref 70–99)
HCT VFR BLD CALC: 38.3 % — LOW (ref 39–50)
HGB BLD-MCNC: 12.7 G/DL — LOW (ref 13–17)
IMM GRANULOCYTES NFR BLD AUTO: 0.6 % — SIGNIFICANT CHANGE UP (ref 0–1.5)
LYMPHOCYTES # BLD AUTO: 1.15 K/UL — SIGNIFICANT CHANGE UP (ref 1–3.3)
LYMPHOCYTES # BLD AUTO: 9.9 % — LOW (ref 13–44)
MAGNESIUM SERPL-MCNC: 1.6 MG/DL — SIGNIFICANT CHANGE UP (ref 1.6–2.6)
MCHC RBC-ENTMCNC: 29.1 PG — SIGNIFICANT CHANGE UP (ref 27–34)
MCHC RBC-ENTMCNC: 33.2 GM/DL — SIGNIFICANT CHANGE UP (ref 32–36)
MCV RBC AUTO: 87.6 FL — SIGNIFICANT CHANGE UP (ref 80–100)
MONOCYTES # BLD AUTO: 0.86 K/UL — SIGNIFICANT CHANGE UP (ref 0–0.9)
MONOCYTES NFR BLD AUTO: 7.4 % — SIGNIFICANT CHANGE UP (ref 2–14)
NEUTROPHILS # BLD AUTO: 9.3 K/UL — HIGH (ref 1.8–7.4)
NEUTROPHILS NFR BLD AUTO: 80.5 % — HIGH (ref 43–77)
PHOSPHATE SERPL-MCNC: 3.8 MG/DL — SIGNIFICANT CHANGE UP (ref 2.5–4.5)
PLATELET # BLD AUTO: 228 K/UL — SIGNIFICANT CHANGE UP (ref 150–400)
POTASSIUM SERPL-MCNC: 4 MMOL/L — SIGNIFICANT CHANGE UP (ref 3.5–5.3)
POTASSIUM SERPL-SCNC: 4 MMOL/L — SIGNIFICANT CHANGE UP (ref 3.5–5.3)
RBC # BLD: 4.37 M/UL — SIGNIFICANT CHANGE UP (ref 4.2–5.8)
RBC # FLD: 13.2 % — SIGNIFICANT CHANGE UP (ref 10.3–14.5)
SODIUM SERPL-SCNC: 140 MMOL/L — SIGNIFICANT CHANGE UP (ref 135–145)
WBC # BLD: 11.56 K/UL — HIGH (ref 3.8–10.5)
WBC # FLD AUTO: 11.56 K/UL — HIGH (ref 3.8–10.5)

## 2018-07-09 PROCEDURE — 99233 SBSQ HOSP IP/OBS HIGH 50: CPT

## 2018-07-09 PROCEDURE — 99232 SBSQ HOSP IP/OBS MODERATE 35: CPT | Mod: GC

## 2018-07-09 RX ADMIN — MORPHINE SULFATE 30 MILLIGRAM(S): 50 CAPSULE, EXTENDED RELEASE ORAL at 21:56

## 2018-07-09 RX ADMIN — TENOFOVIR DISOPROXIL FUMARATE 300 MILLIGRAM(S): 300 TABLET, FILM COATED ORAL at 12:03

## 2018-07-09 RX ADMIN — SIMVASTATIN 10 MILLIGRAM(S): 20 TABLET, FILM COATED ORAL at 22:02

## 2018-07-09 RX ADMIN — OXYCODONE HYDROCHLORIDE 5 MILLIGRAM(S): 5 TABLET ORAL at 09:44

## 2018-07-09 RX ADMIN — OXYCODONE HYDROCHLORIDE 5 MILLIGRAM(S): 5 TABLET ORAL at 18:11

## 2018-07-09 RX ADMIN — TAMSULOSIN HYDROCHLORIDE 0.4 MILLIGRAM(S): 0.4 CAPSULE ORAL at 21:56

## 2018-07-09 RX ADMIN — OXYCODONE HYDROCHLORIDE 5 MILLIGRAM(S): 5 TABLET ORAL at 08:44

## 2018-07-09 RX ADMIN — Medication 1: at 12:31

## 2018-07-09 RX ADMIN — ENOXAPARIN SODIUM 40 MILLIGRAM(S): 100 INJECTION SUBCUTANEOUS at 12:04

## 2018-07-09 RX ADMIN — Medication 100 MILLIGRAM(S): at 13:14

## 2018-07-09 RX ADMIN — MIRTAZAPINE 15 MILLIGRAM(S): 45 TABLET, ORALLY DISINTEGRATING ORAL at 21:56

## 2018-07-09 RX ADMIN — SENNA PLUS 2 TABLET(S): 8.6 TABLET ORAL at 21:56

## 2018-07-09 RX ADMIN — CEFEPIME 100 MILLIGRAM(S): 1 INJECTION, POWDER, FOR SOLUTION INTRAMUSCULAR; INTRAVENOUS at 05:18

## 2018-07-09 RX ADMIN — GABAPENTIN 100 MILLIGRAM(S): 400 CAPSULE ORAL at 17:26

## 2018-07-09 RX ADMIN — OXYCODONE HYDROCHLORIDE 5 MILLIGRAM(S): 5 TABLET ORAL at 21:55

## 2018-07-09 RX ADMIN — OXYCODONE HYDROCHLORIDE 5 MILLIGRAM(S): 5 TABLET ORAL at 13:10

## 2018-07-09 RX ADMIN — GABAPENTIN 100 MILLIGRAM(S): 400 CAPSULE ORAL at 05:20

## 2018-07-09 RX ADMIN — MORPHINE SULFATE 30 MILLIGRAM(S): 50 CAPSULE, EXTENDED RELEASE ORAL at 05:20

## 2018-07-09 RX ADMIN — MORPHINE SULFATE 30 MILLIGRAM(S): 50 CAPSULE, EXTENDED RELEASE ORAL at 13:14

## 2018-07-09 RX ADMIN — OXYCODONE HYDROCHLORIDE 5 MILLIGRAM(S): 5 TABLET ORAL at 22:30

## 2018-07-09 RX ADMIN — CEFEPIME 100 MILLIGRAM(S): 1 INJECTION, POWDER, FOR SOLUTION INTRAMUSCULAR; INTRAVENOUS at 17:25

## 2018-07-09 RX ADMIN — Medication 1: at 17:26

## 2018-07-09 RX ADMIN — OXYCODONE HYDROCHLORIDE 5 MILLIGRAM(S): 5 TABLET ORAL at 17:11

## 2018-07-09 RX ADMIN — MORPHINE SULFATE 30 MILLIGRAM(S): 50 CAPSULE, EXTENDED RELEASE ORAL at 05:50

## 2018-07-09 RX ADMIN — Medication 1: at 08:43

## 2018-07-09 RX ADMIN — OXYCODONE HYDROCHLORIDE 5 MILLIGRAM(S): 5 TABLET ORAL at 12:10

## 2018-07-09 NOTE — PROGRESS NOTE ADULT - SUBJECTIVE AND OBJECTIVE BOX
Patient is a 77y old  Male who presents with a chief complaint of lethargy (05 Jul 2018 15:18)    HPI: Pt seen with wife at bedside.  phone used- [phone]. Pt c/o fatigue. Persistent pain in chest and abdomen but controlled with current regimen. Pt visibly dyspneic during conversation.     Vital Signs Last 24 Hrs  T(C): 36.7 (09 Jul 2018 15:38), Max: 37.3 (08 Jul 2018 22:16)  T(F): 98 (09 Jul 2018 15:38), Max: 99.1 (08 Jul 2018 22:16)  HR: 85 (09 Jul 2018 15:38) (76 - 85)  BP: 121/79 (09 Jul 2018 15:38) (109/69 - 150/77)  BP(mean): --  RR: 18 (09 Jul 2018 15:38) (18 - 20)  SpO2: 97% (09 Jul 2018 15:38) (93% - 97%)                          12.7   11.56 )-----------( 228      ( 09 Jul 2018 09:24 )             38.3       07-09    140  |  99  |  11  ----------------------------<  144<H>  4.0   |  27  |  0.82    Ca    9.4      09 Jul 2018 07:18  Phos  3.8     07-09  Mg     1.6     07-09      MEDICATIONS  (STANDING):  cefepime   IVPB 2000 milliGRAM(s) IV Intermittent every 12 hours  dextrose 5%. 1000 milliLiter(s) (50 mL/Hr) IV Continuous <Continuous>  dextrose 50% Injectable 12.5 Gram(s) IV Push once  dextrose 50% Injectable 25 Gram(s) IV Push once  dextrose 50% Injectable 25 Gram(s) IV Push once  enoxaparin Injectable 40 milliGRAM(s) SubCutaneous daily  gabapentin 100 milliGRAM(s) Oral two times a day  insulin lispro (HumaLOG) corrective regimen sliding scale   SubCutaneous three times a day before meals  insulin lispro (HumaLOG) corrective regimen sliding scale   SubCutaneous at bedtime  mirtazapine Soltab 15 milliGRAM(s) Oral at bedtime  morphine ER Tablet 30 milliGRAM(s) Oral three times a day  senna 2 Tablet(s) Oral at bedtime  simvastatin 10 milliGRAM(s) Oral at bedtime  tamsulosin 0.4 milliGRAM(s) Oral at bedtime  tenofovir disoproxil fumarate (VIREAD) 300 milliGRAM(s) Oral daily    MEDICATIONS  (PRN):  acetaminophen   Tablet 650 milliGRAM(s) Oral every 6 hours PRN For Temp greater than 38 C (100.4 F)  benzocaine 15 mG/menthol 3.6 mG Lozenge 1 Lozenge Oral three times a day PRN Sore Throat  benzonatate 100 milliGRAM(s) Oral three times a day PRN Cough  dextrose 40% Gel 15 Gram(s) Oral once PRN Blood Glucose LESS THAN 70 milliGRAM(s)/deciliter  glucagon  Injectable 1 milliGRAM(s) IntraMuscular once PRN Glucose LESS THAN 70 milligrams/deciliter  ondansetron Injectable 4 milliGRAM(s) IV Push every 8 hours PRN Nausea and/or Vomiting  oxyCODONE    IR 5 milliGRAM(s) Oral every 4 hours PRN Moderate Pain (4 - 6)  oxyCODONE    IR 5 milliGRAM(s) Oral every 4 hours PRN Severe Pain (7 - 10)

## 2018-07-09 NOTE — PROGRESS NOTE ADULT - ASSESSMENT
78yo w Stage IV EGFR mutated NSCLC/adenocarcinoma (on 4th line of tx w/ PO Afatinib daily since 2/12/2018), HBV (on Tenofovir), COPD (not on home O2) admitted to the MICU on 7/5/18 for septic shock 2/2 likely post-obstructive CAP tx with IV Cefepime, IV Vancomycin, and IV Azithromycin (downgraded to IV Cefepime currently, off pressors, hemodynamically stable), acute metabolic encephalopathy (now resolved), acute respiratory failure never requiring intubation (now resolved), and LUX (now resolved).

## 2018-07-09 NOTE — PROGRESS NOTE ADULT - SUBJECTIVE AND OBJECTIVE BOX
INTERVAL HPI/OVERNIGHT EVENTS:  Patient S&E at bedside. No o/n events, patient still with abdominal pain in the epigastric area.     VITAL SIGNS:  T(F): 98 (07-09-18 @ 15:38)  HR: 85 (07-09-18 @ 15:38)  BP: 121/79 (07-09-18 @ 15:38)  RR: 18 (07-09-18 @ 15:38)  SpO2: 97% (07-09-18 @ 15:38)  Wt(kg): --    PHYSICAL EXAM:    Constitutional: NAD, overall deconditioned.   Eyes: EOMI, sclera non-icteric  Neck: supple, no masses, no JVD  Respiratory: crackles b/l  Cardiovascular: RRR, no M/R/G  Gastrointestinal: soft, NTND, no masses palpable, + BS, no hepatosplenomegaly  Extremities: no c/c/e  Neurological: AAOx3      MEDICATIONS  (STANDING):  cefepime   IVPB 2000 milliGRAM(s) IV Intermittent every 12 hours  dextrose 5%. 1000 milliLiter(s) (50 mL/Hr) IV Continuous <Continuous>  dextrose 50% Injectable 12.5 Gram(s) IV Push once  dextrose 50% Injectable 25 Gram(s) IV Push once  dextrose 50% Injectable 25 Gram(s) IV Push once  enoxaparin Injectable 40 milliGRAM(s) SubCutaneous daily  gabapentin 100 milliGRAM(s) Oral two times a day  insulin lispro (HumaLOG) corrective regimen sliding scale   SubCutaneous three times a day before meals  insulin lispro (HumaLOG) corrective regimen sliding scale   SubCutaneous at bedtime  mirtazapine Soltab 15 milliGRAM(s) Oral at bedtime  morphine ER Tablet 30 milliGRAM(s) Oral three times a day  senna 2 Tablet(s) Oral at bedtime  simvastatin 10 milliGRAM(s) Oral at bedtime  tamsulosin 0.4 milliGRAM(s) Oral at bedtime  tenofovir disoproxil fumarate (VIREAD) 300 milliGRAM(s) Oral daily    MEDICATIONS  (PRN):  acetaminophen   Tablet 650 milliGRAM(s) Oral every 6 hours PRN For Temp greater than 38 C (100.4 F)  benzocaine 15 mG/menthol 3.6 mG Lozenge 1 Lozenge Oral three times a day PRN Sore Throat  benzonatate 100 milliGRAM(s) Oral three times a day PRN Cough  dextrose 40% Gel 15 Gram(s) Oral once PRN Blood Glucose LESS THAN 70 milliGRAM(s)/deciliter  glucagon  Injectable 1 milliGRAM(s) IntraMuscular once PRN Glucose LESS THAN 70 milligrams/deciliter  ondansetron Injectable 4 milliGRAM(s) IV Push every 8 hours PRN Nausea and/or Vomiting  oxyCODONE    IR 5 milliGRAM(s) Oral every 4 hours PRN Moderate Pain (4 - 6)  oxyCODONE    IR 5 milliGRAM(s) Oral every 4 hours PRN Severe Pain (7 - 10)      Allergies    No Known Allergies    Intolerances        LABS:                        12.7   11.56 )-----------( 228      ( 09 Jul 2018 09:24 )             38.3     07-09    140  |  99  |  11  ----------------------------<  144<H>  4.0   |  27  |  0.82    Ca    9.4      09 Jul 2018 07:18  Phos  3.8     07-09  Mg     1.6     07-09      PT/INR - ( 08 Jul 2018 00:34 )   PT: 12.5 sec;   INR: 1.15 ratio         PTT - ( 08 Jul 2018 00:34 )  PTT:31.2 sec      RADIOLOGY & ADDITIONAL TESTS:  Studies reviewed.    < from: CT Angio Chest w/ IV Cont (07.05.18 @ 08:42) >  IMPRESSION:    No pulmonary embolism.    New consolidative opacities in the bilateral lower lobes, likely   pneumonia.     Additional masslike consolidation throughout the bilateral lungs   demonstrate increase in size compared to 6/8/2018.    Extensive bilateral lower lobe and central airway secretions.    New conglomerate adenopathy in the subcarinal region.        < end of copied text >      < from: CT Abdomen and Pelvis w/ IV Cont (07.06.18 @ 23:51) >  IMPRESSION: Bladder wall thickening may be secondary to cystitis versus   underdistention.    No acute intra-abdominal pathology.    New small bilateral pleural effusions.     Unchanged bilateral pulmonary consolidations.    7.7 mm hyperenhancing lesion in head of pancreas, as noted on prior   studies.    < end of copied text >

## 2018-07-09 NOTE — PROGRESS NOTE ADULT - ASSESSMENT
76 y/o M with stage IV EGFR + adenocarcinoma of lung with bilateral lung nodules on presentation and s/p progression on erlotinib, carbo/pemetrexed, atezolizumab and most recently on afatinib here with septic shock 2/2 CAP.     #CAP  Being treated with cefepime.  No longer requiring pressor support, clinically improved.   Afebrile at this time.   Continue with supportive care at this time.     #Adenocarcinoma lung  Patient with progression of disease on CT chest.  Holding afatinib due to infection, but also in the setting of progression of disease.   Will discuss next step with Dr. Gross.   CTAP shows stable cyst in pancreas, no other disease which would explain abdominal pain. Patient points to epigastrum and chest when describing pain, may be due to bilateral lower lobe infection.   Recommend pain control with MS Contin and oxycodone, while inpatient can give IV pushes of morphine as well if requiring at this time.   Discussed with son on the phone at bedside.

## 2018-07-09 NOTE — PROGRESS NOTE ADULT - PROBLEM SELECTOR PLAN 3
- oral chemotherapy on hold in setting of current infection  - Pt appears debilitated, visibly dyspneic at rest. Progression of disease on CT. Will discuss treatment plan and GOC with oncologist.

## 2018-07-10 DIAGNOSIS — G89.3 NEOPLASM RELATED PAIN (ACUTE) (CHRONIC): ICD-10-CM

## 2018-07-10 LAB
ANION GAP SERPL CALC-SCNC: 11 MMOL/L — SIGNIFICANT CHANGE UP (ref 5–17)
BUN SERPL-MCNC: 14 MG/DL — SIGNIFICANT CHANGE UP (ref 7–23)
CALCIUM SERPL-MCNC: 9.3 MG/DL — SIGNIFICANT CHANGE UP (ref 8.4–10.5)
CHLORIDE SERPL-SCNC: 101 MMOL/L — SIGNIFICANT CHANGE UP (ref 96–108)
CO2 SERPL-SCNC: 28 MMOL/L — SIGNIFICANT CHANGE UP (ref 22–31)
CREAT SERPL-MCNC: 0.81 MG/DL — SIGNIFICANT CHANGE UP (ref 0.5–1.3)
CULTURE RESULTS: SIGNIFICANT CHANGE UP
CULTURE RESULTS: SIGNIFICANT CHANGE UP
GLUCOSE BLDC GLUCOMTR-MCNC: 135 MG/DL — HIGH (ref 70–99)
GLUCOSE BLDC GLUCOMTR-MCNC: 137 MG/DL — HIGH (ref 70–99)
GLUCOSE BLDC GLUCOMTR-MCNC: 158 MG/DL — HIGH (ref 70–99)
GLUCOSE BLDC GLUCOMTR-MCNC: 173 MG/DL — HIGH (ref 70–99)
GLUCOSE SERPL-MCNC: 142 MG/DL — HIGH (ref 70–99)
HCT VFR BLD CALC: 39 % — SIGNIFICANT CHANGE UP (ref 39–50)
HGB BLD-MCNC: 12.9 G/DL — LOW (ref 13–17)
MCHC RBC-ENTMCNC: 29.1 PG — SIGNIFICANT CHANGE UP (ref 27–34)
MCHC RBC-ENTMCNC: 33.1 GM/DL — SIGNIFICANT CHANGE UP (ref 32–36)
MCV RBC AUTO: 88 FL — SIGNIFICANT CHANGE UP (ref 80–100)
PLATELET # BLD AUTO: 241 K/UL — SIGNIFICANT CHANGE UP (ref 150–400)
POTASSIUM SERPL-MCNC: 4.3 MMOL/L — SIGNIFICANT CHANGE UP (ref 3.5–5.3)
POTASSIUM SERPL-SCNC: 4.3 MMOL/L — SIGNIFICANT CHANGE UP (ref 3.5–5.3)
RBC # BLD: 4.43 M/UL — SIGNIFICANT CHANGE UP (ref 4.2–5.8)
RBC # FLD: 13.4 % — SIGNIFICANT CHANGE UP (ref 10.3–14.5)
SODIUM SERPL-SCNC: 140 MMOL/L — SIGNIFICANT CHANGE UP (ref 135–145)
SPECIMEN SOURCE: SIGNIFICANT CHANGE UP
SPECIMEN SOURCE: SIGNIFICANT CHANGE UP
WBC # BLD: 9.39 K/UL — SIGNIFICANT CHANGE UP (ref 3.8–10.5)
WBC # FLD AUTO: 9.39 K/UL — SIGNIFICANT CHANGE UP (ref 3.8–10.5)

## 2018-07-10 PROCEDURE — 99233 SBSQ HOSP IP/OBS HIGH 50: CPT

## 2018-07-10 RX ORDER — OXYCODONE HYDROCHLORIDE 5 MG/1
10 TABLET ORAL EVERY 4 HOURS
Qty: 0 | Refills: 0 | Status: DISCONTINUED | OUTPATIENT
Start: 2018-07-10 | End: 2018-07-15

## 2018-07-10 RX ADMIN — OXYCODONE HYDROCHLORIDE 5 MILLIGRAM(S): 5 TABLET ORAL at 10:08

## 2018-07-10 RX ADMIN — OXYCODONE HYDROCHLORIDE 5 MILLIGRAM(S): 5 TABLET ORAL at 13:05

## 2018-07-10 RX ADMIN — SIMVASTATIN 10 MILLIGRAM(S): 20 TABLET, FILM COATED ORAL at 22:11

## 2018-07-10 RX ADMIN — ENOXAPARIN SODIUM 40 MILLIGRAM(S): 100 INJECTION SUBCUTANEOUS at 12:33

## 2018-07-10 RX ADMIN — OXYCODONE HYDROCHLORIDE 10 MILLIGRAM(S): 5 TABLET ORAL at 21:52

## 2018-07-10 RX ADMIN — Medication 1: at 17:45

## 2018-07-10 RX ADMIN — TENOFOVIR DISOPROXIL FUMARATE 300 MILLIGRAM(S): 300 TABLET, FILM COATED ORAL at 12:32

## 2018-07-10 RX ADMIN — MORPHINE SULFATE 30 MILLIGRAM(S): 50 CAPSULE, EXTENDED RELEASE ORAL at 05:17

## 2018-07-10 RX ADMIN — TAMSULOSIN HYDROCHLORIDE 0.4 MILLIGRAM(S): 0.4 CAPSULE ORAL at 22:11

## 2018-07-10 RX ADMIN — GABAPENTIN 100 MILLIGRAM(S): 400 CAPSULE ORAL at 05:17

## 2018-07-10 RX ADMIN — OXYCODONE HYDROCHLORIDE 10 MILLIGRAM(S): 5 TABLET ORAL at 19:36

## 2018-07-10 RX ADMIN — OXYCODONE HYDROCHLORIDE 5 MILLIGRAM(S): 5 TABLET ORAL at 05:00

## 2018-07-10 RX ADMIN — CEFEPIME 100 MILLIGRAM(S): 1 INJECTION, POWDER, FOR SOLUTION INTRAMUSCULAR; INTRAVENOUS at 19:39

## 2018-07-10 RX ADMIN — OXYCODONE HYDROCHLORIDE 5 MILLIGRAM(S): 5 TABLET ORAL at 10:30

## 2018-07-10 RX ADMIN — MIRTAZAPINE 15 MILLIGRAM(S): 45 TABLET, ORALLY DISINTEGRATING ORAL at 22:11

## 2018-07-10 RX ADMIN — OXYCODONE HYDROCHLORIDE 10 MILLIGRAM(S): 5 TABLET ORAL at 22:22

## 2018-07-10 RX ADMIN — OXYCODONE HYDROCHLORIDE 5 MILLIGRAM(S): 5 TABLET ORAL at 12:34

## 2018-07-10 RX ADMIN — SENNA PLUS 2 TABLET(S): 8.6 TABLET ORAL at 22:11

## 2018-07-10 RX ADMIN — OXYCODONE HYDROCHLORIDE 5 MILLIGRAM(S): 5 TABLET ORAL at 05:18

## 2018-07-10 RX ADMIN — CEFEPIME 100 MILLIGRAM(S): 1 INJECTION, POWDER, FOR SOLUTION INTRAMUSCULAR; INTRAVENOUS at 05:17

## 2018-07-10 RX ADMIN — MORPHINE SULFATE 30 MILLIGRAM(S): 50 CAPSULE, EXTENDED RELEASE ORAL at 22:11

## 2018-07-10 RX ADMIN — Medication 1: at 12:33

## 2018-07-10 RX ADMIN — OXYCODONE HYDROCHLORIDE 10 MILLIGRAM(S): 5 TABLET ORAL at 20:06

## 2018-07-10 RX ADMIN — MORPHINE SULFATE 30 MILLIGRAM(S): 50 CAPSULE, EXTENDED RELEASE ORAL at 14:16

## 2018-07-10 RX ADMIN — GABAPENTIN 100 MILLIGRAM(S): 400 CAPSULE ORAL at 17:44

## 2018-07-10 NOTE — PATIENT PROFILE ADULT. - HOW PATIENT ADDRESSED, PROFILE
[place] Helical Rim Advancement Flap Text: The defect edges were debeveled with a #15 blade scalpel.  Given the location of the defect and the proximity to free margins (helical rim) a double helical rim advancement flap was deemed most appropriate.  Using a sterile surgical marker, the appropriate advancement flaps were drawn incorporating the defect and placing the expected incisions between the helical rim and antihelix where possible.  The area thus outlined was incised through and through with a #15 scalpel blade.  With a skin hook and iris scissors, the flaps were gently and sharply undermined and freed up.

## 2018-07-10 NOTE — PROGRESS NOTE ADULT - ASSESSMENT
78yo w Stage IV EGFR mutated NSCLC/adenocarcinoma (on 4th line of tx w/ PO Afatinib daily since 2/12/2018), HBV (on Tenofovir), COPD (not on home O2) admitted to the MICU on 7/5/18 for septic shock 2/2 likely post-obstructive CAP.     Treated with IV Cefepime, IV Vancomycin, and IV Azithromycin (downgraded to IV Cefepime currently, off pressors, hemodynamically stable), acute metabolic encephalopathy (now resolved), acute respiratory failure never requiring intubation (now resolved), and LUX (now resolved).

## 2018-07-10 NOTE — PROGRESS NOTE ADULT - PROBLEM SELECTOR PLAN 1
- septic shock, metabolic encephalopathy, acute respiratory failure, and acute kidney injury present on admission, now resolved  - pt remains hemodynamically stable  - continue Abx w/ IV Cefepime for likely post-obstructive pneumonia in the setting of immunosuppression  - Tessalon perrles PRN for cough along with Cepachol lozenges, chest PT, periodic suctioning  - Clinically improving though overall debilitated.

## 2018-07-10 NOTE — PROGRESS NOTE ADULT - PROBLEM SELECTOR PLAN 2
Requiring frequent breakthrough Oxycodone. Dose increased to 10 mg.     Pt known to the palliative team- was referred for medical Marijuana as an outpatient. Will consult palliative for continuing pain control.

## 2018-07-10 NOTE — PROGRESS NOTE ADULT - PROBLEM SELECTOR PLAN 3
- oral chemotherapy on hold in setting of current infection    - Progression of disease on CT. Onc input on treatment plan/prognosis.

## 2018-07-10 NOTE — PROGRESS NOTE ADULT - SUBJECTIVE AND OBJECTIVE BOX
Patient is a 77y old  Male who presents with a chief complaint of lethargy (05 Jul 2018 15:18)    HPI: Pt reports feeling stronger today. Required 6 doses of breakthrough Oxycodone since yesterday.  phone used- [phone].     Vital Signs Last 24 Hrs  T(C): 36.6 (10 Jul 2018 15:49), Max: 36.7 (09 Jul 2018 23:52)  T(F): 97.9 (10 Jul 2018 15:49), Max: 98.1 (09 Jul 2018 23:52)  HR: 88 (10 Jul 2018 15:49) (80 - 88)  BP: 114/76 (10 Jul 2018 15:49) (96/62 - 120/74)  BP(mean): --  RR: 18 (10 Jul 2018 15:49) (16 - 18)  SpO2: 99% (10 Jul 2018 15:49) (93% - 99%)                          12.9   9.39  )-----------( 241      ( 10 Jul 2018 08:11 )             39.0     07-10    140  |  101  |  14  ----------------------------<  142<H>  4.3   |  28  |  0.81    Ca    9.3      10 Jul 2018 07:08  Phos  3.8     07-09  Mg     1.6     07-09      MEDICATIONS  (STANDING):  cefepime   IVPB 2000 milliGRAM(s) IV Intermittent every 12 hours  dextrose 5%. 1000 milliLiter(s) (50 mL/Hr) IV Continuous <Continuous>  dextrose 50% Injectable 12.5 Gram(s) IV Push once  dextrose 50% Injectable 25 Gram(s) IV Push once  dextrose 50% Injectable 25 Gram(s) IV Push once  enoxaparin Injectable 40 milliGRAM(s) SubCutaneous daily  gabapentin 100 milliGRAM(s) Oral two times a day  insulin lispro (HumaLOG) corrective regimen sliding scale   SubCutaneous three times a day before meals  insulin lispro (HumaLOG) corrective regimen sliding scale   SubCutaneous at bedtime  mirtazapine Soltab 15 milliGRAM(s) Oral at bedtime  morphine ER Tablet 30 milliGRAM(s) Oral three times a day  senna 2 Tablet(s) Oral at bedtime  simvastatin 10 milliGRAM(s) Oral at bedtime  tamsulosin 0.4 milliGRAM(s) Oral at bedtime  tenofovir disoproxil fumarate (VIREAD) 300 milliGRAM(s) Oral daily    MEDICATIONS  (PRN):  acetaminophen   Tablet 650 milliGRAM(s) Oral every 6 hours PRN For Temp greater than 38 C (100.4 F)  benzocaine 15 mG/menthol 3.6 mG Lozenge 1 Lozenge Oral three times a day PRN Sore Throat  benzonatate 100 milliGRAM(s) Oral three times a day PRN Cough  dextrose 40% Gel 15 Gram(s) Oral once PRN Blood Glucose LESS THAN 70 milliGRAM(s)/deciliter  glucagon  Injectable 1 milliGRAM(s) IntraMuscular once PRN Glucose LESS THAN 70 milligrams/deciliter  ondansetron Injectable 4 milliGRAM(s) IV Push every 8 hours PRN Nausea and/or Vomiting  oxyCODONE    IR 5 milliGRAM(s) Oral every 4 hours PRN Moderate Pain (4 - 6)  oxyCODONE    IR 5 milliGRAM(s) Oral every 4 hours PRN Severe Pain (7 - 10)

## 2018-07-11 DIAGNOSIS — Z51.5 ENCOUNTER FOR PALLIATIVE CARE: ICD-10-CM

## 2018-07-11 DIAGNOSIS — A41.9 SEPSIS, UNSPECIFIED ORGANISM: ICD-10-CM

## 2018-07-11 LAB
ANION GAP SERPL CALC-SCNC: 12 MMOL/L — SIGNIFICANT CHANGE UP (ref 5–17)
BASOPHILS # BLD AUTO: 0.03 K/UL — SIGNIFICANT CHANGE UP (ref 0–0.2)
BASOPHILS NFR BLD AUTO: 0.4 % — SIGNIFICANT CHANGE UP (ref 0–2)
BUN SERPL-MCNC: 18 MG/DL — SIGNIFICANT CHANGE UP (ref 7–23)
CALCIUM SERPL-MCNC: 9.5 MG/DL — SIGNIFICANT CHANGE UP (ref 8.4–10.5)
CHLORIDE SERPL-SCNC: 98 MMOL/L — SIGNIFICANT CHANGE UP (ref 96–108)
CO2 SERPL-SCNC: 26 MMOL/L — SIGNIFICANT CHANGE UP (ref 22–31)
CREAT SERPL-MCNC: 0.85 MG/DL — SIGNIFICANT CHANGE UP (ref 0.5–1.3)
EOSINOPHIL # BLD AUTO: 0.15 K/UL — SIGNIFICANT CHANGE UP (ref 0–0.5)
EOSINOPHIL NFR BLD AUTO: 1.9 % — SIGNIFICANT CHANGE UP (ref 0–6)
GLUCOSE BLDC GLUCOMTR-MCNC: 132 MG/DL — HIGH (ref 70–99)
GLUCOSE BLDC GLUCOMTR-MCNC: 150 MG/DL — HIGH (ref 70–99)
GLUCOSE BLDC GLUCOMTR-MCNC: 171 MG/DL — HIGH (ref 70–99)
GLUCOSE BLDC GLUCOMTR-MCNC: 212 MG/DL — HIGH (ref 70–99)
GLUCOSE SERPL-MCNC: 138 MG/DL — HIGH (ref 70–99)
HCT VFR BLD CALC: 42.8 % — SIGNIFICANT CHANGE UP (ref 39–50)
HGB BLD-MCNC: 13.8 G/DL — SIGNIFICANT CHANGE UP (ref 13–17)
IMM GRANULOCYTES NFR BLD AUTO: 1.4 % — SIGNIFICANT CHANGE UP (ref 0–1.5)
LYMPHOCYTES # BLD AUTO: 1.79 K/UL — SIGNIFICANT CHANGE UP (ref 1–3.3)
LYMPHOCYTES # BLD AUTO: 22.9 % — SIGNIFICANT CHANGE UP (ref 13–44)
MCHC RBC-ENTMCNC: 28.3 PG — SIGNIFICANT CHANGE UP (ref 27–34)
MCHC RBC-ENTMCNC: 32.2 GM/DL — SIGNIFICANT CHANGE UP (ref 32–36)
MCV RBC AUTO: 87.9 FL — SIGNIFICANT CHANGE UP (ref 80–100)
MONOCYTES # BLD AUTO: 0.5 K/UL — SIGNIFICANT CHANGE UP (ref 0–0.9)
MONOCYTES NFR BLD AUTO: 6.4 % — SIGNIFICANT CHANGE UP (ref 2–14)
NEUTROPHILS # BLD AUTO: 5.23 K/UL — SIGNIFICANT CHANGE UP (ref 1.8–7.4)
NEUTROPHILS NFR BLD AUTO: 67 % — SIGNIFICANT CHANGE UP (ref 43–77)
PLATELET # BLD AUTO: 204 K/UL — SIGNIFICANT CHANGE UP (ref 150–400)
POTASSIUM SERPL-MCNC: 4.5 MMOL/L — SIGNIFICANT CHANGE UP (ref 3.5–5.3)
POTASSIUM SERPL-SCNC: 4.5 MMOL/L — SIGNIFICANT CHANGE UP (ref 3.5–5.3)
RBC # BLD: 4.87 M/UL — SIGNIFICANT CHANGE UP (ref 4.2–5.8)
RBC # FLD: 13.3 % — SIGNIFICANT CHANGE UP (ref 10.3–14.5)
SODIUM SERPL-SCNC: 136 MMOL/L — SIGNIFICANT CHANGE UP (ref 135–145)
WBC # BLD: 7.81 K/UL — SIGNIFICANT CHANGE UP (ref 3.8–10.5)
WBC # FLD AUTO: 7.81 K/UL — SIGNIFICANT CHANGE UP (ref 3.8–10.5)

## 2018-07-11 PROCEDURE — 99233 SBSQ HOSP IP/OBS HIGH 50: CPT

## 2018-07-11 PROCEDURE — 70551 MRI BRAIN STEM W/O DYE: CPT | Mod: 26

## 2018-07-11 PROCEDURE — 99223 1ST HOSP IP/OBS HIGH 75: CPT

## 2018-07-11 RX ORDER — OXYCODONE HYDROCHLORIDE 5 MG/1
5 TABLET ORAL EVERY 4 HOURS
Qty: 0 | Refills: 0 | Status: DISCONTINUED | OUTPATIENT
Start: 2018-07-11 | End: 2018-07-15

## 2018-07-11 RX ORDER — MORPHINE SULFATE 50 MG/1
45 CAPSULE, EXTENDED RELEASE ORAL EVERY 8 HOURS
Qty: 0 | Refills: 0 | Status: DISCONTINUED | OUTPATIENT
Start: 2018-07-11 | End: 2018-07-15

## 2018-07-11 RX ADMIN — GABAPENTIN 100 MILLIGRAM(S): 400 CAPSULE ORAL at 05:22

## 2018-07-11 RX ADMIN — OXYCODONE HYDROCHLORIDE 10 MILLIGRAM(S): 5 TABLET ORAL at 19:50

## 2018-07-11 RX ADMIN — Medication 2: at 12:55

## 2018-07-11 RX ADMIN — CEFEPIME 100 MILLIGRAM(S): 1 INJECTION, POWDER, FOR SOLUTION INTRAMUSCULAR; INTRAVENOUS at 18:22

## 2018-07-11 RX ADMIN — MORPHINE SULFATE 30 MILLIGRAM(S): 50 CAPSULE, EXTENDED RELEASE ORAL at 05:22

## 2018-07-11 RX ADMIN — TENOFOVIR DISOPROXIL FUMARATE 300 MILLIGRAM(S): 300 TABLET, FILM COATED ORAL at 11:32

## 2018-07-11 RX ADMIN — ENOXAPARIN SODIUM 40 MILLIGRAM(S): 100 INJECTION SUBCUTANEOUS at 11:34

## 2018-07-11 RX ADMIN — OXYCODONE HYDROCHLORIDE 10 MILLIGRAM(S): 5 TABLET ORAL at 12:52

## 2018-07-11 RX ADMIN — MORPHINE SULFATE 45 MILLIGRAM(S): 50 CAPSULE, EXTENDED RELEASE ORAL at 14:16

## 2018-07-11 RX ADMIN — CEFEPIME 100 MILLIGRAM(S): 1 INJECTION, POWDER, FOR SOLUTION INTRAMUSCULAR; INTRAVENOUS at 05:22

## 2018-07-11 RX ADMIN — OXYCODONE HYDROCHLORIDE 10 MILLIGRAM(S): 5 TABLET ORAL at 13:52

## 2018-07-11 RX ADMIN — TAMSULOSIN HYDROCHLORIDE 0.4 MILLIGRAM(S): 0.4 CAPSULE ORAL at 21:31

## 2018-07-11 RX ADMIN — OXYCODONE HYDROCHLORIDE 10 MILLIGRAM(S): 5 TABLET ORAL at 07:39

## 2018-07-11 RX ADMIN — MORPHINE SULFATE 45 MILLIGRAM(S): 50 CAPSULE, EXTENDED RELEASE ORAL at 21:31

## 2018-07-11 RX ADMIN — SENNA PLUS 2 TABLET(S): 8.6 TABLET ORAL at 21:31

## 2018-07-11 RX ADMIN — GABAPENTIN 100 MILLIGRAM(S): 400 CAPSULE ORAL at 18:47

## 2018-07-11 RX ADMIN — SIMVASTATIN 10 MILLIGRAM(S): 20 TABLET, FILM COATED ORAL at 21:31

## 2018-07-11 RX ADMIN — OXYCODONE HYDROCHLORIDE 10 MILLIGRAM(S): 5 TABLET ORAL at 08:09

## 2018-07-11 RX ADMIN — OXYCODONE HYDROCHLORIDE 10 MILLIGRAM(S): 5 TABLET ORAL at 19:20

## 2018-07-11 RX ADMIN — MIRTAZAPINE 15 MILLIGRAM(S): 45 TABLET, ORALLY DISINTEGRATING ORAL at 21:31

## 2018-07-11 NOTE — CONSULT NOTE ADULT - SUBJECTIVE AND OBJECTIVE BOX
HPI:  Translated by son per patient request.    78 y/o [place] speaking (Mandarin dialect) male PMH of SCC (stage IV, dx'd 2016, currently being treated with chemotherapy), HBV (on Tenofovir), COPD, HTN, HLD, DM2 (not on insulin), presented to the ED for difficulty to arouse. As per family, the patient had a fall due to dizziness yesterday (no palpitations), and had an episode of vomiting, followed by confusion. The night prior to presentation, patient was extremely diaphoretic. When the patient was difficult to arouse, he was brought in by ambulance. The family notes that the patient has been dizzy for a few days with diaphoresis, and has been having an increasing NP but wet cough without SOB or CP. No diarrhea or dysuria. He also endorses fevers and chills. Appetite has been good. He lives at home with his wife.  CT chest performed a few weeks ago with progression of disease.  He is still on active chemotherapy, PO without a port.  Pt required MICU stay for septic shock, hypotension .          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  Basal cell carcinoma of nostril    SOCIAL HISTORY:   Significant other/partner:  [ x] YES  [ ] NO               Children:  [ x] YES  [ ] NO                   Sikhism/Spirituality:[employer]ybeterian  Substance hx:  [ ] YES   [x ] NO                   Tobacco hx:  [ ] YES  [x ] NO                       Alcohol hx: [ ] YES  [x ] NO         Home Opioid hx:  [x ] YES  [ ] NO   Living Situation: [ x] Home  [ ] Long term care  [ ] Rehab [ ] Other    FAMILY HISTORY:  Family history of diabetes mellitus (Sibling): brother - living  Family history of liver cancer: Mother   Family history of liver cancer  Family history of stomach cancer (Sibling): brother    [x ] Family history non-contributory     BASELINE (I)ADLs (prior to admission):  [place]: [ ] total  [ x] moderate [ ] dependent    ADVANCE DIRECTIVES:    DNR NO  MOLST  [ ] YES [ X] NO                      [ ] Completed  Health Care Proxy [ X] YES  [ ] NO   [ ] Completed  Living Will  [ ] YES [X ] NO             [ ] Surrogate  [X ] HCP  [ ] Guardian:   MACIEL HUSSEIN                                                                  Allergies    No Known Allergies    Intolerances        MEDICATIONS  (STANDING):  cefepime   IVPB 2000 milliGRAM(s) IV Intermittent every 12 hours  dextrose 5%. 1000 milliLiter(s) (50 mL/Hr) IV Continuous <Continuous>  dextrose 50% Injectable 12.5 Gram(s) IV Push once  dextrose 50% Injectable 25 Gram(s) IV Push once  dextrose 50% Injectable 25 Gram(s) IV Push once  enoxaparin Injectable 40 milliGRAM(s) SubCutaneous daily  gabapentin 100 milliGRAM(s) Oral two times a day  insulin lispro (HumaLOG) corrective regimen sliding scale   SubCutaneous three times a day before meals  insulin lispro (HumaLOG) corrective regimen sliding scale   SubCutaneous at bedtime  mirtazapine Soltab 15 milliGRAM(s) Oral at bedtime  morphine ER Tablet 45 milliGRAM(s) Oral every 8 hours  senna 2 Tablet(s) Oral at bedtime  simvastatin 10 milliGRAM(s) Oral at bedtime  tamsulosin 0.4 milliGRAM(s) Oral at bedtime  tenofovir disoproxil fumarate (VIREAD) 300 milliGRAM(s) Oral daily    MEDICATIONS  (PRN):  acetaminophen   Tablet 650 milliGRAM(s) Oral every 6 hours PRN For Temp greater than 38 C (100.4 F)  benzocaine 15 mG/menthol 3.6 mG Lozenge 1 Lozenge Oral three times a day PRN Sore Throat  benzonatate 100 milliGRAM(s) Oral three times a day PRN Cough  dextrose 40% Gel 15 Gram(s) Oral once PRN Blood Glucose LESS THAN 70 milliGRAM(s)/deciliter  glucagon  Injectable 1 milliGRAM(s) IntraMuscular once PRN Glucose LESS THAN 70 milligrams/deciliter  ondansetron Injectable 4 milliGRAM(s) IV Push every 8 hours PRN Nausea and/or Vomiting  oxyCODONE    IR 5 milliGRAM(s) Oral every 4 hours PRN mild-moderate pain  oxyCODONE    IR 10 milliGRAM(s) Oral every 4 hours PRN Severe Pain (7 - 10)      PRESENT SYMPTOMS:  Source: [X ] Patient   [ ] Family   [ ] Team     Pain:                        [ ] No [X ] Yes             [ ] Mild [X ] Moderate [ X] Severe     PAIN AD:  [ ] Mild [ ] Moderate [ ] Severe         Onset -ACUTE ON CHRONIC  Location - UPPER BACK, EPIGASTRIC/STERNUM  Duration -PERSISTENT  Character -DULL ACHY  Alleviating/Aggravating  PAIN PILLS  Radiation -NONE  Timing -      Dyspnea:                [X ] No [ ] Yes             [ ] Mild [ ] Moderate [ ] Severe    Anxiety:                  [X ] No [ ] Yes             [ ] Mild [ ] Moderate [ ] Severe    Fatigue:                  [ ] No [ X] Yes             [ ] Mild [ X] Moderate [ ] Severe    Nausea:                  [X ] No [ ] Yes             [ ] Mild [ ] Moderate [ ] Severe    Loss of appetite:   [ ] No [ X] Yes             [ ] Mild [X ] Moderate [ ] Severe    Constipation:        [ X] No [ ] Yes             [ ] Mild [ ] Moderate [ ] Severe    Other Symptoms:  [ X] All other review of systems negative   [ ] Unable to obtain due to poor mentation     Karnofsky Performance Score/Palliative Performance Status Version 2:   50     %    PHYSICAL EXAM:  Vital Signs Last 24 Hrs  T(C): 36.4 (2018 10:42), Max: 36.6 (10 Jul 2018 15:49)  T(F): 97.6 (2018 10:42), Max: 97.9 (10 Jul 2018 15:49)  HR: 104 (2018 10:42) (88 - 104)  BP: 98/61 (2018 10:42) (98/61 - 118/72)  BP(mean): --  RR: 18 (2018 10:42) (16 - 18)  SpO2: 93% (2018 10:42) (92% - 99%) I&O's Summary    10 Jul 2018 07:  -  2018 07:00  --------------------------------------------------------  IN: 750 mL / OUT: 600 mL / NET: 150 mL    2018 07:01  -  2018 13:10  --------------------------------------------------------  IN: 300 mL / OUT: 240 mL / NET: 60 mL        General:  [ X] Alert  [ X] Oriented x 4  [place] speaking      [ ] Lethargic  [ ] Agitated   [x ] Cachexia   [ ] Unarousable  [ x] Verbal  [ ] Non-Verbal    HEENT:  [ ] Normal   [ x] Dry mouth   [ ] ET Tube    [ ] Trach  [ ] Oral lesions    Lungs:   [ ] Clear [ ] Tachypnea  [ ] Audible excessive secretions   [ ] Rhonchi        [ ] Right [ ] Left [ x] Bilateral  [ ] Crackles        [ ] Right [ ] Left [ ] Bilateral  [ ] Wheezing     [ ] Right [ ] Left [ ] Bilateral    Cardiovascular:  [ ] Regular [ ] Irregular [x ] Tachycardia   [ ] Bradycardia  [ ] Murmur [ ] Other    Abdomen: [x ] Soft  [ ] Distended   [x ] +BS  [ x] Non tender [ ] Tender  [ ]PEG   [ ]OGT/ NGT   Last BM:   7/10    Genitourinary: [ x] Normal [ ] Incontinent   [ ] Oliguria/Anuria   [ ] Soni    Musculoskeletal:  [ ] Normal   [x ] Weakness  [ ] Bedbound/Wheelchair bound [ ] Edema    Neurological: [x ] No focal deficits  [ ] Cognitive impairment  [ ] Dysphagia [ ] Dysarthria [ ] Paresis [ ] Other     Skin: [x ] Normal   [ ] Pressure ulcer(s)                  [ ] Rash    LABS:                        13.8   7.81  )-----------( 204      ( 2018 08:08 )             42.8     07-11    136  |  98  |  18  ----------------------------<  138<H>  4.5   |  26  |  0.85    Ca    9.5      2018 07:11            Shock: [ ] Septic [ ] Cardiogenic [ ] Neurologic [ ] Hypovolemic  Vasopressors x   Inotrophs x     Protein Calorie Malnutrition: [ ] Mild [ ] Moderate [ ] Severe    Oral Intake: [ ] Unable/mouth care only [ ] Minimal [ ] Moderate [ x] Full Capability  Diet: [ ] NPO [ ] Tube feeds [ ] TPN [ ] Other     RADIOLOGY & ADDITIONAL STUDIES:      INTERPRETATION:  Non-contrast MRI of the brain.    CLINICAL INDICATION: Stage IV lung cancer, unresponsive. For staging.    TECHNIQUE:  Multiplanar, multisequence MR images of the brain were   obtained without the administration of intravenous contrast.      COMPARISON: MRI brain 2016. CT brain 2018.    FINDINGS:      There is no hydrocephalus, midline shift, mass effect, vasogenic edema,   or intracranialhemorrhage.  Diffusion weighted images demonstrate no   acute territorial infarct. There is moderate white matter microvascular   ischemic disease.  Flow voids are seen within the major intracranial   vessels consistent with their patency.    Visualized marrow signal is homogeneous on T1-weighted images.    Incidental 2.6 x 1.2 cm right occipital scalp lipoma is redemonstrated.    The visualized paranasal sinuses and mastoid air cells are clear.  The   orbits, sellar and suprasellar structures, and craniocervical junction   are unremarkable.    IMPRESSION:    No acute intracranial hemorrhage, mass effect, vasogenic edema, or   evidence of acute territorial infarct.    Visualized marrow signal homogeneous on T1-weighted images. No evidence   for osseous metastases.    Please note that MRI of the brain with contrast is more sensitive for the   detection of metastatic disease.      CT Abdomen    IMPRESSION: Bladder wall thickening may be secondary to cystitis versus   underdistention.    No acute intra-abdominal pathology.    New small bilateral pleural effusions.     Unchanged bilateral pulmonary consolidations.    7.7 mm hyperenhancing lesion in head of pancreas, as noted on prior   studies.    REFERRALS:   [ ] Chaplaincy  [ ] Hospice  [ ] Child Life  [ ] Social Work  [ ] Case management [ ] Holistic Therapy

## 2018-07-11 NOTE — PROGRESS NOTE ADULT - ASSESSMENT
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     Treated with IV Cefepime, IV Vancomycin, and IV Azithromycin (downgraded to IV Cefepime currently, off pressors, hemodynamically stable), acute metabolic encephalopathy (now resolved), acute respiratory failure never requiring intubation (now resolved), and LUX (now resolved).

## 2018-07-11 NOTE — PROGRESS NOTE ADULT - SUBJECTIVE AND OBJECTIVE BOX
Patient is a 77y old  Male who presents with a chief complaint of lethargy (05 Jul 2018 15:18)    HPI: Pain uncontrolled overnight. MS contin increased today by palliative care. Spoke with pt through son Parmjit at bedside.     Vital Signs Last 24 Hrs  T(C): 36.4 (11 Jul 2018 15:48), Max: 36.6 (11 Jul 2018 04:00)  T(F): 97.5 (11 Jul 2018 15:48), Max: 97.8 (11 Jul 2018 04:00)  HR: 90 (11 Jul 2018 15:48) (88 - 104)  BP: 118/81 (11 Jul 2018 15:48) (98/61 - 118/81)  BP(mean): --  RR: 18 (11 Jul 2018 16:14) (16 - 18)  SpO2: 91% (11 Jul 2018 16:14) (91% - 94%)                          13.8   7.81  )-----------( 204      ( 11 Jul 2018 08:08 )             42.8     07-11    136  |  98  |  18  ----------------------------<  138<H>  4.5   |  26  |  0.85    Ca    9.5      11 Jul 2018 07:11    MEDICATIONS  (STANDING):  cefepime   IVPB 2000 milliGRAM(s) IV Intermittent every 12 hours  dextrose 5%. 1000 milliLiter(s) (50 mL/Hr) IV Continuous <Continuous>  dextrose 50% Injectable 12.5 Gram(s) IV Push once  dextrose 50% Injectable 25 Gram(s) IV Push once  dextrose 50% Injectable 25 Gram(s) IV Push once  enoxaparin Injectable 40 milliGRAM(s) SubCutaneous daily  gabapentin 100 milliGRAM(s) Oral two times a day  insulin lispro (HumaLOG) corrective regimen sliding scale   SubCutaneous three times a day before meals  insulin lispro (HumaLOG) corrective regimen sliding scale   SubCutaneous at bedtime  mirtazapine Soltab 15 milliGRAM(s) Oral at bedtime  morphine ER Tablet 45 milliGRAM(s) Oral every 8 hours  senna 2 Tablet(s) Oral at bedtime  simvastatin 10 milliGRAM(s) Oral at bedtime  tamsulosin 0.4 milliGRAM(s) Oral at bedtime  tenofovir disoproxil fumarate (VIREAD) 300 milliGRAM(s) Oral daily    MEDICATIONS  (PRN):  acetaminophen   Tablet 650 milliGRAM(s) Oral every 6 hours PRN For Temp greater than 38 C (100.4 F)  benzocaine 15 mG/menthol 3.6 mG Lozenge 1 Lozenge Oral three times a day PRN Sore Throat  benzonatate 100 milliGRAM(s) Oral three times a day PRN Cough  dextrose 40% Gel 15 Gram(s) Oral once PRN Blood Glucose LESS THAN 70 milliGRAM(s)/deciliter  glucagon  Injectable 1 milliGRAM(s) IntraMuscular once PRN Glucose LESS THAN 70 milligrams/deciliter  ondansetron Injectable 4 milliGRAM(s) IV Push every 8 hours PRN Nausea and/or Vomiting  oxyCODONE    IR 5 milliGRAM(s) Oral every 4 hours PRN mild-moderate pain  oxyCODONE    IR 10 milliGRAM(s) Oral every 4 hours PRN Severe Pain (7 - 10)

## 2018-07-11 NOTE — CONSULT NOTE ADULT - PROBLEM SELECTOR RECOMMENDATION 4
Spoke with patient via [facility]  #[phone]. He expresses frustration over pain issues although states since admission it is better controlled.  Spoke with son Parmjit who is aware of patients depressed state and also feels it is driven by his pain.  Plan is to continue disease modifying therapies after discharge.

## 2018-07-11 NOTE — CONSULT NOTE ADULT - PROBLEM SELECTOR RECOMMENDATION 2
Pt visibly depressed, states via  phone that he just wants the doctors to give him a shot to end it.   Recommend Psych eval but feel that pain is the driving force to his current emotional state.  On Remeron 15 mg qhs

## 2018-07-11 NOTE — CONSULT NOTE ADULT - ASSESSMENT
78 y/o male with stage iv NCLC  followed by Dr Brandan Gross at [facility] a/w fever, cough found to be septic, recent CT noted for progression of disease called for pain management

## 2018-07-11 NOTE — CONSULT NOTE ADULT - PROBLEM SELECTOR RECOMMENDATION 9
Patient endorses  7/10 pain scale with difficulty sleeping in setting of pain  24 hour dose requirements:   Morphine ER 30mg q 8 hours:   90mg/24 hr  Oxy IR 5 mg x 3 doses :  15mg    Oxy IR 10 mg x 3 does:  30 mg  Conversion requirements: Oxy to Morphine ER :  50 mg Morphine (90mg+50mg =140mg/24 hours with cross tolerance reduction 25% included)  Therefore,  increase daily requirements to 45mg q 8 hours  with PRN Oxy ir 5 mg mild mod pain q 4 hours ;  Oxy ir 10 mg mod sev pain q 4 hours  Consitipation:  on daily senna ; last bm yesterday, monitor closely

## 2018-07-11 NOTE — PROGRESS NOTE ADULT - PROBLEM SELECTOR PLAN 1
- septic shock, metabolic encephalopathy, acute respiratory failure, and acute kidney injury present on admission, now resolved  - pt remains hemodynamically stable  - continue Abx w/ IV Cefepime for likely post-obstructive pneumonia in the setting of immunosuppression- day 7 today- will transition to po abx tomorrow  - Tessalon perrles PRN for cough along with Cepachol lozenges, chest PT, periodic suctioning  - Clinically improving

## 2018-07-12 LAB
GLUCOSE BLDC GLUCOMTR-MCNC: 155 MG/DL — HIGH (ref 70–99)
GLUCOSE BLDC GLUCOMTR-MCNC: 174 MG/DL — HIGH (ref 70–99)
GLUCOSE BLDC GLUCOMTR-MCNC: 186 MG/DL — HIGH (ref 70–99)
GLUCOSE BLDC GLUCOMTR-MCNC: 196 MG/DL — HIGH (ref 70–99)

## 2018-07-12 PROCEDURE — 99232 SBSQ HOSP IP/OBS MODERATE 35: CPT

## 2018-07-12 RX ADMIN — TENOFOVIR DISOPROXIL FUMARATE 300 MILLIGRAM(S): 300 TABLET, FILM COATED ORAL at 11:20

## 2018-07-12 RX ADMIN — MIRTAZAPINE 15 MILLIGRAM(S): 45 TABLET, ORALLY DISINTEGRATING ORAL at 21:44

## 2018-07-12 RX ADMIN — MORPHINE SULFATE 45 MILLIGRAM(S): 50 CAPSULE, EXTENDED RELEASE ORAL at 14:10

## 2018-07-12 RX ADMIN — MORPHINE SULFATE 45 MILLIGRAM(S): 50 CAPSULE, EXTENDED RELEASE ORAL at 21:54

## 2018-07-12 RX ADMIN — Medication 100 MILLIGRAM(S): at 11:27

## 2018-07-12 RX ADMIN — MORPHINE SULFATE 45 MILLIGRAM(S): 50 CAPSULE, EXTENDED RELEASE ORAL at 13:40

## 2018-07-12 RX ADMIN — BENZOCAINE AND MENTHOL 1 LOZENGE: 5; 1 LIQUID ORAL at 13:42

## 2018-07-12 RX ADMIN — Medication 1: at 17:16

## 2018-07-12 RX ADMIN — OXYCODONE HYDROCHLORIDE 5 MILLIGRAM(S): 5 TABLET ORAL at 17:24

## 2018-07-12 RX ADMIN — SIMVASTATIN 10 MILLIGRAM(S): 20 TABLET, FILM COATED ORAL at 21:44

## 2018-07-12 RX ADMIN — OXYCODONE HYDROCHLORIDE 5 MILLIGRAM(S): 5 TABLET ORAL at 08:59

## 2018-07-12 RX ADMIN — MORPHINE SULFATE 45 MILLIGRAM(S): 50 CAPSULE, EXTENDED RELEASE ORAL at 21:43

## 2018-07-12 RX ADMIN — OXYCODONE HYDROCHLORIDE 5 MILLIGRAM(S): 5 TABLET ORAL at 22:10

## 2018-07-12 RX ADMIN — OXYCODONE HYDROCHLORIDE 10 MILLIGRAM(S): 5 TABLET ORAL at 11:57

## 2018-07-12 RX ADMIN — GABAPENTIN 100 MILLIGRAM(S): 400 CAPSULE ORAL at 05:22

## 2018-07-12 RX ADMIN — ENOXAPARIN SODIUM 40 MILLIGRAM(S): 100 INJECTION SUBCUTANEOUS at 11:20

## 2018-07-12 RX ADMIN — Medication 1: at 12:25

## 2018-07-12 RX ADMIN — Medication 1: at 08:53

## 2018-07-12 RX ADMIN — BENZOCAINE AND MENTHOL 1 LOZENGE: 5; 1 LIQUID ORAL at 21:49

## 2018-07-12 RX ADMIN — CEFEPIME 100 MILLIGRAM(S): 1 INJECTION, POWDER, FOR SOLUTION INTRAMUSCULAR; INTRAVENOUS at 05:22

## 2018-07-12 RX ADMIN — OXYCODONE HYDROCHLORIDE 5 MILLIGRAM(S): 5 TABLET ORAL at 09:29

## 2018-07-12 RX ADMIN — Medication 1 TABLET(S): at 21:25

## 2018-07-12 RX ADMIN — CEFEPIME 100 MILLIGRAM(S): 1 INJECTION, POWDER, FOR SOLUTION INTRAMUSCULAR; INTRAVENOUS at 17:06

## 2018-07-12 RX ADMIN — GABAPENTIN 100 MILLIGRAM(S): 400 CAPSULE ORAL at 17:10

## 2018-07-12 RX ADMIN — MORPHINE SULFATE 45 MILLIGRAM(S): 50 CAPSULE, EXTENDED RELEASE ORAL at 05:22

## 2018-07-12 RX ADMIN — OXYCODONE HYDROCHLORIDE 5 MILLIGRAM(S): 5 TABLET ORAL at 21:42

## 2018-07-12 RX ADMIN — TAMSULOSIN HYDROCHLORIDE 0.4 MILLIGRAM(S): 0.4 CAPSULE ORAL at 21:44

## 2018-07-12 RX ADMIN — OXYCODONE HYDROCHLORIDE 5 MILLIGRAM(S): 5 TABLET ORAL at 17:54

## 2018-07-12 RX ADMIN — OXYCODONE HYDROCHLORIDE 10 MILLIGRAM(S): 5 TABLET ORAL at 11:27

## 2018-07-12 RX ADMIN — SENNA PLUS 2 TABLET(S): 8.6 TABLET ORAL at 21:44

## 2018-07-12 RX ADMIN — BENZOCAINE AND MENTHOL 1 LOZENGE: 5; 1 LIQUID ORAL at 09:01

## 2018-07-12 NOTE — PROGRESS NOTE ADULT - SUBJECTIVE AND OBJECTIVE BOX
Patient is a 77y old  Male who presents with a chief complaint of lethargy (05 Jul 2018 15:18)    HPI: Pain better controlled but pt c/o overall weakness.  used [id number]    Vital Signs Last 24 Hrs  T(C): 36.6 (12 Jul 2018 08:09), Max: 36.6 (12 Jul 2018 08:09)  T(F): 97.8 (12 Jul 2018 08:09), Max: 97.8 (12 Jul 2018 08:09)  HR: 76 (12 Jul 2018 08:09) (76 - 94)  BP: 128/72 (12 Jul 2018 08:09) (126/74 - 135/81)  BP(mean): --  RR: 18 (12 Jul 2018 08:09) (16 - 18)  SpO2: 96% (12 Jul 2018 08:09) (91% - 96%)                          13.8   7.81  )-----------( 204      ( 11 Jul 2018 08:08 )             42.8     07-11    136  |  98  |  18  ----------------------------<  138<H>  4.5   |  26  |  0.85    Ca    9.5      11 Jul 2018 07:11      MEDICATIONS  (STANDING):  cefepime   IVPB 2000 milliGRAM(s) IV Intermittent every 12 hours  dextrose 5%. 1000 milliLiter(s) (50 mL/Hr) IV Continuous <Continuous>  dextrose 50% Injectable 12.5 Gram(s) IV Push once  dextrose 50% Injectable 25 Gram(s) IV Push once  dextrose 50% Injectable 25 Gram(s) IV Push once  enoxaparin Injectable 40 milliGRAM(s) SubCutaneous daily  gabapentin 100 milliGRAM(s) Oral two times a day  insulin lispro (HumaLOG) corrective regimen sliding scale   SubCutaneous three times a day before meals  insulin lispro (HumaLOG) corrective regimen sliding scale   SubCutaneous at bedtime  mirtazapine Soltab 15 milliGRAM(s) Oral at bedtime  morphine ER Tablet 45 milliGRAM(s) Oral every 8 hours  senna 2 Tablet(s) Oral at bedtime  simvastatin 10 milliGRAM(s) Oral at bedtime  tamsulosin 0.4 milliGRAM(s) Oral at bedtime  tenofovir disoproxil fumarate (VIREAD) 300 milliGRAM(s) Oral daily    MEDICATIONS  (PRN):  acetaminophen   Tablet 650 milliGRAM(s) Oral every 6 hours PRN For Temp greater than 38 C (100.4 F)  benzocaine 15 mG/menthol 3.6 mG Lozenge 1 Lozenge Oral three times a day PRN Sore Throat  benzonatate 100 milliGRAM(s) Oral three times a day PRN Cough  dextrose 40% Gel 15 Gram(s) Oral once PRN Blood Glucose LESS THAN 70 milliGRAM(s)/deciliter  glucagon  Injectable 1 milliGRAM(s) IntraMuscular once PRN Glucose LESS THAN 70 milligrams/deciliter  ondansetron Injectable 4 milliGRAM(s) IV Push every 8 hours PRN Nausea and/or Vomiting  oxyCODONE    IR 5 milliGRAM(s) Oral every 4 hours PRN mild-moderate pain  oxyCODONE    IR 10 milliGRAM(s) Oral every 4 hours PRN Severe Pain (7 - 10)

## 2018-07-12 NOTE — PROGRESS NOTE ADULT - ASSESSMENT
76 y/o male with stage iv NCLC  followed by Dr Brandan Gross at [facility] a/w fever, cough found to be septic, recent CT noted for progression of disease called for pain management

## 2018-07-12 NOTE — PROGRESS NOTE ADULT - PROBLEM SELECTOR PLAN 1
Continue new management, Please ensure that patient's chosen pharmacy has full quantity (7 days) of prescribed opioid and/or naloxone prior to discharge. Good control , minimal PRN utilized.  Continue new management, Please ensure that patient's chosen pharmacy has full quantity (7 days) of prescribed opioid and/or naloxone prior to discharge.

## 2018-07-12 NOTE — PROGRESS NOTE ADULT - PROBLEM SELECTOR PLAN 3
- oral chemotherapy on hold in setting of current infection    - Progression of disease on CT. Pt reluctant to go home as he does not feel well. Will discuss prognosis with Oncology.

## 2018-07-12 NOTE — PROGRESS NOTE ADULT - PROBLEM SELECTOR PLAN 1
- septic shock, metabolic encephalopathy, acute respiratory failure, and acute kidney injury present on admission, now resolved  - Will transition to po Augmentin today.

## 2018-07-12 NOTE — PROGRESS NOTE ADULT - SUBJECTIVE AND OBJECTIVE BOX
INTERVAL HPI/OVERNIGHT EVENTS:  Sleepy but arousable and verbal.  States pain is better      Allergies    No Known Allergies    Intolerances      ADVANCE DIRECTIVES:    DNR NO  MOLST [ ] YES [x ] NO     MEDICATIONS  (STANDING):  cefepime   IVPB 2000 milliGRAM(s) IV Intermittent every 12 hours  dextrose 5%. 1000 milliLiter(s) (50 mL/Hr) IV Continuous <Continuous>  dextrose 50% Injectable 12.5 Gram(s) IV Push once  dextrose 50% Injectable 25 Gram(s) IV Push once  dextrose 50% Injectable 25 Gram(s) IV Push once  enoxaparin Injectable 40 milliGRAM(s) SubCutaneous daily  gabapentin 100 milliGRAM(s) Oral two times a day  insulin lispro (HumaLOG) corrective regimen sliding scale   SubCutaneous three times a day before meals  insulin lispro (HumaLOG) corrective regimen sliding scale   SubCutaneous at bedtime  mirtazapine Soltab 15 milliGRAM(s) Oral at bedtime  morphine ER Tablet 45 milliGRAM(s) Oral every 8 hours  senna 2 Tablet(s) Oral at bedtime  simvastatin 10 milliGRAM(s) Oral at bedtime  tamsulosin 0.4 milliGRAM(s) Oral at bedtime  tenofovir disoproxil fumarate (VIREAD) 300 milliGRAM(s) Oral daily    MEDICATIONS  (PRN):  acetaminophen   Tablet 650 milliGRAM(s) Oral every 6 hours PRN For Temp greater than 38 C (100.4 F)  benzocaine 15 mG/menthol 3.6 mG Lozenge 1 Lozenge Oral three times a day PRN Sore Throat  benzonatate 100 milliGRAM(s) Oral three times a day PRN Cough  dextrose 40% Gel 15 Gram(s) Oral once PRN Blood Glucose LESS THAN 70 milliGRAM(s)/deciliter  glucagon  Injectable 1 milliGRAM(s) IntraMuscular once PRN Glucose LESS THAN 70 milligrams/deciliter  ondansetron Injectable 4 milliGRAM(s) IV Push every 8 hours PRN Nausea and/or Vomiting  oxyCODONE    IR 5 milliGRAM(s) Oral every 4 hours PRN mild-moderate pain  oxyCODONE    IR 10 milliGRAM(s) Oral every 4 hours PRN Severe Pain (7 - 10)      PRESENT SYMPTOMS:  Source: [x] Patient   [ ] Family   [ ] Team     Pain:                        [x ] No [ ] Yes             [ ] Mild [ ] Moderate [ ] Severe         PAIN AD:    [ ] Mild [ ] Moderate [ ] Severe    Onset -  Location -  Duration -  Character -  Alleviating/Aggravating -  Radiation -  Timing -    Dyspnea:                [x ] No [ ] Yes             [ ] Mild [ ] Moderate [ ] Severe    Anxiety:                  [x ] No [ ] Yes             [ ] Mild [ ] Moderate [ ] Severe    Fatigue:                  [ ] No [x] Yes             [ ] Mild [x ] Moderate [ ] Severe    Nausea:                  [x ] No [ ] Yes             [ ] Mild [ ] Moderate [ ] Severe    Loss of appetite:   [ ] No [ x] Yes             [ ] Mild [x ] Moderate [ ] Severe    Constipation:        [x ] No [ ] Yes             [ ] Mild [ ] Moderate [ ] Severe    Other Symptoms:  [x ] All other review of systems negative   [ ] Unable to obtain due to poor mentation     Karnofsky Performance Score/Palliative Performance Status Version 2:    50     %    PHYSICAL EXAM:  Vital Signs Last 24 Hrs  T(C): 36.6 (12 Jul 2018 08:09), Max: 36.6 (12 Jul 2018 08:09)  T(F): 97.8 (12 Jul 2018 08:09), Max: 97.8 (12 Jul 2018 08:09)  HR: 76 (12 Jul 2018 08:09) (76 - 94)  BP: 128/72 (12 Jul 2018 08:09) (118/81 - 135/81)  BP(mean): --  RR: 18 (12 Jul 2018 08:09) (16 - 18)  SpO2: 96% (12 Jul 2018 08:09) (91% - 96%) I&O's Summary    11 Jul 2018 07:01  -  12 Jul 2018 07:00  --------------------------------------------------------  IN: 900 mL / OUT: 1715 mL / NET: -815 mL    12 Jul 2018 07:01  -  12 Jul 2018 12:49  --------------------------------------------------------  IN: 180 mL / OUT: 0 mL / NET: 180 mL        General:  [x ] Alert  [x ] Oriented x   4  [ ] Lethargic  [ ] Agitated   [ x] Cachexia   [ ] Unarousable  [ x] Verbal  [ ] Non-Verbal    HEENT:  [ ] Normal   [x] Dry mouth   [ ] ET Tube    [ ] Trach  [ ] Oral lesions    Lungs:   [x ] Clear [ ] Tachypnea  [ ] Audible excessive secretions   [ ] Rhonchi        [ ] Right [ ] Left [ ] Bilateral  [ ] Crackles        [ ] Right [ ] Left [ ] Bilateral  [ ] Wheezing     [ ] Right [ ] Left [ ] Bilateral    Cardiovascular:  [ ] Regular [x ] Irregular [ ] Tachycardia   [ ] Bradycardia  [ ] Murmur [ ] Other    Abdomen: [x ] Soft  [ ] Distended   [x ] +BS  [ x] Non tender [ ] Tender  [ ]PEG   [ ]OGT/ NGT   Last BM:       Genitourinary: [x Normal [ ] Incontinent   [ ] Oliguria/Anuria   [ ] Soni    Musculoskeletal:  [ ] Normal   [x ] Weakness  [ ] Bedbound/Wheelchair bound [ ] Edema    Neurological: [x ] No focal deficits  [ ] Cognitive impairment  [ ] Dysphagia [ ] Dysarthria [ ] Paresis [ ] Other     Skin: [x ] Normal   [ ] Pressure ulcer(s)                  [ ] Rash    LABS:                        13.8   7.81  )-----------( 204      ( 11 Jul 2018 08:08 )             42.8     07-11    136  |  98  |  18  ----------------------------<  138<H>  4.5   |  26  |  0.85    Ca    9.5      11 Jul 2018 07:11            Shock: [ ] Septic [ ] Cardiogenic [ ] Neurologic [ ] Hypovolemic  Vasopressors x   Inotrophs x     Oral Intake: [ ] Unable/mouth care only [ ] Minimal [ ] Moderate [x ] Full Capability    Diet: [ ] NPO [ ] Tube feeds [ ] TPN [ ] Other     RADIOLOGY & ADDITIONAL STUDIES:    REFERRALS:   [ ] Chaplaincy  [ ] Hospice  [ ] Child Life  [ x] Social Work  [ ] Case management [ ] Holistic Therapy

## 2018-07-12 NOTE — PROGRESS NOTE ADULT - RESPIRATORY COMMENTS
Decreased breath sounds R side
Improved aeration bilaterally
Good air entry anteriorly
Decreased breath sounds at bases

## 2018-07-13 LAB
ANION GAP SERPL CALC-SCNC: 11 MMOL/L — SIGNIFICANT CHANGE UP (ref 5–17)
BUN SERPL-MCNC: 17 MG/DL — SIGNIFICANT CHANGE UP (ref 7–23)
CALCIUM SERPL-MCNC: 9.5 MG/DL — SIGNIFICANT CHANGE UP (ref 8.4–10.5)
CHLORIDE SERPL-SCNC: 97 MMOL/L — SIGNIFICANT CHANGE UP (ref 96–108)
CO2 SERPL-SCNC: 30 MMOL/L — SIGNIFICANT CHANGE UP (ref 22–31)
CREAT SERPL-MCNC: 0.98 MG/DL — SIGNIFICANT CHANGE UP (ref 0.5–1.3)
GLUCOSE BLDC GLUCOMTR-MCNC: 134 MG/DL — HIGH (ref 70–99)
GLUCOSE BLDC GLUCOMTR-MCNC: 141 MG/DL — HIGH (ref 70–99)
GLUCOSE BLDC GLUCOMTR-MCNC: 153 MG/DL — HIGH (ref 70–99)
GLUCOSE BLDC GLUCOMTR-MCNC: 194 MG/DL — HIGH (ref 70–99)
GLUCOSE SERPL-MCNC: 153 MG/DL — HIGH (ref 70–99)
HCT VFR BLD CALC: 41.4 % — SIGNIFICANT CHANGE UP (ref 39–50)
HGB BLD-MCNC: 13.7 G/DL — SIGNIFICANT CHANGE UP (ref 13–17)
MCHC RBC-ENTMCNC: 29.8 PG — SIGNIFICANT CHANGE UP (ref 27–34)
MCHC RBC-ENTMCNC: 33.1 GM/DL — SIGNIFICANT CHANGE UP (ref 32–36)
MCV RBC AUTO: 90.2 FL — SIGNIFICANT CHANGE UP (ref 80–100)
PLATELET # BLD AUTO: 235 K/UL — SIGNIFICANT CHANGE UP (ref 150–400)
POTASSIUM SERPL-MCNC: 4.2 MMOL/L — SIGNIFICANT CHANGE UP (ref 3.5–5.3)
POTASSIUM SERPL-SCNC: 4.2 MMOL/L — SIGNIFICANT CHANGE UP (ref 3.5–5.3)
RBC # BLD: 4.59 M/UL — SIGNIFICANT CHANGE UP (ref 4.2–5.8)
RBC # FLD: 12.4 % — SIGNIFICANT CHANGE UP (ref 10.3–14.5)
SODIUM SERPL-SCNC: 138 MMOL/L — SIGNIFICANT CHANGE UP (ref 135–145)
WBC # BLD: 8.8 K/UL — SIGNIFICANT CHANGE UP (ref 3.8–10.5)
WBC # FLD AUTO: 8.8 K/UL — SIGNIFICANT CHANGE UP (ref 3.8–10.5)

## 2018-07-13 PROCEDURE — 99232 SBSQ HOSP IP/OBS MODERATE 35: CPT

## 2018-07-13 RX ORDER — METOPROLOL TARTRATE 50 MG
25 TABLET ORAL
Qty: 0 | Refills: 0 | Status: DISCONTINUED | OUTPATIENT
Start: 2018-07-13 | End: 2018-07-13

## 2018-07-13 RX ADMIN — Medication 1 TABLET(S): at 17:47

## 2018-07-13 RX ADMIN — ENOXAPARIN SODIUM 40 MILLIGRAM(S): 100 INJECTION SUBCUTANEOUS at 12:38

## 2018-07-13 RX ADMIN — MORPHINE SULFATE 45 MILLIGRAM(S): 50 CAPSULE, EXTENDED RELEASE ORAL at 21:43

## 2018-07-13 RX ADMIN — BENZOCAINE AND MENTHOL 1 LOZENGE: 5; 1 LIQUID ORAL at 18:05

## 2018-07-13 RX ADMIN — TAMSULOSIN HYDROCHLORIDE 0.4 MILLIGRAM(S): 0.4 CAPSULE ORAL at 21:43

## 2018-07-13 RX ADMIN — Medication 100 MILLIGRAM(S): at 12:40

## 2018-07-13 RX ADMIN — Medication 1: at 17:47

## 2018-07-13 RX ADMIN — Medication 1: at 12:36

## 2018-07-13 RX ADMIN — MIRTAZAPINE 15 MILLIGRAM(S): 45 TABLET, ORALLY DISINTEGRATING ORAL at 21:43

## 2018-07-13 RX ADMIN — SENNA PLUS 2 TABLET(S): 8.6 TABLET ORAL at 21:43

## 2018-07-13 RX ADMIN — TENOFOVIR DISOPROXIL FUMARATE 300 MILLIGRAM(S): 300 TABLET, FILM COATED ORAL at 12:38

## 2018-07-13 RX ADMIN — OXYCODONE HYDROCHLORIDE 5 MILLIGRAM(S): 5 TABLET ORAL at 18:02

## 2018-07-13 RX ADMIN — MORPHINE SULFATE 45 MILLIGRAM(S): 50 CAPSULE, EXTENDED RELEASE ORAL at 13:57

## 2018-07-13 RX ADMIN — GABAPENTIN 100 MILLIGRAM(S): 400 CAPSULE ORAL at 05:55

## 2018-07-13 RX ADMIN — Medication 1 TABLET(S): at 05:54

## 2018-07-13 RX ADMIN — SIMVASTATIN 10 MILLIGRAM(S): 20 TABLET, FILM COATED ORAL at 21:43

## 2018-07-13 RX ADMIN — BENZOCAINE AND MENTHOL 1 LOZENGE: 5; 1 LIQUID ORAL at 12:40

## 2018-07-13 RX ADMIN — OXYCODONE HYDROCHLORIDE 5 MILLIGRAM(S): 5 TABLET ORAL at 12:35

## 2018-07-13 RX ADMIN — BENZOCAINE AND MENTHOL 1 LOZENGE: 5; 1 LIQUID ORAL at 21:49

## 2018-07-13 RX ADMIN — GABAPENTIN 100 MILLIGRAM(S): 400 CAPSULE ORAL at 17:48

## 2018-07-13 RX ADMIN — MORPHINE SULFATE 45 MILLIGRAM(S): 50 CAPSULE, EXTENDED RELEASE ORAL at 05:54

## 2018-07-13 NOTE — PROGRESS NOTE ADULT - SUBJECTIVE AND OBJECTIVE BOX
Patient is a 77y old  Male who presents with a chief complaint of lethargy (05 Jul 2018 15:18)    HPI: Pt c/o weakness. Son Parmjit by bedside. I ambulated the pt with Parmjit around the hallway. C/o mild dyspnea. Some dizziness with ambulation.     Vital Signs Last 24 Hrs  T(C): 36.4 (13 Jul 2018 08:31), Max: 36.7 (13 Jul 2018 04:37)  T(F): 97.5 (13 Jul 2018 08:31), Max: 98 (13 Jul 2018 04:37)  HR: 87 (13 Jul 2018 08:31) (80 - 87)  BP: 133/70 (13 Jul 2018 08:31) (112/68 - 133/70)  BP(mean): --  RR: 18 (13 Jul 2018 08:31) (16 - 18)  SpO2: 93% (13 Jul 2018 08:31) (92% - 93%)                          13.7   8.8   )-----------( 235      ( 13 Jul 2018 07:19 )             41.4     07-13    138  |  97  |  17  ----------------------------<  153<H>  4.2   |  30  |  0.98    Ca    9.5      13 Jul 2018 07:17

## 2018-07-13 NOTE — PROGRESS NOTE ADULT - PROBLEM SELECTOR PLAN 2
Pain improved after increasing MS contin but pt c/o dizziness. Explained to pt and son that this is likely due to opiates. Monitor.

## 2018-07-13 NOTE — PROGRESS NOTE ADULT - SUBJECTIVE AND OBJECTIVE BOX
INTERVAL HPI/OVERNIGHT EVENTS:  Patient S&E at bedside. No o/n events, patient says pain is better controlled. Walked around the floor today.     VITAL SIGNS:  T(F): 97.4 (07-13-18 @ 16:46)  HR: 97 (07-13-18 @ 16:46)  BP: 133/91 (07-13-18 @ 16:46)  RR: 16 (07-13-18 @ 16:46)  SpO2: 93% (07-13-18 @ 16:46)  Wt(kg): --    PHYSICAL EXAM:    Constitutional: NAD  Eyes: EOMI, sclera non-icteric  Neck: supple, no masses, no JVD  Respiratory: CTA b/l, good air entry b/l  Cardiovascular: RRR, no M/R/G  Gastrointestinal: soft, NTND, no masses palpable, + BS, no hepatosplenomegaly  Extremities: no c/c/e  Neurological: AAOx3      MEDICATIONS  (STANDING):  amoxicillin  875 milliGRAM(s)/clavulanate 1 Tablet(s) Oral two times a day  dextrose 5%. 1000 milliLiter(s) (50 mL/Hr) IV Continuous <Continuous>  dextrose 50% Injectable 12.5 Gram(s) IV Push once  dextrose 50% Injectable 25 Gram(s) IV Push once  dextrose 50% Injectable 25 Gram(s) IV Push once  enoxaparin Injectable 40 milliGRAM(s) SubCutaneous daily  gabapentin 100 milliGRAM(s) Oral two times a day  insulin lispro (HumaLOG) corrective regimen sliding scale   SubCutaneous three times a day before meals  insulin lispro (HumaLOG) corrective regimen sliding scale   SubCutaneous at bedtime  mirtazapine Soltab 15 milliGRAM(s) Oral at bedtime  morphine ER Tablet 45 milliGRAM(s) Oral every 8 hours  senna 2 Tablet(s) Oral at bedtime  simvastatin 10 milliGRAM(s) Oral at bedtime  tamsulosin 0.4 milliGRAM(s) Oral at bedtime  tenofovir disoproxil fumarate (VIREAD) 300 milliGRAM(s) Oral daily    MEDICATIONS  (PRN):  acetaminophen   Tablet 650 milliGRAM(s) Oral every 6 hours PRN For Temp greater than 38 C (100.4 F)  benzocaine 15 mG/menthol 3.6 mG Lozenge 1 Lozenge Oral three times a day PRN Sore Throat  benzonatate 100 milliGRAM(s) Oral three times a day PRN Cough  dextrose 40% Gel 15 Gram(s) Oral once PRN Blood Glucose LESS THAN 70 milliGRAM(s)/deciliter  glucagon  Injectable 1 milliGRAM(s) IntraMuscular once PRN Glucose LESS THAN 70 milligrams/deciliter  ondansetron Injectable 4 milliGRAM(s) IV Push every 8 hours PRN Nausea and/or Vomiting  oxyCODONE    IR 5 milliGRAM(s) Oral every 4 hours PRN mild-moderate pain  oxyCODONE    IR 10 milliGRAM(s) Oral every 4 hours PRN Severe Pain (7 - 10)      Allergies    No Known Allergies    Intolerances        LABS:                        13.7   8.8   )-----------( 235      ( 13 Jul 2018 07:19 )             41.4     07-13    138  |  97  |  17  ----------------------------<  153<H>  4.2   |  30  |  0.98    Ca    9.5      13 Jul 2018 07:17            RADIOLOGY & ADDITIONAL TESTS:  Studies reviewed.

## 2018-07-13 NOTE — PROGRESS NOTE ADULT - ASSESSMENT
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     Treated with IV Cefepime, IV Vancomycin, and IV Azithromycin (downgraded to Augmentin currently, off pressors, hemodynamically stable), acute metabolic encephalopathy (now resolved), acute respiratory failure never requiring intubation (now resolved), and LUX (now resolved).

## 2018-07-13 NOTE — PROGRESS NOTE ADULT - PROBLEM SELECTOR PLAN 3
- oral chemotherapy on hold in setting of current infection    - Progression of disease on CT. Pt reluctant to go home as he does not feel well. Onc input.

## 2018-07-13 NOTE — PROGRESS NOTE ADULT - ASSESSMENT
76 y/o M with stage IV EGFR + adenocarcinoma of lung with bilateral lung nodules on presentation and s/p progression on erlotinib, carbo/pemetrexed, atezolizumab and most recently on afatinib here with septic shock 2/2 CAP.     #CAP  S/p treatment with IV antibiotics. Now on PO Augmentin to complete course tomorrow.   Afebrile at this time.   Continue with supportive care at this time.     #Adenocarcinoma lung  Patient with progression of disease on CT chest.  Holding afatinib due to infection, but also in the setting of progression of disease.   Will discuss next step with Dr. Gross, will set up appointment on discharge.   Recommend pain control with MS Contin and oxycodone, while inpatient can give IV pushes of morphine as well if requiring at this time.   Discussed with son at bedside. Also discussed with patient via  on rounds. They are comfortable with the plan.

## 2018-07-14 ENCOUNTER — TRANSCRIPTION ENCOUNTER (OUTPATIENT)
Age: 78
End: 2018-07-14

## 2018-07-14 LAB
GLUCOSE BLDC GLUCOMTR-MCNC: 132 MG/DL — HIGH (ref 70–99)
GLUCOSE BLDC GLUCOMTR-MCNC: 134 MG/DL — HIGH (ref 70–99)
GLUCOSE BLDC GLUCOMTR-MCNC: 177 MG/DL — HIGH (ref 70–99)
GLUCOSE BLDC GLUCOMTR-MCNC: 197 MG/DL — HIGH (ref 70–99)

## 2018-07-14 PROCEDURE — 99232 SBSQ HOSP IP/OBS MODERATE 35: CPT

## 2018-07-14 RX ORDER — OXYCODONE HYDROCHLORIDE 5 MG/1
1 TABLET ORAL
Qty: 80 | Refills: 0 | OUTPATIENT
Start: 2018-07-14 | End: 2018-08-02

## 2018-07-14 RX ORDER — LOPERAMIDE HCL 2 MG
1 TABLET ORAL
Qty: 0 | Refills: 0 | COMMUNITY

## 2018-07-14 RX ORDER — METFORMIN HYDROCHLORIDE 850 MG/1
1 TABLET ORAL
Qty: 0 | Refills: 0 | COMMUNITY

## 2018-07-14 RX ORDER — MORPHINE SULFATE 50 MG/1
3 CAPSULE, EXTENDED RELEASE ORAL
Qty: 0 | Refills: 0 | COMMUNITY
Start: 2018-07-14

## 2018-07-14 RX ORDER — MORPHINE SULFATE 50 MG/1
3 CAPSULE, EXTENDED RELEASE ORAL
Qty: 180 | Refills: 0 | OUTPATIENT
Start: 2018-07-14 | End: 2018-08-02

## 2018-07-14 RX ORDER — IPRATROPIUM/ALBUTEROL SULFATE 18-103MCG
3 AEROSOL WITH ADAPTER (GRAM) INHALATION EVERY 8 HOURS
Qty: 0 | Refills: 0 | Status: DISCONTINUED | OUTPATIENT
Start: 2018-07-14 | End: 2018-07-15

## 2018-07-14 RX ORDER — IPRATROPIUM/ALBUTEROL SULFATE 18-103MCG
3 AEROSOL WITH ADAPTER (GRAM) INHALATION
Qty: 30 | Refills: 0 | OUTPATIENT
Start: 2018-07-14

## 2018-07-14 RX ORDER — OXYCODONE HYDROCHLORIDE 5 MG/1
1 TABLET ORAL
Qty: 0 | Refills: 0 | COMMUNITY
Start: 2018-07-14

## 2018-07-14 RX ORDER — OXYCODONE HYDROCHLORIDE 5 MG/1
1 TABLET ORAL
Qty: 0 | Refills: 0 | COMMUNITY

## 2018-07-14 RX ORDER — AFATINIB 40 MG/1
1 TABLET, FILM COATED ORAL
Qty: 0 | Refills: 0 | COMMUNITY

## 2018-07-14 RX ADMIN — Medication 1 TABLET(S): at 05:39

## 2018-07-14 RX ADMIN — TENOFOVIR DISOPROXIL FUMARATE 300 MILLIGRAM(S): 300 TABLET, FILM COATED ORAL at 11:43

## 2018-07-14 RX ADMIN — GABAPENTIN 100 MILLIGRAM(S): 400 CAPSULE ORAL at 05:39

## 2018-07-14 RX ADMIN — OXYCODONE HYDROCHLORIDE 5 MILLIGRAM(S): 5 TABLET ORAL at 22:27

## 2018-07-14 RX ADMIN — MORPHINE SULFATE 45 MILLIGRAM(S): 50 CAPSULE, EXTENDED RELEASE ORAL at 21:41

## 2018-07-14 RX ADMIN — TAMSULOSIN HYDROCHLORIDE 0.4 MILLIGRAM(S): 0.4 CAPSULE ORAL at 21:41

## 2018-07-14 RX ADMIN — OXYCODONE HYDROCHLORIDE 5 MILLIGRAM(S): 5 TABLET ORAL at 18:51

## 2018-07-14 RX ADMIN — OXYCODONE HYDROCHLORIDE 5 MILLIGRAM(S): 5 TABLET ORAL at 12:13

## 2018-07-14 RX ADMIN — GABAPENTIN 100 MILLIGRAM(S): 400 CAPSULE ORAL at 18:30

## 2018-07-14 RX ADMIN — OXYCODONE HYDROCHLORIDE 5 MILLIGRAM(S): 5 TABLET ORAL at 11:42

## 2018-07-14 RX ADMIN — OXYCODONE HYDROCHLORIDE 5 MILLIGRAM(S): 5 TABLET ORAL at 17:55

## 2018-07-14 RX ADMIN — MIRTAZAPINE 15 MILLIGRAM(S): 45 TABLET, ORALLY DISINTEGRATING ORAL at 21:41

## 2018-07-14 RX ADMIN — MORPHINE SULFATE 45 MILLIGRAM(S): 50 CAPSULE, EXTENDED RELEASE ORAL at 13:30

## 2018-07-14 RX ADMIN — ENOXAPARIN SODIUM 40 MILLIGRAM(S): 100 INJECTION SUBCUTANEOUS at 11:43

## 2018-07-14 RX ADMIN — MORPHINE SULFATE 45 MILLIGRAM(S): 50 CAPSULE, EXTENDED RELEASE ORAL at 05:38

## 2018-07-14 RX ADMIN — SENNA PLUS 2 TABLET(S): 8.6 TABLET ORAL at 21:41

## 2018-07-14 RX ADMIN — Medication 1: at 12:32

## 2018-07-14 RX ADMIN — Medication 1 TABLET(S): at 18:30

## 2018-07-14 RX ADMIN — OXYCODONE HYDROCHLORIDE 5 MILLIGRAM(S): 5 TABLET ORAL at 21:41

## 2018-07-14 RX ADMIN — SIMVASTATIN 10 MILLIGRAM(S): 20 TABLET, FILM COATED ORAL at 21:41

## 2018-07-14 NOTE — DISCHARGE NOTE ADULT - SECONDARY DIAGNOSIS.
Non-small cell lung cancer (NSCLC) Type 2 diabetes mellitus with complication, without long-term current use of insulin Benign prostatic hyperplasia, unspecified whether lower urinary tract symptoms present Viral hepatitis B with hepatitis delta, without hepatic coma, unspecified chronicity Essential hypertension

## 2018-07-14 NOTE — PROGRESS NOTE ADULT - CONSTITUTIONAL
Well-developed, well nourished
Well-developed, well nourished
detailed exam
Well-developed, well nourished
Well-developed, well nourished
detailed exam

## 2018-07-14 NOTE — DISCHARGE NOTE ADULT - HOSPITAL COURSE
78 y/o male PMH of SCC (stage IV, dx'd 05/2016, currently being treated with chemotherapy), HBV (on Tenofovir) COPD, HTN, HLD, DM2 (not on insulin), presented to the ED for difficulty to arouse. Admitted  to the MICU from the ED for septic shock secondary to CAP, with hypotension requiring pressor support. Pneumonia treated with antibiotic. Now afebrile. Patient to F/U with his PMD and oncologist. 78 y/o male PMH of SCC (stage IV, dx'd 05/2016, currently being treated with chemotherapy), HBV (on Tenofovir) COPD, HTN, HLD, DM2 (not on insulin), presented to the ED for difficulty to arouse. Admitted  to the MICU from the ED for septic shock secondary to CAP, with hypotension requiring pressor support. Pneumonia treated with antibiotic. Now afebrile. Patient to F/U with his PMD and oncologist.    Attending Addendum  Patient discharged to home with close oncology f/u. Asymptomatic 76 y/o male PMH of SCC (stage IV, dx'd 05/2016, currently being treated with chemotherapy), HBV (on Tenofovir) COPD, HTN, HLD, DM2 (not on insulin), presented to the ED for difficulty to arouse. Admitted  to the MICU from the ED for septic shock secondary to CAP, with hypotension requiring pressor support. Pneumonia treated with antibiotic. Now afebrile. Patient to F/U with his PMD and oncologist.    Attending Addendum  Patient discharged to home with close oncology f/u. Asymptomatic - septic shock present on admission and resolved by discharge. This is a 78 y/o M with history of stage IV EGFR mutated NSCLC (adenocarcinoma), currently on 4th line afatinib since 2/12/2018. presented to the ED for difficulty to arouse.    As per EMS, the patient was 88%, and was started on a non-rebreather. In the ED, T 102.5, tachycardic to the 140's, hypotensive. Sepsis present on admission.     Admitted to the ICU for septic shock and acute hypoxic respiratory failure. BP unresponsive to fluids, started on Levophed.     CTA showed large L sided mass with new R sided consolidations, along with numerous nodules and adenopathy. Started on iv vanco and cefepime to treat presumed gram negative and MRSA pneumonia.    Clinical status improved, weaned off pressors and transferred to the floor.     Completed 7 days of iv abx, transitioned to Po Augmentin for 3 more days. Gradually weaned off oxygen.     Continued to have persistent severe chest and abdominal pain due to mets and pancreatic mass. Seen by palliative, pain control increased.     Followed by Oncology. Discharged on above meds after stabilization.     Diagnoses: Bacterial pneumonia; Septic shock; Acute hypoxic respiratory failure; Lung cancer; Lung metastases; Lymph node metastases; Pain due to cancer; COPD; Metabolic encephalopathy; Severe protein calorie malnutrition; Hypotension; DM-2; Hepatitis B; Hypokalemia; Hypomagnesemia; Hypophosphatemia; Prerenal azotemia 78 y/o M with history of stage IV EGFR mutated NSCLC (adenocarcinoma), currently on 4th line afatinib since 2/12/2018 presented to the ED for difficulty to arouse.    Per EMS, the patient was 88% and was started on a non-rebreather. In the ED, T 102.5, tachycardic to the 140's, hypotensive. Sepsis present on admission.     Admitted to the ICU for septic shock and acute hypoxic respiratory failure. BP unresponsive to fluids, started on Levophed.     CTA showed large L sided mass with new R sided consolidations along with numerous nodules and adenopathy. Started on iv vanco and cefepime to treat presumed gram negative and MRSA pneumonia.    Clinical status gradually improved, weaned off pressors and transferred to the floor. Completed 7 days of iv abx, transitioned to Po Augmentin for 3 more days. Gradually weaned off oxygen.     Continued to have persistent severe chest and abdominal pain due to mets and pancreatic mass. Seen by palliative, pain control increased.     Able to ambulate without oxygen. Followed by Oncology. Discharged on above meds after stabilization.     Diagnoses: Bacterial pneumonia; Septic shock; Acute hypoxic respiratory failure; Lung cancer; Lung metastases; Lymph node metastases; Pain due to cancer; COPD; Metabolic encephalopathy; Severe protein calorie malnutrition; Hypotension; DM-2; Hepatitis B; Hypokalemia; Hypomagnesemia; Hypophosphatemia; Prerenal azotemia

## 2018-07-14 NOTE — DISCHARGE NOTE ADULT - MEDICATION SUMMARY - MEDICATIONS TO STOP TAKING
I will STOP taking the medications listed below when I get home from the hospital:    afatinib 20 mg oral tablet  -- 1 tab(s) by mouth once a day

## 2018-07-14 NOTE — DISCHARGE NOTE ADULT - MEDICATION SUMMARY - MEDICATIONS TO TAKE
I will START or STAY ON the medications listed below when I get home from the hospital:    Rolling Waker  -- Indication: For Walker    oxyCODONE 10 mg oral tablet  -- 1 tab(s) by mouth every 6 hours, As Needed - 10) MDD:4  -- Indication: For Pain    morphine 15 mg/12 hr oral tablet, extended release  -- 3 tab(s) by mouth every 8 hours MDD:3  -- Indication: For Pain    Flomax 0.4 mg oral capsule  -- 1 cap(s) by mouth once a day   -- Indication: For BPH (benign prostatic hyperplasia)    gabapentin 100 mg oral capsule  -- 1 cap(s) by mouth once a day (at bedtime)  -- Indication: For Pain    mirtazapine 15 mg oral tablet, disintegrating  -- 1 tab(s) by mouth once a day (at bedtime)  -- Indication: For Depression    metFORMIN 500 mg oral tablet  -- 1 tab(s) by mouth 2 times a day  -- Indication: For DM    Imodium 2 mg oral capsule  -- 1 cap(s) by mouth once a day, As Needed  -- Indication: For Diarrhea    Zofran 4 mg oral tablet  -- 1 tab(s) by mouth every 8 hours, As Needed  -- Indication: For N/vomiting    simvastatin 10 mg oral tablet  -- 1 tab(s) by mouth once a day (at bedtime)  -- Indication: For HLD    benzonatate 100 mg oral capsule  -- 1 cap(s) by mouth 3 times a day, As needed, Cough  -- Indication: For Cough    tenofovir disoproxil fumarate 300 mg oral tablet  -- 1 tab(s) by mouth once a day  -- Indication: For HBV (hepatitis B virus) infection    senna 8.6 mg oral tablet  -- 2 tab(s) by mouth once a day (at bedtime)  -- Indication: For Constipation I will START or STAY ON the medications listed below when I get home from the hospital:    Rolling Waker  -- Indication: For Walker    oxyCODONE 10 mg oral tablet  -- 1 tab(s) by mouth every 6 hours, As Needed - 10) MDD:4  -- Indication: For Pain    morphine 15 mg/12 hr oral tablet, extended release  -- 3 tab(s) by mouth every 8 hours MDD:3  -- Indication: For Pain    Flomax 0.4 mg oral capsule  -- 1 cap(s) by mouth once a day   -- Indication: For BPH (benign prostatic hyperplasia)    gabapentin 100 mg oral capsule  -- 1 cap(s) by mouth once a day (at bedtime)  -- Indication: For Pain    mirtazapine 15 mg oral tablet, disintegrating  -- 1 tab(s) by mouth once a day (at bedtime)  -- Indication: For Depression    metFORMIN 500 mg oral tablet  -- 1 tab(s) by mouth 2 times a day  -- Indication: For DM    Imodium 2 mg oral capsule  -- 1 cap(s) by mouth once a day, As Needed  -- Indication: For Diarrhea    Zofran 4 mg oral tablet  -- 1 tab(s) by mouth every 8 hours, As Needed  -- Indication: For N/vomiting    simvastatin 10 mg oral tablet  -- 1 tab(s) by mouth once a day (at bedtime)  -- Indication: For HLD    benzonatate 100 mg oral capsule  -- 1 cap(s) by mouth 3 times a day, As needed, Cough  -- Indication: For Cough    tenofovir disoproxil fumarate 300 mg oral tablet  -- 1 tab(s) by mouth once a day  -- Indication: For HBV (hepatitis B virus) infection    ipratropium-albuterol 0.5 mg-2.5 mg/3 mLinhalation solution  -- 3 milliliter(s) inhaled every 8 hours, As needed, Shortness of Breath and/or Wheezing  -- Indication: For wheezing    senna 8.6 mg oral tablet  -- 2 tab(s) by mouth once a day (at bedtime)  -- Indication: For Constipation

## 2018-07-14 NOTE — PROGRESS NOTE ADULT - PROBLEM SELECTOR PLAN 2
Pain improved after increasing MS contin. Prescriptions sent to [facility] today. F/u with Dr Best Marquez at [facility].

## 2018-07-14 NOTE — PROGRESS NOTE ADULT - SUBJECTIVE AND OBJECTIVE BOX
Patient is a 77y old  Male who presents with a chief complaint of lethargy (14 Jul 2018 14:08)    HPI: Pt feels better today. Ambulated with son.     Vital Signs Last 24 Hrs  T(C): 36.9 (14 Jul 2018 08:11), Max: 36.9 (14 Jul 2018 05:00)  T(F): 98.4 (14 Jul 2018 08:11), Max: 98.4 (14 Jul 2018 05:00)  HR: 82 (14 Jul 2018 08:11) (82 - 97)  BP: 99/65 (14 Jul 2018 08:11) (99/65 - 133/91)  BP(mean): --  RR: 16 (14 Jul 2018 08:11) (16 - 16)  SpO2: 92% (14 Jul 2018 08:11) (92% - 95%)                          13.7   8.8   )-----------( 235      ( 13 Jul 2018 07:19 )             41.4     07-13    138  |  97  |  17  ----------------------------<  153<H>  4.2   |  30  |  0.98    Ca    9.5      13 Jul 2018 07:17      MEDICATIONS  (STANDING):  amoxicillin  875 milliGRAM(s)/clavulanate 1 Tablet(s) Oral two times a day  dextrose 5%. 1000 milliLiter(s) (50 mL/Hr) IV Continuous <Continuous>  dextrose 50% Injectable 12.5 Gram(s) IV Push once  dextrose 50% Injectable 25 Gram(s) IV Push once  dextrose 50% Injectable 25 Gram(s) IV Push once  enoxaparin Injectable 40 milliGRAM(s) SubCutaneous daily  gabapentin 100 milliGRAM(s) Oral two times a day  insulin lispro (HumaLOG) corrective regimen sliding scale   SubCutaneous three times a day before meals  insulin lispro (HumaLOG) corrective regimen sliding scale   SubCutaneous at bedtime  mirtazapine Soltab 15 milliGRAM(s) Oral at bedtime  morphine ER Tablet 45 milliGRAM(s) Oral every 8 hours  senna 2 Tablet(s) Oral at bedtime  simvastatin 10 milliGRAM(s) Oral at bedtime  tamsulosin 0.4 milliGRAM(s) Oral at bedtime  tenofovir disoproxil fumarate (VIREAD) 300 milliGRAM(s) Oral daily    MEDICATIONS  (PRN):  acetaminophen   Tablet 650 milliGRAM(s) Oral every 6 hours PRN For Temp greater than 38 C (100.4 F)  ALBUTerol/ipratropium for Nebulization 3 milliLiter(s) Nebulizer every 8 hours PRN Shortness of Breath and/or Wheezing  benzocaine 15 mG/menthol 3.6 mG Lozenge 1 Lozenge Oral three times a day PRN Sore Throat  benzonatate 100 milliGRAM(s) Oral three times a day PRN Cough  dextrose 40% Gel 15 Gram(s) Oral once PRN Blood Glucose LESS THAN 70 milliGRAM(s)/deciliter  glucagon  Injectable 1 milliGRAM(s) IntraMuscular once PRN Glucose LESS THAN 70 milligrams/deciliter  ondansetron Injectable 4 milliGRAM(s) IV Push every 8 hours PRN Nausea and/or Vomiting  oxyCODONE    IR 5 milliGRAM(s) Oral every 4 hours PRN mild-moderate pain  oxyCODONE    IR 10 milliGRAM(s) Oral every 4 hours PRN Severe Pain (7 - 10)

## 2018-07-14 NOTE — DISCHARGE NOTE ADULT - PATIENT PORTAL LINK FT
You can access [facility], offered by [facility], by registering with the following website: [email or web address]

## 2018-07-14 NOTE — PROGRESS NOTE ADULT - ENMT
No oral lesions; no gross abnormalities

## 2018-07-14 NOTE — DISCHARGE NOTE ADULT - CARE PLAN
Principal Discharge DX:	CAP (community acquired pneumonia)  Goal:	Resolution  Assessment and plan of treatment:	Improved with antibiotic. Afebrile now.  F/U with your primary doctor next week.  Secondary Diagnosis:	Non-small cell lung cancer (NSCLC)  Assessment and plan of treatment:	F/u with your oncologist.  Secondary Diagnosis:	Type 2 diabetes mellitus with complication, without long-term current use of insulin  Assessment and plan of treatment:	HgA1C this admission 6.2.   Make sure you get your HgA1c checked every three months.  If you take oral diabetes medications, check your blood glucose two times a day.  If you take insulin, check your blood glucose before meals and at bedtime.  It's important not to skip any meals.  Keep a log of your blood glucose results and always take it with you to your doctor appointments.  Keep a list of your current medications including injectables and over the counter medications and bring this medication list with you to all your doctor appointments.  If you have not seen your ophthalmologist this year call for appointment.  Check your feet daily for redness, sores, or openings. Do not self treat. If no improvement in two days call your primary care physician for an appointment.  Low blood sugar (hypoglycemia) is a blood sugar below 70mg/dl. Check your blood sugar if you feel signs/symptoms of hypoglycemia. If your blood sugar is below 70 take 15 grams of carbohydrates (ex 4 oz of apple juice, 3-4 glucose tablets, or 4-6 oz of regular soda) wait 15 minutes and repeat blood sugar to make sure it comes up above 70.  If your blood sugar is above 70 and you are due for a meal, have a meal.  If you are not due for a meal have a snack.  This snack helps keeps your blood sugar at a safe range. Principal Discharge DX:	CAP (community acquired pneumonia)  Goal:	Resolution  Assessment and plan of treatment:	Improved with antibiotic. Afebrile now.  F/U with your primary doctor next week.  Secondary Diagnosis:	Non-small cell lung cancer (NSCLC)  Assessment and plan of treatment:	F/u with your oncologist.  Secondary Diagnosis:	Type 2 diabetes mellitus with complication, without long-term current use of insulin  Assessment and plan of treatment:	HgA1C this admission 6.2.   Make sure you get your HgA1c checked every three months.  If you take oral diabetes medications, check your blood glucose two times a day.  If you take insulin, check your blood glucose before meals and at bedtime.  It's important not to skip any meals.  Keep a log of your blood glucose results and always take it with you to your doctor appointments.  Keep a list of your current medications including injectables and over the counter medications and bring this medication list with you to all your doctor appointments.  If you have not seen your ophthalmologist this year call for appointment.  Check your feet daily for redness, sores, or openings. Do not self treat. If no improvement in two days call your primary care physician for an appointment.  Low blood sugar (hypoglycemia) is a blood sugar below 70mg/dl. Check your blood sugar if you feel signs/symptoms of hypoglycemia. If your blood sugar is below 70 take 15 grams of carbohydrates (ex 4 oz of apple juice, 3-4 glucose tablets, or 4-6 oz of regular soda) wait 15 minutes and repeat blood sugar to make sure it comes up above 70.  If your blood sugar is above 70 and you are due for a meal, have a meal.  If you are not due for a meal have a snack.  This snack helps keeps your blood sugar at a safe range.  Secondary Diagnosis:	Benign prostatic hyperplasia, unspecified whether lower urinary tract symptoms present  Assessment and plan of treatment:	You have an enlarged prostate gland which gets bigger as men get older - it is a very common problem and has nothing to do with prostate cancer  Call your doctor if you are urinating more frequently, have trouble starting to urinate, have weak stream, urine leaking or dribbling, and feeling as though bladder is not empty after urination  Your doctor will monitor your prostate with a rectal exam as well as urine or blood testing  You can help yourself by reducing the amount of fluid you drink before going to bed, limiting the amount of alcohol & caffeine you drink   Avoid cold & allergy medication that contain decongestants or antihistamines which make BPH symptoms worse  You can also "double void" by waiting a moment after urinating & trying again  Take your medication as prescribed - one medication helps to relax the muscle around the urethra and the other medication you may take prevents the prostate from growing more or even shrinking the prostate  Secondary Diagnosis:	Viral hepatitis B with hepatitis delta, without hepatic coma, unspecified chronicity  Assessment and plan of treatment:	continue with current medications  Secondary Diagnosis:	Essential hypertension  Assessment and plan of treatment:	Low salt diet  Activity as tolerated.  Take all medication as prescribed.  Follow up with your medical doctor for routine blood pressure monitoring at your next visit.  Notify your doctor if you have any of the following symptoms:   Dizziness, Lightheadedness, Blurry vision, Headache, Chest pain, Shortness of breath

## 2018-07-14 NOTE — DISCHARGE NOTE ADULT - MEDICATION SUMMARY - MEDICATIONS TO CHANGE
I will SWITCH the dose or number of times a day I take the medications listed below when I get home from the hospital:    morphine 30 mg oral tablet  -- 1 tab(s) by mouth 3 times a day    metFORMIN 500 mg oral tablet  -- 1 tab(s) by mouth 3 times a day    oxyCODONE 5 mg oral tablet  -- 1 tab(s) by mouth every 4 hours, As Needed for pain I will SWITCH the dose or number of times a day I take the medications listed below when I get home from the hospital:    morphine 30 mg oral tablet  -- 1 tab(s) by mouth 3 times a day    metFORMIN 500 mg oral tablet  -- 1 tab(s) by mouth 3 times a day    oxyCODONE 5 mg oral tablet  -- 1 tab(s) by mouth every 4 hours, As Needed for pain    Imodium 2 mg oral capsule  -- 1 cap(s) by mouth every 4 hours, As Needed    Tessalon Perles 100 mg oral capsule  -- 1 cap(s) by mouth 3 times a day

## 2018-07-14 NOTE — DISCHARGE NOTE ADULT - PLAN OF CARE
Resolution Improved with antibiotic. Afebrile now.  F/U with your primary doctor next week. F/u with your oncologist. HgA1C this admission 6.2.   Make sure you get your HgA1c checked every three months.  If you take oral diabetes medications, check your blood glucose two times a day.  If you take insulin, check your blood glucose before meals and at bedtime.  It's important not to skip any meals.  Keep a log of your blood glucose results and always take it with you to your doctor appointments.  Keep a list of your current medications including injectables and over the counter medications and bring this medication list with you to all your doctor appointments.  If you have not seen your ophthalmologist this year call for appointment.  Check your feet daily for redness, sores, or openings. Do not self treat. If no improvement in two days call your primary care physician for an appointment.  Low blood sugar (hypoglycemia) is a blood sugar below 70mg/dl. Check your blood sugar if you feel signs/symptoms of hypoglycemia. If your blood sugar is below 70 take 15 grams of carbohydrates (ex 4 oz of apple juice, 3-4 glucose tablets, or 4-6 oz of regular soda) wait 15 minutes and repeat blood sugar to make sure it comes up above 70.  If your blood sugar is above 70 and you are due for a meal, have a meal.  If you are not due for a meal have a snack.  This snack helps keeps your blood sugar at a safe range. You have an enlarged prostate gland which gets bigger as men get older - it is a very common problem and has nothing to do with prostate cancer  Call your doctor if you are urinating more frequently, have trouble starting to urinate, have weak stream, urine leaking or dribbling, and feeling as though bladder is not empty after urination  Your doctor will monitor your prostate with a rectal exam as well as urine or blood testing  You can help yourself by reducing the amount of fluid you drink before going to bed, limiting the amount of alcohol & caffeine you drink   Avoid cold & allergy medication that contain decongestants or antihistamines which make BPH symptoms worse  You can also "double void" by waiting a moment after urinating & trying again  Take your medication as prescribed - one medication helps to relax the muscle around the urethra and the other medication you may take prevents the prostate from growing more or even shrinking the prostate continue with current medications Low salt diet  Activity as tolerated.  Take all medication as prescribed.  Follow up with your medical doctor for routine blood pressure monitoring at your next visit.  Notify your doctor if you have any of the following symptoms:   Dizziness, Lightheadedness, Blurry vision, Headache, Chest pain, Shortness of breath

## 2018-07-14 NOTE — PROGRESS NOTE ADULT - PROBLEM SELECTOR PLAN 3
- oral chemotherapy on hold in setting of current infection    - Progression of disease on CT. To f/u with Oncology as an outpatient. Continue to hold Afatinib per onc.

## 2018-07-14 NOTE — DISCHARGE NOTE ADULT - CARE PROVIDER_API CALL
Brandan Gross (MD; MBBS), Hematology; [facility]; Medical Oncology  [address] [place] [id number]  Phone: [phone]  Fax: [phone]

## 2018-07-15 VITALS
TEMPERATURE: 98 F | RESPIRATION RATE: 18 BRPM | SYSTOLIC BLOOD PRESSURE: 108 MMHG | HEART RATE: 92 BPM | OXYGEN SATURATION: 93 % | DIASTOLIC BLOOD PRESSURE: 68 MMHG

## 2018-07-15 LAB
GLUCOSE BLDC GLUCOMTR-MCNC: 127 MG/DL — HIGH (ref 70–99)
GLUCOSE BLDC GLUCOMTR-MCNC: 138 MG/DL — HIGH (ref 70–99)

## 2018-07-15 PROCEDURE — 87015 SPECIMEN INFECT AGNT CONCNTJ: CPT

## 2018-07-15 PROCEDURE — 97116 GAIT TRAINING THERAPY: CPT

## 2018-07-15 PROCEDURE — 87449 NOS EACH ORGANISM AG IA: CPT

## 2018-07-15 PROCEDURE — 87086 URINE CULTURE/COLONY COUNT: CPT

## 2018-07-15 PROCEDURE — 70551 MRI BRAIN STEM W/O DYE: CPT

## 2018-07-15 PROCEDURE — 80048 BASIC METABOLIC PNL TOTAL CA: CPT

## 2018-07-15 PROCEDURE — 94640 AIRWAY INHALATION TREATMENT: CPT

## 2018-07-15 PROCEDURE — 85014 HEMATOCRIT: CPT

## 2018-07-15 PROCEDURE — 87798 DETECT AGENT NOS DNA AMP: CPT

## 2018-07-15 PROCEDURE — 87486 CHLMYD PNEUM DNA AMP PROBE: CPT

## 2018-07-15 PROCEDURE — 97530 THERAPEUTIC ACTIVITIES: CPT

## 2018-07-15 PROCEDURE — 87206 SMEAR FLUORESCENT/ACID STAI: CPT

## 2018-07-15 PROCEDURE — 96375 TX/PRO/DX INJ NEW DRUG ADDON: CPT | Mod: XU

## 2018-07-15 PROCEDURE — 97162 PT EVAL MOD COMPLEX 30 MIN: CPT

## 2018-07-15 PROCEDURE — 83605 ASSAY OF LACTIC ACID: CPT

## 2018-07-15 PROCEDURE — 83735 ASSAY OF MAGNESIUM: CPT

## 2018-07-15 PROCEDURE — 87633 RESP VIRUS 12-25 TARGETS: CPT

## 2018-07-15 PROCEDURE — 82947 ASSAY GLUCOSE BLOOD QUANT: CPT

## 2018-07-15 PROCEDURE — 83036 HEMOGLOBIN GLYCOSYLATED A1C: CPT

## 2018-07-15 PROCEDURE — 72125 CT NECK SPINE W/O DYE: CPT

## 2018-07-15 PROCEDURE — 82330 ASSAY OF CALCIUM: CPT

## 2018-07-15 PROCEDURE — 86480 TB TEST CELL IMMUN MEASURE: CPT

## 2018-07-15 PROCEDURE — 87040 BLOOD CULTURE FOR BACTERIA: CPT

## 2018-07-15 PROCEDURE — 80053 COMPREHEN METABOLIC PANEL: CPT

## 2018-07-15 PROCEDURE — 82803 BLOOD GASES ANY COMBINATION: CPT

## 2018-07-15 PROCEDURE — 93005 ELECTROCARDIOGRAM TRACING: CPT

## 2018-07-15 PROCEDURE — 71045 X-RAY EXAM CHEST 1 VIEW: CPT

## 2018-07-15 PROCEDURE — 87581 M.PNEUMON DNA AMP PROBE: CPT

## 2018-07-15 PROCEDURE — 99285 EMERGENCY DEPT VISIT HI MDM: CPT | Mod: 25

## 2018-07-15 PROCEDURE — 85730 THROMBOPLASTIN TIME PARTIAL: CPT

## 2018-07-15 PROCEDURE — 74177 CT ABD & PELVIS W/CONTRAST: CPT

## 2018-07-15 PROCEDURE — 82962 GLUCOSE BLOOD TEST: CPT

## 2018-07-15 PROCEDURE — 84132 ASSAY OF SERUM POTASSIUM: CPT

## 2018-07-15 PROCEDURE — 85027 COMPLETE CBC AUTOMATED: CPT

## 2018-07-15 PROCEDURE — 99239 HOSP IP/OBS DSCHRG MGMT >30: CPT

## 2018-07-15 PROCEDURE — 85610 PROTHROMBIN TIME: CPT

## 2018-07-15 PROCEDURE — 82435 ASSAY OF BLOOD CHLORIDE: CPT

## 2018-07-15 PROCEDURE — 81001 URINALYSIS AUTO W/SCOPE: CPT

## 2018-07-15 PROCEDURE — 84100 ASSAY OF PHOSPHORUS: CPT

## 2018-07-15 PROCEDURE — 80202 ASSAY OF VANCOMYCIN: CPT

## 2018-07-15 PROCEDURE — 84295 ASSAY OF SERUM SODIUM: CPT

## 2018-07-15 PROCEDURE — 87116 MYCOBACTERIA CULTURE: CPT

## 2018-07-15 PROCEDURE — 70450 CT HEAD/BRAIN W/O DYE: CPT

## 2018-07-15 PROCEDURE — 71275 CT ANGIOGRAPHY CHEST: CPT

## 2018-07-15 PROCEDURE — 96374 THER/PROPH/DIAG INJ IV PUSH: CPT | Mod: XU

## 2018-07-15 RX ADMIN — ENOXAPARIN SODIUM 40 MILLIGRAM(S): 100 INJECTION SUBCUTANEOUS at 12:33

## 2018-07-15 RX ADMIN — OXYCODONE HYDROCHLORIDE 5 MILLIGRAM(S): 5 TABLET ORAL at 06:26

## 2018-07-15 RX ADMIN — Medication 1 TABLET(S): at 05:32

## 2018-07-15 RX ADMIN — OXYCODONE HYDROCHLORIDE 5 MILLIGRAM(S): 5 TABLET ORAL at 09:58

## 2018-07-15 RX ADMIN — OXYCODONE HYDROCHLORIDE 10 MILLIGRAM(S): 5 TABLET ORAL at 12:31

## 2018-07-15 RX ADMIN — OXYCODONE HYDROCHLORIDE 5 MILLIGRAM(S): 5 TABLET ORAL at 10:58

## 2018-07-15 RX ADMIN — GABAPENTIN 100 MILLIGRAM(S): 400 CAPSULE ORAL at 05:32

## 2018-07-15 RX ADMIN — TENOFOVIR DISOPROXIL FUMARATE 300 MILLIGRAM(S): 300 TABLET, FILM COATED ORAL at 12:32

## 2018-07-15 RX ADMIN — OXYCODONE HYDROCHLORIDE 5 MILLIGRAM(S): 5 TABLET ORAL at 05:32

## 2018-07-15 RX ADMIN — OXYCODONE HYDROCHLORIDE 10 MILLIGRAM(S): 5 TABLET ORAL at 13:31

## 2018-07-15 RX ADMIN — MORPHINE SULFATE 45 MILLIGRAM(S): 50 CAPSULE, EXTENDED RELEASE ORAL at 14:28

## 2018-07-15 RX ADMIN — MORPHINE SULFATE 45 MILLIGRAM(S): 50 CAPSULE, EXTENDED RELEASE ORAL at 05:32

## 2018-07-15 NOTE — PROGRESS NOTE ADULT - PROBLEM SELECTOR PLAN 3
- oral chemotherapy on hold in setting of current infection    - Progression of disease on CT. To f/u with Oncology as an outpatient. Continue to hold Afatinib per onc. - oral chemotherapy on hold in setting of current infection  - Progression of disease on CT. To f/u with Oncology as an outpatient. Continue to hold Afatinib per onc.

## 2018-07-15 NOTE — PROGRESS NOTE ADULT - SUBJECTIVE AND OBJECTIVE BOX
Authored by Dr Jae Lowery [phone]     Patient is a 77y old  Male who presents with a chief complaint of lethargy (14 Jul 2018 14:08)    SUBJECTIVE / OVERNIGHT EVENTS:    MEDICATIONS  (STANDING):  dextrose 5%. 1000 milliLiter(s) (50 mL/Hr) IV Continuous <Continuous>  dextrose 50% Injectable 12.5 Gram(s) IV Push once  dextrose 50% Injectable 25 Gram(s) IV Push once  dextrose 50% Injectable 25 Gram(s) IV Push once  enoxaparin Injectable 40 milliGRAM(s) SubCutaneous daily  gabapentin 100 milliGRAM(s) Oral two times a day  insulin lispro (HumaLOG) corrective regimen sliding scale   SubCutaneous three times a day before meals  insulin lispro (HumaLOG) corrective regimen sliding scale   SubCutaneous at bedtime  mirtazapine Soltab 15 milliGRAM(s) Oral at bedtime  morphine ER Tablet 45 milliGRAM(s) Oral every 8 hours  senna 2 Tablet(s) Oral at bedtime  simvastatin 10 milliGRAM(s) Oral at bedtime  tamsulosin 0.4 milliGRAM(s) Oral at bedtime  tenofovir disoproxil fumarate (VIREAD) 300 milliGRAM(s) Oral daily    MEDICATIONS  (PRN):  acetaminophen   Tablet 650 milliGRAM(s) Oral every 6 hours PRN For Temp greater than 38 C (100.4 F)  ALBUTerol/ipratropium for Nebulization 3 milliLiter(s) Nebulizer every 8 hours PRN Shortness of Breath and/or Wheezing  benzocaine 15 mG/menthol 3.6 mG Lozenge 1 Lozenge Oral three times a day PRN Sore Throat  benzonatate 100 milliGRAM(s) Oral three times a day PRN Cough  dextrose 40% Gel 15 Gram(s) Oral once PRN Blood Glucose LESS THAN 70 milliGRAM(s)/deciliter  glucagon  Injectable 1 milliGRAM(s) IntraMuscular once PRN Glucose LESS THAN 70 milligrams/deciliter  ondansetron Injectable 4 milliGRAM(s) IV Push every 8 hours PRN Nausea and/or Vomiting  oxyCODONE    IR 5 milliGRAM(s) Oral every 4 hours PRN mild-moderate pain  oxyCODONE    IR 10 milliGRAM(s) Oral every 4 hours PRN Severe Pain (7 - 10)      Vital Signs Last 24 Hrs  T(C): 36.7 (15 Jul 2018 08:25), Max: 36.8 (14 Jul 2018 23:46)  T(F): 98 (15 Jul 2018 08:25), Max: 98.2 (14 Jul 2018 23:46)  HR: 82 (15 Jul 2018 08:25) (80 - 85)  BP: 97/61 (15 Jul 2018 08:25) (97/61 - 108/62)  BP(mean): --  RR: 18 (15 Jul 2018 08:25) (16 - 18)  SpO2: 91% (15 Jul 2018 08:25) (91% - 96%)  CAPILLARY BLOOD GLUCOSE      POCT Blood Glucose.: 127 mg/dL (15 Jul 2018 08:21)  POCT Blood Glucose.: 197 mg/dL (14 Jul 2018 21:12)  POCT Blood Glucose.: 132 mg/dL (14 Jul 2018 17:21)  POCT Blood Glucose.: 177 mg/dL (14 Jul 2018 12:10)    I&O's Summary    14 Jul 2018 07:01  -  15 Jul 2018 07:00  --------------------------------------------------------  IN: 690 mL / OUT: 1750 mL / NET: -1060 mL        PHYSICAL EXAM  General: NAD, comfortable  Eyes: no conjunctival erythema  ENT: MMM  Neck: Neck supple, No JVD  Respiratory: CTA B/L, No wheezing, rales, rhonchi  CV: RRR no murmurs  Abdominal: Soft, NT, ND +BS  MSK: no focal weakness  Extremities: No edema, 2+ peripheral pulses  Neurology: A&Ox3, nonfocal, BANKS x 4  Skin: No Rashes, Hematoma, Ecchymosis  Psych: Calm and appropriate      LABS:                      RADIOLOGY & ADDITIONAL TESTS:    Imaging Personally Reviewed:  Consultant(s) Notes Reviewed:    Care Discussed with Consultants/Other Providers: Authored by Dr Jae Lowery [phone]     Patient is a 77y old  Male who presents with a chief complaint of lethargy (14 Jul 2018 14:08)    SUBJECTIVE / OVERNIGHT EVENTS: no complaints    MEDICATIONS  (STANDING):  dextrose 5%. 1000 milliLiter(s) (50 mL/Hr) IV Continuous <Continuous>  dextrose 50% Injectable 12.5 Gram(s) IV Push once  dextrose 50% Injectable 25 Gram(s) IV Push once  dextrose 50% Injectable 25 Gram(s) IV Push once  enoxaparin Injectable 40 milliGRAM(s) SubCutaneous daily  gabapentin 100 milliGRAM(s) Oral two times a day  insulin lispro (HumaLOG) corrective regimen sliding scale   SubCutaneous three times a day before meals  insulin lispro (HumaLOG) corrective regimen sliding scale   SubCutaneous at bedtime  mirtazapine Soltab 15 milliGRAM(s) Oral at bedtime  morphine ER Tablet 45 milliGRAM(s) Oral every 8 hours  senna 2 Tablet(s) Oral at bedtime  simvastatin 10 milliGRAM(s) Oral at bedtime  tamsulosin 0.4 milliGRAM(s) Oral at bedtime  tenofovir disoproxil fumarate (VIREAD) 300 milliGRAM(s) Oral daily    MEDICATIONS  (PRN):  acetaminophen   Tablet 650 milliGRAM(s) Oral every 6 hours PRN For Temp greater than 38 C (100.4 F)  ALBUTerol/ipratropium for Nebulization 3 milliLiter(s) Nebulizer every 8 hours PRN Shortness of Breath and/or Wheezing  benzocaine 15 mG/menthol 3.6 mG Lozenge 1 Lozenge Oral three times a day PRN Sore Throat  benzonatate 100 milliGRAM(s) Oral three times a day PRN Cough  dextrose 40% Gel 15 Gram(s) Oral once PRN Blood Glucose LESS THAN 70 milliGRAM(s)/deciliter  glucagon  Injectable 1 milliGRAM(s) IntraMuscular once PRN Glucose LESS THAN 70 milligrams/deciliter  ondansetron Injectable 4 milliGRAM(s) IV Push every 8 hours PRN Nausea and/or Vomiting  oxyCODONE    IR 5 milliGRAM(s) Oral every 4 hours PRN mild-moderate pain  oxyCODONE    IR 10 milliGRAM(s) Oral every 4 hours PRN Severe Pain (7 - 10)      Vital Signs Last 24 Hrs  T(C): 36.7 (15 Jul 2018 08:25), Max: 36.8 (14 Jul 2018 23:46)  T(F): 98 (15 Jul 2018 08:25), Max: 98.2 (14 Jul 2018 23:46)  HR: 82 (15 Jul 2018 08:25) (80 - 85)  BP: 97/61 (15 Jul 2018 08:25) (97/61 - 108/62)  BP(mean): --  RR: 18 (15 Jul 2018 08:25) (16 - 18)  SpO2: 91% (15 Jul 2018 08:25) (91% - 96%)  CAPILLARY BLOOD GLUCOSE      POCT Blood Glucose.: 127 mg/dL (15 Jul 2018 08:21)  POCT Blood Glucose.: 197 mg/dL (14 Jul 2018 21:12)  POCT Blood Glucose.: 132 mg/dL (14 Jul 2018 17:21)  POCT Blood Glucose.: 177 mg/dL (14 Jul 2018 12:10)    I&O's Summary    14 Jul 2018 07:01  -  15 Jul 2018 07:00  --------------------------------------------------------  IN: 690 mL / OUT: 1750 mL / NET: -1060 mL        PHYSICAL EXAM  General: NAD, comfortable  Eyes: no conjunctival erythema  ENT: MMM  Neck: Neck supple, No JVD  Respiratory: CTA B/L, No wheezing, rales, rhonchi  CV: RRR no murmurs  Abdominal: Soft, NT, ND +BS  MSK: no focal weakness  Extremities: No edema, 2+ peripheral pulses  Neurology: A&Ox3, nonfocal, BANKS x 4  Skin: No Rashes, Hematoma, Ecchymosis  Psych: Calm and appropriate      LABS:                      RADIOLOGY & ADDITIONAL TESTS:    Imaging Personally Reviewed:  Consultant(s) Notes Reviewed:    Care Discussed with Consultants/Other Providers:

## 2018-07-15 NOTE — PROGRESS NOTE ADULT - ASSESSMENT
78yo w Stage IV EGFR mutated NSCLC/adenocarcinoma (on 4th line of tx w/ PO Afatinib daily since 2/12/2018), HBV (on Tenofovir), COPD (not on home O2) admitted to the MICU on 7/5/18 for septic shock 2/2 likely post-obstructive CAP.     Treated with IV Cefepime, IV Vancomycin, and IV Azithromycin (downgraded to Augmentin currently, off pressors, hemodynamically stable), acute metabolic encephalopathy (now resolved), acute respiratory failure never requiring intubation (now resolved), and LUX (now resolved).

## 2018-07-15 NOTE — PROGRESS NOTE ADULT - PROBLEM SELECTOR PLAN 2
Pain improved after increasing MS contin. Prescriptions sent to [facility] today. F/u with Dr Best Marquez at [facility]. Pain improved after increasing MS contin. Prescriptions sent to [facility] yesterday F/u with Dr Best Marquez at [facility].

## 2018-07-15 NOTE — PROGRESS NOTE ADULT - ATTENDING COMMENTS
Agree with above.   Pt recovering from septic shock 2/2 PNA. Met lung disease difficult to evaluate given acute infection.   Recommend MRI brain w/ keyana as was the plan from out pt to assess status of disease.   f/u with Dr. Gross upon d/c.
Agree with above.   Spoke with pt and wife via phone . Pain is better controlled with current regimen. Pt ambulating.   We offered opportunity for pt and wife to ask us questions about his hospital course or his disease. Pt and wife deferred to their son and denied having any questions.   Pt to f/u with Dr. Gross as an out pt.
Agree with above. Seen and examined with residents. Multilobar pneumonia vs tumor spread. On broad spectrum antibiotics. Supportive care, oncology following.
Mr. Sainz is clinically stable at this time.  Pain is being managed well.  AFB x 3 is negative.  Respiratory isolation D/C'd.  Agree with current management.  Stable for transfer to the floor.
Mr. Sainz is hemodynamically stable, afebrile.  AFB is negative x 2, third is pending.  He was complaining of pain today.  Will place on home dose of morphine 30mg TID with oxycodone as needed for breakthrough pain.  Agree with current management.  ONce 3rd sputum is negative he may transfer out of the mICU.
Pt wants to go home tomorrow. Will complete discharge today. D/c planning for tomorrow am.
35 minutes spent orchestrating discharge
Awaiting final PT recs on disposition- PT called.
Spoke with son Parmjit over the phone. He will come in tomorrow am to discuss pt's concerns about discharge and overall plan.

## 2018-07-15 NOTE — PROGRESS NOTE ADULT - PROVIDER SPECIALTY LIST ADULT
Heme/Onc
Heme/Onc
Hospitalist
MICU
Palliative Care
Hospitalist
Hospitalist

## 2018-07-15 NOTE — PROGRESS NOTE ADULT - PROBLEM SELECTOR PROBLEM 2
Pain of metastatic malignancy
Depression
Pain
Pain of metastatic malignancy
Pain
Pain of metastatic malignancy

## 2018-07-15 NOTE — PROGRESS NOTE ADULT - PROBLEM SELECTOR PLAN 5
- A1C 6.2  - ISS w/ serial FS monitoring
- holding home antihyperglycemics, continue statin  - ISS w/ serial FS monitoring
- holding home antihyperglycemics, continue statin  - ISS w/ serial FS monitoring
- A1C 6.2  - ISS w/ serial FS monitoring

## 2018-07-15 NOTE — PROGRESS NOTE ADULT - PROBLEM SELECTOR PROBLEM 3
Non-small cell lung cancer (NSCLC)
Sepsis
Non-small cell lung cancer (NSCLC)

## 2018-07-15 NOTE — PROGRESS NOTE ADULT - PROBLEM SELECTOR PLAN 6
- continue Tamsulosin

## 2018-07-15 NOTE — PROGRESS NOTE ADULT - PROBLEM SELECTOR PLAN 7
- continue Mirtazapine

## 2018-07-15 NOTE — PROGRESS NOTE ADULT - PROBLEM SELECTOR PROBLEM 4
HBV (hepatitis B virus) infection
Encounter for palliative care
HBV (hepatitis B virus) infection

## 2018-07-15 NOTE — PROGRESS NOTE ADULT - PROBLEM SELECTOR PROBLEM 6
BPH (benign prostatic hyperplasia)

## 2018-07-15 NOTE — PROGRESS NOTE ADULT - PROBLEM SELECTOR PLAN 4
- continue tx w/ Tenofovir PO
As d/w Dr Evangelista, maintain current management .  Patient to be discharged tomorrow.  Please ascertain that patient is given RX for new pain management .  Signing off, please reconsult with any acute issues
- continue tx w/ Tenofovir PO

## 2018-07-15 NOTE — PROGRESS NOTE ADULT - PROBLEM SELECTOR PROBLEM 1
Pneumonia, bacterial
Pain of metastatic malignancy
Pneumonia, bacterial

## 2018-07-16 PROBLEM — E78.5 HYPERLIPIDEMIA, UNSPECIFIED: Chronic | Status: ACTIVE | Noted: 2017-04-04

## 2018-07-16 PROBLEM — N40.0 BENIGN PROSTATIC HYPERPLASIA WITHOUT LOWER URINARY TRACT SYMPTOMS: Chronic | Status: ACTIVE | Noted: 2017-04-04

## 2018-07-19 ENCOUNTER — RESULT REVIEW (OUTPATIENT)
Age: 78
End: 2018-07-19

## 2018-07-19 ENCOUNTER — APPOINTMENT (OUTPATIENT)
Dept: HEMATOLOGY ONCOLOGY | Facility: CLINIC | Age: 78
End: 2018-07-19
Payer: MEDICAID

## 2018-07-19 ENCOUNTER — LABORATORY RESULT (OUTPATIENT)
Age: 78
End: 2018-07-19

## 2018-07-19 ENCOUNTER — OUTPATIENT (OUTPATIENT)
Dept: OUTPATIENT SERVICES | Facility: HOSPITAL | Age: 78
LOS: 1 days | Discharge: HOME | End: 2018-07-19

## 2018-07-19 VITALS
TEMPERATURE: 97.6 F | WEIGHT: 148.81 LBS | RESPIRATION RATE: 16 BRPM | OXYGEN SATURATION: 92 % | BODY MASS INDEX: 22.63 KG/M2 | HEART RATE: 87 BPM | SYSTOLIC BLOOD PRESSURE: 118 MMHG | DIASTOLIC BLOOD PRESSURE: 75 MMHG

## 2018-07-19 DIAGNOSIS — D04.9 CARCINOMA IN SITU OF SKIN, UNSPECIFIED: Chronic | ICD-10-CM

## 2018-07-19 DIAGNOSIS — C34.90 MALIGNANT NEOPLASM OF UNSPECIFIED PART OF UNSPECIFIED BRONCHUS OR LUNG: ICD-10-CM

## 2018-07-19 DIAGNOSIS — C44.311 BASAL CELL CARCINOMA OF SKIN OF NOSE: Chronic | ICD-10-CM

## 2018-07-19 PROCEDURE — 99215 OFFICE O/P EST HI 40 MIN: CPT

## 2018-07-20 LAB
ALBUMIN SERPL ELPH-MCNC: 4 G/DL
ALP BLD-CCNC: 72 U/L
ALT SERPL-CCNC: 26 U/L
ANION GAP SERPL CALC-SCNC: 19 MMOL/L
AST SERPL-CCNC: 20 U/L
BILIRUB SERPL-MCNC: 0.2 MG/DL
BUN SERPL-MCNC: 13 MG/DL
CALCIUM SERPL-MCNC: 9.1 MG/DL
CEA SERPL-MCNC: 3.2 NG/ML
CHLORIDE SERPL-SCNC: 97 MMOL/L
CO2 SERPL-SCNC: 22 MMOL/L
CREAT SERPL-MCNC: 0.85 MG/DL
GLUCOSE SERPL-MCNC: 156 MG/DL
POTASSIUM SERPL-SCNC: 4.4 MMOL/L
PROT SERPL-MCNC: 7.4 G/DL
SODIUM SERPL-SCNC: 138 MMOL/L
TSH SERPL-ACNC: 0.63 UIU/ML

## 2018-07-26 DIAGNOSIS — Z71.89 OTHER SPECIFIED COUNSELING: ICD-10-CM

## 2018-07-28 ENCOUNTER — FORM ENCOUNTER (OUTPATIENT)
Age: 78
End: 2018-07-28

## 2018-07-29 ENCOUNTER — APPOINTMENT (OUTPATIENT)
Dept: NUCLEAR MEDICINE | Facility: IMAGING CENTER | Age: 78
End: 2018-07-29
Payer: MEDICAID

## 2018-07-29 ENCOUNTER — OUTPATIENT (OUTPATIENT)
Dept: OUTPATIENT SERVICES | Facility: HOSPITAL | Age: 78
LOS: 1 days | End: 2018-07-29
Payer: MEDICAID

## 2018-07-29 DIAGNOSIS — D04.9 CARCINOMA IN SITU OF SKIN, UNSPECIFIED: Chronic | ICD-10-CM

## 2018-07-29 DIAGNOSIS — C44.311 BASAL CELL CARCINOMA OF SKIN OF NOSE: Chronic | ICD-10-CM

## 2018-07-29 DIAGNOSIS — C34.90 MALIGNANT NEOPLASM OF UNSPECIFIED PART OF UNSPECIFIED BRONCHUS OR LUNG: ICD-10-CM

## 2018-07-29 PROCEDURE — A9552: CPT

## 2018-07-29 PROCEDURE — 78815 PET IMAGE W/CT SKULL-THIGH: CPT | Mod: 26,PS

## 2018-07-29 PROCEDURE — 78815 PET IMAGE W/CT SKULL-THIGH: CPT

## 2018-08-01 ENCOUNTER — INPATIENT (INPATIENT)
Facility: HOSPITAL | Age: 78
LOS: 7 days | Discharge: NURSING, REHAB OR LONG-TERM CARE | End: 2018-08-09
Attending: HOSPITALIST | Admitting: HOSPITALIST
Payer: MEDICAID

## 2018-08-01 VITALS
HEART RATE: 132 BPM | OXYGEN SATURATION: 95 % | DIASTOLIC BLOOD PRESSURE: 79 MMHG | SYSTOLIC BLOOD PRESSURE: 139 MMHG | TEMPERATURE: 103 F | RESPIRATION RATE: 24 BRPM

## 2018-08-01 DIAGNOSIS — J18.9 PNEUMONIA, UNSPECIFIED ORGANISM: ICD-10-CM

## 2018-08-01 DIAGNOSIS — C34.90 MALIGNANT NEOPLASM OF UNSPECIFIED PART OF UNSPECIFIED BRONCHUS OR LUNG: ICD-10-CM

## 2018-08-01 DIAGNOSIS — B18.1 CHRONIC VIRAL HEPATITIS B WITHOUT DELTA-AGENT: ICD-10-CM

## 2018-08-01 DIAGNOSIS — I10 ESSENTIAL (PRIMARY) HYPERTENSION: ICD-10-CM

## 2018-08-01 DIAGNOSIS — C44.311 BASAL CELL CARCINOMA OF SKIN OF NOSE: Chronic | ICD-10-CM

## 2018-08-01 DIAGNOSIS — Z71.89 OTHER SPECIFIED COUNSELING: ICD-10-CM

## 2018-08-01 DIAGNOSIS — Z29.9 ENCOUNTER FOR PROPHYLACTIC MEASURES, UNSPECIFIED: ICD-10-CM

## 2018-08-01 DIAGNOSIS — J44.9 CHRONIC OBSTRUCTIVE PULMONARY DISEASE, UNSPECIFIED: ICD-10-CM

## 2018-08-01 DIAGNOSIS — E11.9 TYPE 2 DIABETES MELLITUS WITHOUT COMPLICATIONS: ICD-10-CM

## 2018-08-01 DIAGNOSIS — D04.9 CARCINOMA IN SITU OF SKIN, UNSPECIFIED: Chronic | ICD-10-CM

## 2018-08-01 DIAGNOSIS — A41.9 SEPSIS, UNSPECIFIED ORGANISM: ICD-10-CM

## 2018-08-01 LAB
ALBUMIN SERPL ELPH-MCNC: 3.8 G/DL — SIGNIFICANT CHANGE UP (ref 3.3–5)
ALP SERPL-CCNC: 58 U/L — SIGNIFICANT CHANGE UP (ref 40–120)
ALT FLD-CCNC: 9 U/L — SIGNIFICANT CHANGE UP (ref 4–41)
APTT BLD: 32.5 SEC — SIGNIFICANT CHANGE UP (ref 27.5–37.4)
AST SERPL-CCNC: 18 U/L — SIGNIFICANT CHANGE UP (ref 4–40)
BASE EXCESS BLDV CALC-SCNC: 3.5 MMOL/L — SIGNIFICANT CHANGE UP
BASOPHILS # BLD AUTO: 0.03 K/UL — SIGNIFICANT CHANGE UP (ref 0–0.2)
BASOPHILS NFR BLD AUTO: 0.2 % — SIGNIFICANT CHANGE UP (ref 0–2)
BILIRUB SERPL-MCNC: 0.5 MG/DL — SIGNIFICANT CHANGE UP (ref 0.2–1.2)
BLOOD GAS VENOUS - CREATININE: 0.99 MG/DL — SIGNIFICANT CHANGE UP (ref 0.5–1.3)
BUN SERPL-MCNC: 12 MG/DL — SIGNIFICANT CHANGE UP (ref 7–23)
CALCIUM SERPL-MCNC: 8.7 MG/DL — SIGNIFICANT CHANGE UP (ref 8.4–10.5)
CHLORIDE BLDV-SCNC: 101 MMOL/L — SIGNIFICANT CHANGE UP (ref 96–108)
CHLORIDE SERPL-SCNC: 95 MMOL/L — LOW (ref 98–107)
CO2 SERPL-SCNC: 26 MMOL/L — SIGNIFICANT CHANGE UP (ref 22–31)
CREAT SERPL-MCNC: 1.05 MG/DL — SIGNIFICANT CHANGE UP (ref 0.5–1.3)
EOSINOPHIL # BLD AUTO: 0.02 K/UL — SIGNIFICANT CHANGE UP (ref 0–0.5)
EOSINOPHIL NFR BLD AUTO: 0.1 % — SIGNIFICANT CHANGE UP (ref 0–6)
GAS PNL BLDV: 133 MMOL/L — LOW (ref 136–146)
GLUCOSE BLDC GLUCOMTR-MCNC: 125 MG/DL — HIGH (ref 70–99)
GLUCOSE BLDC GLUCOMTR-MCNC: 133 MG/DL — HIGH (ref 70–99)
GLUCOSE BLDV-MCNC: 179 — HIGH (ref 70–99)
GLUCOSE SERPL-MCNC: 180 MG/DL — HIGH (ref 70–99)
HCO3 BLDV-SCNC: 26 MMOL/L — SIGNIFICANT CHANGE UP (ref 20–27)
HCT VFR BLD CALC: 40.4 % — SIGNIFICANT CHANGE UP (ref 39–50)
HCT VFR BLDV CALC: 40.6 % — SIGNIFICANT CHANGE UP (ref 39–51)
HGB BLD-MCNC: 12.9 G/DL — LOW (ref 13–17)
HGB BLDV-MCNC: 13.2 G/DL — SIGNIFICANT CHANGE UP (ref 13–17)
IMM GRANULOCYTES # BLD AUTO: 0.08 # — SIGNIFICANT CHANGE UP
IMM GRANULOCYTES NFR BLD AUTO: 0.5 % — SIGNIFICANT CHANGE UP (ref 0–1.5)
INR BLD: 1.1 — SIGNIFICANT CHANGE UP (ref 0.88–1.17)
LACTATE BLDV-MCNC: 2.4 MMOL/L — HIGH (ref 0.5–2)
LYMPHOCYTES # BLD AUTO: 0.63 K/UL — LOW (ref 1–3.3)
LYMPHOCYTES # BLD AUTO: 3.5 % — LOW (ref 13–44)
MCHC RBC-ENTMCNC: 28.2 PG — SIGNIFICANT CHANGE UP (ref 27–34)
MCHC RBC-ENTMCNC: 31.9 % — LOW (ref 32–36)
MCV RBC AUTO: 88.4 FL — SIGNIFICANT CHANGE UP (ref 80–100)
MONOCYTES # BLD AUTO: 0.51 K/UL — SIGNIFICANT CHANGE UP (ref 0–0.9)
MONOCYTES NFR BLD AUTO: 2.9 % — SIGNIFICANT CHANGE UP (ref 2–14)
NEUTROPHILS # BLD AUTO: 16.5 K/UL — HIGH (ref 1.8–7.4)
NEUTROPHILS NFR BLD AUTO: 92.8 % — HIGH (ref 43–77)
NRBC # FLD: 0 — SIGNIFICANT CHANGE UP
PCO2 BLDV: 56 MMHG — HIGH (ref 41–51)
PH BLDV: 7.33 PH — SIGNIFICANT CHANGE UP (ref 7.32–7.43)
PLATELET # BLD AUTO: 155 K/UL — SIGNIFICANT CHANGE UP (ref 150–400)
PMV BLD: 9.7 FL — SIGNIFICANT CHANGE UP (ref 7–13)
PO2 BLDV: 36 MMHG — SIGNIFICANT CHANGE UP (ref 35–40)
POTASSIUM BLDV-SCNC: 4.1 MMOL/L — SIGNIFICANT CHANGE UP (ref 3.4–4.5)
POTASSIUM SERPL-MCNC: 4.2 MMOL/L — SIGNIFICANT CHANGE UP (ref 3.5–5.3)
POTASSIUM SERPL-SCNC: 4.2 MMOL/L — SIGNIFICANT CHANGE UP (ref 3.5–5.3)
PROT SERPL-MCNC: 7.5 G/DL — SIGNIFICANT CHANGE UP (ref 6–8.3)
PROTHROM AB SERPL-ACNC: 12.2 SEC — SIGNIFICANT CHANGE UP (ref 9.8–13.1)
RBC # BLD: 4.57 M/UL — SIGNIFICANT CHANGE UP (ref 4.2–5.8)
RBC # FLD: 13.5 % — SIGNIFICANT CHANGE UP (ref 10.3–14.5)
SAO2 % BLDV: 65.6 % — SIGNIFICANT CHANGE UP (ref 60–85)
SODIUM SERPL-SCNC: 134 MMOL/L — LOW (ref 135–145)
WBC # BLD: 17.77 K/UL — HIGH (ref 3.8–10.5)
WBC # FLD AUTO: 17.77 K/UL — HIGH (ref 3.8–10.5)

## 2018-08-01 PROCEDURE — 70450 CT HEAD/BRAIN W/O DYE: CPT | Mod: 26

## 2018-08-01 PROCEDURE — 71045 X-RAY EXAM CHEST 1 VIEW: CPT | Mod: 26

## 2018-08-01 RX ORDER — GLUCAGON INJECTION, SOLUTION 0.5 MG/.1ML
1 INJECTION, SOLUTION SUBCUTANEOUS ONCE
Qty: 0 | Refills: 0 | Status: DISCONTINUED | OUTPATIENT
Start: 2018-08-01 | End: 2018-08-09

## 2018-08-01 RX ORDER — DEXTROSE 50 % IN WATER 50 %
12.5 SYRINGE (ML) INTRAVENOUS ONCE
Qty: 0 | Refills: 0 | Status: DISCONTINUED | OUTPATIENT
Start: 2018-08-01 | End: 2018-08-09

## 2018-08-01 RX ORDER — INSULIN LISPRO 100/ML
VIAL (ML) SUBCUTANEOUS AT BEDTIME
Qty: 0 | Refills: 0 | Status: DISCONTINUED | OUTPATIENT
Start: 2018-08-01 | End: 2018-08-02

## 2018-08-01 RX ORDER — LOPERAMIDE HCL 2 MG
1 TABLET ORAL
Qty: 0 | Refills: 0 | COMMUNITY

## 2018-08-01 RX ORDER — ACETAMINOPHEN 500 MG
650 TABLET ORAL EVERY 6 HOURS
Qty: 0 | Refills: 0 | Status: DISCONTINUED | OUTPATIENT
Start: 2018-08-01 | End: 2018-08-02

## 2018-08-01 RX ORDER — SODIUM CHLORIDE 9 MG/ML
1000 INJECTION INTRAMUSCULAR; INTRAVENOUS; SUBCUTANEOUS
Qty: 0 | Refills: 0 | Status: DISCONTINUED | OUTPATIENT
Start: 2018-08-01 | End: 2018-08-09

## 2018-08-01 RX ORDER — CEFEPIME 1 G/1
1000 INJECTION, POWDER, FOR SOLUTION INTRAMUSCULAR; INTRAVENOUS ONCE
Qty: 0 | Refills: 0 | Status: COMPLETED | OUTPATIENT
Start: 2018-08-01 | End: 2018-08-01

## 2018-08-01 RX ORDER — SODIUM CHLORIDE 9 MG/ML
1000 INJECTION INTRAMUSCULAR; INTRAVENOUS; SUBCUTANEOUS ONCE
Qty: 0 | Refills: 0 | Status: COMPLETED | OUTPATIENT
Start: 2018-08-01 | End: 2018-08-01

## 2018-08-01 RX ORDER — VANCOMYCIN HCL 1 G
1000 VIAL (EA) INTRAVENOUS ONCE
Qty: 0 | Refills: 0 | Status: COMPLETED | OUTPATIENT
Start: 2018-08-01 | End: 2018-08-01

## 2018-08-01 RX ORDER — ACETAMINOPHEN 500 MG
1000 TABLET ORAL ONCE
Qty: 0 | Refills: 0 | Status: COMPLETED | OUTPATIENT
Start: 2018-08-01 | End: 2018-08-01

## 2018-08-01 RX ORDER — DEXTROSE 50 % IN WATER 50 %
25 SYRINGE (ML) INTRAVENOUS ONCE
Qty: 0 | Refills: 0 | Status: DISCONTINUED | OUTPATIENT
Start: 2018-08-01 | End: 2018-08-09

## 2018-08-01 RX ORDER — HEPARIN SODIUM 5000 [USP'U]/ML
5000 INJECTION INTRAVENOUS; SUBCUTANEOUS EVERY 12 HOURS
Qty: 0 | Refills: 0 | Status: DISCONTINUED | OUTPATIENT
Start: 2018-08-01 | End: 2018-08-09

## 2018-08-01 RX ORDER — PIPERACILLIN AND TAZOBACTAM 4; .5 G/20ML; G/20ML
3.38 INJECTION, POWDER, LYOPHILIZED, FOR SOLUTION INTRAVENOUS ONCE
Qty: 0 | Refills: 0 | Status: DISCONTINUED | OUTPATIENT
Start: 2018-08-01 | End: 2018-08-01

## 2018-08-01 RX ORDER — IPRATROPIUM/ALBUTEROL SULFATE 18-103MCG
3 AEROSOL WITH ADAPTER (GRAM) INHALATION EVERY 6 HOURS
Qty: 0 | Refills: 0 | Status: DISCONTINUED | OUTPATIENT
Start: 2018-08-01 | End: 2018-08-09

## 2018-08-01 RX ORDER — CEFEPIME 1 G/1
2000 INJECTION, POWDER, FOR SOLUTION INTRAMUSCULAR; INTRAVENOUS EVERY 8 HOURS
Qty: 0 | Refills: 0 | Status: DISCONTINUED | OUTPATIENT
Start: 2018-08-02 | End: 2018-08-02

## 2018-08-01 RX ORDER — DEXTROSE 50 % IN WATER 50 %
15 SYRINGE (ML) INTRAVENOUS ONCE
Qty: 0 | Refills: 0 | Status: DISCONTINUED | OUTPATIENT
Start: 2018-08-01 | End: 2018-08-09

## 2018-08-01 RX ORDER — SODIUM CHLORIDE 9 MG/ML
1000 INJECTION, SOLUTION INTRAVENOUS
Qty: 0 | Refills: 0 | Status: DISCONTINUED | OUTPATIENT
Start: 2018-08-01 | End: 2018-08-09

## 2018-08-01 RX ORDER — INSULIN LISPRO 100/ML
VIAL (ML) SUBCUTANEOUS
Qty: 0 | Refills: 0 | Status: DISCONTINUED | OUTPATIENT
Start: 2018-08-01 | End: 2018-08-02

## 2018-08-01 RX ORDER — VANCOMYCIN HCL 1 G
1000 VIAL (EA) INTRAVENOUS EVERY 12 HOURS
Qty: 0 | Refills: 0 | Status: DISCONTINUED | OUTPATIENT
Start: 2018-08-02 | End: 2018-08-03

## 2018-08-01 RX ORDER — AZITHROMYCIN 500 MG/1
500 TABLET, FILM COATED ORAL EVERY 24 HOURS
Qty: 0 | Refills: 0 | Status: DISCONTINUED | OUTPATIENT
Start: 2018-08-01 | End: 2018-08-04

## 2018-08-01 RX ADMIN — Medication 250 MILLIGRAM(S): at 18:35

## 2018-08-01 RX ADMIN — CEFEPIME 100 MILLIGRAM(S): 1 INJECTION, POWDER, FOR SOLUTION INTRAMUSCULAR; INTRAVENOUS at 18:00

## 2018-08-01 RX ADMIN — Medication 1000 MILLIGRAM(S): at 18:35

## 2018-08-01 RX ADMIN — SODIUM CHLORIDE 1000 MILLILITER(S): 9 INJECTION INTRAMUSCULAR; INTRAVENOUS; SUBCUTANEOUS at 19:52

## 2018-08-01 RX ADMIN — Medication 400 MILLIGRAM(S): at 18:22

## 2018-08-01 RX ADMIN — SODIUM CHLORIDE 1000 MILLILITER(S): 9 INJECTION INTRAMUSCULAR; INTRAVENOUS; SUBCUTANEOUS at 17:42

## 2018-08-01 RX ADMIN — CEFEPIME 1000 MILLIGRAM(S): 1 INJECTION, POWDER, FOR SOLUTION INTRAMUSCULAR; INTRAVENOUS at 18:35

## 2018-08-01 NOTE — H&P ADULT - PROBLEM SELECTOR PLAN 3
- SBP well controlled in ED. S/p 2L NS bolus upon admission  - Holding BP meds in setting of Sepsis state

## 2018-08-01 NOTE — ED PROVIDER NOTE - MEDICAL DECISION MAKING DETAILS
76 y/o M stage IV lung ca with labored breathing and decreased responsiveness, febrile and tachy in ED; sepsis w/u  -cbc, cmp, vbg, ivf, iv tylenol, cxr, blood cx, ua/ucx, will cover for CA-PNA given recent admission and hx concerning for aspiration

## 2018-08-01 NOTE — ED ADULT TRIAGE NOTE - CHIEF COMPLAINT QUOTE
Arrives via EMS from home.  Pt found unresponsive with labored breathing.  Arrives with right hand 20 g, IVF in progress, pt vomited x 1, and given zofran.

## 2018-08-01 NOTE — ED PROVIDER NOTE - ATTENDING CONTRIBUTION TO CARE
[place]  pt with lung CA  episode of vomiting  followed by SOB  and decreased mentation  acc to EMS  sat in [place]  at site      her lungs with rhonci   sat 94 on 3l NC  card tachycardic   abd  soft  non distended    initially not responding to verbal  with hydration  more responsive  brief answers to family in mandarin  pt also given broad spectrum coverage        CT head performed  due to initial mental status [place]  pt with lung CA  episode of vomiting  followed by SOB  and decreased mentation  acc to EMS  sat in 80s  at site      here lungs with rhonci   sat 94 on 3l NC  card tachycardic   abd  soft  non distended    initially not responding to verbal  with hydration  more responsive  brief answers to family in mandarin  pt also given broad spectrum coverage  for PNA  noted on CXR      CT head performed  due to initial mental status

## 2018-08-01 NOTE — H&P ADULT - PROBLEM SELECTOR PLAN 4
- A1c 6.2 last month; On Metformin 500mg BID home regimen  - FSG's q6h while NPO  - ISS - NIDDM2 with hyperglycemia  - A1c 6.2 last month; Hold oral home meds  - FSG's q6h while NPO  - ISS

## 2018-08-01 NOTE — H&P ADULT - PROBLEM SELECTOR PLAN 1
- SIRS Criteria met (T 104.9, , WBC 17) w/ CXR showing evidence of bilateral patchy opacities concerning for multifocal/aspiration PNA  - s/p Vanc, Cefepime, and Azithromycin x1 in ED  - Given his recent MICU admission for post obstructive PNA (MRSA/gram negative organism) will cover w/ Vanc, Cefepime and Azithromycin for now  - Ulegionella pending. Bcx and Ucx pending  - Tylenol 650mg q6h prn fever. Aspiration precautions - SIRS Criteria met (T 104.9, , WBC 17) w/ CXR showing evidence of bilateral patchy opacities concerning for multifocal/aspiration PNA  - s/p Vanc, Cefepime, and Azithromycin x1 in ED. Lactate 2.4 on admission, likely Type A- expect to downtrend w/ IVF and abx  - Given his recent MICU admission for post obstructive PNA (MRSA/gram negative organism) will cover w/ Vanc, Cefepime and Azithromycin for now  - Ulegionella pending. Bcx and Ucx pending  - Tylenol 650mg q6h prn fever. Aspiration precautions - SIRS Criteria met (T 104.9, , WBC 17) w/ CXR showing evidence of bilateral patchy opacities concerning for multifocal/aspiration PNA  - s/p Vanc, Cefepime, and Azithromycin x1 in ED. Lactate 2.4 on admission, likely Type A- expect to downtrend w/ IVF and abx  - Given his recent MICU admission for post obstructive PNA (MRSA/gram negative organism) will cover w/ Vanc, Cefepime and Azithromycin for now  - Maintenance NS @75cc/h  - Ulegionella pending. Bcx and Ucx pending  - Tylenol 650mg q6h prn fever. Aspiration precautions - SIRS Criteria met (T 104.9, , WBC 17) w/ CXR showing evidence of bilateral patchy opacities concerning for multifocal/aspiration PNA  - s/p Vanc, Cefepime, and Azithromycin x1 in ED. Lactate 2.4 on admission, likely Type A- expect to downtrend w/ IVF and abx  - Given his recent MICU admission for post obstructive PNA (MRSA/gram negative organism) will cover w/ Vanc, Cefepime and Azithromycin for now  - Maintenance NS @75cc/h stop after 10h  - Ulegionella pending. Bcx and Ucx pending  - Tylenol prn fever. Aspiration precautions/Fall risk protocol - SIRS Criteria met (T 104.9, , WBC 17) w/ CXR showing evidence of bilateral patchy opacities concerning for Sepsis 2/2 multifocal/aspiration PNA given pt was found covered in emesis today.  - s/p Vanc, Cefepime, and Azithromycin x1 in ED. Lactate 2.4 on admission, likely Type A- expect to downtrend w/ IVF and abx  - Given his recent MICU admission for post obstructive PNA (MRSA/gram negative organism) will cover w/ Vanc, Cefepime and Azithromycin for now  - Maintenance NS @75cc/h stop after 10h  - Ulegionella pending. Bcx and Ucx pending  - Tylenol prn fever. Aspiration precautions/Fall risk protocol - SIRS Criteria met (T 104.9, , WBC 17) w/ CXR showing evidence of bilateral patchy opacities concerning for Sepsis 2/2 multifocal/aspiration PNA given pt was found covered in emesis today.  - s/p Vanc, Cefepime, and Azithromycin x1 in ED. Lactate 2.4 on admission, likely Type A- expect to downtrend w/ IVF and abx  - Given his recent MICU admission for post obstructive PNA (MRSA/gram negative organism) will cover w/ Vanc, Cefepime and Azithromycin for now  - Lactate elevated, c/w maintenance NS @75cc/h stop after 10h, repeat lactate in AM  - trend leukocytosis, fever curve  - Ulegionella pending. Bcx and UA/Ucx pending  - Tylenol prn fever. Aspiration precautions/Fall risk protocol  - will keep NPO for now,  S&S eval pending - SIRS Criteria met (T 104.9, , WBC 17) w/ CXR showing evidence of bilateral patchy opacities concerning for Sepsis 2/2 multifocal/aspiration PNA given pt was found covered in emesis today.  - s/p Vanc, Cefepime, and Azithromycin x1 in ED. Lactate 2.4 on admission, likely Type A- expect to downtrend w/ IVF and abx  - Given his recent MICU admission for post obstructive PNA (MRSA/gram negative organism) will cover w/ Vanc, Cefepime and Azithromycin for now  - Lactate elevated, c/w maintenance NS @75cc/h stop after 10h, repeat lactate in AM  - trend leukocytosis, fever curve  - Ulegionella pending. Bcx and UA/Ucx pending; RVP negative   - Tylenol prn fever. Aspiration precautions/Fall risk protocol  - will keep NPO for now,  S&S eval pending

## 2018-08-01 NOTE — H&P ADULT - PROBLEM SELECTOR PLAN 2
- Stage IV EGFR mutated NSCLC/adenocarcinoma (on 4th line of tx w/ PO Afatinib daily since 2/2018)  - PET scan (7/29) obulated left upper lobe lung mass minimally decreased   in size and metabolism as compared to PET/CT from 4/12/2016, but with   appearance of multiple hypermetabolic masslike consolidation and multiple   smaller nodules which are too small for PET characterization, in the   bilateral lungs     Hypermetabolic mediastinal, and bilateral perihilar lymph nodes,   concerning for metastasis. - Stage IV EGFR mutated NSCLC/adenocarcinoma (on 4th line of tx w/ PO Afatinib daily since 2/2018)  - PET scan (7/29) shows multiple hypermetabolic masslike consolidations and multiple nodules in the bilateral lungs w/ hypermetabolic mediastinal, and bilateral perihilar lymph nodes concerning for metastasis.  - Holding afatinib in setting of Sepsis and progression of disease  - Onc consult - Stage IV EGFR mutated NSCLC/adenocarcinoma (on 4th line of tx w/ PO Afatinib daily since 2/2018)  - PET scan (7/29) shows multiple hypermetabolic masslike consolidations and multiple nodules in the bilateral lungs w/ hypermetabolic mediastinal, and bilateral perihilar lymph nodes concerning for metastasis.  - Holding afatinib in setting of Sepsis and progression of disease  - Onc consulted - Stage IV EGFR mutated NSCLC/adenocarcinoma (on 4th line of tx w/ PO Afatinib daily since 2/2018)  - PET scan (7/29) shows multiple hypermetabolic masslike consolidations and multiple nodules in the bilateral lungs w/ hypermetabolic mediastinal, and bilateral perihilar lymph nodes concerning for metastasis.  - Holding afatinib in setting of Sepsis and progression of disease  - Onc consulted  - Palliative consulted for pain med management/ GOC given progression of disease and declining clinical status

## 2018-08-01 NOTE — PROVIDER CONTACT NOTE (OTHER) - ASSESSMENT
pt c/o mid sternal chest pain cardiac work up /enzymes to be done as per prior repeat order , same pain as per pt as admission pain level 9 but pt/dtr requesting pt get oxycodone per home med at present decline need for stronger pain med bp med due
chest pain denies radiation
hiccups
no acute distress noted
no complaints offered
No

## 2018-08-01 NOTE — H&P ADULT - PMH
BPH (benign prostatic hyperplasia)    BPH (benign prostatic hypertrophy)    COPD (chronic obstructive pulmonary disease)    Diabetes    DM (diabetes mellitus)    GERD (gastroesophageal reflux disease)    GERD (gastroesophageal reflux disease)    HLD (hyperlipidemia)    HLD (hyperlipidemia)    HTN (hypertension)    Hypertension    Lung cancer    Lung cancer BPH (benign prostatic hyperplasia)    BPH (benign prostatic hypertrophy)    COPD (chronic obstructive pulmonary disease)    Diabetes    DM (diabetes mellitus)    GERD (gastroesophageal reflux disease)    HLD (hyperlipidemia)    HLD (hyperlipidemia)    HTN (hypertension)    Hypertension    Lung cancer

## 2018-08-01 NOTE — H&P ADULT - ASSESSMENT
78 y/o w Stage IV EGFR mutated NSCLC/adenocarcinoma (on 4th line of tx w/ PO Afatinib daily since 2/2018), essential HTN, DM2, HBV (on Tenofovir), COPD (not on home O2) w/ recent MICU admission for Septic shock 2/2 gram negative/MRSA PNA and acute hypoxic resp failure admitted for Sepsis 2/2 PNA 78 y/o w Stage IV EGFR mutated NSCLC/adenocarcinoma (on 4th line of tx w/ PO Afatinib daily since 2/2018), essential HTN, DM2, HBV (on Tenofovir), COPD (not on home O2) w/ recent MICU admission for Septic shock 2/2 gram negative/MRSA PNA and acute hypoxic resp failure admitted for Sepsis 2/2 PNA      - SIRS Criteria met (T 104.9, , WBC 17) w/ CXR showing evidence of bilateral patchy opacities concerning for multifocal/aspiration PNA  - s/p Vanc, Cefepime, and Azithromycin x1 in ED  - Given his recent MICU admission for post obstructive PNA (MRSA/gram negative organism) will cover w/ Vanc, Cefepime and Azithromycin for now  - Ulegionella pending  - Bcx and Ucx pending  - Tylenol 650mg q6h prn fever 76 y/o w Stage IV EGFR mutated NSCLC/adenocarcinoma (on 4th line of tx w/ PO Afatinib daily since 2/2018), essential HTN, DM2, HBV (on Tenofovir), COPD (not on home O2) w/ recent MICU admission for Septic shock 2/2 gram negative/MRSA PNA and acute hypoxic resp failure admitted for Sepsis 2/2 multifocal/aspiration PNA

## 2018-08-01 NOTE — H&P ADULT - NSHPREVIEWOFSYSTEMS_GEN_ALL_CORE
REVIEW OF SYSTEMS:    CONSTITUTIONAL: Denies any fatigue, weakness, fevers or chills  EYES/ENT: No visual changes;  No vertigo or throat pain   NECK: No pain or stiffness  RESPIRATORY: No cough, wheezing, hemoptysis; No shortness of breath  CARDIOVASCULAR: No chest pain or palpitations  GASTROINTESTINAL: No abdominal or epigastric pain. No nausea, vomiting, or hematemesis; No diarrhea or constipation. No melena or hematochezia.  GENITOURINARY: No dysuria, frequency or hematuria  NEUROLOGICAL: No numbness or weakness  SKIN: No itching, burning, rashes, or lesions   All other review of systems is negative unless indicated above. REVIEW OF SYSTEMS:    CONSTITUTIONAL: + chills, weakness   EYES/ENT: No visual changes;  No vertigo or throat pain   NECK: No pain or stiffness  RESPIRATORY: + labored breathing  CARDIOVASCULAR: No chest pain or palpitations  GASTROINTESTINAL: No abdominal or epigastric pain. No nausea, vomiting, or hematemesis; No diarrhea or constipation. No melena or hematochezia.  GENITOURINARY: No dysuria, frequency or hematuria  NEUROLOGICAL: No numbness or weakness  SKIN: No itching, burning, rashes, or lesions   All other review of systems is negative unless indicated above. REVIEW OF SYSTEMS:    CONSTITUTIONAL: + chills, weakness   EYES/ENT: No visual changes;  No vertigo or throat pain   NECK: No pain or stiffness  RESPIRATORY: + labored breathing, cough  CARDIOVASCULAR: No chest pain or palpitations  GASTROINTESTINAL: No abdominal or epigastric pain. (+) nausea, vomiting; No hematemesis; No diarrhea or constipation. No melena or hematochezia.  GENITOURINARY: No dysuria, frequency or hematuria  NEUROLOGICAL: No numbness or weakness; (+)headache associated with sinus pressure  SKIN: No itching, burning, rashes, or lesions   All other review of systems is negative unless indicated above.

## 2018-08-01 NOTE — H&P ADULT - NSHPSOCIALHISTORY_GEN_ALL_CORE
Immigrated from [place] 5 years ago  Former smoker for 50 years; quit 2 years ago Immigrated from [place] 5 years ago  Former smoker for 50 years; quit 2 years ago  HHA 2d/week

## 2018-08-01 NOTE — H&P ADULT - HISTORY OF PRESENT ILLNESS
78 y/o w Stage IV EGFR mutated NSCLC/adenocarcinoma (on 4th line of tx w/ PO Afatinib daily since 2/2018), essential HTN, DM2, HBV (on Tenofovir), COPD (not on home O2) w/ recent MICU admission for Septic shock 2/2 gram negative/MRSA PNA and acute hypoxic resp failure BIBEMS for decreased responsiveness, subjective fevers, and lethargy. Per family at bedside, pt was in his usual state of health this AM then went out to breakfast and began feeling tired/subjective chills and lethargy. Pt slept for several hours and was noted to have labored breathing. Per pt wife, she came into his room and found him covered in NBNB emesis prompting her to call EMS. She sat him upright and pt reportedly vomited again prior to coming to ED.     In the ED, pt febrile 104.9F, -139/40-79, -125, RR 18 (96 on 4L NC). Pt was given Vanc, Cefepime x1, 2L NS bolus and Ofirmev x1. 76 y/o w Stage IV EGFR mutated NSCLC/adenocarcinoma (on 4th line of tx w/ PO Afatinib daily since 2/2018), essential HTN, DM2, HBV (on Tenofovir), COPD (not on home O2) w/ recent MICU admission for Septic shock 2/2 gram negative/MRSA PNA and acute hypoxic resp failure BIBEMS for decreased responsiveness, subjective fevers, and lethargy. Per family at bedside, pt was in his usual state of health following discharge after last admission up until this AM when he began feeling tired/subjective chills and lethargy after breakfast. Pt slept for several hours and his wife reports "hearing his heavy breathing" from downstairs.  Per pt wife, she came into his room and found him covered in NBNB emesis, rigoring, w/ chills prompting her to call EMS.    In the ED, pt febrile 104.9F, -139/40-79, -125, RR 18 (96 on 4L NC). Pt was given Vanc, Cefepime x1, 2L NS bolus and Ofirmev x1. 78 y/o w Stage IV EGFR mutated NSCLC/adenocarcinoma (on 4th line of tx w/ PO Afatinib daily since 2/2018), essential HTN, DM2, HBV (on Tenofovir), COPD (not on home O2) w/ recent MICU admission for Septic shock 2/2 gram negative/MRSA PNA and acute hypoxic resp failure BIBEMS for decreased responsiveness, subjective fevers, and lethargy. Per family at bedside, pt was in his usual state of health following discharge after last admission up until this AM when he began feeling tired/subjective chills and lethargy after breakfast. Pt slept for several hours and his wife reports "hearing his heavy breathing" from downstairs.  Per pt wife, she came into his room and found him covered in NBNB emesis, rigoring, w/ chills prompting her to call EMS.    In the ED, pt febrile 104.9F, -139/40-79, -125, RR 18 (96 on 4L NC). Pt was given Vanc, Cefepime x1, 2L NS bolus and Ofirmev x1. 76 y/o w Stage IV EGFR mutated NSCLC/adenocarcinoma (on 4th line of tx w/ PO Afatinib daily since 2/2018), essential HTN, DM2, HBV (on Tenofovir), COPD (not on home O2) w/ recent MICU admission for Septic shock 2/2 gram negative/MRSA PNA and acute hypoxic resp failure BIBEMS for decreased responsiveness, subjective fevers, and lethargy. Per family at bedside, pt was in his usual state of health following discharge after last admission up until this AM when he began feeling tired/subjective chills and lethargy after breakfast. He was tolerating his meal this AM w/o difficulty and then told his wife he was feeling tired. He slept for several hours and his wife reports "hearing his heavy breathing" from downstairs.  She went into his room and found him covered in NBNB emesis, rigoring, w/ chills prompting her to call EMS. Of note, pt has recent hx of 'dry heaving' and 'choking' on his food. No reported night sweats, recent travel, new medications, CP, palpitations, abd pain, dysuria or diarrhea.     In the ED, pt febrile 104.9F, -139/40-79, -125, RR 18 (96 on 4L NC). Pt was given Vanc, Cefepime x1, 2L NS bolus and Ofirmev x1.

## 2018-08-01 NOTE — ED PROVIDER NOTE - OBJECTIVE STATEMENT
Pt and wife manderin speaking.  offered - wife preferred daughter to translate.  76 y/o male PMH of SCC (stage IV, dx'd 05/2016, currently being treated with chemotherapy), HBV (on Tenofovir), COPD, HTN, HLD, DM2 (not on insulin) BIBA for  decreased arousal, fever, labored breathing today. Wife noted labored breathing while pt took a nap this afternoon. EMS was called, vomit found near pt at home. Pt and wife mandrin speaking.  offered - wife preferred granddaughter to translate who is in ED currently and son (health care proxy) who was spoken to over the phone as he is away for business. Per son, pt does not have DNR/DNI.  78 y/o male PMH of SCC (stage IV, dx'd 05/2016, currently being treated with oral chemotherapy), HBV (on Tenofovir), COPD, HTN, HLD, DM2 (not on insulin) BIBA for  decreased responsiveness, fever, labored breathing today. Pt was talking and responsive this morning, went out to lunch, felt tired and had chills and took a nap. Wife noted labored breathing while pt was sleeping this afternoon. EMS was called, vomit found near pt at home. Pt was laying supine and vomited again when EMS sat him up. Pt was discharged approx 2 weeks ago for PNA, completed augmentin at home. Pt was in ICU for septic shock during this stay.

## 2018-08-01 NOTE — ED ADULT NURSE REASSESSMENT NOTE - NS ED NURSE REASSESS COMMENT FT1
Pt is alert and awake however remains AMS.  Pt knows he is in a hospital and endorses knowing his daughter Pt is disoriented to self, time of year,

## 2018-08-01 NOTE — H&P ADULT - NSHPLABSRESULTS_GEN_ALL_CORE
12.9   17.77 )-----------( 155      ( 01 Aug 2018 17:28 )             40.4       08-01    134<L>  |  95<L>  |  12  ----------------------------<  180<H>  4.2   |  26  |  1.05    Ca    8.7      01 Aug 2018 17:28    TPro  7.5  /  Alb  3.8  /  TBili  0.5  /  DBili  x   /  AST  18  /  ALT  9   /  AlkPhos  58  08-01                  PT/INR - ( 01 Aug 2018 17:28 )   PT: 12.2 SEC;   INR: 1.10          PTT - ( 01 Aug 2018 17:28 )  PTT:32.5 SEC    Lactate Trend            CAPILLARY BLOOD GLUCOSE            Culture Results:   No growth at 1 week. (07-07 @ 13:42)  Culture Results:   No growth at 1 week. (07-06 @ 11:20)  Culture Results:   No growth at 1 week. (07-05 @ 18:35)  Culture Results:   No growth (07-05 @ 16:29)  Culture Results:   No growth at 5 days. (07-05 @ 09:37)  Culture Results:   No growth at 5 days. (07-05 @ 09:37) 08-01    134<L>  |  95<L>  |  12  ----------------------------<  180<H>  4.2   |  26  |  1.05    Ca    8.7      01 Aug 2018 17:28    TPro  7.5  /  Alb  3.8  /  TBili  0.5  /  DBili  x   /  AST  18  /  ALT  9   /  AlkPhos  58  08-01                        12.9   17.77 )-----------( 155      ( 01 Aug 2018 17:28 )             40.4     LIVER FUNCTIONS - ( 01 Aug 2018 17:28 )  Alb: 3.8 g/dL / Pro: 7.5 g/dL / ALK PHOS: 58 u/L / ALT: 9 u/L / AST: 18 u/L / GGT: x           PT/INR - ( 01 Aug 2018 17:28 )   PT: 12.2 SEC;   INR: 1.10     PTT - ( 01 Aug 2018 17:28 )  PTT:32.5 SEC    17:28 - VBG - pH: 7.33  | pCO2: 56    | pO2: 36    | Lactate: 2.4      Culture Results:   No growth at 1 week. (07-07 @ 13:42)  Culture Results:   No growth at 1 week. (07-06 @ 11:20)  Culture Results:   No growth at 1 week. (07-05 @ 18:35)  Culture Results:   No growth (07-05 @ 16:29)  Culture Results:   No growth at 5 days. (07-05 @ 09:37)  Culture Results:   No growth at 5 days. (07-05 @ 09:37)    < from: CT Head No Cont (08.01.18 @ 18:34) >  There is unchanged mild enlargement of ventricles and sulci consistent with volume loss and areas of white matter decreased attenuation, likely   areas of microvascular disease, with other etiologies not excluded. There is mineralization/calcification of the globus pallidus. There is no new   acute hemorrhage or midline shift. Basal cisterns remain patent. Orbits are unchanged. Sinuses and mastoids remain unremarkable. Calvarium is intact. There is an incidental unchanged 1.6 cm AP x 4 cm right suboccipital scalp lipoma and focal calcification more inferiorly.  IMPRESSION: No significant change compared to 7/5/2018, we demonstration of volume loss and microvascular disease, there is no acute hemorrhage or midline shift. If symptoms persist consider follow-up CT or MRI if no contraindications.  < end of copied text >    CXR personally reviewed  - bilateral patchy opacities unchanged from prior CXR 7/5/2018; more prominent LLL consolidation in comparison to prior 08-01    134<L>  |  95<L>  |  12  ----------------------------<  180<H>  4.2   |  26  |  1.05    Ca    8.7      01 Aug 2018 17:28    TPro  7.5  /  Alb  3.8  /  TBili  0.5  /  DBili  x   /  AST  18  /  ALT  9   /  AlkPhos  58  08-01                        12.9   17.77 )-----------( 155      ( 01 Aug 2018 17:28 )             40.4     LIVER FUNCTIONS - ( 01 Aug 2018 17:28 )  Alb: 3.8 g/dL / Pro: 7.5 g/dL / ALK PHOS: 58 u/L / ALT: 9 u/L / AST: 18 u/L / GGT: x           PT/INR - ( 01 Aug 2018 17:28 )   PT: 12.2 SEC;   INR: 1.10     PTT - ( 01 Aug 2018 17:28 )  PTT:32.5 SEC    17:28 - VBG - pH: 7.33  | pCO2: 56    | pO2: 36    | Lactate: 2.4      Culture Results:   No growth at 1 week. (07-07 @ 13:42)  Culture Results:   No growth at 1 week. (07-06 @ 11:20)  Culture Results:   No growth at 1 week. (07-05 @ 18:35)  Culture Results:   No growth (07-05 @ 16:29)  Culture Results:   No growth at 5 days. (07-05 @ 09:37)  Culture Results:   No growth at 5 days. (07-05 @ 09:37)    < from: CT Head No Cont (08.01.18 @ 18:34) >  There is unchanged mild enlargement of ventricles and sulci consistent with volume loss and areas of white matter decreased attenuation, likely   areas of microvascular disease, with other etiologies not excluded. There is mineralization/calcification of the globus pallidus. There is no new   acute hemorrhage or midline shift. Basal cisterns remain patent. Orbits are unchanged. Sinuses and mastoids remain unremarkable. Calvarium is intact. There is an incidental unchanged 1.6 cm AP x 4 cm right suboccipital scalp lipoma and focal calcification more inferiorly.  IMPRESSION: No significant change compared to 7/5/2018, we demonstration of volume loss and microvascular disease, there is no acute hemorrhage or midline shift. If symptoms persist consider follow-up CT or MRI if no contraindications.  < end of copied text >    CXR personally reviewed  - bilateral patchy opacities unchanged from prior CXR 7/5/2018; more prominent LLL consolidation in comparison to prior    EKG personally reviewed - 129bpm sinus tach; QTc 442ms

## 2018-08-01 NOTE — H&P ADULT - NSHPPHYSICALEXAM_GEN_ALL_CORE
PHYSICAL EXAM:    General: No acute distress.  HEENT: Normocephalic, atraumatic.  PERRL.  EOMI.  No scleral icterus.  Moist MM.  No oropharyngeal exudates.    Neck: Supple.  Full range of motion.  No JVD.  No carotid bruits.  No thyromegaly.  Trachea midline.  No lymphadenopathy.   Heart: RRR.  Normal S1 and S2.  No murmurs, rubs, or gallops.   Lungs: CTAB. No wheezes, crackles, or rhonchi.    Abdomen: BS+, soft, NT/ND.  No organomegaly.  Skin: Warm and dry.  No rashes.  Extremities: No edema, clubbing, or cyanosis.  2+ peripheral pulses b/l.  Musculoskeletal: No deformities.  No spinal or paraspinal tenderness.  Neuro: A&Ox3.  CN II-XII intact.  5/5 strength in UE and LE b/l.  Tactile sensation intact in UE and LE b/l.  Cerebellar function intact as assessed by finger-to-nose test. PHYSICAL EXAM:    General: AAOx1  HEENT: Dry mucous membranes   Neck: Supple.  Full range of motion.    Heart: RRR.  Normal S1 and S2.  No murmurs, rubs, or gallops.   Lungs: Coarse breath sounds bilateral lung fields    Abdomen: BS+, soft, NT/ND.  No organomegaly.  Skin: Warm and dry.  No rashes.  Extremities: No edema, clubbing, or cyanosis.  2+ peripheral pulses b/l.  Musculoskeletal: No deformities.  No spinal or paraspinal tenderness.  Neuro: Unable to be assessed PHYSICAL EXAM:    General: AAOx1, ill appearing male, Mild distress  HEENT: Dry mucous membranes   Neck: Supple.  Full range of motion.    Heart: RRR.  Normal S1 and S2.  No murmurs, rubs, or gallops.   Lungs: Coarse breath sounds bilateral lung fields , poor inspiratory effort  Abdomen: BS+, soft, NT/ND.  No organomegaly.  Skin: Warm and dry.  No rashes.  Extremities: No edema, clubbing, or cyanosis.  2+ peripheral pulses b/l.  Musculoskeletal: No deformities.  No spinal or paraspinal tenderness.  Neuro: Unable to be assessed PHYSICAL EXAM:    General: AAOx1, ill appearing male, Mild distress  HEENT: Dry mucous membranes; yellow discharge right medial canthus; clear sclerae   Neck: Supple.  Full range of motion.    Heart: RRR.  Normal S1 and S2.  No murmurs, rubs, or gallops.   Lungs: Coarse breath sounds bilateral lung fields , poor inspiratory effort [R lung clear anteriorly; L lung course breath sounds]  Abdomen: BS+, soft, NT/ND.  No organomegaly.  Skin: Warm and dry.  No rashes.  Extremities: No edema, clubbing, or cyanosis.  2+ peripheral pulses b/l.  Musculoskeletal: No deformities.  No spinal or paraspinal tenderness.  Neuro: Unable to be assessed [attending exam - via wife translating at bedside, patient A&Ox person, place; 4/5 strength b/l UE]

## 2018-08-01 NOTE — H&P ADULT - PROBLEM SELECTOR PLAN 7
- DVT PPx: HSQ  - Diet: NPO pending swallow eval - Pt wife, daughter, and grand daughter at bedside this evening; we had an extensive conversation regarding their overall GOC. They understand that pt has had a decline in his overall clinical status since last admission and are concerned that they would be unable to care for him at home. We discussed what the pt would want in this situation and they are discussing whether they would like to pursue comfort measures and DNR/DNI.   - Palliative care consulted. - Pt wife, daughter, and grand daughter at bedside this evening; we had an extensive conversation regarding their overall GOC for their loved one. They understand that pt has had a decline in his overall clinical status since last admission and are concerned that they would be unable to care for him at home a this time. We discussed what the pt would want in this situation if he were able to delineate his wishes. They are actively discussing whether they would like to pursue comfort measures and DNR/DNI. Ongoing GOC  - Guarded prognosis  - Palliative care consulted.

## 2018-08-02 DIAGNOSIS — R53.81 OTHER MALAISE: ICD-10-CM

## 2018-08-02 DIAGNOSIS — G89.3 NEOPLASM RELATED PAIN (ACUTE) (CHRONIC): ICD-10-CM

## 2018-08-02 DIAGNOSIS — Z51.5 ENCOUNTER FOR PALLIATIVE CARE: ICD-10-CM

## 2018-08-02 PROBLEM — K21.9 GASTRO-ESOPHAGEAL REFLUX DISEASE WITHOUT ESOPHAGITIS: Chronic | Status: INACTIVE | Noted: 2017-04-04 | Resolved: 2018-08-02

## 2018-08-02 PROBLEM — C34.90 MALIGNANT NEOPLASM OF UNSPECIFIED PART OF UNSPECIFIED BRONCHUS OR LUNG: Chronic | Status: INACTIVE | Noted: 2017-04-03 | Resolved: 2018-08-02

## 2018-08-02 LAB
ALBUMIN SERPL ELPH-MCNC: 3 G/DL — LOW (ref 3.3–5)
ALP SERPL-CCNC: 45 U/L — SIGNIFICANT CHANGE UP (ref 40–120)
ALT FLD-CCNC: 8 U/L — SIGNIFICANT CHANGE UP (ref 4–41)
ANISOCYTOSIS BLD QL: SLIGHT — SIGNIFICANT CHANGE UP
APPEARANCE UR: CLEAR — SIGNIFICANT CHANGE UP
AST SERPL-CCNC: 12 U/L — SIGNIFICANT CHANGE UP (ref 4–40)
B PERT DNA SPEC QL NAA+PROBE: SIGNIFICANT CHANGE UP
BACTERIA # UR AUTO: SIGNIFICANT CHANGE UP
BASOPHILS # BLD AUTO: 0.03 K/UL — SIGNIFICANT CHANGE UP (ref 0–0.2)
BASOPHILS NFR BLD AUTO: 0.2 % — SIGNIFICANT CHANGE UP (ref 0–2)
BASOPHILS NFR SPEC: 0 % — SIGNIFICANT CHANGE UP (ref 0–2)
BILIRUB SERPL-MCNC: 0.4 MG/DL — SIGNIFICANT CHANGE UP (ref 0.2–1.2)
BILIRUB UR-MCNC: NEGATIVE — SIGNIFICANT CHANGE UP
BLASTS # FLD: 0 % — SIGNIFICANT CHANGE UP (ref 0–0)
BLOOD UR QL VISUAL: HIGH
BUN SERPL-MCNC: 9 MG/DL — SIGNIFICANT CHANGE UP (ref 7–23)
C PNEUM DNA SPEC QL NAA+PROBE: NOT DETECTED — SIGNIFICANT CHANGE UP
CALCIUM SERPL-MCNC: 8.4 MG/DL — SIGNIFICANT CHANGE UP (ref 8.4–10.5)
CHLORIDE SERPL-SCNC: 105 MMOL/L — SIGNIFICANT CHANGE UP (ref 98–107)
CO2 SERPL-SCNC: 26 MMOL/L — SIGNIFICANT CHANGE UP (ref 22–31)
COLOR SPEC: SIGNIFICANT CHANGE UP
CREAT SERPL-MCNC: 0.91 MG/DL — SIGNIFICANT CHANGE UP (ref 0.5–1.3)
EOSINOPHIL # BLD AUTO: 0.03 K/UL — SIGNIFICANT CHANGE UP (ref 0–0.5)
EOSINOPHIL NFR BLD AUTO: 0.2 % — SIGNIFICANT CHANGE UP (ref 0–6)
EOSINOPHIL NFR FLD: 0 % — SIGNIFICANT CHANGE UP (ref 0–6)
FLUAV H1 2009 PAND RNA SPEC QL NAA+PROBE: NOT DETECTED — SIGNIFICANT CHANGE UP
FLUAV H1 RNA SPEC QL NAA+PROBE: NOT DETECTED — SIGNIFICANT CHANGE UP
FLUAV H3 RNA SPEC QL NAA+PROBE: NOT DETECTED — SIGNIFICANT CHANGE UP
FLUAV SUBTYP SPEC NAA+PROBE: SIGNIFICANT CHANGE UP
FLUBV RNA SPEC QL NAA+PROBE: NOT DETECTED — SIGNIFICANT CHANGE UP
GIANT PLATELETS BLD QL SMEAR: PRESENT — SIGNIFICANT CHANGE UP
GLUCOSE BLDC GLUCOMTR-MCNC: 149 MG/DL — HIGH (ref 70–99)
GLUCOSE SERPL-MCNC: 153 MG/DL — HIGH (ref 70–99)
GLUCOSE UR-MCNC: NEGATIVE — SIGNIFICANT CHANGE UP
HADV DNA SPEC QL NAA+PROBE: NOT DETECTED — SIGNIFICANT CHANGE UP
HCOV 229E RNA SPEC QL NAA+PROBE: NOT DETECTED — SIGNIFICANT CHANGE UP
HCOV HKU1 RNA SPEC QL NAA+PROBE: NOT DETECTED — SIGNIFICANT CHANGE UP
HCOV NL63 RNA SPEC QL NAA+PROBE: NOT DETECTED — SIGNIFICANT CHANGE UP
HCOV OC43 RNA SPEC QL NAA+PROBE: NOT DETECTED — SIGNIFICANT CHANGE UP
HCT VFR BLD CALC: 35.6 % — LOW (ref 39–50)
HGB BLD-MCNC: 11.1 G/DL — LOW (ref 13–17)
HMPV RNA SPEC QL NAA+PROBE: NOT DETECTED — SIGNIFICANT CHANGE UP
HPIV1 RNA SPEC QL NAA+PROBE: NOT DETECTED — SIGNIFICANT CHANGE UP
HPIV2 RNA SPEC QL NAA+PROBE: NOT DETECTED — SIGNIFICANT CHANGE UP
HPIV3 RNA SPEC QL NAA+PROBE: NOT DETECTED — SIGNIFICANT CHANGE UP
HPIV4 RNA SPEC QL NAA+PROBE: NOT DETECTED — SIGNIFICANT CHANGE UP
IMM GRANULOCYTES # BLD AUTO: 0.13 # — SIGNIFICANT CHANGE UP
IMM GRANULOCYTES NFR BLD AUTO: 0.7 % — SIGNIFICANT CHANGE UP (ref 0–1.5)
KETONES UR-MCNC: NEGATIVE — SIGNIFICANT CHANGE UP
LACTATE SERPL-SCNC: 0.9 MMOL/L — SIGNIFICANT CHANGE UP (ref 0.5–2)
LEUKOCYTE ESTERASE UR-ACNC: NEGATIVE — SIGNIFICANT CHANGE UP
LYMPHOCYTES # BLD AUTO: 1.04 K/UL — SIGNIFICANT CHANGE UP (ref 1–3.3)
LYMPHOCYTES # BLD AUTO: 5.3 % — LOW (ref 13–44)
LYMPHOCYTES NFR SPEC AUTO: 4.4 % — LOW (ref 13–44)
M PNEUMO DNA SPEC QL NAA+PROBE: NOT DETECTED — SIGNIFICANT CHANGE UP
MACROCYTES BLD QL: SLIGHT — SIGNIFICANT CHANGE UP
MAGNESIUM SERPL-MCNC: 1.8 MG/DL — SIGNIFICANT CHANGE UP (ref 1.6–2.6)
MCHC RBC-ENTMCNC: 28.4 PG — SIGNIFICANT CHANGE UP (ref 27–34)
MCHC RBC-ENTMCNC: 31.2 % — LOW (ref 32–36)
MCV RBC AUTO: 91 FL — SIGNIFICANT CHANGE UP (ref 80–100)
METAMYELOCYTES # FLD: 0 % — SIGNIFICANT CHANGE UP (ref 0–1)
MONOCYTES # BLD AUTO: 0.9 K/UL — SIGNIFICANT CHANGE UP (ref 0–0.9)
MONOCYTES NFR BLD AUTO: 4.6 % — SIGNIFICANT CHANGE UP (ref 2–14)
MONOCYTES NFR BLD: 5.2 % — SIGNIFICANT CHANGE UP (ref 2–9)
MYELOCYTES NFR BLD: 0 % — SIGNIFICANT CHANGE UP (ref 0–0)
NEUTROPHIL AB SER-ACNC: 85.1 % — HIGH (ref 43–77)
NEUTROPHILS # BLD AUTO: 17.48 K/UL — HIGH (ref 1.8–7.4)
NEUTROPHILS NFR BLD AUTO: 89 % — HIGH (ref 43–77)
NEUTS BAND # BLD: 5.3 % — SIGNIFICANT CHANGE UP (ref 0–6)
NITRITE UR-MCNC: NEGATIVE — SIGNIFICANT CHANGE UP
NRBC # FLD: 0 — SIGNIFICANT CHANGE UP
OTHER - HEMATOLOGY %: 0 — SIGNIFICANT CHANGE UP
PH UR: 6.5 — SIGNIFICANT CHANGE UP (ref 4.6–8)
PHOSPHATE SERPL-MCNC: 2.1 MG/DL — LOW (ref 2.5–4.5)
PLATELET # BLD AUTO: 147 K/UL — LOW (ref 150–400)
PLATELET COUNT - ESTIMATE: SIGNIFICANT CHANGE UP
PMV BLD: 10.2 FL — SIGNIFICANT CHANGE UP (ref 7–13)
POTASSIUM SERPL-MCNC: 3.7 MMOL/L — SIGNIFICANT CHANGE UP (ref 3.5–5.3)
POTASSIUM SERPL-SCNC: 3.7 MMOL/L — SIGNIFICANT CHANGE UP (ref 3.5–5.3)
PROMYELOCYTES # FLD: 0 % — SIGNIFICANT CHANGE UP (ref 0–0)
PROT SERPL-MCNC: 6.1 G/DL — SIGNIFICANT CHANGE UP (ref 6–8.3)
PROT UR-MCNC: NEGATIVE MG/DL — SIGNIFICANT CHANGE UP
RBC # BLD: 3.91 M/UL — LOW (ref 4.2–5.8)
RBC # FLD: 13.7 % — SIGNIFICANT CHANGE UP (ref 10.3–14.5)
RBC CASTS # UR COMP ASSIST: SIGNIFICANT CHANGE UP (ref 0–?)
RSV RNA SPEC QL NAA+PROBE: NOT DETECTED — SIGNIFICANT CHANGE UP
RV+EV RNA SPEC QL NAA+PROBE: NOT DETECTED — SIGNIFICANT CHANGE UP
SODIUM SERPL-SCNC: 140 MMOL/L — SIGNIFICANT CHANGE UP (ref 135–145)
SP GR SPEC: 1.01 — SIGNIFICANT CHANGE UP (ref 1–1.04)
SPECIMEN SOURCE: SIGNIFICANT CHANGE UP
SPECIMEN SOURCE: SIGNIFICANT CHANGE UP
UROBILINOGEN FLD QL: NORMAL MG/DL — SIGNIFICANT CHANGE UP
VARIANT LYMPHS # BLD: 0 % — SIGNIFICANT CHANGE UP
WBC # BLD: 19.61 K/UL — HIGH (ref 3.8–10.5)
WBC # FLD AUTO: 19.61 K/UL — HIGH (ref 3.8–10.5)
WBC UR QL: SIGNIFICANT CHANGE UP (ref 0–?)

## 2018-08-02 PROCEDURE — 99222 1ST HOSP IP/OBS MODERATE 55: CPT

## 2018-08-02 PROCEDURE — 99223 1ST HOSP IP/OBS HIGH 75: CPT | Mod: GC

## 2018-08-02 PROCEDURE — 99223 1ST HOSP IP/OBS HIGH 75: CPT

## 2018-08-02 PROCEDURE — 74177 CT ABD & PELVIS W/CONTRAST: CPT | Mod: 26

## 2018-08-02 PROCEDURE — 71260 CT THORAX DX C+: CPT | Mod: 26

## 2018-08-02 RX ORDER — TENOFOVIR DISOPROXIL FUMARATE 300 MG/1
300 TABLET, FILM COATED ORAL DAILY
Qty: 0 | Refills: 0 | Status: DISCONTINUED | OUTPATIENT
Start: 2018-08-02 | End: 2018-08-09

## 2018-08-02 RX ORDER — MORPHINE SULFATE 50 MG/1
2 CAPSULE, EXTENDED RELEASE ORAL
Qty: 0 | Refills: 0 | Status: DISCONTINUED | OUTPATIENT
Start: 2018-08-02 | End: 2018-08-02

## 2018-08-02 RX ORDER — MORPHINE SULFATE 50 MG/1
2 CAPSULE, EXTENDED RELEASE ORAL
Qty: 0 | Refills: 0 | Status: DISCONTINUED | OUTPATIENT
Start: 2018-08-02 | End: 2018-08-03

## 2018-08-02 RX ORDER — INSULIN LISPRO 100/ML
VIAL (ML) SUBCUTANEOUS EVERY 6 HOURS
Qty: 0 | Refills: 0 | Status: DISCONTINUED | OUTPATIENT
Start: 2018-08-02 | End: 2018-08-04

## 2018-08-02 RX ORDER — MEROPENEM 1 G/30ML
1000 INJECTION INTRAVENOUS EVERY 8 HOURS
Qty: 0 | Refills: 0 | Status: DISCONTINUED | OUTPATIENT
Start: 2018-08-02 | End: 2018-08-09

## 2018-08-02 RX ORDER — ACETAMINOPHEN 500 MG
650 TABLET ORAL EVERY 6 HOURS
Qty: 0 | Refills: 0 | Status: DISCONTINUED | OUTPATIENT
Start: 2018-08-02 | End: 2018-08-09

## 2018-08-02 RX ORDER — MORPHINE SULFATE 50 MG/1
4 CAPSULE, EXTENDED RELEASE ORAL
Qty: 0 | Refills: 0 | Status: DISCONTINUED | OUTPATIENT
Start: 2018-08-02 | End: 2018-08-02

## 2018-08-02 RX ORDER — LIDOCAINE 4 G/100G
1 CREAM TOPICAL DAILY
Qty: 0 | Refills: 0 | Status: DISCONTINUED | OUTPATIENT
Start: 2018-08-02 | End: 2018-08-09

## 2018-08-02 RX ORDER — METFORMIN HYDROCHLORIDE 850 MG/1
1 TABLET ORAL
Qty: 0 | Refills: 0 | COMMUNITY

## 2018-08-02 RX ORDER — MORPHINE SULFATE 50 MG/1
2 CAPSULE, EXTENDED RELEASE ORAL EVERY 4 HOURS
Qty: 0 | Refills: 0 | Status: DISCONTINUED | OUTPATIENT
Start: 2018-08-02 | End: 2018-08-03

## 2018-08-02 RX ORDER — MORPHINE SULFATE 50 MG/1
2 CAPSULE, EXTENDED RELEASE ORAL ONCE
Qty: 0 | Refills: 0 | Status: DISCONTINUED | OUTPATIENT
Start: 2018-08-02 | End: 2018-08-02

## 2018-08-02 RX ADMIN — MORPHINE SULFATE 2 MILLIGRAM(S): 50 CAPSULE, EXTENDED RELEASE ORAL at 23:53

## 2018-08-02 RX ADMIN — MORPHINE SULFATE 2 MILLIGRAM(S): 50 CAPSULE, EXTENDED RELEASE ORAL at 22:20

## 2018-08-02 RX ADMIN — HEPARIN SODIUM 5000 UNIT(S): 5000 INJECTION INTRAVENOUS; SUBCUTANEOUS at 05:53

## 2018-08-02 RX ADMIN — MORPHINE SULFATE 2 MILLIGRAM(S): 50 CAPSULE, EXTENDED RELEASE ORAL at 23:38

## 2018-08-02 RX ADMIN — SODIUM CHLORIDE 75 MILLILITER(S): 9 INJECTION INTRAMUSCULAR; INTRAVENOUS; SUBCUTANEOUS at 05:45

## 2018-08-02 RX ADMIN — Medication 3 MILLILITER(S): at 15:57

## 2018-08-02 RX ADMIN — Medication 63.75 MILLIMOLE(S): at 13:11

## 2018-08-02 RX ADMIN — CEFEPIME 100 MILLIGRAM(S): 1 INJECTION, POWDER, FOR SOLUTION INTRAMUSCULAR; INTRAVENOUS at 07:13

## 2018-08-02 RX ADMIN — MORPHINE SULFATE 2 MILLIGRAM(S): 50 CAPSULE, EXTENDED RELEASE ORAL at 13:25

## 2018-08-02 RX ADMIN — MORPHINE SULFATE 2 MILLIGRAM(S): 50 CAPSULE, EXTENDED RELEASE ORAL at 12:25

## 2018-08-02 RX ADMIN — AZITHROMYCIN 250 MILLIGRAM(S): 500 TABLET, FILM COATED ORAL at 01:12

## 2018-08-02 RX ADMIN — MORPHINE SULFATE 2 MILLIGRAM(S): 50 CAPSULE, EXTENDED RELEASE ORAL at 12:10

## 2018-08-02 RX ADMIN — Medication 3 MILLILITER(S): at 07:54

## 2018-08-02 RX ADMIN — Medication 250 MILLIGRAM(S): at 05:44

## 2018-08-02 RX ADMIN — Medication 3 MILLILITER(S): at 01:13

## 2018-08-02 RX ADMIN — Medication 250 MILLIGRAM(S): at 19:33

## 2018-08-02 RX ADMIN — MORPHINE SULFATE 2 MILLIGRAM(S): 50 CAPSULE, EXTENDED RELEASE ORAL at 22:05

## 2018-08-02 RX ADMIN — Medication 3 MILLILITER(S): at 22:12

## 2018-08-02 RX ADMIN — LIDOCAINE 1 PATCH: 4 CREAM TOPICAL at 19:20

## 2018-08-02 RX ADMIN — MORPHINE SULFATE 2 MILLIGRAM(S): 50 CAPSULE, EXTENDED RELEASE ORAL at 13:10

## 2018-08-02 RX ADMIN — MEROPENEM 100 MILLIGRAM(S): 1 INJECTION INTRAVENOUS at 23:00

## 2018-08-02 RX ADMIN — CEFEPIME 100 MILLIGRAM(S): 1 INJECTION, POWDER, FOR SOLUTION INTRAMUSCULAR; INTRAVENOUS at 13:17

## 2018-08-02 RX ADMIN — SODIUM CHLORIDE 75 MILLILITER(S): 9 INJECTION INTRAMUSCULAR; INTRAVENOUS; SUBCUTANEOUS at 01:11

## 2018-08-02 RX ADMIN — HEPARIN SODIUM 5000 UNIT(S): 5000 INJECTION INTRAVENOUS; SUBCUTANEOUS at 19:33

## 2018-08-02 NOTE — CONSULT NOTE ADULT - PROBLEM SELECTOR RECOMMENDATION 9
Patient of Dr. Gross as an outpatient.  Receiving Afatanib - currently on hold.  Will follow-up with oncology regarding goals of care going forward.

## 2018-08-02 NOTE — CONSULT NOTE ADULT - PROBLEM SELECTOR RECOMMENDATION 4
Extensive conversation with patient's wife, patient's son, and patient's daughter in law at bedside with  phone #[phone].  Patient's son Parmjit contacted via phone [phone] - family reports no difficulty with swallowing at home, no coughing, no signs or symptoms of aspiration.  Parmjit reports that the patient was weak upon first returning from the hospital, but after a few days, he was able to carry out ADL's and walk short distances independently.  Parmjit reports patient's pain was managed on pain regimen as above.  Patient wife is deferring to son Parmjit for all information/decision making.  Psychosocial support provided.  Will continue to follow for ongoing symptom management and goals of care.

## 2018-08-02 NOTE — CONSULT NOTE ADULT - ASSESSMENT
77 year old critically ill gentleman, immunosuppressed with advanced metastatic non small cell lung cancer presents with sepsis. Fever and leukocytosis in the setting of infitrates on chest x ray are most concerning for pneumonia.   At risk for resistant pathogen given his recent hospitalization/ antibiotic exposure.    1) Pneumonia- presumed bacterial  Change cefepime to meropenem 1 iv q 8  Continue vancomycin- check vanco through prior to 4th dose  Check urine legionella antigen- if negative-d/c azithromycin    Check Chest CT    Known quantiferon positive.  AFB smears last month were negative and fever curve improved with abx.    2) Hep B- chronic  Continue tenofovir    Overall prognosis is guarded 77 year old critically ill gentleman, immunosuppressed with advanced metastatic non small cell lung cancer presents with sepsis. Fever and leukocytosis in the setting of infitrates on chest x ray are most concerning for pneumonia.   At risk for resistant pathogen given his recent hospitalization/ antibiotic exposure.    1) Pneumonia- presumed bacterial  Change cefepime to meropenem 1 iv q 8  Continue vancomycin- check vanco through prior to 4th dose  Check urine legionella antigen- if negative-d/c azithromycin    Check Chest CT. Check CT a/p given vomiting.       Known quantiferon positive.  AFB smears last month were negative and fever curve improved with abx.    2) Hep B- chronic  Resume tenofovir    Overall prognosis is guarded

## 2018-08-02 NOTE — CONSULT NOTE ADULT - ASSESSMENT
76 y/o w Stage IV EGFR mutated NSCLC/adenocarcinoma - presents with sepsis 2/2 multifocal pneumonia/aspiration pneumonia.  Palliative consulted for goals of care and symptom management.

## 2018-08-02 NOTE — ED ADULT NURSE NOTE - OBJECTIVE STATEMENT
Pt received A&Ox2 to ED Rm 18 with c/o SOB, fever, N/V, lethargy prior to ED arrival. Previous RN states EMS found pt to be hypoxic (80s on RA) & had ep of N/V. Per previous RN, pt arrived to ED tachycardic. Currently, pt with RR equal & unlabored - on 2L O2 via NC sat > 94% (best on R thumb - good wave form). Wife at bedside - both Chinese only speaking. RVP found to be negative; pt put in transport to admitting floor.

## 2018-08-02 NOTE — PROGRESS NOTE ADULT - SUBJECTIVE AND OBJECTIVE BOX
Patient is a 77y old  Male who presents with a chief complaint of fever, unresponsiveness at home (02 Aug 2018 04:03)      SUBJECTIVE / OVERNIGHT EVENTS: spoke w/ pt's wife w  phone- [phone]- pt has been having pain for a while; no swallowing issues at home; initially wife said that pt wasn't taking MS Contin at home but then Son, Parmjit called and said he was.  wife deferred medical decisions and discussions to him- [phone]   d/w wife re: prior hospitalizations- pt has been more tired since last discharge  pt mentioned via  that he is in pain    MEDICATIONS  (STANDING):  ALBUTerol/ipratropium for Nebulization 3 milliLiter(s) Nebulizer every 6 hours  azithromycin  IVPB 500 milliGRAM(s) IV Intermittent every 24 hours  dextrose 5%. 1000 milliLiter(s) (50 mL/Hr) IV Continuous <Continuous>  dextrose 50% Injectable 12.5 Gram(s) IV Push once  dextrose 50% Injectable 25 Gram(s) IV Push once  dextrose 50% Injectable 25 Gram(s) IV Push once  heparin  Injectable 5000 Unit(s) SubCutaneous every 12 hours  insulin lispro (HumaLOG) corrective regimen sliding scale   SubCutaneous three times a day before meals  insulin lispro (HumaLOG) corrective regimen sliding scale   SubCutaneous at bedtime  meropenem  IVPB 1000 milliGRAM(s) IV Intermittent every 8 hours  sodium chloride 0.9%. 1000 milliLiter(s) (75 mL/Hr) IV Continuous <Continuous>  tenofovir disoproxil fumarate (VIREAD) 300 milliGRAM(s) Oral daily  vancomycin  IVPB 1000 milliGRAM(s) IV Intermittent every 12 hours    MEDICATIONS  (PRN):  acetaminophen  Suppository 650 milliGRAM(s) Rectal every 6 hours PRN For Temp greater than 38 C (100.4 F)  dextrose 40% Gel 15 Gram(s) Oral once PRN Blood Glucose LESS THAN 70 milliGRAM(s)/deciliter  glucagon  Injectable 1 milliGRAM(s) IntraMuscular once PRN Glucose LESS THAN 70 milligrams/deciliter  morphine  - Injectable 2 milliGRAM(s) IV Push every 3 hours PRN moderate and severe pain      Meds ordered within last 24hours  sodium chloride 0.9% Bolus:   1000 milliLiter(s), IV Bolus, once, infuse over 60 Minute(s), Stop After 1 Doses ( @ 17:11)  piperacillin/tazobactam IVPB.: [Known as ZOSYN IVPB.]  3.375 Gram(s) in dextrose 5% 100 milliLiter(s), IV Intermittent, once, infuse over 30 Minute(s), Stop After 1 Doses  Administration Instructions: Dilute 3.375 Gram(s) vial with 15 mL Sterile Water.  Place in Buretrol with 100 mL of prescribed diluent. ( 17:11)  vancomycin  IVPB:   1000 milliGRAM(s) in sodium chloride 0.9% 250 milliLiter(s), IV Intermittent, once, infuse over 60 Minute(s), Stop After 1 Doses ( @ 17:11)  acetaminophen  IVPB: [Known as OFIRMEV for TEMP]  1000 milliGRAM(s) in IV Solution 100 milliLiter(s), IV Intermittent, once, infuse over 15 Minute(s), Stop After 1 Doses  Special Instructions: Acetaminophen dosage should not exceed 4,000 milliGRAM(s) in 24 hours.  Administration Instructions: MAX DAILY DOSE:  ADULT = 4,000 mG/Day ( @ 17:11)  cefepime   IVPB: [Known as MAXIPIME]  1000 milliGRAM(s) in dextrose 5% 50 milliLiter(s), IV Intermittent, once, infuse over 30 Minute(s), Stop After 1 Doses  Indication: sepsis  Provider's Contact #: [phone] ( @ 17:19)  (Floorstock):   Qty Removed: 1 each ( @ 17:44)  (Floorstock):   Qty Removed: 1 each ( @ 17:44)  (Floorstock):   Qty Removed: 1 each ( @ 17:44)  sodium chloride 0.9% Bolus:   1000 milliLiter(s), IV Bolus, once, infuse over 1 Hour(s), Stop After 1 Doses  Provider's Contact #: [phone] ( @ 17:48)  heparin  Injectable:   5000 Unit(s), SubCutaneous, every 12 hours  Provider's Contact #: [phone] ( @ 21:02)  acetaminophen   Tablet: [Ordered as TYLENOL]  650 milliGRAM(s), Oral, every 6 hours, PRN for For Temp greater than 38 C (100.4 F)  Administration Instructions: MAX DAILY DOSE:  ADULT = 4,000 mG/Day  Provider's Contact #: [phone] ( @ 21:15)  vancomycin  IVPB:   1000 milliGRAM(s) in dextrose 5% 250 milliLiter(s), IV Intermittent, every 12 hours, infuse over 60 Minute(s)  Indication: sepsis  Provider's Contact #: [phone] ( @ 21:15)  cefepime   IVPB: [Known as MAXIPIME]  2000 milliGRAM(s) in dextrose 5% 50 milliLiter(s), IV Intermittent, every 8 hours, infuse over 30 Minute(s)  Indication: sepsis  Provider's Contact #: [phone] ( @ 21:15)  azithromycin  IVPB: [Known as ZITHROMAX IVPB]  500 milliGRAM(s) in dextrose 5% 250 milliLiter(s), IV Intermittent, every 24 hours, infuse over 60 Minute(s)  Indication: sepsis  Provider's Contact #: [phone] ( @ 21:15)  insulin lispro (HumaLOG) corrective regimen sliding scale:       2 Unit(s) if Glucose 151 - 200      4 Unit(s) if Glucose 201 - 250      6 Unit(s) if Glucose 251 - 300      8 Unit(s) if Glucose 301 - 350      10 Unit(s) if Glucose 351 - 400      12 Unit(s) if Glucose Greater Than 400 + Contact MD  SubCutaneous, three times a day before meals  Special Instructions: Give correctional scale insulin REGARDLESS of PO status NOTIFY Provider for blood glucose LESS THAN 70 milliGRAM(s)/deciLiter or above 400 milliGRAM(s)/deciLiter.  Administration Instructions: *Per Sliding Scale*  Dispose unused medication in BLACK bin.  This is a Look-alike/Sound-alike Medication  Provider's Contact #: [phone] ( @ 21:15)  insulin lispro (HumaLOG) corrective regimen sliding scale:       0 Unit(s) if Glucose 0 - 250      1 Unit(s) if Glucose 251 - 300      2 Unit(s) if Glucose 301 - 350      3 Unit(s) if Glucose 351 - 400      4 Unit(s) if Glucose Greater Than 400 + Contact Provider  SubCutaneous, at bedtime  Special Instructions: Give correctional scale insulin REGARDLESS of PO status NOTIFY Provider for blood glucose LESS THAN 70 milliGRAM(s)/deciLiter or above 400 milliGRAM(s)/deciLiter.  Administration Instructions: Dispose unused medication in BLACK bin.  This is a Look-alike/Sound-alike Medication  Provider's Contact #: [phone] ( @ 21:15)  dextrose 5%.: Solution, 1000 milliLiter(s) infuse at 50 mL/Hr  Special Instructions: Conditional Order: HYPOGLYCEMIA PROTOCOL  Provider's Contact #: [phone] ( @ 21:15)  dextrose 40% Gel: [Known as GLUTOSE 15]  15 Gram(s), Oral, once, PRN for Blood Glucose LESS THAN 70 milliGRAM(s)/deciliter, Stop After 1 Doses  Special Instructions: Conditional Order: HYPOGLYCEMIA PROTOCOL  Administration Instructions: Contents of 37.5 Gram(s) tube delivers 15 Gram(s) dextrose  Provider's Contact #: [phone] ( @ 21:15)  dextrose 50% Injectable:   12.5 Gram(s), IV Push, once, Stop After 1 Doses  Special Instructions: Conditional Order: HYPOGLYCEMIA PROTOCOL  Provider's Contact #: [phone] ( @ 21:15)  dextrose 50% Injectable:   25 Gram(s), IV Push, once, Stop After 1 Doses  Special Instructions: Conditional Order: HYPOGLYCEMIA PROTOCOL  Provider's Contact #: [phone] ( @ 21:15)  dextrose 50% Injectable:   25 Gram(s), IV Push, once, Stop After 1 Doses  Special Instructions: Conditional Order: HYPOGLYCEMIA PROTOCOL  Provider's Contact #: [phone] ( @ 21:15)  glucagon  Injectable:   1 milliGRAM(s), IntraMuscular, once, PRN for Glucose LESS THAN 70 milligrams/deciliter, Stop After 1 Doses  Special Instructions: Conditional Order: HYPOGLYCEMIA PROTOCOL  Provider's Contact #: [phone] ( @ 21:15)  ALBUTerol/ipratropium for Nebulization: Known as DUONEB  3 milliLiter(s), Nebulizer, every 6 hours  Special Instructions: Continue small volume nebulizer treatment until respiratory assessment and patient education determines treatment can be converted to MDI per [facility] protocol. Discontinue nebulizer order once patient has converted to MDI.  Below is the criteria that must be met to make the conversion:  1.Patient is cooperative, alert, oriented & follows instructions  2.Patient has sufficient pre-bronchodilator inspiratory flow to be able to take a slow, deep inspiration and hold their breath f  Administration Instructions: Contains: 2.5 mG albuterol and 0.5 mG ipratropium  This is a Look-alike/Sound-alike Medication  Provider's Contact #: [phone] ( @ 21:17)  sodium chloride 0.9%.: Solution, 1000 milliLiter(s) infuse at 75 mL/Hr, Stop After 10 Hours  Provider's Contact #: [phone] ( @ 21:37)  (Floorstock):   Qty Removed: 1 each ( @ 01:06)  (Floorstock):   Qty Removed: 1 each ( @ 01:06)  acetaminophen  Suppository: [Known as TYLENOL Suppository]  650 milliGRAM(s), Rectal, every 6 hours, PRN for For Temp greater than 38 C (100.4 F)  Administration Instructions: FOR RECTAL USE  MAX DAILY DOSE:  ADULT = 4,000 mG/Day ( @ 08:55)  sodium phosphate IVPB:   15 milliMole(s) in dextrose 5% 250 milliLiter(s), IV Intermittent, once, infuse over 4 Hour(s), Stop After 1 Doses  Provider's Contact #: [phone] ( @ 11:04)  morphine  - Injectable:   4 milliGRAM(s), IV Push, every 3 hours, PRN for moderate and severe pain  Administration Instructions: This is a Look-alike/Sound-alike Medication  Provider's Contact #: [phone] ( @ 11:42)  morphine  - Injectable:   2 milliGRAM(s), IV Push, every 3 hours, PRN for moderate and severe pain  Administration Instructions: This is a Look-alike/Sound-alike Medication  Provider's Contact #: [phone] ( @ 11:46)  morphine  - Injectable:   2 milliGRAM(s), IV Push, once, Stop After 1 Doses  Administration Instructions: This is a Look-alike/Sound-alike Medication  Provider's Contact #: [phone] ( @ 12:39)  tenofovir disoproxil fumarate (VIREAD): [Known as VIREAD]  300 milliGRAM(s), Oral, daily  Indication: hep B  Provider's Contact #: [phone] ( @ 15:16)  meropenem  IVPB:   1000 milliGRAM(s) in sodium chloride 0.9% 50 milliLiter(s), IV Intermittent, every 8 hours, infuse over 30 Minute(s)  Indication: pna  Provider's Contact #: [phone] ( @ 15:16)      T(C): 36.8 (18 @ 13:16), Max: 40.5 (18 @ 17:30)  HR: 86 (18 @ 13:16) (86 - 132)  BP: 114/64 (18 @ 13:16) (111/40 - 146/74)  RR: 18 (18 @ 07:56) (16 - 24)  SpO2: 98% (18 @ 13:16) (93% - 100%)    CAPILLARY BLOOD GLUCOSE      POCT Blood Glucose.: 138 mg/dL (02 Aug 2018 12:35)  POCT Blood Glucose.: 149 mg/dL (02 Aug 2018 08:28)  POCT Blood Glucose.: 133 mg/dL (01 Aug 2018 23:25)  POCT Blood Glucose.: 125 mg/dL (01 Aug 2018 23:13)    I&O's Summary      PHYSICAL EXAM:  GENERAL: eyes closed, moaning  HEENT: dry mouth  CHEST/LUNG: Clear to auscultation bilaterally; No wheeze  HEART: Regular rate and rhythm; No murmurs, rubs, or gallops  ABDOMEN: Soft, Nontender, Nondistended; Bowel sounds present  EXTREMITIES:  No clubbing, cyanosis, or edema      LABS:                        11.1   19.61 )-----------( 147      ( 02 Aug 2018 06:13 )             35.6         140  |  105  |  9   ----------------------------<  153<H>  3.7   |  26  |  0.91    Ca    8.4      02 Aug 2018 06:13  Phos  2.1     -  Mg     1.8         TPro  6.1  /  Alb  3.0<L>  /  TBili  0.4  /  DBili  x   /  AST  12  /  ALT  8   /  AlkPhos  45      PT/INR - ( 01 Aug 2018 17:28 )   PT: 12.2 SEC;   INR: 1.10          PTT - ( 01 Aug 2018 17:28 )  PTT:32.5 SEC      Urinalysis Basic - ( 02 Aug 2018 00:30 )    Color: COLORL / Appearance: CLEAR / S.006 / pH: 6.5  Gluc: NEGATIVE / Ketone: NEGATIVE  / Bili: NEGATIVE / Urobili: NORMAL mg/dL   Blood: MODERATE / Protein: NEGATIVE mg/dL / Nitrite: NEGATIVE   Leuk Esterase: NEGATIVE / RBC: 2-5 / WBC 0-2   Sq Epi: x / Non Sq Epi: x / Bacteria: FEW        RADIOLOGY & ADDITIONAL TESTS:    Imaging Personally Reviewed:    Consultant(s) Notes Reviewed:      Care Discussed with Consultants/Other Providers:

## 2018-08-02 NOTE — CONSULT NOTE ADULT - ASSESSMENT
77m with IV EGFR+ NSCLC, on Afatinib, presenting with pneumonia and sepsis. Pt is s/p recent admission for the same reason.   He has had a recent PET CT which is likely c/w stable disease. In case of progression on Afatinib, treatment with osimertinib can be considered.     -Pulmonary consult  -CT surg consult  -Consider CT chest for better eval of CXR findings  -Speech and swallow evaluation, patient can continue afatinib when cleared by speech and swallow  -Palliative care consult and ID consult appreciated    Will follow    Helen Perez MD

## 2018-08-02 NOTE — CONSULT NOTE ADULT - SUBJECTIVE AND OBJECTIVE BOX
HPI:  78 y/o w Stage IV EGFR mutated NSCLC/adenocarcinoma (on 4th line of tx w/ PO Afatinib daily since 2018),   essential HTN, DM2, HBV (on Tenofovir), COPD (not on home O2)     The patient was admitted last month with pneumonia.  He required an ICU stay and was treated with 7 days of cefpeime.     He was discharged home and felt better.     The pt was in his usual state of health following discharge after last admission up until te morning of .  He began feeling tired/subjective chills and lethargy after breakfast. He was tolerating his meal this AM w/o difficulty and then told his wife he was feeling tired. He slept for several hours and his wife reports "hearing his heavy breathing" from downstairs.      She went into his room and found him covered in NBNB emesis, rigoring, w/ chills prompting her to call EMS. Of note, pt has recent hx of 'dry heaving' and 'choking' on his food. No reported night sweats, recent travel, new medications, CP, palpitations, abd pain, dysuria or diarrhea.     In the ED, pt febrile 104.9F, -139/40-79, -125, RR 18 (96 on 4L NC). Pt was given Vanc, Cefepime x1, 2L NS bolus and Ofirmev x1. 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 1000 milliLiter(s) IV Continuous <Continuous>  dextrose 50% Injectable 12.5 Gram(s) IV Push once  dextrose 50% Injectable 25 Gram(s) IV Push once  dextrose 50% Injectable 25 Gram(s) IV Push once  glucagon  Injectable 1 milliGRAM(s) IntraMuscular once PRN  heparin  Injectable 5000 Unit(s) SubCutaneous every 12 hours  insulin lispro (HumaLOG) corrective regimen sliding scale   SubCutaneous three times a day before meals  insulin lispro (HumaLOG) corrective regimen sliding scale   SubCutaneous at bedtime  morphine  - Injectable 2 milliGRAM(s) IV Push every 3 hours PRN  sodium chloride 0.9%. 1000 milliLiter(s) IV Continuous <Continuous>      SOCIAL HISTORY:  from [place]  [place]  former smoker       FAMILY HISTORY:  Family history of diabetes mellitus (Sibling): brother - living  Family history of liver cancer: Mother   Family history of liver cancer  Family history of stomach cancer (Sibling): brother      REVIEW OF SYSTEMS  [  ] ROS unobtainable because:    [ x ] All other systems negative except as noted below:	    Constitutional:  [ ] fever [ ] chills  [ ] weight loss  [x ] weakness  Skin:  [ ] rash [ ] phlebitis	  Eyes: [ ] icterus [ ] pain  [ ] discharge	  ENMT: [ ] sore throat  [ ] thrush [ ] ulcers [ ] exudates  Respiratory: [x ] dyspnea [ ] hemoptysis [ x] cough [ ] sputum	  Cardiovascular:  [ ] chest pain [ ] palpitations [ ] edema	  Gastrointestinal:  [ ] nausea [ ] vomiting [ ] diarrhea [ ] constipation [ ] pain	  Genitourinary:  [ ] dysuria [ ] frequency [ ] hematuria [ ] discharge [ ] flank pain  [ ] incontinence  Musculoskeletal:  [ ] myalgias [ ] arthralgias [ ] arthritis  [ ] back pain  Neurological:  [ ] headache [ ] seizures  [ ] confusion/altered mental status  Psychiatric:  [ ] anxiety [ ] depression	  Hematology/Lymphatics:  [ ] lymphadenopathy  Endocrine:  [ ] adrenal [ ] thyroid  Allergic/Immunologic:	 [ ] transplant [ ] seasonal    PHYSICAL EXAM:  General: [x ] lathargic  HEAD/EYES: [ ] PERRL [ x] white sclera [ ] icterus  ENT:  [ ] normal [x ] supple [ ] thrush [ ] pharyngeal exudate  Cardiovascular:   [ ] murmur [ ] normal [ ] PPM/AICD  Respiratory:  [x ] course BS  GI:  [ x] soft, non-tender, normal bowel sounds  :  [ ] acevedo [x ] no CVA tenderness   Musculoskeletal:  [x ] no synovitis  Neurologic:  [x ] non-focal exam   Skin:  [x ] no rash  Lymph: [ ] no lymphadenopathy  Psychiatric:  [ x] appropriate affect [ ] alert & oriented  Lines:  [ ] no phlebitis [ ] central line          Drug Dosing Weight  Height (cm): 172.72 (02 Aug 2018 04:53)  Weight (kg): 62.6 (02 Aug 2018 04:53)  BMI (kg/m2): 21 (02 Aug 2018 04:53)  BSA (m2): 1.75 (02 Aug 2018 04:53)    Vital Signs Last 24 Hrs  T(F): 98.3 (18 @ 13:16), Max: 104.9 (18 @ 17:30)    Vital Signs Last 24 Hrs  HR: 86 (18 @ 13:16) (86 - 132)  BP: 114/64 (18 @ 13:16) (111/40 - 146/74)  RR: 18 (18 @ 07:56)  SpO2: 98% (18 @ 13:16) (93% - 100%)  Wt(kg): --                          11.1   19.61 )-----------( 147      ( 02 Aug 2018 06:13 )             35.6       08-    140  |  105  |  9   ----------------------------<  153<H>  3.7   |  26  |  0.91    Ca    8.4      02 Aug 2018 06:13  Phos  2.1       Mg     1.8         TPro  6.1  /  Alb  3.0<L>  /  TBili  0.4  /  DBili  x   /  AST  12  /  ALT  8   /  AlkPhos  45  08-02      Urinalysis Basic - ( 02 Aug 2018 00:30 )    Color: COLORL / Appearance: CLEAR / S.006 / pH: 6.5  Gluc: NEGATIVE / Ketone: NEGATIVE  / Bili: NEGATIVE / Urobili: NORMAL mg/dL   Blood: MODERATE / Protein: NEGATIVE mg/dL / Nitrite: NEGATIVE   Leuk Esterase: NEGATIVE / RBC: 2-5 / WBC 0-2   Sq Epi: x / Non Sq Epi: x / Bacteria: FEW        MICROBIOLOGY:    Smear negative for AFB- /, 7/6, 7  Bronch    Quantiferon positive in 2018    RADIOLOGY:    < from: Xray Chest 1 View-PORTABLE IMMEDIATE (18 @ 17:39) >    IMPRESSION: Patchy bilateral opacities are likely infectious in etiology   consistentwith multifocal pneumonia    < end of copied text >

## 2018-08-02 NOTE — CONSULT NOTE ADULT - PROBLEM SELECTOR RECOMMENDATION 2
As above.  Patient's son Parmjit reports patient was taking MS Contin 45mg PO TID with ~ 2 doses of Oxycodone 10mg Po PRN at home and Gabapentin - he believes TID.  Currently patient NPO with drowsiness with constant moaning and complaints of pain.  Recommend Morphine 2mg IV q4h ATC - HOLD for sedation - with Morphine 2mg IV q3h PRN Moderate and severe pain (do not give within 1 hour of standing Morphine).  Bowel regimen when able.

## 2018-08-02 NOTE — SWALLOW BEDSIDE ASSESSMENT ADULT - ASR SWALLOW ASPIRATION MONITOR
oral hygiene/position upright (90Y)/pneumonia/throat clearing/change of breathing pattern/cough/gurgly voice/fever/upper respiratory infection

## 2018-08-02 NOTE — ED ADULT NURSE REASSESSMENT NOTE - NS ED NURSE REASSESS COMMENT FT1
Updated handoff rpt given to Floor LORA Butler - 829B, RVP neg. on 2L NC O2.  Pt vitally stable for transport.  ED Order sets for CM and pulse ox dc'd by covering Medicine Provider.  Pt awaiting transport to floor.  Covering LORA sheth.

## 2018-08-02 NOTE — PROGRESS NOTE ADULT - PROBLEM SELECTOR PLAN 1
- SIRS Criteria met (T 104.9, , WBC 17) w/ CXR showing evidence of bilateral patchy opacities concerning for Sepsis possible due to multifocal/aspiration PNA given pt was found covered in emesis today.  - iv fluids  ID consulted  - trend leukocytosis, fever curve  - Ulegionella pending. Bcx and UA/Ucx pending; RVP negative   - Tylenol prn fever. Aspiration precautions/Fall risk protocol  - will keep NPO for now,  cinesophagram pending

## 2018-08-02 NOTE — SWALLOW BEDSIDE ASSESSMENT ADULT - SWALLOW EVAL: CURRENT DIET
NPO pending formal speech and swallow evaluation, per conversation with ADS PA on unit at time of evaluation

## 2018-08-02 NOTE — PROGRESS NOTE ADULT - PROBLEM SELECTOR PLAN 2
- Stage IV EGFR mutated NSCLC/adenocarcinoma (on 4th line of tx w/ PO Afatinib daily since 2/2018)  - PET scan (7/29) shows multiple hypermetabolic masslike consolidations and multiple nodules in the bilateral lungs w/ hypermetabolic mediastinal, and bilateral perihilar lymph nodes concerning for metastasis.  - Holding afatinib in setting of Sepsis and progression of disease  - Onc consulted  - Palliative consulted for pain - Morphine 2mg iv q4h ATC, morphine 2mg iv q3h prn breakthrough, lidoderm patch to right shoulder

## 2018-08-02 NOTE — CONSULT NOTE ADULT - SUBJECTIVE AND OBJECTIVE BOX
Patient is a 77y old  Male who presents with a chief complaint of fever, unresponsiveness at home (02 Aug 2018 04:03)      HPI:  76 y/o w Stage IV EGFR mutated NSCLC/adenocarcinoma (on 4th line of tx w/ PO Afatinib daily since 2018), essential HTN, DM2, HBV (on Tenofovir), COPD (not on home O2) w/ recent MICU admission for Septic shock 2/2 gram negative/MRSA PNA and acute hypoxic resp failure BIBEMS for decreased responsiveness, subjective fevers, and lethargy. Per family at bedside, pt was in his usual state of health following discharge after last admission up until this AM when he began feeling tired/subjective chills and lethargy after breakfast. He was tolerating his meal this AM w/o difficulty and then told his wife he was feeling tired. He slept for several hours and his wife reports "hearing his heavy breathing" from downstairs.  She went into his room and found him covered in NBNB emesis, rigoring, w/ chills prompting her to call EMS. Of note, pt has recent hx of 'dry heaving' and 'choking' on his food. No reported night sweats, recent travel, new medications, CP, palpitations, abd pain, dysuria or diarrhea.     In the ED, pt febrile 104.9F, -139/40-79, -125, RR 18 (96 on 4L NC). Pt was given Vanc, Cefepime x1, 2L NS bolus and Ofirmev x1. (01 Aug 2018 20:10)     Onc History:  Mr. Sainz is a 78yo man with stage IV EGFR mutated NSCLC s/p progression through erlotinib, carboplatin/pemetrexed, atezolizumab, now on afatinib.   Tarceva 2016-2017  Carbo/pem - 17 - 17  Atezolizumab 17- 17  Afatinib ~18 -   The patient was started on 40 mg daily but could not tolerate it because of diarrhea and bloating. He is currently taking 20 mg daily of afatinib. He has been this dose for 2 months and has been tolerating it better though has episodes of diarrhea that are sometimes disabling. He has had multiple NGS of peripheral blood/tumor showing persistent EGFR L858R mutation + additional mutations but no T790M (last bx was in 2018). He was being followed by Dr. Samuel and is now a patient of [facility]. Last visit, I referred him for a PET/CT but unfortunately, pt ended up being admitted to [facility] with an unwitnessed fall. He was found to have PNA on scans in addition to increase in consolidation-like masses in b/l lungs. He was treated with abx and improved significantly. Brain MRI was normal. He was even in ICU for a day. He was off of Gilotrif for a few days but has resumed since discharge.      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 1000 milliLiter(s) (50 mL/Hr) IV Continuous <Continuous>  dextrose 50% Injectable 12.5 Gram(s) IV Push once  dextrose 50% Injectable 25 Gram(s) IV Push once  dextrose 50% Injectable 25 Gram(s) IV Push once  heparin  Injectable 5000 Unit(s) SubCutaneous every 12 hours  insulin lispro (HumaLOG) corrective regimen sliding scale   SubCutaneous three times a day before meals  insulin lispro (HumaLOG) corrective regimen sliding scale   SubCutaneous at bedtime  sodium chloride 0.9%. 1000 milliLiter(s) (75 mL/Hr) IV Continuous <Continuous>  sodium phosphate IVPB 15 milliMole(s) IV Intermittent once  vancomycin  IVPB 1000 milliGRAM(s) IV Intermittent every 12 hours    MEDICATIONS  (PRN):  acetaminophen  Suppository 650 milliGRAM(s) Rectal every 6 hours PRN For Temp greater than 38 C (100.4 F)  dextrose 40% Gel 15 Gram(s) Oral once PRN Blood Glucose LESS THAN 70 milliGRAM(s)/deciliter  glucagon  Injectable 1 milliGRAM(s) IntraMuscular once PRN Glucose LESS THAN 70 milligrams/deciliter  morphine  - Injectable 2 milliGRAM(s) IV Push every 3 hours PRN moderate and severe pain      Allergies    No Known Allergies    Intolerances        Vital Signs Last 24 Hrs  T(C): 37 (02 Aug 2018 04:53), Max: 40.5 (01 Aug 2018 17:30)  T(F): 98.6 (02 Aug 2018 04:53), Max: 104.9 (01 Aug 2018 17:30)  HR: 92 (02 Aug 2018 07:56) (89 - 132)  BP: 146/74 (02 Aug 2018 04:53) (111/40 - 146/74)  BP(mean): --  RR: 18 (02 Aug 2018 07:56) (16 - 24)  SpO2: 95% (02 Aug 2018 07:56) (93% - 100%)    PHYSICAL EXAM  General: adult in NAD  HEENT: clear oropharynx, anicteric sclera, pink conjunctiva  Neck: supple  CV: normal S1/S2 with no murmur rubs or gallops  Lungs: positive air movement b/l ant lungs,clear to auscultation, no wheezes, no rales  Abdomen: soft non-tender non-distended, no hepatosplenomegaly  Ext: no clubbing cyanosis or edema  Skin: no rashes and no petechiae  Neuro: alert and oriented X 4, no focal deficits      LABS:                          11.1   19.61 )-----------( 147      ( 02 Aug 2018 06:13 )             35.6         Mean Cell Volume : 91.0 fL  Mean Cell Hemoglobin : 28.4 pg  Mean Cell Hemoglobin Concentration : 31.2 %  Auto Neutrophil # : 17.48 K/uL  Auto Lymphocyte # : 1.04 K/uL  Auto Monocyte # : 0.90 K/uL  Auto Eosinophil # : 0.03 K/uL  Auto Basophil # : 0.03 K/uL  Auto Neutrophil % : 89.0 %  Auto Lymphocyte % : 5.3 %  Auto Monocyte % : 4.6 %  Auto Eosinophil % : 0.2 %  Auto Basophil % : 0.2 %      Serial CBC's   @ 06:13  Hct-35.6 / Hgb-11.1 / Plat-147 / RBC-3.91 / WBC-19.61  Serial CBC's   @ 17:28  Hct-40.4 / Hgb-12.9 / Plat-155 / RBC-4.57 / WBC-17.77      08-    140  |  105  |  9   ----------------------------<  153<H>  3.7   |  26  |  0.91    Ca    8.4      02 Aug 2018 06:13  Phos  2.1     08-  Mg     1.8         TPro  6.1  /  Alb  3.0<L>  /  TBili  0.4  /  DBili  x   /  AST  12  /  ALT  8   /  AlkPhos  45  08-02      PT/INR - ( 01 Aug 2018 17:28 )   PT: 12.2 SEC;   INR: 1.10          PTT - ( 01 Aug 2018 17:28 )  PTT:32.5 SEC                RADIOLOGY & ADDITIONAL STUDIES: Patient is a 77y old  Male who presents with a chief complaint of fever, unresponsiveness at home (02 Aug 2018 04:03)      HPI:  78 y/o w Stage IV EGFR mutated NSCLC/adenocarcinoma (on targeted therapy on PO Afatinib daily since 2018, with stable disease on most recent PET scan ), essential HTN, DM2, HBV (on Tenofovir), COPD (not on home O2) w/ recent MICU admission for Septic shock 2/2 gram negative/MRSA PNA and acute hypoxic resp failure BIBEMS for decreased responsiveness, subjective fevers, and lethargy. Per family at bedside, pt was in his usual state of health following discharge after last admission up until this AM when he began feeling tired/subjective chills and lethargy after breakfast. He was tolerating his meal this AM w/o difficulty and then told his wife he was feeling tired. He slept for several hours and his wife reports "hearing his heavy breathing" from downstairs.  She went into his room and found him covered in NBNB emesis, rigoring, w/ chills prompting her to call EMS. Of note, pt has recent hx of 'dry heaving' and 'choking' on his food.   In the ED, pt febrile 104.9F, -139/40-79, -125, RR 18 (96 on 4L NC). Pt was given Vanc, Cefepime x1, 2L NS bolus and Ofirmev x1. (01 Aug 2018 20:10)     Onc History:  Mr. Sainz is a 78yo man with stage IV EGFR mutated NSCLC s/p progression through erlotinib, carboplatin/pemetrexed, atezolizumab, now on afatinib.   Tarceva 2016-2017  Carbo/pem - 17 - 17  Atezolizumab 17- 17  Afatinib ~18 -   The patient was started on 40 mg daily but could not tolerate it because of diarrhea and bloating. He is currently taking 20 mg daily of afatinib. He has been this dose for 2 months and has been tolerating it better though has episodes of diarrhea that are sometimes disabling. He has had multiple NGS of peripheral blood/tumor showing persistent EGFR L858R mutation + additional mutations but no T790M (last bx was in 2018). He was being followed by Dr. Samuel and is now a patient of mine. Last visit, I referred him for a PET/CT but unfortunately, pt ended up being admitted to [facility] with an unwitnessed fall. He was found to have PNA on scans in addition to increase in consolidation-like masses in b/l lungs. He was treated with abx and improved significantly. Brain MRI was normal. He was even in ICU for a day. He was off of Gilotrif for a few days but has resumed since discharge.      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 1000 milliLiter(s) (50 mL/Hr) IV Continuous <Continuous>  dextrose 50% Injectable 12.5 Gram(s) IV Push once  dextrose 50% Injectable 25 Gram(s) IV Push once  dextrose 50% Injectable 25 Gram(s) IV Push once  heparin  Injectable 5000 Unit(s) SubCutaneous every 12 hours  insulin lispro (HumaLOG) corrective regimen sliding scale   SubCutaneous three times a day before meals  insulin lispro (HumaLOG) corrective regimen sliding scale   SubCutaneous at bedtime  sodium chloride 0.9%. 1000 milliLiter(s) (75 mL/Hr) IV Continuous <Continuous>  sodium phosphate IVPB 15 milliMole(s) IV Intermittent once  vancomycin  IVPB 1000 milliGRAM(s) IV Intermittent every 12 hours    MEDICATIONS  (PRN):  acetaminophen  Suppository 650 milliGRAM(s) Rectal every 6 hours PRN For Temp greater than 38 C (100.4 F)  dextrose 40% Gel 15 Gram(s) Oral once PRN Blood Glucose LESS THAN 70 milliGRAM(s)/deciliter  glucagon  Injectable 1 milliGRAM(s) IntraMuscular once PRN Glucose LESS THAN 70 milligrams/deciliter  morphine  - Injectable 2 milliGRAM(s) IV Push every 3 hours PRN moderate and severe pain      Allergies    No Known Allergies    Intolerances        Vital Signs Last 24 Hrs  T(C): 37 (02 Aug 2018 04:53), Max: 40.5 (01 Aug 2018 17:30)  T(F): 98.6 (02 Aug 2018 04:53), Max: 104.9 (01 Aug 2018 17:30)  HR: 92 (02 Aug 2018 07:56) (89 - 132)  BP: 146/74 (02 Aug 2018 04:53) (111/40 - 146/74)  BP(mean): --  RR: 18 (02 Aug 2018 07:56) (16 - 24)  SpO2: 95% (02 Aug 2018 07:56) (93% - 100%)    PHYSICAL EXAM  General: adult in NAD  HEENT: anicteric sclera, pink conjunctiva  Neck: supple  CV: normal S1/S2 with no murmur   Lungs: positive air movement b/l ant lungs, few coarse crackles  Abdomen: soft non-tender non-distended, no hepatosplenomegaly  Ext: no clubbing cyanosis or edema  Skin: no rashes and no petechiae  Neuro: alert and oriented X 4, no focal deficits      LABS:                          11.1   19.61 )-----------( 147      ( 02 Aug 2018 06:13 )             35.6         Mean Cell Volume : 91.0 fL  Mean Cell Hemoglobin : 28.4 pg  Mean Cell Hemoglobin Concentration : 31.2 %  Auto Neutrophil # : 17.48 K/uL  Auto Lymphocyte # : 1.04 K/uL  Auto Monocyte # : 0.90 K/uL  Auto Eosinophil # : 0.03 K/uL  Auto Basophil # : 0.03 K/uL  Auto Neutrophil % : 89.0 %  Auto Lymphocyte % : 5.3 %  Auto Monocyte % : 4.6 %  Auto Eosinophil % : 0.2 %  Auto Basophil % : 0.2 %      Serial CBC's   @ 06:13  Hct-35.6 / Hgb-11.1 / Plat-147 / RBC-3.91 / WBC-19.61  Serial CBC's  08-01 @ 17:28  Hct-40.4 / Hgb-12.9 / Plat-155 / RBC-4.57 / WBC-17.77          140  |  105  |  9   ----------------------------<  153<H>  3.7   |  26  |  0.91    Ca    8.4      02 Aug 2018 06:13  Phos  2.1       Mg     1.8         TPro  6.1  /  Alb  3.0<L>  /  TBili  0.4  /  DBili  x   /  AST  12  /  ALT  8   /  AlkPhos  45        PT/INR - ( 01 Aug 2018 17:28 )   PT: 12.2 SEC;   INR: 1.10          PTT - ( 01 Aug 2018 17:28 )  PTT:32.5 SEC      RADIOLOGY & ADDITIONAL STUDIES:

## 2018-08-02 NOTE — SWALLOW BEDSIDE ASSESSMENT ADULT - COMMENTS
Pt was awake, cooperative though lethargy noted for a clinical assessment of swallow function this PM with supplemental oxygen in place via nasal cannula. Pt's family present at bedside and served as reliable informants and translators as pt is primarily Mandarin speaking (free translation services provided which pt and family declined). Per charting, pt is a 76 y/o w Stage IV EGFR mutated NSCLC/adenocarcinoma (on 4th line of tx w/ PO Afatinib daily since 2/2018), essential HTN, DM2, HBV (on Tenofovir), COPD (not on home O2) w/ recent MICU admission for Septic shock 2/2 gram negative/MRSA PNA and acute hypoxic resp failure currently admitted to [facility] for sepsis 2/2 multifocal/aspiration PNA. Recent CXR revealed "Patchy bilateral opacities are likely infectious in etiology consistent with multifocal pneumonia "    Baseline throat clearing noted prior to provision of PO trials.

## 2018-08-02 NOTE — CONSULT NOTE ADULT - SUBJECTIVE AND OBJECTIVE BOX
HPI:  Obtained from H&P   78 y/o w Stage IV EGFR mutated NSCLC/adenocarcinoma (on 4th line of tx w/ PO Afatinib daily since 2018), essential HTN, DM2, HBV (on Tenofovir), COPD (not on home O2) w/ recent MICU admission for Septic shock 2/2 gram negative/MRSA PNA and acute hypoxic resp failure BIBEMS for decreased responsiveness, subjective fevers, and lethargy. Per family at bedside, pt was in his usual state of health following discharge after last admission up until this AM when he began feeling tired/subjective chills and lethargy after breakfast. He was tolerating his meal this AM w/o difficulty and then told his wife he was feeling tired. He slept for several hours and his wife reports "hearing his heavy breathing" from downstairs.  She went into his room and found him covered in NBNB emesis, rigoring, w/ chills prompting her to call EMS. Of note, pt has recent hx of 'dry heaving' and 'choking' on his food. No reported night sweats, recent travel, new medications, CP, palpitations, abd pain, dysuria or diarrhea.     Patient being treated for sepsis 2/2 multifocal pneumonia/aspiration pneumonia        PERTINENT PMH REVIEWED:  [ x] YES [ ] NO           SOCIAL HISTORY:  Significant other/partner:  [x ] YES  [ ] NO            Children:  [x ] YES  [ ] NO                   Hoahaoism/Spirituality: Amish  Substance hx:  [ ] YES   [ ] NO           Tobacco hx:  [ ] YES  [ ] NO             Alcohol hx: [ ] YES  [ ] NO        Home Opioid hx:  [ ] YES  [ ] NO   Living Situation: [x ] Home  [ ] Long term care  [ ] Rehab    FAMILY HISTORY:  Family history of diabetes mellitus (Sibling): brother - living  Family history of liver cancer: Mother   Family history of liver cancer  Family history of stomach cancer (Sibling): brother    BASELINE ADLs (prior to admission):  Independent [ ] moderately [ ] fully   Dependent   [ ] moderately [ ] fully    Code Status: Full Code                          [x ] Surrogate  [ ] HCP  [ ] Guardian:        Robert Humphrey                                                          Phone#:[phone]    Allergies    No Known Allergies    Intolerances        MEDICATIONS  (STANDING):  ALBUTerol/ipratropium for Nebulization 3 milliLiter(s) Nebulizer every 6 hours  azithromycin  IVPB 500 milliGRAM(s) IV Intermittent every 24 hours  cefepime   IVPB 2000 milliGRAM(s) IV Intermittent every 8 hours  dextrose 5%. 1000 milliLiter(s) (50 mL/Hr) IV Continuous <Continuous>  dextrose 50% Injectable 12.5 Gram(s) IV Push once  dextrose 50% Injectable 25 Gram(s) IV Push once  dextrose 50% Injectable 25 Gram(s) IV Push once  heparin  Injectable 5000 Unit(s) SubCutaneous every 12 hours  insulin lispro (HumaLOG) corrective regimen sliding scale   SubCutaneous three times a day before meals  insulin lispro (HumaLOG) corrective regimen sliding scale   SubCutaneous at bedtime  sodium chloride 0.9%. 1000 milliLiter(s) (75 mL/Hr) IV Continuous <Continuous>  vancomycin  IVPB 1000 milliGRAM(s) IV Intermittent every 12 hours    MEDICATIONS  (PRN):  acetaminophen  Suppository 650 milliGRAM(s) Rectal every 6 hours PRN For Temp greater than 38 C (100.4 F)  dextrose 40% Gel 15 Gram(s) Oral once PRN Blood Glucose LESS THAN 70 milliGRAM(s)/deciliter  glucagon  Injectable 1 milliGRAM(s) IntraMuscular once PRN Glucose LESS THAN 70 milligrams/deciliter  morphine  - Injectable 2 milliGRAM(s) IV Push every 3 hours PRN moderate and severe pain      PRESENT SYMPTOMS:  Source: [ ] Patient   [ ] Family   [ ] Team     Pain: [ ] YES [ ] NO  Onset:                    Location:                          Duration:                 Character:            Aggravating factors:                        Relieving factors:    Radiation:              Timing:                             Severity:      Dyspnea: [ ] YES [ ] NO - Mild [ ]  Moderate [ ]  Severe [ ]    Anxiety: [ ] YES [ ] NO  Fatigue: [ ] YES [ ] NO   Nausea: [ ] YES [ ] NO  Loss of appetite: [ ] YES [ ] NO   Constipation: [ ] YES [ ] NO     Other Symptoms:  [ ] All other review of systems negative   [ ] Unable to obtain due to poor mentation     Does patient meet criteria for Severe Protein Calorie Malnutrition?  Yes [ ]  No [ ]  PPSV 30% or below [ ]  Anasarca [ ]  Albumin < 2 [ ] Catabolic State [ ] Poor nutritional intake [ ] Significant weight loss [ ]      Palliative Performance Status Version 2:         %  ECOG -        Vital Signs Last 24 Hrs  T(C): 36.8 (02 Aug 2018 13:16), Max: 40.5 (01 Aug 2018 17:30)  T(F): 98.3 (02 Aug 2018 13:16), Max: 104.9 (01 Aug 2018 17:30)  HR: 86 (02 Aug 2018 13:16) (86 - 132)  BP: 114/64 (02 Aug 2018 13:16) (111/40 - 146/74)  BP(mean): --  RR: 18 (02 Aug 2018 07:56) (16 - 24)  SpO2: 98% (02 Aug 2018 13:16) (93% - 100%)    Physical Exam:    General: [ ] Alert,  A&O x     [ ] lethargic   [ ] Agitated   [ ] Cachexia   HEENT: [ ] Normal   [ ] Dry mouth   [ ] ET Tube    [ ] Trach   Lungs: [ ] Clear [ ] Rhonchi  [ ] Crackles [ ] Wheezing [ ] Tachypnea  [ ] Audible excessive secretions    Cardiovascular:  [ ] Regular rate and rhythm  [ ] Irregular [ ] Tachycardia   [ ] Bradycardia   Abdomen: [ ] Soft  [ ] Distended  [ ] +BS  [ ] Non tender [ ] Tender  [ ]PEG   [ ] NGT   Last BM:   18  Genitourinary: [ ] Normal [x ] Incontinent   [ ] Oliguria/Anuria   [ ] Soni  Musculoskeletal:  [ ] Normal   [ ] Generalized weakness  [ ] Bedbound  [ ] Edema   Neurological: [ ] No focal deficits  [ ] Cognitive impairment     Skin: [ ] Normal   [ ] Pressure ulcers     LABS:                        11.1   19.61 )-----------( 147      ( 02 Aug 2018 06:13 )             35.6     08-02    140  |  105  |  9   ----------------------------<  153<H>  3.7   |  26  |  0.91    Ca    8.4      02 Aug 2018 06:13  Phos  2.1     08-02  Mg     1.8     08-02    TPro  6.1  /  Alb  3.0<L>  /  TBili  0.4  /  DBili  x   /  AST  12  /  ALT  8   /  AlkPhos  45  08-02    PT/INR - ( 01 Aug 2018 17:28 )   PT: 12.2 SEC;   INR: 1.10          PTT - ( 01 Aug 2018 17:28 )  PTT:32.5 SEC  Urinalysis Basic - ( 02 Aug 2018 00:30 )    Color: COLORL / Appearance: CLEAR / S.006 / pH: 6.5  Gluc: NEGATIVE / Ketone: NEGATIVE  / Bili: NEGATIVE / Urobili: NORMAL mg/dL   Blood: MODERATE / Protein: NEGATIVE mg/dL / Nitrite: NEGATIVE   Leuk Esterase: NEGATIVE / RBC: 2-5 / WBC 0-2   Sq Epi: x / Non Sq Epi: x / Bacteria: FEW      I&O's Summary      RADIOLOGY & ADDITIONAL STUDIES:  Reviewed  CT head 18  IMPRESSION:   No significant change compared to 2018, we demonstration of volume   loss and microvascular disease, there is no acute hemorrhage or midline   shift. If symptoms persist consider follow-up CT or MRI if no   contraindications.    Chest Xray 18  IMPRESSION: Patchy bilateral opacities are likely infectious in etiology   consistent with multifocal pneumonia    Referrals:  Hospice [ ]   Chaplaincy [ ]    Child Life [ ]   Social Work [ ]   Case Management [ ]   Holistic Therapy [ ] HPI:  Obtained from H&P   78 y/o w Stage IV EGFR mutated NSCLC/adenocarcinoma (on 4th line of tx w/ PO Afatinib daily since 2018), essential HTN, DM2, HBV (on Tenofovir), COPD (not on home O2) w/ recent MICU admission for Septic shock 2/2 gram negative/MRSA PNA and acute hypoxic resp failure BIBEMS for decreased responsiveness, subjective fevers, and lethargy. Per family at bedside, pt was in his usual state of health following discharge after last admission up until this AM when he began feeling tired/subjective chills and lethargy after breakfast. He was tolerating his meal this AM w/o difficulty and then told his wife he was feeling tired. He slept for several hours and his wife reports "hearing his heavy breathing" from downstairs.  She went into his room and found him covered in NBNB emesis, rigoring, w/ chills prompting her to call EMS. Of note, pt has recent hx of 'dry heaving' and 'choking' on his food. No reported night sweats, recent travel, new medications, CP, palpitations, abd pain, dysuria or diarrhea.     Patient being treated for sepsis 2/2 multifocal pneumonia/aspiration pneumonia        PERTINENT PMH REVIEWED:  [ x] YES [ ] NO           SOCIAL HISTORY:  Significant other/partner:  [x ] YES  [ ] NO            Children:  [x ] YES  [ ] NO                   Yarsanism/Spirituality: Zoroastrianism  Substance hx:  [ ] YES   [ x] NO           Tobacco hx:  [x ] YES - former             Alcohol hx: [ ] YES  [x ] NO        Home Opioid hx:  [x ] YES  [ ] NO   Living Situation: [x ] Home  [ ] Long term care  [ ] Rehab    FAMILY HISTORY:  Family history of diabetes mellitus (Sibling): brother - living  Family history of liver cancer: Mother   Family history of liver cancer  Family history of stomach cancer (Sibling): brother    BASELINE ADLs (prior to admission):  Independent [x ] moderately [ ] fully   Dependent   [ ] moderately [ ] fully    Code Status: Full Code                          [x ] Surrogate  [ ] HCP  [ ] Guardian:        Giovanilaylannamdi Humphrey                                                          Phone#:[phone]    Allergies    No Known Allergies    Intolerances        MEDICATIONS  (STANDING):  ALBUTerol/ipratropium for Nebulization 3 milliLiter(s) Nebulizer every 6 hours  azithromycin  IVPB 500 milliGRAM(s) IV Intermittent every 24 hours  cefepime   IVPB 2000 milliGRAM(s) IV Intermittent every 8 hours  dextrose 5%. 1000 milliLiter(s) (50 mL/Hr) IV Continuous <Continuous>  dextrose 50% Injectable 12.5 Gram(s) IV Push once  dextrose 50% Injectable 25 Gram(s) IV Push once  dextrose 50% Injectable 25 Gram(s) IV Push once  heparin  Injectable 5000 Unit(s) SubCutaneous every 12 hours  insulin lispro (HumaLOG) corrective regimen sliding scale   SubCutaneous three times a day before meals  insulin lispro (HumaLOG) corrective regimen sliding scale   SubCutaneous at bedtime  sodium chloride 0.9%. 1000 milliLiter(s) (75 mL/Hr) IV Continuous <Continuous>  vancomycin  IVPB 1000 milliGRAM(s) IV Intermittent every 12 hours    MEDICATIONS  (PRN):  acetaminophen  Suppository 650 milliGRAM(s) Rectal every 6 hours PRN For Temp greater than 38 C (100.4 F)  dextrose 40% Gel 15 Gram(s) Oral once PRN Blood Glucose LESS THAN 70 milliGRAM(s)/deciliter  glucagon  Injectable 1 milliGRAM(s) IntraMuscular once PRN Glucose LESS THAN 70 milligrams/deciliter  morphine  - Injectable 2 milliGRAM(s) IV Push every 3 hours PRN moderate and severe pain      PRESENT SYMPTOMS: Limited due to drowsiness   Source: [x ] Patient   [ ] Family   [ ] Team     Pain: [x ] YES - reports pain "all over my body" - unable to quantify or qualify - patient's son reports that he has been having pain chronically due to his lung cancer.  Improved with opioids at home.      Dyspnea: [ ] YES [ ] NO - Mild [ ]  Moderate [ ]  Severe [ ]    Anxiety: [ ] YES [ ] NO  Fatigue: [x ] YES [ ] NO   Nausea: [ ] YES [ ] NO  Loss of appetite: [x ] YES [ ] NO   Constipation: [ ] YES [ ] NO     Other Symptoms:  [ ] All other review of systems negative   [ ] Unable to obtain due to poor mentation     Does patient meet criteria for Severe Protein Calorie Malnutrition?  Yes [ ]  No [ ]  PPSV 30% or below [ ]  Anasarca [ ]  Albumin < 2 [ ] Catabolic State [ ] Poor nutritional intake [ ] Significant weight loss [ ]      Palliative Performance Status Version 2:   40%       Vital Signs Last 24 Hrs  T(C): 36.8 (02 Aug 2018 13:16), Max: 40.5 (01 Aug 2018 17:30)  T(F): 98.3 (02 Aug 2018 13:16), Max: 104.9 (01 Aug 2018 17:30)  HR: 86 (02 Aug 2018 13:16) (86 - 132)  BP: 114/64 (02 Aug 2018 13:16) (111/40 - 146/74)  BP(mean): --  RR: 18 (02 Aug 2018 07:56) (16 - 24)  SpO2: 98% (02 Aug 2018 13:16) (93% - 100%)    Physical Exam:    General: Drowsy, moaning in pain when awake   HEENT: [ ] Normal    x] Dry mouth   [ ] ET Tube    [ ] Trach   Lungs: [x ] Clear - anteriorly     Cardiovascular:  [x ] Regular rate and rhythm  [ ] Irregular [ ] Tachycardia   [ ] Bradycardia   Abdomen: [x ] Soft  [ ] Distended  [x ] +BS  [x ] Non tender [ ] Tender  [ ]PEG   [ ] NGT   Last BM:   18  Genitourinary: [x ] Normal [ ] Incontinent   [ ] Oliguria/Anuria   [ ] Soni  Musculoskeletal:  [ ] Normal   [x ] Generalized weakness  [ ] Bedbound  [ ] Edema     Skin: [x ] Normal   [ ] Pressure ulcers     LABS:                        11.1   19.61 )-----------( 147      ( 02 Aug 2018 06:13 )             35.6     08-02    140  |  105  |  9   ----------------------------<  153<H>  3.7   |  26  |  0.91    Ca    8.4      02 Aug 2018 06:13  Phos  2.1     08-02  Mg     1.8     08-02    TPro  6.1  /  Alb  3.0<L>  /  TBili  0.4  /  DBili  x   /  AST  12  /  ALT  8   /  AlkPhos  45  08-02    PT/INR - ( 01 Aug 2018 17:28 )   PT: 12.2 SEC;   INR: 1.10          PTT - ( 01 Aug 2018 17:28 )  PTT:32.5 SEC  Urinalysis Basic - ( 02 Aug 2018 00:30 )    Color: COLORL / Appearance: CLEAR / S.006 / pH: 6.5  Gluc: NEGATIVE / Ketone: NEGATIVE  / Bili: NEGATIVE / Urobili: NORMAL mg/dL   Blood: MODERATE / Protein: NEGATIVE mg/dL / Nitrite: NEGATIVE   Leuk Esterase: NEGATIVE / RBC: 2-5 / WBC 0-2   Sq Epi: x / Non Sq Epi: x / Bacteria: FEW      I&O's Summary      RADIOLOGY & ADDITIONAL STUDIES:  Reviewed  CT head 18  IMPRESSION:   No significant change compared to 2018, we demonstration of volume   loss and microvascular disease, there is no acute hemorrhage or midline   shift. If symptoms persist consider follow-up CT or MRI if no   contraindications.    Chest Xray 18  IMPRESSION: Patchy bilateral opacities are likely infectious in etiology   consistent with multifocal pneumonia    Referrals:  Hospice [ ]   Chaplaincy [ ]    Child Life [ ]   Social Work [ ]   Case Management [ ]   Holistic Therapy [ ]

## 2018-08-02 NOTE — SWALLOW BEDSIDE ASSESSMENT ADULT - PHARYNGEAL PHASE
Delayed pharyngeal swallow/Decreased laryngeal elevation/Wet vocal quality post oral intake/Delayed throat clear post oral intake/Cough post oral intake/Throat clear post oral intake/Delayed cough post oral intake/Multiple swallows

## 2018-08-02 NOTE — SWALLOW BEDSIDE ASSESSMENT ADULT - SWALLOW EVAL: DIAGNOSIS
1- mild oral dysphagia given puree, honey thick liquid, nectar thick liquid and thin liquids marked by delayed bolus collection, transfer and transport. 2- moderate pharyngeal dysphagia given above textures marked by delayed swallow trigger and reduced hyolaryngeal excursion. multiple swallows noted with all textures with continuation of baseline throat clearing and inconsistent changes in vocal quality to a 'wet' tone both suggestive of possible penetration/aspiration

## 2018-08-02 NOTE — SWALLOW BEDSIDE ASSESSMENT ADULT - SWALLOW EVAL: RECOMMENDED DIET
1- oral nutrition/hydration is contraindicated at this time 2- consider ST alternative means of nutrition as pt is at an increased nutritional risk

## 2018-08-02 NOTE — ED ADULT NURSE NOTE - NSIMPLEMENTINTERV_GEN_ALL_ED
Implemented All Universal Safety Interventions:  [place] to call system. Call bell, personal items and telephone within reach. Instruct patient to call for assistance. Room bathroom lighting operational. Non-slip footwear when patient is off stretcher. Physically safe environment: no spills, clutter or unnecessary equipment. Stretcher in lowest position, wheels locked, appropriate side rails in place.

## 2018-08-03 DIAGNOSIS — R13.10 DYSPHAGIA, UNSPECIFIED: ICD-10-CM

## 2018-08-03 LAB
ALBUMIN SERPL ELPH-MCNC: 2.9 G/DL — LOW (ref 3.3–5)
ALP SERPL-CCNC: 48 U/L — SIGNIFICANT CHANGE UP (ref 40–120)
ALT FLD-CCNC: 8 U/L — SIGNIFICANT CHANGE UP (ref 4–41)
AST SERPL-CCNC: 10 U/L — SIGNIFICANT CHANGE UP (ref 4–40)
BACTERIA UR CULT: SIGNIFICANT CHANGE UP
BASOPHILS # BLD AUTO: 0.03 K/UL — SIGNIFICANT CHANGE UP (ref 0–0.2)
BASOPHILS NFR BLD AUTO: 0.2 % — SIGNIFICANT CHANGE UP (ref 0–2)
BILIRUB SERPL-MCNC: 0.3 MG/DL — SIGNIFICANT CHANGE UP (ref 0.2–1.2)
BUN SERPL-MCNC: 7 MG/DL — SIGNIFICANT CHANGE UP (ref 7–23)
CALCIUM SERPL-MCNC: 8.7 MG/DL — SIGNIFICANT CHANGE UP (ref 8.4–10.5)
CHLORIDE SERPL-SCNC: 105 MMOL/L — SIGNIFICANT CHANGE UP (ref 98–107)
CO2 SERPL-SCNC: 24 MMOL/L — SIGNIFICANT CHANGE UP (ref 22–31)
CREAT SERPL-MCNC: 0.8 MG/DL — SIGNIFICANT CHANGE UP (ref 0.5–1.3)
EOSINOPHIL # BLD AUTO: 0.09 K/UL — SIGNIFICANT CHANGE UP (ref 0–0.5)
EOSINOPHIL NFR BLD AUTO: 0.6 % — SIGNIFICANT CHANGE UP (ref 0–6)
GLUCOSE SERPL-MCNC: 140 MG/DL — HIGH (ref 70–99)
HCT VFR BLD CALC: 35 % — LOW (ref 39–50)
HGB BLD-MCNC: 11.1 G/DL — LOW (ref 13–17)
IMM GRANULOCYTES # BLD AUTO: 0.06 # — SIGNIFICANT CHANGE UP
IMM GRANULOCYTES NFR BLD AUTO: 0.4 % — SIGNIFICANT CHANGE UP (ref 0–1.5)
L PNEUMO AG UR QL: NEGATIVE — SIGNIFICANT CHANGE UP
LYMPHOCYTES # BLD AUTO: 1.15 K/UL — SIGNIFICANT CHANGE UP (ref 1–3.3)
LYMPHOCYTES # BLD AUTO: 8.1 % — LOW (ref 13–44)
MAGNESIUM SERPL-MCNC: 2 MG/DL — SIGNIFICANT CHANGE UP (ref 1.6–2.6)
MCHC RBC-ENTMCNC: 27.9 PG — SIGNIFICANT CHANGE UP (ref 27–34)
MCHC RBC-ENTMCNC: 31.7 % — LOW (ref 32–36)
MCV RBC AUTO: 87.9 FL — SIGNIFICANT CHANGE UP (ref 80–100)
MONOCYTES # BLD AUTO: 0.74 K/UL — SIGNIFICANT CHANGE UP (ref 0–0.9)
MONOCYTES NFR BLD AUTO: 5.2 % — SIGNIFICANT CHANGE UP (ref 2–14)
NEUTROPHILS # BLD AUTO: 12.16 K/UL — HIGH (ref 1.8–7.4)
NEUTROPHILS NFR BLD AUTO: 85.5 % — HIGH (ref 43–77)
NRBC # FLD: 0 — SIGNIFICANT CHANGE UP
PHOSPHATE SERPL-MCNC: 2.5 MG/DL — SIGNIFICANT CHANGE UP (ref 2.5–4.5)
PLATELET # BLD AUTO: 153 K/UL — SIGNIFICANT CHANGE UP (ref 150–400)
PMV BLD: 9.7 FL — SIGNIFICANT CHANGE UP (ref 7–13)
POTASSIUM SERPL-MCNC: 3.7 MMOL/L — SIGNIFICANT CHANGE UP (ref 3.5–5.3)
POTASSIUM SERPL-SCNC: 3.7 MMOL/L — SIGNIFICANT CHANGE UP (ref 3.5–5.3)
PROT SERPL-MCNC: 6.3 G/DL — SIGNIFICANT CHANGE UP (ref 6–8.3)
RBC # BLD: 3.98 M/UL — LOW (ref 4.2–5.8)
RBC # FLD: 13.3 % — SIGNIFICANT CHANGE UP (ref 10.3–14.5)
SODIUM SERPL-SCNC: 140 MMOL/L — SIGNIFICANT CHANGE UP (ref 135–145)
SPECIMEN SOURCE: SIGNIFICANT CHANGE UP
VANCOMYCIN TROUGH SERPL-MCNC: 10 UG/ML — SIGNIFICANT CHANGE UP (ref 10–20)
WBC # BLD: 14.23 K/UL — HIGH (ref 3.8–10.5)
WBC # FLD AUTO: 14.23 K/UL — HIGH (ref 3.8–10.5)

## 2018-08-03 PROCEDURE — 99232 SBSQ HOSP IP/OBS MODERATE 35: CPT

## 2018-08-03 PROCEDURE — 99233 SBSQ HOSP IP/OBS HIGH 50: CPT

## 2018-08-03 PROCEDURE — 99231 SBSQ HOSP IP/OBS SF/LOW 25: CPT

## 2018-08-03 PROCEDURE — 74230 X-RAY XM SWLNG FUNCJ C+: CPT | Mod: 26

## 2018-08-03 PROCEDURE — 99223 1ST HOSP IP/OBS HIGH 75: CPT | Mod: GC

## 2018-08-03 RX ORDER — SODIUM CHLORIDE 9 MG/ML
4 INJECTION INTRAMUSCULAR; INTRAVENOUS; SUBCUTANEOUS EVERY 6 HOURS
Qty: 0 | Refills: 0 | Status: DISCONTINUED | OUTPATIENT
Start: 2018-08-03 | End: 2018-08-03

## 2018-08-03 RX ORDER — MORPHINE SULFATE 50 MG/1
4 CAPSULE, EXTENDED RELEASE ORAL EVERY 4 HOURS
Qty: 0 | Refills: 0 | Status: DISCONTINUED | OUTPATIENT
Start: 2018-08-03 | End: 2018-08-04

## 2018-08-03 RX ORDER — MORPHINE SULFATE 50 MG/1
4 CAPSULE, EXTENDED RELEASE ORAL
Qty: 0 | Refills: 0 | Status: DISCONTINUED | OUTPATIENT
Start: 2018-08-03 | End: 2018-08-07

## 2018-08-03 RX ORDER — VANCOMYCIN HCL 1 G
1250 VIAL (EA) INTRAVENOUS EVERY 12 HOURS
Qty: 0 | Refills: 0 | Status: DISCONTINUED | OUTPATIENT
Start: 2018-08-03 | End: 2018-08-09

## 2018-08-03 RX ORDER — MORPHINE SULFATE 50 MG/1
4 CAPSULE, EXTENDED RELEASE ORAL
Qty: 0 | Refills: 0 | Status: DISCONTINUED | OUTPATIENT
Start: 2018-08-03 | End: 2018-08-03

## 2018-08-03 RX ORDER — VANCOMYCIN HCL 1 G
1250 VIAL (EA) INTRAVENOUS ONCE
Qty: 0 | Refills: 0 | Status: COMPLETED | OUTPATIENT
Start: 2018-08-03 | End: 2018-08-03

## 2018-08-03 RX ORDER — SODIUM CHLORIDE 9 MG/ML
4 INJECTION INTRAMUSCULAR; INTRAVENOUS; SUBCUTANEOUS EVERY 12 HOURS
Qty: 0 | Refills: 0 | Status: DISCONTINUED | OUTPATIENT
Start: 2018-08-03 | End: 2018-08-09

## 2018-08-03 RX ADMIN — MEROPENEM 100 MILLIGRAM(S): 1 INJECTION INTRAVENOUS at 05:44

## 2018-08-03 RX ADMIN — MORPHINE SULFATE 2 MILLIGRAM(S): 50 CAPSULE, EXTENDED RELEASE ORAL at 09:45

## 2018-08-03 RX ADMIN — MORPHINE SULFATE 4 MILLIGRAM(S): 50 CAPSULE, EXTENDED RELEASE ORAL at 21:11

## 2018-08-03 RX ADMIN — HEPARIN SODIUM 5000 UNIT(S): 5000 INJECTION INTRAVENOUS; SUBCUTANEOUS at 05:44

## 2018-08-03 RX ADMIN — MORPHINE SULFATE 4 MILLIGRAM(S): 50 CAPSULE, EXTENDED RELEASE ORAL at 12:41

## 2018-08-03 RX ADMIN — LIDOCAINE 1 PATCH: 4 CREAM TOPICAL at 07:20

## 2018-08-03 RX ADMIN — MEROPENEM 100 MILLIGRAM(S): 1 INJECTION INTRAVENOUS at 16:57

## 2018-08-03 RX ADMIN — MORPHINE SULFATE 2 MILLIGRAM(S): 50 CAPSULE, EXTENDED RELEASE ORAL at 06:15

## 2018-08-03 RX ADMIN — MORPHINE SULFATE 2 MILLIGRAM(S): 50 CAPSULE, EXTENDED RELEASE ORAL at 01:26

## 2018-08-03 RX ADMIN — MORPHINE SULFATE 4 MILLIGRAM(S): 50 CAPSULE, EXTENDED RELEASE ORAL at 23:41

## 2018-08-03 RX ADMIN — MORPHINE SULFATE 4 MILLIGRAM(S): 50 CAPSULE, EXTENDED RELEASE ORAL at 20:56

## 2018-08-03 RX ADMIN — Medication 3 MILLILITER(S): at 03:18

## 2018-08-03 RX ADMIN — Medication 3 MILLILITER(S): at 15:20

## 2018-08-03 RX ADMIN — MORPHINE SULFATE 2 MILLIGRAM(S): 50 CAPSULE, EXTENDED RELEASE ORAL at 09:28

## 2018-08-03 RX ADMIN — MEROPENEM 100 MILLIGRAM(S): 1 INJECTION INTRAVENOUS at 21:00

## 2018-08-03 RX ADMIN — HEPARIN SODIUM 5000 UNIT(S): 5000 INJECTION INTRAVENOUS; SUBCUTANEOUS at 17:24

## 2018-08-03 RX ADMIN — TENOFOVIR DISOPROXIL FUMARATE 300 MILLIGRAM(S): 300 TABLET, FILM COATED ORAL at 16:46

## 2018-08-03 RX ADMIN — Medication 166.67 MILLIGRAM(S): at 07:07

## 2018-08-03 RX ADMIN — LIDOCAINE 1 PATCH: 4 CREAM TOPICAL at 16:46

## 2018-08-03 RX ADMIN — MORPHINE SULFATE 2 MILLIGRAM(S): 50 CAPSULE, EXTENDED RELEASE ORAL at 01:41

## 2018-08-03 RX ADMIN — Medication 3 MILLILITER(S): at 09:41

## 2018-08-03 RX ADMIN — AZITHROMYCIN 250 MILLIGRAM(S): 500 TABLET, FILM COATED ORAL at 00:58

## 2018-08-03 RX ADMIN — SODIUM CHLORIDE 4 MILLILITER(S): 9 INJECTION INTRAMUSCULAR; INTRAVENOUS; SUBCUTANEOUS at 22:23

## 2018-08-03 RX ADMIN — MORPHINE SULFATE 4 MILLIGRAM(S): 50 CAPSULE, EXTENDED RELEASE ORAL at 17:10

## 2018-08-03 RX ADMIN — MORPHINE SULFATE 2 MILLIGRAM(S): 50 CAPSULE, EXTENDED RELEASE ORAL at 06:00

## 2018-08-03 RX ADMIN — MORPHINE SULFATE 4 MILLIGRAM(S): 50 CAPSULE, EXTENDED RELEASE ORAL at 23:56

## 2018-08-03 RX ADMIN — Medication 166.67 MILLIGRAM(S): at 17:24

## 2018-08-03 RX ADMIN — MORPHINE SULFATE 4 MILLIGRAM(S): 50 CAPSULE, EXTENDED RELEASE ORAL at 16:46

## 2018-08-03 RX ADMIN — Medication 3 MILLILITER(S): at 22:22

## 2018-08-03 RX ADMIN — MORPHINE SULFATE 4 MILLIGRAM(S): 50 CAPSULE, EXTENDED RELEASE ORAL at 13:07

## 2018-08-03 NOTE — PROGRESS NOTE ADULT - ASSESSMENT
77m with IV EGFR+ NSCLC, on Afatinib, presenting with pneumonia and sepsis. Pt is s/p recent admission for the same reason.   CT chest with obstruction of bronchus intermedius and RLL bronchus with postobstructive PNA.     -Pulmonary consult, consider bronch.  -May benefit from stenting of the obstructed bronchus, consider thoracic surg consult  -Patient can continue afatinib  -Palliative care consult and ID consult appreciated    Will follow    Helen Perez MD

## 2018-08-03 NOTE — CONSULT NOTE ADULT - SUBJECTIVE AND OBJECTIVE BOX
CHIEF COMPLAINT:    HPI: 6 y/o w Stage IV EGFR mutated NSCLC/adenocarcinoma (on 4th line of tx w/ PO Afatinib daily since 2018), essential HTN, DM2, HBV (on Tenofovir), COPD (not on home O2) w/ recent MICU admission for Septic shock 2/2 gram negative/MRSA PNA and acute hypoxic resp failure BIBEMS for decreased responsiveness, subjective fevers, and lethargy.    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      FAMILY HISTORY:  Family history of diabetes mellitus (Sibling): brother - living  Family history of liver cancer: Mother   Family history of liver cancer  Family history of stomach cancer (Sibling): brother      SOCIAL HISTORY:  Smoking: [ ] Never Smoked [ ] Former Smoker (__ packs x ___ years) [ ] Current Smoker  (__ packs x ___ years)  Substance Use: [ ] Never Used [ ] Used ____  EtOH Use:  Marital Status: [ ] Single [ ]  [ ]  [ ]   Sexual History:   Occupation:  Recent Travel:  Country of Birth:  Advance Directives:    Allergies    No Known Allergies    Intolerances        HOME MEDICATIONS:    REVIEW OF SYSTEMS:  Constitutional: [ ] negative [ ] fevers [ ] chills [ ] weight loss [ ] weight gain  HEENT: [ ] negative [ ] dry eyes [ ] eye irritation [ ] postnasal drip [ ] nasal congestion  CV: [ ] negative  [ ] chest pain [ ] orthopnea [ ] palpitations [ ] murmur  Resp: [ ] negative [ ] cough [ ] shortness of breath [ ] dyspnea [ ] wheezing [ ] sputum [ ] hemoptysis  GI: [ ] negative [ ] nausea [ ] vomiting [ ] diarrhea [ ] constipation [ ] abd pain [ ] dysphagia   : [ ] negative [ ] dysuria [ ] nocturia [ ] hematuria [ ] increased urinary frequency  Musculoskeletal: [ ] negative [ ] back pain [ ] myalgias [ ] arthralgias [ ] fracture  Skin: [ ] negative [ ] rash [ ] itch  Neurological: [ ] negative [ ] headache [ ] dizziness [ ] syncope [ ] weakness [ ] numbness  Psychiatric: [ ] negative [ ] anxiety [ ] depression  Endocrine: [ ] negative [ ] diabetes [ ] thyroid problem  Hematologic/Lymphatic: [ ] negative [ ] anemia [ ] bleeding problem  Allergic/Immunologic: [ ] negative [ ] itchy eyes [ ] nasal discharge [ ] hives [ ] angioedema  [ ] All other systems negative  [ ] Unable to assess ROS because ________    OBJECTIVE:  ICU Vital Signs Last 24 Hrs  T(C): 36.6 (03 Aug 2018 14:17), Max: 37.1 (02 Aug 2018 21:38)  T(F): 97.9 (03 Aug 2018 14:17), Max: 98.7 (02 Aug 2018 21:38)  HR: 84 (03 Aug 2018 15:20) (78 - 98)  BP: 138/78 (03 Aug 2018 14:17) (123/64 - 138/78)  BP(mean): --  ABP: --  ABP(mean): --  RR: 21 (03 Aug 2018 14:17) (18 - 22)  SpO2: 98% (03 Aug 2018 15:20) (93% - 98%)        CAPILLARY BLOOD GLUCOSE      POCT Blood Glucose.: 147 mg/dL (03 Aug 2018 17:56)      PHYSICAL EXAM:  General:   HEENT:   Lymph Nodes:  Neck:   Respiratory:   Cardiovascular:   Abdomen:   Extremities:   Skin:   Neurological:  Psychiatry:    HOSPITAL MEDICATIONS:  heparin  Injectable 5000 Unit(s) SubCutaneous every 12 hours    azithromycin  IVPB 500 milliGRAM(s) IV Intermittent every 24 hours  meropenem  IVPB 1000 milliGRAM(s) IV Intermittent every 8 hours  tenofovir disoproxil fumarate (VIREAD) 300 milliGRAM(s) Oral daily  vancomycin  IVPB 1250 milliGRAM(s) IV Intermittent every 12 hours      dextrose 40% Gel 15 Gram(s) Oral once PRN  dextrose 50% Injectable 12.5 Gram(s) IV Push once  dextrose 50% Injectable 25 Gram(s) IV Push once  dextrose 50% Injectable 25 Gram(s) IV Push once  glucagon  Injectable 1 milliGRAM(s) IntraMuscular once PRN  insulin lispro (HumaLOG) corrective regimen sliding scale   SubCutaneous every 6 hours    ALBUTerol/ipratropium for Nebulization 3 milliLiter(s) Nebulizer every 6 hours    acetaminophen  Suppository 650 milliGRAM(s) Rectal every 6 hours PRN  morphine  - Injectable 4 milliGRAM(s) IV Push every 4 hours  morphine  - Injectable 4 milliGRAM(s) IV Push every 3 hours PRN          dextrose 5%. 1000 milliLiter(s) IV Continuous <Continuous>  sodium chloride 0.9%. 1000 milliLiter(s) IV Continuous <Continuous>      lidocaine   Patch 1 Patch Transdermal daily        LABS:                        11.1   14.23 )-----------( 153      ( 03 Aug 2018 05:15 )             35.0     Hgb Trend: 11.1<--, 11.1<--, 12.9<--  08-03    140  |  105  |  7   ----------------------------<  140<H>  3.7   |  24  |  0.80    Ca    8.7      03 Aug 2018 05:15  Phos  2.5     08-03  Mg     2.0     08-03    TPro  6.3  /  Alb  2.9<L>  /  TBili  0.3  /  DBili  x   /  AST  10  /  ALT  8   /  AlkPhos  48  08-    Creatinine Trend: 0.80<--, 0.91<--, 1.05<--, 0.98<--, 0.85<--, 0.81<--    Urinalysis Basic - ( 02 Aug 2018 00:30 )    Color: COLORL / Appearance: CLEAR / S.006 / pH: 6.5  Gluc: NEGATIVE / Ketone: NEGATIVE  / Bili: NEGATIVE / Urobili: NORMAL mg/dL   Blood: MODERATE / Protein: NEGATIVE mg/dL / Nitrite: NEGATIVE   Leuk Esterase: NEGATIVE / RBC: 2-5 / WBC 0-2   Sq Epi: x / Non Sq Epi: x / Bacteria: FEW            MICROBIOLOGY:     RADIOLOGY:  [ ] Reviewed and interpreted by me    PULMONARY FUNCTION TESTS:    EKG: CHIEF COMPLAINT:    HPI: 6 y/o w Stage IV EGFR mutated NSCLC/adenocarcinoma (on 4th line of tx w/ PO Afatinib daily since 2018), essential HTN, DM2, HBV (on Tenofovir), COPD (not on home O2) w/ recent MICU admission for Septic shock 2/2 gram negative/MRSA PNA and acute hypoxic resp failure BIBEMS for decreased responsiveness, subjective fevers, and lethargy. Patient has had intermittent episodes of coughing with meals. He was feeling okay but had worsening of baseline chest pain prompting hospital visit. In ED he had a temperature and CT chest found debris in trachea and right main stem including occlusion of bronchus intermedius and RLL. Distal airway still open on CT chest and there is new consolidation in RLL which may be PNA vs Tumor or both.  Patient has had progression of disease despite 4th line therapy.  He is feeling better since admission. Remains afebrile and is on 2L oxygen only with no respiratory distress. Leukocytosis improving.    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      FAMILY HISTORY:  Family history of diabetes mellitus (Sibling): brother - living  Family history of liver cancer: Mother   Family history of liver cancer  Family history of stomach cancer (Sibling): brother      SOCIAL HISTORY:  Smoking: [ ] Never Smoked [ ] Former Smoker (__ packs x ___ years) [ ] Current Smoker  (__ packs x ___ years)  Substance Use: [ ] Never Used [ ] Used ____  EtOH Use:  Marital Status: [ ] Single [ ]  [ ]  [ ]   Sexual History:   Occupation:  Recent Travel:  Country of Birth:  Advance Directives:    Allergies    No Known Allergies    Intolerances        HOME MEDICATIONS:    REVIEW OF SYSTEMS:  Constitutional: [ x] negative [ ] fevers [ ] chills [ ] weight loss [ ] weight gain  HEENT: [x ] negative [ ] dry eyes [ ] eye irritation [ ] postnasal drip [ ] nasal congestion  CV: [ ] negative  [x ] chest pain [ ] orthopnea [ ] palpitations [ ] murmur  Resp: [ ] negative [ ] cough [ ] shortness of breath [ x] dyspnea [ ] wheezing [ ] sputum [ ] hemoptysis  GI: [ x] negative [ ] nausea [ ] vomiting [ ] diarrhea [ ] constipation [ ] abd pain [ ] dysphagia   : [x ] negative [ ] dysuria [ ] nocturia [ ] hematuria [ ] increased urinary frequency  Musculoskeletal: [ x] negative [ ] back pain [ ] myalgias [ ] arthralgias [ ] fracture  Skin: [x ] negative [ ] rash [ ] itch  Neurological: [ ] negative [ ] headache [ ] dizziness [ ] syncope [x ] weakness [ ] numbness  Psychiatric: [x ] negative [ ] anxiety [ ] depression  Endocrine: [x ] negative [ ] diabetes [ ] thyroid problem  Hematologic/Lymphatic: [ ] negative [ ] anemia [ ] bleeding problem  Allergic/Immunologic: [ ] negative [ ] itchy eyes [ ] nasal discharge [ ] hives [ ] angioedema  [ ] All other systems negative  [ ] Unable to assess ROS because ________    OBJECTIVE:  T(C): 36.6 (03 Aug 2018 14:17), Max: 37.1 (02 Aug 2018 21:38)  T(F): 97.9 (03 Aug 2018 14:17), Max: 98.7 (02 Aug 2018 21:38)  HR: 84 (03 Aug 2018 15:20) (78 - 98)  BP: 138/78 (03 Aug 2018 14:17) (123/64 - 138/78)  RR: 21 (03 Aug 2018 14:17) (18 - 22)  SpO2: 98% (03 Aug 2018 15:20) (93% - 98%)        CAPILLARY BLOOD GLUCOSE      POCT Blood Glucose.: 147 mg/dL (03 Aug 2018 17:56)      PHYSICAL EXAM:  General: NAD. Thin appearing man.  HEENT: MANJIT  Lymph Nodes:  Neck: No JVD  Respiratory: CTA b/l no wheezing. (Just received nebs)  Cardiovascular: RRR  Abdomen: S/NT/ND  Extremities: -C/C/E  Skin: No rash. No clubbing.  Neurological: non-focal.  Psychiatry:    HOSPITAL MEDICATIONS:  heparin  Injectable 5000 Unit(s) SubCutaneous every 12 hours    azithromycin  IVPB 500 milliGRAM(s) IV Intermittent every 24 hours  meropenem  IVPB 1000 milliGRAM(s) IV Intermittent every 8 hours  tenofovir disoproxil fumarate (VIREAD) 300 milliGRAM(s) Oral daily  vancomycin  IVPB 1250 milliGRAM(s) IV Intermittent every 12 hours      dextrose 40% Gel 15 Gram(s) Oral once PRN  dextrose 50% Injectable 12.5 Gram(s) IV Push once  dextrose 50% Injectable 25 Gram(s) IV Push once  dextrose 50% Injectable 25 Gram(s) IV Push once  glucagon  Injectable 1 milliGRAM(s) IntraMuscular once PRN  insulin lispro (HumaLOG) corrective regimen sliding scale   SubCutaneous every 6 hours    ALBUTerol/ipratropium for Nebulization 3 milliLiter(s) Nebulizer every 6 hours    acetaminophen  Suppository 650 milliGRAM(s) Rectal every 6 hours PRN  morphine  - Injectable 4 milliGRAM(s) IV Push every 4 hours  morphine  - Injectable 4 milliGRAM(s) IV Push every 3 hours PRN          dextrose 5%. 1000 milliLiter(s) IV Continuous <Continuous>  sodium chloride 0.9%. 1000 milliLiter(s) IV Continuous <Continuous>      lidocaine   Patch 1 Patch Transdermal daily        LABS:                        11.1   14.23 )-----------( 153      ( 03 Aug 2018 05:15 )             35.0     Hgb Trend: 11.1<--, 11.1<--, 12.9<--  08-03    140  |  105  |  7   ----------------------------<  140<H>  3.7   |  24  |  0.80    Ca    8.7      03 Aug 2018 05:15  Phos  2.5     08-  Mg     2.0     -    TPro  6.3  /  Alb  2.9<L>  /  TBili  0.3  /  DBili  x   /  AST  10  /  ALT  8   /  AlkPhos  48  08-    Creatinine Trend: 0.80<--, 0.91<--, 1.05<--, 0.98<--, 0.85<--, 0.81<--    Urinalysis Basic - ( 02 Aug 2018 00:30 )    Color: COLORL / Appearance: CLEAR / S.006 / pH: 6.5  Gluc: NEGATIVE / Ketone: NEGATIVE  / Bili: NEGATIVE / Urobili: NORMAL mg/dL   Blood: MODERATE / Protein: NEGATIVE mg/dL / Nitrite: NEGATIVE   Leuk Esterase: NEGATIVE / RBC: 2-5 / WBC 0-2   Sq Epi: x / Non Sq Epi: x / Bacteria: FEW            MICROBIOLOGY:     RADIOLOGY:  [x ] Reviewed and interpreted by me    CT chest: Extensive airway secretions with complete obstruction of the bronchus   intermedius and right lower lobe bronchus. There is new consolidation in   the right lower lobe, concerning for postobstructive pneumonia and/or   atelectasis.    As compared to PET/CT on 2018, no significant change in extensive   bilateral pulmonary masses and masslike consolidations and adenopathy,   likely reflecting a combination of tumor and pneumonia.    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    PULMONARY FUNCTION TESTS:    EKG:

## 2018-08-03 NOTE — PROGRESS NOTE ADULT - ASSESSMENT
78 y/o w Stage IV EGFR mutated NSCLC/adenocarcinoma - presents with sepsis 2/2 multifocal pneumonia/aspiration pneumonia.  Palliative consulted for goals of care and symptom management.

## 2018-08-03 NOTE — SWALLOW VFSS/MBS ASSESSMENT ADULT - RECOMMENDED CONSISTENCY
IMPRESSIONS CONTINUED: 6- severe pharyngeal dysphagia given thin liquids marked by the above stated profile resulting in incomplete closure of laryngeal vestibule resulting in deep, silent penetration with partial retrieval. moderate stasis remained post swallow at the base of tongue, valleculae, posterior pharyngeal wall and pyriform sinuses placing pt at increased risk of secondary penetration/aspiration. absent cough response noted during observed penetration further suggestive of reduced laryngopharyngeal sensation.    RECOMMENDED CONSISTENCIES: dysphagia 1 with honey thick liquids via teaspoon presentation

## 2018-08-03 NOTE — SWALLOW VFSS/MBS ASSESSMENT ADULT - NS SWALLOW VFSS REC ASPIR MON
cough/fever/pneumonia/throat clearing/gurgly voice/change of breathing pattern/oral hygiene/position upright (90Y)/upper respiratory infection

## 2018-08-03 NOTE — SWALLOW VFSS/MBS ASSESSMENT ADULT - COMMENTS
Pt was received by radiology this am at which time pt was awake, cooperative though mild lethargy noted. Supplemental oxygen in place via nasal cannula noted. Per charting, pt is a Pt was received by radiology this am at which time pt was awake, cooperative though mild lethargy noted. Supplemental oxygen in place via nasal cannula noted. Per charting, pt is a pt is a 76 y/o w Stage IV EGFR mutated NSCLC/adenocarcinoma (on 4th line of tx w/ PO Afatinib daily since 2/2018), essential HTN, DM2, HBV (on Tenofovir), COPD (not on home O2) w/ recent MICU admission for Septic shock 2/2 gram negative/MRSA PNA and acute hypoxic resp failure currently admitted to [facility] for sepsis 2/2 multifocal/aspiration PNA. Recent CXR revealed "Patchy bilateral opacities are likely infectious in etiology consistent with multifocal pneumonia "

## 2018-08-03 NOTE — SWALLOW VFSS/MBS ASSESSMENT ADULT - ORAL PHASE
Delayed oral transit time/Reduced anterior - posterior transport/Uncontrolled bolus / spillover in olaf-pharynx Reduced anterior - posterior transport/Uncontrolled bolus / spillover in hypopharynx Delayed oral transit time/Uncontrolled bolus / spillover in hypopharynx/Reduced anterior - posterior transport Delayed oral transit time/Reduced anterior - posterior transport/Uncontrolled bolus / spillover in hypopharynx

## 2018-08-03 NOTE — SWALLOW VFSS/MBS ASSESSMENT ADULT - RECOMMENDED FEEDING/EATING TECHNIQUES
crush medication (when feasible)/hard swallow w/ each bite or sip/oral hygiene/allow for swallow between intakes/provide rest periods between swallows/alternate food with liquid/position upright (90 degrees)/small sips/bites

## 2018-08-03 NOTE — PROGRESS NOTE ADULT - SUBJECTIVE AND OBJECTIVE BOX
Follow Up:      Inverval History/ROS:Patient is a 77y old  Male who presents with a chief complaint of fever, unresponsiveness at home (02 Aug 2018 04:03)    Left forearm pain.  No fever.    Allergies    No Known Allergies    Intolerances        ANTIMICROBIALS:  azithromycin  IVPB 500 every 24 hours  meropenem  IVPB 1000 every 8 hours  tenofovir disoproxil fumarate (VIREAD) 300 daily  vancomycin  IVPB 1250 every 12 hours      OTHER MEDS:  acetaminophen  Suppository 650 milliGRAM(s) Rectal every 6 hours PRN  ALBUTerol/ipratropium for Nebulization 3 milliLiter(s) Nebulizer every 6 hours  dextrose 40% Gel 15 Gram(s) Oral once PRN  dextrose 5%. 1000 milliLiter(s) IV Continuous <Continuous>  dextrose 50% Injectable 12.5 Gram(s) IV Push once  dextrose 50% Injectable 25 Gram(s) IV Push once  dextrose 50% Injectable 25 Gram(s) IV Push once  glucagon  Injectable 1 milliGRAM(s) IntraMuscular once PRN  heparin  Injectable 5000 Unit(s) SubCutaneous every 12 hours  insulin lispro (HumaLOG) corrective regimen sliding scale   SubCutaneous every 6 hours  lidocaine   Patch 1 Patch Transdermal daily  morphine  - Injectable 2 milliGRAM(s) IV Push every 3 hours PRN  morphine  - Injectable 4 milliGRAM(s) IV Push every 4 hours  sodium chloride 0.9%. 1000 milliLiter(s) IV Continuous <Continuous>      Vital Signs Last 24 Hrs  T(C): 36.4 (03 Aug 2018 10:15), Max: 37.1 (02 Aug 2018 21:38)  T(F): 97.5 (03 Aug 2018 10:15), Max: 98.7 (02 Aug 2018 21:38)  HR: 85 (03 Aug 2018 10:15) (78 - 98)  BP: 132/75 (03 Aug 2018 10:15) (123/64 - 132/75)  BP(mean): --  RR: 22 (03 Aug 2018 10:15) (18 - 22)  SpO2: 95% (03 Aug 2018 10:15) (93% - 98%)    PHYSICAL EXAM:  General: [x ] non-toxic  HEAD/EYES: [ ] PERRL [x ] white sclera [ ] icterus  ENT:  [ ] normal [x ] supple [ ] thrush [ ] pharyngeal exudate  Cardiovascular:   [ ] murmur [ x] normal [ ] PPM/AICD  Respiratory:  [x ] course BS  GI:  [x ] soft, non-tender, normal bowel sounds  :  [ ] acevedo [ ] no CVA tenderness   Musculoskeletal:  [ ] no synovitis  Neurologic:  [ ] non-focal exam   Skin:  [x ] no rash  Lymph: [ ] no lymphadenopathy  Psychiatric:  [x ] appropriate affect [ ] alert & oriented  Lines:  [ x] no phlebitis [ ] central line                                11.1   14.23 )-----------( 153      ( 03 Aug 2018 05:15 )             35.0       08-03    140  |  105  |  7   ----------------------------<  140<H>  3.7   |  24  |  0.80    Ca    8.7      03 Aug 2018 05:15  Phos  2.5     08-03  Mg     2.0     08-03    TPro  6.3  /  Alb  2.9<L>  /  TBili  0.3  /  DBili  x   /  AST  10  /  ALT  8   /  AlkPhos  48  08-03      Urinalysis Basic - ( 02 Aug 2018 00:30 )    Color: COLORL / Appearance: CLEAR / S.006 / pH: 6.5  Gluc: NEGATIVE / Ketone: NEGATIVE  / Bili: NEGATIVE / Urobili: NORMAL mg/dL   Blood: MODERATE / Protein: NEGATIVE mg/dL / Nitrite: NEGATIVE   Leuk Esterase: NEGATIVE / RBC: 2-5 / WBC 0-2   Sq Epi: x / Non Sq Epi: x / Bacteria: FEW        MICROBIOLOGY:    RADIOLOGY:

## 2018-08-03 NOTE — PROGRESS NOTE ADULT - PROBLEM SELECTOR PLAN 5
Will continue to follow for ongoing goals of care and symptom management.  Psychosocial support provided.

## 2018-08-03 NOTE — CHART NOTE - NSCHARTNOTEFT_GEN_A_CORE
ADS NIGHT COVERAGE:    VT subtherapeutic at 10.0. Dose increased to 1250mg BID.    S. Bonifacio ADS PA-C  [id number]

## 2018-08-03 NOTE — SWALLOW VFSS/MBS ASSESSMENT ADULT - SPECIFY REASON(S)
to objectively assess swallow function. pt is familiar to this dept as she was seen for a clinical assessment of swallow function on 8/2/2018 ( please see report for details)

## 2018-08-03 NOTE — PROGRESS NOTE ADULT - SUBJECTIVE AND OBJECTIVE BOX
INTERVAL HPI/OVERNIGHT EVENTS:  Patient continues to complain of generalized pain - Patient and patient's wife declined  and called patient's granddaughter Karen [phone]     Allergies    No Known Allergies    Intolerances        Code Status:  Full Code         PRESENT SYMPTOMS: ROS limited due to patient being in pain   SOURCE:  [x ] Patient   [ ] Family   [ ] Team     Pain: Patient complained of generalized pain "a lot" - unable to quantify or qualify - constant - minimally relieved by Morphine IV     Dyspnea [ ] YES [x ] NO - Mild [ ]  Moderate [ ]  Severe [ ]   Anxiety:  [ ] YES [ ] NO  Fatigue: [ ] YES [ ] NO  Nausea: [ ] YES [ ] NO  Loss of Appetite: NPO  Constipation [ ] YES   [ ] No     OTHER SYMPTOMS:  [ ] All other ROS negative     [ ] Unable to obtain due to poor mentation    Does the patient meet criteria for Severe Protein Calorie Malnutrition?  Yes [ ]  No [ ]   PPSV 30% or below [ ]  Anasarca [ ]  Albumin <2 [ ]  Catabolic State [ ]  Poor nutritional intake [ ]  Significant weight loss [ ]     MEDICATIONS  (STANDING):  ALBUTerol/ipratropium for Nebulization 3 milliLiter(s) Nebulizer every 6 hours  azithromycin  IVPB 500 milliGRAM(s) IV Intermittent every 24 hours  dextrose 5%. 1000 milliLiter(s) (50 mL/Hr) IV Continuous <Continuous>  dextrose 50% Injectable 12.5 Gram(s) IV Push once  dextrose 50% Injectable 25 Gram(s) IV Push once  dextrose 50% Injectable 25 Gram(s) IV Push once  heparin  Injectable 5000 Unit(s) SubCutaneous every 12 hours  insulin lispro (HumaLOG) corrective regimen sliding scale   SubCutaneous every 6 hours  lidocaine   Patch 1 Patch Transdermal daily  meropenem  IVPB 1000 milliGRAM(s) IV Intermittent every 8 hours  morphine  - Injectable 4 milliGRAM(s) IV Push every 4 hours  sodium chloride 0.9%. 1000 milliLiter(s) (75 mL/Hr) IV Continuous <Continuous>  tenofovir disoproxil fumarate (VIREAD) 300 milliGRAM(s) Oral daily  vancomycin  IVPB 1250 milliGRAM(s) IV Intermittent every 12 hours    MEDICATIONS  (PRN):  acetaminophen  Suppository 650 milliGRAM(s) Rectal every 6 hours PRN For Temp greater than 38 C (100.4 F)  dextrose 40% Gel 15 Gram(s) Oral once PRN Blood Glucose LESS THAN 70 milliGRAM(s)/deciliter  glucagon  Injectable 1 milliGRAM(s) IntraMuscular once PRN Glucose LESS THAN 70 milligrams/deciliter  morphine  - Injectable 4 milliGRAM(s) IV Push every 3 hours PRN moderate and severe pain      Palliative Performance Status Version 2:   40%  ECOG - 3       Physical Exam:    General: Drowsy, but more alert than yesterday.  As per family - A&Ox3    Lungs: [x ] Clear - anteriorly   Cardiovascular:  [x ] Regular rate and rhythm  [ ] Irregular [ ] Tachycardia   [ ] Bradycardia   Abdomen: [ x] Soft  [ ] Distended  [x ] +BS  [ x] Non tender [ ] Tender  [ ]PEG   [ ] NGT   Last BM: 8/3/18    Genitourinary:  [x ] Normal [ ] Incontinent   [ ] Oliguria/Anuria   [ ] Soni  Musculoskeletal:  [ ] Normal   [x ] Generalized weakness  [ ] Bedbound  [ ] Edema   Neurological: [x ] No focal deficits  [ ] Cognitive impairment     Skin: [x ] Normal   [ ] Pressure ulcers     Vital Signs Last 24 Hrs  T(C): 36.6 (03 Aug 2018 14:17), Max: 37.1 (02 Aug 2018 21:38)  T(F): 97.9 (03 Aug 2018 14:17), Max: 98.7 (02 Aug 2018 21:38)  HR: 84 (03 Aug 2018 15:20) (78 - 98)  BP: 138/78 (03 Aug 2018 14:17) (123/64 - 138/78)  BP(mean): --  RR: 21 (03 Aug 2018 14:17) (18 - 22)  SpO2: 98% (03 Aug 2018 15:20) (93% - 98%)    LABS:                        11.1   14.23 )-----------( 153      ( 03 Aug 2018 05:15 )             35.0     08-03    140  |  105  |  7   ----------------------------<  140<H>  3.7   |  24  |  0.80    Ca    8.7      03 Aug 2018 05:15  Phos  2.5     08-03  Mg     2.0     08-03    TPro  6.3  /  Alb  2.9<L>  /  TBili  0.3  /  DBili  x   /  AST  10  /  ALT  8   /  AlkPhos  48  08-03    PT/INR - ( 01 Aug 2018 17:28 )   PT: 12.2 SEC;   INR: 1.10          PTT - ( 01 Aug 2018 17:28 )  PTT:32.5 SEC  Urinalysis Basic - ( 02 Aug 2018 00:30 )    Color: COLORL / Appearance: CLEAR / S.006 / pH: 6.5  Gluc: NEGATIVE / Ketone: NEGATIVE  / Bili: NEGATIVE / Urobili: NORMAL mg/dL   Blood: MODERATE / Protein: NEGATIVE mg/dL / Nitrite: NEGATIVE   Leuk Esterase: NEGATIVE / RBC: 2-5 / WBC 0-2   Sq Epi: x / Non Sq Epi: x / Bacteria: FEW      I&O's Summary      RADIOLOGY & ADDITIONAL STUDIES:

## 2018-08-03 NOTE — CONSULT NOTE ADULT - ATTENDING COMMENTS
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 Likely aspirated, as evidenced by debris in right bronchus, RML. Unlikely that this is endobronchial growth of maligancy since CT chest from 7/5/18 did not demonstrate any endobronchial lesion.   Continue broad spectrum antibiotics for aspiration PNA, possible postobstructive pna . Airway clearance with BID therapy in this order: albuterol, hypersal 3% and chest PT for 20 minutes. On 2 LPM O2 supplement.
Patient seen and examined.  Agree with NP note.

## 2018-08-03 NOTE — PROGRESS NOTE ADULT - PROBLEM SELECTOR PLAN 2
Pain as above, unrelieved with Morphine 2mg IV q4h ATC with Morphine 2mg IV x 3 in 24 hours. Morphine increased to 4mg IV q4h ATC - hold for sedation (Patient takes MS Contin 45mg PO TID at baseline) with Morphine 4mg IV q3h PRN given patient's lethargy and dysphagia.  If patient is able to tolerate pills PO - can consider conversion to MS Contin tomorrow - dosing is dependent on patient's requirements and lethargy level.  Patient also taking Neurontin 100mg PO QHS.  Would hold off on restarting at this time as likely minimal effect on pain level with possibility of worsening lethargy.  Of note, patient's son and granddaughter report that patient is "tired" at home throughout the day and that some of this lethargy is likely his baseline.  Patient more alert than yesterday, able to sustain being awake and answer simple questions in Mandarin.  Bowel regimen when able. Pain as above, unrelieved with Morphine 2mg IV q4h ATC with Morphine 2mg IV x 3 in 24 hours. Morphine increased to 4mg IV q4h ATC (equivalent to Morphine 72mg PO in 24 hours)- hold for sedation (Patient takes MS Contin 45mg PO TID at baseline) with Morphine 4mg IV q3h PRN given patient's lethargy and dysphagia.  If patient is able to tolerate pills PO - can consider conversion to MS Contin tomorrow - dosing is dependent on patient's requirements and lethargy level.  Patient also taking Neurontin 100mg PO QHS.  Would hold off on restarting at this time as likely minimal effect on pain level with possibility of worsening lethargy.  Of note, patient's son and granddaughter report that patient is "tired" at home throughout the day and that some of this lethargy is likely his baseline.  Patient more alert than yesterday, able to sustain being awake and answer simple questions in Mandarin.  Bowel regimen when able. Pain as above, unrelieved with Morphine 2mg IV q4h ATC and Morphine 2mg IV x 3 in 24 hours. Morphine increased to 4mg IV q4h ATC (equivalent to Morphine 72mg PO in 24 hours)- hold for sedation (Patient takes MS Contin 45mg PO TID at baseline) with Morphine 4mg IV q3h PRN given patient's lethargy and dysphagia.  If patient is able to tolerate pills PO - can consider conversion to MS Contin tomorrow - dosing is dependent on patient's requirements and lethargy level.  Patient also taking Neurontin 100mg PO QHS.  Would hold off on restarting at this time as likely minimal effect on pain level with possibility of worsening lethargy.  Of note, patient's son and granddaughter report that patient is "tired" at home throughout the day and that some of this lethargy is likely his baseline.  Patient more alert than yesterday, able to sustain being awake and answer simple questions in Mandarin.  Bowel regimen when able.

## 2018-08-03 NOTE — PROGRESS NOTE ADULT - ASSESSMENT
77 year old critically ill gentleman, immunosuppressed with advanced metastatic non small cell lung cancer presents with sepsis. Fever and leukocytosis in the setting of infitrates on chest x ray are most concerning for pneumonia.   At risk for resistant pathogen given his recent hospitalization/ antibiotic exposure.    1) Pneumonia- presumed bacterial  Continue meropenem 1 iv q 8  Continue vancomycin- check vanco through prior to 4th dose  Check urine legionella antigen- if negative-d/c azithromycin    Check Chest CT. Check CT a/p given vomiting.       Known quantiferon positive.  AFB smears last month were negative and fever curve improved with abx.    2) Hep B- chronic  Resume tenofovir    Monitor left forearm for thrombophlebitis I f febrile repeat blood cultures.     Overall prognosis is guarded

## 2018-08-03 NOTE — CONSULT NOTE ADULT - ASSESSMENT
8 y/o w Stage IV EGFR mutated NSCLC/adenocarcinoma Dx 2016 (on 4th line of tx w/ PO Afatinib daily since 2/2018), essential HTN, DM2, HBV (on Tenofovir), COPD (not on home O2) w/ recent MICU admission for Septic shock 2/2 gram negative/MRSA PNA and acute hypoxic resp failure BIBEMS for decreased responsiveness, subjective fevers, and lethargy found to have post-obstructive PNA with concern for aspiration. 6 y/o w Stage IV EGFR mutated NSCLC/adenocarcinoma Dx 2016 (on 4th line of tx w/ PO Afatinib daily since 2/2018), essential HTN, DM2, HBV (on Tenofovir), COPD (not on home O2) w/ recent MICU admission for Septic shock 2/2 gram negative/MRSA PNA and acute hypoxic resp failure BIBEMS for decreased responsiveness, subjective fevers, and lethargy found to have post-obstructive PNA with concern for aspiration.    - Although Barium swallow negative, presence of debris in trachea and history do suggest intermittent aspiration.  - Patient is improving with antibiotics for his post obstructive PNA and there is aerated lungs post obstruction and patient is not in respiratory distress or increasing oxygen requirements.  - Recommend incentive spirometry and Chest PT q 12.  - Would also recommend 3% hypertonic saline given just before Duonebs q 12 and followed by chest PT for secretion mobilization.  - Aerobika to bedside too.  - No acute pulmonary interventions at this time.  - Continue oncology follow up.  - Will sign off. Please call with questions.

## 2018-08-03 NOTE — PROGRESS NOTE ADULT - SUBJECTIVE AND OBJECTIVE BOX
Patient is a 77y old  Male who presents with a chief complaint of fever, unresponsiveness at home (02 Aug 2018 04:03)      SUBJECTIVE / OVERNIGHT EVENTS: pt seen at 1245p w/ wife at bedside - reva Jones translated over the phone - pt still in pain- but overall a bit better tdoay  pt able to speak some english- pointed to IV site that it was hurting him      MEDICATIONS  (STANDING):  ALBUTerol/ipratropium for Nebulization 3 milliLiter(s) Nebulizer every 6 hours  azithromycin  IVPB 500 milliGRAM(s) IV Intermittent every 24 hours  dextrose 5%. 1000 milliLiter(s) (50 mL/Hr) IV Continuous <Continuous>  dextrose 50% Injectable 12.5 Gram(s) IV Push once  dextrose 50% Injectable 25 Gram(s) IV Push once  dextrose 50% Injectable 25 Gram(s) IV Push once  heparin  Injectable 5000 Unit(s) SubCutaneous every 12 hours  insulin lispro (HumaLOG) corrective regimen sliding scale   SubCutaneous every 6 hours  lidocaine   Patch 1 Patch Transdermal daily  meropenem  IVPB 1000 milliGRAM(s) IV Intermittent every 8 hours  morphine  - Injectable 4 milliGRAM(s) IV Push every 4 hours  sodium chloride 0.9%. 1000 milliLiter(s) (75 mL/Hr) IV Continuous <Continuous>  tenofovir disoproxil fumarate (VIREAD) 300 milliGRAM(s) Oral daily  vancomycin  IVPB 1250 milliGRAM(s) IV Intermittent every 12 hours    MEDICATIONS  (PRN):  acetaminophen  Suppository 650 milliGRAM(s) Rectal every 6 hours PRN For Temp greater than 38 C (100.4 F)  dextrose 40% Gel 15 Gram(s) Oral once PRN Blood Glucose LESS THAN 70 milliGRAM(s)/deciliter  glucagon  Injectable 1 milliGRAM(s) IntraMuscular once PRN Glucose LESS THAN 70 milligrams/deciliter  morphine  - Injectable 2 milliGRAM(s) IV Push every 3 hours PRN breakthrough pain      Meds ordered within last 24hours  tenofovir disoproxil fumarate (VIREAD): [Known as VIREAD]  300 milliGRAM(s), Oral, daily  Indication: hep B  Provider's Contact #: [phone] ( @ 15:16)  meropenem  IVPB:   1000 milliGRAM(s) in sodium chloride 0.9% 50 milliLiter(s), IV Intermittent, every 8 hours, infuse over 30 Minute(s)  Indication: pna  Provider's Contact #: [phone] ( @ 15:16)  morphine  - Injectable:   2 milliGRAM(s), IV Push, every 4 hours  Special Instructions: as per palliative  hold for sedation  patient may refuse  Administration Instructions: This is a Look-alike/Sound-alike Medication  Provider's Contact #: [phone] ( @ 16:11)  morphine  - Injectable:   2 milliGRAM(s), IV Push, every 3 hours, PRN for breakthrough pain  Special Instructions: as per palliative  hold for sedation  Administration Instructions: This is a Look-alike/Sound-alike Medication  Provider's Contact #: [phone] ( @ 16:11)  morphine  - Injectable:   2 milliGRAM(s), IV Push, every 3 hours, PRN for breakthrough pain  Special Instructions: Do not given within one hour of standing Morphine  HOLD for sedation  Administration Instructions: This is a Look-alike/Sound-alike Medication  Provider's Contact #: [phone] ( @ 16:13)  lidocaine   Patch: [Known as LIDODERM]  1 Patch, Transdermal, daily  Special Instructions: right scapula  Administration Instructions: Patch may remain in place for up to 12 hours in any 24-hour period.  * External Use Only *  Provider's Contact #: [phone] ( @ 16:42)  morphine  - Injectable:   2 milliGRAM(s), IV Push, every 3 hours, PRN for breakthrough pain  Special Instructions: Do not given within one hour of standing Morphine  HOLD for sedation  Administration Instructions: This is a Look-alike/Sound-alike Medication  Provider's Contact #: [phone] ( @ 18:39)  insulin lispro (HumaLOG) corrective regimen sliding scale:       1 Unit(s) if Glucose 151 - 200      2 Unit(s) if Glucose 201 - 250      3 Unit(s) if Glucose 251 - 300      4 Unit(s) if Glucose 301 - 350      5 Unit(s) if Glucose 351 - 400      6 Unit(s) if Glucose Greater Than 400 + Contact MD  SubCutaneous, every 6 hours  Special Instructions: Give correctional scale insulin REGARDLESS of PO status NOTIFY Provider for blood glucose LESS THAN 70 milliGRAM(s)/deciLiter or above 400 milliGRAM(s)/deciLiter.  Administration Instructions: *Per Sliding Scale*  Dispose unused medication in BLACK bin.  This is a Look-alike/Sound-alike Medication  Provider's Contact #: [phone] ( @ 20:53)  vancomycin  IVPB:   1250 milliGRAM(s) in dextrose 5% 250 milliLiter(s), IV Intermittent, every 12 hours, infuse over 90 Minute(s)  Indication: sepsis  Provider's Contact #: [phone] ( @ 06:01)  vancomycin  IVPB:   1250 milliGRAM(s) in dextrose 5% 250 milliLiter(s), IV Intermittent, once, infuse over 90 Minute(s), Stop After 1 Doses  Indication: sepsis  Special Instructions: dose adjusted and pt did not recieve AM dose yet  Provider's Contact #: [phone] ( @ 06:19)  morphine  - Injectable:   4 milliGRAM(s), IV Push, every 4 hours  Special Instructions: ATC for pain.  HOLD for sedation.  Patient may refuse.  Administration Instructions: This is a Look-alike/Sound-alike Medication  Provider's Contact #: [phone] ( @ 11:50)      T(C): 36.6 (18 @ 14:17), Max: 37.1 (18 @ 21:38)  HR: 81 (18 @ 14:17) (78 - 98)  BP: 138/78 (18 @ 14:17) (123/64 - 138/78)  RR: 21 (18 @ 14:17) (18 - 22)  SpO2: 97% (18 @ 14:17) (93% - 98%)    CAPILLARY BLOOD GLUCOSE      POCT Blood Glucose.: 135 mg/dL (03 Aug 2018 14:12)  POCT Blood Glucose.: 191 mg/dL (03 Aug 2018 08:59)  POCT Blood Glucose.: 136 mg/dL (03 Aug 2018 05:59)  POCT Blood Glucose.: 141 mg/dL (02 Aug 2018 23:22)  POCT Blood Glucose.: 150 mg/dL (02 Aug 2018 18:52)    I&O's Summary      PHYSICAL EXAM:  GENERAL: NAD more awake  CHEST/LUNG: Clear to auscultation bilaterally; No wheeze  HEART: Regular rate and rhythm; No murmurs, rubs, or gallops  ABDOMEN: Soft, Nontender, Nondistended; Bowel sounds present  EXTREMITIES:  No clubbing, cyanosis, or edema      LABS:                        11.1   14.23 )-----------( 153      ( 03 Aug 2018 05:15 )             35.0     08-03    140  |  105  |  7   ----------------------------<  140<H>  3.7   |  24  |  0.80    Ca    8.7      03 Aug 2018 05:15  Phos  2.5     08-03  Mg     2.0     08-03    TPro  6.3  /  Alb  2.9<L>  /  TBili  0.3  /  DBili  x   /  AST  10  /  ALT  8   /  AlkPhos  48  08-03    PT/INR - ( 01 Aug 2018 17:28 )   PT: 12.2 SEC;   INR: 1.10          PTT - ( 01 Aug 2018 17:28 )  PTT:32.5 SEC      Urinalysis Basic - ( 02 Aug 2018 00:30 )    Color: COLORL / Appearance: CLEAR / S.006 / pH: 6.5  Gluc: NEGATIVE / Ketone: NEGATIVE  / Bili: NEGATIVE / Urobili: NORMAL mg/dL   Blood: MODERATE / Protein: NEGATIVE mg/dL / Nitrite: NEGATIVE   Leuk Esterase: NEGATIVE / RBC: 2-5 / WBC 0-2   Sq Epi: x / Non Sq Epi: x / Bacteria: FEW        RADIOLOGY & ADDITIONAL TESTS:    Imaging Personally Reviewed:    Consultant(s) Notes Reviewed:      Care Discussed with Consultants/Other Providers:

## 2018-08-03 NOTE — PROGRESS NOTE ADULT - PROBLEM SELECTOR PLAN 2
- Stage IV EGFR mutated NSCLC/adenocarcinoma (on 4th line of tx w/ PO Afatinib daily since 2/2018)  - PET scan (7/29) shows multiple hypermetabolic masslike consolidations and multiple nodules in the bilateral lungs w/ hypermetabolic mediastinal, and bilateral perihilar lymph nodes concerning for metastasis.  Per onc- ok to start afatinib - will resume when WBC improves - updated son Parmjit over the phone  - Palliative consulted for pain - lidoderm patch to right shoulder; was on MS [id number] TID and oxy 10 for breakthrough at home- dt lethargy yesterday only on PO meds for now

## 2018-08-03 NOTE — SWALLOW VFSS/MBS ASSESSMENT ADULT - DIAGNOSTIC IMPRESSIONS
1- mild oral dysphagia given puree, honey thick liquid, nectar thick liquid and thin liquid textures marked by delayed bolus collection, transfer and transport with posterior loss extending from the oropharynx to the hypopharynx across presented consistencies. 2- moderate oral dysphagia given mechanical soft marked by reduced stripping ability from utensil with prolonged oral preparation/AP transit. mild lingual stasis noted to remain in the oral cavity post swallow. 2- moderate pharyngeal dysphagia given puree and honey thick liquids marked by delayed swallow trigger, reduced tongue base propulsion, reduced epiglottic deflection and reduced hyolaryngeal excursion resulting in mild-moderate stasis at the base of tongue, valleculae and posterior pharyngeal wall with trace stasis at the pyriforms. no penetration/aspiration viewed. 4- moderate-severe pharyngeal dysphagia given mechanical soft textures marked by delayed swallow trigger, reduced tongue base propulsion, reduced epiglottic deflection and reduced hyolaryngeal excursion. moderate stasis noted at the base of tongue, valleculae and posterior pharyngeal wall with mild stasis at the pyriforms. increasing stasis noted with advancing trials which pt was unable to clear with successive swallows and/or liquid wash placing him at increased risk for secondary penetration/aspiration post swallow. 5- moderate-severe pharyngeal dysphagia given nectar thick liquids marked by delayed swallow trigger, reduced tongue base propulsion, reduced epiglottic deflection and reduced hyolaryngeal excursion resulting in moderate stasis at the base of tongue, valleculae and posterior pharyngeal wall with stasis migrating toward the laryngeal surface of the epiglottis as trials progressed, further placing pt at risk for secondary penetration/aspiration post swallow. 6- severe pharyngeal dysphagia given thin liquids marked by the above stated profile resulting in incomplete closure of laryngeal vestibule 1- mild oral dysphagia given puree, honey thick liquid, nectar thick liquid and thin liquid textures marked by delayed bolus collection, transfer and transport with posterior loss extending from the oropharynx to the hypopharynx across presented consistencies. 2- moderate oral dysphagia given mechanical soft marked by reduced stripping ability from utensil with prolonged oral preparation/AP transit. mild lingual stasis noted to remain in the oral cavity post swallow. 2- moderate pharyngeal dysphagia given puree and honey thick liquids marked by delayed swallow trigger, reduced tongue base propulsion, reduced epiglottic deflection and reduced hyolaryngeal excursion resulting in mild-moderate stasis at the base of tongue, valleculae and posterior pharyngeal wall with trace stasis at the pyriforms. no penetration/aspiration viewed. it should be noted, pt noted to demonstrate reduced amount of stasis when textures were presented via teaspoon. 4- moderate-severe pharyngeal dysphagia given mechanical soft textures marked by delayed swallow trigger, reduced tongue base propulsion, reduced epiglottic deflection and reduced hyolaryngeal excursion. moderate stasis noted at the base of tongue, valleculae and posterior pharyngeal wall with mild stasis at the pyriforms. increasing stasis noted with advancing trials which pt was unable to clear with successive swallows and/or liquid wash placing him at increased risk for secondary penetration/aspiration post swallow. 5- moderate-severe pharyngeal dysphagia given nectar thick liquids marked by delayed swallow trigger, reduced tongue base propulsion, reduced epiglottic deflection and reduced hyolaryngeal excursion resulting in moderate stasis at the base of tongue, valleculae and posterior pharyngeal wall with stasis migrating toward the laryngeal surface of the epiglottis as trials progressed, further placing pt at risk for secondary penetration/aspiration post swallow.

## 2018-08-03 NOTE — PROGRESS NOTE ADULT - ASSESSMENT
78 y/o w Stage IV EGFR mutated NSCLC/adenocarcinoma (on 4th line of tx w/ PO Afatinib daily since 2/2018), essential HTN, DM2, HBV (on Tenofovir), COPD (not on home O2) w/ recent MICU admission for Septic shock 2/2 gram negative/MRSA PNA and acute hypoxic resp failure admitted for Sepsis (Recurrent)

## 2018-08-03 NOTE — PROGRESS NOTE ADULT - SUBJECTIVE AND OBJECTIVE BOX
INTERVAL HPI/OVERNIGHT EVENTS:  Pt seen at bedside. Resting comfortably in bed.     VITAL SIGNS:  T(F): 97.9 (18 @ 14:17)  HR: 84 (18 @ 15:20)  BP: 138/78 (18 @ 14:17)  RR: 21 (18 @ 14:17)  SpO2: 98% (18 @ 15:20)  Wt(kg): --    PHYSICAL EXAM:    Constitutional: WDWN, NAD,   Eyes: PERRL, EOMI, sclera non-icteric  Neck: supple, no masses, no JVD  Respiratory: few crackles  Cardiovascular: RRR, normal S1S2, no M/R/G  Gastrointestinal: soft, NTND,   Extremities: no edema  Skin: Normal temperature    MEDICATIONS  (STANDING):  ALBUTerol/ipratropium for Nebulization 3 milliLiter(s) Nebulizer every 6 hours  azithromycin  IVPB 500 milliGRAM(s) IV Intermittent every 24 hours  dextrose 5%. 1000 milliLiter(s) (50 mL/Hr) IV Continuous <Continuous>  dextrose 50% Injectable 12.5 Gram(s) IV Push once  dextrose 50% Injectable 25 Gram(s) IV Push once  dextrose 50% Injectable 25 Gram(s) IV Push once  heparin  Injectable 5000 Unit(s) SubCutaneous every 12 hours  insulin lispro (HumaLOG) corrective regimen sliding scale   SubCutaneous every 6 hours  lidocaine   Patch 1 Patch Transdermal daily  meropenem  IVPB 1000 milliGRAM(s) IV Intermittent every 8 hours  morphine  - Injectable 4 milliGRAM(s) IV Push every 4 hours  sodium chloride 0.9%. 1000 milliLiter(s) (75 mL/Hr) IV Continuous <Continuous>  tenofovir disoproxil fumarate (VIREAD) 300 milliGRAM(s) Oral daily  vancomycin  IVPB 1250 milliGRAM(s) IV Intermittent every 12 hours    MEDICATIONS  (PRN):  acetaminophen  Suppository 650 milliGRAM(s) Rectal every 6 hours PRN For Temp greater than 38 C (100.4 F)  dextrose 40% Gel 15 Gram(s) Oral once PRN Blood Glucose LESS THAN 70 milliGRAM(s)/deciliter  glucagon  Injectable 1 milliGRAM(s) IntraMuscular once PRN Glucose LESS THAN 70 milligrams/deciliter  morphine  - Injectable 2 milliGRAM(s) IV Push every 3 hours PRN breakthrough pain      Allergies    No Known Allergies    Intolerances        LABS:                        11.1   14.23 )-----------( 153      ( 03 Aug 2018 05:15 )             35.0     08-03    140  |  105  |  7   ----------------------------<  140<H>  3.7   |  24  |  0.80    Ca    8.7      03 Aug 2018 05:15  Phos  2.5     08-03  Mg     2.0     08-03    TPro  6.3  /  Alb  2.9<L>  /  TBili  0.3  /  DBili  x   /  AST  10  /  ALT  8   /  AlkPhos  48  08-03    PT/INR - ( 01 Aug 2018 17:28 )   PT: 12.2 SEC;   INR: 1.10          PTT - ( 01 Aug 2018 17:28 )  PTT:32.5 SEC  Urinalysis Basic - ( 02 Aug 2018 00:30 )    Color: COLORL / Appearance: CLEAR / S.006 / pH: 6.5  Gluc: NEGATIVE / Ketone: NEGATIVE  / Bili: NEGATIVE / Urobili: NORMAL mg/dL   Blood: MODERATE / Protein: NEGATIVE mg/dL / Nitrite: NEGATIVE   Leuk Esterase: NEGATIVE / RBC: 2-5 / WBC 0-2   Sq Epi: x / Non Sq Epi: x / Bacteria: FEW        RADIOLOGY & ADDITIONAL TESTS:  Studies reviewed.  CT c/a/p     IMPRESSION:   Extensive airway secretions with complete obstruction of the bronchus   intermedius and right lower lobe bronchus. There is new consolidation in   the right lower lobe, concerning for postobstructive pneumonia and/or   atelectasis.    As compared to PET/CT on 2018, no significant change in extensive   bilateral pulmonary masses and masslike consolidations and adenopathy,   likely reflecting a combination of tumor and pneumonia.    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    Other findings are unchanged, as above.

## 2018-08-03 NOTE — PROGRESS NOTE ADULT - PROBLEM SELECTOR PLAN 1
-Sepsis possible due to multifocal/aspiration PNA - CT chest confirmed- likely aspiration PNA- Pulm eval per onc's request- likely needs chest PT and aspiration precautions  - iv fluids  ID consulted  - trend leukocytosis, fever curve  - Ulegionella pending. Bcx and UA/Ucx pending; RVP negative   - Tylenol prn fever. Aspiration precautions/Fall risk protocol  - diet ordered based on  cinesophagram

## 2018-08-04 LAB — VANCOMYCIN TROUGH SERPL-MCNC: 14.2 UG/ML — SIGNIFICANT CHANGE UP (ref 10–20)

## 2018-08-04 PROCEDURE — 99233 SBSQ HOSP IP/OBS HIGH 50: CPT

## 2018-08-04 PROCEDURE — 99232 SBSQ HOSP IP/OBS MODERATE 35: CPT | Mod: GC

## 2018-08-04 RX ORDER — INSULIN LISPRO 100/ML
VIAL (ML) SUBCUTANEOUS
Qty: 0 | Refills: 0 | Status: DISCONTINUED | OUTPATIENT
Start: 2018-08-04 | End: 2018-08-09

## 2018-08-04 RX ORDER — ONDANSETRON 8 MG/1
4 TABLET, FILM COATED ORAL EVERY 6 HOURS
Qty: 0 | Refills: 0 | Status: DISCONTINUED | OUTPATIENT
Start: 2018-08-04 | End: 2018-08-09

## 2018-08-04 RX ORDER — INSULIN LISPRO 100/ML
VIAL (ML) SUBCUTANEOUS AT BEDTIME
Qty: 0 | Refills: 0 | Status: DISCONTINUED | OUTPATIENT
Start: 2018-08-04 | End: 2018-08-09

## 2018-08-04 RX ORDER — MORPHINE SULFATE 50 MG/1
45 CAPSULE, EXTENDED RELEASE ORAL EVERY 8 HOURS
Qty: 0 | Refills: 0 | Status: DISCONTINUED | OUTPATIENT
Start: 2018-08-04 | End: 2018-08-09

## 2018-08-04 RX ADMIN — MEROPENEM 100 MILLIGRAM(S): 1 INJECTION INTRAVENOUS at 22:22

## 2018-08-04 RX ADMIN — HEPARIN SODIUM 5000 UNIT(S): 5000 INJECTION INTRAVENOUS; SUBCUTANEOUS at 17:20

## 2018-08-04 RX ADMIN — MORPHINE SULFATE 4 MILLIGRAM(S): 50 CAPSULE, EXTENDED RELEASE ORAL at 02:08

## 2018-08-04 RX ADMIN — MORPHINE SULFATE 4 MILLIGRAM(S): 50 CAPSULE, EXTENDED RELEASE ORAL at 14:49

## 2018-08-04 RX ADMIN — LIDOCAINE 1 PATCH: 4 CREAM TOPICAL at 04:45

## 2018-08-04 RX ADMIN — MORPHINE SULFATE 45 MILLIGRAM(S): 50 CAPSULE, EXTENDED RELEASE ORAL at 15:56

## 2018-08-04 RX ADMIN — MEROPENEM 100 MILLIGRAM(S): 1 INJECTION INTRAVENOUS at 13:06

## 2018-08-04 RX ADMIN — Medication 3 MILLILITER(S): at 10:32

## 2018-08-04 RX ADMIN — Medication 3 MILLILITER(S): at 03:25

## 2018-08-04 RX ADMIN — MORPHINE SULFATE 4 MILLIGRAM(S): 50 CAPSULE, EXTENDED RELEASE ORAL at 15:23

## 2018-08-04 RX ADMIN — MORPHINE SULFATE 45 MILLIGRAM(S): 50 CAPSULE, EXTENDED RELEASE ORAL at 15:55

## 2018-08-04 RX ADMIN — Medication 3 MILLILITER(S): at 16:17

## 2018-08-04 RX ADMIN — MORPHINE SULFATE 4 MILLIGRAM(S): 50 CAPSULE, EXTENDED RELEASE ORAL at 02:23

## 2018-08-04 RX ADMIN — Medication 166.67 MILLIGRAM(S): at 06:34

## 2018-08-04 RX ADMIN — Medication 1: at 12:49

## 2018-08-04 RX ADMIN — MORPHINE SULFATE 4 MILLIGRAM(S): 50 CAPSULE, EXTENDED RELEASE ORAL at 13:05

## 2018-08-04 RX ADMIN — MORPHINE SULFATE 4 MILLIGRAM(S): 50 CAPSULE, EXTENDED RELEASE ORAL at 12:56

## 2018-08-04 RX ADMIN — SODIUM CHLORIDE 4 MILLILITER(S): 9 INJECTION INTRAMUSCULAR; INTRAVENOUS; SUBCUTANEOUS at 10:32

## 2018-08-04 RX ADMIN — TENOFOVIR DISOPROXIL FUMARATE 300 MILLIGRAM(S): 300 TABLET, FILM COATED ORAL at 12:50

## 2018-08-04 RX ADMIN — LIDOCAINE 1 PATCH: 4 CREAM TOPICAL at 12:56

## 2018-08-04 RX ADMIN — Medication 3 MILLILITER(S): at 22:30

## 2018-08-04 RX ADMIN — SODIUM CHLORIDE 4 MILLILITER(S): 9 INJECTION INTRAMUSCULAR; INTRAVENOUS; SUBCUTANEOUS at 22:27

## 2018-08-04 RX ADMIN — AZITHROMYCIN 250 MILLIGRAM(S): 500 TABLET, FILM COATED ORAL at 00:44

## 2018-08-04 RX ADMIN — MORPHINE SULFATE 4 MILLIGRAM(S): 50 CAPSULE, EXTENDED RELEASE ORAL at 09:45

## 2018-08-04 RX ADMIN — LIDOCAINE 1 PATCH: 4 CREAM TOPICAL at 23:57

## 2018-08-04 RX ADMIN — HEPARIN SODIUM 5000 UNIT(S): 5000 INJECTION INTRAVENOUS; SUBCUTANEOUS at 06:01

## 2018-08-04 RX ADMIN — MORPHINE SULFATE 45 MILLIGRAM(S): 50 CAPSULE, EXTENDED RELEASE ORAL at 23:05

## 2018-08-04 RX ADMIN — Medication 166.67 MILLIGRAM(S): at 19:15

## 2018-08-04 RX ADMIN — MORPHINE SULFATE 4 MILLIGRAM(S): 50 CAPSULE, EXTENDED RELEASE ORAL at 06:10

## 2018-08-04 RX ADMIN — MORPHINE SULFATE 45 MILLIGRAM(S): 50 CAPSULE, EXTENDED RELEASE ORAL at 23:01

## 2018-08-04 RX ADMIN — MORPHINE SULFATE 4 MILLIGRAM(S): 50 CAPSULE, EXTENDED RELEASE ORAL at 09:20

## 2018-08-04 RX ADMIN — MEROPENEM 100 MILLIGRAM(S): 1 INJECTION INTRAVENOUS at 06:01

## 2018-08-04 RX ADMIN — ONDANSETRON 4 MILLIGRAM(S): 8 TABLET, FILM COATED ORAL at 15:55

## 2018-08-04 RX ADMIN — MORPHINE SULFATE 4 MILLIGRAM(S): 50 CAPSULE, EXTENDED RELEASE ORAL at 06:25

## 2018-08-04 NOTE — PROGRESS NOTE ADULT - SUBJECTIVE AND OBJECTIVE BOX
Follow Up:  Post-obstructive pneumonia     Interval History/ROS: Afebrile overnight. Complains of continued severe pain to his shoulders, back and sides. He feels that his pain medications at home were more effective and he'd like to increase his pain meds here if possible. Coughing and breathing are improved. No abdominal pain or diarrhea.     Allergies  No Known Allergies        ANTIMICROBIALS:  azithromycin  IVPB 500 every 24 hours  meropenem  IVPB 1000 every 8 hours  tenofovir disoproxil fumarate (VIREAD) 300 daily  vancomycin  IVPB 1250 every 12 hours      OTHER MEDS:  acetaminophen  Suppository 650 milliGRAM(s) Rectal every 6 hours PRN  ALBUTerol/ipratropium for Nebulization 3 milliLiter(s) Nebulizer every 6 hours  dextrose 40% Gel 15 Gram(s) Oral once PRN  dextrose 5%. 1000 milliLiter(s) IV Continuous <Continuous>  dextrose 50% Injectable 12.5 Gram(s) IV Push once  dextrose 50% Injectable 25 Gram(s) IV Push once  dextrose 50% Injectable 25 Gram(s) IV Push once  glucagon  Injectable 1 milliGRAM(s) IntraMuscular once PRN  heparin  Injectable 5000 Unit(s) SubCutaneous every 12 hours  insulin lispro (HumaLOG) corrective regimen sliding scale   SubCutaneous every 6 hours  lidocaine   Patch 1 Patch Transdermal daily  morphine  - Injectable 4 milliGRAM(s) IV Push every 4 hours  morphine  - Injectable 4 milliGRAM(s) IV Push every 3 hours PRN  sodium chloride 0.9%. 1000 milliLiter(s) IV Continuous <Continuous>  sodium chloride 3%  Inhalation 4 milliLiter(s) Inhalation every 12 hours      Vital Signs Last 24 Hrs  T(C): 36.2 (04 Aug 2018 05:59), Max: 36.6 (03 Aug 2018 14:17)  T(F): 97.2 (04 Aug 2018 05:59), Max: 97.9 (03 Aug 2018 14:17)  HR: 83 (04 Aug 2018 05:59) (74 - 89)  BP: 133/85 (04 Aug 2018 05:59) (126/72 - 150/91)  BP(mean): --  RR: 20 (04 Aug 2018 05:59) (20 - 22)  SpO2: 96% (04 Aug 2018 05:59) (94% - 99%)    Physical Exam:  General: NAD, non toxic but uncomfortable from pain, A&O   HEENT:  no oropharyngeal lesions, dry mouth, dentures, no LAD,   neck supple  Cardiovascular: regular rate and rhythm   Respiratory:  nonlabored on nasal cannula, decreased breath sounds RLL, otherwise fairly clear bilaterally, no wheezing  abd:  soft, bowel sounds present, nontender, no organomegaly  :  no suprapubic tenderness,   no  acevedo  Musculoskeletal: no joint swelling  Skin:    no rash  Neurologic:  no focal deficit  Vascular: no edema   Psych: normal affect                           11.1   14.23 )-----------( 153      ( 03 Aug 2018 05:15 )             35.0       08-03    140  |  105  |  7   ----------------------------<  140<H>  3.7   |  24  |  0.80    Ca    8.7      03 Aug 2018 05:15  Phos  2.5     08-03  Mg     2.0     08-03    TPro  6.3  /  Alb  2.9<L>  /  TBili  0.3  /  DBili  x   /  AST  10  /  ALT  8   /  AlkPhos  48  08-03          MICROBIOLOGY:  Culture - Urine (08.02.18 @ 01:28)    Culture - Urine:   GPC^Gram pos. cocci  COLONY COUNT: LESS THAN 10,000 CFU/ML  GNR^Gram Neg Rods  COLONY COUNT: LESS THAN 10,000 CFU/ML    Specimen Source: URINE CATHETER    Culture - Blood (08.01.18 @ 18:54)    Culture - Blood:   NO ORGANISMS ISOLATED  NO ORGANISMS ISOLATED AT 48 HRS.    Specimen Source: BLOOD VENOUS    Culture - Blood (08.01.18 @ 18:47)    Culture - Blood:   NO ORGANISMS ISOLATED  NO ORGANISMS ISOLATED AT 48 HRS.    Specimen Source: BLOOD PERIPHERAL    RADIOLOGY:  CT Chest w/ IV Cont (08.02.18 @ 18:02)   Extensive airway secretions with complete obstruction of the bronchus   intermedius and right lower lobe bronchus. There is new consolidation in   the rightlower lobe, concerning for postobstructive pneumonia and/or   atelectasis.  As compared to PET/CT on 7/29/2018, no significant change in extensive   bilateral pulmonary masses and masslike consolidations and adenopathy,   likely reflecting a combination of tumor and pneumonia.  Small stable hypervascular nodule in the pancreatic head, unchanged since   4/19/2016, likely a small neuroendocrine tumor. Follow Up:  Post-obstructive pneumonia     Interval History/ROS: Afebrile overnight. Complains of continued severe pain to his shoulders, back and sides. He feels that his pain medications at home were more effective and he'd like to increase his pain meds here if possible. Coughing and breathing are improved. No abdominal pain or diarrhea.     Allergies  No Known Allergies    ANTIMICROBIALS:  azithromycin  IVPB 500 every 24 hours  meropenem  IVPB 1000 every 8 hours  tenofovir disoproxil fumarate (VIREAD) 300 daily  vancomycin  IVPB 1250 every 12 hours      OTHER MEDS:  acetaminophen  Suppository 650 milliGRAM(s) Rectal every 6 hours PRN  ALBUTerol/ipratropium for Nebulization 3 milliLiter(s) Nebulizer every 6 hours  dextrose 40% Gel 15 Gram(s) Oral once PRN  dextrose 5%. 1000 milliLiter(s) IV Continuous <Continuous>  dextrose 50% Injectable 12.5 Gram(s) IV Push once  dextrose 50% Injectable 25 Gram(s) IV Push once  dextrose 50% Injectable 25 Gram(s) IV Push once  glucagon  Injectable 1 milliGRAM(s) IntraMuscular once PRN  heparin  Injectable 5000 Unit(s) SubCutaneous every 12 hours  insulin lispro (HumaLOG) corrective regimen sliding scale   SubCutaneous every 6 hours  lidocaine   Patch 1 Patch Transdermal daily  morphine  - Injectable 4 milliGRAM(s) IV Push every 4 hours  morphine  - Injectable 4 milliGRAM(s) IV Push every 3 hours PRN  sodium chloride 0.9%. 1000 milliLiter(s) IV Continuous <Continuous>  sodium chloride 3%  Inhalation 4 milliLiter(s) Inhalation every 12 hours      Vital Signs Last 24 Hrs  T(C): 36.2 (04 Aug 2018 05:59), Max: 36.6 (03 Aug 2018 14:17)  T(F): 97.2 (04 Aug 2018 05:59), Max: 97.9 (03 Aug 2018 14:17)  HR: 83 (04 Aug 2018 05:59) (74 - 89)  BP: 133/85 (04 Aug 2018 05:59) (126/72 - 150/91)  BP(mean): --  RR: 20 (04 Aug 2018 05:59) (20 - 22)  SpO2: 96% (04 Aug 2018 05:59) (94% - 99%)    Physical Exam:  General: NAD, non toxic but uncomfortable from pain, A&O   HEENT:  no oropharyngeal lesions, dry mouth, dentures, no LAD,   neck supple  Cardiovascular: regular rate and rhythm   Respiratory:  nonlabored on nasal cannula, decreased breath sounds RLL, otherwise fairly clear bilaterally, no wheezing  abd:  soft, bowel sounds present, nontender, no organomegaly  :  no suprapubic tenderness,   no  acevedo  Musculoskeletal: no joint swelling  Skin:    no rash  Neurologic:  no focal deficit  Vascular: no edema   Psych: normal affect                           11.1   14.23 )-----------( 153      ( 03 Aug 2018 05:15 )             35.0       08-03    140  |  105  |  7   ----------------------------<  140<H>  3.7   |  24  |  0.80    Ca    8.7      03 Aug 2018 05:15  Phos  2.5     08-03  Mg     2.0     08-03    TPro  6.3  /  Alb  2.9<L>  /  TBili  0.3  /  DBili  x   /  AST  10  /  ALT  8   /  AlkPhos  48  08-03          MICROBIOLOGY:  Culture - Urine (08.02.18 @ 01:28)    Culture - Urine:   GPC^Gram pos. cocci  COLONY COUNT: LESS THAN 10,000 CFU/ML  GNR^Gram Neg Rods  COLONY COUNT: LESS THAN 10,000 CFU/ML    Specimen Source: URINE CATHETER    Culture - Blood (08.01.18 @ 18:54)    Culture - Blood:   NO ORGANISMS ISOLATED  NO ORGANISMS ISOLATED AT 48 HRS.    Specimen Source: BLOOD VENOUS    Culture - Blood (08.01.18 @ 18:47)    Culture - Blood:   NO ORGANISMS ISOLATED  NO ORGANISMS ISOLATED AT 48 HRS.    Specimen Source: BLOOD PERIPHERAL    RADIOLOGY:  CT Chest Abdomen Pelvis w/ IV Cont (08.02.18 @ 18:02)   Extensive airway secretions with complete obstruction of the bronchus   intermedius and right lower lobe bronchus. There is new consolidation in   the rightlower lobe, concerning for postobstructive pneumonia and/or   atelectasis.  As compared to PET/CT on 7/29/2018, no significant change in extensive   bilateral pulmonary masses and masslike consolidations and adenopathy,   likely reflecting a combination of tumor and pneumonia.  Small stable hypervascular nodule in the pancreatic head, unchanged since   4/19/2016, likely a small neuroendocrine tumor. Follow Up:  Post-obstructive pneumonia     Interval History/ROS: Afebrile overnight. Complains of continued severe pain to his shoulders, back and sides. He feels that his pain medications at home were more effective and he'd like to increase his pain meds here if possible. Coughing and breathing are improved. No abdominal pain or diarrhea.     Allergies  No Known Allergies    ANTIMICROBIALS:  azithromycin  IVPB 500 every 24 hours  meropenem  IVPB 1000 every 8 hours  tenofovir disoproxil fumarate (VIREAD) 300 daily  vancomycin  IVPB 1250 every 12 hours      OTHER MEDS:  acetaminophen  Suppository 650 milliGRAM(s) Rectal every 6 hours PRN  ALBUTerol/ipratropium for Nebulization 3 milliLiter(s) Nebulizer every 6 hours  dextrose 40% Gel 15 Gram(s) Oral once PRN  dextrose 5%. 1000 milliLiter(s) IV Continuous <Continuous>  dextrose 50% Injectable 12.5 Gram(s) IV Push once  dextrose 50% Injectable 25 Gram(s) IV Push once  dextrose 50% Injectable 25 Gram(s) IV Push once  glucagon  Injectable 1 milliGRAM(s) IntraMuscular once PRN  heparin  Injectable 5000 Unit(s) SubCutaneous every 12 hours  insulin lispro (HumaLOG) corrective regimen sliding scale   SubCutaneous every 6 hours  lidocaine   Patch 1 Patch Transdermal daily  morphine  - Injectable 4 milliGRAM(s) IV Push every 4 hours  morphine  - Injectable 4 milliGRAM(s) IV Push every 3 hours PRN  sodium chloride 0.9%. 1000 milliLiter(s) IV Continuous <Continuous>  sodium chloride 3%  Inhalation 4 milliLiter(s) Inhalation every 12 hours      Vital Signs Last 24 Hrs  T(C): 36.2 (04 Aug 2018 05:59), Max: 36.6 (03 Aug 2018 14:17)  T(F): 97.2 (04 Aug 2018 05:59), Max: 97.9 (03 Aug 2018 14:17)  HR: 83 (04 Aug 2018 05:59) (74 - 89)  BP: 133/85 (04 Aug 2018 05:59) (126/72 - 150/91)  BP(mean): --  RR: 20 (04 Aug 2018 05:59) (20 - 22)  SpO2: 96% (04 Aug 2018 05:59) (94% - 99%)    Physical Exam:  General: NAD, non toxic but uncomfortable from pain, A&O   HEENT:  no oropharyngeal lesions, dry mouth, dentures, no LAD,   neck supple  Cardiovascular: regular rate and rhythm   Respiratory:  nonlabored on nasal cannula, decreased breath sounds RLL, otherwise fairly clear bilaterally, no wheezing  abd:  soft, bowel sounds present, nontender, no organomegaly  :  no suprapubic tenderness,   no  acevedo  Musculoskeletal: no joint swelling  Skin:    small patch of faint erythema volar aspect left forearm, nontender, does not appear infected   Neurologic:  no focal deficit  Vascular: no edema. no phlebitis   Psych: normal affect                           11.1   14.23 )-----------( 153      ( 03 Aug 2018 05:15 )             35.0       08-03    140  |  105  |  7   ----------------------------<  140<H>  3.7   |  24  |  0.80    Ca    8.7      03 Aug 2018 05:15  Phos  2.5     08-03  Mg     2.0     08-03    TPro  6.3  /  Alb  2.9<L>  /  TBili  0.3  /  DBili  x   /  AST  10  /  ALT  8   /  AlkPhos  48  08-03          MICROBIOLOGY:  Culture - Urine (08.02.18 @ 01:28)    Culture - Urine:   GPC^Gram pos. cocci  COLONY COUNT: LESS THAN 10,000 CFU/ML  GNR^Gram Neg Rods  COLONY COUNT: LESS THAN 10,000 CFU/ML    Specimen Source: URINE CATHETER    Culture - Blood (08.01.18 @ 18:54)    Culture - Blood:   NO ORGANISMS ISOLATED  NO ORGANISMS ISOLATED AT 48 HRS.    Specimen Source: BLOOD VENOUS    Culture - Blood (08.01.18 @ 18:47)    Culture - Blood:   NO ORGANISMS ISOLATED  NO ORGANISMS ISOLATED AT 48 HRS.    Specimen Source: BLOOD PERIPHERAL    RADIOLOGY:  CT Chest Abdomen Pelvis w/ IV Cont (08.02.18 @ 18:02)   Extensive airway secretions with complete obstruction of the bronchus   intermedius and right lower lobe bronchus. There is new consolidation in   the rightlower lobe, concerning for postobstructive pneumonia and/or   atelectasis.  As compared to PET/CT on 7/29/2018, no significant change in extensive   bilateral pulmonary masses and masslike consolidations and adenopathy,   likely reflecting a combination of tumor and pneumonia.  Small stable hypervascular nodule in the pancreatic head, unchanged since   4/19/2016, likely a small neuroendocrine tumor.

## 2018-08-04 NOTE — PHYSICAL THERAPY INITIAL EVALUATION ADULT - PERTINENT HX OF CURRENT PROBLEM, REHAB EVAL
Pt. is a 77 year old male admitted to [facility] secondary to pneumonia. PMH: HTN, COPD, Lung cancer

## 2018-08-04 NOTE — PROGRESS NOTE ADULT - ASSESSMENT
76 y/o w Stage IV EGFR mutated NSCLC/adenocarcinoma (on 4th line of tx w/ PO Afatinib daily since 2/2018), essential HTN, DM2, HBV (on Tenofovir), COPD (not on home O2) w/ recent MICU admission for Septic shock 2/2 gram negative/MRSA PNA and acute hypoxic resp failure admitted for Sepsis (Recurrent)    pain restarting MS Contin TID 76 y/o w Stage IV EGFR mutated NSCLC/adenocarcinoma (on 4th line of tx w/ PO Afatinib daily since 2/2018), essential HTN, DM2, HBV (on Tenofovir), COPD (not on home O2) w/ recent MICU admission for Septic shock 2/2 gram negative/MRSA PNA and acute hypoxic resp failure admitted for Sepsis due to recurrent aspiration pna    pain- restarting MS Contin TID

## 2018-08-04 NOTE — PROGRESS NOTE ADULT - SUBJECTIVE AND OBJECTIVE BOX
Patient is a 77y old  Male who presents with a chief complaint of fever, unresponsiveness at home (02 Aug 2018 04:03)      SUBJECTIVE / OVERNIGHT EVENTS: pt was in pain but better once MS Contin started    MEDICATIONS  (STANDING):  ALBUTerol/ipratropium for Nebulization 3 milliLiter(s) Nebulizer every 6 hours  dextrose 5%. 1000 milliLiter(s) (50 mL/Hr) IV Continuous <Continuous>  dextrose 50% Injectable 12.5 Gram(s) IV Push once  dextrose 50% Injectable 25 Gram(s) IV Push once  dextrose 50% Injectable 25 Gram(s) IV Push once  heparin  Injectable 5000 Unit(s) SubCutaneous every 12 hours  insulin lispro (HumaLOG) corrective regimen sliding scale   SubCutaneous every 6 hours  lidocaine   Patch 1 Patch Transdermal daily  meropenem  IVPB 1000 milliGRAM(s) IV Intermittent every 8 hours  morphine ER Tablet 45 milliGRAM(s) Oral every 8 hours  sodium chloride 0.9%. 1000 milliLiter(s) (75 mL/Hr) IV Continuous <Continuous>  sodium chloride 3%  Inhalation 4 milliLiter(s) Inhalation every 12 hours  tenofovir disoproxil fumarate (VIREAD) 300 milliGRAM(s) Oral daily  vancomycin  IVPB 1250 milliGRAM(s) IV Intermittent every 12 hours    MEDICATIONS  (PRN):  acetaminophen  Suppository 650 milliGRAM(s) Rectal every 6 hours PRN For Temp greater than 38 C (100.4 F)  dextrose 40% Gel 15 Gram(s) Oral once PRN Blood Glucose LESS THAN 70 milliGRAM(s)/deciliter  glucagon  Injectable 1 milliGRAM(s) IntraMuscular once PRN Glucose LESS THAN 70 milligrams/deciliter  morphine  - Injectable 4 milliGRAM(s) IV Push every 3 hours PRN moderate and severe pain  ondansetron Injectable 4 milliGRAM(s) IV Push every 6 hours PRN Nausea and/or Vomiting      Meds ordered within last 24hours  sodium chloride 3%  Inhalation:   4 milliLiter(s), Inhalation, every 6 hours  Administration Instructions: for inhalation  Provider's Contact #: [phone] (08-03 @ 19:36)  sodium chloride 3%  Inhalation:   4 milliLiter(s), Inhalation, every 12 hours  Administration Instructions: for inhalation  Provider's Contact #: [phone] (08-03 @ 19:38)  ondansetron Injectable: [Ordered as ZOFRAN Injectable]  4 milliGRAM(s), IV Push, every 6 hours, PRN for Nausea and/or Vomiting  Administration Instructions: Max IV Push dose 8 milliGRAM(s)  IF IV PUSH, ADMINISTER OVER 2-5 MIN  Provider's Contact #: [phone] (08-04 @ 14:44)  morphine ER Tablet: [Known as MS CONTIN]  45 milliGRAM(s), Oral, every 8 hours  Special Instructions: Hold for Sedation  First dose now please  Administration Instructions: swallow whole * don't crush/chew  Provider's Contact #: [phone] (08-04 @ 15:08)      T(C): 36.2 (08-04-18 @ 13:41), Max: 36.5 (08-03-18 @ 20:38)  HR: 90 (08-04-18 @ 16:10) (74 - 95)  BP: 139/82 (08-04-18 @ 13:41) (126/72 - 150/91)  RR: 20 (08-04-18 @ 05:59) (20 - 20)  SpO2: 99% (08-04-18 @ 16:10) (94% - 99%)    CAPILLARY BLOOD GLUCOSE      POCT Blood Glucose.: 150 mg/dL (04 Aug 2018 17:40)  POCT Blood Glucose.: 160 mg/dL (04 Aug 2018 12:39)  POCT Blood Glucose.: 117 mg/dL (04 Aug 2018 06:42)  POCT Blood Glucose.: 102 mg/dL (03 Aug 2018 23:00)  POCT Blood Glucose.: 147 mg/dL (03 Aug 2018 17:56)    I&O's Summary      PHYSICAL EXAM:  GENERAL: NAD  CHEST/LUNG: Clear to auscultation bilaterally; No wheeze  HEART: Regular rate and rhythm; No murmurs, rubs, or gallops  ABDOMEN: Soft, Nontender, Nondistended; Bowel sounds present  EXTREMITIES:  No clubbing, cyanosis, or edema      LABS:                        11.1   14.23 )-----------( 153      ( 03 Aug 2018 05:15 )             35.0     08-03    140  |  105  |  7   ----------------------------<  140<H>  3.7   |  24  |  0.80    Ca    8.7      03 Aug 2018 05:15  Phos  2.5     08-03  Mg     2.0     08-03    TPro  6.3  /  Alb  2.9<L>  /  TBili  0.3  /  DBili  x   /  AST  10  /  ALT  8   /  AlkPhos  48  08-03              RADIOLOGY & ADDITIONAL TESTS:    Imaging Personally Reviewed:    Consultant(s) Notes Reviewed:      Care Discussed with Consultants/Other Providers: Patient is a 77y old  Male who presents with a chief complaint of fever, unresponsiveness at home (02 Aug 2018 04:03)      SUBJECTIVE / OVERNIGHT EVENTS: pt was in pain but better once MS Contin started; spoke w/ pt and wife through son Thomas over the phone  pt speaks an unusual dialect of Cantonese called cristofere (?spelling); pt wished to have soup- told son that it had to be thickened before he took it    MEDICATIONS  (STANDING):  ALBUTerol/ipratropium for Nebulization 3 milliLiter(s) Nebulizer every 6 hours  dextrose 5%. 1000 milliLiter(s) (50 mL/Hr) IV Continuous <Continuous>  dextrose 50% Injectable 12.5 Gram(s) IV Push once  dextrose 50% Injectable 25 Gram(s) IV Push once  dextrose 50% Injectable 25 Gram(s) IV Push once  heparin  Injectable 5000 Unit(s) SubCutaneous every 12 hours  insulin lispro (HumaLOG) corrective regimen sliding scale   SubCutaneous every 6 hours  lidocaine   Patch 1 Patch Transdermal daily  meropenem  IVPB 1000 milliGRAM(s) IV Intermittent every 8 hours  morphine ER Tablet 45 milliGRAM(s) Oral every 8 hours  sodium chloride 0.9%. 1000 milliLiter(s) (75 mL/Hr) IV Continuous <Continuous>  sodium chloride 3%  Inhalation 4 milliLiter(s) Inhalation every 12 hours  tenofovir disoproxil fumarate (VIREAD) 300 milliGRAM(s) Oral daily  vancomycin  IVPB 1250 milliGRAM(s) IV Intermittent every 12 hours    MEDICATIONS  (PRN):  acetaminophen  Suppository 650 milliGRAM(s) Rectal every 6 hours PRN For Temp greater than 38 C (100.4 F)  dextrose 40% Gel 15 Gram(s) Oral once PRN Blood Glucose LESS THAN 70 milliGRAM(s)/deciliter  glucagon  Injectable 1 milliGRAM(s) IntraMuscular once PRN Glucose LESS THAN 70 milligrams/deciliter  morphine  - Injectable 4 milliGRAM(s) IV Push every 3 hours PRN moderate and severe pain  ondansetron Injectable 4 milliGRAM(s) IV Push every 6 hours PRN Nausea and/or Vomiting      Meds ordered within last 24hours  sodium chloride 3%  Inhalation:   4 milliLiter(s), Inhalation, every 6 hours  Administration Instructions: for inhalation  Provider's Contact #: [phone] (08-03 @ 19:36)  sodium chloride 3%  Inhalation:   4 milliLiter(s), Inhalation, every 12 hours  Administration Instructions: for inhalation  Provider's Contact #: [phone] (08-03 @ 19:38)  ondansetron Injectable: [Ordered as ZOFRAN Injectable]  4 milliGRAM(s), IV Push, every 6 hours, PRN for Nausea and/or Vomiting  Administration Instructions: Max IV Push dose 8 milliGRAM(s)  IF IV PUSH, ADMINISTER OVER 2-5 MIN  Provider's Contact #: [phone] (08-04 @ 14:44)  morphine ER Tablet: [Known as MS CONTIN]  45 milliGRAM(s), Oral, every 8 hours  Special Instructions: Hold for Sedation  First dose now please  Administration Instructions: swallow whole * don't crush/chew  Provider's Contact #: [phone] (08-04 @ 15:08)      T(C): 36.2 (08-04-18 @ 13:41), Max: 36.5 (08-03-18 @ 20:38)  HR: 90 (08-04-18 @ 16:10) (74 - 95)  BP: 139/82 (08-04-18 @ 13:41) (126/72 - 150/91)  RR: 20 (08-04-18 @ 05:59) (20 - 20)  SpO2: 99% (08-04-18 @ 16:10) (94% - 99%)    CAPILLARY BLOOD GLUCOSE      POCT Blood Glucose.: 150 mg/dL (04 Aug 2018 17:40)  POCT Blood Glucose.: 160 mg/dL (04 Aug 2018 12:39)  POCT Blood Glucose.: 117 mg/dL (04 Aug 2018 06:42)  POCT Blood Glucose.: 102 mg/dL (03 Aug 2018 23:00)  POCT Blood Glucose.: 147 mg/dL (03 Aug 2018 17:56)    I&O's Summary      PHYSICAL EXAM:  GENERAL: more alert  CHEST/LUNG: Clear to auscultation bilaterally; No wheeze  HEART: Regular rate and rhythm; No murmurs, rubs, or gallops  ABDOMEN: Soft, Nontender, Nondistended; Bowel sounds present  EXTREMITIES:  No clubbing, cyanosis, or edema'+UE w/ b/l erythema in areas of prior IV sites- ?nodularity In LUE    LABS:                        11.1   14.23 )-----------( 153      ( 03 Aug 2018 05:15 )             35.0     08-03    140  |  105  |  7   ----------------------------<  140<H>  3.7   |  24  |  0.80    Ca    8.7      03 Aug 2018 05:15  Phos  2.5     08-03  Mg     2.0     08-03    TPro  6.3  /  Alb  2.9<L>  /  TBili  0.3  /  DBili  x   /  AST  10  /  ALT  8   /  AlkPhos  48  08-03              RADIOLOGY & ADDITIONAL TESTS:    Imaging Personally Reviewed:    Consultant(s) Notes Reviewed:      Care Discussed with Consultants/Other Providers:

## 2018-08-04 NOTE — PROGRESS NOTE ADULT - PROBLEM SELECTOR PLAN 1
-Sepsis possible due to multifocal/aspiration PNA - CT chest confirmed- likely aspiration PNA- Pulm eval per onc's request- likely needs chest PT and aspiration precautions  - iv fluids  ID consulted  - trend leukocytosis, fever curve  - Ulegionella pending. Bcx and UA/Ucx pending; RVP negative   - Tylenol prn fever. Aspiration precautions/Fall risk protocol  - diet ordered based on  cinesophagram -Sepsis resolving  possible due to multifocal/aspiration PNA - pulm consulted per onc's request-   - iv fluids  ID on board  - trend leukocytosis, fever curve  - Ulegionella pending. Bcx and UA/Ucx pending; RVP negative   - Tylenol prn fever. Aspiration precautions/Fall risk protocol  - diet ordered based on  cinesophagram

## 2018-08-04 NOTE — PROGRESS NOTE ADULT - PROBLEM SELECTOR PLAN 2
- Stage IV EGFR mutated NSCLC/adenocarcinoma (on 4th line of tx w/ PO Afatinib daily since 2/2018)  - PET scan (7/29) shows multiple hypermetabolic masslike consolidations and multiple nodules in the bilateral lungs w/ hypermetabolic mediastinal, and bilateral perihilar lymph nodes concerning for metastasis.  Per onc- ok to start afatinib - will resume when WBC improves - updated son Parmjit over the phone  - Palliative consulted for pain - lidoderm patch to right shoulder; was on MS [id number] TID and oxy 10 for breakthrough at home- dt lethargy yesterday only on PO meds for now

## 2018-08-04 NOTE — PHYSICAL THERAPY INITIAL EVALUATION ADULT - ADDITIONAL COMMENTS
unable to obtain social history secondary to pt, non english speaking and deferred use of interpretive services this session. Please consult social work for accurate history. Pt. returned supine in bed with all tubes/lines intact, call bell in reach and in NAD.

## 2018-08-04 NOTE — PROGRESS NOTE ADULT - ASSESSMENT
77 year old critically ill gentleman, immunosuppressed with advanced metastatic non small cell lung cancer presents with sepsis. Fever and leukocytosis in the setting of infitrates on chest x ray are most concerning for pneumonia.   At risk for resistant pathogen given his recent hospitalization/ antibiotic exposure.    1) Pneumonia- presumed bacterial  Continue meropenem 1 iv q 8  Continue vancomycin- check vanco through prior to 4th dose  Check urine legionella antigen- if negative-d/c azithromycin    Check Chest CT. Check CT a/p given vomiting.       Known quantiferon positive.  AFB smears last month were negative and fever curve improved with abx.    2) Hep B- chronic  Resume tenofovir    Monitor left forearm for thrombophlebitis I f febrile repeat blood cultures.     Overall prognosis is guarded 77M immunosuppressed with advanced metastatic non small cell lung cancer admitted 8/1/18 with sepsis, 104.9F and leukocytosis in the setting of new RLL consolidation, concerning for post-obstructive pneumonia. Fevers subsided, clinically improved, blood cultures negative to date.   At risk for resistant pathogen given his recent hospitalization/antibiotic exposure in July- shock attributed to sepsis from pneumonia, cultures all negative.     1) Pneumonia- presumed bacterial  Continue meropenem 1 iv q 8  Continue vancomycin- trough was 10, dose increased to 1.25GM IV q12h, monitor repeat trough   Can stop Azithromycin as urine legionella antigen is negative     Known quantiferon positive.  AFB smears last month were negative and fever curve improved with antibiotics.     2) Hep B- chronic, on Tenofovir    Monitor left forearm for thrombophlebitis I f febrile repeat blood cultures.     Please address uncontrolled pain.     Overall prognosis is guarded 77M immunosuppressed with advanced metastatic non small cell lung cancer admitted 8/1/18 with sepsis, 104.9F and leukocytosis in the setting of new RLL consolidation, concerning for post-obstructive pneumonia. Fevers subsided, clinically improved, blood cultures negative to date.   At risk for resistant pathogen given his recent hospitalization/antibiotic exposure in July- shock attributed to sepsis from pneumonia, cultures all negative.     1) Pneumonia- presumed bacterial  Continue meropenem 1 iv q 8  Continue vancomycin- trough was 10, dose increased to 1.25GM IV q12h, monitor repeat trough   Can stop Azithromycin as urine legionella antigen is negative     Known quantiferon positive.  AFB smears last month were negative and fever curve improved with antibiotics.     2) Hep B- chronic, on Tenofovir    Monitor left forearm for thrombophlebitis, currently just slightly red   Repeat blood cultures if fever returns   Please address continued uncontrolled pain.     Overall prognosis is guarded

## 2018-08-05 LAB
ALBUMIN SERPL ELPH-MCNC: 3.2 G/DL — LOW (ref 3.3–5)
ALP SERPL-CCNC: 54 U/L — SIGNIFICANT CHANGE UP (ref 40–120)
ALT FLD-CCNC: 15 U/L — SIGNIFICANT CHANGE UP (ref 4–41)
AST SERPL-CCNC: 17 U/L — SIGNIFICANT CHANGE UP (ref 4–40)
BASOPHILS # BLD AUTO: 0.06 K/UL — SIGNIFICANT CHANGE UP (ref 0–0.2)
BASOPHILS NFR BLD AUTO: 0.7 % — SIGNIFICANT CHANGE UP (ref 0–2)
BILIRUB SERPL-MCNC: 0.2 MG/DL — SIGNIFICANT CHANGE UP (ref 0.2–1.2)
BUN SERPL-MCNC: 13 MG/DL — SIGNIFICANT CHANGE UP (ref 7–23)
CALCIUM SERPL-MCNC: 8.8 MG/DL — SIGNIFICANT CHANGE UP (ref 8.4–10.5)
CHLORIDE SERPL-SCNC: 105 MMOL/L — SIGNIFICANT CHANGE UP (ref 98–107)
CO2 SERPL-SCNC: 25 MMOL/L — SIGNIFICANT CHANGE UP (ref 22–31)
CREAT SERPL-MCNC: 0.9 MG/DL — SIGNIFICANT CHANGE UP (ref 0.5–1.3)
EOSINOPHIL # BLD AUTO: 0.33 K/UL — SIGNIFICANT CHANGE UP (ref 0–0.5)
EOSINOPHIL NFR BLD AUTO: 3.7 % — SIGNIFICANT CHANGE UP (ref 0–6)
GLUCOSE SERPL-MCNC: 111 MG/DL — HIGH (ref 70–99)
HCT VFR BLD CALC: 39.3 % — SIGNIFICANT CHANGE UP (ref 39–50)
HGB BLD-MCNC: 12.5 G/DL — LOW (ref 13–17)
IMM GRANULOCYTES # BLD AUTO: 0.04 # — SIGNIFICANT CHANGE UP
IMM GRANULOCYTES NFR BLD AUTO: 0.5 % — SIGNIFICANT CHANGE UP (ref 0–1.5)
LYMPHOCYTES # BLD AUTO: 1.48 K/UL — SIGNIFICANT CHANGE UP (ref 1–3.3)
LYMPHOCYTES # BLD AUTO: 16.7 % — SIGNIFICANT CHANGE UP (ref 13–44)
MAGNESIUM SERPL-MCNC: 2.1 MG/DL — SIGNIFICANT CHANGE UP (ref 1.6–2.6)
MCHC RBC-ENTMCNC: 28.5 PG — SIGNIFICANT CHANGE UP (ref 27–34)
MCHC RBC-ENTMCNC: 31.8 % — LOW (ref 32–36)
MCV RBC AUTO: 89.5 FL — SIGNIFICANT CHANGE UP (ref 80–100)
MONOCYTES # BLD AUTO: 0.51 K/UL — SIGNIFICANT CHANGE UP (ref 0–0.9)
MONOCYTES NFR BLD AUTO: 5.8 % — SIGNIFICANT CHANGE UP (ref 2–14)
NEUTROPHILS # BLD AUTO: 6.44 K/UL — SIGNIFICANT CHANGE UP (ref 1.8–7.4)
NEUTROPHILS NFR BLD AUTO: 72.6 % — SIGNIFICANT CHANGE UP (ref 43–77)
NRBC # FLD: 0 — SIGNIFICANT CHANGE UP
PHOSPHATE SERPL-MCNC: 3.3 MG/DL — SIGNIFICANT CHANGE UP (ref 2.5–4.5)
PLATELET # BLD AUTO: 197 K/UL — SIGNIFICANT CHANGE UP (ref 150–400)
PMV BLD: 9.6 FL — SIGNIFICANT CHANGE UP (ref 7–13)
POTASSIUM SERPL-MCNC: 3.6 MMOL/L — SIGNIFICANT CHANGE UP (ref 3.5–5.3)
POTASSIUM SERPL-SCNC: 3.6 MMOL/L — SIGNIFICANT CHANGE UP (ref 3.5–5.3)
PROT SERPL-MCNC: 6.8 G/DL — SIGNIFICANT CHANGE UP (ref 6–8.3)
RBC # BLD: 4.39 M/UL — SIGNIFICANT CHANGE UP (ref 4.2–5.8)
RBC # FLD: 13.3 % — SIGNIFICANT CHANGE UP (ref 10.3–14.5)
SODIUM SERPL-SCNC: 142 MMOL/L — SIGNIFICANT CHANGE UP (ref 135–145)
WBC # BLD: 8.86 K/UL — SIGNIFICANT CHANGE UP (ref 3.8–10.5)
WBC # FLD AUTO: 8.86 K/UL — SIGNIFICANT CHANGE UP (ref 3.8–10.5)

## 2018-08-05 PROCEDURE — 93010 ELECTROCARDIOGRAM REPORT: CPT

## 2018-08-05 PROCEDURE — 99233 SBSQ HOSP IP/OBS HIGH 50: CPT

## 2018-08-05 RX ORDER — AFATINIB 40 MG/1
20 TABLET, FILM COATED ORAL DAILY
Qty: 0 | Refills: 0 | Status: DISCONTINUED | OUTPATIENT
Start: 2018-08-06 | End: 2018-08-09

## 2018-08-05 RX ADMIN — Medication 3 MILLILITER(S): at 15:54

## 2018-08-05 RX ADMIN — Medication 1: at 18:35

## 2018-08-05 RX ADMIN — MORPHINE SULFATE 4 MILLIGRAM(S): 50 CAPSULE, EXTENDED RELEASE ORAL at 14:31

## 2018-08-05 RX ADMIN — MORPHINE SULFATE 45 MILLIGRAM(S): 50 CAPSULE, EXTENDED RELEASE ORAL at 15:46

## 2018-08-05 RX ADMIN — MORPHINE SULFATE 45 MILLIGRAM(S): 50 CAPSULE, EXTENDED RELEASE ORAL at 23:01

## 2018-08-05 RX ADMIN — Medication 1: at 09:09

## 2018-08-05 RX ADMIN — MEROPENEM 100 MILLIGRAM(S): 1 INJECTION INTRAVENOUS at 06:40

## 2018-08-05 RX ADMIN — MORPHINE SULFATE 45 MILLIGRAM(S): 50 CAPSULE, EXTENDED RELEASE ORAL at 08:19

## 2018-08-05 RX ADMIN — Medication 3 MILLILITER(S): at 22:06

## 2018-08-05 RX ADMIN — SODIUM CHLORIDE 4 MILLILITER(S): 9 INJECTION INTRAMUSCULAR; INTRAVENOUS; SUBCUTANEOUS at 22:16

## 2018-08-05 RX ADMIN — MORPHINE SULFATE 45 MILLIGRAM(S): 50 CAPSULE, EXTENDED RELEASE ORAL at 16:40

## 2018-08-05 RX ADMIN — LIDOCAINE 1 PATCH: 4 CREAM TOPICAL at 23:45

## 2018-08-05 RX ADMIN — Medication 166.67 MILLIGRAM(S): at 17:57

## 2018-08-05 RX ADMIN — MEROPENEM 100 MILLIGRAM(S): 1 INJECTION INTRAVENOUS at 14:20

## 2018-08-05 RX ADMIN — MORPHINE SULFATE 45 MILLIGRAM(S): 50 CAPSULE, EXTENDED RELEASE ORAL at 07:48

## 2018-08-05 RX ADMIN — MORPHINE SULFATE 4 MILLIGRAM(S): 50 CAPSULE, EXTENDED RELEASE ORAL at 14:18

## 2018-08-05 RX ADMIN — MEROPENEM 100 MILLIGRAM(S): 1 INJECTION INTRAVENOUS at 23:01

## 2018-08-05 RX ADMIN — LIDOCAINE 1 PATCH: 4 CREAM TOPICAL at 11:31

## 2018-08-05 RX ADMIN — HEPARIN SODIUM 5000 UNIT(S): 5000 INJECTION INTRAVENOUS; SUBCUTANEOUS at 17:58

## 2018-08-05 RX ADMIN — HEPARIN SODIUM 5000 UNIT(S): 5000 INJECTION INTRAVENOUS; SUBCUTANEOUS at 06:40

## 2018-08-05 RX ADMIN — Medication 166.67 MILLIGRAM(S): at 07:40

## 2018-08-05 RX ADMIN — Medication 30 MILLILITER(S): at 07:10

## 2018-08-05 RX ADMIN — TENOFOVIR DISOPROXIL FUMARATE 300 MILLIGRAM(S): 300 TABLET, FILM COATED ORAL at 11:33

## 2018-08-05 NOTE — PROGRESS NOTE ADULT - PROBLEM SELECTOR PLAN 2
- Stage IV EGFR mutated NSCLC/adenocarcinoma (on 4th line of tx w/ PO Afatinib daily since 2/2018)  - PET scan (7/29) shows multiple hypermetabolic masslike consolidations and multiple nodules in the bilateral lungs w/ hypermetabolic mediastinal, and bilateral perihilar lymph nodes concerning for metastasis.  Per onc- ok to start afatinib - will resume when WBC improves - updated son Parmjit over the phone  - Palliative consulted for pain - lidoderm patch to right shoulder; was on MS [id number] TID and oxy 10 for breakthrough at home- dt lethargy yesterday only on PO meds for now

## 2018-08-05 NOTE — CHART NOTE - NSCHARTNOTEFT_GEN_A_CORE
Notified by RN patient c/o epigastric pain.  Seen and evaluated patient at the bedside ( ID# [id number]), patient reports pain started last night after eating soup.  He reports constant pain, 5/10 on numeric scale, denies radiating to arm, jaw or neck.  Patient denies SOB at the present time.  EKG- NSR, Maalox x1 ordered, will continue to monitor.

## 2018-08-05 NOTE — PROGRESS NOTE ADULT - PROBLEM SELECTOR PLAN 1
-Sepsis resolving  possible due to multifocal/aspiration PNA - pulm consulted per onc's request-   - iv fluids  ID on board  - trend leukocytosis, fever curve  - Ulegionella pending. Bcx and UA/Ucx pending; RVP negative   - Tylenol prn fever. Aspiration precautions/Fall risk protocol  - diet ordered based on  cinesophagram -Sepsis resolving  possible due to multifocal/aspiration PNA - pulm consulted per onc's request-   - iv fluids  ID on board  - trend leukocytosis, fever curve   Aspiration precautions  - diet ordered based on  cinesophagram

## 2018-08-05 NOTE — PROGRESS NOTE ADULT - ASSESSMENT
76 y/o w Stage IV EGFR mutated NSCLC/adenocarcinoma (on 4th line of tx w/ PO Afatinib daily since 2/2018), essential HTN, DM2, HBV (on Tenofovir), COPD (not on home O2) w/ recent MICU admission for Septic shock 2/2 gram negative/MRSA PNA and acute hypoxic resp failure admitted for Sepsis due to recurrent aspiration pna    pain- restarting MS Contin TID  rt arm w/ phlebitis- localized- warm compress 78 y/o w Stage IV EGFR mutated NSCLC/adenocarcinoma (on 4th line of tx w/ PO Afatinib daily since 2/2018), essential HTN, DM2, HBV (on Tenofovir), COPD (not on home O2) w/ recent MICU admission for Septic shock 2/2 gram negative/MRSA PNA and acute hypoxic resp failure admitted for Sepsis due to recurrent aspiration pna    pain- restarting MS Contin TID- much improved - now walking  rt arm w/ phlebitis- localized- warm compress

## 2018-08-06 LAB
BACTERIA BLD CULT: SIGNIFICANT CHANGE UP
BACTERIA BLD CULT: SIGNIFICANT CHANGE UP
VANCOMYCIN TROUGH SERPL-MCNC: 17.3 UG/ML — SIGNIFICANT CHANGE UP (ref 10–20)

## 2018-08-06 PROCEDURE — 99233 SBSQ HOSP IP/OBS HIGH 50: CPT

## 2018-08-06 PROCEDURE — 99232 SBSQ HOSP IP/OBS MODERATE 35: CPT

## 2018-08-06 RX ADMIN — SODIUM CHLORIDE 4 MILLILITER(S): 9 INJECTION INTRAMUSCULAR; INTRAVENOUS; SUBCUTANEOUS at 22:32

## 2018-08-06 RX ADMIN — MEROPENEM 100 MILLIGRAM(S): 1 INJECTION INTRAVENOUS at 05:13

## 2018-08-06 RX ADMIN — AFATINIB 20 MILLIGRAM(S): 40 TABLET, FILM COATED ORAL at 14:59

## 2018-08-06 RX ADMIN — MORPHINE SULFATE 45 MILLIGRAM(S): 50 CAPSULE, EXTENDED RELEASE ORAL at 16:04

## 2018-08-06 RX ADMIN — LIDOCAINE 1 PATCH: 4 CREAM TOPICAL at 13:14

## 2018-08-06 RX ADMIN — MORPHINE SULFATE 45 MILLIGRAM(S): 50 CAPSULE, EXTENDED RELEASE ORAL at 07:04

## 2018-08-06 RX ADMIN — MEROPENEM 100 MILLIGRAM(S): 1 INJECTION INTRAVENOUS at 22:52

## 2018-08-06 RX ADMIN — SODIUM CHLORIDE 4 MILLILITER(S): 9 INJECTION INTRAMUSCULAR; INTRAVENOUS; SUBCUTANEOUS at 10:30

## 2018-08-06 RX ADMIN — Medication 166.67 MILLIGRAM(S): at 20:56

## 2018-08-06 RX ADMIN — Medication 3 MILLILITER(S): at 10:30

## 2018-08-06 RX ADMIN — MORPHINE SULFATE 45 MILLIGRAM(S): 50 CAPSULE, EXTENDED RELEASE ORAL at 23:23

## 2018-08-06 RX ADMIN — MORPHINE SULFATE 45 MILLIGRAM(S): 50 CAPSULE, EXTENDED RELEASE ORAL at 17:00

## 2018-08-06 RX ADMIN — Medication 3 MILLILITER(S): at 04:12

## 2018-08-06 RX ADMIN — Medication 166.67 MILLIGRAM(S): at 06:00

## 2018-08-06 RX ADMIN — TENOFOVIR DISOPROXIL FUMARATE 300 MILLIGRAM(S): 300 TABLET, FILM COATED ORAL at 13:13

## 2018-08-06 RX ADMIN — MEROPENEM 100 MILLIGRAM(S): 1 INJECTION INTRAVENOUS at 13:14

## 2018-08-06 RX ADMIN — HEPARIN SODIUM 5000 UNIT(S): 5000 INJECTION INTRAVENOUS; SUBCUTANEOUS at 19:53

## 2018-08-06 RX ADMIN — Medication 3 MILLILITER(S): at 22:32

## 2018-08-06 RX ADMIN — HEPARIN SODIUM 5000 UNIT(S): 5000 INJECTION INTRAVENOUS; SUBCUTANEOUS at 05:13

## 2018-08-06 RX ADMIN — Medication 3 MILLILITER(S): at 16:02

## 2018-08-06 NOTE — PROGRESS NOTE ADULT - SUBJECTIVE AND OBJECTIVE BOX
Follow Up:      Inverval History/ROS:Patient is a 77y old  Male who presents with a chief complaint of fever, unresponsiveness at home (02 Aug 2018 04:03)  OOB to chair. More comfortable. No fever.       Allergies    No Known Allergies    Intolerances        ANTIMICROBIALS:  meropenem  IVPB 1000 every 8 hours  tenofovir disoproxil fumarate (VIREAD) 300 daily  vancomycin  IVPB 1250 every 12 hours      OTHER MEDS:  acetaminophen  Suppository 650 milliGRAM(s) Rectal every 6 hours PRN  afatinib 20 milliGRAM(s) Oral daily  ALBUTerol/ipratropium for Nebulization 3 milliLiter(s) Nebulizer every 6 hours  dextrose 40% Gel 15 Gram(s) Oral once PRN  dextrose 5%. 1000 milliLiter(s) IV Continuous <Continuous>  dextrose 50% Injectable 12.5 Gram(s) IV Push once  dextrose 50% Injectable 25 Gram(s) IV Push once  dextrose 50% Injectable 25 Gram(s) IV Push once  glucagon  Injectable 1 milliGRAM(s) IntraMuscular once PRN  heparin  Injectable 5000 Unit(s) SubCutaneous every 12 hours  insulin lispro (HumaLOG) corrective regimen sliding scale   SubCutaneous three times a day before meals  insulin lispro (HumaLOG) corrective regimen sliding scale   SubCutaneous at bedtime  lidocaine   Patch 1 Patch Transdermal daily  morphine  - Injectable 4 milliGRAM(s) IV Push every 3 hours PRN  morphine ER Tablet 45 milliGRAM(s) Oral every 8 hours  ondansetron Injectable 4 milliGRAM(s) IV Push every 6 hours PRN  sodium chloride 0.9%. 1000 milliLiter(s) IV Continuous <Continuous>  sodium chloride 3%  Inhalation 4 milliLiter(s) Inhalation every 12 hours      Vital Signs Last 24 Hrs  T(C): 36.6 (06 Aug 2018 05:09), Max: 36.6 (06 Aug 2018 05:09)  T(F): 97.9 (06 Aug 2018 05:09), Max: 97.9 (06 Aug 2018 05:09)  HR: 92 (06 Aug 2018 10:30) (81 - 95)  BP: 107/69 (06 Aug 2018 05:09) (107/69 - 139/78)  BP(mean): --  RR: 19 (06 Aug 2018 05:09) (19 - 20)  SpO2: 97% (06 Aug 2018 10:30) (95% - 98%)    PHYSICAL EXAM:  General: [x ] non-toxic  HEAD/EYES: [ ] PERRL x[ ] white sclera [ ] icterus  ENT:  [ ] normal [x ] supple [ ] thrush [ ] pharyngeal exudate  Cardiovascular:   [ ] murmur [x ] normal [ ] PPM/AICD  Respiratory:  [x ] clear to ausculation bilaterally  GI:  [ x] soft, non-tender, normal bowel sounds  :  [ ] acevedo [ ] no CVA tenderness   Musculoskeletal:  [ ] no synovitis  Neurologic:  [ ] non-focal exam   Skin:  [x ] no rash  Lymph: [ ] no lymphadenopathy  Psychiatric:  [x ] appropriate affect [ ] alert & oriented  Lines:  [x ] no phlebitis [ ] central line                                12.5   8.86  )-----------( 197      ( 05 Aug 2018 06:07 )             39.3       08-05    142  |  105  |  13  ----------------------------<  111<H>  3.6   |  25  |  0.90    Ca    8.8      05 Aug 2018 06:07  Phos  3.3     08-05  Mg     2.1     08-05    TPro  6.8  /  Alb  3.2<L>  /  TBili  0.2  /  DBili  x   /  AST  17  /  ALT  15  /  AlkPhos  54  08-05          MICROBIOLOGY:    RADIOLOGY:

## 2018-08-06 NOTE — PROGRESS NOTE ADULT - SUBJECTIVE AND OBJECTIVE BOX
CC/Reason for Consult:     SUBJECTIVE / OVERNIGHT EVENTS:    MEDICATIONS  (STANDING):  afatinib 20 milliGRAM(s) Oral daily  ALBUTerol/ipratropium for Nebulization 3 milliLiter(s) Nebulizer every 6 hours  dextrose 5%. 1000 milliLiter(s) (50 mL/Hr) IV Continuous <Continuous>  dextrose 50% Injectable 12.5 Gram(s) IV Push once  dextrose 50% Injectable 25 Gram(s) IV Push once  dextrose 50% Injectable 25 Gram(s) IV Push once  heparin  Injectable 5000 Unit(s) SubCutaneous every 12 hours  insulin lispro (HumaLOG) corrective regimen sliding scale   SubCutaneous three times a day before meals  insulin lispro (HumaLOG) corrective regimen sliding scale   SubCutaneous at bedtime  lidocaine   Patch 1 Patch Transdermal daily  meropenem  IVPB 1000 milliGRAM(s) IV Intermittent every 8 hours  morphine ER Tablet 45 milliGRAM(s) Oral every 8 hours  sodium chloride 0.9%. 1000 milliLiter(s) (75 mL/Hr) IV Continuous <Continuous>  sodium chloride 3%  Inhalation 4 milliLiter(s) Inhalation every 12 hours  tenofovir disoproxil fumarate (VIREAD) 300 milliGRAM(s) Oral daily  vancomycin  IVPB 1250 milliGRAM(s) IV Intermittent every 12 hours    MEDICATIONS  (PRN):  acetaminophen  Suppository 650 milliGRAM(s) Rectal every 6 hours PRN For Temp greater than 38 C (100.4 F)  dextrose 40% Gel 15 Gram(s) Oral once PRN Blood Glucose LESS THAN 70 milliGRAM(s)/deciliter  glucagon  Injectable 1 milliGRAM(s) IntraMuscular once PRN Glucose LESS THAN 70 milligrams/deciliter  morphine  - Injectable 4 milliGRAM(s) IV Push every 3 hours PRN moderate and severe pain  ondansetron Injectable 4 milliGRAM(s) IV Push every 6 hours PRN Nausea and/or Vomiting      Vital Signs Last 24 Hrs  T(C): 36.6 (06 Aug 2018 05:09), Max: 36.6 (06 Aug 2018 05:09)  T(F): 97.9 (06 Aug 2018 05:09), Max: 97.9 (06 Aug 2018 05:09)  HR: 92 (06 Aug 2018 10:30) (81 - 95)  BP: 107/69 (06 Aug 2018 05:09) (107/69 - 139/78)  BP(mean): --  RR: 19 (06 Aug 2018 05:09) (19 - 20)  SpO2: 97% (06 Aug 2018 10:30) (95% - 98%)  CAPILLARY BLOOD GLUCOSE      POCT Blood Glucose.: 133 mg/dL (06 Aug 2018 08:59)  POCT Blood Glucose.: 147 mg/dL (05 Aug 2018 22:30)  POCT Blood Glucose.: 161 mg/dL (05 Aug 2018 18:19)  POCT Blood Glucose.: 129 mg/dL (05 Aug 2018 12:35)        PHYSICAL EXAM:  GENERAL: NAD, well-developed  HEAD:  Atraumatic, Normocephalic  EYES: EOMI, conjunctiva and sclera clear  NECK: Supple, No JVD  CHEST/LUNG: Clear to auscultation bilaterally; No wheeze  HEART: Regular rate and rhythm; No murmurs, rubs, or gallops  ABDOMEN: Soft, Nontender, Nondistended; Bowel sounds present  EXTREMITIES:  2+ Peripheral Pulses, No clubbing, cyanosis, or edema  PSYCH: AAOx3  NEUROLOGY: non-focal  SKIN: No rashes or lesions    LABS:                        12.5   8.86  )-----------( 197      ( 05 Aug 2018 06:07 )             39.3     08-05    142  |  105  |  13  ----------------------------<  111<H>  3.6   |  25  |  0.90    Ca    8.8      05 Aug 2018 06:07  Phos  3.3     08-05  Mg     2.1     08-05    TPro  6.8  /  Alb  3.2<L>  /  TBili  0.2  /  DBili  x   /  AST  17  /  ALT  15  /  AlkPhos  54  08-05              RADIOLOGY & ADDITIONAL TESTS:    Imaging Personally Reviewed:    Consultant(s) Notes Reviewed:      Care Discussed with Consultants/Other Providers: Patient is a 77y old  Male who presents with a chief complaint of fever, unresponsiveness at home (02 Aug 2018 04:03)      SUBJECTIVE / OVERNIGHT EVENTS: Improving, feeling better    MEDICATIONS  (STANDING):  afatinib 20 milliGRAM(s) Oral daily  ALBUTerol/ipratropium for Nebulization 3 milliLiter(s) Nebulizer every 6 hours  dextrose 5%. 1000 milliLiter(s) (50 mL/Hr) IV Continuous <Continuous>  dextrose 50% Injectable 12.5 Gram(s) IV Push once  dextrose 50% Injectable 25 Gram(s) IV Push once  dextrose 50% Injectable 25 Gram(s) IV Push once  heparin  Injectable 5000 Unit(s) SubCutaneous every 12 hours  insulin lispro (HumaLOG) corrective regimen sliding scale   SubCutaneous three times a day before meals  insulin lispro (HumaLOG) corrective regimen sliding scale   SubCutaneous at bedtime  lidocaine   Patch 1 Patch Transdermal daily  meropenem  IVPB 1000 milliGRAM(s) IV Intermittent every 8 hours  morphine ER Tablet 45 milliGRAM(s) Oral every 8 hours  sodium chloride 0.9%. 1000 milliLiter(s) (75 mL/Hr) IV Continuous <Continuous>  sodium chloride 3%  Inhalation 4 milliLiter(s) Inhalation every 12 hours  tenofovir disoproxil fumarate (VIREAD) 300 milliGRAM(s) Oral daily  vancomycin  IVPB 1250 milliGRAM(s) IV Intermittent every 12 hours    MEDICATIONS  (PRN):  acetaminophen  Suppository 650 milliGRAM(s) Rectal every 6 hours PRN For Temp greater than 38 C (100.4 F)  dextrose 40% Gel 15 Gram(s) Oral once PRN Blood Glucose LESS THAN 70 milliGRAM(s)/deciliter  glucagon  Injectable 1 milliGRAM(s) IntraMuscular once PRN Glucose LESS THAN 70 milligrams/deciliter  morphine  - Injectable 4 milliGRAM(s) IV Push every 3 hours PRN moderate and severe pain  ondansetron Injectable 4 milliGRAM(s) IV Push every 6 hours PRN Nausea and/or Vomiting      Vital Signs Last 24 Hrs  T(C): 36.4 (06 Aug 2018 14:29), Max: 36.6 (06 Aug 2018 05:09)  T(F): 97.6 (06 Aug 2018 14:29), Max: 97.9 (06 Aug 2018 05:09)  HR: 82 (06 Aug 2018 14:29) (81 - 95)  BP: 101/66 (06 Aug 2018 14:29) (101/66 - 139/78)  BP(mean): --  RR: 19 (06 Aug 2018 14:29) (19 - 20)  SpO2: 95% (06 Aug 2018 14:29) (95% - 98%)  CAPILLARY BLOOD GLUCOSE      POCT Blood Glucose.: 136 mg/dL (06 Aug 2018 13:10)  POCT Blood Glucose.: 133 mg/dL (06 Aug 2018 08:59)  POCT Blood Glucose.: 147 mg/dL (05 Aug 2018 22:30)  POCT Blood Glucose.: 161 mg/dL (05 Aug 2018 18:19)    I&O's Summary      PHYSICAL EXAM:  GENERAL: NAD, well-developed  HEAD:  Atraumatic, Normocephalic  EYES: EOMI, PERRLA, conjunctiva and sclera clear  NECK: Supple, No JVD  CHEST/LUNG: Clear to auscultation bilaterally; No wheeze  HEART: Regular rate and rhythm; No murmurs, rubs, or gallops  ABDOMEN: Soft, Nontender, Nondistended; Bowel sounds present  EXTREMITIES:  2+ Peripheral Pulses, No clubbing, cyanosis, or edema  NEUROLOGY: non-focal  SKIN: No rashes or lesions    LABS:                        12.5   8.86  )-----------( 197      ( 05 Aug 2018 06:07 )             39.3     08-05    142  |  105  |  13  ----------------------------<  111<H>  3.6   |  25  |  0.90    Ca    8.8      05 Aug 2018 06:07  Phos  3.3     08-05  Mg     2.1     08-05    TPro  6.8  /  Alb  3.2<L>  /  TBili  0.2  /  DBili  x   /  AST  17  /  ALT  15  /  AlkPhos  54  08-05    Consultant(s) Notes Reviewed:  ID    Care Discussed with Consultants/Other Providers: Onc

## 2018-08-06 NOTE — PROGRESS NOTE ADULT - PROBLEM SELECTOR PLAN 2
- Stage IV EGFR mutated NSCLC/adenocarcinoma (on 4th line of tx w/ PO Afatinib daily since 2/2018)  - PET scan (7/29) shows multiple hypermetabolic masslike consolidations and multiple nodules in the bilateral lungs w/ hypermetabolic mediastinal, and bilateral perihilar lymph nodes concerning for metastasis.  Per onc-afatinib resumed today  - Palliative consulted for pain - lidoderm patch to right shoulder  Pain control with MScontin 45 tid plus morphine prn breakthrough pain; pain control improved

## 2018-08-06 NOTE — PROGRESS NOTE ADULT - ASSESSMENT
76 y/o w Stage IV EGFR mutated NSCLC/adenocarcinoma (on 4th line of tx w/ PO Afatinib daily since 2/2018), essential HTN, DM2, HBV (on Tenofovir), COPD (not on home O2) w/ recent MICU admission for Septic shock 2/2 gram negative/MRSA PNA and acute hypoxic resp failure admitted for Sepsis due to recurrent aspiration pna    rt arm w/ phlebitis- localized- warm compress

## 2018-08-06 NOTE — PROGRESS NOTE ADULT - PROBLEM SELECTOR PLAN 1
-Sepsis resolving  possible due to multifocal/aspiration PNA - pulm consulted per onc's request- c/w aspiration pneumonia, no need to consider stenting of bronch  Appreciate ID recs, IV abx thru 8/8/18  - diet ordered based on  cinesophagram, dysphagia 1 honey thick liquids; nutrition consult to teach family proper foods to provide to prevent aspiration

## 2018-08-06 NOTE — PROGRESS NOTE ADULT - ASSESSMENT
77M immunosuppressed with advanced metastatic non small cell lung cancer admitted 8/1/18 with sepsis, 104.9F and leukocytosis in the setting of new RLL consolidation, concerning for post-obstructive pneumonia. Fevers subsided, clinically improved, blood cultures negative to date.   At risk for resistant pathogen given his recent hospitalization/antibiotic exposure in July- shock attributed to sepsis from pneumonia, cultures all negative.     1) Pneumonia- presumed bacterial/ post obstructive  Continue meropenem 1 iv q 8  Continue vancomycin- trough was 10, dose increased to 1.25GM IV q12h, monitor repeat trough   Continue vancomycin/ meropenem through 8/8    Known quantiferon positive.  AFB smears last month were negative and fever curve improved with antibiotics.     2) Hep B- chronic, on Tenofovir    Due to tumor burden, high risk for recurrent post obstructive pneumonia.  Overall prognosis is poor.

## 2018-08-07 ENCOUNTER — APPOINTMENT (OUTPATIENT)
Dept: HEMATOLOGY ONCOLOGY | Facility: CLINIC | Age: 78
End: 2018-08-07

## 2018-08-07 PROCEDURE — 99232 SBSQ HOSP IP/OBS MODERATE 35: CPT

## 2018-08-07 PROCEDURE — 99233 SBSQ HOSP IP/OBS HIGH 50: CPT

## 2018-08-07 RX ORDER — SENNA PLUS 8.6 MG/1
2 TABLET ORAL AT BEDTIME
Qty: 0 | Refills: 0 | Status: DISCONTINUED | OUTPATIENT
Start: 2018-08-07 | End: 2018-08-07

## 2018-08-07 RX ORDER — OXYCODONE HYDROCHLORIDE 5 MG/1
10 TABLET ORAL EVERY 4 HOURS
Qty: 0 | Refills: 0 | Status: DISCONTINUED | OUTPATIENT
Start: 2018-08-07 | End: 2018-08-09

## 2018-08-07 RX ADMIN — MEROPENEM 100 MILLIGRAM(S): 1 INJECTION INTRAVENOUS at 23:27

## 2018-08-07 RX ADMIN — MORPHINE SULFATE 45 MILLIGRAM(S): 50 CAPSULE, EXTENDED RELEASE ORAL at 08:30

## 2018-08-07 RX ADMIN — SODIUM CHLORIDE 4 MILLILITER(S): 9 INJECTION INTRAMUSCULAR; INTRAVENOUS; SUBCUTANEOUS at 21:58

## 2018-08-07 RX ADMIN — Medication 166.67 MILLIGRAM(S): at 08:18

## 2018-08-07 RX ADMIN — Medication 3 MILLILITER(S): at 10:39

## 2018-08-07 RX ADMIN — MORPHINE SULFATE 45 MILLIGRAM(S): 50 CAPSULE, EXTENDED RELEASE ORAL at 23:27

## 2018-08-07 RX ADMIN — ONDANSETRON 4 MILLIGRAM(S): 8 TABLET, FILM COATED ORAL at 14:47

## 2018-08-07 RX ADMIN — Medication 1: at 09:35

## 2018-08-07 RX ADMIN — MORPHINE SULFATE 45 MILLIGRAM(S): 50 CAPSULE, EXTENDED RELEASE ORAL at 08:18

## 2018-08-07 RX ADMIN — LIDOCAINE 1 PATCH: 4 CREAM TOPICAL at 22:58

## 2018-08-07 RX ADMIN — MORPHINE SULFATE 45 MILLIGRAM(S): 50 CAPSULE, EXTENDED RELEASE ORAL at 17:29

## 2018-08-07 RX ADMIN — LIDOCAINE 1 PATCH: 4 CREAM TOPICAL at 01:18

## 2018-08-07 RX ADMIN — TENOFOVIR DISOPROXIL FUMARATE 300 MILLIGRAM(S): 300 TABLET, FILM COATED ORAL at 11:18

## 2018-08-07 RX ADMIN — MEROPENEM 100 MILLIGRAM(S): 1 INJECTION INTRAVENOUS at 06:37

## 2018-08-07 RX ADMIN — HEPARIN SODIUM 5000 UNIT(S): 5000 INJECTION INTRAVENOUS; SUBCUTANEOUS at 06:37

## 2018-08-07 RX ADMIN — SODIUM CHLORIDE 4 MILLILITER(S): 9 INJECTION INTRAMUSCULAR; INTRAVENOUS; SUBCUTANEOUS at 10:39

## 2018-08-07 RX ADMIN — MORPHINE SULFATE 45 MILLIGRAM(S): 50 CAPSULE, EXTENDED RELEASE ORAL at 00:23

## 2018-08-07 RX ADMIN — Medication 166.67 MILLIGRAM(S): at 21:46

## 2018-08-07 RX ADMIN — MORPHINE SULFATE 45 MILLIGRAM(S): 50 CAPSULE, EXTENDED RELEASE ORAL at 18:30

## 2018-08-07 RX ADMIN — OXYCODONE HYDROCHLORIDE 10 MILLIGRAM(S): 5 TABLET ORAL at 17:19

## 2018-08-07 RX ADMIN — HEPARIN SODIUM 5000 UNIT(S): 5000 INJECTION INTRAVENOUS; SUBCUTANEOUS at 18:25

## 2018-08-07 RX ADMIN — MEROPENEM 100 MILLIGRAM(S): 1 INJECTION INTRAVENOUS at 13:53

## 2018-08-07 RX ADMIN — Medication 3 MILLILITER(S): at 21:45

## 2018-08-07 RX ADMIN — AFATINIB 20 MILLIGRAM(S): 40 TABLET, FILM COATED ORAL at 11:18

## 2018-08-07 RX ADMIN — ONDANSETRON 4 MILLIGRAM(S): 8 TABLET, FILM COATED ORAL at 20:57

## 2018-08-07 RX ADMIN — Medication 3 MILLILITER(S): at 16:08

## 2018-08-07 RX ADMIN — Medication 1: at 18:26

## 2018-08-07 RX ADMIN — LIDOCAINE 1 PATCH: 4 CREAM TOPICAL at 11:18

## 2018-08-07 RX ADMIN — OXYCODONE HYDROCHLORIDE 10 MILLIGRAM(S): 5 TABLET ORAL at 18:20

## 2018-08-07 NOTE — PROGRESS NOTE ADULT - SUBJECTIVE AND OBJECTIVE BOX
Patient is a 77y old  Male who presents with a chief complaint of fever, unresponsiveness at home (02 Aug 2018 04:03)      SUBJECTIVE / OVERNIGHT EVENTS:Pain controlled    MEDICATIONS  (STANDING):  afatinib 20 milliGRAM(s) Oral daily  ALBUTerol/ipratropium for Nebulization 3 milliLiter(s) Nebulizer every 6 hours  dextrose 5%. 1000 milliLiter(s) (50 mL/Hr) IV Continuous <Continuous>  dextrose 50% Injectable 12.5 Gram(s) IV Push once  dextrose 50% Injectable 25 Gram(s) IV Push once  dextrose 50% Injectable 25 Gram(s) IV Push once  heparin  Injectable 5000 Unit(s) SubCutaneous every 12 hours  insulin lispro (HumaLOG) corrective regimen sliding scale   SubCutaneous three times a day before meals  insulin lispro (HumaLOG) corrective regimen sliding scale   SubCutaneous at bedtime  lidocaine   Patch 1 Patch Transdermal daily  meropenem  IVPB 1000 milliGRAM(s) IV Intermittent every 8 hours  morphine ER Tablet 45 milliGRAM(s) Oral every 8 hours  sodium chloride 0.9%. 1000 milliLiter(s) (75 mL/Hr) IV Continuous <Continuous>  sodium chloride 3%  Inhalation 4 milliLiter(s) Inhalation every 12 hours  tenofovir disoproxil fumarate (VIREAD) 300 milliGRAM(s) Oral daily  vancomycin  IVPB 1250 milliGRAM(s) IV Intermittent every 12 hours    MEDICATIONS  (PRN):  acetaminophen  Suppository 650 milliGRAM(s) Rectal every 6 hours PRN For Temp greater than 38 C (100.4 F)  dextrose 40% Gel 15 Gram(s) Oral once PRN Blood Glucose LESS THAN 70 milliGRAM(s)/deciliter  glucagon  Injectable 1 milliGRAM(s) IntraMuscular once PRN Glucose LESS THAN 70 milligrams/deciliter  ondansetron Injectable 4 milliGRAM(s) IV Push every 6 hours PRN Nausea and/or Vomiting  oxyCODONE    IR 10 milliGRAM(s) Oral every 4 hours PRN moderate and severe pain      Vital Signs Last 24 Hrs  T(C): 36.3 (07 Aug 2018 15:13), Max: 36.5 (06 Aug 2018 21:59)  T(F): 97.3 (07 Aug 2018 15:13), Max: 97.7 (06 Aug 2018 21:59)  HR: 86 (07 Aug 2018 15:13) (76 - 99)  BP: 105/62 (07 Aug 2018 15:13) (103/65 - 144/86)  BP(mean): --  RR: 18 (07 Aug 2018 15:13) (18 - 20)  SpO2: 97% (07 Aug 2018 15:13) (94% - 98%)  CAPILLARY BLOOD GLUCOSE      POCT Blood Glucose.: 114 mg/dL (07 Aug 2018 13:15)  POCT Blood Glucose.: 161 mg/dL (07 Aug 2018 09:20)  POCT Blood Glucose.: 177 mg/dL (06 Aug 2018 22:25)  POCT Blood Glucose.: 116 mg/dL (06 Aug 2018 18:09)    I&O's Summary      PHYSICAL EXAM:  GENERAL: NAD, well-developed  HEAD:  Atraumatic, Normocephalic  EYES: EOMI, PERRLA, conjunctiva and sclera clear  NECK: Supple, No JVD  CHEST/LUNG: Clear to auscultation bilaterally; No wheeze  HEART: Regular rate and rhythm; No murmurs, rubs, or gallops  ABDOMEN: Soft, Nontender, Nondistended; Bowel sounds present  EXTREMITIES:  2+ Peripheral Pulses, No clubbing, cyanosis, or edema  PSYCH: AAOx3  NEUROLOGY: non-focal  SKIN: No rashes or lesions  Care Discussed with Consultants/Other Providers: Onc

## 2018-08-07 NOTE — PROGRESS NOTE ADULT - PROBLEM SELECTOR PLAN 2
Barbara currently.  Patient received one PRN dose of Morphine 4mg IV in 24 hours.  Morphine IV discontinued and patient's home medication Oxycodone 10mg PO q4h PRN ordered.  Continue MS Contin 45mg PO TID.  Senna added for bowel regimen. Patient known to Dr. Robertson as an outpatient, appointment on Monday 8/13/18 at 4pm at [facility].  Patient's son Parmjit and primary team aware of follow-up appointment.

## 2018-08-07 NOTE — PROGRESS NOTE ADULT - ATTENDING COMMENTS
Cont abx. check Sputum cx.    Rodolfo Bowles  Attending Physician   Division of Infectious Disease  Pager #[phone]  After 5pm/weekend or no response, call #[phone]
Patient seen and examined.  Agree with NP note.
Patient seen and examined.  Agree with NP note.

## 2018-08-07 NOTE — PROGRESS NOTE ADULT - SUBJECTIVE AND OBJECTIVE BOX
INTERVAL HPI/OVERNIGHT EVENTS:  Pain overall improved     Allergies    No Known Allergies    Intolerances        Code Status:  Full Code         PRESENT SYMPTOMS: Declines  - Son Parmjit at bedside translating   SOURCE:  [x ] Patient   [ ] Family   [ ] Team     Pain: Denies pain currently.  States pain is "better"     Dyspnea [ ] YES [x ] NO - Mild [ ]  Moderate [ ]  Severe [ ]   Anxiety:  [ ] YES [x ] NO  Fatigue: [x] YES [ ] NO  Nausea: [ ] YES [x ] NO  Loss of Appetite: [ ] YES [x ] NO  Constipation [ ] YES   [x ] No     OTHER SYMPTOMS:  [x ] All other ROS negative     [ ] Unable to obtain due to poor mentation    Does the patient meet criteria for Severe Protein Calorie Malnutrition?  Yes [ ]  No [ ]   PPSV 30% or below [ ]  Anasarca [ ]  Albumin <2 [ ]  Catabolic State [ ]  Poor nutritional intake [ ]  Significant weight loss [ ]     MEDICATIONS  (STANDING):  afatinib 20 milliGRAM(s) Oral daily  ALBUTerol/ipratropium for Nebulization 3 milliLiter(s) Nebulizer every 6 hours  dextrose 5%. 1000 milliLiter(s) (50 mL/Hr) IV Continuous <Continuous>  dextrose 50% Injectable 12.5 Gram(s) IV Push once  dextrose 50% Injectable 25 Gram(s) IV Push once  dextrose 50% Injectable 25 Gram(s) IV Push once  heparin  Injectable 5000 Unit(s) SubCutaneous every 12 hours  insulin lispro (HumaLOG) corrective regimen sliding scale   SubCutaneous three times a day before meals  insulin lispro (HumaLOG) corrective regimen sliding scale   SubCutaneous at bedtime  lidocaine   Patch 1 Patch Transdermal daily  meropenem  IVPB 1000 milliGRAM(s) IV Intermittent every 8 hours  morphine ER Tablet 45 milliGRAM(s) Oral every 8 hours  sodium chloride 0.9%. 1000 milliLiter(s) (75 mL/Hr) IV Continuous <Continuous>  sodium chloride 3%  Inhalation 4 milliLiter(s) Inhalation every 12 hours  tenofovir disoproxil fumarate (VIREAD) 300 milliGRAM(s) Oral daily  vancomycin  IVPB 1250 milliGRAM(s) IV Intermittent every 12 hours    MEDICATIONS  (PRN):  acetaminophen  Suppository 650 milliGRAM(s) Rectal every 6 hours PRN For Temp greater than 38 C (100.4 F)  dextrose 40% Gel 15 Gram(s) Oral once PRN Blood Glucose LESS THAN 70 milliGRAM(s)/deciliter  glucagon  Injectable 1 milliGRAM(s) IntraMuscular once PRN Glucose LESS THAN 70 milligrams/deciliter  ondansetron Injectable 4 milliGRAM(s) IV Push every 6 hours PRN Nausea and/or Vomiting  oxyCODONE    IR 10 milliGRAM(s) Oral every 4 hours PRN moderate and severe pain      Palliative Performance Status Version 2:    50-60%  ECOG - 3       Physical Exam:    General: [x ] Alert,  A&O x  4 as per son   Lungs: [x ] Clear - anteriorly   Cardiovascular:  [x ] Regular rate and rhythm  [ ] Irregular [ ] Tachycardia   [ ] Bradycardia   Abdomen: [x ] Soft  [ ] Distended  [x ] +BS  [x ] Non tender [ ] Tender  [ ]PEG   [ ] NGT   Last BM:  8/6/18   Genitourinary:  [ x] Normal [ ] Incontinent   [ ] Oliguria/Anuria   [ ] Soni  Musculoskeletal:  [ ] Normal   [ x] Generalized weakness  [ ] Bedbound  [ ] Edema   Neurological: [x ] No focal deficits  [ ] Cognitive impairment     Skin: [x ] Normal   [ ] Pressure ulcers     Vital Signs Last 24 Hrs  T(C): 36.3 (07 Aug 2018 15:13), Max: 36.5 (06 Aug 2018 21:59)  T(F): 97.3 (07 Aug 2018 15:13), Max: 97.7 (06 Aug 2018 21:59)  HR: 86 (07 Aug 2018 15:13) (76 - 95)  BP: 105/62 (07 Aug 2018 15:13) (103/65 - 144/86)  BP(mean): --  RR: 18 (07 Aug 2018 15:13) (18 - 20)  SpO2: 97% (07 Aug 2018 15:13) (94% - 98%)    LABS: no labs today               I&O's Summary      RADIOLOGY & ADDITIONAL STUDIES:

## 2018-08-07 NOTE — PROGRESS NOTE ADULT - SUBJECTIVE AND OBJECTIVE BOX
Follow Up:      Inverval History/ROS:Patient is a 77y old  Male who presents with a chief complaint of fever, unresponsiveness at home (02 Aug 2018 04:03)    No fever. Breathing is more comfortable.      Allergies    No Known Allergies    Intolerances        ANTIMICROBIALS:  meropenem  IVPB 1000 every 8 hours  tenofovir disoproxil fumarate (VIREAD) 300 daily  vancomycin  IVPB 1250 every 12 hours      OTHER MEDS:  acetaminophen  Suppository 650 milliGRAM(s) Rectal every 6 hours PRN  afatinib 20 milliGRAM(s) Oral daily  ALBUTerol/ipratropium for Nebulization 3 milliLiter(s) Nebulizer every 6 hours  dextrose 40% Gel 15 Gram(s) Oral once PRN  dextrose 5%. 1000 milliLiter(s) IV Continuous <Continuous>  dextrose 50% Injectable 12.5 Gram(s) IV Push once  dextrose 50% Injectable 25 Gram(s) IV Push once  dextrose 50% Injectable 25 Gram(s) IV Push once  glucagon  Injectable 1 milliGRAM(s) IntraMuscular once PRN  heparin  Injectable 5000 Unit(s) SubCutaneous every 12 hours  insulin lispro (HumaLOG) corrective regimen sliding scale   SubCutaneous three times a day before meals  insulin lispro (HumaLOG) corrective regimen sliding scale   SubCutaneous at bedtime  lidocaine   Patch 1 Patch Transdermal daily  morphine ER Tablet 45 milliGRAM(s) Oral every 8 hours  ondansetron Injectable 4 milliGRAM(s) IV Push every 6 hours PRN  oxyCODONE    IR 10 milliGRAM(s) Oral every 4 hours PRN  sodium chloride 0.9%. 1000 milliLiter(s) IV Continuous <Continuous>  sodium chloride 3%  Inhalation 4 milliLiter(s) Inhalation every 12 hours      Vital Signs Last 24 Hrs  T(C): 36.3 (07 Aug 2018 15:13), Max: 36.5 (06 Aug 2018 21:59)  T(F): 97.3 (07 Aug 2018 15:13), Max: 97.7 (06 Aug 2018 21:59)  HR: 86 (07 Aug 2018 15:13) (76 - 99)  BP: 105/62 (07 Aug 2018 15:13) (103/65 - 144/86)  BP(mean): --  RR: 18 (07 Aug 2018 15:13) (18 - 20)  SpO2: 97% (07 Aug 2018 15:13) (94% - 98%)    PHYSICAL EXAM:  General: [x ] non-toxic  HEAD/EYES: [ ] PERRL [x ] white sclera [ ] icterus  ENT:  [ ] normal [ ] supple [ ] thrush [ ] pharyngeal exudate  Cardiovascular:    ] murmur [ x] normal [ ] PPM/AICD  Respiratory:  [ ] clear to ausculation bilaterally  GI:  [x ] soft, non-tender, normal bowel sounds  :  [ ] acevedo [ ] no CVA tenderness   Musculoskeletal:  [x ] no synovitis  Neurologic:  [x ] non-focal exam   Skin:  [x ] no rash  Lymph: [x ] no lymphadenopathy  Psychiatric:  [x ] appropriate affect [x ] alert & oriented  Lines:  [ x] no phlebitis [ ] central line                          MICROBIOLOGY:    RADIOLOGY:

## 2018-08-07 NOTE — PROGRESS NOTE ADULT - ASSESSMENT
76 y/o w Stage IV EGFR mutated NSCLC/adenocarcinoma (on 4th line of tx w/ PO Afatinib daily since 2/2018), essential HTN, DM2, HBV (on Tenofovir), COPD (not on home O2) w/ recent MICU admission for Septic shock 2/2 gram negative/MRSA PNA and acute hypoxic resp failure admitted for Sepsis due to recurrent aspiration pna

## 2018-08-07 NOTE — PROGRESS NOTE ADULT - ASSESSMENT
77M immunosuppressed with advanced metastatic non small cell lung cancer admitted 8/1/18 with sepsis, 104.9F and leukocytosis in the setting of new RLL consolidation, concerning for post-obstructive pneumonia. Fevers subsided, clinically improved, blood cultures negative to date.   At risk for resistant pathogen given his recent hospitalization/antibiotic exposure in July- shock attributed to sepsis from pneumonia, cultures all negative.     1) Pneumonia- presumed bacterial/ post obstructive  Continue meropenem 1 iv q 8  Continue vancomycin- trough was 10, dose increased to 1.25GM IV q12h, monitor repeat trough   Continue vancomycin/ meropenem through 8/8    Known quantiferon positive.  AFB smears last month were negative and fever curve improved with antibiotics.     2) Hep B- chronic, on Tenofovir    Due to tumor burden, high risk for recurrent post obstructive pneumonia.  Overall prognosis is poor.     Will sign off Call with additional questions

## 2018-08-07 NOTE — PROGRESS NOTE ADULT - PROBLEM SELECTOR PLAN 1
Patient of Dr. Gross on Afatinib.  Plan for follow-up with oncology as an outpatient.  Continue supportive care.

## 2018-08-07 NOTE — PROGRESS NOTE ADULT - PROBLEM SELECTOR PLAN 2
- Stage IV EGFR mutated NSCLC/adenocarcinoma (on 4th line of tx w/ PO Afatinib daily since 2/2018)  - PET scan (7/29) shows multiple hypermetabolic masslike consolidations and multiple nodules in the bilateral lungs w/ hypermetabolic mediastinal, and bilateral perihilar lymph nodes concerning for metastasis.  Per onc-afatinib resumed 8/6  - Palliative consulted for pain - lidoderm patch to right shoulder  Pain control with MScontin 45 tid plus morphine prn breakthrough pain; pain control improved

## 2018-08-08 LAB
BASOPHILS # BLD AUTO: 0.03 K/UL — SIGNIFICANT CHANGE UP (ref 0–0.2)
BASOPHILS NFR BLD AUTO: 0.4 % — SIGNIFICANT CHANGE UP (ref 0–2)
BUN SERPL-MCNC: 17 MG/DL — SIGNIFICANT CHANGE UP (ref 7–23)
CALCIUM SERPL-MCNC: 9.3 MG/DL — SIGNIFICANT CHANGE UP (ref 8.4–10.5)
CHLORIDE SERPL-SCNC: 102 MMOL/L — SIGNIFICANT CHANGE UP (ref 98–107)
CO2 SERPL-SCNC: 26 MMOL/L — SIGNIFICANT CHANGE UP (ref 22–31)
CREAT SERPL-MCNC: 0.84 MG/DL — SIGNIFICANT CHANGE UP (ref 0.5–1.3)
EOSINOPHIL # BLD AUTO: 0.2 K/UL — SIGNIFICANT CHANGE UP (ref 0–0.5)
EOSINOPHIL NFR BLD AUTO: 2.7 % — SIGNIFICANT CHANGE UP (ref 0–6)
GLUCOSE SERPL-MCNC: 116 MG/DL — HIGH (ref 70–99)
HCT VFR BLD CALC: 39.7 % — SIGNIFICANT CHANGE UP (ref 39–50)
HGB BLD-MCNC: 12.5 G/DL — LOW (ref 13–17)
IMM GRANULOCYTES # BLD AUTO: 0.04 # — SIGNIFICANT CHANGE UP
IMM GRANULOCYTES NFR BLD AUTO: 0.5 % — SIGNIFICANT CHANGE UP (ref 0–1.5)
LYMPHOCYTES # BLD AUTO: 1.73 K/UL — SIGNIFICANT CHANGE UP (ref 1–3.3)
LYMPHOCYTES # BLD AUTO: 23.5 % — SIGNIFICANT CHANGE UP (ref 13–44)
MAGNESIUM SERPL-MCNC: 2.2 MG/DL — SIGNIFICANT CHANGE UP (ref 1.6–2.6)
MCHC RBC-ENTMCNC: 28.1 PG — SIGNIFICANT CHANGE UP (ref 27–34)
MCHC RBC-ENTMCNC: 31.5 % — LOW (ref 32–36)
MCV RBC AUTO: 89.2 FL — SIGNIFICANT CHANGE UP (ref 80–100)
MONOCYTES # BLD AUTO: 0.43 K/UL — SIGNIFICANT CHANGE UP (ref 0–0.9)
MONOCYTES NFR BLD AUTO: 5.8 % — SIGNIFICANT CHANGE UP (ref 2–14)
NEUTROPHILS # BLD AUTO: 4.93 K/UL — SIGNIFICANT CHANGE UP (ref 1.8–7.4)
NEUTROPHILS NFR BLD AUTO: 67.1 % — SIGNIFICANT CHANGE UP (ref 43–77)
NRBC # FLD: 0 — SIGNIFICANT CHANGE UP
PHOSPHATE SERPL-MCNC: 3.7 MG/DL — SIGNIFICANT CHANGE UP (ref 2.5–4.5)
PLATELET # BLD AUTO: 207 K/UL — SIGNIFICANT CHANGE UP (ref 150–400)
PMV BLD: 9.4 FL — SIGNIFICANT CHANGE UP (ref 7–13)
POTASSIUM SERPL-MCNC: 3.9 MMOL/L — SIGNIFICANT CHANGE UP (ref 3.5–5.3)
POTASSIUM SERPL-SCNC: 3.9 MMOL/L — SIGNIFICANT CHANGE UP (ref 3.5–5.3)
RBC # BLD: 4.45 M/UL — SIGNIFICANT CHANGE UP (ref 4.2–5.8)
RBC # FLD: 13.4 % — SIGNIFICANT CHANGE UP (ref 10.3–14.5)
SODIUM SERPL-SCNC: 138 MMOL/L — SIGNIFICANT CHANGE UP (ref 135–145)
VANCOMYCIN TROUGH SERPL-MCNC: 18.8 UG/ML — SIGNIFICANT CHANGE UP (ref 10–20)
WBC # BLD: 7.36 K/UL — SIGNIFICANT CHANGE UP (ref 3.8–10.5)
WBC # FLD AUTO: 7.36 K/UL — SIGNIFICANT CHANGE UP (ref 3.8–10.5)

## 2018-08-08 PROCEDURE — 99233 SBSQ HOSP IP/OBS HIGH 50: CPT

## 2018-08-08 RX ORDER — LIDOCAINE 4 G/100G
10 CREAM TOPICAL
Qty: 0 | Refills: 0 | Status: DISCONTINUED | OUTPATIENT
Start: 2018-08-08 | End: 2018-08-09

## 2018-08-08 RX ORDER — CLOTRIMAZOLE 10 MG
1 TROCHE MUCOUS MEMBRANE
Qty: 0 | Refills: 0 | Status: DISCONTINUED | OUTPATIENT
Start: 2018-08-08 | End: 2018-08-09

## 2018-08-08 RX ADMIN — HEPARIN SODIUM 5000 UNIT(S): 5000 INJECTION INTRAVENOUS; SUBCUTANEOUS at 06:42

## 2018-08-08 RX ADMIN — Medication 166.67 MILLIGRAM(S): at 21:21

## 2018-08-08 RX ADMIN — MEROPENEM 100 MILLIGRAM(S): 1 INJECTION INTRAVENOUS at 15:09

## 2018-08-08 RX ADMIN — TENOFOVIR DISOPROXIL FUMARATE 300 MILLIGRAM(S): 300 TABLET, FILM COATED ORAL at 11:01

## 2018-08-08 RX ADMIN — MEROPENEM 100 MILLIGRAM(S): 1 INJECTION INTRAVENOUS at 23:38

## 2018-08-08 RX ADMIN — MORPHINE SULFATE 45 MILLIGRAM(S): 50 CAPSULE, EXTENDED RELEASE ORAL at 23:08

## 2018-08-08 RX ADMIN — Medication 1: at 13:21

## 2018-08-08 RX ADMIN — Medication 3 MILLILITER(S): at 15:35

## 2018-08-08 RX ADMIN — SODIUM CHLORIDE 4 MILLILITER(S): 9 INJECTION INTRAMUSCULAR; INTRAVENOUS; SUBCUTANEOUS at 10:05

## 2018-08-08 RX ADMIN — LIDOCAINE 1 PATCH: 4 CREAM TOPICAL at 23:11

## 2018-08-08 RX ADMIN — LIDOCAINE 1 PATCH: 4 CREAM TOPICAL at 11:01

## 2018-08-08 RX ADMIN — MORPHINE SULFATE 45 MILLIGRAM(S): 50 CAPSULE, EXTENDED RELEASE ORAL at 08:01

## 2018-08-08 RX ADMIN — Medication 166.67 MILLIGRAM(S): at 10:13

## 2018-08-08 RX ADMIN — Medication 3 MILLILITER(S): at 03:30

## 2018-08-08 RX ADMIN — AFATINIB 20 MILLIGRAM(S): 40 TABLET, FILM COATED ORAL at 11:01

## 2018-08-08 RX ADMIN — ONDANSETRON 4 MILLIGRAM(S): 8 TABLET, FILM COATED ORAL at 20:36

## 2018-08-08 RX ADMIN — MORPHINE SULFATE 45 MILLIGRAM(S): 50 CAPSULE, EXTENDED RELEASE ORAL at 16:50

## 2018-08-08 RX ADMIN — MORPHINE SULFATE 45 MILLIGRAM(S): 50 CAPSULE, EXTENDED RELEASE ORAL at 08:02

## 2018-08-08 RX ADMIN — Medication 3 MILLILITER(S): at 22:07

## 2018-08-08 RX ADMIN — SODIUM CHLORIDE 4 MILLILITER(S): 9 INJECTION INTRAMUSCULAR; INTRAVENOUS; SUBCUTANEOUS at 22:29

## 2018-08-08 RX ADMIN — Medication 3 MILLILITER(S): at 10:05

## 2018-08-08 RX ADMIN — HEPARIN SODIUM 5000 UNIT(S): 5000 INJECTION INTRAVENOUS; SUBCUTANEOUS at 17:40

## 2018-08-08 RX ADMIN — MORPHINE SULFATE 45 MILLIGRAM(S): 50 CAPSULE, EXTENDED RELEASE ORAL at 16:04

## 2018-08-08 RX ADMIN — Medication 1 LOZENGE: at 23:09

## 2018-08-08 RX ADMIN — MEROPENEM 100 MILLIGRAM(S): 1 INJECTION INTRAVENOUS at 06:42

## 2018-08-08 NOTE — DIETITIAN INITIAL EVALUATION ADULT. - PROBLEM SELECTOR PLAN 1
- SIRS Criteria met (T 104.9, , WBC 17) w/ CXR showing evidence of bilateral patchy opacities concerning for Sepsis 2/2 multifocal/aspiration PNA given pt was found covered in emesis today.  - s/p Vanc, Cefepime, and Azithromycin x1 in ED. Lactate 2.4 on admission, likely Type A- expect to downtrend w/ IVF and abx  - Given his recent MICU admission for post obstructive PNA (MRSA/gram negative organism) will cover w/ Vanc, Cefepime and Azithromycin for now  - Lactate elevated, c/w maintenance NS @75cc/h stop after 10h, repeat lactate in AM  - trend leukocytosis, fever curve  - Ulegionella pending. Bcx and UA/Ucx pending; RVP negative   - Tylenol prn fever. Aspiration precautions/Fall risk protocol  - will keep NPO for now,  S&S eval pending

## 2018-08-08 NOTE — DIETITIAN INITIAL EVALUATION ADULT. - PROBLEM SELECTOR PLAN 4
- NIDDM2 with hyperglycemia  - A1c 6.2 last month; Hold oral home meds  - FSG's q6h while NPO  - ISS

## 2018-08-08 NOTE — CHART NOTE - NSCHARTNOTEFT_GEN_A_CORE
NUTRITION SERVICES                                                                                  MALNUTRITION ALERT     Attention Health Care Provider: Upon nutritional assessment by the Registered Dietitian your patient was determined to meet criteria / has evidence of the following diagnosis/diagnoses:    [ ] Mild Protein Calorie Malnutrition   [X ] Moderate Protein Calorie Malnutrition   [ ] Severe Protein Calorie Malnutrition   [ ] Unspecified Protein Calorie Malnutrition   [ ] Underweight / BMI <19  [ ] Morbid Obesity / BMI >40          PLAN OF CARE: Refer to Initial Dietitian Evaluation or Nutrition Follow-Up Documentation for Nutritional Recommendations.

## 2018-08-08 NOTE — PROGRESS NOTE ADULT - SUBJECTIVE AND OBJECTIVE BOX
Patient is a 77y old  Male who presents with a chief complaint of fever, unresponsiveness at home (02 Aug 2018 04:03)      SUBJECTIVE / OVERNIGHT EVENTS:    MEDICATIONS  (STANDING):  afatinib 20 milliGRAM(s) Oral daily  ALBUTerol/ipratropium for Nebulization 3 milliLiter(s) Nebulizer every 6 hours  dextrose 5%. 1000 milliLiter(s) (50 mL/Hr) IV Continuous <Continuous>  dextrose 50% Injectable 12.5 Gram(s) IV Push once  dextrose 50% Injectable 25 Gram(s) IV Push once  dextrose 50% Injectable 25 Gram(s) IV Push once  heparin  Injectable 5000 Unit(s) SubCutaneous every 12 hours  insulin lispro (HumaLOG) corrective regimen sliding scale   SubCutaneous three times a day before meals  insulin lispro (HumaLOG) corrective regimen sliding scale   SubCutaneous at bedtime  lidocaine   Patch 1 Patch Transdermal daily  meropenem  IVPB 1000 milliGRAM(s) IV Intermittent every 8 hours  morphine ER Tablet 45 milliGRAM(s) Oral every 8 hours  sodium chloride 0.9%. 1000 milliLiter(s) (75 mL/Hr) IV Continuous <Continuous>  sodium chloride 3%  Inhalation 4 milliLiter(s) Inhalation every 12 hours  tenofovir disoproxil fumarate (VIREAD) 300 milliGRAM(s) Oral daily  vancomycin  IVPB 1250 milliGRAM(s) IV Intermittent every 12 hours    MEDICATIONS  (PRN):  acetaminophen  Suppository 650 milliGRAM(s) Rectal every 6 hours PRN For Temp greater than 38 C (100.4 F)  dextrose 40% Gel 15 Gram(s) Oral once PRN Blood Glucose LESS THAN 70 milliGRAM(s)/deciliter  glucagon  Injectable 1 milliGRAM(s) IntraMuscular once PRN Glucose LESS THAN 70 milligrams/deciliter  ondansetron Injectable 4 milliGRAM(s) IV Push every 6 hours PRN Nausea and/or Vomiting  oxyCODONE    IR 10 milliGRAM(s) Oral every 4 hours PRN moderate and severe pain      Vital Signs Last 24 Hrs  T(C): 36.4 (08 Aug 2018 06:38), Max: 36.6 (07 Aug 2018 22:49)  T(F): 97.5 (08 Aug 2018 06:38), Max: 97.8 (07 Aug 2018 22:49)  HR: 78 (08 Aug 2018 06:38) (75 - 89)  BP: 95/62 (08 Aug 2018 06:38) (95/62 - 117/76)  BP(mean): --  RR: 18 (08 Aug 2018 06:38) (18 - 18)  SpO2: 98% (08 Aug 2018 06:38) (96% - 99%)  CAPILLARY BLOOD GLUCOSE      POCT Blood Glucose.: 129 mg/dL (08 Aug 2018 08:58)  POCT Blood Glucose.: 130 mg/dL (07 Aug 2018 21:29)  POCT Blood Glucose.: 151 mg/dL (07 Aug 2018 17:10)  POCT Blood Glucose.: 114 mg/dL (07 Aug 2018 13:15)    I&O's Summary      PHYSICAL EXAM:  GENERAL: NAD, well-developed  HEAD:  Atraumatic, Normocephalic  EYES: EOMI, PERRLA, conjunctiva and sclera clear  NECK: Supple, No JVD  CHEST/LUNG: Clear to auscultation bilaterally; No wheeze  HEART: Regular rate and rhythm; No murmurs, rubs, or gallops  ABDOMEN: Soft, Nontender, Nondistended; Bowel sounds present  EXTREMITIES:  2+ Peripheral Pulses, No clubbing, cyanosis, or edema  PSYCH: AAOx3  NEUROLOGY: non-focal  SKIN: No rashes or lesions    LABS:                        12.5   7.36  )-----------( 207      ( 08 Aug 2018 06:10 )             39.7     08-08    138  |  102  |  17  ----------------------------<  116<H>  3.9   |  26  |  0.84    Ca    9.3      08 Aug 2018 06:10  Phos  3.7     08-08  Mg     2.2     08-08                RADIOLOGY & ADDITIONAL TESTS:    Imaging Personally Reviewed:    Consultant(s) Notes Reviewed:      Care Discussed with Consultants/Other Providers: Patient is a 77y old  Male who presents with a chief complaint of fever, unresponsiveness at home (02 Aug 2018 04:03)      SUBJECTIVE / OVERNIGHT EVENTS: Feeling better today, pain controlled, no stomach upset.    MEDICATIONS  (STANDING):  afatinib 20 milliGRAM(s) Oral daily  ALBUTerol/ipratropium for Nebulization 3 milliLiter(s) Nebulizer every 6 hours  dextrose 5%. 1000 milliLiter(s) (50 mL/Hr) IV Continuous <Continuous>  dextrose 50% Injectable 12.5 Gram(s) IV Push once  dextrose 50% Injectable 25 Gram(s) IV Push once  dextrose 50% Injectable 25 Gram(s) IV Push once  heparin  Injectable 5000 Unit(s) SubCutaneous every 12 hours  insulin lispro (HumaLOG) corrective regimen sliding scale   SubCutaneous three times a day before meals  insulin lispro (HumaLOG) corrective regimen sliding scale   SubCutaneous at bedtime  lidocaine   Patch 1 Patch Transdermal daily  meropenem  IVPB 1000 milliGRAM(s) IV Intermittent every 8 hours  morphine ER Tablet 45 milliGRAM(s) Oral every 8 hours  sodium chloride 0.9%. 1000 milliLiter(s) (75 mL/Hr) IV Continuous <Continuous>  sodium chloride 3%  Inhalation 4 milliLiter(s) Inhalation every 12 hours  tenofovir disoproxil fumarate (VIREAD) 300 milliGRAM(s) Oral daily  vancomycin  IVPB 1250 milliGRAM(s) IV Intermittent every 12 hours    MEDICATIONS  (PRN):  acetaminophen  Suppository 650 milliGRAM(s) Rectal every 6 hours PRN For Temp greater than 38 C (100.4 F)  dextrose 40% Gel 15 Gram(s) Oral once PRN Blood Glucose LESS THAN 70 milliGRAM(s)/deciliter  glucagon  Injectable 1 milliGRAM(s) IntraMuscular once PRN Glucose LESS THAN 70 milligrams/deciliter  ondansetron Injectable 4 milliGRAM(s) IV Push every 6 hours PRN Nausea and/or Vomiting  oxyCODONE    IR 10 milliGRAM(s) Oral every 4 hours PRN moderate and severe pain      Vital Signs Last 24 Hrs  T(C): 36.4 (08 Aug 2018 06:38), Max: 36.6 (07 Aug 2018 22:49)  T(F): 97.5 (08 Aug 2018 06:38), Max: 97.8 (07 Aug 2018 22:49)  HR: 78 (08 Aug 2018 06:38) (75 - 89)  BP: 95/62 (08 Aug 2018 06:38) (95/62 - 117/76)  BP(mean): --  RR: 18 (08 Aug 2018 06:38) (18 - 18)  SpO2: 98% (08 Aug 2018 06:38) (96% - 99%)  CAPILLARY BLOOD GLUCOSE      POCT Blood Glucose.: 129 mg/dL (08 Aug 2018 08:58)  POCT Blood Glucose.: 130 mg/dL (07 Aug 2018 21:29)  POCT Blood Glucose.: 151 mg/dL (07 Aug 2018 17:10)  POCT Blood Glucose.: 114 mg/dL (07 Aug 2018 13:15)    I&O's Summary      PHYSICAL EXAM:  GENERAL: NAD, well-developed  HEAD:  Atraumatic, Normocephalic  EYES: EOMI, PERRLA, conjunctiva and sclera clear  NECK: Supple, No JVD  CHEST/LUNG: Clear to auscultation bilaterally; No wheeze  HEART: Regular rate and rhythm; No murmurs, rubs, or gallops  ABDOMEN: Soft, Nontender, Nondistended; Bowel sounds present  EXTREMITIES:  2+ Peripheral Pulses, No clubbing, cyanosis, or edema  PSYCH: AAOx3  NEUROLOGY: non-focal  SKIN: No rashes or lesions    LABS:                        12.5   7.36  )-----------( 207      ( 08 Aug 2018 06:10 )             39.7     08-08    138  |  102  |  17  ----------------------------<  116<H>  3.9   |  26  |  0.84    Ca    9.3      08 Aug 2018 06:10  Phos  3.7     08-08  Mg     2.2     08-08              Care Discussed with Consultants/Other Providers: onc

## 2018-08-08 NOTE — DIETITIAN INITIAL EVALUATION ADULT. - ENERGY NEEDS
Current weight 138lbs Ht 68" BMI 21kg/m2  IBW: 154lbs (+/-10%) Patient at 90%IBW  No edema noted. Skin intact.

## 2018-08-08 NOTE — DIETITIAN INITIAL EVALUATION ADULT. - NS AS NUTRI INTERV MEDICAL AND FOOD SUPPLEMENTS
Continue Glucerna Therapeutic Nutrition 240mls 2x daily (440kcals, 20g protein) thickened to Honey Consistency

## 2018-08-08 NOTE — DIETITIAN INITIAL EVALUATION ADULT. - PROBLEM SELECTOR PLAN 2
- Stage IV EGFR mutated NSCLC/adenocarcinoma (on 4th line of tx w/ PO Afatinib daily since 2/2018)  - PET scan (7/29) shows multiple hypermetabolic masslike consolidations and multiple nodules in the bilateral lungs w/ hypermetabolic mediastinal, and bilateral perihilar lymph nodes concerning for metastasis.  - Holding afatinib in setting of Sepsis and progression of disease  - Onc consulted  - Palliative consulted for pain med management/ GOC given progression of disease and declining clinical status

## 2018-08-08 NOTE — DIETITIAN INITIAL EVALUATION ADULT. - SIGNS/SYMPTOMS
<75% nutritional needs,8% weight loss x 1 year, moderate muscle wasting and fat loss as stated above

## 2018-08-08 NOTE — PROGRESS NOTE ADULT - PROBLEM SELECTOR PLAN 1
-Sepsis resolving  possible due to multifocal/aspiration PNA - pulm consulted per onc's request- c/w aspiration pneumonia, no need to consider stenting of bronch  Appreciate ID recs, IV abx completed today 8/8/18  - diet ordered based on  cinesophagram, dysphagia 1 honey thick liquids; nutrition consult to teach family proper foods to provide to prevent aspiration

## 2018-08-08 NOTE — DIETITIAN INITIAL EVALUATION ADULT. - NS AS NUTRI INTERV MEALS SNACK
Texture-modified diet/1. Suggest liberalize diet and continue with Dysphagia 1-pureed- Honey Consistency as per SLP recommendations. 2. Would deter additionannl restriction CSTCHO (A1c 6.2H-well controlled)/DASH diet given medical condition

## 2018-08-08 NOTE — DIETITIAN INITIAL EVALUATION ADULT. - PERTINENT LABORATORY DATA
08-08 Na138 mmol/L Glu 116 mg/dL<H> K+ 3.9 mmol/L Cr  0.84 mg/dL BUN 17 mg/dL 08-08 Phos 3.7 mg/dL 08-05 Alb 3.2 g/dL<L>

## 2018-08-08 NOTE — PROGRESS NOTE ADULT - ASSESSMENT
78 y/o w Stage IV EGFR mutated NSCLC/adenocarcinoma (on 4th line of tx w/ PO Afatinib daily since 2/2018), essential HTN, DM2, HBV (on Tenofovir), COPD (not on home O2) w/ recent MICU admission for Septic shock 2/2 gram negative/MRSA PNA and acute hypoxic resp failure admitted for Sepsis due to recurrent aspiration pna

## 2018-08-08 NOTE — DIETITIAN INITIAL EVALUATION ADULT. - PROBLEM SELECTOR PLAN 7
- Pt wife, daughter, and grand daughter at bedside this evening; we had an extensive conversation regarding their overall GOC for their loved one. They understand that pt has had a decline in his overall clinical status since last admission and are concerned that they would be unable to care for him at home a this time. We discussed what the pt would want in this situation if he were able to delineate his wishes. They are actively discussing whether they would like to pursue comfort measures and DNR/DNI. Ongoing GOC  - Guarded prognosis  - Palliative care consulted.

## 2018-08-08 NOTE — DIETITIAN INITIAL EVALUATION ADULT. - PHYSICAL APPEARANCE
Nutrition focused physical exam conducted - found signs of malnutrition [ ]absent [ X]present Subcutaneous fat loss: [moderate ] Orbital fat pads region, [moderate]Buccal fat region, [moderate]Triceps region,  [ MOderate]Ribs region  Muscle wasting: [Moderate]Temples region, [ Moderate]Clavicle region, [Moderate ]Shoulder region, [MOderate ]Scapula region, [Moderate ]Interosseous region,  [Moderate ]thigh region, [Moderate ]Calf region

## 2018-08-08 NOTE — DIETITIAN INITIAL EVALUATION ADULT. - DIET TYPE
Glucerna Therapeutic Nutrition 240mls 2x daily (440kcals, 20g protein)/dysphagia 1, pureed, honey consistency fluid/consistent carbohydrate (evening snack)/DASH/TLC (sodium and cholesterol restricted diet)

## 2018-08-09 ENCOUNTER — TRANSCRIPTION ENCOUNTER (OUTPATIENT)
Age: 78
End: 2018-08-09

## 2018-08-09 VITALS — OXYGEN SATURATION: 97 %

## 2018-08-09 PROCEDURE — 99239 HOSP IP/OBS DSCHRG MGMT >30: CPT

## 2018-08-09 RX ORDER — LIDOCAINE 4 G/100G
10 CREAM TOPICAL
Qty: 0 | Refills: 0 | COMMUNITY
Start: 2018-08-09

## 2018-08-09 RX ORDER — AFATINIB 40 MG/1
1 TABLET, FILM COATED ORAL
Qty: 30 | Refills: 0 | OUTPATIENT
Start: 2018-08-09 | End: 2018-09-07

## 2018-08-09 RX ORDER — MORPHINE SULFATE 50 MG/1
3 CAPSULE, EXTENDED RELEASE ORAL
Qty: 0 | Refills: 0 | COMMUNITY
Start: 2018-08-09

## 2018-08-09 RX ORDER — OXYCODONE HYDROCHLORIDE 5 MG/1
1 TABLET ORAL
Qty: 0 | Refills: 0 | COMMUNITY
Start: 2018-08-09

## 2018-08-09 RX ORDER — AFATINIB 40 MG/1
1 TABLET, FILM COATED ORAL
Qty: 0 | Refills: 0 | COMMUNITY

## 2018-08-09 RX ORDER — CLOTRIMAZOLE 10 MG
1 TROCHE MUCOUS MEMBRANE
Qty: 0 | Refills: 0 | COMMUNITY
Start: 2018-08-09

## 2018-08-09 RX ORDER — LIDOCAINE 4 G/100G
1 CREAM TOPICAL
Qty: 0 | Refills: 0 | COMMUNITY
Start: 2018-08-09

## 2018-08-09 RX ORDER — MORPHINE SULFATE 50 MG/1
30 CAPSULE, EXTENDED RELEASE ORAL
Qty: 0 | Refills: 0 | COMMUNITY
Start: 2018-08-09

## 2018-08-09 RX ADMIN — SODIUM CHLORIDE 4 MILLILITER(S): 9 INJECTION INTRAMUSCULAR; INTRAVENOUS; SUBCUTANEOUS at 10:01

## 2018-08-09 RX ADMIN — TENOFOVIR DISOPROXIL FUMARATE 300 MILLIGRAM(S): 300 TABLET, FILM COATED ORAL at 11:07

## 2018-08-09 RX ADMIN — MORPHINE SULFATE 45 MILLIGRAM(S): 50 CAPSULE, EXTENDED RELEASE ORAL at 07:00

## 2018-08-09 RX ADMIN — LIDOCAINE 1 PATCH: 4 CREAM TOPICAL at 11:07

## 2018-08-09 RX ADMIN — Medication 1: at 18:41

## 2018-08-09 RX ADMIN — MORPHINE SULFATE 45 MILLIGRAM(S): 50 CAPSULE, EXTENDED RELEASE ORAL at 16:08

## 2018-08-09 RX ADMIN — Medication 1 LOZENGE: at 07:00

## 2018-08-09 RX ADMIN — HEPARIN SODIUM 5000 UNIT(S): 5000 INJECTION INTRAVENOUS; SUBCUTANEOUS at 07:00

## 2018-08-09 RX ADMIN — HEPARIN SODIUM 5000 UNIT(S): 5000 INJECTION INTRAVENOUS; SUBCUTANEOUS at 17:27

## 2018-08-09 RX ADMIN — Medication 1 LOZENGE: at 16:07

## 2018-08-09 RX ADMIN — Medication 3 MILLILITER(S): at 10:01

## 2018-08-09 RX ADMIN — AFATINIB 20 MILLIGRAM(S): 40 TABLET, FILM COATED ORAL at 11:07

## 2018-08-09 RX ADMIN — MORPHINE SULFATE 45 MILLIGRAM(S): 50 CAPSULE, EXTENDED RELEASE ORAL at 16:06

## 2018-08-09 RX ADMIN — Medication 3 MILLILITER(S): at 16:55

## 2018-08-09 RX ADMIN — Medication 1 LOZENGE: at 11:44

## 2018-08-09 RX ADMIN — Medication 3 MILLILITER(S): at 03:17

## 2018-08-09 NOTE — PROGRESS NOTE ADULT - PROBLEM SELECTOR PROBLEM 4
Type 2 diabetes mellitus without complication, unspecified whether long term insulin use
Palliative care encounter
Type 2 diabetes mellitus without complication, unspecified whether long term insulin use
Debility
Type 2 diabetes mellitus without complication, unspecified whether long term insulin use

## 2018-08-09 NOTE — PROGRESS NOTE ADULT - PROBLEM SELECTOR PLAN 8
- DVT PPx: HSQ  - Diet: dysphagia 1 honey thick liquid
- DVT PPx: HSQ  - Diet: dysphagia 1 honey thick liquid
- DVT PPx: HSQ  - Diet: NPO pending swallow eval
- DVT PPx: HSQ  - Diet: dysphagia 1 honey thick liquid
- DVT PPx: HSQ  - Diet: dysphagia 1 honey thick liquid

## 2018-08-09 NOTE — PROGRESS NOTE ADULT - PROBLEM SELECTOR PLAN 3
- Holding BP meds in setting of Sepsis state; BP improving
- Holding BP meds in setting of Sepsis state; BP improving
Assist with ADL's.  Fall precautions.  Skin care PRN.
- Holding BP meds in setting of Sepsis state
- Holding BP meds in setting of Sepsis state; BP improving
Patient s/p cinesophagram.  Recommend Dysphagia 1 with honey thickened liquids. Assist with meals.  Aspiration precautions.
- Holding BP meds in setting of Sepsis state; BP improving

## 2018-08-09 NOTE — PROGRESS NOTE ADULT - PROBLEM SELECTOR PROBLEM 1
Non-small cell carcinoma of lung, stage 4, unspecified laterality
Sepsis
Non-small cell carcinoma of lung, stage 4, unspecified laterality
Sepsis

## 2018-08-09 NOTE — DISCHARGE NOTE ADULT - CARE PROVIDER_API CALL
Brandan Gross; MBBS), Hematology; [facility]; Medical Oncology  [address], [place] [id number]  Phone: [phone]  Fax: [phone]    Nery Kramer), [facility] Medicine; Internal Medicine  [address] [place] [id number]  Phone: [phone]  Fax: [phone]

## 2018-08-09 NOTE — PROGRESS NOTE ADULT - SUBJECTIVE AND OBJECTIVE BOX
Patient is a 77y old  Male who presents with a chief complaint of fever, unresponsiveness at home (09 Aug 2018 15:45)      SUBJECTIVE / OVERNIGHT EVENTS: Doing well, ambulating    MEDICATIONS  (STANDING):  afatinib 20 milliGRAM(s) Oral daily  ALBUTerol/ipratropium for Nebulization 3 milliLiter(s) Nebulizer every 6 hours  clotrimazole Lozenge 1 Lozenge Oral five times a day  dextrose 5%. 1000 milliLiter(s) (50 mL/Hr) IV Continuous <Continuous>  dextrose 50% Injectable 12.5 Gram(s) IV Push once  dextrose 50% Injectable 25 Gram(s) IV Push once  dextrose 50% Injectable 25 Gram(s) IV Push once  heparin  Injectable 5000 Unit(s) SubCutaneous every 12 hours  insulin lispro (HumaLOG) corrective regimen sliding scale   SubCutaneous three times a day before meals  insulin lispro (HumaLOG) corrective regimen sliding scale   SubCutaneous at bedtime  lidocaine   Patch 1 Patch Transdermal daily  morphine ER Tablet 45 milliGRAM(s) Oral every 8 hours  sodium chloride 0.9%. 1000 milliLiter(s) (75 mL/Hr) IV Continuous <Continuous>  sodium chloride 3%  Inhalation 4 milliLiter(s) Inhalation every 12 hours  tenofovir disoproxil fumarate (VIREAD) 300 milliGRAM(s) Oral daily    MEDICATIONS  (PRN):  acetaminophen  Suppository 650 milliGRAM(s) Rectal every 6 hours PRN For Temp greater than 38 C (100.4 F)  dextrose 40% Gel 15 Gram(s) Oral once PRN Blood Glucose LESS THAN 70 milliGRAM(s)/deciliter  glucagon  Injectable 1 milliGRAM(s) IntraMuscular once PRN Glucose LESS THAN 70 milligrams/deciliter  lidocaine 2% Viscous 10 milliLiter(s) Swish and Spit four times a day PRN throat pain  ondansetron Injectable 4 milliGRAM(s) IV Push every 6 hours PRN Nausea and/or Vomiting  oxyCODONE    IR 10 milliGRAM(s) Oral every 4 hours PRN moderate and severe pain      Vital Signs Last 24 Hrs  T(C): 36.3 (09 Aug 2018 13:07), Max: 36.6 (09 Aug 2018 05:40)  T(F): 97.4 (09 Aug 2018 13:07), Max: 97.8 (09 Aug 2018 05:40)  HR: 85 (09 Aug 2018 13:07) (74 - 93)  BP: 131/84 (09 Aug 2018 13:07) (90/54 - 131/84)  BP(mean): --  RR: 18 (09 Aug 2018 10:00) (18 - 18)  SpO2: 100% (09 Aug 2018 13:07) (95% - 100%)  CAPILLARY BLOOD GLUCOSE      POCT Blood Glucose.: 113 mg/dL (09 Aug 2018 12:37)  POCT Blood Glucose.: 112 mg/dL (09 Aug 2018 08:36)  POCT Blood Glucose.: 137 mg/dL (08 Aug 2018 22:14)  POCT Blood Glucose.: 127 mg/dL (08 Aug 2018 17:47)    I&O's Summary      PHYSICAL EXAM:  GENERAL: NAD, well-developed  HEAD:  Atraumatic, Normocephalic  EYES: EOMI, PERRLA, conjunctiva and sclera clear  NECK: Supple, No JVD  CHEST/LUNG: Clear to auscultation bilaterally; No wheeze  HEART: Regular rate and rhythm; No murmurs, rubs, or gallops  ABDOMEN: Soft, Nontender, Nondistended; Bowel sounds present  EXTREMITIES:  2+ Peripheral Pulses, No clubbing, cyanosis, or edema  PSYCH: AAOx3  NEUROLOGY: non-focal  SKIN: No rashes or lesions    LABS:                        12.5   7.36  )-----------( 207      ( 08 Aug 2018 06:10 )             39.7     08-08    138  |  102  |  17  ----------------------------<  116<H>  3.9   |  26  |  0.84    Ca    9.3      08 Aug 2018 06:10  Phos  3.7     08-08  Mg     2.2     08-08          Care Discussed with Consultants/Other Providers: Onc

## 2018-08-09 NOTE — DISCHARGE NOTE ADULT - HOSPITAL COURSE
Dx: Sepsis 2/2 aspiration/multifocal PNA    76 y/o w Stage IV EGFR mutated NSCLC/adenocarcinoma (on 4th line of tx w/ PO Afatinib daily since 2/2018), essential HTN, DM2, HBV (on Tenofovir), COPD (not on home O2) w/ recent MICU admission for Septic shock 2/2 gram negative/MRSA PNA and acute hypoxic resp failure admitted for Sepsis 2/2 multifocal/aspiration PNA    Hospital Course   Sepsis.    - SIRS Criteria met (T 104.9, , WBC 17) w/ CXR showing evidence of bilateral patchy opacities concerning for Sepsis 2/2 multifocal/aspiration PNA given pt was found covered in emesis today.  - s/p Vanc, Cefepime, and Azithromycin x1 in ED. Lactate 2.4 on admission, likely Type A- expect to downtrend w/ IVF and abx  - Given his recent MICU admission for post obstructive PNA (MRSA/gram negative organism) will cover w/ Vanc, Cefepime and Azithromycin for now  - Lactate elevated  - trend leukocytosis, fever curve  - Ulegionella negative  - Bcx - 48 hrs  -UA-(-)  -RVP negative   - Tylenol prn fever. Aspiration precautions/Fall risk protocol  -   S&S eval Pureed with honey thick fluids   - ID consulted: medardo and vanco  - CT chest & abd: mult opacities and pleural effusions    Non-small cell carcinoma of lung, stage 4, unspecified laterality.     - Stage IV EGFR mutated NSCLC/adenocarcinoma (on 4th line of tx w/ PO Afatinib daily since 2/2018)  - PET scan (7/29) shows multiple hypermetabolic masslike consolidations and multiple nodules in the bilateral lungs w/ hypermetabolic mediastinal, and bilateral perihilar lymph nodes concerning for metastasis.  - Held afatinib in setting of Sepsis and it was restarted once cleared   - Onc consulted: treatment with osimertinib can be considered  - Palliative consulted for pain med management/ GOC given progression of disease and declining clinical status.   -Pulm: no acute intervention--       Essential hypertension.    - SBP well controlled in ED. S/p 2L NS bolus upon admission  - Held BP meds in setting of Sepsis state.     Type 2 diabetes mellitus without complication, unspecified whether long term insulin use.    - NIDDM2 with hyperglycemia  - A1c 6.2 last month; Held oral home meds  - FSG's q6h while NPO  - ISS.    COPD (chronic obstructive pulmonary disease).    - COPD not on home O2  - Currently satting well on 3L NC  - Duonebs q6h.     Chronic viral hepatitis B without delta agent and without coma.   - C/w Tenofovir       Need for prophylactic measure.    - DVT PPx: HSQ  - Diet: NPO pending swallow eval.   -Cine: dysphagia 1 with honey thick liquids     Dispo: Rehab Dx: Sepsis 2/2 aspiration/multifocal PNA    76 y/o w Stage IV EGFR mutated NSCLC/adenocarcinoma (on 4th line of tx w/ PO Afatinib daily since 2/2018), essential HTN, DM2, HBV (on Tenofovir), COPD (not on home O2) w/ recent MICU admission for Septic shock 2/2 gram negative/MRSA PNA and acute hypoxic resp failure admitted for Sepsis 2/2 multifocal/aspiration PNA    Hospital Course   Found to have sepsis due to pneumonia which was present on admission, and was treated with IV antibiotics thru 8/8/18. Sepsis resolved.    Non-small cell carcinoma of lung, stage 4,    - Stage IV EGFR mutated NSCLC/adenocarcinoma (on 4th line of tx w/ PO Afatinib daily since 2/2018)  - PET scan (7/29) shows multiple hypermetabolic masslike consolidations and multiple nodules in the bilateral lungs w/ hypermetabolic mediastinal, and bilateral perihilar lymph nodes concerning for metastasis.  - Held afatinib in setting of Sepsis and it was restarted once cleared   - Palliative consulted for pain med management/ GOC given progression of disease and declining clinical status.   Pt will follow up with oncologist as outpatient      Essential hypertension.    BP meds held and BP remained stable off meds during admission    Type 2 diabetes mellitus without complication, unspecified whether long term insulin use.    - NIDDM2 with hyperglycemia  - A1c 6.2 last month; Held oral home meds, FSBG controlled, oral meds not resumed at discharge    COPD (chronic obstructive pulmonary disease).    - COPD not on home O2  Stable at discharge    Chronic viral hepatitis B without delta agent and without coma.   - C/w Tenofovir       Speech and swallow eval with cinesophogram recommended dysphagia 1 (pureed food) with honey thick liquids     Discharged to [facility]

## 2018-08-09 NOTE — PROGRESS NOTE ADULT - PROBLEM SELECTOR PROBLEM 2
Non-small cell carcinoma of lung, stage 4, unspecified laterality
Non-small cell carcinoma of lung, stage 4, unspecified laterality
Pain, neoplasm-related
Non-small cell carcinoma of lung, stage 4, unspecified laterality
Pain, neoplasm-related
Non-small cell carcinoma of lung, stage 4, unspecified laterality

## 2018-08-09 NOTE — DISCHARGE NOTE ADULT - SECONDARY DIAGNOSIS.
Non-small cell carcinoma of lung, stage 4, unspecified laterality Hypertension Type 2 diabetes mellitus without complication, unspecified whether long term insulin use COPD (chronic obstructive pulmonary disease) Chronic viral hepatitis B without delta agent and without coma

## 2018-08-09 NOTE — DISCHARGE NOTE ADULT - CARE PROVIDERS DIRECT ADDRESSES
[email or web address]

## 2018-08-09 NOTE — DISCHARGE NOTE ADULT - MEDICATION SUMMARY - MEDICATIONS TO TAKE
I will START or STAY ON the medications listed below when I get home from the hospital:    morphine 15 mg/12 hr oral tablet, extended release  -- 3 tab(s) by mouth every 8 hours  -- Indication: For Pain, neoplasm-related    oxyCODONE 10 mg oral tablet  -- 1 tab(s) by mouth every 4 hours, As needed, moderate and severe pain  -- Indication: For Pain, neoplasm-related    Flomax 0.4 mg oral capsule  -- 1 cap(s) by mouth once a day   -- Indication: For BPH    gabapentin 100 mg oral capsule  -- 1 cap(s) by mouth once a day (at bedtime)  -- Indication: For Neuropathic pain    mirtazapine 15 mg oral tablet, disintegrating  -- 1 tab(s) by mouth once a day (at bedtime)  -- Indication: For antidepressant     Zofran 4 mg oral tablet  -- 1 tab(s) by mouth every 8 hours, As Needed  -- Indication: For Nausea     clotrimazole 10 mg oral lozenge  -- 1 lozenge by mouth 5 times a day  for 6 days  -- Indication: For Thrush    simvastatin 10 mg oral tablet  -- 1 tab(s) by mouth once a day (at bedtime)  -- Indication: For HLD    tenofovir disoproxil fumarate 300 mg oral tablet  -- 1 tab(s) by mouth once a day  -- Indication: For Hep B    ipratropium-albuterol 0.5 mg-2.5 mg/3 mLinhalation solution  -- 3 milliliter(s) inhaled every 8 hours, As needed, Shortness of Breath and/or Wheezing  -- Indication: For SOB/Wheezing     lidocaine 5% topical film  -- Apply on skin to affected area once a day  Right Scapula   -- Indication: For Pain, neoplasm-related    lidocaine 2% mucous membrane solution  -- 10 milliliter(s) mucous membrane every 6 hours, As Needed  Swish and Spit   -- Indication: For Sore throat     senna 8.6 mg oral tablet  -- 2 tab(s) by mouth once a day (at bedtime)  -- Indication: For Constipation     afatinib 20 mg oral tablet  -- 1 tab(s) by mouth once a day  -- Indication: For Non-small cell carcinoma of lung, stage 4, unspecified laterality

## 2018-08-09 NOTE — DISCHARGE NOTE ADULT - PATIENT PORTAL LINK FT
You can access the [facility] Patient Portal, offered by [facility], by registering with the following website: [email or web address]

## 2018-08-09 NOTE — PROGRESS NOTE ADULT - PROBLEM SELECTOR PROBLEM 5
COPD (chronic obstructive pulmonary disease)
Palliative care encounter
COPD (chronic obstructive pulmonary disease)

## 2018-08-09 NOTE — PROGRESS NOTE ADULT - PROBLEM SELECTOR PROBLEM 3
Essential hypertension
Debility
Essential hypertension
Dysphagia
Essential hypertension

## 2018-08-09 NOTE — DISCHARGE NOTE ADULT - PLAN OF CARE
Resolved Pt completed antibiotics Adhere to treatment plan c/w -afatinib Your blood pressure will be controlled. Low salt diet  Activity as tolerated.  Take all medication as prescribed.  Follow up with your medical doctor for routine blood pressure monitoring at your next visit.  Notify your doctor if you have any of the following symptoms:   Dizziness, Lightheadedness, Blurry vision, Headache, Chest pain, Shortness of breath Goal -180 c/w Nebulizer treatments prn c/w viread

## 2018-08-09 NOTE — DISCHARGE NOTE ADULT - CARE PLAN
Principal Discharge DX:	Sepsis  Goal:	Resolved  Assessment and plan of treatment:	Pt completed antibiotics  Secondary Diagnosis:	Non-small cell carcinoma of lung, stage 4, unspecified laterality  Goal:	Adhere to treatment plan  Assessment and plan of treatment:	c/w -afatinib  Secondary Diagnosis:	Hypertension  Goal:	Your blood pressure will be controlled.  Assessment and plan of treatment:	Low salt diet  Activity as tolerated.  Take all medication as prescribed.  Follow up with your medical doctor for routine blood pressure monitoring at your next visit.  Notify your doctor if you have any of the following symptoms:   Dizziness, Lightheadedness, Blurry vision, Headache, Chest pain, Shortness of breath  Secondary Diagnosis:	Type 2 diabetes mellitus without complication, unspecified whether long term insulin use  Goal:	Goal -180  Secondary Diagnosis:	COPD (chronic obstructive pulmonary disease)  Secondary Diagnosis:	Chronic viral hepatitis B without delta agent and without coma Principal Discharge DX:	Sepsis  Goal:	Resolved  Assessment and plan of treatment:	Pt completed antibiotics  Secondary Diagnosis:	Non-small cell carcinoma of lung, stage 4, unspecified laterality  Goal:	Adhere to treatment plan  Assessment and plan of treatment:	c/w -afatinib  Secondary Diagnosis:	Hypertension  Goal:	Your blood pressure will be controlled.  Assessment and plan of treatment:	Low salt diet  Activity as tolerated.  Take all medication as prescribed.  Follow up with your medical doctor for routine blood pressure monitoring at your next visit.  Notify your doctor if you have any of the following symptoms:   Dizziness, Lightheadedness, Blurry vision, Headache, Chest pain, Shortness of breath  Secondary Diagnosis:	Type 2 diabetes mellitus without complication, unspecified whether long term insulin use  Goal:	Goal -180  Secondary Diagnosis:	COPD (chronic obstructive pulmonary disease)  Assessment and plan of treatment:	c/w Nebulizer treatments prn  Secondary Diagnosis:	Chronic viral hepatitis B without delta agent and without coma  Assessment and plan of treatment:	c/w viread

## 2018-08-09 NOTE — PROGRESS NOTE ADULT - PROVIDER SPECIALTY LIST ADULT
Heme/Onc
Hospitalist
Infectious Disease
Palliative Care
Infectious Disease
Palliative Care
Hospitalist

## 2018-08-09 NOTE — DISCHARGE NOTE ADULT - MEDICATION SUMMARY - MEDICATIONS TO STOP TAKING
I will STOP taking the medications listed below when I get home from the hospital:    metFORMIN 500 mg oral tablet  -- 1 tab(s) by mouth 2 times a day    alogliptin-metformin 12.5 mg-500 mg oral tablet  -- 1 tab(s) by mouth once a day

## 2018-08-09 NOTE — DISCHARGE NOTE ADULT - MEDICATION SUMMARY - MEDICATIONS TO CHANGE
I will SWITCH the dose or number of times a day I take the medications listed below when I get home from the hospital:    oxyCODONE 10 mg oral tablet  -- 1 tab(s) by mouth every 6 hours, As Needed - 10) MDD:4

## 2018-08-09 NOTE — PROGRESS NOTE ADULT - PROBLEM SELECTOR PLAN 1
-Sepsis resolving  possible due to multifocal/aspiration PNA - pulm consulted per onc's request- c/w aspiration pneumonia, no need to consider stenting of bronch  Appreciate ID recs, IV abx completed 8/8/18  - diet ordered based on  cinesophagram, dysphagia 1 honey thick liquids; nutrition consult to teach family proper foods to provide to prevent aspiration

## 2018-08-09 NOTE — PROGRESS NOTE ADULT - PROBLEM SELECTOR PLAN 7
will cont to engage son Parmjit in these discussions  prognosis guarded
will cont to engage son Parmjit in these discussions  prognosis guarded
will cont to engage agustín Parnell in these discussions  prognosis guarded  plan is d/c to [facility] today  d/c time 45 min
will cont to engage son Parmjit in these discussions  prognosis guarded
will cont to engage agustín Parnell in these discussions  prognosis guarded  plan is d/c to [facility]

## 2018-08-09 NOTE — PROGRESS NOTE ADULT - PROBLEM SELECTOR PLAN 4
- A1c 6.2 last month; Hold oral home meds  - FSG's q6h- ISS
- A1c 6.2 last month; Hold oral home meds  - FSG's q6h- ISS
- A1c 6.2 last month; Hold oral home meds  - FSG's q6h while NPO  - ISS
- A1c 6.2 last month; Hold oral home meds  - FSG's q6h- ISS
Assist with ADL's.  Skin care PRN.  Fall precautions.
Pain regimen and follow-up appointment as above.  Plan for rehab with follow-up with oncology as an outpatient.  Psychosocial support provided to patient and family.  Please re-consult as needed.
- A1c 6.2 last month; Hold oral home meds  - FSG's q6h- ISS

## 2018-08-09 NOTE — PROGRESS NOTE ADULT - PROBLEM SELECTOR PROBLEM 6
Chronic viral hepatitis B without delta agent and without coma

## 2018-08-10 ENCOUNTER — APPOINTMENT (OUTPATIENT)
Dept: GERIATRICS | Facility: CLINIC | Age: 78
End: 2018-08-10

## 2018-08-24 ENCOUNTER — OUTPATIENT (OUTPATIENT)
Dept: OUTPATIENT SERVICES | Facility: HOSPITAL | Age: 78
LOS: 1 days | Discharge: HOME | End: 2018-08-24

## 2018-08-24 DIAGNOSIS — C44.311 BASAL CELL CARCINOMA OF SKIN OF NOSE: Chronic | ICD-10-CM

## 2018-08-24 DIAGNOSIS — D04.9 CARCINOMA IN SITU OF SKIN, UNSPECIFIED: Chronic | ICD-10-CM

## 2018-08-24 DIAGNOSIS — C34.90 MALIGNANT NEOPLASM OF UNSPECIFIED PART OF UNSPECIFIED BRONCHUS OR LUNG: ICD-10-CM

## 2018-08-25 LAB
CULTURE RESULTS: SIGNIFICANT CHANGE UP
SPECIMEN SOURCE: SIGNIFICANT CHANGE UP

## 2018-08-31 ENCOUNTER — RESULT REVIEW (OUTPATIENT)
Age: 78
End: 2018-08-31

## 2018-08-31 ENCOUNTER — APPOINTMENT (OUTPATIENT)
Dept: GERIATRICS | Facility: CLINIC | Age: 78
End: 2018-08-31
Payer: MEDICAID

## 2018-08-31 ENCOUNTER — APPOINTMENT (OUTPATIENT)
Dept: HEMATOLOGY ONCOLOGY | Facility: CLINIC | Age: 78
End: 2018-08-31
Payer: MEDICAID

## 2018-08-31 VITALS
WEIGHT: 142.2 LBS | RESPIRATION RATE: 16 BRPM | SYSTOLIC BLOOD PRESSURE: 104 MMHG | BODY MASS INDEX: 21.62 KG/M2 | OXYGEN SATURATION: 95 % | HEART RATE: 93 BPM | DIASTOLIC BLOOD PRESSURE: 70 MMHG | TEMPERATURE: 97.9 F

## 2018-08-31 DIAGNOSIS — I26.99 OTHER PULMONARY EMBOLISM W/OUT ACUTE COR PULMONALE: ICD-10-CM

## 2018-08-31 LAB
BASOPHILS # BLD AUTO: 0 K/UL — SIGNIFICANT CHANGE UP (ref 0–0.2)
BASOPHILS NFR BLD AUTO: 0.3 % — SIGNIFICANT CHANGE UP (ref 0–2)
EOSINOPHIL # BLD AUTO: 0.1 K/UL — SIGNIFICANT CHANGE UP (ref 0–0.5)
EOSINOPHIL NFR BLD AUTO: 1 % — SIGNIFICANT CHANGE UP (ref 0–6)
HCT VFR BLD CALC: 41.8 % — SIGNIFICANT CHANGE UP (ref 39–50)
HGB BLD-MCNC: 13.3 G/DL — SIGNIFICANT CHANGE UP (ref 13–17)
LYMPHOCYTES # BLD AUTO: 19.7 % — SIGNIFICANT CHANGE UP (ref 13–44)
LYMPHOCYTES # BLD AUTO: 2 K/UL — SIGNIFICANT CHANGE UP (ref 1–3.3)
MCHC RBC-ENTMCNC: 28 PG — SIGNIFICANT CHANGE UP (ref 27–34)
MCHC RBC-ENTMCNC: 31.9 G/DL — LOW (ref 32–36)
MCV RBC AUTO: 87.6 FL — SIGNIFICANT CHANGE UP (ref 80–100)
MONOCYTES # BLD AUTO: 0.5 K/UL — SIGNIFICANT CHANGE UP (ref 0–0.9)
MONOCYTES NFR BLD AUTO: 4.4 % — SIGNIFICANT CHANGE UP (ref 2–14)
NEUTROPHILS # BLD AUTO: 7.7 K/UL — HIGH (ref 1.8–7.4)
NEUTROPHILS NFR BLD AUTO: 74.7 % — SIGNIFICANT CHANGE UP (ref 43–77)
PLATELET # BLD AUTO: 171 K/UL — SIGNIFICANT CHANGE UP (ref 150–400)
RBC # BLD: 4.78 M/UL — SIGNIFICANT CHANGE UP (ref 4.2–5.8)
RBC # FLD: 13.3 % — SIGNIFICANT CHANGE UP (ref 10.3–14.5)
WBC # BLD: 10.3 K/UL — SIGNIFICANT CHANGE UP (ref 3.8–10.5)
WBC # FLD AUTO: 10.3 K/UL — SIGNIFICANT CHANGE UP (ref 3.8–10.5)

## 2018-08-31 PROCEDURE — 99215 OFFICE O/P EST HI 40 MIN: CPT

## 2018-08-31 PROCEDURE — 99214 OFFICE O/P EST MOD 30 MIN: CPT

## 2018-08-31 RX ORDER — MORPHINE SULFATE 15 MG/1
15 TABLET, FILM COATED, EXTENDED RELEASE ORAL 3 TIMES DAILY
Qty: 90 | Refills: 0 | Status: DISCONTINUED | COMMUNITY
Start: 2017-06-12 | End: 2018-08-31

## 2018-08-31 RX ORDER — ENOXAPARIN SODIUM 100 MG/ML
100 INJECTION SUBCUTANEOUS DAILY
Qty: 3 | Refills: 2 | Status: DISCONTINUED | COMMUNITY
Start: 2017-04-25 | End: 2018-08-31

## 2018-08-31 RX ORDER — ACETAMINOPHEN 500 MG/1
500 TABLET, COATED ORAL
Refills: 0 | Status: DISCONTINUED | COMMUNITY
End: 2018-08-31

## 2018-08-31 RX ORDER — ERYTHROMYCIN 20 MG/G
2 GEL TOPICAL TWICE DAILY
Qty: 1 | Refills: 5 | Status: DISCONTINUED | COMMUNITY
Start: 2018-03-05 | End: 2018-08-31

## 2018-08-31 RX ORDER — SODIUM CHLORIDE FOR INHALATION 3 %
3 VIAL, NEBULIZER (ML) INHALATION
Qty: 100 | Refills: 0 | Status: DISCONTINUED | COMMUNITY
Start: 2018-06-14 | End: 2018-08-31

## 2018-08-31 RX ORDER — DICLOFENAC SODIUM 10 MG/G
1 GEL TOPICAL DAILY
Qty: 1 | Refills: 2 | Status: DISCONTINUED | COMMUNITY
Start: 2017-12-08 | End: 2018-08-31

## 2018-08-31 RX ORDER — PREDNISONE 5 MG/1
5 TABLET ORAL
Qty: 9 | Refills: 0 | Status: DISCONTINUED | COMMUNITY
Start: 2018-01-05 | End: 2018-08-31

## 2018-08-31 RX ORDER — AMMONIUM LACTATE 12 %
12 LOTION (GRAM) TOPICAL
Qty: 1 | Refills: 0 | Status: DISCONTINUED | COMMUNITY
Start: 2017-10-24 | End: 2018-08-31

## 2018-08-31 RX ORDER — BENZONATATE 100 MG/1
100 CAPSULE ORAL 3 TIMES DAILY
Qty: 90 | Refills: 3 | Status: DISCONTINUED | COMMUNITY
Start: 2018-06-14 | End: 2018-08-31

## 2018-09-03 LAB
ALBUMIN SERPL ELPH-MCNC: 4.1 G/DL
ALP BLD-CCNC: 65 U/L
ALT SERPL-CCNC: 20 U/L
ANION GAP SERPL CALC-SCNC: 14 MMOL/L
AST SERPL-CCNC: 17 U/L
BILIRUB SERPL-MCNC: 0.3 MG/DL
BUN SERPL-MCNC: 17 MG/DL
CALCIUM SERPL-MCNC: 9.5 MG/DL
CEA SERPL-MCNC: 2.6 NG/ML
CHLORIDE SERPL-SCNC: 99 MMOL/L
CO2 SERPL-SCNC: 26 MMOL/L
CREAT SERPL-MCNC: 0.93 MG/DL
GLUCOSE SERPL-MCNC: 115 MG/DL
POTASSIUM SERPL-SCNC: 4.4 MMOL/L
PROT SERPL-MCNC: 7.6 G/DL
SODIUM SERPL-SCNC: 139 MMOL/L
TSH SERPL-ACNC: 1.12 UIU/ML

## 2018-09-08 NOTE — H&P ADULT. - NEGATIVE GENERAL GENITOURINARY SYMPTOMS
no difficulty in swallowing/no hoarseness no hematuria/no dysuria no dysuria/no renal colic/no flank pain L/no hematuria/no flank pain R

## 2018-09-13 ENCOUNTER — APPOINTMENT (OUTPATIENT)
Dept: HEMATOLOGY ONCOLOGY | Facility: CLINIC | Age: 78
End: 2018-09-13

## 2018-09-14 ENCOUNTER — FORM ENCOUNTER (OUTPATIENT)
Age: 78
End: 2018-09-14

## 2018-09-15 ENCOUNTER — OUTPATIENT (OUTPATIENT)
Dept: OUTPATIENT SERVICES | Facility: HOSPITAL | Age: 78
LOS: 1 days | End: 2018-09-15
Payer: MEDICAID

## 2018-09-15 ENCOUNTER — APPOINTMENT (OUTPATIENT)
Dept: CT IMAGING | Facility: IMAGING CENTER | Age: 78
End: 2018-09-15
Payer: MEDICAID

## 2018-09-15 DIAGNOSIS — C34.90 MALIGNANT NEOPLASM OF UNSPECIFIED PART OF UNSPECIFIED BRONCHUS OR LUNG: ICD-10-CM

## 2018-09-15 DIAGNOSIS — D04.9 CARCINOMA IN SITU OF SKIN, UNSPECIFIED: Chronic | ICD-10-CM

## 2018-09-15 DIAGNOSIS — C44.311 BASAL CELL CARCINOMA OF SKIN OF NOSE: Chronic | ICD-10-CM

## 2018-09-15 PROCEDURE — 71260 CT THORAX DX C+: CPT

## 2018-09-15 PROCEDURE — 82565 ASSAY OF CREATININE: CPT

## 2018-09-15 PROCEDURE — 71260 CT THORAX DX C+: CPT | Mod: 26

## 2018-09-17 ENCOUNTER — APPOINTMENT (OUTPATIENT)
Dept: SPEECH THERAPY | Facility: CLINIC | Age: 78
End: 2018-09-17

## 2018-09-17 ENCOUNTER — OUTPATIENT (OUTPATIENT)
Dept: OUTPATIENT SERVICES | Facility: HOSPITAL | Age: 78
LOS: 1 days | Discharge: HOME | End: 2018-09-17

## 2018-09-17 DIAGNOSIS — D04.9 CARCINOMA IN SITU OF SKIN, UNSPECIFIED: Chronic | ICD-10-CM

## 2018-09-17 DIAGNOSIS — C44.311 BASAL CELL CARCINOMA OF SKIN OF NOSE: Chronic | ICD-10-CM

## 2018-09-18 ENCOUNTER — FORM ENCOUNTER (OUTPATIENT)
Age: 78
End: 2018-09-18

## 2018-09-18 ENCOUNTER — APPOINTMENT (OUTPATIENT)
Dept: HEMATOLOGY ONCOLOGY | Facility: CLINIC | Age: 78
End: 2018-09-18
Payer: MEDICAID

## 2018-09-18 VITALS
OXYGEN SATURATION: 93 % | SYSTOLIC BLOOD PRESSURE: 111 MMHG | TEMPERATURE: 98.3 F | RESPIRATION RATE: 16 BRPM | DIASTOLIC BLOOD PRESSURE: 72 MMHG | WEIGHT: 143.3 LBS | BODY MASS INDEX: 21.79 KG/M2 | HEART RATE: 93 BPM

## 2018-09-18 PROCEDURE — 99215 OFFICE O/P EST HI 40 MIN: CPT

## 2018-09-19 ENCOUNTER — APPOINTMENT (OUTPATIENT)
Dept: SPEECH THERAPY | Facility: HOSPITAL | Age: 78
End: 2018-09-19
Payer: MEDICAID

## 2018-09-19 ENCOUNTER — OUTPATIENT (OUTPATIENT)
Dept: OUTPATIENT SERVICES | Facility: HOSPITAL | Age: 78
LOS: 1 days | End: 2018-09-19

## 2018-09-19 ENCOUNTER — APPOINTMENT (OUTPATIENT)
Dept: RADIOLOGY | Facility: HOSPITAL | Age: 78
End: 2018-09-19
Payer: MEDICAID

## 2018-09-19 DIAGNOSIS — C34.90 MALIGNANT NEOPLASM OF UNSPECIFIED PART OF UNSPECIFIED BRONCHUS OR LUNG: ICD-10-CM

## 2018-09-19 DIAGNOSIS — D04.9 CARCINOMA IN SITU OF SKIN, UNSPECIFIED: Chronic | ICD-10-CM

## 2018-09-19 DIAGNOSIS — C44.311 BASAL CELL CARCINOMA OF SKIN OF NOSE: Chronic | ICD-10-CM

## 2018-09-19 PROCEDURE — 74230 X-RAY XM SWLNG FUNCJ C+: CPT | Mod: 26

## 2018-09-21 ENCOUNTER — APPOINTMENT (OUTPATIENT)
Dept: GERIATRICS | Facility: CLINIC | Age: 78
End: 2018-09-21

## 2018-09-24 DIAGNOSIS — R13.12 DYSPHAGIA, OROPHARYNGEAL PHASE: ICD-10-CM

## 2018-09-28 DIAGNOSIS — E78.00 PURE HYPERCHOLESTEROLEMIA, UNSPECIFIED: ICD-10-CM

## 2018-09-28 DIAGNOSIS — M25.511 PAIN IN RIGHT SHOULDER: ICD-10-CM

## 2018-09-28 DIAGNOSIS — J43.9 EMPHYSEMA, UNSPECIFIED: ICD-10-CM

## 2018-09-28 RX ORDER — IPRATROPIUM BROMIDE AND ALBUTEROL SULFATE 2.5; .5 MG/3ML; MG/3ML
0.5-2.5 (3) SOLUTION RESPIRATORY (INHALATION) 4 TIMES DAILY
Qty: 1 | Refills: 2 | Status: ACTIVE | COMMUNITY
Start: 2018-09-28 | End: 1900-01-01

## 2018-10-03 ENCOUNTER — APPOINTMENT (OUTPATIENT)
Dept: SPEECH THERAPY | Facility: CLINIC | Age: 78
End: 2018-10-03

## 2018-10-03 ENCOUNTER — RX RENEWAL (OUTPATIENT)
Age: 78
End: 2018-10-03

## 2018-10-11 ENCOUNTER — APPOINTMENT (OUTPATIENT)
Dept: SPEECH THERAPY | Facility: CLINIC | Age: 78
End: 2018-10-11

## 2018-10-11 ENCOUNTER — OUTPATIENT (OUTPATIENT)
Dept: OUTPATIENT SERVICES | Facility: HOSPITAL | Age: 78
LOS: 1 days | Discharge: HOME | End: 2018-10-11

## 2018-10-11 DIAGNOSIS — D04.9 CARCINOMA IN SITU OF SKIN, UNSPECIFIED: Chronic | ICD-10-CM

## 2018-10-11 DIAGNOSIS — C44.311 BASAL CELL CARCINOMA OF SKIN OF NOSE: Chronic | ICD-10-CM

## 2018-10-11 DIAGNOSIS — C34.90 MALIGNANT NEOPLASM OF UNSPECIFIED PART OF UNSPECIFIED BRONCHUS OR LUNG: ICD-10-CM

## 2018-10-19 ENCOUNTER — LABORATORY RESULT (OUTPATIENT)
Age: 78
End: 2018-10-19

## 2018-10-19 ENCOUNTER — APPOINTMENT (OUTPATIENT)
Dept: INFUSION THERAPY | Facility: HOSPITAL | Age: 78
End: 2018-10-19

## 2018-10-19 ENCOUNTER — RESULT REVIEW (OUTPATIENT)
Age: 78
End: 2018-10-19

## 2018-10-19 ENCOUNTER — APPOINTMENT (OUTPATIENT)
Dept: SPEECH THERAPY | Facility: CLINIC | Age: 78
End: 2018-10-19

## 2018-10-19 ENCOUNTER — APPOINTMENT (OUTPATIENT)
Dept: HEMATOLOGY ONCOLOGY | Facility: CLINIC | Age: 78
End: 2018-10-19
Payer: MEDICAID

## 2018-10-19 ENCOUNTER — APPOINTMENT (OUTPATIENT)
Dept: GERIATRICS | Facility: CLINIC | Age: 78
End: 2018-10-19

## 2018-10-19 VITALS
DIASTOLIC BLOOD PRESSURE: 68 MMHG | OXYGEN SATURATION: 90 % | HEART RATE: 91 BPM | RESPIRATION RATE: 16 BRPM | SYSTOLIC BLOOD PRESSURE: 100 MMHG | TEMPERATURE: 97.9 F

## 2018-10-19 VITALS
SYSTOLIC BLOOD PRESSURE: 100 MMHG | RESPIRATION RATE: 16 BRPM | HEART RATE: 91 BPM | BODY MASS INDEX: 21.62 KG/M2 | DIASTOLIC BLOOD PRESSURE: 66 MMHG | OXYGEN SATURATION: 90 % | TEMPERATURE: 97.9 F | WEIGHT: 142.2 LBS

## 2018-10-19 DIAGNOSIS — K12.30 ORAL MUCOSITIS (ULCERATIVE), UNSPECIFIED: ICD-10-CM

## 2018-10-19 DIAGNOSIS — M89.8X9 OTHER SPECIFIED DISORDERS OF BONE, UNSPECIFIED SITE: ICD-10-CM

## 2018-10-19 LAB
BASOPHILS # BLD AUTO: 0 K/UL — SIGNIFICANT CHANGE UP (ref 0–0.2)
BASOPHILS NFR BLD AUTO: 0.1 % — SIGNIFICANT CHANGE UP (ref 0–2)
EOSINOPHIL # BLD AUTO: 0.1 K/UL — SIGNIFICANT CHANGE UP (ref 0–0.5)
EOSINOPHIL NFR BLD AUTO: 0.7 % — SIGNIFICANT CHANGE UP (ref 0–6)
HCT VFR BLD CALC: 35.8 % — LOW (ref 39–50)
HGB BLD-MCNC: 12.1 G/DL — LOW (ref 13–17)
LYMPHOCYTES # BLD AUTO: 1.5 K/UL — SIGNIFICANT CHANGE UP (ref 1–3.3)
LYMPHOCYTES # BLD AUTO: 11.2 % — LOW (ref 13–44)
MCHC RBC-ENTMCNC: 29.7 PG — SIGNIFICANT CHANGE UP (ref 27–34)
MCHC RBC-ENTMCNC: 33.8 G/DL — SIGNIFICANT CHANGE UP (ref 32–36)
MCV RBC AUTO: 87.8 FL — SIGNIFICANT CHANGE UP (ref 80–100)
MONOCYTES # BLD AUTO: 0.4 K/UL — SIGNIFICANT CHANGE UP (ref 0–0.9)
MONOCYTES NFR BLD AUTO: 3.1 % — SIGNIFICANT CHANGE UP (ref 2–14)
NEUTROPHILS # BLD AUTO: 11.4 K/UL — HIGH (ref 1.8–7.4)
NEUTROPHILS NFR BLD AUTO: 84.8 % — HIGH (ref 43–77)
PLATELET # BLD AUTO: 135 K/UL — LOW (ref 150–400)
RBC # BLD: 4.08 M/UL — LOW (ref 4.2–5.8)
RBC # FLD: 13.5 % — SIGNIFICANT CHANGE UP (ref 10.3–14.5)
WBC # BLD: 13.5 K/UL — HIGH (ref 3.8–10.5)
WBC # FLD AUTO: 13.5 K/UL — HIGH (ref 3.8–10.5)

## 2018-10-19 PROCEDURE — 99214 OFFICE O/P EST MOD 30 MIN: CPT

## 2018-10-19 RX ORDER — DIPHENHYDRAMINE HYDROCHLORIDE AND LIDOCAINE HYDROCHLORIDE AND ALUMINUM HYDROXIDE AND MAGNESIUM HYDRO
KIT
Qty: 2 | Refills: 3 | Status: ACTIVE | COMMUNITY
Start: 2018-10-19 | End: 1900-01-01

## 2018-10-22 DIAGNOSIS — E86.0 DEHYDRATION: ICD-10-CM

## 2018-10-25 ENCOUNTER — FORM ENCOUNTER (OUTPATIENT)
Age: 78
End: 2018-10-25

## 2018-10-26 ENCOUNTER — OUTPATIENT (OUTPATIENT)
Dept: OUTPATIENT SERVICES | Facility: HOSPITAL | Age: 78
LOS: 1 days | End: 2018-10-26
Payer: MEDICAID

## 2018-10-26 ENCOUNTER — APPOINTMENT (OUTPATIENT)
Dept: SPEECH THERAPY | Facility: CLINIC | Age: 78
End: 2018-10-26

## 2018-10-26 ENCOUNTER — APPOINTMENT (OUTPATIENT)
Dept: NUCLEAR MEDICINE | Facility: IMAGING CENTER | Age: 78
End: 2018-10-26
Payer: MEDICAID

## 2018-10-26 DIAGNOSIS — D04.9 CARCINOMA IN SITU OF SKIN, UNSPECIFIED: Chronic | ICD-10-CM

## 2018-10-26 DIAGNOSIS — M89.8X9 OTHER SPECIFIED DISORDERS OF BONE, UNSPECIFIED SITE: ICD-10-CM

## 2018-10-26 DIAGNOSIS — C34.90 MALIGNANT NEOPLASM OF UNSPECIFIED PART OF UNSPECIFIED BRONCHUS OR LUNG: ICD-10-CM

## 2018-10-26 DIAGNOSIS — C44.311 BASAL CELL CARCINOMA OF SKIN OF NOSE: Chronic | ICD-10-CM

## 2018-10-26 PROCEDURE — A9561: CPT

## 2018-10-26 PROCEDURE — 78306 BONE IMAGING WHOLE BODY: CPT | Mod: 26

## 2018-10-26 PROCEDURE — 78306 BONE IMAGING WHOLE BODY: CPT

## 2018-10-31 ENCOUNTER — APPOINTMENT (OUTPATIENT)
Dept: SPEECH THERAPY | Facility: CLINIC | Age: 78
End: 2018-10-31

## 2018-10-31 ENCOUNTER — APPOINTMENT (OUTPATIENT)
Dept: OTOLARYNGOLOGY | Facility: CLINIC | Age: 78
End: 2018-10-31
Payer: MEDICAID

## 2018-10-31 ENCOUNTER — INPATIENT (INPATIENT)
Facility: HOSPITAL | Age: 78
LOS: 9 days | Discharge: HOME | End: 2018-11-10
Attending: HOSPITALIST | Admitting: HOSPITALIST
Payer: MEDICAID

## 2018-10-31 ENCOUNTER — APPOINTMENT (OUTPATIENT)
Dept: HEMATOLOGY ONCOLOGY | Facility: CLINIC | Age: 78
End: 2018-10-31
Payer: MEDICAID

## 2018-10-31 VITALS
BODY MASS INDEX: 21.55 KG/M2 | HEART RATE: 90 BPM | SYSTOLIC BLOOD PRESSURE: 95 MMHG | HEIGHT: 68 IN | WEIGHT: 142.19 LBS | DIASTOLIC BLOOD PRESSURE: 60 MMHG

## 2018-10-31 VITALS
DIASTOLIC BLOOD PRESSURE: 75 MMHG | RESPIRATION RATE: 18 BRPM | TEMPERATURE: 98 F | HEART RATE: 90 BPM | SYSTOLIC BLOOD PRESSURE: 113 MMHG | OXYGEN SATURATION: 93 %

## 2018-10-31 VITALS
DIASTOLIC BLOOD PRESSURE: 68 MMHG | RESPIRATION RATE: 16 BRPM | TEMPERATURE: 98.1 F | OXYGEN SATURATION: 99 % | SYSTOLIC BLOOD PRESSURE: 100 MMHG | WEIGHT: 138.67 LBS | BODY MASS INDEX: 21.08 KG/M2 | HEART RATE: 90 BPM

## 2018-10-31 DIAGNOSIS — J44.9 CHRONIC OBSTRUCTIVE PULMONARY DISEASE, UNSPECIFIED: ICD-10-CM

## 2018-10-31 DIAGNOSIS — R13.10 DYSPHAGIA, UNSPECIFIED: ICD-10-CM

## 2018-10-31 DIAGNOSIS — D04.9 CARCINOMA IN SITU OF SKIN, UNSPECIFIED: Chronic | ICD-10-CM

## 2018-10-31 DIAGNOSIS — N40.0 BENIGN PROSTATIC HYPERPLASIA WITHOUT LOWER URINARY TRACT SYMPTOMS: ICD-10-CM

## 2018-10-31 DIAGNOSIS — I26.99 OTHER PULMONARY EMBOLISM WITHOUT ACUTE COR PULMONALE: ICD-10-CM

## 2018-10-31 DIAGNOSIS — J18.9 PNEUMONIA, UNSPECIFIED ORGANISM: ICD-10-CM

## 2018-10-31 DIAGNOSIS — E11.9 TYPE 2 DIABETES MELLITUS WITHOUT COMPLICATIONS: ICD-10-CM

## 2018-10-31 DIAGNOSIS — G93.41 METABOLIC ENCEPHALOPATHY: ICD-10-CM

## 2018-10-31 DIAGNOSIS — N17.9 ACUTE KIDNEY FAILURE, UNSPECIFIED: ICD-10-CM

## 2018-10-31 DIAGNOSIS — C44.311 BASAL CELL CARCINOMA OF SKIN OF NOSE: Chronic | ICD-10-CM

## 2018-10-31 DIAGNOSIS — G89.3 NEOPLASM RELATED PAIN (ACUTE) (CHRONIC): ICD-10-CM

## 2018-10-31 LAB
ALBUMIN SERPL ELPH-MCNC: 3.4 G/DL — SIGNIFICANT CHANGE UP (ref 3.3–5)
ALP SERPL-CCNC: 69 U/L — SIGNIFICANT CHANGE UP (ref 40–120)
ALT FLD-CCNC: 7 U/L — SIGNIFICANT CHANGE UP (ref 4–41)
AST SERPL-CCNC: 13 U/L — SIGNIFICANT CHANGE UP (ref 4–40)
B PERT DNA SPEC QL NAA+PROBE: SIGNIFICANT CHANGE UP
BASE EXCESS BLDV CALC-SCNC: 7.4 MMOL/L — SIGNIFICANT CHANGE UP
BASOPHILS # BLD AUTO: 0.02 K/UL — SIGNIFICANT CHANGE UP (ref 0–0.2)
BASOPHILS NFR BLD AUTO: 0.1 % — SIGNIFICANT CHANGE UP (ref 0–2)
BILIRUB SERPL-MCNC: 0.4 MG/DL — SIGNIFICANT CHANGE UP (ref 0.2–1.2)
BLOOD GAS VENOUS - CREATININE: 1.93 MG/DL — HIGH (ref 0.5–1.3)
BUN SERPL-MCNC: 21 MG/DL — SIGNIFICANT CHANGE UP (ref 7–23)
C PNEUM DNA SPEC QL NAA+PROBE: NOT DETECTED — SIGNIFICANT CHANGE UP
CALCIUM SERPL-MCNC: 9 MG/DL — SIGNIFICANT CHANGE UP (ref 8.4–10.5)
CHLORIDE BLDV-SCNC: 101 MMOL/L — SIGNIFICANT CHANGE UP (ref 96–108)
CHLORIDE SERPL-SCNC: 99 MMOL/L — SIGNIFICANT CHANGE UP (ref 98–107)
CO2 SERPL-SCNC: 29 MMOL/L — SIGNIFICANT CHANGE UP (ref 22–31)
CREAT SERPL-MCNC: 2.1 MG/DL — HIGH (ref 0.5–1.3)
EOSINOPHIL # BLD AUTO: 0.03 K/UL — SIGNIFICANT CHANGE UP (ref 0–0.5)
EOSINOPHIL NFR BLD AUTO: 0.2 % — SIGNIFICANT CHANGE UP (ref 0–6)
FLUAV H1 2009 PAND RNA SPEC QL NAA+PROBE: NOT DETECTED — SIGNIFICANT CHANGE UP
FLUAV H1 RNA SPEC QL NAA+PROBE: NOT DETECTED — SIGNIFICANT CHANGE UP
FLUAV H3 RNA SPEC QL NAA+PROBE: NOT DETECTED — SIGNIFICANT CHANGE UP
FLUAV SUBTYP SPEC NAA+PROBE: SIGNIFICANT CHANGE UP
FLUBV RNA SPEC QL NAA+PROBE: NOT DETECTED — SIGNIFICANT CHANGE UP
GAS PNL BLDV: 136 MMOL/L — SIGNIFICANT CHANGE UP (ref 136–146)
GLUCOSE BLDV-MCNC: 140 — HIGH (ref 70–99)
GLUCOSE SERPL-MCNC: 139 MG/DL — HIGH (ref 70–99)
HADV DNA SPEC QL NAA+PROBE: NOT DETECTED — SIGNIFICANT CHANGE UP
HCO3 BLDV-SCNC: 29 MMOL/L — HIGH (ref 20–27)
HCOV 229E RNA SPEC QL NAA+PROBE: NOT DETECTED — SIGNIFICANT CHANGE UP
HCOV HKU1 RNA SPEC QL NAA+PROBE: NOT DETECTED — SIGNIFICANT CHANGE UP
HCOV NL63 RNA SPEC QL NAA+PROBE: NOT DETECTED — SIGNIFICANT CHANGE UP
HCOV OC43 RNA SPEC QL NAA+PROBE: NOT DETECTED — SIGNIFICANT CHANGE UP
HCT VFR BLD CALC: 36.4 % — LOW (ref 39–50)
HCT VFR BLDV CALC: 36.1 % — LOW (ref 39–51)
HGB BLD-MCNC: 11.6 G/DL — LOW (ref 13–17)
HGB BLDV-MCNC: 11.7 G/DL — LOW (ref 13–17)
HMPV RNA SPEC QL NAA+PROBE: NOT DETECTED — SIGNIFICANT CHANGE UP
HPIV1 RNA SPEC QL NAA+PROBE: NOT DETECTED — SIGNIFICANT CHANGE UP
HPIV2 RNA SPEC QL NAA+PROBE: NOT DETECTED — SIGNIFICANT CHANGE UP
HPIV3 RNA SPEC QL NAA+PROBE: NOT DETECTED — SIGNIFICANT CHANGE UP
HPIV4 RNA SPEC QL NAA+PROBE: NOT DETECTED — SIGNIFICANT CHANGE UP
IMM GRANULOCYTES # BLD AUTO: 0.08 # — SIGNIFICANT CHANGE UP
IMM GRANULOCYTES NFR BLD AUTO: 0.5 % — SIGNIFICANT CHANGE UP (ref 0–1.5)
LACTATE BLDV-MCNC: 1.2 MMOL/L — SIGNIFICANT CHANGE UP (ref 0.5–2)
LYMPHOCYTES # BLD AUTO: 1.11 K/UL — SIGNIFICANT CHANGE UP (ref 1–3.3)
LYMPHOCYTES # BLD AUTO: 6.6 % — LOW (ref 13–44)
M PNEUMO DNA SPEC QL NAA+PROBE: NOT DETECTED — SIGNIFICANT CHANGE UP
MCHC RBC-ENTMCNC: 28 PG — SIGNIFICANT CHANGE UP (ref 27–34)
MCHC RBC-ENTMCNC: 31.9 % — LOW (ref 32–36)
MCV RBC AUTO: 87.9 FL — SIGNIFICANT CHANGE UP (ref 80–100)
MONOCYTES # BLD AUTO: 0.85 K/UL — SIGNIFICANT CHANGE UP (ref 0–0.9)
MONOCYTES NFR BLD AUTO: 5.1 % — SIGNIFICANT CHANGE UP (ref 2–14)
NEUTROPHILS # BLD AUTO: 14.64 K/UL — HIGH (ref 1.8–7.4)
NEUTROPHILS NFR BLD AUTO: 87.5 % — HIGH (ref 43–77)
NRBC # FLD: 0 — SIGNIFICANT CHANGE UP
PCO2 BLDV: 56 MMHG — HIGH (ref 41–51)
PH BLDV: 7.38 PH — SIGNIFICANT CHANGE UP (ref 7.32–7.43)
PLATELET # BLD AUTO: 194 K/UL — SIGNIFICANT CHANGE UP (ref 150–400)
PMV BLD: 9.4 FL — SIGNIFICANT CHANGE UP (ref 7–13)
PO2 BLDV: 20 MMHG — LOW (ref 35–40)
POTASSIUM BLDV-SCNC: 4 MMOL/L — SIGNIFICANT CHANGE UP (ref 3.4–4.5)
POTASSIUM SERPL-MCNC: 4.1 MMOL/L — SIGNIFICANT CHANGE UP (ref 3.5–5.3)
POTASSIUM SERPL-SCNC: 4.1 MMOL/L — SIGNIFICANT CHANGE UP (ref 3.5–5.3)
PROT SERPL-MCNC: 7.6 G/DL — SIGNIFICANT CHANGE UP (ref 6–8.3)
RBC # BLD: 4.14 M/UL — LOW (ref 4.2–5.8)
RBC # FLD: 13.9 % — SIGNIFICANT CHANGE UP (ref 10.3–14.5)
RSV RNA SPEC QL NAA+PROBE: NOT DETECTED — SIGNIFICANT CHANGE UP
RV+EV RNA SPEC QL NAA+PROBE: NOT DETECTED — SIGNIFICANT CHANGE UP
SAO2 % BLDV: 27 % — LOW (ref 60–85)
SODIUM SERPL-SCNC: 138 MMOL/L — SIGNIFICANT CHANGE UP (ref 135–145)
WBC # BLD: 16.73 K/UL — HIGH (ref 3.8–10.5)
WBC # FLD AUTO: 16.73 K/UL — HIGH (ref 3.8–10.5)

## 2018-10-31 PROCEDURE — 71045 X-RAY EXAM CHEST 1 VIEW: CPT | Mod: 26

## 2018-10-31 PROCEDURE — 99204 OFFICE O/P NEW MOD 45 MIN: CPT | Mod: 25

## 2018-10-31 PROCEDURE — 31579 LARYNGOSCOPY TELESCOPIC: CPT

## 2018-10-31 PROCEDURE — 99223 1ST HOSP IP/OBS HIGH 75: CPT | Mod: GC

## 2018-10-31 PROCEDURE — 99215 OFFICE O/P EST HI 40 MIN: CPT

## 2018-10-31 RX ORDER — PIPERACILLIN AND TAZOBACTAM 4; .5 G/20ML; G/20ML
3.38 INJECTION, POWDER, LYOPHILIZED, FOR SOLUTION INTRAVENOUS EVERY 12 HOURS
Qty: 0 | Refills: 0 | Status: DISCONTINUED | OUTPATIENT
Start: 2018-11-01 | End: 2018-11-06

## 2018-10-31 RX ORDER — SODIUM CHLORIDE 9 MG/ML
1000 INJECTION, SOLUTION INTRAVENOUS
Qty: 0 | Refills: 0 | Status: DISCONTINUED | OUTPATIENT
Start: 2018-10-31 | End: 2018-11-10

## 2018-10-31 RX ORDER — DEXTROSE 50 % IN WATER 50 %
25 SYRINGE (ML) INTRAVENOUS ONCE
Qty: 0 | Refills: 0 | Status: DISCONTINUED | OUTPATIENT
Start: 2018-10-31 | End: 2018-11-10

## 2018-10-31 RX ORDER — PIPERACILLIN AND TAZOBACTAM 4; .5 G/20ML; G/20ML
3.38 INJECTION, POWDER, LYOPHILIZED, FOR SOLUTION INTRAVENOUS ONCE
Qty: 0 | Refills: 0 | Status: COMPLETED | OUTPATIENT
Start: 2018-10-31 | End: 2018-10-31

## 2018-10-31 RX ORDER — SODIUM CHLORIDE 9 MG/ML
1000 INJECTION, SOLUTION INTRAVENOUS ONCE
Qty: 0 | Refills: 0 | Status: COMPLETED | OUTPATIENT
Start: 2018-10-31 | End: 2018-10-31

## 2018-10-31 RX ORDER — VANCOMYCIN HCL 1 G
1000 VIAL (EA) INTRAVENOUS ONCE
Qty: 0 | Refills: 0 | Status: COMPLETED | OUTPATIENT
Start: 2018-10-31 | End: 2018-10-31

## 2018-10-31 RX ORDER — DEXTROSE 50 % IN WATER 50 %
15 SYRINGE (ML) INTRAVENOUS ONCE
Qty: 0 | Refills: 0 | Status: DISCONTINUED | OUTPATIENT
Start: 2018-10-31 | End: 2018-11-10

## 2018-10-31 RX ORDER — INSULIN LISPRO 100/ML
VIAL (ML) SUBCUTANEOUS EVERY 6 HOURS
Qty: 0 | Refills: 0 | Status: DISCONTINUED | OUTPATIENT
Start: 2018-10-31 | End: 2018-11-02

## 2018-10-31 RX ORDER — DEXTROSE 50 % IN WATER 50 %
12.5 SYRINGE (ML) INTRAVENOUS ONCE
Qty: 0 | Refills: 0 | Status: DISCONTINUED | OUTPATIENT
Start: 2018-10-31 | End: 2018-11-10

## 2018-10-31 RX ORDER — GLUCAGON INJECTION, SOLUTION 0.5 MG/.1ML
1 INJECTION, SOLUTION SUBCUTANEOUS ONCE
Qty: 0 | Refills: 0 | Status: DISCONTINUED | OUTPATIENT
Start: 2018-10-31 | End: 2018-11-10

## 2018-10-31 RX ADMIN — SODIUM CHLORIDE 500 MILLILITER(S): 9 INJECTION, SOLUTION INTRAVENOUS at 17:33

## 2018-10-31 RX ADMIN — PIPERACILLIN AND TAZOBACTAM 200 GRAM(S): 4; .5 INJECTION, POWDER, LYOPHILIZED, FOR SOLUTION INTRAVENOUS at 17:33

## 2018-10-31 RX ADMIN — Medication 250 MILLIGRAM(S): at 18:26

## 2018-10-31 NOTE — H&P ADULT - PROBLEM SELECTOR PLAN 5
- patient with elevated Cr to 2.0- baseline is 1  - ddx includes post obstructive given BPH vs LUX in setting of sepsis   - will bladder scan to eval for urine and straight cath  - will also send UA given suprapubic tenderness as well as dark urine  - will fluid resuscitate at 100cc NS/ hour

## 2018-10-31 NOTE — H&P ADULT - PROBLEM SELECTOR PLAN 2
- patient presenting with aspiration event and elevated WBC count, as well as CXR findings of multifocal PNA  - history of dysphagia and witnessed aspirations

## 2018-10-31 NOTE — H&P ADULT - NSHPLABSRESULTS_GEN_ALL_CORE
11.6   16.73 )-----------( 194      ( 31 Oct 2018 15:50 )             36.4       10-31    138  |  99  |  21  ----------------------------<  139<H>  4.1   |  29  |  2.10<H>    Ca    9.0      31 Oct 2018 15:50    TPro  7.6  /  Alb  3.4  /  TBili  0.4  /  DBili  x   /  AST  13  /  ALT  7   /  AlkPhos  69  10-31                15:50 - VBG - pH: 7.38  | pCO2: 56    | pO2: 20    | Lactate: 1.2            EKG: normal sins rhythm  CXR: bilateral airspace opacities

## 2018-10-31 NOTE — H&P ADULT - HISTORY OF PRESENT ILLNESS
77M with PMhx of stage IV CKD, and mutated NSCLC (s/p erlotinib, carboplatin/pemetrexed, atezolizumab) now on gilotrif (abatnif) presented to his oncologist office today and had a witness episode of aspirationin the office. Patient also endorses lethargy and generalized weakness, and chills over the past few days. Son at [facility] states patient was coughing up his food at home, and had been on honey thickened liquid at home. Today, however, he had a cheese sandwich and spit that up. Son and patient also report dark urine.       Patient with recent admission for aspiration PNA in August, and prior with recent MICU admission for Septic shock 2/2 gram negative/MRSA PNA and acute hypoxic resp failure. 77M with PMhx of stage HTN, HLD, BPH, COPD, DM, GERD, BCC s/p resection, Hep B, IV CKD, and mutated NSCLC (s/p erlotinib, carboplatin/pemetrexed, atezolizumab) now on gilotrif (abatnif) presented to his oncologist office today was advised to come to the ED due to concern for aspiration PNA and lethargy. History obtained from son and wife at bedside. Per wife, patient has been having worsening lethargy and weakness over the past few days, and has required assistance with ambulation. Patient himself is complaining of fatigue and weakness, and dark urine. Yesterday overnight, son reports patient was confused. At 1AM in the the morning, son noted patient was downstairs getting washed up and had opened the garage. Upon questioning, patient stated he thought it was 8:00PM. Today, patient was taken to his speech therapists office, where he coughed up 2 clumps of cheese from a grilled cheese sandwich he ate earlier. He was advised to see his oncologist, and went there afterwards, who insisted he come to the ED.    Dr. Gross also concerned about patient's pain medication intake, and a component of his lethargy is from overmedication from his opioids. Patient recently had a PET scan done which did not show evidence of bony metastates.     Patient with recent admission for aspiration PNA in August, and prior with recent MICU admission for Septic shock 2/2 gram negative/MRSA PNA and acute hypoxic resp failure.     In the ED, /75; HR 90; RR 18; T 97.8, sat 93% on room air. Patient given vanc and zosyn in the ED and 1L NS.

## 2018-10-31 NOTE — ED PROVIDER NOTE - OBJECTIVE STATEMENT
76 yo Cantonese speaking M, declines  service (son translates at bedside) hx stage IV EGFR mutated NSCLC (s/p erlotinib, carboplatin/pemetrexed, atezolizumab) now on gilotrif, sent in by oncologist (Dr. Brandan Gross) for witnessed aspiration in office, and complaints of lethargy, generalized weakness, and chills for the past few days. Son states he has witnessed him coughing up food, on honey thickened liquid diet at home, but had a cheese sandwich that he spit up today. He also reports dark urine. No diarrhea/constipation. Chronic chest pain/abdominal pain/arm pain which subsides with his home dose of morphine. 76 yo Cantonese speaking M, declines  service (son translates at bedside) hx stage IV EGFR mutated NSCLC (s/p erlotinib, carboplatin/pemetrexed, atezolizumab) now on gilotrif, sent in by oncologist (Dr. Brandan Gross) for witnessed aspiration in office, and complaints of lethargy, generalized weakness, and chills for the past few days. Son states he has witnessed him coughing up food, on honey thickened liquid diet at home, but had a cheese sandwich that he spit up today. He also reports dark urine. No diarrhea/constipation. Chronic chest pain/abdominal pain/R hand pain, no change today, non exertional, subsides with his home dose of morphine. 78 yo Cantonese speaking M, declines  service (son translates at bedside) hx stage IV EGFR mutated NSCLC (s/p erlotinib, carboplatin/pemetrexed, atezolizumab) now on gilotrif, sent in by oncologist (Dr. Brandan Gross) for witnessed aspiration in office, and complaints of lethargy, generalized weakness, and chills for the past few days. Son states he has witnessed him coughing up food, on honey thickened liquid diet at home, but had a cheese sandwich that he spit up today. He also reports dark urine. No diarrhea/constipation. Chronic substernal burning chest pain/abdominal pain, R hand pain, non exertional,  no change today, denies chest pain radiating to arm or back, pain subsides with his home dose of morphine.

## 2018-10-31 NOTE — H&P ADULT - PROBLEM SELECTOR PLAN 4
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

## 2018-10-31 NOTE — ED ADULT NURSE NOTE - NSIMPLEMENTINTERV_GEN_ALL_ED
Implemented All Fall Risk Interventions:  [place] to call system. Call bell, personal items and telephone within reach. Instruct patient to call for assistance. Room bathroom lighting operational. Non-slip footwear when patient is off stretcher. Physically safe environment: no spills, clutter or unnecessary equipment. Stretcher in lowest position, wheels locked, appropriate side rails in place. Provide visual cue, wrist band, yellow gown, etc. Monitor gait and stability. Monitor for mental status changes and reorient to person, place, and time. Review medications for side effects contributing to fall risk. Reinforce activity limits and safety measures with patient and family.

## 2018-10-31 NOTE — ED PROVIDER NOTE - ATTENDING CONTRIBUTION TO CARE
I, Jennifer Cabot, MD, have performed a history and physical exam of the patient and discussed their management with the resident. I reviewed the resident's note and agree with the documented findings and plan of care. My medical decision making and observations are found above.    cabot: 77M with PMH of stage lung CA on oral chemo, DM, COPD, HBV, here from his oncologist's office for aspiration, lethargy, weakness since yesterday.  + brown urine.  No F/C/N/V/D.  On exam, HDS, frail, sleepy, MMM, eyes clear, lungs CTAB, heart sounds normal, abd soft, NT, ND, no CVAT, LEs without edema, wwp, skin normal temperature and color, neuro: alert and oriented x 3, no focal deficits.  DDx includes aspiration PNA, LUX, electrolyte derangement.  Will send full labs, CXR, UA, anticipate admission.

## 2018-10-31 NOTE — H&P ADULT - ASSESSMENT
77M with PMhx of stage HTN, HLD, BPH, COPD, DM, GERD, PE, dysphagia, BCC s/p resection, Hep B, IV CKD, and mutated NSCLC (s/p erlotinib, carboplatin/pemetrexed, atezolizumab) now on gilotrif (abatnif) presents with few days of worsening lethargy and weakness, with witnessed aspiration event, admitted for aspiration PNA, complicated by LUX on CKD.

## 2018-10-31 NOTE — H&P ADULT - PROBLEM SELECTOR PLAN 3
- likely secondary to sepsis and/or opioid intake  - continue to monitor improvement after abx/fluids and holding opioids  - CT head in August negative for intracranial mass

## 2018-10-31 NOTE — ED PROVIDER NOTE - PHYSICAL EXAMINATION
VITALS: reviewed  GEN: NAD  HEAD/EYES: NCAT, PERRL, EOMI, anicteric sclerae, no conjunctival pallor  ENT: mucus membranes dry, oropharynx WNL, trachea midline  RESP: lungs CTA with equal breath sounds bilaterally, chest wall nontender and atraumatic  CV: heart with reg rhythm S1, S2; distal pulses intact and symmetric bilaterally  ABDOMEN: normoactive bowel sounds, soft, nondistended, nontender, no palpable masses  : no CVAT  MSK: extremities atraumatic and nontender, no edema, no asymmetry.  SKIN: warm, dry, no rash, no bruising, no cyanosis. color appropriate for ethnicity  NEURO: alert, mentating appropriately, no facial asymmetry. gross sensation, 4/5 strength all four extremities, coordination are intact  PSYCH: Affect appropriate

## 2018-10-31 NOTE — H&P ADULT - NSHPPHYSICALEXAM_GEN_ALL_CORE
T(C): 36.2 (31 Oct 2018 18:24), Max: 37.4 (31 Oct 2018 16:31)  T(F): 97.1 (31 Oct 2018 18:24), Max: 99.4 (31 Oct 2018 16:31)  HR: 90 (31 Oct 2018 18:24) (87 - 90)  BP: 112/64 (31 Oct 2018 18:24) (112/64 - 128/71)  BP(mean): --  ABP: --  ABP(mean): --  RR: 16 (31 Oct 2018 18:24) (16 - 18)  SpO2: 100% (31 Oct 2018 18:24) (93% - 100%)    General: elderly male, laying in bed, NAD, lethargic  Neurology: A&Ox to self, year and location, unwilling to participate in physical exam  Head:  Normocephalic, atraumatic  ENT:  Mucosa moist, no ulcerations  Neck:  Supple, no sinuses or palpable masses  Respiratory: left sided expiratory wheezing and right sided rhonci  CV: RRR, S1S2, no murmur  Abdominal: Soft, tender to palpation in suprapubic pain,   MSK: No edema, + peripheral pulses, FROM all   Incisions: intact, no erythema or drainage

## 2018-10-31 NOTE — H&P ADULT - PROBLEM SELECTOR PLAN 7
- patient on morphine 30q8 and and 10 of oxy q4  - in ED, patient noted to be markedly lethargic- will hold of on pain medications at this time  - pall care consult in AM regarding patient medication titration  - unclear etiology of pain- PET scan is negative for bony mets; may be pleural pain from cancer +/- GERD

## 2018-10-31 NOTE — H&P ADULT - PROBLEM SELECTOR PLAN 10
- CT from April revealing bilateral pulmonary emboli  - prior on lovenox and transitioned to Eliquis by oncologist    11. Goals of care:  -Discussed resuscitation and intubation with son and patient. They have not come to a decision at this time, but will talk about it overnight. Full code for now. Are interested in filling out a MOLST form.    12. Hepatitis B- on tenofovir  - hold for now     13. DVT ppx  - on eliquis at home- covered for 24 hours  - will eval speech capability in AM- if unable to tolerate PO- will give lovenox

## 2018-10-31 NOTE — H&P ADULT - ATTENDING COMMENTS
Pt feels ok without complaints.  Pt's son states that pt is less lethargic than earlier, but not completely back to baseline.  Pt has not had any pain despite missing morphine doses in afternoon and night.    On exam, pt in NAD.  Lungs with diffuse rhonchi, no resp distress.  Heart: tachy, regular.    Labs and imaging reviewed  - continue Zosyn for possible aspiration pna  - trend creatinine and hydrate for LUX  - Hold morphine,  will likely need lower dose.

## 2018-10-31 NOTE — ED PROVIDER NOTE - NS ED ROS FT
CONST: no fevers, + chills, no trauma, + generalized weakness  EYES: no pain, no visual disturbances  ENT: no sore throat, no epistaxis, no rhinorrhea, no hearing changes  CV: + chest pain, no palpitations, no orthopnea, no extremity pain or swelling  RESP: no shortness of breath, + cough, no sputum, no pleurisy, no wheezing  ABD: + abdominal pain, no nausea, no vomiting, no diarrhea, no black or bloody stool  : no dysuria, no hematuria, no frequency, no urgency  MSK: no back pain, no neck pain, no extremity pain  NEURO: no headache, no sensory disturbances, no focal weakness, no dizziness  HEME: no easy bleeding or bruising  SKIN: no diaphoresis, no rash

## 2018-10-31 NOTE — ED ADULT NURSE NOTE - OBJECTIVE STATEMENT
pt received to room 19 pt is A&0x3 pt comes to ED for lethargy pt was receiving chemo for lung CA and he was lethargic so they called 911. pt is primary Cantonese speaking pt wants his son to translate. pt VSS pt is sating 98 % on Ra. pt was complaining of dark urine and pain. 20 g placed in L AC labs were drawn and sent pt has a low grade temp rectally. blanchable redness noted to sacrum awaiting further orders Will continue to monitor the pt

## 2018-10-31 NOTE — H&P ADULT - PROBLEM SELECTOR PLAN 8
- patient does not use inhalers regularly at home nor nebulizers  - will give duonebs as needed  - does not require O2 at this time- maintain sat > 92%

## 2018-10-31 NOTE — H&P ADULT - PROBLEM SELECTOR PLAN 1
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

## 2018-10-31 NOTE — ED ADULT NURSE NOTE - PMH
BPH (benign prostatic hyperplasia)    BPH (benign prostatic hypertrophy)    COPD (chronic obstructive pulmonary disease)    Diabetes    DM (diabetes mellitus)    GERD (gastroesophageal reflux disease)    HLD (hyperlipidemia)    HLD (hyperlipidemia)    HTN (hypertension)    Hypertension    Lung cancer

## 2018-10-31 NOTE — ED PROVIDER NOTE - MEDICAL DECISION MAKING DETAILS
cabot: 77M with PMH of stage lung CA on oral chemo, DM, COPD, HBV, here from his oncologist's office for aspiration, lethargy, weakness since yesterday.  + brown urine.  No F/C/N/V/D.  On exam, HDS, frail, sleepy, MMM, eyes clear, lungs CTAB, heart sounds normal, abd soft, NT, ND, no CVAT, LEs without edema, wwp, skin normal temperature and color, neuro: alert and oriented x 3, no focal deficits.  DDx includes aspiration PNA, LUX, electrolyte derangement.  Will send full labs, CXR, UA, anticipate admission.

## 2018-11-01 DIAGNOSIS — C34.90 MALIGNANT NEOPLASM OF UNSPECIFIED PART OF UNSPECIFIED BRONCHUS OR LUNG: ICD-10-CM

## 2018-11-01 DIAGNOSIS — G89.3 NEOPLASM RELATED PAIN (ACUTE) (CHRONIC): ICD-10-CM

## 2018-11-01 DIAGNOSIS — Z51.5 ENCOUNTER FOR PALLIATIVE CARE: ICD-10-CM

## 2018-11-01 DIAGNOSIS — R53.81 OTHER MALAISE: ICD-10-CM

## 2018-11-01 LAB
APPEARANCE UR: SIGNIFICANT CHANGE UP
APTT BLD: 40.3 SEC — HIGH (ref 27.5–36.3)
APTT BLD: 86.1 SEC — HIGH (ref 27.5–36.3)
BILIRUB UR-MCNC: NEGATIVE — SIGNIFICANT CHANGE UP
BLOOD UR QL VISUAL: HIGH
BUN SERPL-MCNC: 19 MG/DL — SIGNIFICANT CHANGE UP (ref 7–23)
CALCIUM SERPL-MCNC: 8.4 MG/DL — SIGNIFICANT CHANGE UP (ref 8.4–10.5)
CHLORIDE SERPL-SCNC: 103 MMOL/L — SIGNIFICANT CHANGE UP (ref 98–107)
CO2 SERPL-SCNC: 26 MMOL/L — SIGNIFICANT CHANGE UP (ref 22–31)
COLOR SPEC: SIGNIFICANT CHANGE UP
CREAT SERPL-MCNC: 1.98 MG/DL — HIGH (ref 0.5–1.3)
EPI CELLS # UR: SIGNIFICANT CHANGE UP
GLUCOSE SERPL-MCNC: 112 MG/DL — HIGH (ref 70–99)
GLUCOSE UR-MCNC: NEGATIVE — SIGNIFICANT CHANGE UP
GRAM STN SPT: SIGNIFICANT CHANGE UP
HBA1C BLD-MCNC: 5.8 % — HIGH (ref 4–5.6)
HCT VFR BLD CALC: 32.3 % — LOW (ref 39–50)
HCT VFR BLD CALC: 33.9 % — LOW (ref 39–50)
HGB BLD-MCNC: 10.4 G/DL — LOW (ref 13–17)
HGB BLD-MCNC: 10.7 G/DL — LOW (ref 13–17)
INR BLD: 1.51 — HIGH (ref 0.88–1.17)
KETONES UR-MCNC: NEGATIVE — SIGNIFICANT CHANGE UP
LEUKOCYTE ESTERASE UR-ACNC: SIGNIFICANT CHANGE UP
MAGNESIUM SERPL-MCNC: 1.8 MG/DL — SIGNIFICANT CHANGE UP (ref 1.6–2.6)
MCHC RBC-ENTMCNC: 27.9 PG — SIGNIFICANT CHANGE UP (ref 27–34)
MCHC RBC-ENTMCNC: 28.2 PG — SIGNIFICANT CHANGE UP (ref 27–34)
MCHC RBC-ENTMCNC: 31.6 % — LOW (ref 32–36)
MCHC RBC-ENTMCNC: 32.2 % — SIGNIFICANT CHANGE UP (ref 32–36)
MCV RBC AUTO: 87.5 FL — SIGNIFICANT CHANGE UP (ref 80–100)
MCV RBC AUTO: 88.5 FL — SIGNIFICANT CHANGE UP (ref 80–100)
MUCOUS THREADS # UR AUTO: SIGNIFICANT CHANGE UP
NITRITE UR-MCNC: NEGATIVE — SIGNIFICANT CHANGE UP
NRBC # FLD: 0 — SIGNIFICANT CHANGE UP
NRBC # FLD: 0 — SIGNIFICANT CHANGE UP
PH UR: 6.5 — SIGNIFICANT CHANGE UP (ref 5–8)
PHOSPHATE SERPL-MCNC: 2.9 MG/DL — SIGNIFICANT CHANGE UP (ref 2.5–4.5)
PLATELET # BLD AUTO: 185 K/UL — SIGNIFICANT CHANGE UP (ref 150–400)
PLATELET # BLD AUTO: 206 K/UL — SIGNIFICANT CHANGE UP (ref 150–400)
PMV BLD: 9.2 FL — SIGNIFICANT CHANGE UP (ref 7–13)
PMV BLD: 9.5 FL — SIGNIFICANT CHANGE UP (ref 7–13)
POTASSIUM SERPL-MCNC: 3.7 MMOL/L — SIGNIFICANT CHANGE UP (ref 3.5–5.3)
POTASSIUM SERPL-SCNC: 3.7 MMOL/L — SIGNIFICANT CHANGE UP (ref 3.5–5.3)
PROT UR-MCNC: 50 — SIGNIFICANT CHANGE UP
PROTHROM AB SERPL-ACNC: 17 SEC — HIGH (ref 9.8–13.1)
RBC # BLD: 3.69 M/UL — LOW (ref 4.2–5.8)
RBC # BLD: 3.83 M/UL — LOW (ref 4.2–5.8)
RBC # FLD: 13.6 % — SIGNIFICANT CHANGE UP (ref 10.3–14.5)
RBC # FLD: 13.8 % — SIGNIFICANT CHANGE UP (ref 10.3–14.5)
RBC CASTS # UR COMP ASSIST: >50 — HIGH (ref 0–?)
SODIUM SERPL-SCNC: 139 MMOL/L — SIGNIFICANT CHANGE UP (ref 135–145)
SP GR SPEC: 1.01 — SIGNIFICANT CHANGE UP (ref 1–1.04)
SPECIMEN SOURCE: SIGNIFICANT CHANGE UP
UROBILINOGEN FLD QL: NORMAL — SIGNIFICANT CHANGE UP
WBC # BLD: 13.1 K/UL — HIGH (ref 3.8–10.5)
WBC # BLD: 15.26 K/UL — HIGH (ref 3.8–10.5)
WBC # FLD AUTO: 13.1 K/UL — HIGH (ref 3.8–10.5)
WBC # FLD AUTO: 15.26 K/UL — HIGH (ref 3.8–10.5)
WBC UR QL: SIGNIFICANT CHANGE UP (ref 0–?)

## 2018-11-01 PROCEDURE — 99233 SBSQ HOSP IP/OBS HIGH 50: CPT | Mod: GC

## 2018-11-01 PROCEDURE — 99223 1ST HOSP IP/OBS HIGH 75: CPT | Mod: GC

## 2018-11-01 PROCEDURE — 70450 CT HEAD/BRAIN W/O DYE: CPT | Mod: 26

## 2018-11-01 PROCEDURE — 99232 SBSQ HOSP IP/OBS MODERATE 35: CPT | Mod: GC

## 2018-11-01 RX ORDER — SODIUM CHLORIDE 9 MG/ML
1000 INJECTION INTRAMUSCULAR; INTRAVENOUS; SUBCUTANEOUS
Qty: 0 | Refills: 0 | Status: DISCONTINUED | OUTPATIENT
Start: 2018-11-01 | End: 2018-11-01

## 2018-11-01 RX ORDER — ONDANSETRON 8 MG/1
4 TABLET, FILM COATED ORAL EVERY 6 HOURS
Qty: 0 | Refills: 0 | Status: DISCONTINUED | OUTPATIENT
Start: 2018-11-01 | End: 2018-11-10

## 2018-11-01 RX ORDER — ACETAMINOPHEN 500 MG
1000 TABLET ORAL ONCE
Qty: 0 | Refills: 0 | Status: COMPLETED | OUTPATIENT
Start: 2018-11-01 | End: 2018-11-01

## 2018-11-01 RX ORDER — HEPARIN SODIUM 5000 [USP'U]/ML
5000 INJECTION INTRAVENOUS; SUBCUTANEOUS EVERY 6 HOURS
Qty: 0 | Refills: 0 | Status: DISCONTINUED | OUTPATIENT
Start: 2018-11-01 | End: 2018-11-05

## 2018-11-01 RX ORDER — SODIUM CHLORIDE 9 MG/ML
1000 INJECTION, SOLUTION INTRAVENOUS
Qty: 0 | Refills: 0 | Status: DISCONTINUED | OUTPATIENT
Start: 2018-11-01 | End: 2018-11-04

## 2018-11-01 RX ORDER — HEPARIN SODIUM 5000 [USP'U]/ML
2500 INJECTION INTRAVENOUS; SUBCUTANEOUS EVERY 6 HOURS
Qty: 0 | Refills: 0 | Status: DISCONTINUED | OUTPATIENT
Start: 2018-11-01 | End: 2018-11-05

## 2018-11-01 RX ORDER — HEPARIN SODIUM 5000 [USP'U]/ML
INJECTION INTRAVENOUS; SUBCUTANEOUS
Qty: 25000 | Refills: 0 | Status: DISCONTINUED | OUTPATIENT
Start: 2018-11-01 | End: 2018-11-05

## 2018-11-01 RX ORDER — HYDROMORPHONE HYDROCHLORIDE 2 MG/ML
0.5 INJECTION INTRAMUSCULAR; INTRAVENOUS; SUBCUTANEOUS
Qty: 0 | Refills: 0 | Status: DISCONTINUED | OUTPATIENT
Start: 2018-11-01 | End: 2018-11-03

## 2018-11-01 RX ORDER — VANCOMYCIN HCL 1 G
500 VIAL (EA) INTRAVENOUS ONCE
Qty: 0 | Refills: 0 | Status: COMPLETED | OUTPATIENT
Start: 2018-11-01 | End: 2018-11-01

## 2018-11-01 RX ORDER — NALOXONE HYDROCHLORIDE 4 MG/.1ML
0.4 SPRAY NASAL ONCE
Qty: 0 | Refills: 0 | Status: COMPLETED | OUTPATIENT
Start: 2018-11-01 | End: 2018-11-01

## 2018-11-01 RX ORDER — HYDROMORPHONE HYDROCHLORIDE 2 MG/ML
0.5 INJECTION INTRAMUSCULAR; INTRAVENOUS; SUBCUTANEOUS EVERY 6 HOURS
Qty: 0 | Refills: 0 | Status: DISCONTINUED | OUTPATIENT
Start: 2018-11-01 | End: 2018-11-03

## 2018-11-01 RX ADMIN — Medication 1000 MILLIGRAM(S): at 06:00

## 2018-11-01 RX ADMIN — SODIUM CHLORIDE 100 MILLILITER(S): 9 INJECTION INTRAMUSCULAR; INTRAVENOUS; SUBCUTANEOUS at 02:03

## 2018-11-01 RX ADMIN — Medication 1000 MILLIGRAM(S): at 11:46

## 2018-11-01 RX ADMIN — HYDROMORPHONE HYDROCHLORIDE 0.5 MILLIGRAM(S): 2 INJECTION INTRAMUSCULAR; INTRAVENOUS; SUBCUTANEOUS at 18:41

## 2018-11-01 RX ADMIN — Medication 400 MILLIGRAM(S): at 11:31

## 2018-11-01 RX ADMIN — NALOXONE HYDROCHLORIDE 0.4 MILLIGRAM(S): 4 SPRAY NASAL at 11:00

## 2018-11-01 RX ADMIN — HYDROMORPHONE HYDROCHLORIDE 0.5 MILLIGRAM(S): 2 INJECTION INTRAMUSCULAR; INTRAVENOUS; SUBCUTANEOUS at 11:45

## 2018-11-01 RX ADMIN — SODIUM CHLORIDE 75 MILLILITER(S): 9 INJECTION, SOLUTION INTRAVENOUS at 16:38

## 2018-11-01 RX ADMIN — Medication 400 MILLIGRAM(S): at 05:27

## 2018-11-01 RX ADMIN — PIPERACILLIN AND TAZOBACTAM 25 GRAM(S): 4; .5 INJECTION, POWDER, LYOPHILIZED, FOR SOLUTION INTRAVENOUS at 05:46

## 2018-11-01 RX ADMIN — Medication 100 MILLIGRAM(S): at 23:05

## 2018-11-01 RX ADMIN — HEPARIN SODIUM 1100 UNIT(S)/HR: 5000 INJECTION INTRAVENOUS; SUBCUTANEOUS at 16:38

## 2018-11-01 RX ADMIN — HYDROMORPHONE HYDROCHLORIDE 0.5 MILLIGRAM(S): 2 INJECTION INTRAMUSCULAR; INTRAVENOUS; SUBCUTANEOUS at 18:56

## 2018-11-01 RX ADMIN — HYDROMORPHONE HYDROCHLORIDE 0.5 MILLIGRAM(S): 2 INJECTION INTRAMUSCULAR; INTRAVENOUS; SUBCUTANEOUS at 12:00

## 2018-11-01 RX ADMIN — PIPERACILLIN AND TAZOBACTAM 25 GRAM(S): 4; .5 INJECTION, POWDER, LYOPHILIZED, FOR SOLUTION INTRAVENOUS at 18:41

## 2018-11-01 RX ADMIN — HEPARIN SODIUM 1100 UNIT(S)/HR: 5000 INJECTION INTRAVENOUS; SUBCUTANEOUS at 23:05

## 2018-11-01 NOTE — PROGRESS NOTE ADULT - ATTENDING COMMENTS
Patient seen and examined. Chart/lab reviewed. Agree with above with modifications.  77M wth h/o HTN, DM-2, COPD, PE, dysphagia, BCC s/p resection, Hep B, CKD4, and mutated NSCLC (s/p erlotinib, carboplatin/pemetrexed, atezolizumab) now on gilotrif (abatnif), p/w lethargy, aspiration pneumonia, LUX.  He was on mscontin 30 mg tid and oxycodone 10 mg q4h prn per pain management, though bone scan and PET neg for bone mets.    PE: awake and c/o pain, lung : decreased BS base, abdomen soft, no edema    Assessment/plan:  # Lethargy, acute metabolic encephalopathy: in the setting of aspiration pneumonia/sepsis, given narcan this am for lethargy, now awake and c/o pain, need to reduce pain meds on discharge  # Dsyphagia, Aspiration pneumonia: bilateral opacities with large LLL infiltrate on CXR, vanco/zosyn, NPO for now, speech/swallow eval  # chronic pain: due to lung CA?. no bone mets on PET/bone scan, palliative care consult for pain management, now on dilaudid prn  #NSCLC: hold abatnif in the setting of infection, f/u onc  # LUX on CKD4: in the setting of sepsis, IVF, check renal US  # H/o PE: start heparin drip, no lovenox in the setting of LUX Patient seen and examined. Chart/lab reviewed. Agree with above with modifications.  77M wth h/o HTN, DM-2, COPD, PE, dysphagia, BCC s/p resection, Hep B, CKD4, and mutated NSCLC (s/p erlotinib, carboplatin/pemetrexed, atezolizumab) now on gilotrif (abatnif), p/w lethargy, aspiration pneumonia, LUX.  He was on mscontin 30 mg tid and oxycodone 10 mg q4h prn per pain management, though bone scan and PET neg for bone mets.    PE: awake and c/o pain, lung : decreased BS base, abdomen soft, no edema    Assessment/plan:  # Lethargy, acute metabolic encephalopathy: in the setting of aspiration pneumonia/sepsis, given narcan this am for lethargy, now awake and c/o pain, need to reduce pain meds on discharge, check CT head  # Dsyphagia, Aspiration pneumonia: bilateral opacities with large LLL infiltrate on CXR, vanco/zosyn, NPO for now, speech/swallow eval  # chronic pain: due to lung CA?. no bone mets on PET/bone scan, palliative care consult for pain management, now on dilaudid prn  #NSCLC: hold abatnif in the setting of infection, f/u onc  # LUX on CKD4: in the setting of sepsis, IVF, check renal US and bladder scan  # H/o PE: start heparin drip, no eliquis or lovenox in the setting of LUX Patient seen and examined. Chart/lab reviewed. Agree with above with modifications.  77M wth h/o HTN, DM-2, COPD, PE, dysphagia, BCC s/p resection, Hep B, CKD4, and mutated NSCLC (s/p erlotinib, carboplatin/pemetrexed, atezolizumab) now on gilotrif (abatnif), p/w lethargy, aspiration pneumonia, LUX.  He was on mscontin 30 mg tid and oxycodone 10 mg q4h prn per pain management, though bone scan and PET neg for bone mets.    PE: awake and c/o pain, lung : decreased BS base, abdomen soft, no edema    Assessment/plan:  # Lethargy, acute metabolic encephalopathy: in the setting of aspiration pneumonia/sepsis, given narcan this am for lethargy, now awake and c/o pain, need to reduce pain meds on discharge, check CT head  # Dsyphagia, Aspiration pneumonia: bilateral opacities with large LLL infiltrate on CXR, vanco/zosyn, NPO for now, speech/swallow eval  # chronic pain: due to lung CA?. no bone mets on PET/bone scan, palliative care consult for pain management, now on dilaudid prn  #NSCLC: hold abatnif in the setting of infection, f/u onc  # LUX : in the setting of sepsis, IVF, check renal US and bladder scan  # HBV: hold tenofovir in the setting of LUX  # H/o PE: start heparin drip, no eliquis or lovenox in the setting of LUX

## 2018-11-01 NOTE — PROGRESS NOTE ADULT - PROBLEM SELECTOR PLAN 3
- likely secondary to sepsis and/or opioid intake  - continue to monitor improvement after abx/fluids and holding opioids  - CT head in August negative for intracranial mass - likely secondary to sepsis and/or opioid intake  -At home, patient takes morphine 30 mg TID and   - continue to monitor improvement after abx/fluids and holding opioids  - CT head in August negative for intracranial mass - likely secondary to sepsis and/or opioid intake  -At home, patient takes morphine 30 mg TID and oxycodone  - CT head in August negative for intracranial mass - likely secondary to sepsis and/or opioid intake  -At home, patient takes morphine 30 mg TID and oxycodone; given patient's LUX, concerned that morphine was not being excreted from his body. Was given narcan  -Palliative was consulted who recommended dilaudid 0.5 IV push q6h and dilaudid 0.5 IV push q2h PRN

## 2018-11-01 NOTE — CONSULT NOTE ADULT - PROBLEM SELECTOR RECOMMENDATION 4
Will continue to follow for ongoing pain management.  Psychosocial support provided to patient and son. Assist with ADL's.  Skin care PRN. Fall precautions.

## 2018-11-01 NOTE — SWALLOW BEDSIDE ASSESSMENT ADULT - COMMENTS
SLP attempted to see patient for swallowing evaluation however as per nursing, patient is off the floor at [facility].  this department to reattempt as schedule permits.

## 2018-11-01 NOTE — PROGRESS NOTE ADULT - PROBLEM SELECTOR PLAN 10
- CT from April revealing bilateral pulmonary emboli  - prior on lovenox and transitioned to Eliquis by oncologist    11. Goals of care:  -Discussed resuscitation and intubation with son and patient. They have not come to a decision at this time, but will talk about it overnight. Full code for now. Are interested in filling out a MOLST form.    12. Hepatitis B- on tenofovir  - hold for now     13. DVT ppx  - on eliquis at home- covered for 24 hours  - will eval speech capability in AM- if unable to tolerate PO- will give lovenox - CT from April revealing bilateral pulmonary emboli  - prior on lovenox and transitioned to Eliquis by oncologist  -Patient last took his eliquis yesterday morning. Patient started on heparin gtt     11. Goals of care:  -Discussed resuscitation and intubation with son and patient. They have not come to a decision at this time, but will talk about it overnight. Full code for now. Are interested in filling out a MOLST form.    12. Hepatitis B- on tenofovir  - hold for now     13. DVT ppx  - on eliquis at home- covered for 24 hours  - will eval speech capability in AM- if unable to tolerate PO- will give lovenox

## 2018-11-01 NOTE — CONSULT NOTE ADULT - ATTENDING COMMENTS
Pt seen with NP. Son at bedside. Pt with significant pain. Pain regimen as above. Pt NPO given high suspicion for aspiration. Plan is for s&s eval, given pts recurrent PNA in the past. For now maintain IV regimen, will change to oral regimen once PO route is cleared.

## 2018-11-01 NOTE — CONSULT NOTE ADULT - PROBLEM SELECTOR RECOMMENDATION 5
Will continue to follow for ongoing pain management.  Psychosocial support provided to patient and son.

## 2018-11-01 NOTE — PROGRESS NOTE ADULT - PROBLEM SELECTOR PLAN 6
- patient on flomax at home  - has difficulty urinating in ED  - will bladder scan and straight cath for > 250 cc

## 2018-11-01 NOTE — PROGRESS NOTE ADULT - SUBJECTIVE AND OBJECTIVE BOX
CHIEF COMPLAINT: lethargy, SOB    Interval Events:  This morning, patient was seen and examined at bedside. Son, Parmjit, was at bedside to translate as patient speaks Cantonese. Patient is AOx2 (did not know he was in the hospital). He reports 6/10, sharp, constant, chest pain that has been there before his hospitalization. He also admits to SOB, he is on nasal cannula (not on oxygen at home). Patient denies abdominal pain, headaches, fever/chills, cough, or sputum production. He admits to hematuria with dark red urine.     REVIEW OF SYSTEMS:  Constitutional: [x] negative [ ] fevers [ ] chills [ ] weight loss [ ] weight gain  HEENT: [x] negative [ ] dry eyes [ ] eye irritation [ ] postnasal drip [ ] nasal congestion  CV: [ ] negative  [x] chest pain [ ] orthopnea [ ] palpitations [ ] murmur  Resp: [ ] negative [ ] cough [x] shortness of breath [ ] dyspnea [ ] wheezing [ ] sputum [ ] hemoptysis  GI: [x] negative [ ] nausea [ ] vomiting [ ] diarrhea [ ] constipation [ ] abd pain [ ] dysphagia   : [ ] negative [ ] dysuria [ ] nocturia [x] hematuria [ ] increased urinary frequency  Musculoskeletal: [x] negative [ ] back pain [ ] myalgias [ ] arthralgias [ ] fracture  Skin: [x] negative [ ] rash [ ] itch  Neurological: [x] negative [ ] headache [ ] dizziness [ ] syncope [ ] weakness [ ] numbness  Psychiatric: [x] negative [ ] anxiety [ ] depression  Endocrine: [x] negative [ ] diabetes [ ] thyroid problem  Hematologic/Lymphatic: [x] negative [ ] anemia [ ] bleeding problem  Allergic/Immunologic: [ ] negative [ ] itchy eyes [ ] nasal discharge [ ] hives [ ] angioedema  [ ] All other systems negative  [ ] Unable to assess ROS because ________    OBJECTIVE:  T(C): 36.7 (01 Nov 2018 05:22), Max: 37.4 (31 Oct 2018 16:31)  T(F): 98 (01 Nov 2018 05:22), Max: 99.4 (31 Oct 2018 16:31)  HR: 85 (01 Nov 2018 05:22) (85 - 94)  BP: 115/70 (01 Nov 2018 05:22) (112/64 - 129/59)  RR: 18 (01 Nov 2018 05:22) (16 - 18)  SpO2: 98% (01 Nov 2018 05:22) (91% - 100%)        CAPILLARY BLOOD GLUCOSE      POCT Blood Glucose.: 126 mg/dL (01 Nov 2018 09:16)      PHYSICAL EXAM:  GENERAL: NAD, well-developed  HEAD:  Atraumatic, Normocephalic  EYES: EOMI, PERRLA, conjunctiva and sclera clear  NECK: Supple, No JVD  CHEST/LUNG: Clear to auscultation bilaterally; No wheeze  HEART: Regular rate and rhythm; No murmurs, rubs, or gallops  ABDOMEN: Soft, Nontender, Nondistended; Bowel sounds present  EXTREMITIES:  2+ Peripheral Pulses, No clubbing, cyanosis, or edema  PSYCH: AAOx2  NEUROLOGY: non-focal, CN grossly intact (  SKIN: No rashes or lesions    LINES:    HOSPITAL MEDICATIONS:    piperacillin/tazobactam IVPB. 3.375 Gram(s) IV Intermittent every 12 hours      dextrose 40% Gel 15 Gram(s) Oral once PRN  dextrose 50% Injectable 12.5 Gram(s) IV Push once  dextrose 50% Injectable 25 Gram(s) IV Push once  dextrose 50% Injectable 25 Gram(s) IV Push once  glucagon  Injectable 1 milliGRAM(s) IntraMuscular once PRN  insulin lispro (HumaLOG) corrective regimen sliding scale   SubCutaneous every 6 hours              dextrose 5%. 1000 milliLiter(s) IV Continuous <Continuous>  sodium chloride 0.9%. 1000 milliLiter(s) IV Continuous <Continuous>        naloxone Injectable 0.4 milliGRAM(s) IV Push once      LABS:                        10.4   15.26 )-----------( 185      ( 01 Nov 2018 05:29 )             32.3     Hgb Trend: 10.4<--, 11.6<--  11-01    139  |  103  |  19  ----------------------------<  112<H>  3.7   |  26  |  1.98<H>    Ca    8.4      01 Nov 2018 05:27  Phos  2.9     11-01  Mg     1.8     11-01    TPro  7.6  /  Alb  3.4  /  TBili  0.4  /  DBili  x   /  AST  13  /  ALT  7   /  AlkPhos  69  10-31    Creatinine Trend: 1.98<--, 2.10<--        Venous Blood Gas:  10-31 @ 15:50  7.38/56/20/29/27.0  VBG Lactate: 1.2      MICROBIOLOGY:     RADIOLOGY:  [ ] Reviewed and interpreted by me    EKG: CHIEF COMPLAINT: lethargy, SOB    Interval Events:  This morning, patient was seen and examined at bedside. Son, Parmjit, was at bedside to translate as patient speaks Cantonese. Patient is AOx2 (did not know he was in the hospital). He reports 6/10, sharp, constant, chest pain that has been there before his hospitalization. He also admits to SOB, he is on nasal cannula (not on oxygen at home). Patient denies abdominal pain, headaches, fever/chills, cough, or sputum production. He admits to hematuria with dark red urine.     REVIEW OF SYSTEMS:  Constitutional: [x] negative [ ] fevers [ ] chills [ ] weight loss [ ] weight gain  HEENT: [x] negative [ ] dry eyes [ ] eye irritation [ ] postnasal drip [ ] nasal congestion  CV: [ ] negative  [x] chest pain [ ] orthopnea [ ] palpitations [ ] murmur  Resp: [ ] negative [ ] cough [x] shortness of breath [ ] dyspnea [ ] wheezing [ ] sputum [ ] hemoptysis  GI: [x] negative [ ] nausea [ ] vomiting [ ] diarrhea [ ] constipation [ ] abd pain [ ] dysphagia   : [ ] negative [ ] dysuria [ ] nocturia [x] hematuria [ ] increased urinary frequency  Musculoskeletal: [x] negative [ ] back pain [ ] myalgias [ ] arthralgias [ ] fracture  Skin: [x] negative [ ] rash [ ] itch  Neurological: [x] negative [ ] headache [ ] dizziness [ ] syncope [ ] weakness [ ] numbness  Psychiatric: [x] negative [ ] anxiety [ ] depression  Endocrine: [x] negative [ ] diabetes [ ] thyroid problem  Hematologic/Lymphatic: [x] negative [ ] anemia [ ] bleeding problem  Allergic/Immunologic: [ ] negative [ ] itchy eyes [ ] nasal discharge [ ] hives [ ] angioedema  [ ] All other systems negative  [ ] Unable to assess ROS because ________    OBJECTIVE:  T(C): 36.7 (01 Nov 2018 05:22), Max: 37.4 (31 Oct 2018 16:31)  T(F): 98 (01 Nov 2018 05:22), Max: 99.4 (31 Oct 2018 16:31)  HR: 85 (01 Nov 2018 05:22) (85 - 94)  BP: 115/70 (01 Nov 2018 05:22) (112/64 - 129/59)  RR: 18 (01 Nov 2018 05:22) (16 - 18)  SpO2: 98% (01 Nov 2018 05:22) (91% - 100%)        CAPILLARY BLOOD GLUCOSE      POCT Blood Glucose.: 126 mg/dL (01 Nov 2018 09:16)      PHYSICAL EXAM:  GENERAL: NAD, well-developed  HEAD:  Atraumatic, Normocephalic  EYES: Pupils pinpoint b/l, sluggishly reactive to light, equal on both sounds, EOMI, conjunctiva and sclera clear  NECK: Supple, No JVD  CHEST/LUNG: rhonchi on right; expiratory wheeze on left   HEART:S1 and S2, Regular rate and rhythm; No murmurs, rubs, or gallops  ABDOMEN: Soft, Nontender, Nondistended; Bowel sounds present  EXTREMITIES:  2+ Peripheral Pulses, No clubbing, cyanosis, or edema  PSYCH: AAOx2  NEUROLOGY: non-focal, CN grossly intact   SKIN: No rashes or lesions    LINES:    HOSPITAL MEDICATIONS:    piperacillin/tazobactam IVPB. 3.375 Gram(s) IV Intermittent every 12 hours      dextrose 40% Gel 15 Gram(s) Oral once PRN  dextrose 50% Injectable 12.5 Gram(s) IV Push once  dextrose 50% Injectable 25 Gram(s) IV Push once  dextrose 50% Injectable 25 Gram(s) IV Push once  glucagon  Injectable 1 milliGRAM(s) IntraMuscular once PRN  insulin lispro (HumaLOG) corrective regimen sliding scale   SubCutaneous every 6 hours              dextrose 5%. 1000 milliLiter(s) IV Continuous <Continuous>  sodium chloride 0.9%. 1000 milliLiter(s) IV Continuous <Continuous>        naloxone Injectable 0.4 milliGRAM(s) IV Push once      LABS:                        10.4   15.26 )-----------( 185      ( 01 Nov 2018 05:29 )             32.3     Hgb Trend: 10.4<--, 11.6<--  11-01    139  |  103  |  19  ----------------------------<  112<H>  3.7   |  26  |  1.98<H>    Ca    8.4      01 Nov 2018 05:27  Phos  2.9     11-01  Mg     1.8     11-01    TPro  7.6  /  Alb  3.4  /  TBili  0.4  /  DBili  x   /  AST  13  /  ALT  7   /  AlkPhos  69  10-31    Creatinine Trend: 1.98<--, 2.10<--        Venous Blood Gas:  10-31 @ 15:50  7.38/56/20/29/27.0  VBG Lactate: 1.2      MICROBIOLOGY:     RADIOLOGY:  [ ] Reviewed and interpreted by me    EKG:

## 2018-11-01 NOTE — CONSULT NOTE ADULT - SUBJECTIVE AND OBJECTIVE BOX
HPI:  77M with PMhx of stage HTN, HLD, BPH, COPD, DM, GERD, BCC s/p resection, Hep B, IV CKD, and mutated NSCLC (s/p erlotinib, carboplatin/pemetrexed, atezolizumab) now on afatinib presented to Dr. Gross yesterday as advised to come to the ED due to concern for aspiration PNA and lethargy.  Per wife, patient has been having worsening lethargy. son reports patient was confused. Dr. Gross also concerned about patient's pain medication intake, and a component of his lethargy contributed from opioid use. Patient recently had a PET scan done which did not show evidence of bony metastates.      In the ED, /75; HR 90; RR 18; T 97.8, sat 93% on room air. Patient given vanc and zosyn in the ED and 1L NS. (31 Oct 2018 19:14)      Allergies  No Known Drug Allergies  tegaderm (Rash; Urticaria)    MEDICATIONS  (STANDING):  dextrose 5%. 1000 milliLiter(s) (50 mL/Hr) IV Continuous <Continuous>  dextrose 50% Injectable 12.5 Gram(s) IV Push once  dextrose 50% Injectable 25 Gram(s) IV Push once  dextrose 50% Injectable 25 Gram(s) IV Push once  HYDROmorphone  Injectable 0.5 milliGRAM(s) IV Push every 6 hours  insulin lispro (HumaLOG) corrective regimen sliding scale   SubCutaneous every 6 hours  piperacillin/tazobactam IVPB. 3.375 Gram(s) IV Intermittent every 12 hours  sodium chloride 0.9%. 1000 milliLiter(s) (100 mL/Hr) IV Continuous <Continuous>    MEDICATIONS  (PRN):  dextrose 40% Gel 15 Gram(s) Oral once PRN Blood Glucose LESS THAN 70 milliGRAM(s)/deciliter  glucagon  Injectable 1 milliGRAM(s) IntraMuscular once PRN Glucose LESS THAN 70 milligrams/deciliter  HYDROmorphone  Injectable 0.5 milliGRAM(s) IV Push every 2 hours PRN Severe Pain (7 - 10)  ondansetron Injectable 4 milliGRAM(s) IV Push every 6 hours PRN Nausea and/or Vomiting      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      FAMILY HISTORY:  Family history of diabetes mellitus (Sibling): brother - living  Family history of liver cancer: Mother   Family history of liver cancer  Family history of stomach cancer (Sibling): brother      SOCIAL HISTORY: No EtOH, no tobacco    REVIEW OF SYSTEMS:      Height (cm): 172.72 (10-31 @ 23:47)  Weight (kg): 61.4 (10-31 @ 23:47)  BMI (kg/m2): 20.6 (10-31 @ 23:47)  BSA (m2): 1.73 (10-31 @ 23:47)    T(F): 98 (18 @ 05:22), Max: 99.4 (10-31-18 @ 16:31)  HR: 86 (18 @ 11:50)  BP: 158/- (18 @ 11:50)  RR: 18 (18 @ 11:50)  SpO2: 98% (18 @ 05:22)  Wt(kg): --    PE : unable to examine, pt away at CT                           10.4   15.26 )-----------( 185      ( 2018 05:29 )             32.3           139  |  103  |  19  ----------------------------<  112<H>  3.7   |  26  |  1.98<H>    Ca    8.4      2018 05:27  Phos  2.9       Mg     1.8         TPro  7.6  /  Alb  3.4  /  TBili  0.4  /  DBili  x   /  AST  13  /  ALT  7   /  AlkPhos  69  10-31      Phosphorus Level, Serum: 2.9 mg/dL ( @ 05:27)  Magnesium, Serum: 1.8 mg/dL ( 05:27)

## 2018-11-01 NOTE — PROGRESS NOTE ADULT - PROBLEM SELECTOR PLAN 2
- patient presenting with aspiration event and elevated WBC count, as well as CXR findings of multifocal PNA  - history of dysphagia and witnessed aspirations - patient presenting with aspiration event and elevated WBC count, as well as CXR findings of multifocal PNA  - history of dysphagia and witnessed aspirations  -patient will remain NPO for now  -C/w vanc and zoysn - patient presenting with aspiration event and elevated WBC count, as well as CXR findings of multifocal PNA  - history of dysphagia and witnessed aspirations  -patient will remain NPO for now  -C/w vanc and zoysn  -f/u vanc trough at 5 PM

## 2018-11-01 NOTE — CONSULT NOTE ADULT - PROBLEM SELECTOR RECOMMENDATION 9
-agree with treatment of aspiration pna and being followed by speech pathology  -can hold afatinib for now  -will follow with you

## 2018-11-01 NOTE — CONSULT NOTE ADULT - PROBLEM SELECTOR RECOMMENDATION 9
Patient of Dr. Gross, currently receiving afatinib.  Will follow-up with Dr. Gross for goals of care going forward.

## 2018-11-01 NOTE — PROGRESS NOTE ADULT - PROBLEM SELECTOR PLAN 4
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

## 2018-11-01 NOTE — CONSULT NOTE ADULT - PROBLEM SELECTOR RECOMMENDATION 2
Patient known to palliative service from prior admission.  Upon discharge at that time, patient was receiving MS Contin 45mg PO TID and ambulatory.  Patient currently receiving Morphine 30mg PO TID with Oxycodone PRN as an outpatient. Patient received 0.4mg of Narcan this morning for lethargy.  While patient's LUX likely decreased clearance of Morphine causing some lethargy, it is likely patient's lethargy is largely due to an infectious process - as this is a similar presentation as his prior admission.  Narcan should be reserved for respiratory depression and given in 0.1mg IV increments.  Given long standing opioid use and patient's severe pain, Dilaudid 0.5mg IV q6h ATC with Dilaudid 0.5mg IV q2h PRN - which is a reduction to his Morphine 30mg PO TID baseline to avoid opioid withdrawal and adjust for lethargy.  Bowel regimen when able.

## 2018-11-01 NOTE — CONSULT NOTE ADULT - SUBJECTIVE AND OBJECTIVE BOX
HPI:  Obtained from H&P   77M with PMhx of stage HTN, HLD, BPH, COPD, DM, GERD, BCC s/p resection, Hep B, IV CKD, and mutated NSCLC (s/p erlotinib, carboplatin/pemetrexed, atezolizumab) now on gilotrif (abatnif) presented to his oncologist office today was advised to come to the ED due to concern for aspiration PNA and lethargy. History obtained from son and wife at bedside. Per wife, patient has been having worsening lethargy and weakness over the past few days, and has required assistance with ambulation. Patient himself is complaining of fatigue and weakness, and dark urine. Yesterday overnight, son reports patient was confused. At 1AM in the the morning, son noted patient was downstairs getting washed up and had opened the garage. Upon questioning, patient stated he thought it was 8:00PM. Today, patient was taken to his speech therapists office, where he coughed up 2 clumps of cheese from a grilled cheese sandwich he ate earlier. He was advised to see his oncologist, and went there afterwards, who insisted he come to the ED.    Dr. Gross also concerned about patient's pain medication intake, and a component of his lethargy is from overmedication from his opioids. Patient recently had a PET scan done which did not show evidence of bony metastates.     Patient with recent admission for aspiration PNA in August, and prior with recent MICU admission for Septic shock 2/2 gram negative/MRSA PNA and acute hypoxic resp failure.     Patient laying in bed - in pain - s/p emesis x1 post narcan     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  Basal cell carcinoma of nostril    FAMILY HISTORY:  Family history of diabetes mellitus (Sibling): brother - living  Family history of liver cancer: Mother   Family history of liver cancer  Family history of stomach cancer (Sibling): brother      SOCIAL HISTORY:   Significant other/partner: Yes [x ]  No [ ] Children:  Yes [x ]  No [ ] Scientology/Spirituality:  Alevism   Substance hx: Yes[ ]  No [x ]   Tobacco hx:  Yes [x ] Former - quit 2 years ago  Alcohol hx: Yes [ ] No [ x  Home Opioid hx:  Yes [x ] MS Contin/oxycodone   Living Situation: [ x]Home  [ ]Long term care  [ ]Rehab [ ]Other    ADVANCE DIRECTIVES:  Full Code      [ ] Health Care Proxy(s)  [x ] Surrogate(s)  [ ] Guardian           Name(s): Phone Number(s): Parmjit Davidson - [phone]    BASELINE (I)ADL(s) (prior to admission):  [place]: [ ]Total  [ x] Moderate [ ]Dependent    Allergies    No Known Drug Allergies  tegaderm (Rash; Urticaria)    Intolerances    MEDICATIONS  (STANDING):  dextrose 5%. 1000 milliLiter(s) (50 mL/Hr) IV Continuous <Continuous>  dextrose 50% Injectable 12.5 Gram(s) IV Push once  dextrose 50% Injectable 25 Gram(s) IV Push once  dextrose 50% Injectable 25 Gram(s) IV Push once  heparin  Infusion.  Unit(s)/Hr (11 mL/Hr) IV Continuous <Continuous>  HYDROmorphone  Injectable 0.5 milliGRAM(s) IV Push every 6 hours  insulin lispro (HumaLOG) corrective regimen sliding scale   SubCutaneous every 6 hours  piperacillin/tazobactam IVPB. 3.375 Gram(s) IV Intermittent every 12 hours  sodium chloride 0.9%. 1000 milliLiter(s) (100 mL/Hr) IV Continuous <Continuous>    MEDICATIONS  (PRN):  dextrose 40% Gel 15 Gram(s) Oral once PRN Blood Glucose LESS THAN 70 milliGRAM(s)/deciliter  glucagon  Injectable 1 milliGRAM(s) IntraMuscular once PRN Glucose LESS THAN 70 milligrams/deciliter  heparin  Injectable 5000 Unit(s) IV Push every 6 hours PRN For aPTT less than 40  heparin  Injectable 2500 Unit(s) IV Push every 6 hours PRN For aPTT between 40 - 57  HYDROmorphone  Injectable 0.5 milliGRAM(s) IV Push every 2 hours PRN Severe Pain (7 - 10)  ondansetron Injectable 4 milliGRAM(s) IV Push every 6 hours PRN Nausea and/or Vomiting    PRESENT SYMPTOMS: [ ]Unable to obtain due to poor mentation   Source if other than patient:  [ ]Family   [ ]Team     Pain (Impact on QOL):  Patient declines  - son Parmjit translating.  Patient reports pain everywhere - unable to quantify or qualify - moaning and restless.      PAIN AD Score:     [email or web address] (press ctrl +  left click to view)    Dyspnea:  Yes [ ] No [x ] - [ ]Mild [ ]Moderate [ ]Severe  Anxiety:    Yes [ x] No [ ] - [ ]Mild [x ]Moderate [ ]Severe - due to pain   Fatigue:    Yes [x ] No [ ] - [ ]Mild [x ]Moderate [ ]Severe  Nausea:    Yes [ ] No [x ] - [ ]Mild [ ]Moderate [ ]Severe                         Loss of appetite: Yes [x ] No [ ] - [ ]Mild [ ]Moderate [ ]Severe             Constipation:  Yes [ ] No [x ] - [ ]Mild [ ]Moderate [ ]Severe    Other Symptoms:  [x ]All other review of systems negative     Karnofsky Performance Score/Palliative Performance Status Version 2:   50%    http://palliative.info/resource_material/[email or web address].pdf    PHYSICAL EXAM:  Vital Signs Last 24 Hrs  T(C): 36.4 (2018 15:01), Max: 37.4 (31 Oct 2018 16:31)  T(F): 97.5 (2018 15:01), Max: 99.4 (31 Oct 2018 16:31)  HR: 74 (2018 15:01) (74 - 94)  BP: 113/65 (2018 15:01) (112/64 - 158/-)  BP(mean): --  RR: 18 (2018 15:01) (16 - 18)  SpO2: 99% (2018 15:01) (91% - 100%) I&O's Summary  GENERAL:  Moaning in pain, speaking to son in Chinese, drowsy without stimulation post narcan   PULMONARY:   [x ]Clear - anteriorly    [ ]Rhonchi        [ ]Right [ ]Left [ ]Bilateral  [ ]Crackles        [ ]Right [ ]Left [ ]Bilateral  [ ]Wheezing     [ ]Right [ ]Left [ ]Bilateral  CARDIOVASCULAR:    [x ]Regular [ ]Irregular [ ]Tachy  [ ]Jerman [ ]Murmur [ ]Other  GASTROINTESTINAL:  [x ]Soft  [ ]Distended   [ x]+BS  [ x]Non tender [ ]Tender  [ ]PEG [ ]OGT/ NGT  Last BM: 10/30/18  GENITOURINARY:  [x ]Normal [ ] Incontinent   [ ]Oliguria/Anuria   [ ]Soni  MUSCULOSKELETAL:   [ ]Normal   [x ]Weakness  [ ]Bed/Wheelchair bound [ ]Edema  NEUROLOGIC:   Drowsy    SKIN:   [x ]Normal   [ ]Pressure ulcer(s)  [ ]Rash    LABS:                        10.4   15.26 )-----------( 185      ( 2018 05:29 )             32.3       139  |  103  |  19  ----------------------------<  112<H>  3.7   |  26  |  1.98<H>    Ca    8.4      2018 05:27  Phos  2.9       Mg     1.8         TPro  7.6  /  Alb  3.4  /  TBili  0.4  /  DBili  x   /  AST  13  /  ALT  7   /  AlkPhos  69  10-31  PT/INR - ( 2018 15:10 )   PT: 17.0 SEC;   INR: 1.51          PTT - ( 2018 15:10 )  PTT:40.3 SEC    Urinalysis Basic - ( 2018 10:11 )    Color: LT RED / Appearance: TURBID / S.008 / pH: 6.5  Gluc: NEGATIVE / Ketone: NEGATIVE  / Bili: NEGATIVE / Urobili: NORMAL   Blood: LARGE / Protein: 50 / Nitrite: NEGATIVE   Leuk Esterase: TRACE / RBC: >50 / WBC 6-11   Sq Epi: x / Non Sq Epi: FEW / Bacteria: x      RADIOLOGY & ADDITIONAL STUDIES: Reviewed  CT head 18  IMPRESSION:    No acute intracranial pathology is noted. If the patient has new and   persistent symptoms, consider short interval follow-up head CT or brain   MRI follow-up.      PROTEIN CALORIE MALNUTRITION PRESENT: [ ] Yes [ ] No  [ ] PPSV2 < or = to 30% [ ] significant weight loss  [ ] poor nutritional intake [ ] catabolic state [ ] anasarca     Albumin, Serum: 3.4 g/dL (10-31-18 @ 15:50)      REFERRALS:   [ ]Chaplaincy  [ ] Hospice  [ ]Child Life  [ ]Social Work  [ ]Case management [ ]Holistic Therapy   Goals of Care Discussion Document: HPI:  Obtained from H&P   77M with PMhx of stage HTN, HLD, BPH, COPD, DM, GERD, BCC s/p resection, Hep B, IV CKD, and mutated NSCLC (s/p erlotinib, carboplatin/pemetrexed, atezolizumab) now on gilotrif (abatnif) presented to his oncologist office today was advised to come to the ED due to concern for aspiration PNA and lethargy. History obtained from son and wife at bedside. Per wife, patient has been having worsening lethargy and weakness over the past few days, and has required assistance with ambulation. Patient himself is complaining of fatigue and weakness, and dark urine. Yesterday overnight, son reports patient was confused. At 1AM in the the morning, son noted patient was downstairs getting washed up and had opened the garage. Upon questioning, patient stated he thought it was 8:00PM. Today, patient was taken to his speech therapists office, where he coughed up 2 clumps of cheese from a grilled cheese sandwich he ate earlier. He was advised to see his oncologist, and went there afterwards, who insisted he come to the ED.    Dr. Gross also concerned about patient's pain medication intake, and a component of his lethargy is from overmedication from his opioids. Patient recently had a PET scan done which did not show evidence of bony metastates.     Patient with recent admission for aspiration PNA in August, and prior with recent MICU admission for Septic shock 2/2 gram negative/MRSA PNA and acute hypoxic resp failure.     Patient laying in bed - in pain - s/p emesis x1 post narcan     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  Basal cell carcinoma of nostril    FAMILY HISTORY:  Family history of diabetes mellitus (Sibling): brother - living  Family history of liver cancer: Mother   Family history of liver cancer  Family history of stomach cancer (Sibling): brother      SOCIAL HISTORY:   Significant other/partner: Yes [x ]  No [ ] Children:  Yes [x ]  No [ ] Roman Catholic/Spirituality:  Oriental orthodox   Substance hx: Yes[ ]  No [x ]   Tobacco hx:  Yes [x ] Former - quit 2 years ago  Alcohol hx: Yes [ ] No [ x  Home Opioid hx:  Yes [x ] MS Contin/oxycodone   Living Situation: [ x]Home  [ ]Long term care  [ ]Rehab [ ]Other    ADVANCE DIRECTIVES:  Full Code      [ ] Health Care Proxy(s)  [x ] Surrogate(s)  [ ] Guardian           Name(s): Phone Number(s): Parmjit Davidson - [phone]    BASELINE (I)ADL(s) (prior to admission):  [place]: [ ]Total  [ x] Moderate [ ]Dependent    Allergies    No Known Drug Allergies  tegaderm (Rash; Urticaria)    Intolerances    MEDICATIONS  (STANDING):  dextrose 5%. 1000 milliLiter(s) (50 mL/Hr) IV Continuous <Continuous>  dextrose 50% Injectable 12.5 Gram(s) IV Push once  dextrose 50% Injectable 25 Gram(s) IV Push once  dextrose 50% Injectable 25 Gram(s) IV Push once  heparin  Infusion.  Unit(s)/Hr (11 mL/Hr) IV Continuous <Continuous>  HYDROmorphone  Injectable 0.5 milliGRAM(s) IV Push every 6 hours  insulin lispro (HumaLOG) corrective regimen sliding scale   SubCutaneous every 6 hours  piperacillin/tazobactam IVPB. 3.375 Gram(s) IV Intermittent every 12 hours  sodium chloride 0.9%. 1000 milliLiter(s) (100 mL/Hr) IV Continuous <Continuous>    MEDICATIONS  (PRN):  dextrose 40% Gel 15 Gram(s) Oral once PRN Blood Glucose LESS THAN 70 milliGRAM(s)/deciliter  glucagon  Injectable 1 milliGRAM(s) IntraMuscular once PRN Glucose LESS THAN 70 milligrams/deciliter  heparin  Injectable 5000 Unit(s) IV Push every 6 hours PRN For aPTT less than 40  heparin  Injectable 2500 Unit(s) IV Push every 6 hours PRN For aPTT between 40 - 57  HYDROmorphone  Injectable 0.5 milliGRAM(s) IV Push every 2 hours PRN Severe Pain (7 - 10)  ondansetron Injectable 4 milliGRAM(s) IV Push every 6 hours PRN Nausea and/or Vomiting    PRESENT SYMPTOMS: [ ]Unable to obtain due to poor mentation   Source if other than patient:  [ ]Family   [ ]Team     Pain (Impact on QOL):  Patient declines  - son Parmjit latham.  Patient reports pain everywhere - unable to quantify or qualify - moaning and restless.      PAIN AD Score:     [email or web address] (press ctrl +  left click to view)    Dyspnea:  Yes [ ] No [x ] - [ ]Mild [ ]Moderate [ ]Severe  Anxiety:    Yes [ x] No [ ] - [ ]Mild [x ]Moderate [ ]Severe - due to pain   Fatigue:    Yes [x ] No [ ] - [ ]Mild [x ]Moderate [ ]Severe  Nausea:    Yes [ ] No [x ] - [ ]Mild [ ]Moderate [ ]Severe                         Loss of appetite: Yes [x ] No [ ] - [ ]Mild [ ]Moderate [ ]Severe             Constipation:  Yes [ ] No [x ] - [ ]Mild [ ]Moderate [ ]Severe    Other Symptoms:  [x ]All other review of systems negative     Karnofsky Performance Score/Palliative Performance Status Version 2:   50%    http://palliative.info/resource_material/[email or web address].pdf    PHYSICAL EXAM:  Vital Signs Last 24 Hrs  T(C): 36.4 (2018 15:01), Max: 37.4 (31 Oct 2018 16:31)  T(F): 97.5 (2018 15:01), Max: 99.4 (31 Oct 2018 16:31)  HR: 74 (2018 15:01) (74 - 94)  BP: 113/65 (2018 15:01) (112/64 - 158/-)  BP(mean): --  RR: 18 (2018 15:01) (16 - 18)  SpO2: 99% (2018 15:01) (91% - 100%) I&O's Summary    GENERAL:  Moaning in pain, speaking to son in [facility], drowsy without stimulation post narcan   PULMONARY:   [x ]Clear - anteriorly    [ ]Rhonchi        [ ]Right [ ]Left [ ]Bilateral  [ ]Crackles        [ ]Right [ ]Left [ ]Bilateral  [ ]Wheezing     [ ]Right [ ]Left [ ]Bilateral  CARDIOVASCULAR:    [x ]Regular [ ]Irregular [ ]Tachy  [ ]Jerman [ ]Murmur [ ]Other  GASTROINTESTINAL:  [x ]Soft  [ ]Distended   [ x]+BS  [ x]Non tender [ ]Tender  [ ]PEG [ ]OGT/ NGT  Last BM: 10/30/18  GENITOURINARY:  [x ]Normal [ ] Incontinent   [ ]Oliguria/Anuria   [ ]Soni  MUSCULOSKELETAL:   [ ]Normal   [x ]Weakness  [ ]Bed/Wheelchair bound [ ]Edema  NEUROLOGIC:   Drowsy    SKIN:   [x ]Normal   [ ]Pressure ulcer(s)  [ ]Rash    LABS:                        10.4   15.26 )-----------( 185      ( 2018 05:29 )             32.3       139  |  103  |  19  ----------------------------<  112<H>  3.7   |  26  |  1.98<H>    Ca    8.4      2018 05:27  Phos  2.9       Mg     1.8         TPro  7.6  /  Alb  3.4  /  TBili  0.4  /  DBili  x   /  AST  13  /  ALT  7   /  AlkPhos  69  10-31  PT/INR - ( 2018 15:10 )   PT: 17.0 SEC;   INR: 1.51        PTT - ( 2018 15:10 )  PTT:40.3 SEC    Urinalysis Basic - ( 2018 10:11 )    Color: LT RED / Appearance: TURBID / S.008 / pH: 6.5  Gluc: NEGATIVE / Ketone: NEGATIVE  / Bili: NEGATIVE / Urobili: NORMAL   Blood: LARGE / Protein: 50 / Nitrite: NEGATIVE   Leuk Esterase: TRACE / RBC: >50 / WBC 6-11   Sq Epi: x / Non Sq Epi: FEW / Bacteria: x    RADIOLOGY & ADDITIONAL STUDIES: Reviewed  CT head 18    IMPRESSION:    No acute intracranial pathology is noted. If the patient has new and   persistent symptoms, consider short interval follow-up head CT or brain   MRI follow-up.    PROTEIN CALORIE MALNUTRITION PRESENT: [ ] Yes [ ] No  [ ] PPSV2 < or = to 30% [ x] significant weight loss  [ ] poor nutritional intake [ ] catabolic state [ ] anasarca     Albumin, Serum: 3.4 g/dL (10-31-18 @ 15:50)      REFERRALS:   [ ]Chaplaincy  [ ] Hospice  [ ]Child Life  [ ]Social Work  [ ]Case management [ ]Holistic Therapy   Goals of Care Discussion Document:

## 2018-11-01 NOTE — CONSULT NOTE ADULT - PROBLEM SELECTOR RECOMMENDATION 3
Assist with ADL's.  Skin care PRN. Fall precautions. +Nausea after Narcan, denies any prior nausea/vomiting before. Will have prn Zofran available.

## 2018-11-01 NOTE — PROGRESS NOTE ADULT - PROBLEM SELECTOR PLAN 1
- patient presenting with tachycardia and elevated WBC count with evidence of PNA  - s/p 1 dose vanc/ zosyn  - continue with zosyn to cover for anaerobic and gram negative including pseudomonas given patient with multiple hospital admissions and immunocompromised on immunotherapy - patient presenting with tachycardia and elevated WBC count with evidence of PNA  - continue with zosyn to cover for anaerobic and gram negative including pseudomonas given patient with multiple hospital admissions and immunocompromised on immunotherapy  -will dose vanc by level given creatinine of 2.1

## 2018-11-02 ENCOUNTER — TRANSCRIPTION ENCOUNTER (OUTPATIENT)
Age: 78
End: 2018-11-02

## 2018-11-02 DIAGNOSIS — R11.2 NAUSEA WITH VOMITING, UNSPECIFIED: ICD-10-CM

## 2018-11-02 LAB
APTT BLD: 91.4 SEC — HIGH (ref 27.5–36.3)
BASOPHILS # BLD AUTO: 0.03 K/UL — SIGNIFICANT CHANGE UP (ref 0–0.2)
BASOPHILS NFR BLD AUTO: 0.2 % — SIGNIFICANT CHANGE UP (ref 0–2)
BUN SERPL-MCNC: 22 MG/DL — SIGNIFICANT CHANGE UP (ref 7–23)
CALCIUM SERPL-MCNC: 8.5 MG/DL — SIGNIFICANT CHANGE UP (ref 8.4–10.5)
CHLORIDE SERPL-SCNC: 102 MMOL/L — SIGNIFICANT CHANGE UP (ref 98–107)
CO2 SERPL-SCNC: 25 MMOL/L — SIGNIFICANT CHANGE UP (ref 22–31)
CREAT SERPL-MCNC: 1.97 MG/DL — HIGH (ref 0.5–1.3)
EOSINOPHIL # BLD AUTO: 0.12 K/UL — SIGNIFICANT CHANGE UP (ref 0–0.5)
EOSINOPHIL NFR BLD AUTO: 0.9 % — SIGNIFICANT CHANGE UP (ref 0–6)
GLUCOSE SERPL-MCNC: 126 MG/DL — HIGH (ref 70–99)
HCT VFR BLD CALC: 34.1 % — LOW (ref 39–50)
HCT VFR BLD CALC: 34.1 % — LOW (ref 39–50)
HGB BLD-MCNC: 11 G/DL — LOW (ref 13–17)
HGB BLD-MCNC: 11 G/DL — LOW (ref 13–17)
IMM GRANULOCYTES # BLD AUTO: 0.05 # — SIGNIFICANT CHANGE UP
IMM GRANULOCYTES NFR BLD AUTO: 0.4 % — SIGNIFICANT CHANGE UP (ref 0–1.5)
LYMPHOCYTES # BLD AUTO: 1.41 K/UL — SIGNIFICANT CHANGE UP (ref 1–3.3)
LYMPHOCYTES # BLD AUTO: 10.7 % — LOW (ref 13–44)
MAGNESIUM SERPL-MCNC: 2 MG/DL — SIGNIFICANT CHANGE UP (ref 1.6–2.6)
MCHC RBC-ENTMCNC: 28.1 PG — SIGNIFICANT CHANGE UP (ref 27–34)
MCHC RBC-ENTMCNC: 28.1 PG — SIGNIFICANT CHANGE UP (ref 27–34)
MCHC RBC-ENTMCNC: 32.3 % — SIGNIFICANT CHANGE UP (ref 32–36)
MCHC RBC-ENTMCNC: 32.3 % — SIGNIFICANT CHANGE UP (ref 32–36)
MCV RBC AUTO: 87 FL — SIGNIFICANT CHANGE UP (ref 80–100)
MCV RBC AUTO: 87 FL — SIGNIFICANT CHANGE UP (ref 80–100)
MONOCYTES # BLD AUTO: 0.73 K/UL — SIGNIFICANT CHANGE UP (ref 0–0.9)
MONOCYTES NFR BLD AUTO: 5.6 % — SIGNIFICANT CHANGE UP (ref 2–14)
NEUTROPHILS # BLD AUTO: 10.78 K/UL — HIGH (ref 1.8–7.4)
NEUTROPHILS NFR BLD AUTO: 82.2 % — HIGH (ref 43–77)
NRBC # FLD: 0 — SIGNIFICANT CHANGE UP
NRBC # FLD: 0 — SIGNIFICANT CHANGE UP
PHOSPHATE SERPL-MCNC: 3.7 MG/DL — SIGNIFICANT CHANGE UP (ref 2.5–4.5)
PLATELET # BLD AUTO: 220 K/UL — SIGNIFICANT CHANGE UP (ref 150–400)
PLATELET # BLD AUTO: 220 K/UL — SIGNIFICANT CHANGE UP (ref 150–400)
PMV BLD: 9.4 FL — SIGNIFICANT CHANGE UP (ref 7–13)
PMV BLD: 9.4 FL — SIGNIFICANT CHANGE UP (ref 7–13)
POTASSIUM SERPL-MCNC: 3.7 MMOL/L — SIGNIFICANT CHANGE UP (ref 3.5–5.3)
POTASSIUM SERPL-SCNC: 3.7 MMOL/L — SIGNIFICANT CHANGE UP (ref 3.5–5.3)
RBC # BLD: 3.92 M/UL — LOW (ref 4.2–5.8)
RBC # BLD: 3.92 M/UL — LOW (ref 4.2–5.8)
RBC # FLD: 13.8 % — SIGNIFICANT CHANGE UP (ref 10.3–14.5)
RBC # FLD: 13.8 % — SIGNIFICANT CHANGE UP (ref 10.3–14.5)
SODIUM SERPL-SCNC: 140 MMOL/L — SIGNIFICANT CHANGE UP (ref 135–145)
VANCOMYCIN FLD-MCNC: 10.2 UG/ML — SIGNIFICANT CHANGE UP
WBC # BLD: 13.12 K/UL — HIGH (ref 3.8–10.5)
WBC # BLD: 13.12 K/UL — HIGH (ref 3.8–10.5)
WBC # FLD AUTO: 13.12 K/UL — HIGH (ref 3.8–10.5)
WBC # FLD AUTO: 13.12 K/UL — HIGH (ref 3.8–10.5)

## 2018-11-02 PROCEDURE — 99233 SBSQ HOSP IP/OBS HIGH 50: CPT | Mod: GC

## 2018-11-02 PROCEDURE — 74230 X-RAY XM SWLNG FUNCJ C+: CPT | Mod: 26

## 2018-11-02 PROCEDURE — 76770 US EXAM ABDO BACK WALL COMP: CPT | Mod: 26

## 2018-11-02 RX ORDER — INSULIN LISPRO 100/ML
VIAL (ML) SUBCUTANEOUS AT BEDTIME
Qty: 0 | Refills: 0 | Status: DISCONTINUED | OUTPATIENT
Start: 2018-11-02 | End: 2018-11-10

## 2018-11-02 RX ORDER — INSULIN LISPRO 100/ML
VIAL (ML) SUBCUTANEOUS
Qty: 0 | Refills: 0 | Status: DISCONTINUED | OUTPATIENT
Start: 2018-11-02 | End: 2018-11-10

## 2018-11-02 RX ORDER — INSULIN LISPRO 100/ML
VIAL (ML) SUBCUTANEOUS
Qty: 0 | Refills: 0 | Status: DISCONTINUED | OUTPATIENT
Start: 2018-11-02 | End: 2018-11-02

## 2018-11-02 RX ORDER — VANCOMYCIN HCL 1 G
1250 VIAL (EA) INTRAVENOUS ONCE
Qty: 0 | Refills: 0 | Status: COMPLETED | OUTPATIENT
Start: 2018-11-02 | End: 2018-11-02

## 2018-11-02 RX ADMIN — SODIUM CHLORIDE 75 MILLILITER(S): 9 INJECTION, SOLUTION INTRAVENOUS at 00:33

## 2018-11-02 RX ADMIN — HYDROMORPHONE HYDROCHLORIDE 0.5 MILLIGRAM(S): 2 INJECTION INTRAMUSCULAR; INTRAVENOUS; SUBCUTANEOUS at 17:45

## 2018-11-02 RX ADMIN — PIPERACILLIN AND TAZOBACTAM 25 GRAM(S): 4; .5 INJECTION, POWDER, LYOPHILIZED, FOR SOLUTION INTRAVENOUS at 05:53

## 2018-11-02 RX ADMIN — HYDROMORPHONE HYDROCHLORIDE 0.5 MILLIGRAM(S): 2 INJECTION INTRAMUSCULAR; INTRAVENOUS; SUBCUTANEOUS at 20:35

## 2018-11-02 RX ADMIN — SODIUM CHLORIDE 75 MILLILITER(S): 9 INJECTION, SOLUTION INTRAVENOUS at 22:13

## 2018-11-02 RX ADMIN — HYDROMORPHONE HYDROCHLORIDE 0.5 MILLIGRAM(S): 2 INJECTION INTRAMUSCULAR; INTRAVENOUS; SUBCUTANEOUS at 13:23

## 2018-11-02 RX ADMIN — HYDROMORPHONE HYDROCHLORIDE 0.5 MILLIGRAM(S): 2 INJECTION INTRAMUSCULAR; INTRAVENOUS; SUBCUTANEOUS at 10:19

## 2018-11-02 RX ADMIN — PIPERACILLIN AND TAZOBACTAM 25 GRAM(S): 4; .5 INJECTION, POWDER, LYOPHILIZED, FOR SOLUTION INTRAVENOUS at 18:34

## 2018-11-02 RX ADMIN — HYDROMORPHONE HYDROCHLORIDE 0.5 MILLIGRAM(S): 2 INJECTION INTRAMUSCULAR; INTRAVENOUS; SUBCUTANEOUS at 13:53

## 2018-11-02 RX ADMIN — Medication 166.67 MILLIGRAM(S): at 12:10

## 2018-11-02 RX ADMIN — HYDROMORPHONE HYDROCHLORIDE 0.5 MILLIGRAM(S): 2 INJECTION INTRAMUSCULAR; INTRAVENOUS; SUBCUTANEOUS at 01:54

## 2018-11-02 RX ADMIN — HYDROMORPHONE HYDROCHLORIDE 0.5 MILLIGRAM(S): 2 INJECTION INTRAMUSCULAR; INTRAVENOUS; SUBCUTANEOUS at 20:55

## 2018-11-02 RX ADMIN — HYDROMORPHONE HYDROCHLORIDE 0.5 MILLIGRAM(S): 2 INJECTION INTRAMUSCULAR; INTRAVENOUS; SUBCUTANEOUS at 10:40

## 2018-11-02 RX ADMIN — HYDROMORPHONE HYDROCHLORIDE 0.5 MILLIGRAM(S): 2 INJECTION INTRAMUSCULAR; INTRAVENOUS; SUBCUTANEOUS at 05:54

## 2018-11-02 RX ADMIN — Medication 1: at 13:23

## 2018-11-02 RX ADMIN — HYDROMORPHONE HYDROCHLORIDE 0.5 MILLIGRAM(S): 2 INJECTION INTRAMUSCULAR; INTRAVENOUS; SUBCUTANEOUS at 18:10

## 2018-11-02 RX ADMIN — HEPARIN SODIUM 1100 UNIT(S)/HR: 5000 INJECTION INTRAVENOUS; SUBCUTANEOUS at 05:53

## 2018-11-02 RX ADMIN — HYDROMORPHONE HYDROCHLORIDE 0.5 MILLIGRAM(S): 2 INJECTION INTRAMUSCULAR; INTRAVENOUS; SUBCUTANEOUS at 07:37

## 2018-11-02 NOTE — PROGRESS NOTE ADULT - SUBJECTIVE AND OBJECTIVE BOX
INTERVAL HPI/OVERNIGHT EVENTS:  Patient with improved pain     Code Status: Full Code   Allergies    No Known Drug Allergies  tegaderm (Rash; Urticaria)    Intolerances    MEDICATIONS  (STANDING):  dextrose 5% + sodium chloride 0.45%. 1000 milliLiter(s) (75 mL/Hr) IV Continuous <Continuous>  dextrose 5%. 1000 milliLiter(s) (50 mL/Hr) IV Continuous <Continuous>  dextrose 50% Injectable 12.5 Gram(s) IV Push once  dextrose 50% Injectable 25 Gram(s) IV Push once  dextrose 50% Injectable 25 Gram(s) IV Push once  heparin  Infusion.  Unit(s)/Hr (11 mL/Hr) IV Continuous <Continuous>  HYDROmorphone  Injectable 0.5 milliGRAM(s) IV Push every 6 hours  insulin lispro (HumaLOG) corrective regimen sliding scale   SubCutaneous every 6 hours  piperacillin/tazobactam IVPB. 3.375 Gram(s) IV Intermittent every 12 hours    MEDICATIONS  (PRN):  dextrose 40% Gel 15 Gram(s) Oral once PRN Blood Glucose LESS THAN 70 milliGRAM(s)/deciliter  glucagon  Injectable 1 milliGRAM(s) IntraMuscular once PRN Glucose LESS THAN 70 milligrams/deciliter  heparin  Injectable 5000 Unit(s) IV Push every 6 hours PRN For aPTT less than 40  heparin  Injectable 2500 Unit(s) IV Push every 6 hours PRN For aPTT between 40 - 57  HYDROmorphone  Injectable 0.5 milliGRAM(s) IV Push every 2 hours PRN Severe Pain (7 - 10)  ondansetron Injectable 4 milliGRAM(s) IV Push every 6 hours PRN Nausea and/or Vomiting      PRESENT SYMPTOMS: [ ]Unable to obtain due to poor mentation   Source if other than patient:  [ ]Family   [ ]Team     Pain (Impact on QOL):  Denies currently.  Continues to have intermittent generalized body pain - unable to qualify or quantify   Dyspnea:  Yes [ ] No [x ] - [ ]Mild [ ]Moderate [ ]Severe  Anxiety:    Yes [ ] No [ x] - [ ]Mild [ ]Moderate [ ]Severe  Fatigue:    Yes [x ] No [ ] - [ ]Mild [x ]Moderate [ ]Severe  Nausea:    Yes [ ] No [x ] - [ ]Mild [ ]Moderate [ ]Severe                         Loss of appetite: NPO            Constipation:  Yes [ ] No [x ] - [ ]Mild [ ]Moderate [ ]Severe    PAIN AD Score:	  [email or web address] (Ctrl + left click to view)    Other Symptoms:  [x ]All other review of systems negative     Karnofsky Performance Score/Palliative Performance Status Version 2:     40%    http://palliative.info/resource_material/[email or web address]    PHYSICAL EXAM:  Vital Signs Last 24 Hrs  T(C): 35.8 (2018 14:49), Max: 36.4 (2018 01:41)  T(F): 96.5 (2018 14:49), Max: 97.5 (2018 01:41)  HR: 78 (2018 14:49) (66 - 78)  BP: 120/72 (2018 14:49) (110/70 - 132/78)  BP(mean): --  RR: 20 (2018 14:49) (17 - 30)  SpO2: 98% (2018 14:49) (97% - 99%) I&O's Summary    2018 07:01  -  2018 07:00  --------------------------------------------------------  IN: 500 mL / OUT: 650 mL / NET: -150 mL     GENERAL:  [x ]Alert, verbal   PULMONARY:   [x ]Clear - anteriorly   [ ]Rhonchi        [ ]Right [ ]Left [ ]Bilateral  [ ]Crackles        [ ]Right [ ]Left [ ]Bilateral  [ ]Wheezing     [ ]Right [ ]Left [ ]Bilateral  CARDIOVASCULAR:    [x ]Regular [ ]Irregular [ ]Tachy  [ ]Jerman [ ]Murmur [ ]Other  GASTROINTESTINAL:  [ x]Soft  [ ]Distended   [x ]+BS  [ x]Non tender [ ]Tender  [ ]PEG [ ]OGT/ NGT   Last BM: 18  GENITOURINARY:  [x ]Normal [ ] Incontinent   [ ]Oliguria/Anuria   [ ]Soni  MUSCULOSKELETAL:   [ ]Normal   [x ]Weakness  [ ]Bed/Wheelchair bound [ ]Edema  NEUROLOGIC:   [x ]No focal deficits  [ ] Cognitive impairment  [ ] Dysphagia [ ]Dysarthria [ ] Paresis [ ]Other   SKIN:   [ x]Normal   [ ]Pressure ulcer(s)  [ ]Rash    CRITICAL CARE:  [ ] Shock Present  [ ]Septic [ ]Cardiogenic [ ]Neurologic [ ]Hypovolemic  [ ]  Vasopressors [ ]  Inotropes   [ ] Respiratory failure present  [ ] Acute  [ ] Chronic [ ] Hypoxic  [ ] Hypercarbic [ ] Other  [ ] Other organ failure     LABS:                        11.0   13.12 )-----------( 220      ( 2018 04:40 )             34.1   11    140  |  102  |  22  ----------------------------<  126<H>  3.7   |  25  |  1.97<H>    Ca    8.5      2018 04:40  Phos  3.7       Mg     2.0         PT/INR - ( 2018 15:10 )   PT: 17.0 SEC;   INR: 1.51          PTT - ( 2018 04:40 )  PTT:91.4 SEC    Urinalysis Basic - ( 2018 10:11 )    Color: LT RED / Appearance: TURBID / S.008 / pH: 6.5  Gluc: NEGATIVE / Ketone: NEGATIVE  / Bili: NEGATIVE / Urobili: NORMAL   Blood: LARGE / Protein: 50 / Nitrite: NEGATIVE   Leuk Esterase: TRACE / RBC: >50 / WBC 6-11   Sq Epi: x / Non Sq Epi: FEW / Bacteria: x      RADIOLOGY & ADDITIONAL STUDIES:    Protein Calorie Malnutrition Present: [ ] yes [ ] no  [ ] PPSV2 < or = 30%  [ ] significant weight loss [ ] poor nutritional intake [ ] anasarca [ ] catabolic state Albumin, Serum: 3.4 g/dL (10-31-18 @ 15:50)      REFERRALS:   [ ]Chaplaincy  [ ] Hospice  [ ]Child Life  [ ]Social Work  [ ]Case management [ ]Holistic Therapy   Goals of Care Document:

## 2018-11-02 NOTE — PROGRESS NOTE ADULT - PROBLEM SELECTOR PLAN 3
- likely secondary to sepsis and/or opioid intake  -At home, patient takes morphine 30 mg TID and oxycodone; given patient's LUX, concerned that morphine was not being excreted from his body. Was given narcan  -Palliative consulted; appreciated recs  -pain management:  dilaudid 0.5 IV push q6h and dilaudid 0.5 IV push q2h PRN

## 2018-11-02 NOTE — DISCHARGE NOTE ADULT - CARE PLAN
Principal Discharge DX:	Multifocal pneumonia  Goal:	Ongoing management  Secondary Diagnosis:	Pain, neoplasm-related  Goal:	Ongoing management  Secondary Diagnosis:	LUX (acute kidney injury)  Secondary Diagnosis:	Dysphagia  Secondary Diagnosis:	Pulmonary embolism  Secondary Diagnosis:	Lung cancer Principal Discharge DX:	Multifocal pneumonia  Goal:	Ongoing management  Assessment and plan of treatment:	You presented with shortness of breath caused by an infection in your lung  Secondary Diagnosis:	Pain, neoplasm-related  Goal:	Ongoing management  Secondary Diagnosis:	LUX (acute kidney injury)  Secondary Diagnosis:	Dysphagia  Secondary Diagnosis:	Pulmonary embolism  Secondary Diagnosis:	Lung cancer Principal Discharge DX:	Multifocal pneumonia  Goal:	Ongoing management  Assessment and plan of treatment:	You presented with shortness of breath caused by an infection in your lung from a recent aspiration event. You completed 6 days of an IV antibiotic called zosyn. Your sputum cultures grew a bacteria called klebsiella which was sensitive to the antibiotic you were on. You were switched to another antibiotic that you can take by mouth at home. Please continue to take ciprofloxacin 750 mg by mouth twice a day and metronidazole ___ for one more day. If you start to have fevers, shortness of breath, or cough up blood, please return to the emergency room immediately.  Secondary Diagnosis:	Pain, neoplasm-related  Goal:	Ongoing management  Assessment and plan of treatment:	You have a history of chest pain that is likely associated with your lung cancer. For this pain, you take morphine 30 mg by mouth three times a day as well as oxycodone 10 mg every four hours as needed. When you first presented to the hospital, you appeared lethargic and there was an initial concern that your pain medicine was not being appropriately excreted from your body because of your acute kidney injury. Because of this concern, you were given narcan to reverse the effects of your pain medication. Palliative was consulted to help manage your pain while you were hospitalized.  Secondary Diagnosis:	LUX (acute kidney injury)  Goal:	Ongoing management  Assessment and plan of treatment:	When you presented to the hospital  Secondary Diagnosis:	Dysphagia  Goal:	Ongoing management  Assessment and plan of treatment:	You have a history of difficulty swallowing because of a left  Secondary Diagnosis:	Pulmonary embolism  Goal:	Ongoing management  Assessment and plan of treatment:	You have a history of pulmonary embolisms for which you require medicine that  Secondary Diagnosis:	Lung cancer  Goal:	Ongoing management  Assessment and plan of treatment:	You have a history of lung cancer Principal Discharge DX:	Multifocal pneumonia  Goal:	Ongoing management  Assessment and plan of treatment:	You presented with shortness of breath caused by an infection in your lung from a recent aspiration event.  Your sputum cultures grew a bacteria called klebsiella. During your hospitalization, you were treated with 6 days of an IV antibiotic called zosyn. You were switched to oral antibiotics called ciprofloxacin and metronidazole which are also effective against the bacteria in your lungs. Please continue to take ciprofloxacin 500 mg by mouth twice a day and metronidazole 500 mg by mouth every 8 hours for one more day. If you start to have fevers, shortness of breath, or cough up blood, please return to the emergency room immediately.  Secondary Diagnosis:	Pain, neoplasm-related  Goal:	Ongoing management  Assessment and plan of treatment:	You have a history of chest pain that is likely associated with your lung cancer. For this pain, you took morphine 30 mg by mouth three times a day as well as oxycodone 10 mg every four hours as needed. When you first presented to the hospital, you appeared lethargic and there was an initial concern that your pain medicine was not being appropriately excreted from your body because of your acute kidney injury. Because of this concern, you were given a medication called narcan to reverse the effects of your pain medication. Palliative Medicine was consulted to help manage your pain while you were hospitalized. Please continue taking oxycontin 30 mg twice a day and oxycodone IR 10 mg every 4 hours as needed. Please follow up with your Pain management physician, Dr. Dunne, within 1 week of hospital discharge to manage your pain medication.  Secondary Diagnosis:	LUX (acute kidney injury)  Goal:	Ongoing management  Assessment and plan of treatment:	When you presented to the hospital you were found to have an acute injury to your kidneys as shown by a level in your blood called creatinine. Throughout your hospitalization, your medications that could be affected by your reduced kidney function were held, including your tenofovir. Your creatinine was monitored and showed improvement but your creatinine level is still not at your baseline. Please follow up with your outpatient primary care physician,  _____, within 1 week of hospital discharge to check another creatinine level.  Secondary Diagnosis:	Dysphagia  Goal:	Ongoing management  Assessment and plan of treatment:	You have a history of difficulty swallowing because of a left vocal cord paralysis. Your history of dysphagia likely contributed to your aspiration event leading to your infection in your lung. During your hospitalization, Speech and Swallow were consulted who recommended an x-ray cinesophagram to examine your ability to swallow. You were started on a diet consisting of soft and pureed foods to reduce your risk of aspiration. Please continue to eat soft and pureed foods. Avoid crunchy and dry foods. Please follow up at the Speech and Swallow Clinic at the [facility].  Secondary Diagnosis:	Pulmonary embolism  Goal:	Ongoing management  Assessment and plan of treatment:	You have a history of pulmonary embolisms for which you require medicine that helps to prevent clots called eliquis. Because you seemed lethargic and had a recent aspiration event when you first presented to the hospital, your oral medications were held. You were switched to an IV medication that thins your blood called heparin. As you became more alert, you were switched back to your home dose of eliquis. Please continue taking eliquis 5 mg by mouth twice a day.  Secondary Diagnosis:	Lung cancer  Goal:	Ongoing management  Assessment and plan of treatment:	You have a history of lung cancer for which you are currently undergoing chemotherapy. In the setting of your acute infection, your chemotherapy medication, Abatnif, was held. Please follow up with your outpatient Oncologist, Dr. Sigala, within 1 week of your hospital discharge. Principal Discharge DX:	Multifocal pneumonia  Goal:	Ongoing management  Assessment and plan of treatment:	You presented with shortness of breath caused by an infection in your lung from a recent aspiration event.  Your sputum cultures grew a bacteria called klebsiella. During your hospitalization, you were treated with 6 days of an IV antibiotic called zosyn. You were switched to and completed 2 days of oral antibiotics called ciprofloxacin and metronidazole which are also effective against the bacteria in your lungs. If you start to have fevers, shortness of breath, or cough up blood, please return to the emergency room immediately.  Secondary Diagnosis:	Pain, neoplasm-related  Goal:	Ongoing management  Assessment and plan of treatment:	You have a history of chest pain that is likely associated with your lung cancer. For this pain, you took morphine 30 mg by mouth three times a day as well as oxycodone 10 mg every four hours as needed. When you first presented to the hospital, you appeared lethargic and there was an initial concern that your pain medicine was not being appropriately excreted from your body because of your acute kidney injury. Because of this concern, you were given a medication called narcan to reverse the effects of your pain medication. Palliative Medicine was consulted to help manage your pain while you were hospitalized. Please continue taking oxycontin 30 mg twice a day and oxycodone IR 10 mg every 4 hours as needed. Please follow up with your Pain management physician, Dr. Dunne, within 1 week of hospital discharge to manage your pain medication.  Secondary Diagnosis:	LUX (acute kidney injury)  Goal:	Ongoing management  Assessment and plan of treatment:	When you presented to the hospital you were found to have an acute injury to your kidneys as shown by a level in your blood called creatinine. Throughout your hospitalization, your medications that could be affected by your reduced kidney function were held, including your tenofovir. Your creatinine was monitored and showed improvement but your creatinine level is still not at your baseline. Please follow up with your outpatient primary care physician,  _____, within 1 week of hospital discharge to check another creatinine level.  Secondary Diagnosis:	Dysphagia  Goal:	Ongoing management  Assessment and plan of treatment:	You have a history of difficulty swallowing because of a left vocal cord paralysis. Your history of dysphagia likely contributed to your aspiration event leading to your infection in your lung. During your hospitalization, Speech and Swallow were consulted who recommended an x-ray cinesophagram to examine your ability to swallow. You were started on a diet consisting of soft and pureed foods to reduce your risk of aspiration. ENT was also consulted who did a laryngoscopy and did not recommend further treatment. Please continue to eat soft and pureed foods. Avoid crunchy and dry foods. Please follow up at the Speech and Swallow Clinic at the [facility]. Please follow up with your outpatient ENT physician, Dr. Prado, within 1-2 weeks of hospital discharge.  Secondary Diagnosis:	Pulmonary embolism  Goal:	Ongoing management  Assessment and plan of treatment:	You have a history of pulmonary embolisms for which you require medicine that helps to prevent clots called eliquis. Because you seemed lethargic and had a recent aspiration event when you first presented to the hospital, your oral medications were held. You were switched to an IV medication that thins your blood called heparin. As you became more alert, you were switched back to eliquis but the dose was reduced because of your poor kidney function. Please continue taking eliquis 2.5 mg by mouth twice a day.  Secondary Diagnosis:	Lung cancer  Goal:	Ongoing management  Assessment and plan of treatment:	You have a history of lung cancer for which you are currently undergoing chemotherapy. In the setting of your acute infection, your chemotherapy medication, Abatnif, was held. Please follow up with your outpatient Oncologist, Dr. Sigala, within 1 week of your hospital discharge. Principal Discharge DX:	Multifocal pneumonia  Goal:	Ongoing management  Assessment and plan of treatment:	You presented with shortness of breath caused by an infection in your lung from a recent aspiration event.  Your sputum cultures grew a bacteria called klebsiella. During your hospitalization, you were treated with 6 days of an IV antibiotic called zosyn. You were switched to and completed 2 days of oral antibiotics called ciprofloxacin and metronidazole which are also effective against the bacteria in your lungs. If you start to have fevers, shortness of breath, or cough up blood, please return to the emergency room immediately.  Secondary Diagnosis:	Pain, neoplasm-related  Goal:	Ongoing management  Assessment and plan of treatment:	You have a history of chest pain that is likely associated with your lung cancer. For this pain, you took morphine 30 mg by mouth three times a day as well as oxycodone 10 mg every four hours as needed. When you first presented to the hospital, you appeared lethargic and there was an initial concern that your pain medicine was not being appropriately excreted from your body because of your acute kidney injury. Because of this concern, you were given a medication called narcan to reverse the effects of your pain medication. Palliative Medicine was consulted to help manage your pain while you were hospitalized. Please continue taking oxycontin 30 mg twice a day and oxycodone IR 10 mg every 4 hours as needed. Please follow up with your Pain management physician, Dr. Marquez, on November 14th, 2018 at 4 PM at the [facility].  Secondary Diagnosis:	LUX (acute kidney injury)  Goal:	Ongoing management  Assessment and plan of treatment:	When you presented to the hospital you were found to have an acute injury to your kidneys as shown by a level in your blood called creatinine. Throughout your hospitalization, your medications that could be affected by your reduced kidney function were held, including your tenofovir. Your creatinine was monitored and showed improvement but your creatinine level is still not at your baseline. Please follow up with your outpatient primary care physician, Dr. Love, within 1 week of hospital discharge to check another creatinine level.  Secondary Diagnosis:	Dysphagia  Goal:	Ongoing management  Assessment and plan of treatment:	You have a history of difficulty swallowing because of a left vocal cord paralysis. Your history of dysphagia likely contributed to your aspiration event leading to your infection in your lung. During your hospitalization, Speech and Swallow were consulted who recommended an x-ray cinesophagram to examine your ability to swallow. You were started on a diet consisting of soft and pureed foods to reduce your risk of aspiration. ENT was also consulted who did a laryngoscopy and did not recommend further treatment. Please continue to eat soft and pureed foods. Avoid crunchy and dry foods. Please follow up at the Speech and Swallow Clinic at the [facility]. Please follow up with your outpatient ENT physician, Dr. Prado, within 1-2 weeks of hospital discharge.  Secondary Diagnosis:	Pulmonary embolism  Goal:	Ongoing management  Assessment and plan of treatment:	You have a history of pulmonary embolisms for which you require medicine that helps to prevent clots called eliquis. Because you seemed lethargic and had a recent aspiration event when you first presented to the hospital, your oral medications were held. You were switched to an IV medication that thins your blood called heparin. As you became more alert, you were switched back to eliquis but the dose was reduced because of your poor kidney function. Please continue taking eliquis 2.5 mg by mouth twice a day.  Secondary Diagnosis:	Lung cancer  Goal:	Ongoing management  Assessment and plan of treatment:	You have a history of lung cancer for which you are currently undergoing chemotherapy. In the setting of your acute infection, your chemotherapy medication, Abatnif, was held. Please follow up with your outpatient Oncologist, Dr. Sigala, within 1 week of your hospital discharge. Principal Discharge DX:	Multifocal pneumonia  Goal:	Ongoing management  Assessment and plan of treatment:	You presented with shortness of breath caused by an infection in your lung from a recent aspiration event.  Your sputum cultures grew a bacteria called klebsiella. During your hospitalization, you were treated with 6 days of an IV antibiotic called zosyn. You were switched to and completed 2 days of oral antibiotics called ciprofloxacin and metronidazole which are also effective against the bacteria in your lungs. If you start to have fevers, shortness of breath, or cough up blood, please return to the emergency room immediately.  Secondary Diagnosis:	Pain, neoplasm-related  Goal:	Ongoing management  Assessment and plan of treatment:	You have a history of chest pain that is likely associated with your lung cancer. For this pain, you took morphine 30 mg by mouth three times a day as well as oxycodone 10 mg every four hours as needed. When you first presented to the hospital, you appeared lethargic and there was an initial concern that your pain medicine was not being appropriately excreted from your body because of your acute kidney injury. Because of this concern, you were given a medication called narcan to reverse the effects of your pain medication. Palliative Medicine was consulted to help manage your pain while you were hospitalized. Please continue taking oxycontin 30 mg twice a day and oxycodone IR 10 mg every 4 hours as needed. Please follow up with your Pain management physician, Dr. Marquez, on November 14th, 2018 at 4 PM at the [facility].  Secondary Diagnosis:	LUX (acute kidney injury)  Goal:	Ongoing management  Assessment and plan of treatment:	When you presented to the hospital you were found to have an acute injury to your kidneys as shown by a level in your blood called creatinine. Throughout your hospitalization, your medications that could be affected by your reduced kidney function were held, including your tenofovir. Your creatinine was monitored and showed improvement but your creatinine level is still not at your baseline. Please follow up with your outpatient primary care physician, Dr. Love, within 1 week of hospital discharge to check another creatinine level.  Secondary Diagnosis:	Dysphagia  Goal:	Ongoing management  Assessment and plan of treatment:	You have a history of difficulty swallowing because of a left vocal cord paralysis. Your history of dysphagia likely contributed to your aspiration event leading to your infection in your lung. During your hospitalization, Speech and Swallow were consulted who recommended an x-ray cinesophagram to examine your ability to swallow. You were started on a diet consisting of soft and pureed foods to reduce your risk of aspiration. ENT was also consulted who did a laryngoscopy and did not recommend further treatment. Please continue to eat soft and pureed foods. Avoid crunchy and dry foods. Please follow up at the Speech and Swallow Clinic at the [facility]. Please follow up with your outpatient ENT physician, Dr. Prado, within 1-2 weeks of hospital discharge.  Secondary Diagnosis:	Pulmonary embolism  Goal:	Ongoing management  Assessment and plan of treatment:	You have a history of pulmonary embolisms for which you require medicine that helps to prevent clots called eliquis. Because you seemed lethargic and had a recent aspiration event when you first presented to the hospital, your oral medications were held. You were switched to an IV medication that thins your blood called heparin. As you became more alert, you were switched back to eliquis but the dose was reduced because of your poor kidney function. Please continue taking eliquis 2.5 mg by mouth twice a day.  Secondary Diagnosis:	Lung cancer  Goal:	Ongoing management  Assessment and plan of treatment:	You have a history of lung cancer for which you are currently undergoing chemotherapy. In the setting of your acute infection and kidney injury, your chemotherapy medication, Afatinib, was held. Please do NOT take afatinib until you see your outpatient Oncologist. Please follow up with your outpatient Oncologist, Dr. Sigala, within 1 week of your hospital discharge.

## 2018-11-02 NOTE — PROGRESS NOTE ADULT - PROBLEM SELECTOR PLAN 2
Patient controlled on current regimen of Dilaudid 0.5mg IV q6h ATC with Dilaudid 0.5mg IV q2h PRN.  Patient required 2 PRN doses in 24 hours.  Speech and swallow evaluation pending.  If able to swallow pills whole (can not crush Oxycontin), can start Oxycontin 15mg PO BID - given patient's LUX - and discontinue Dilaudid 0.5mg IV q6h ATC.  Continue Dilaudid 0.5mg IV q2h PRN.  Bowel regimen when able.

## 2018-11-02 NOTE — SWALLOW VFSS/MBS ASSESSMENT ADULT - COMMENTS
77M with PMhx of stage HTN, HLD, BPH, COPD, DM, GERD, BCC s/p resection, Hep B, IV CKD, and mutated NSCLC (s/p erlotinib, carboplatin/pemetrexed, atezolizumab) now on gilotrif (abatnif) presented to his oncologist office today was advised to come to the ED due to concern for aspiration PNA and lethargy. History obtained from son and wife at bedside. Per wife, patient has been having worsening lethargy and weakness over the past few days, and has required assistance with ambulation. Patient himself is complaining of fatigue and weakness, and dark urine. Yesterday overnight, son reports patient was confused. At 1AM in the the morning, son noted patient was downstairs getting washed up and had opened the garage. Upon questioning, patient stated he thought it was 8:00PM. Today, patient was taken to his speech therapists office, where he coughed up 2 clumps of cheese from a grilled cheese sandwich he ate earlier. He was advised to see his oncologist, and went there afterwards, who insisted he come to the ED.

## 2018-11-02 NOTE — DISCHARGE NOTE ADULT - PROVIDER TOKENS
TOKEN:'[id number]' TOKEN:'[id number]',FREE:[LAST:[Marcelo],FIRST:[Nery],PHONE:[phone]:[(   )    -],ADDRESS:[[address] [place] [address]]] TOKEN:'[id number]',FREE:[LAST:[Best-Jimmy],FIRST:[Nery],PHONE:[phone]:[(   )    -],ADDRESS:[[address] [place] [address]]],FREE:[LAST:[Ivan],FIRST:[Pashto],PHONE:[phone]:[(   )    -],ADDRESS:[[address] [place] [address]]]

## 2018-11-02 NOTE — DISCHARGE NOTE ADULT - CARE PROVIDER_API CALL
Brandan Gross (MD; MBBS), Hematology; [facility]; Medical Oncology  [address] [place] [id number]  Phone: [phone]  Fax: [phone] Brandan Gross (MD; MBBS), Hematology; [facility]; Medical Oncology  [address], [place] [id number]  Phone: [phone]  Fax: [phone]    Nery Robertson  [address] [place] [address]  Phone: [phone]  Fax: (   )    - Brandan Gross; MBBS), Hematology; [facility]; Medical Oncology  [address], [place] [id number]  Phone: [phone]  Fax: [phone]    Nery Robertson  [address] [place] [address] [address], [place] [address]  Phone: [phone]  Fax: (   )    -    Susana Love  [address] , [place] [address]  Phone: [phone]  Fax: (   )    -

## 2018-11-02 NOTE — DISCHARGE NOTE ADULT - ADDITIONAL INSTRUCTIONS
-Please follow up with your outpatient primary care physician,  ____, within 1 week of hospital discharge to monitor your creatinine levels  -Please eat soft, pureed, or honey-thick consistent liquid foods. Please avoid foods that are crunchy or dry.   -Please continue taking ciprofloxacin 500 mg by mouth twice a day and metronidazole 500 mg by mouth every 8 hours for one more day.   -Please follow up with your outpatient Oncologist, Dr. Gross, within 1 week of hospital discharge.   - -Please follow up with your outpatient primary care physician,  ____, within 1 week of hospital discharge to monitor your creatinine levels  -Please eat soft, pureed, or honey-thick consistent liquid foods. Please avoid foods that are crunchy or dry.   -Please continue taking ciprofloxacin 500 mg by mouth twice a day and metronidazole 500 mg by mouth every 8 hours for one more day.   -Please follow up with your outpatient Oncologist, Dr. Gross, within 1 week of hospital discharge.   -Please follow up with  -Please follow up with your outpatient primary care physician, Dr. Love, within 1 week of hospital discharge to monitor your creatinine levels  -Please eat soft, pureed, or honey-thick consistent liquid foods. Please avoid foods that are crunchy or dry.   -Please follow up with your outpatient Oncologist, Dr. Gross, within 1 week of hospital discharge.   -Please follow up with Dr. Nery Limon on your appointment scheduled for November 19th, 2018 at 4 PM at the [facility]. -Please follow up with your outpatient primary care physician, Dr. Love, within 1 week of hospital discharge to monitor your creatinine levels  -Please eat soft, pureed, or honey-thick consistent liquid foods. Please avoid foods that are crunchy or dry.   -Please do not take your afatinib. Please follow up with your outpatient Oncologist, Dr. Gross, within 1 week of hospital discharge.   -Please follow up with Dr. Nery Limon on your appointment scheduled for November 19th, 2018 at 4 PM at the [facility], [place].

## 2018-11-02 NOTE — PROGRESS NOTE ADULT - ATTENDING COMMENTS
Patient seen and examined. Chart/lab reviewed. Agree with above with modifications.  77M wth h/o HTN, DM-2, COPD, PE, dysphagia, BCC s/p resection, Hep B, CKD4, and mutated NSCLC (s/p erlotinib, carboplatin/pemetrexed, atezolizumab) now on gilotrif (abatnif), p/w lethargy, aspiration pneumonia, LUX.  He was on mscontin 30 mg tid and oxycodone 10 mg q4h prn per pain management, though bone scan and PET neg for bone mets.    PE: awake/alert, pain better controlled, lung : decreased BS base, abdomen soft, no edema    Assessment/plan:  # Lethargy, acute metabolic encephalopathy: in the setting of aspiration pneumonia/sepsis, CT head neg  # Dsyphagia, Aspiration pneumonia: bilateral opacities with large LLL infiltrate on CXR, vanco/zosyn for 7 day course  seen by s/s, puree diet with honey thick liquid, aspiration precaution  # chronic pain: due to lung CA?. no bone mets on PET/bone scan, pain mgmt per palliative care, on iv dilaudid prn, reduce pain meds on d/c  #NSCLC: hold abatnif in the setting of infection, f/u onc  # LUX : in the setting of sepsis, IVF, renal US no hydronephrosis  # HBV: hold tenofovir in the setting of LUX  # H/o PE: c/w heparin drip,  no eliquis or lovenox in the setting of LUX

## 2018-11-02 NOTE — DISCHARGE NOTE ADULT - MEDICATION SUMMARY - MEDICATIONS TO CHANGE
I will SWITCH the dose or number of times a day I take the medications listed below when I get home from the hospital:    Eliquis 5 mg oral tablet  -- 1 tab(s) by mouth 2 times a day    oxyCODONE 30 mg oral tablet, extended release  -- 1 tab(s) by mouth every 8 hours MDD:3 tablets

## 2018-11-02 NOTE — DISCHARGE NOTE ADULT - PATIENT PORTAL LINK FT
You can access the [facility], offered by [facility], by registering with the following website: [email or web address]

## 2018-11-02 NOTE — PROGRESS NOTE ADULT - PROBLEM SELECTOR PLAN 2
- patient presenting with aspiration event and elevated WBC count, as well as CXR findings of multifocal PNA  - history of dysphagia and witnessed aspirations  -patient will remain NPO for now  -C/w vanc and zosyn  -vanc by level: level this AM was 10.2 Given vancomycin 1250 IV x1

## 2018-11-02 NOTE — DISCHARGE NOTE ADULT - CONDITIONS AT DISCHARGE
Patient is stable , discharged, oriented x 4; is out of bed with one person standby assistances.  Once the Meal is set up he will eat.  His Skin is intact, vital signs are stable and he has no additional complaints at this time.  His Wife will transport him Home.

## 2018-11-02 NOTE — PROGRESS NOTE ADULT - PROBLEM SELECTOR PLAN 4
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

## 2018-11-02 NOTE — SWALLOW VFSS/MBS ASSESSMENT ADULT - DIAGNOSTIC IMPRESSIONS
Patient presents with Mild Oral Stage and Moderate to Severe Pharyngeal Stage Dysphagia. The Oral Stage is characterized by adequate oral containment, slow bolus manipulation, slow tongue motion with slow anterior to posterior transfer of the bolus; premature spillage for thin liquids to the hypopharynx due to reduced tongue to palate seal; with adequate oral clearance post swallow.    The Pharyngeal Stage is characterized by delayed initiation of the pharyngeal swallow (Bolus head is at the pyriforms for Thin Liquids), reduced laryngeal elevation with incomplete laryngeal vestibular closure, reduced tongue base retraction with reduced epiglottic deflection resulting in moderate vallecular residue post primary swallow for puree and reduced pharyngeal constriction resulting in mild pyriforms/pharyngeal wall residue post primary swallow. There is moderate pharyngeal clearance deficits located in the vallecular/pyriforms/pharyngeal wall post swallow.   There was one episode of Trace Laryngeal Penetration for Puree consistency leaving Trace residue in the laryngeal vestibule without complete retrieval. Patient is verbally cued to cough to expel Trace contrast. Additional puree trials did not replicate Penetration.  There was Deep Laryngeal Penetration for Nectar Thick Liquids. Patient presents with Mild Oral Stage and Moderate to Severe Pharyngeal Stage Dysphagia. The Oral Stage is characterized by adequate oral containment, slow bolus manipulation, slow tongue motion with slow anterior to posterior transfer of the bolus; premature spillage for thin liquids to the hypopharynx due to reduced tongue to palate seal; with adequate oral clearance post swallow.    The Pharyngeal Stage is characterized by delayed initiation of the pharyngeal swallow (Bolus head is at the pyriforms for Thin Liquids), reduced laryngeal elevation with incomplete laryngeal vestibular closure, reduced tongue base retraction with reduced epiglottic deflection resulting in moderate vallecular residue post primary swallow for puree and reduced pharyngeal constriction resulting in mild pyriforms/pharyngeal wall residue post primary swallow. There is moderate pharyngeal clearance deficits located in the vallecular/pyriforms/pharyngeal wall post swallow.   There was one episode of Trace Laryngeal Penetration for Puree consistency leaving Trace residue in the laryngeal vestibule without complete retrieval. Patient is verbally cued to cough to expel Trace contrast. Additional puree trials did not replicate Penetration.  There was Deep Laryngeal Penetration for Nectar Thick Liquids without retrieval leaving residue in laryngeal vestibule. There was Aspiration (Silent) observed during the swallow for Thin Liquids. Patient is not sensate to the Aspiration given no reflexive cough response. Compensatory strategy of Chin Down posture did not benefit to eliminate the Penetration/Aspiration for Nectar Thick Liquids.  There was No Aspiration observed before, during or after the swallow for puree and honey thick liquids.

## 2018-11-02 NOTE — DISCHARGE NOTE ADULT - MEDICATION SUMMARY - MEDICATIONS TO STOP TAKING
I will STOP taking the medications listed below when I get home from the hospital:    tenofovir disoproxil fumarate 300 mg oral tablet  -- 1 tab(s) by mouth once a day    gabapentin 100 mg oral capsule  -- 1 cap(s) by mouth once a day (at bedtime)    afatinib 20 mg oral tablet  -- 1 tab(s) by mouth once a day    alogliptin-metformin 12.5 mg-500 mg oral tablet  -- 1 tab(s) by mouth once a day    morphine 12 hour extended release  -- 30 milligram(s) by mouth every 8 hours

## 2018-11-02 NOTE — DISCHARGE NOTE ADULT - OTHER SIGNIFICANT FINDINGS
< from: Xray Chest 1 View- PORTABLE-Urgent (10.31.18 @ 17:03) >  IMPRESSION:     Bilateral patchy opacities with a large left lower lobe opacity better   evaluated on most recent CT chest.    < end of copied text > < from: Xray Chest 1 View- PORTABLE-Urgent (10.31.18 @ 17:03) >  IMPRESSION:     Bilateral patchy opacities with a large left lower lobe opacity better   evaluated on most recent CT chest.    < end of copied text >    < from: Xray Cinesophagram (11.02.18 @ 09:09) >    IMPRESSION:  Single episode of trace laryngeal penetration without   complete retrieval for puree. Deep laryngeal penetration without   retrieval for nectar thick liquids. Silent aspiration for thin liquids.   No aspiration for puree or honey thick liquids. A full report will be   issued by the department of speech and hearing.     < end of copied text >

## 2018-11-02 NOTE — DISCHARGE NOTE ADULT - INSTRUCTIONS
Please Please eat foods that are soft, pureed or are honey thick liquid consistencies such as yogurt, mashed potatoes, smooth soups, and pureed vegetables and meats. Please avoid foods that are crunchy and dry.

## 2018-11-02 NOTE — DISCHARGE NOTE ADULT - HOSPITAL COURSE
HPI per admitting resident:     77M with PMhx of stage HTN, HLD, BPH, COPD, DM, GERD, BCC s/p resection, Hep B, IV CKD, and mutated NSCLC (s/p erlotinib, carboplatin/pemetrexed, atezolizumab) now on gilotrif (abatnif) presented to his oncologist office today was advised to come to the ED due to concern for aspiration PNA and lethargy. History obtained from son and wife at bedside. Per wife, patient has been having worsening lethargy and weakness over the past few days, and has required assistance with ambulation. Patient himself is complaining of fatigue and weakness, and dark urine. Yesterday overnight, son reports patient was confused. At 1AM in the the morning, son noted patient was downstairs getting washed up and had opened the garage. Upon questioning, patient stated he thought it was 8:00PM. Today, patient was taken to his speech therapists office, where he coughed up 2 clumps of cheese from a grilled cheese sandwich he ate earlier. He was advised to see his oncologist, and went there afterwards, who insisted he come to the ED.    Hospital course:   In the ED, /75; HR 90; RR 18; T 97.8, sat 93% on room air. Patient given vanc and zosyn in the ED and 1L NS. HPI per admitting resident:     77M with PMhx of stage HTN, HLD, BPH, COPD, DM, GERD, BCC s/p resection, Hep B, IV CKD, and mutated NSCLC (s/p erlotinib, carboplatin/pemetrexed, atezolizumab) now on gilotrif (abatnif) presented to his oncologist office today was advised to come to the ED due to concern for aspiration PNA and lethargy. History obtained from son and wife at bedside. Per wife, patient has been having worsening lethargy and weakness over the past few days, and has required assistance with ambulation. Patient himself is complaining of fatigue and weakness, and dark urine. Yesterday overnight, son reports patient was confused. At 1AM in the the morning, son noted patient was downstairs getting washed up and had opened the garage. Upon questioning, patient stated he thought it was 8:00PM. Today, patient was taken to his speech therapists office, where he coughed up 2 clumps of cheese from a grilled cheese sandwich he ate earlier. He was advised to see his oncologist, and went there afterwards, who insisted he come to the ED.    Hospital course:   In the ED, /75; HR 90; RR 18; T 97.8, sat 93% on room air. Patient given vanc and zosyn in the ED and 1L NS. CXR revealed HPI per admitting resident:     77M with PMhx of stage HTN, HLD, BPH, COPD, DM, GERD, BCC s/p resection, Hep B, IV CKD, and mutated NSCLC (s/p erlotinib, carboplatin/pemetrexed, atezolizumab) now on gilotrif (abatnif) presented to his oncologist office today was advised to come to the ED due to concern for aspiration PNA and lethargy. History obtained from son and wife at bedside. Per wife, patient has been having worsening lethargy and weakness over the past few days, and has required assistance with ambulation. Patient himself is complaining of fatigue and weakness, and dark urine. Yesterday overnight, son reports patient was confused. At 1AM in the the morning, son noted patient was downstairs getting washed up and had opened the garage. Upon questioning, patient stated he thought it was 8:00PM. Today, patient was taken to his speech therapists office, where he coughed up 2 clumps of cheese from a grilled cheese sandwich he ate earlier. He was advised to see his oncologist, and went there afterwards, who insisted he come to the ED.    Hospital course:   In the ED, /75; HR 90; RR 18; T 97.8, sat 93% on room air. Patient given vanc and zosyn in the ED and 1L NS. Notable labs were: WBC 16.73, creatinine of 2.10. CXR revealed bilateral airspace opacities consistent w/ multifocal aspiration pneumonia (given history shows that patient had likely aspiration event after eating grilled cheese sandwich). BCX and sputum cx were sent. Given patient's lethargy, PO meds, including eliquis and home pain medications were held. Patient was started on a heparin gtt for his history of PEs. He was transferred to medicine for further management.     Patient was continued on IV zosyn with an intention to treat for 7 days to cover for anaerobes and pseudomonas (as patient had multiple hospital admissions). Patient continued to be lethargic and considering patient was taking a significant amount of morphine and oxycodone for neoplasm related pain in the setting of an LUX, there was an initial concern for overdose of opioids. He was given narcan but he started to experience severe pain so Palliative was consulted for pain management. Patient was started on IV dilaudid as well as PRN IV dilaudid for break through pain. As the control of patient's pain improved, he was transitioned to oxycontin PO w/ oxycodone IR for breakthrough pain. Patient's creatinine was monitored and was found to downtrend (creatinine clearance of 34), so patient was restarted on eliquis. Patient's sputum culture grew klebseilla sensitive to ciprofloxacin so patient was switched to PO ciprofloxacin and PO metronidazole (to cover for anaerobes and gram negatives) to complete a total antibiotic course of 7 days. Patient will complete final day of antibiotics as an outpatient and will continue with oxycontin 30 mg PO BID and oxycodone IR 10 mg PO q4h PRN for breakthrough pain.

## 2018-11-02 NOTE — PROGRESS NOTE ADULT - PROBLEM SELECTOR PLAN 1
- patient presenting with tachycardia and elevated WBC count with evidence of PNA  - continue with zosyn to cover for anaerobic and gram negative including pseudomonas given patient with multiple hospital admissions and immunocompromised on immunotherapy  -will dose vanc by level - patient presenting with tachycardia and elevated WBC count with evidence of PNA  - continue with zosyn to cover for anaerobic and gram negative including pseudomonas given patient with multiple hospital admissions and immunocompromised on immunotherapy  -will dose vanc by level; vanc level 10.2 this AM; gave Vanc 1250 IV x1

## 2018-11-02 NOTE — PROGRESS NOTE ADULT - PROBLEM SELECTOR PLAN 5
- patient with elevated Cr; creatinine is 1.97- baseline is 0.8-0.9  -On UA, noted to have large blood. f/u renal U/S - patient with elevated Cr; creatinine is 1.97- baseline is 0.8-0.9  -On UA, noted to have large blood.   -Renal U/S did not show evidence of hydronephrosis

## 2018-11-02 NOTE — DISCHARGE NOTE ADULT - CARE PROVIDERS DIRECT ADDRESSES
,[email or web address]'s [email or web address] ,[email or web address],DirectAddress_Unknown,DirectAddress_Unknown

## 2018-11-02 NOTE — PROGRESS NOTE ADULT - PROBLEM SELECTOR PLAN 10
- CT from April revealing bilateral pulmonary emboli  - prior on lovenox and transitioned to Eliquis by oncologist  -Patient last took his eliquis yesterday morning. Patient started on heparin gtt     11. Goals of care:  -Discussed resuscitation and intubation with son and patient. They have not come to a decision at this time, but will talk about it overnight. Full code for now. Are interested in filling out a MOLST form.    12. Hepatitis B- on tenofovir  - hold for now     13. DVT ppx  - on heparin gtt

## 2018-11-02 NOTE — PROGRESS NOTE ADULT - SUBJECTIVE AND OBJECTIVE BOX
CHIEF COMPLAINT:    Interval Events:    REVIEW OF SYSTEMS:  Constitutional: [ ] negative [ ] fevers [ ] chills [ ] weight loss [ ] weight gain  HEENT: [ ] negative [ ] dry eyes [ ] eye irritation [ ] postnasal drip [ ] nasal congestion  CV: [ ] negative  [ ] chest pain [ ] orthopnea [ ] palpitations [ ] murmur  Resp: [ ] negative [ ] cough [ ] shortness of breath [ ] dyspnea [ ] wheezing [ ] sputum [ ] hemoptysis  GI: [ ] negative [ ] nausea [ ] vomiting [ ] diarrhea [ ] constipation [ ] abd pain [ ] dysphagia   : [ ] negative [ ] dysuria [ ] nocturia [ ] hematuria [ ] increased urinary frequency  Musculoskeletal: [ ] negative [ ] back pain [ ] myalgias [ ] arthralgias [ ] fracture  Skin: [ ] negative [ ] rash [ ] itch  Neurological: [ ] negative [ ] headache [ ] dizziness [ ] syncope [ ] weakness [ ] numbness  Psychiatric: [ ] negative [ ] anxiety [ ] depression  Endocrine: [ ] negative [ ] diabetes [ ] thyroid problem  Hematologic/Lymphatic: [ ] negative [ ] anemia [ ] bleeding problem  Allergic/Immunologic: [ ] negative [ ] itchy eyes [ ] nasal discharge [ ] hives [ ] angioedema  [ ] All other systems negative  [ ] Unable to assess ROS because ________    OBJECTIVE:  ICU Vital Signs Last 24 Hrs  T(C): 36.4 (2018 01:41), Max: 36.4 (2018 15:01)  T(F): 97.5 (:41), Max: 97.5 (2018 15:01)  HR: 66 (:41) (66 - 86)  BP: 122/72 (2018 01:41) (110/70 - 158/-)  BP(mean): --  ABP: --  ABP(mean): --  RR: 18 (2018 01:41) (17 - 18)  SpO2: 98% (2018 01:41) (98% - 99%)         @ 07:01  -   @ 07:00  --------------------------------------------------------  IN: 500 mL / OUT: 650 mL / NET: -150 mL      CAPILLARY BLOOD GLUCOSE      POCT Blood Glucose.: 115 mg/dL (2018 00:14)      PHYSICAL EXAM:  General:   HEENT:   Lymph Nodes:  Neck:   Respiratory:   Cardiovascular:   Abdomen:   Extremities:   Skin:   Neurological:  Psychiatry:    LINES:    HOSPITAL MEDICATIONS:  heparin  Infusion.  Unit(s)/Hr IV Continuous <Continuous>    piperacillin/tazobactam IVPB. 3.375 Gram(s) IV Intermittent every 12 hours      dextrose 40% Gel 15 Gram(s) Oral once PRN  dextrose 50% Injectable 12.5 Gram(s) IV Push once  dextrose 50% Injectable 25 Gram(s) IV Push once  dextrose 50% Injectable 25 Gram(s) IV Push once  glucagon  Injectable 1 milliGRAM(s) IntraMuscular once PRN  insulin lispro (HumaLOG) corrective regimen sliding scale   SubCutaneous every 6 hours      HYDROmorphone  Injectable 0.5 milliGRAM(s) IV Push every 6 hours  HYDROmorphone  Injectable 0.5 milliGRAM(s) IV Push every 2 hours PRN  ondansetron Injectable 4 milliGRAM(s) IV Push every 6 hours PRN          dextrose 5% + sodium chloride 0.45%. 1000 milliLiter(s) IV Continuous <Continuous>  dextrose 5%. 1000 milliLiter(s) IV Continuous <Continuous>            LABS:                        11.0   13.12 )-----------( 220      ( 2018 04:40 )             34.1     Hgb Trend: 11.0<--, 10.7<--, 10.4<--, 11.6<--  11-02    140  |  102  |  22  ----------------------------<  126<H>  3.7   |  25  |  1.97<H>    Ca    8.5      2018 04:40  Phos  3.7     11-  Mg     2.0     11    TPro  7.6  /  Alb  3.4  /  TBili  0.4  /  DBili  x   /  AST  13  /  ALT  7   /  AlkPhos  69  10-31    Creatinine Trend: 1.97<--, 1.98<--, 2.10<--  PT/INR - ( 2018 15:10 )   PT: 17.0 SEC;   INR: 1.51          PTT - ( 2018 04:40 )  PTT:91.4 SEC  Urinalysis Basic - ( 2018 10:11 )    Color: LT RED / Appearance: TURBID / S.008 / pH: 6.5  Gluc: NEGATIVE / Ketone: NEGATIVE  / Bili: NEGATIVE / Urobili: NORMAL   Blood: LARGE / Protein: 50 / Nitrite: NEGATIVE   Leuk Esterase: TRACE / RBC: >50 / WBC 6-11   Sq Epi: x / Non Sq Epi: FEW / Bacteria: x        Venous Blood Gas:  10-31 @ 15:50  7.38/56/20/29/27.0  VBG Lactate: 1.2      MICROBIOLOGY:     RADIOLOGY:  [ ] Reviewed and interpreted by me    EKG: CHIEF COMPLAINT: SOB, lethargy    Interval Events:  No acute events overnight. Patient was seen and examined at bedside. Reports 8/10 chest pain but has not yet received his dilaudid.      REVIEW OF SYSTEMS:  Constitutional: [x] negative [ ] fevers [ ] chills [ ] weight loss [ ] weight gain  HEENT: [x] negative [ ] dry eyes [ ] eye irritation [ ] postnasal drip [ ] nasal congestion  CV: [ ] negative  [x] chest pain [ ] orthopnea [ ] palpitations [ ] murmur  Resp: [ ] negative [ ] cough [x] shortness of breath [ ] dyspnea [ ] wheezing [ ] sputum [ ] hemoptysis  GI: [x] negative [ ] nausea [ ] vomiting [ ] diarrhea [ ] constipation [ ] abd pain [ ] dysphagia   : [x] negative [ ] dysuria [ ] nocturia [ ] hematuria [ ] increased urinary frequency  Musculoskeletal: [x] negative [ ] back pain [ ] myalgias [ ] arthralgias [ ] fracture  Skin: [x] negative [ ] rash [ ] itch  Neurological: [x] negative [ ] headache [ ] dizziness [ ] syncope [ ] weakness [ ] numbness  Psychiatric: [x] negative [ ] anxiety [ ] depression  Endocrine: [ ] negative [x] diabetes [ ] thyroid problem  Hematologic/Lymphatic: [x] negative [ ] anemia [ ] bleeding problem  Allergic/Immunologic: [ ] negative [ ] itchy eyes [ ] nasal discharge [ ] hives [ ] angioedema  [ ] All other systems negative  [ ] Unable to assess ROS because ________    OBJECTIVE:  T(C): 36.4 (:41), Max: 36.4 (2018 15:01)  T(F): 97.5 (:41), Max: 97.5 (2018 15:01)  HR: 66 (:41) (66 - 86)  BP: 122/72 (:41) (110/70 - 158/-)  RR: 18 (:41) (17 - 18)  SpO2: 98% (:41) (98% - 99%)         @ 07:01  -   @ 07:00  --------------------------------------------------------  IN: 500 mL / OUT: 650 mL / NET: -150 mL      CAPILLARY BLOOD GLUCOSE      POCT Blood Glucose.: 115 mg/dL (2018 00:14)      PHYSICAL EXAM:  GENERAL: NAD, well-developed  HEAD:  Atraumatic, Normocephalic  EYES: Pupils pinpoint b/l, sluggishly reactive to light, equal on both sounds, EOMI, conjunctiva and sclera clear  NECK: Supple, No JVD  CHEST/LUNG: rhonchi on right; expiratory wheeze on left   HEART:S1 and S2, Regular rate and rhythm; No murmurs, rubs, or gallops  ABDOMEN: Soft, Nontender, Nondistended; Bowel sounds present  EXTREMITIES:  2+ Peripheral Pulses, No clubbing, cyanosis, or edema  PSYCH: AAOx2  NEUROLOGY: non-focal, CN grossly intact   SKIN: No rashes or lesions  LINES:    HOSPITAL MEDICATIONS:  heparin  Infusion.  Unit(s)/Hr IV Continuous <Continuous>    piperacillin/tazobactam IVPB. 3.375 Gram(s) IV Intermittent every 12 hours      dextrose 40% Gel 15 Gram(s) Oral once PRN  dextrose 50% Injectable 12.5 Gram(s) IV Push once  dextrose 50% Injectable 25 Gram(s) IV Push once  dextrose 50% Injectable 25 Gram(s) IV Push once  glucagon  Injectable 1 milliGRAM(s) IntraMuscular once PRN  insulin lispro (HumaLOG) corrective regimen sliding scale   SubCutaneous every 6 hours      HYDROmorphone  Injectable 0.5 milliGRAM(s) IV Push every 6 hours  HYDROmorphone  Injectable 0.5 milliGRAM(s) IV Push every 2 hours PRN  ondansetron Injectable 4 milliGRAM(s) IV Push every 6 hours PRN          dextrose 5% + sodium chloride 0.45%. 1000 milliLiter(s) IV Continuous <Continuous>  dextrose 5%. 1000 milliLiter(s) IV Continuous <Continuous>            LABS:                        11.0   13.12 )-----------( 220      ( 2018 04:40 )             34.1     Hgb Trend: 11.0<--, 10.7<--, 10.4<--, 11.6<--  11-02    140  |  102  |  22  ----------------------------<  126<H>  3.7   |  25  |  1.97<H>    Ca    8.5      2018 04:40  Phos  3.7       Mg     2.0         TPro  7.6  /  Alb  3.4  /  TBili  0.4  /  DBili  x   /  AST  13  /  ALT  7   /  AlkPhos  69  10-31    Creatinine Trend: 1.97<--, 1.98<--, 2.10<--  PT/INR - ( 2018 15:10 )   PT: 17.0 SEC;   INR: 1.51          PTT - ( 2018 04:40 )  PTT:91.4 SEC  Urinalysis Basic - ( 2018 10:11 )    Color: LT RED / Appearance: TURBID / S.008 / pH: 6.5  Gluc: NEGATIVE / Ketone: NEGATIVE  / Bili: NEGATIVE / Urobili: NORMAL   Blood: LARGE / Protein: 50 / Nitrite: NEGATIVE   Leuk Esterase: TRACE / RBC: >50 / WBC 6-11   Sq Epi: x / Non Sq Epi: FEW / Bacteria: x        Venous Blood Gas:  10-31 @ 15:50  7.38/56/20/29/27.0  VBG Lactate: 1.2      MICROBIOLOGY:     RADIOLOGY:  [ ] Reviewed and interpreted by me    EKG:

## 2018-11-02 NOTE — SWALLOW VFSS/MBS ASSESSMENT ADULT - RECOMMENDED CONSISTENCY
1.) Puree with Honey Thick Liquids via teaspoon.  2.) Feeding/Swallowing Guidelines: Sit upright, encourage Puree via small teaspoon (5ml) at a time, and honey thick liquids via teaspoon (5ml); two swallows each for puree and honey thick liquids; alternate puree and honey thick liquids.  3.) Aspiration Precautions  4.) Maintain Good Oral Hygiene Care

## 2018-11-02 NOTE — DISCHARGE NOTE ADULT - MEDICATION SUMMARY - MEDICATIONS TO TAKE
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  ICD 10: G89.3 Neoplasm related pain  -- Indication: For fall    oxyCODONE 10 mg oral tablet  -- 1 tab(s) by mouth every 4 hours, As needed, breakthrough pain MDD:6 tablets  -- Indication: For Neoplasm related pain    oxyCODONE 30 mg oral tablet, extended release  -- 1 tab(s) by mouth every 8 hours MDD:3 tablets  -- Indication: For Neoplasm related pain    Flomax 0.4 mg oral capsule  -- 1 cap(s) by mouth once a day   -- Indication: For BPH (benign prostatic hyperplasia)    apixaban 2.5 mg oral tablet  -- 1 tab(s) by mouth every 12 hours  -- Indication: For BPH (benign prostatic hyperplasia)    mirtazapine 15 mg oral tablet, disintegrating  -- 1 tab(s) by mouth once a day (at bedtime)  -- Indication: For Depression    Zofran 4 mg oral tablet  -- 1 tab(s) by mouth every 8 hours, As Needed  -- Indication: For Nausea & vomiting    simvastatin 10 mg oral tablet  -- 1 tab(s) by mouth once a day (at bedtime)  -- Indication: For hyperlipidemia    benzonatate 100 mg oral capsule  -- 1 cap(s) by mouth 3 times a day  -- Indication: For COugh     ipratropium-albuterol 0.5 mg-2.5 mg/3 mLinhalation solution  -- 3 milliliter(s) inhaled every 8 hours, As needed, Shortness of Breath and/or Wheezing  -- Indication: For COPD (chronic obstructive pulmonary disease)    lidocaine 2% mucous membrane solution  -- 10 milliliter(s) mucous membrane every 6 hours, As Needed  Swish and Spit   -- Indication: For Neoplasm related pain

## 2018-11-03 LAB
-  AMIKACIN: SIGNIFICANT CHANGE UP
-  AMPICILLIN/SULBACTAM: SIGNIFICANT CHANGE UP
-  AMPICILLIN: SIGNIFICANT CHANGE UP
-  AZTREONAM: SIGNIFICANT CHANGE UP
-  CEFAZOLIN: SIGNIFICANT CHANGE UP
-  CEFEPIME: SIGNIFICANT CHANGE UP
-  CEFOXITIN: SIGNIFICANT CHANGE UP
-  CEFTAZIDIME: SIGNIFICANT CHANGE UP
-  CEFTRIAXONE: SIGNIFICANT CHANGE UP
-  CIPROFLOXACIN: SIGNIFICANT CHANGE UP
-  ERTAPENEM: SIGNIFICANT CHANGE UP
-  GENTAMICIN: SIGNIFICANT CHANGE UP
-  IMIPENEM: SIGNIFICANT CHANGE UP
-  LEVOFLOXACIN: SIGNIFICANT CHANGE UP
-  MEROPENEM: SIGNIFICANT CHANGE UP
-  PIPERACILLIN/TAZOBACTAM: SIGNIFICANT CHANGE UP
-  TIGECYCLINE: SIGNIFICANT CHANGE UP
-  TOBRAMYCIN: SIGNIFICANT CHANGE UP
-  TRIMETHOPRIM/SULFAMETHOXAZOLE: SIGNIFICANT CHANGE UP
APTT BLD: 92.6 SEC — HIGH (ref 27.5–36.3)
BACTERIA SPT RESP CULT: SIGNIFICANT CHANGE UP
BASOPHILS # BLD AUTO: 0.03 K/UL — SIGNIFICANT CHANGE UP (ref 0–0.2)
BASOPHILS NFR BLD AUTO: 0.3 % — SIGNIFICANT CHANGE UP (ref 0–2)
BUN SERPL-MCNC: 17 MG/DL — SIGNIFICANT CHANGE UP (ref 7–23)
CALCIUM SERPL-MCNC: 8.3 MG/DL — LOW (ref 8.4–10.5)
CHLORIDE SERPL-SCNC: 102 MMOL/L — SIGNIFICANT CHANGE UP (ref 98–107)
CO2 SERPL-SCNC: 25 MMOL/L — SIGNIFICANT CHANGE UP (ref 22–31)
CREAT SERPL-MCNC: 1.7 MG/DL — HIGH (ref 0.5–1.3)
EOSINOPHIL # BLD AUTO: 0.13 K/UL — SIGNIFICANT CHANGE UP (ref 0–0.5)
EOSINOPHIL NFR BLD AUTO: 1.1 % — SIGNIFICANT CHANGE UP (ref 0–6)
GLUCOSE SERPL-MCNC: 140 MG/DL — HIGH (ref 70–99)
GRAM STN SPT: SIGNIFICANT CHANGE UP
HCT VFR BLD CALC: 33.9 % — LOW (ref 39–50)
HGB BLD-MCNC: 10.9 G/DL — LOW (ref 13–17)
IMM GRANULOCYTES # BLD AUTO: 0.07 # — SIGNIFICANT CHANGE UP
IMM GRANULOCYTES NFR BLD AUTO: 0.6 % — SIGNIFICANT CHANGE UP (ref 0–1.5)
LYMPHOCYTES # BLD AUTO: 1.22 K/UL — SIGNIFICANT CHANGE UP (ref 1–3.3)
LYMPHOCYTES # BLD AUTO: 10.3 % — LOW (ref 13–44)
MAGNESIUM SERPL-MCNC: 2 MG/DL — SIGNIFICANT CHANGE UP (ref 1.6–2.6)
MCHC RBC-ENTMCNC: 28.2 PG — SIGNIFICANT CHANGE UP (ref 27–34)
MCHC RBC-ENTMCNC: 32.2 % — SIGNIFICANT CHANGE UP (ref 32–36)
MCV RBC AUTO: 87.8 FL — SIGNIFICANT CHANGE UP (ref 80–100)
METHOD TYPE: SIGNIFICANT CHANGE UP
MONOCYTES # BLD AUTO: 0.6 K/UL — SIGNIFICANT CHANGE UP (ref 0–0.9)
MONOCYTES NFR BLD AUTO: 5 % — SIGNIFICANT CHANGE UP (ref 2–14)
NEUTROPHILS # BLD AUTO: 9.85 K/UL — HIGH (ref 1.8–7.4)
NEUTROPHILS NFR BLD AUTO: 82.7 % — HIGH (ref 43–77)
NRBC # FLD: 0 — SIGNIFICANT CHANGE UP
ORGANISM # SPEC MICROSCOPIC CNT: SIGNIFICANT CHANGE UP
PHOSPHATE SERPL-MCNC: 2.3 MG/DL — LOW (ref 2.5–4.5)
PLATELET # BLD AUTO: 223 K/UL — SIGNIFICANT CHANGE UP (ref 150–400)
PMV BLD: 9.4 FL — SIGNIFICANT CHANGE UP (ref 7–13)
POTASSIUM SERPL-MCNC: 3.4 MMOL/L — LOW (ref 3.5–5.3)
POTASSIUM SERPL-SCNC: 3.4 MMOL/L — LOW (ref 3.5–5.3)
RBC # BLD: 3.86 M/UL — LOW (ref 4.2–5.8)
RBC # FLD: 13.6 % — SIGNIFICANT CHANGE UP (ref 10.3–14.5)
SODIUM SERPL-SCNC: 141 MMOL/L — SIGNIFICANT CHANGE UP (ref 135–145)
VANCOMYCIN FLD-MCNC: 15.5 UG/ML — SIGNIFICANT CHANGE UP
WBC # BLD: 11.9 K/UL — HIGH (ref 3.8–10.5)
WBC # FLD AUTO: 11.9 K/UL — HIGH (ref 3.8–10.5)

## 2018-11-03 PROCEDURE — 99233 SBSQ HOSP IP/OBS HIGH 50: CPT | Mod: GC

## 2018-11-03 RX ORDER — VANCOMYCIN HCL 1 G
500 VIAL (EA) INTRAVENOUS ONCE
Qty: 0 | Refills: 0 | Status: DISCONTINUED | OUTPATIENT
Start: 2018-11-03 | End: 2018-11-03

## 2018-11-03 RX ORDER — VANCOMYCIN HCL 1 G
750 VIAL (EA) INTRAVENOUS ONCE
Qty: 0 | Refills: 0 | Status: COMPLETED | OUTPATIENT
Start: 2018-11-03 | End: 2018-11-03

## 2018-11-03 RX ORDER — SODIUM,POTASSIUM PHOSPHATES 278-250MG
1 POWDER IN PACKET (EA) ORAL
Qty: 0 | Refills: 0 | Status: COMPLETED | OUTPATIENT
Start: 2018-11-03 | End: 2018-11-03

## 2018-11-03 RX ORDER — OXYCODONE HYDROCHLORIDE 5 MG/1
15 TABLET ORAL
Qty: 0 | Refills: 0 | Status: DISCONTINUED | OUTPATIENT
Start: 2018-11-03 | End: 2018-11-04

## 2018-11-03 RX ORDER — HYDROMORPHONE HYDROCHLORIDE 2 MG/ML
0.5 INJECTION INTRAMUSCULAR; INTRAVENOUS; SUBCUTANEOUS
Qty: 0 | Refills: 0 | Status: DISCONTINUED | OUTPATIENT
Start: 2018-11-03 | End: 2018-11-03

## 2018-11-03 RX ORDER — HYDROMORPHONE HYDROCHLORIDE 2 MG/ML
0.5 INJECTION INTRAMUSCULAR; INTRAVENOUS; SUBCUTANEOUS ONCE
Qty: 0 | Refills: 0 | Status: DISCONTINUED | OUTPATIENT
Start: 2018-11-03 | End: 2018-11-03

## 2018-11-03 RX ORDER — HYDROMORPHONE HYDROCHLORIDE 2 MG/ML
0.75 INJECTION INTRAMUSCULAR; INTRAVENOUS; SUBCUTANEOUS
Qty: 0 | Refills: 0 | Status: DISCONTINUED | OUTPATIENT
Start: 2018-11-03 | End: 2018-11-06

## 2018-11-03 RX ADMIN — HYDROMORPHONE HYDROCHLORIDE 0.5 MILLIGRAM(S): 2 INJECTION INTRAMUSCULAR; INTRAVENOUS; SUBCUTANEOUS at 18:21

## 2018-11-03 RX ADMIN — Medication 1 PACKET(S): at 12:24

## 2018-11-03 RX ADMIN — HEPARIN SODIUM 1100 UNIT(S)/HR: 5000 INJECTION INTRAVENOUS; SUBCUTANEOUS at 07:26

## 2018-11-03 RX ADMIN — HYDROMORPHONE HYDROCHLORIDE 0.5 MILLIGRAM(S): 2 INJECTION INTRAMUSCULAR; INTRAVENOUS; SUBCUTANEOUS at 18:41

## 2018-11-03 RX ADMIN — OXYCODONE HYDROCHLORIDE 15 MILLIGRAM(S): 5 TABLET ORAL at 18:21

## 2018-11-03 RX ADMIN — HYDROMORPHONE HYDROCHLORIDE 0.5 MILLIGRAM(S): 2 INJECTION INTRAMUSCULAR; INTRAVENOUS; SUBCUTANEOUS at 23:49

## 2018-11-03 RX ADMIN — HYDROMORPHONE HYDROCHLORIDE 0.5 MILLIGRAM(S): 2 INJECTION INTRAMUSCULAR; INTRAVENOUS; SUBCUTANEOUS at 05:53

## 2018-11-03 RX ADMIN — SODIUM CHLORIDE 75 MILLILITER(S): 9 INJECTION, SOLUTION INTRAVENOUS at 12:24

## 2018-11-03 RX ADMIN — HYDROMORPHONE HYDROCHLORIDE 0.5 MILLIGRAM(S): 2 INJECTION INTRAMUSCULAR; INTRAVENOUS; SUBCUTANEOUS at 06:08

## 2018-11-03 RX ADMIN — HYDROMORPHONE HYDROCHLORIDE 0.5 MILLIGRAM(S): 2 INJECTION INTRAMUSCULAR; INTRAVENOUS; SUBCUTANEOUS at 12:46

## 2018-11-03 RX ADMIN — HYDROMORPHONE HYDROCHLORIDE 0.5 MILLIGRAM(S): 2 INJECTION INTRAMUSCULAR; INTRAVENOUS; SUBCUTANEOUS at 23:34

## 2018-11-03 RX ADMIN — HYDROMORPHONE HYDROCHLORIDE 0.5 MILLIGRAM(S): 2 INJECTION INTRAMUSCULAR; INTRAVENOUS; SUBCUTANEOUS at 15:10

## 2018-11-03 RX ADMIN — HYDROMORPHONE HYDROCHLORIDE 0.5 MILLIGRAM(S): 2 INJECTION INTRAMUSCULAR; INTRAVENOUS; SUBCUTANEOUS at 04:08

## 2018-11-03 RX ADMIN — HYDROMORPHONE HYDROCHLORIDE 0.5 MILLIGRAM(S): 2 INJECTION INTRAMUSCULAR; INTRAVENOUS; SUBCUTANEOUS at 14:51

## 2018-11-03 RX ADMIN — OXYCODONE HYDROCHLORIDE 15 MILLIGRAM(S): 5 TABLET ORAL at 18:23

## 2018-11-03 RX ADMIN — Medication 1 PACKET(S): at 18:21

## 2018-11-03 RX ADMIN — HYDROMORPHONE HYDROCHLORIDE 0.5 MILLIGRAM(S): 2 INJECTION INTRAMUSCULAR; INTRAVENOUS; SUBCUTANEOUS at 20:48

## 2018-11-03 RX ADMIN — Medication 1 PACKET(S): at 22:02

## 2018-11-03 RX ADMIN — HYDROMORPHONE HYDROCHLORIDE 0.5 MILLIGRAM(S): 2 INJECTION INTRAMUSCULAR; INTRAVENOUS; SUBCUTANEOUS at 00:30

## 2018-11-03 RX ADMIN — HYDROMORPHONE HYDROCHLORIDE 0.5 MILLIGRAM(S): 2 INJECTION INTRAMUSCULAR; INTRAVENOUS; SUBCUTANEOUS at 04:23

## 2018-11-03 RX ADMIN — HYDROMORPHONE HYDROCHLORIDE 0.5 MILLIGRAM(S): 2 INJECTION INTRAMUSCULAR; INTRAVENOUS; SUBCUTANEOUS at 00:15

## 2018-11-03 RX ADMIN — HYDROMORPHONE HYDROCHLORIDE 0.5 MILLIGRAM(S): 2 INJECTION INTRAMUSCULAR; INTRAVENOUS; SUBCUTANEOUS at 10:26

## 2018-11-03 RX ADMIN — Medication 250 MILLIGRAM(S): at 14:50

## 2018-11-03 RX ADMIN — HYDROMORPHONE HYDROCHLORIDE 0.5 MILLIGRAM(S): 2 INJECTION INTRAMUSCULAR; INTRAVENOUS; SUBCUTANEOUS at 12:21

## 2018-11-03 RX ADMIN — PIPERACILLIN AND TAZOBACTAM 25 GRAM(S): 4; .5 INJECTION, POWDER, LYOPHILIZED, FOR SOLUTION INTRAVENOUS at 18:21

## 2018-11-03 RX ADMIN — HYDROMORPHONE HYDROCHLORIDE 0.5 MILLIGRAM(S): 2 INJECTION INTRAMUSCULAR; INTRAVENOUS; SUBCUTANEOUS at 21:03

## 2018-11-03 RX ADMIN — HYDROMORPHONE HYDROCHLORIDE 0.5 MILLIGRAM(S): 2 INJECTION INTRAMUSCULAR; INTRAVENOUS; SUBCUTANEOUS at 10:46

## 2018-11-03 RX ADMIN — PIPERACILLIN AND TAZOBACTAM 25 GRAM(S): 4; .5 INJECTION, POWDER, LYOPHILIZED, FOR SOLUTION INTRAVENOUS at 05:02

## 2018-11-03 NOTE — PROGRESS NOTE ADULT - PROBLEM SELECTOR PLAN 2
- likely secondary to sepsis and/or opioid intake  -At home, patient takes morphine 30 mg TID and oxycodone; given patient's LUX, concerned that morphine was not being excreted from his body. Was given narcan  -Palliative consulted; appreciated recs  -pain management:  Started oxycontin 15mg BID, dc'd dilaudid 0.5 IV push q6h. Continuing dilaudid 0.5 IV push q2h PRN, requested the nurse to offer the medication every 2 hrs to the pt.

## 2018-11-03 NOTE — PROGRESS NOTE ADULT - ATTENDING COMMENTS
Patient seen and examined. Chart/lab reviewed. Agree with above with modifications.  77M wth h/o HTN, DM-2, COPD, PE, dysphagia, BCC s/p resection, Hep B, CKD4, and mutated NSCLC (s/p erlotinib, carboplatin/pemetrexed, atezolizumab) now on gilotrif (abatnif), p/w lethargy, aspiration pneumonia, LUX.  He was on mscontin 30 mg tid and oxycodone 10 mg q4h prn per pain management, though bone scan and PET neg for bone mets.    PE: awake/alert, pain better controlled, lung : decreased BS base, abdomen soft, no edema    Assessment/plan:  # Lethargy, acute metabolic encephalopathy: in the setting of aspiration pneumonia/sepsis, mental status improved, CT head neg    # Dsyphagia, Aspiration pneumonia: bilateral opacities with large LLL infiltrate on CXR, vanco/zosyn for 7 day course  seen by s/s, puree diet with honey thick liquid, aspiration precaution  # chronic pain: due to lung CA?. no bone mets on PET/bone scan, pain mgmt per palliative care, on iv dilaudid prn, reduce pain meds on d/c, PT consult  #NSCLC: hold abatnif in the setting of infection, f/u onc  # LUX :  in the setting of sepsis, improving on IVF, renal US no hydronephrosis  # HBV: hold tenofovir in the setting of LUX  # H/o PE: c/w heparin drip,  no eliquis or lovenox in the setting of LUX Patient seen and examined. Chart/lab reviewed. Agree with above with modifications.  77M wth h/o HTN, DM-2, COPD, PE, dysphagia, BCC s/p resection, Hep B, CKD4, and mutated NSCLC (s/p erlotinib, carboplatin/pemetrexed, atezolizumab) now on gilotrif (abatnif), p/w lethargy, aspiration pneumonia, LUX.  He was on mscontin 30 mg tid and oxycodone 10 mg q4h prn per pain management, though bone scan and PET neg for bone mets.    PE: awake/alert, pain better controlled, lung : decreased BS base, abdomen soft, no edema    Assessment/plan:  # Lethargy, acute metabolic encephalopathy: in the setting of aspiration pneumonia/sepsis, mental status improved, CT head neg    # Dsyphagia, Aspiration pneumonia: bilateral opacities with large LLL infiltrate on CXR, vanco/zosyn for 7 day course  vanco level 15.5, dose 500 mg vanco today  seen by s/s, puree diet with honey thick liquid, aspiration precaution  # chronic pain: due to lung CA?. no bone mets on PET/bone scan, pain mgmt per palliative care, on iv dilaudid prn, reduce pain meds on d/c, PT consult  #NSCLC: hold abatnif in the setting of infection, f/u onc  # LUX :  in the setting of sepsis, improving on IVF, renal US no hydronephrosis  # Hypokalemia/hypophosphatemia: replete with kphos, monitor lytes  # HBV: hold tenofovir in the setting of LUX  # H/o PE: c/w heparin drip,  no eliquis or lovenox in the setting of LUX

## 2018-11-03 NOTE — PROGRESS NOTE ADULT - PROBLEM SELECTOR PLAN 9
- CT from April revealing bilateral pulmonary emboli  - prior on lovenox and transitioned to Eliquis by oncologist  - Patient started on heparin gtt due to LUX     11. Goals of care:  -Discussed resuscitation and intubation with son and patient. They have not come to a decision at this time, but will talk about it overnight. Full code for now. Are interested in filling out a MOLST form.    12. Hepatitis B- on tenofovir  - hold for now     13. DVT ppx  - on heparin gtt

## 2018-11-03 NOTE — PROGRESS NOTE ADULT - PROBLEM SELECTOR PLAN 4
- patient with elevated Cr; baseline is 0.8-0.9  Slowing improving   -On UA, noted to have large blood.   -Renal U/S did not show evidence of hydronephrosis

## 2018-11-03 NOTE — PROGRESS NOTE ADULT - PROBLEM SELECTOR PLAN 3
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

## 2018-11-03 NOTE — PROGRESS NOTE ADULT - PROBLEM SELECTOR PLAN 1
- patient presenting with tachycardia and elevated WBC count with evidence of PNA  - Sepsis now resolved.   - continue with zosyn to cover for anaerobic and gram negative including pseudomonas given patient with multiple hospital admissions and immunocompromised on immunotherapy  - Received vanco today for vanc by level, but pt growing Klebsiella sensitive to Zosyn so will stop vanco from now on.

## 2018-11-03 NOTE — PROGRESS NOTE ADULT - PROBLEM SELECTOR PLAN 6
- patient on morphine 30q8 and and 10 of oxy q4 at home   -pain is mainly located in chest; recent PET had showed no bony mets  -initial concern for opioid induced lethargy, s/p narcan   -palliative consulted; appreciated recs  - pain management as noted above

## 2018-11-03 NOTE — PROGRESS NOTE ADULT - SUBJECTIVE AND OBJECTIVE BOX
Paris Garza, PGY3  Pager [phone] /[phone]    Patient is a 77y old  Male who presents with a chief complaint of shortness of breath (03 Nov 2018 15:15)      SUBJECTIVE / OVERNIGHT EVENTS: No acute events overnight. This morning pt states that his pain is not well controlled with just Dilaudid 0.5 q6h.  He's not getting PRN when he needs it (states that the0.5 mg dilaudid is good for 1.5-2 hrs) because he's not requesting it as he does not speak English.     MEDICATIONS  (STANDING):  dextrose 5% + sodium chloride 0.45%. 1000 milliLiter(s) (75 mL/Hr) IV Continuous <Continuous>  dextrose 5%. 1000 milliLiter(s) (50 mL/Hr) IV Continuous <Continuous>  dextrose 50% Injectable 12.5 Gram(s) IV Push once  dextrose 50% Injectable 25 Gram(s) IV Push once  dextrose 50% Injectable 25 Gram(s) IV Push once  heparin  Infusion.  Unit(s)/Hr (11 mL/Hr) IV Continuous <Continuous>  insulin lispro (HumaLOG) corrective regimen sliding scale   SubCutaneous three times a day before meals  insulin lispro (HumaLOG) corrective regimen sliding scale   SubCutaneous at bedtime  oxyCODONE  ER Tablet 15 milliGRAM(s) Oral two times a day  piperacillin/tazobactam IVPB. 3.375 Gram(s) IV Intermittent every 12 hours  potassium phosphate / sodium phosphate powder 1 Packet(s) Oral four times a day    MEDICATIONS  (PRN):  dextrose 40% Gel 15 Gram(s) Oral once PRN Blood Glucose LESS THAN 70 milliGRAM(s)/deciliter  glucagon  Injectable 1 milliGRAM(s) IntraMuscular once PRN Glucose LESS THAN 70 milligrams/deciliter  heparin  Injectable 5000 Unit(s) IV Push every 6 hours PRN For aPTT less than 40  heparin  Injectable 2500 Unit(s) IV Push every 6 hours PRN For aPTT between 40 - 57  HYDROmorphone  Injectable 0.5 milliGRAM(s) IV Push every 2 hours PRN Moderate and Severe pain  ondansetron Injectable 4 milliGRAM(s) IV Push every 6 hours PRN Nausea and/or Vomiting      CAPILLARY BLOOD GLUCOSE      POCT Blood Glucose.: 148 mg/dL (03 Nov 2018 13:14)  POCT Blood Glucose.: 131 mg/dL (03 Nov 2018 09:17)  POCT Blood Glucose.: 173 mg/dL (02 Nov 2018 22:35)  POCT Blood Glucose.: 141 mg/dL (02 Nov 2018 18:08)    I&O's Summary    03 Nov 2018 08:01  -  03 Nov 2018 16:40  --------------------------------------------------------  IN: 1000 mL / OUT: 0 mL / NET: 1000 mL        PHYSICAL EXAM:  GENERAL: NAD  HEENT: Head atraumatic, normocephalic, EOMI, PERRL, conjunctiva and sclera clear, neck supple   CHEST/LUNG: Clear to auscultation bilaterally, no crackles, rhonchi or wheezing   HEART: Regular rate and rhythm, no murmurs, rubs, or gallops  ABDOMEN: Soft, nontender, nondistended, normal bowel sounds   EXTREMITIES:  2+ Peripheral pulses, no clubbing, cyanosis, or edema  PSYCH: AAOx3  NEUROLOGY: No focal deficits   SKIN: No rashes or lesions    LABS:                        10.9   11.90 )-----------( 223      ( 03 Nov 2018 06:00 )             33.9     11-03    141  |  102  |  17  ----------------------------<  140<H>  3.4<L>   |  25  |  1.70<H>    Ca    8.3<L>      03 Nov 2018 06:00  Phos  2.3     11-03  Mg     2.0     11-03      PTT - ( 03 Nov 2018 06:00 )  PTT:92.6 SEC

## 2018-11-04 LAB
APTT BLD: 81.4 SEC — HIGH (ref 27.5–36.3)
BUN SERPL-MCNC: 11 MG/DL — SIGNIFICANT CHANGE UP (ref 7–23)
BUN SERPL-MCNC: 12 MG/DL — SIGNIFICANT CHANGE UP (ref 7–23)
CALCIUM SERPL-MCNC: 8.4 MG/DL — SIGNIFICANT CHANGE UP (ref 8.4–10.5)
CALCIUM SERPL-MCNC: 8.5 MG/DL — SIGNIFICANT CHANGE UP (ref 8.4–10.5)
CHLORIDE SERPL-SCNC: 103 MMOL/L — SIGNIFICANT CHANGE UP (ref 98–107)
CHLORIDE SERPL-SCNC: 105 MMOL/L — SIGNIFICANT CHANGE UP (ref 98–107)
CO2 SERPL-SCNC: 23 MMOL/L — SIGNIFICANT CHANGE UP (ref 22–31)
CO2 SERPL-SCNC: 24 MMOL/L — SIGNIFICANT CHANGE UP (ref 22–31)
CREAT SERPL-MCNC: 1.61 MG/DL — HIGH (ref 0.5–1.3)
CREAT SERPL-MCNC: 1.64 MG/DL — HIGH (ref 0.5–1.3)
GLUCOSE SERPL-MCNC: 134 MG/DL — HIGH (ref 70–99)
GLUCOSE SERPL-MCNC: 158 MG/DL — HIGH (ref 70–99)
HCT VFR BLD CALC: 33.5 % — LOW (ref 39–50)
HGB BLD-MCNC: 11.1 G/DL — LOW (ref 13–17)
MAGNESIUM SERPL-MCNC: 1.7 MG/DL — SIGNIFICANT CHANGE UP (ref 1.6–2.6)
MAGNESIUM SERPL-MCNC: 1.7 MG/DL — SIGNIFICANT CHANGE UP (ref 1.6–2.6)
MCHC RBC-ENTMCNC: 28.4 PG — SIGNIFICANT CHANGE UP (ref 27–34)
MCHC RBC-ENTMCNC: 33.1 % — SIGNIFICANT CHANGE UP (ref 32–36)
MCV RBC AUTO: 85.7 FL — SIGNIFICANT CHANGE UP (ref 80–100)
NRBC # FLD: 0 — SIGNIFICANT CHANGE UP
PHOSPHATE SERPL-MCNC: 2.6 MG/DL — SIGNIFICANT CHANGE UP (ref 2.5–4.5)
PHOSPHATE SERPL-MCNC: 2.7 MG/DL — SIGNIFICANT CHANGE UP (ref 2.5–4.5)
PLATELET # BLD AUTO: 232 K/UL — SIGNIFICANT CHANGE UP (ref 150–400)
PMV BLD: 9.5 FL — SIGNIFICANT CHANGE UP (ref 7–13)
POTASSIUM SERPL-MCNC: 2.9 MMOL/L — CRITICAL LOW (ref 3.5–5.3)
POTASSIUM SERPL-MCNC: 3.7 MMOL/L — SIGNIFICANT CHANGE UP (ref 3.5–5.3)
POTASSIUM SERPL-SCNC: 2.9 MMOL/L — CRITICAL LOW (ref 3.5–5.3)
POTASSIUM SERPL-SCNC: 3.7 MMOL/L — SIGNIFICANT CHANGE UP (ref 3.5–5.3)
RBC # BLD: 3.91 M/UL — LOW (ref 4.2–5.8)
RBC # FLD: 13.6 % — SIGNIFICANT CHANGE UP (ref 10.3–14.5)
SODIUM SERPL-SCNC: 139 MMOL/L — SIGNIFICANT CHANGE UP (ref 135–145)
SODIUM SERPL-SCNC: 141 MMOL/L — SIGNIFICANT CHANGE UP (ref 135–145)
WBC # BLD: 10.32 K/UL — SIGNIFICANT CHANGE UP (ref 3.8–10.5)
WBC # FLD AUTO: 10.32 K/UL — SIGNIFICANT CHANGE UP (ref 3.8–10.5)

## 2018-11-04 PROCEDURE — 99233 SBSQ HOSP IP/OBS HIGH 50: CPT | Mod: GC

## 2018-11-04 RX ORDER — DOCUSATE SODIUM 100 MG
100 CAPSULE ORAL DAILY
Qty: 0 | Refills: 0 | Status: DISCONTINUED | OUTPATIENT
Start: 2018-11-04 | End: 2018-11-10

## 2018-11-04 RX ORDER — SENNA PLUS 8.6 MG/1
2 TABLET ORAL AT BEDTIME
Qty: 0 | Refills: 0 | Status: DISCONTINUED | OUTPATIENT
Start: 2018-11-04 | End: 2018-11-10

## 2018-11-04 RX ORDER — OXYCODONE HYDROCHLORIDE 5 MG/1
20 TABLET ORAL
Qty: 0 | Refills: 0 | Status: DISCONTINUED | OUTPATIENT
Start: 2018-11-04 | End: 2018-11-05

## 2018-11-04 RX ORDER — MAGNESIUM SULFATE 500 MG/ML
2 VIAL (ML) INJECTION ONCE
Qty: 0 | Refills: 0 | Status: COMPLETED | OUTPATIENT
Start: 2018-11-04 | End: 2018-11-04

## 2018-11-04 RX ORDER — POTASSIUM CHLORIDE 20 MEQ
10 PACKET (EA) ORAL
Qty: 0 | Refills: 0 | Status: COMPLETED | OUTPATIENT
Start: 2018-11-04 | End: 2018-11-04

## 2018-11-04 RX ORDER — DEXTROSE MONOHYDRATE, SODIUM CHLORIDE, AND POTASSIUM CHLORIDE 50; .745; 4.5 G/1000ML; G/1000ML; G/1000ML
1000 INJECTION, SOLUTION INTRAVENOUS
Qty: 0 | Refills: 0 | Status: DISCONTINUED | OUTPATIENT
Start: 2018-11-04 | End: 2018-11-06

## 2018-11-04 RX ORDER — POTASSIUM CHLORIDE 20 MEQ
40 PACKET (EA) ORAL ONCE
Qty: 0 | Refills: 0 | Status: COMPLETED | OUTPATIENT
Start: 2018-11-04 | End: 2018-11-04

## 2018-11-04 RX ADMIN — PIPERACILLIN AND TAZOBACTAM 25 GRAM(S): 4; .5 INJECTION, POWDER, LYOPHILIZED, FOR SOLUTION INTRAVENOUS at 06:05

## 2018-11-04 RX ADMIN — SODIUM CHLORIDE 75 MILLILITER(S): 9 INJECTION, SOLUTION INTRAVENOUS at 06:14

## 2018-11-04 RX ADMIN — HYDROMORPHONE HYDROCHLORIDE 0.75 MILLIGRAM(S): 2 INJECTION INTRAMUSCULAR; INTRAVENOUS; SUBCUTANEOUS at 05:19

## 2018-11-04 RX ADMIN — HYDROMORPHONE HYDROCHLORIDE 0.75 MILLIGRAM(S): 2 INJECTION INTRAMUSCULAR; INTRAVENOUS; SUBCUTANEOUS at 08:36

## 2018-11-04 RX ADMIN — Medication 40 MILLIEQUIVALENT(S): at 07:38

## 2018-11-04 RX ADMIN — Medication 100 MILLIGRAM(S): at 12:52

## 2018-11-04 RX ADMIN — HEPARIN SODIUM 1100 UNIT(S)/HR: 5000 INJECTION INTRAVENOUS; SUBCUTANEOUS at 07:39

## 2018-11-04 RX ADMIN — SENNA PLUS 2 TABLET(S): 8.6 TABLET ORAL at 22:03

## 2018-11-04 RX ADMIN — HYDROMORPHONE HYDROCHLORIDE 0.75 MILLIGRAM(S): 2 INJECTION INTRAMUSCULAR; INTRAVENOUS; SUBCUTANEOUS at 13:01

## 2018-11-04 RX ADMIN — Medication 100 MILLIEQUIVALENT(S): at 10:16

## 2018-11-04 RX ADMIN — HYDROMORPHONE HYDROCHLORIDE 0.75 MILLIGRAM(S): 2 INJECTION INTRAMUSCULAR; INTRAVENOUS; SUBCUTANEOUS at 12:46

## 2018-11-04 RX ADMIN — HYDROMORPHONE HYDROCHLORIDE 0.75 MILLIGRAM(S): 2 INJECTION INTRAMUSCULAR; INTRAVENOUS; SUBCUTANEOUS at 08:51

## 2018-11-04 RX ADMIN — OXYCODONE HYDROCHLORIDE 20 MILLIGRAM(S): 5 TABLET ORAL at 17:56

## 2018-11-04 RX ADMIN — HYDROMORPHONE HYDROCHLORIDE 0.75 MILLIGRAM(S): 2 INJECTION INTRAMUSCULAR; INTRAVENOUS; SUBCUTANEOUS at 05:04

## 2018-11-04 RX ADMIN — OXYCODONE HYDROCHLORIDE 15 MILLIGRAM(S): 5 TABLET ORAL at 07:13

## 2018-11-04 RX ADMIN — DEXTROSE MONOHYDRATE, SODIUM CHLORIDE, AND POTASSIUM CHLORIDE 50 MILLILITER(S): 50; .745; 4.5 INJECTION, SOLUTION INTRAVENOUS at 15:03

## 2018-11-04 RX ADMIN — PIPERACILLIN AND TAZOBACTAM 25 GRAM(S): 4; .5 INJECTION, POWDER, LYOPHILIZED, FOR SOLUTION INTRAVENOUS at 17:56

## 2018-11-04 RX ADMIN — DEXTROSE MONOHYDRATE, SODIUM CHLORIDE, AND POTASSIUM CHLORIDE 50 MILLILITER(S): 50; .745; 4.5 INJECTION, SOLUTION INTRAVENOUS at 22:02

## 2018-11-04 RX ADMIN — HYDROMORPHONE HYDROCHLORIDE 0.75 MILLIGRAM(S): 2 INJECTION INTRAMUSCULAR; INTRAVENOUS; SUBCUTANEOUS at 22:59

## 2018-11-04 RX ADMIN — HYDROMORPHONE HYDROCHLORIDE 0.75 MILLIGRAM(S): 2 INJECTION INTRAMUSCULAR; INTRAVENOUS; SUBCUTANEOUS at 16:10

## 2018-11-04 RX ADMIN — OXYCODONE HYDROCHLORIDE 20 MILLIGRAM(S): 5 TABLET ORAL at 18:32

## 2018-11-04 RX ADMIN — OXYCODONE HYDROCHLORIDE 15 MILLIGRAM(S): 5 TABLET ORAL at 06:12

## 2018-11-04 RX ADMIN — HYDROMORPHONE HYDROCHLORIDE 0.75 MILLIGRAM(S): 2 INJECTION INTRAMUSCULAR; INTRAVENOUS; SUBCUTANEOUS at 20:59

## 2018-11-04 RX ADMIN — Medication 50 GRAM(S): at 10:16

## 2018-11-04 RX ADMIN — HYDROMORPHONE HYDROCHLORIDE 0.75 MILLIGRAM(S): 2 INJECTION INTRAMUSCULAR; INTRAVENOUS; SUBCUTANEOUS at 20:44

## 2018-11-04 RX ADMIN — HYDROMORPHONE HYDROCHLORIDE 0.75 MILLIGRAM(S): 2 INJECTION INTRAMUSCULAR; INTRAVENOUS; SUBCUTANEOUS at 23:14

## 2018-11-04 RX ADMIN — HYDROMORPHONE HYDROCHLORIDE 0.75 MILLIGRAM(S): 2 INJECTION INTRAMUSCULAR; INTRAVENOUS; SUBCUTANEOUS at 16:25

## 2018-11-04 NOTE — PROGRESS NOTE ADULT - SUBJECTIVE AND OBJECTIVE BOX
CHIEF COMPLAINT:    Interval Events:    REVIEW OF SYSTEMS:  Constitutional: [ ] negative [ ] fevers [ ] chills [ ] weight loss [ ] weight gain  HEENT: [ ] negative [ ] dry eyes [ ] eye irritation [ ] postnasal drip [ ] nasal congestion  CV: [ ] negative  [ ] chest pain [ ] orthopnea [ ] palpitations [ ] murmur  Resp: [ ] negative [ ] cough [ ] shortness of breath [ ] dyspnea [ ] wheezing [ ] sputum [ ] hemoptysis  GI: [ ] negative [ ] nausea [ ] vomiting [ ] diarrhea [ ] constipation [ ] abd pain [ ] dysphagia   : [ ] negative [ ] dysuria [ ] nocturia [ ] hematuria [ ] increased urinary frequency  Musculoskeletal: [ ] negative [ ] back pain [ ] myalgias [ ] arthralgias [ ] fracture  Skin: [ ] negative [ ] rash [ ] itch  Neurological: [ ] negative [ ] headache [ ] dizziness [ ] syncope [ ] weakness [ ] numbness  Psychiatric: [ ] negative [ ] anxiety [ ] depression  Endocrine: [ ] negative [ ] diabetes [ ] thyroid problem  Hematologic/Lymphatic: [ ] negative [ ] anemia [ ] bleeding problem  Allergic/Immunologic: [ ] negative [ ] itchy eyes [ ] nasal discharge [ ] hives [ ] angioedema  [ ] All other systems negative  [ ] Unable to assess ROS because ________    OBJECTIVE:  ICU Vital Signs Last 24 Hrs  T(C): 36.6 (04 Nov 2018 05:55), Max: 36.9 (04 Nov 2018 02:04)  T(F): 97.8 (04 Nov 2018 05:55), Max: 98.4 (04 Nov 2018 02:04)  HR: 72 (04 Nov 2018 05:55) (72 - 83)  BP: 144/85 (04 Nov 2018 05:55) (144/85 - 162/92)  BP(mean): --  ABP: --  ABP(mean): --  RR: 18 (04 Nov 2018 05:55) (18 - 24)  SpO2: 99% (04 Nov 2018 05:55) (97% - 100%)        11-03 @ 08:01  -  11-04 @ 07:00  --------------------------------------------------------  IN: 1000 mL / OUT: 1050 mL / NET: -50 mL      CAPILLARY BLOOD GLUCOSE      POCT Blood Glucose.: 138 mg/dL (03 Nov 2018 22:32)      PHYSICAL EXAM:  General:   HEENT:   Lymph Nodes:  Neck:   Respiratory:   Cardiovascular:   Abdomen:   Extremities:   Skin:   Neurological:  Psychiatry:    LINES:    HOSPITAL MEDICATIONS:  heparin  Infusion.  Unit(s)/Hr IV Continuous <Continuous>    piperacillin/tazobactam IVPB. 3.375 Gram(s) IV Intermittent every 12 hours      dextrose 40% Gel 15 Gram(s) Oral once PRN  dextrose 50% Injectable 12.5 Gram(s) IV Push once  dextrose 50% Injectable 25 Gram(s) IV Push once  dextrose 50% Injectable 25 Gram(s) IV Push once  glucagon  Injectable 1 milliGRAM(s) IntraMuscular once PRN  insulin lispro (HumaLOG) corrective regimen sliding scale   SubCutaneous three times a day before meals  insulin lispro (HumaLOG) corrective regimen sliding scale   SubCutaneous at bedtime      HYDROmorphone  Injectable 0.75 milliGRAM(s) IV Push every 3 hours PRN  ondansetron Injectable 4 milliGRAM(s) IV Push every 6 hours PRN  oxyCODONE  ER Tablet 15 milliGRAM(s) Oral two times a day          dextrose 5% + sodium chloride 0.45%. 1000 milliLiter(s) IV Continuous <Continuous>  dextrose 5%. 1000 milliLiter(s) IV Continuous <Continuous>            LABS:                        11.1   10.32 )-----------( 232      ( 04 Nov 2018 05:59 )             33.5     Hgb Trend: 11.1<--, 10.9<--, 11.0<--, 10.7<--, 10.4<--  11-03    141  |  102  |  17  ----------------------------<  140<H>  3.4<L>   |  25  |  1.70<H>    Ca    8.3<L>      03 Nov 2018 06:00  Phos  2.3     11-03  Mg     2.0     11-03      Creatinine Trend: 1.70<--, 1.97<--, 1.98<--, 2.10<--  PTT - ( 04 Nov 2018 05:59 )  PTT:81.4 SEC          MICROBIOLOGY:     RADIOLOGY:  [ ] Reviewed and interpreted by me    EKG: CHIEF COMPLAINT: lethargy, SOB     Interval Events:      REVIEW OF SYSTEMS:  Constitutional: [ ] negative [ ] fevers [ ] chills [ ] weight loss [ ] weight gain  HEENT: [ ] negative [ ] dry eyes [ ] eye irritation [ ] postnasal drip [ ] nasal congestion  CV: [ ] negative  [ ] chest pain [ ] orthopnea [ ] palpitations [ ] murmur  Resp: [ ] negative [ ] cough [ ] shortness of breath [ ] dyspnea [ ] wheezing [ ] sputum [ ] hemoptysis  GI: [ ] negative [ ] nausea [ ] vomiting [ ] diarrhea [ ] constipation [ ] abd pain [ ] dysphagia   : [ ] negative [ ] dysuria [ ] nocturia [ ] hematuria [ ] increased urinary frequency  Musculoskeletal: [ ] negative [ ] back pain [ ] myalgias [ ] arthralgias [ ] fracture  Skin: [ ] negative [ ] rash [ ] itch  Neurological: [ ] negative [ ] headache [ ] dizziness [ ] syncope [ ] weakness [ ] numbness  Psychiatric: [ ] negative [ ] anxiety [ ] depression  Endocrine: [ ] negative [ ] diabetes [ ] thyroid problem  Hematologic/Lymphatic: [ ] negative [ ] anemia [ ] bleeding problem  Allergic/Immunologic: [ ] negative [ ] itchy eyes [ ] nasal discharge [ ] hives [ ] angioedema  [ ] All other systems negative  [ ] Unable to assess ROS because ________    OBJECTIVE:  ICU Vital Signs Last 24 Hrs  T(C): 36.6 (04 Nov 2018 05:55), Max: 36.9 (04 Nov 2018 02:04)  T(F): 97.8 (04 Nov 2018 05:55), Max: 98.4 (04 Nov 2018 02:04)  HR: 72 (04 Nov 2018 05:55) (72 - 83)  BP: 144/85 (04 Nov 2018 05:55) (144/85 - 162/92)  BP(mean): --  ABP: --  ABP(mean): --  RR: 18 (04 Nov 2018 05:55) (18 - 24)  SpO2: 99% (04 Nov 2018 05:55) (97% - 100%)        11-03 @ 08:01  -  11-04 @ 07:00  --------------------------------------------------------  IN: 1000 mL / OUT: 1050 mL / NET: -50 mL      CAPILLARY BLOOD GLUCOSE      POCT Blood Glucose.: 138 mg/dL (03 Nov 2018 22:32)      PHYSICAL EXAM:  General:   HEENT:   Lymph Nodes:  Neck:   Respiratory:   Cardiovascular:   Abdomen:   Extremities:   Skin:   Neurological:  Psychiatry:    LINES:    HOSPITAL MEDICATIONS:  heparin  Infusion.  Unit(s)/Hr IV Continuous <Continuous>    piperacillin/tazobactam IVPB. 3.375 Gram(s) IV Intermittent every 12 hours      dextrose 40% Gel 15 Gram(s) Oral once PRN  dextrose 50% Injectable 12.5 Gram(s) IV Push once  dextrose 50% Injectable 25 Gram(s) IV Push once  dextrose 50% Injectable 25 Gram(s) IV Push once  glucagon  Injectable 1 milliGRAM(s) IntraMuscular once PRN  insulin lispro (HumaLOG) corrective regimen sliding scale   SubCutaneous three times a day before meals  insulin lispro (HumaLOG) corrective regimen sliding scale   SubCutaneous at bedtime      HYDROmorphone  Injectable 0.75 milliGRAM(s) IV Push every 3 hours PRN  ondansetron Injectable 4 milliGRAM(s) IV Push every 6 hours PRN  oxyCODONE  ER Tablet 15 milliGRAM(s) Oral two times a day          dextrose 5% + sodium chloride 0.45%. 1000 milliLiter(s) IV Continuous <Continuous>  dextrose 5%. 1000 milliLiter(s) IV Continuous <Continuous>            LABS:                        11.1   10.32 )-----------( 232      ( 04 Nov 2018 05:59 )             33.5     Hgb Trend: 11.1<--, 10.9<--, 11.0<--, 10.7<--, 10.4<--  11-03    141  |  102  |  17  ----------------------------<  140<H>  3.4<L>   |  25  |  1.70<H>    Ca    8.3<L>      03 Nov 2018 06:00  Phos  2.3     11-03  Mg     2.0     11-03      Creatinine Trend: 1.70<--, 1.97<--, 1.98<--, 2.10<--  PTT - ( 04 Nov 2018 05:59 )  PTT:81.4 SEC          MICROBIOLOGY:     RADIOLOGY:  [ ] Reviewed and interpreted by me    EKG: CHIEF COMPLAINT: lethargy, SOB     Interval Events:  ON, patient c/o CP, requiring PRNs q2h. Increased dilaudid to 0.75 q3h. This morning, patient was seen and examined at bedside. He reports 8-9/10 CP and upper back pain despite receiving his dilaudid. Admits to intermittent SOB that has been unchanged since prior to his hospitalization. Denies abdominal pain or N/V.       REVIEW OF SYSTEMS:  Constitutional: [x] negative [ ] fevers [ ] chills [ ] weight loss [ ] weight gain  HEENT: [x] negative [ ] dry eyes [ ] eye irritation [ ] postnasal drip [ ] nasal congestion  CV: [ ] negative  [x] chest pain [ ] orthopnea [ ] palpitations [ ] murmur  Resp: [ ] negative [ ] cough [x] shortness of breath [ ] dyspnea [ ] wheezing [ ] sputum [ ] hemoptysis  GI: [x] negative [ ] nausea [ ] vomiting [ ] diarrhea [ ] constipation [ ] abd pain [ ] dysphagia   : [x] negative [ ] dysuria [ ] nocturia [ ] hematuria [ ] increased urinary frequency  Musculoskeletal: [x] negative [ ] back pain [ ] myalgias [ ] arthralgias [ ] fracture  Skin: [x] negative [ ] rash [ ] itch  Neurological: [x] negative [ ] headache [ ] dizziness [ ] syncope [ ] weakness [ ] numbness  Psychiatric: [x] negative [ ] anxiety [ ] depression  Endocrine: [x] negative [ ] diabetes [ ] thyroid problem  Hematologic/Lymphatic: [x] negative [ ] anemia [ ] bleeding problem  Allergic/Immunologic: [ ] negative [ ] itchy eyes [ ] nasal discharge [ ] hives [ ] angioedema  [ ] All other systems negative  [ ] Unable to assess ROS because ________    OBJECTIVE:  T(C): 36.6 (04 Nov 2018 05:55), Max: 36.9 (04 Nov 2018 02:04)  T(F): 97.8 (04 Nov 2018 05:55), Max: 98.4 (04 Nov 2018 02:04)  HR: 72 (04 Nov 2018 05:55) (72 - 83)  BP: 144/85 (04 Nov 2018 05:55) (144/85 - 162/92)  RR: 18 (04 Nov 2018 05:55) (18 - 24)  SpO2: 99% (04 Nov 2018 05:55) (97% - 100%)        11-03 @ 08:01  -  11-04 @ 07:00  --------------------------------------------------------  IN: 1000 mL / OUT: 1050 mL / NET: -50 mL      CAPILLARY BLOOD GLUCOSE      POCT Blood Glucose.: 138 mg/dL (03 Nov 2018 22:32)    PHYSICAL EXAM:  GENERAL: NAD, well-developed  HEAD:  Atraumatic, Normocephalic  EYES: EOMI, PERRLA, conjunctiva and sclera clear  NECK: Supple, No JVD  CHEST/LUNG: expiratory wheeze b/l, decreased breath sounds at bases,   HEART: S1 and S2, Regular rate and rhythm; No murmurs, rubs, or gallops  ABDOMEN: Soft, Nontender, Nondistended; Bowel sounds present  EXTREMITIES:  2+ Peripheral Pulses, No clubbing, cyanosis, or edema  PSYCH: AAOx3  NEUROLOGY: non-focal,CN grossly intact  SKIN: No rashes or lesions    LINES:    HOSPITAL MEDICATIONS:  heparin  Infusion.  Unit(s)/Hr IV Continuous <Continuous>    piperacillin/tazobactam IVPB. 3.375 Gram(s) IV Intermittent every 12 hours      dextrose 40% Gel 15 Gram(s) Oral once PRN  dextrose 50% Injectable 12.5 Gram(s) IV Push once  dextrose 50% Injectable 25 Gram(s) IV Push once  dextrose 50% Injectable 25 Gram(s) IV Push once  glucagon  Injectable 1 milliGRAM(s) IntraMuscular once PRN  insulin lispro (HumaLOG) corrective regimen sliding scale   SubCutaneous three times a day before meals  insulin lispro (HumaLOG) corrective regimen sliding scale   SubCutaneous at bedtime      HYDROmorphone  Injectable 0.75 milliGRAM(s) IV Push every 3 hours PRN  ondansetron Injectable 4 milliGRAM(s) IV Push every 6 hours PRN  oxyCODONE  ER Tablet 15 milliGRAM(s) Oral two times a day          dextrose 5% + sodium chloride 0.45%. 1000 milliLiter(s) IV Continuous <Continuous>  dextrose 5%. 1000 milliLiter(s) IV Continuous <Continuous>            LABS:                        11.1   10.32 )-----------( 232      ( 04 Nov 2018 05:59 )             33.5     Hgb Trend: 11.1<--, 10.9<--, 11.0<--, 10.7<--, 10.4<--  11-03    141  |  102  |  17  ----------------------------<  140<H>  3.4<L>   |  25  |  1.70<H>    Ca    8.3<L>      03 Nov 2018 06:00  Phos  2.3     11-03  Mg     2.0     11-03      Creatinine Trend: 1.70<--, 1.97<--, 1.98<--, 2.10<--  PTT - ( 04 Nov 2018 05:59 )  PTT:81.4 SEC          MICROBIOLOGY:     RADIOLOGY:  [ ] Reviewed and interpreted by me    EKG:

## 2018-11-04 NOTE — PROGRESS NOTE ADULT - ATTENDING COMMENTS
Patient seen and examined. Chart/lab reviewed. Agree with above with modifications.  77M wth h/o HTN, DM-2, COPD, PE, dysphagia, BCC s/p resection, Hep B, CKD4, and mutated NSCLC (s/p erlotinib, carboplatin/pemetrexed, atezolizumab) now on gilotrif (abatnif), p/w lethargy, aspiration pneumonia, LUX.  He was on mscontin 30 mg tid and oxycodone 10 mg q4h prn per pain management, though bone scan and PET neg for bone mets.    PE: awake/alert, pain better controlled, lung : decreased BS base, abdomen soft, no edema    Assessment/plan:  # Lethargy, acute metabolic encephalopathy: in the setting of aspiration pneumonia/sepsis, mental status improved, CT head neg    # Dsyphagia, Aspiration pneumonia: bilateral opacities with large LLL infiltrate on CXR, c/w zosyn for 7 day course  sputum cx klebsiella  seen by s/s, puree diet with honey thick liquid, aspiration precaution  # chronic pain: due to lung CA?. no bone mets on PET/bone scan, pain mgmt per palliative care, increase oxycontin to 20 mg bid, on iv dilaudid prn, reduce pain meds on d/c, PT consult  #NSCLC: hold abatnif in the setting of infection, f/u onc  # LUX :  in the setting of sepsis, improving on IVF, renal US no hydronephrosis  # Hypokalemia: replete K, monitor lytes  # HBV: hold tenofovir in the setting of LUX  # H/o PE: c/w heparin drip for now, change to eliquis when LUX resolves

## 2018-11-04 NOTE — PROGRESS NOTE ADULT - PROBLEM SELECTOR PLAN 1
- patient presenting with tachycardia and elevated WBC count with evidence of PNA  - Sepsis now resolved.   - continue with zosyn to cover for anaerobic and gram negative including pseudomonas given patient with multiple hospital admissions and immunocompromised on immunotherapy  - Received vanco today for vanc by level, but pt growing Klebsiella sensitive to Zosyn so will stop vanco from now on. - patient presented with tachycardia and elevated WBC count with evidence of PNA  - Sepsis now resolved.   -Day 3 of zosyn IV q8h  -D/Peter vancomycin as sensitivities of sputum culture revealed klebsiella sensitive to zosyn

## 2018-11-04 NOTE — PROGRESS NOTE ADULT - PROBLEM SELECTOR PLAN 2
- likely secondary to sepsis and/or opioid intake  -At home, patient takes morphine 30 mg TID and oxycodone; given patient's LUX, concerned that morphine was not being excreted from his body. Was given narcan  -Palliative consulted; appreciated recs  -pain management:  Started oxycontin 15mg BID, dc'd dilaudid 0.5 IV push q6h. Continuing dilaudid 0.5 IV push q2h PRN, requested the nurse to offer the medication every 2 hrs to the pt. - likely secondary to sepsis and/or opioid intake  -At home, patient takes morphine 30 mg TID and oxycodone; given patient's LUX, concerned that morphine was not being excreted from his body. s/p narcan. Determined lethargy likely 2/2 infection  -Palliative consulted; appreciated recs  -pain management:  Increased oxycontin 20 mg BID, increased dilaudid to 0.75 IV push q3h PRN (as patient was requiring PRN q2h)

## 2018-11-05 ENCOUNTER — APPOINTMENT (OUTPATIENT)
Dept: GERIATRICS | Facility: CLINIC | Age: 78
End: 2018-11-05

## 2018-11-05 LAB
APTT BLD: 108.6 SEC — HIGH (ref 27.5–36.3)
BACTERIA BLD CULT: SIGNIFICANT CHANGE UP
BACTERIA BLD CULT: SIGNIFICANT CHANGE UP
BASOPHILS # BLD AUTO: 0.03 K/UL — SIGNIFICANT CHANGE UP (ref 0–0.2)
BASOPHILS NFR BLD AUTO: 0.3 % — SIGNIFICANT CHANGE UP (ref 0–2)
BUN SERPL-MCNC: 9 MG/DL — SIGNIFICANT CHANGE UP (ref 7–23)
CALCIUM SERPL-MCNC: 8.9 MG/DL — SIGNIFICANT CHANGE UP (ref 8.4–10.5)
CHLORIDE SERPL-SCNC: 102 MMOL/L — SIGNIFICANT CHANGE UP (ref 98–107)
CO2 SERPL-SCNC: 23 MMOL/L — SIGNIFICANT CHANGE UP (ref 22–31)
CREAT SERPL-MCNC: 1.61 MG/DL — HIGH (ref 0.5–1.3)
EOSINOPHIL # BLD AUTO: 0.2 K/UL — SIGNIFICANT CHANGE UP (ref 0–0.5)
EOSINOPHIL NFR BLD AUTO: 1.8 % — SIGNIFICANT CHANGE UP (ref 0–6)
GLUCOSE SERPL-MCNC: 123 MG/DL — HIGH (ref 70–99)
HCT VFR BLD CALC: 36.5 % — LOW (ref 39–50)
HGB BLD-MCNC: 11.9 G/DL — LOW (ref 13–17)
IMM GRANULOCYTES # BLD AUTO: 0.04 # — SIGNIFICANT CHANGE UP
IMM GRANULOCYTES NFR BLD AUTO: 0.4 % — SIGNIFICANT CHANGE UP (ref 0–1.5)
LYMPHOCYTES # BLD AUTO: 1.71 K/UL — SIGNIFICANT CHANGE UP (ref 1–3.3)
LYMPHOCYTES # BLD AUTO: 15.6 % — SIGNIFICANT CHANGE UP (ref 13–44)
MAGNESIUM SERPL-MCNC: 1.7 MG/DL — SIGNIFICANT CHANGE UP (ref 1.6–2.6)
MCHC RBC-ENTMCNC: 28.1 PG — SIGNIFICANT CHANGE UP (ref 27–34)
MCHC RBC-ENTMCNC: 32.6 % — SIGNIFICANT CHANGE UP (ref 32–36)
MCV RBC AUTO: 86.3 FL — SIGNIFICANT CHANGE UP (ref 80–100)
MONOCYTES # BLD AUTO: 0.71 K/UL — SIGNIFICANT CHANGE UP (ref 0–0.9)
MONOCYTES NFR BLD AUTO: 6.5 % — SIGNIFICANT CHANGE UP (ref 2–14)
NEUTROPHILS # BLD AUTO: 8.25 K/UL — HIGH (ref 1.8–7.4)
NEUTROPHILS NFR BLD AUTO: 75.4 % — SIGNIFICANT CHANGE UP (ref 43–77)
NRBC # FLD: 0 — SIGNIFICANT CHANGE UP
PHOSPHATE SERPL-MCNC: 3.3 MG/DL — SIGNIFICANT CHANGE UP (ref 2.5–4.5)
PLATELET # BLD AUTO: 204 K/UL — SIGNIFICANT CHANGE UP (ref 150–400)
PMV BLD: 9.4 FL — SIGNIFICANT CHANGE UP (ref 7–13)
POTASSIUM SERPL-MCNC: 3.6 MMOL/L — SIGNIFICANT CHANGE UP (ref 3.5–5.3)
POTASSIUM SERPL-SCNC: 3.6 MMOL/L — SIGNIFICANT CHANGE UP (ref 3.5–5.3)
RBC # BLD: 4.23 M/UL — SIGNIFICANT CHANGE UP (ref 4.2–5.8)
RBC # FLD: 13.4 % — SIGNIFICANT CHANGE UP (ref 10.3–14.5)
SODIUM SERPL-SCNC: 140 MMOL/L — SIGNIFICANT CHANGE UP (ref 135–145)
WBC # BLD: 10.94 K/UL — HIGH (ref 3.8–10.5)
WBC # FLD AUTO: 10.94 K/UL — HIGH (ref 3.8–10.5)

## 2018-11-05 PROCEDURE — 99232 SBSQ HOSP IP/OBS MODERATE 35: CPT | Mod: GC

## 2018-11-05 PROCEDURE — 99233 SBSQ HOSP IP/OBS HIGH 50: CPT | Mod: GC

## 2018-11-05 RX ORDER — OXYCODONE HYDROCHLORIDE 5 MG/1
30 TABLET ORAL
Qty: 0 | Refills: 0 | Status: DISCONTINUED | OUTPATIENT
Start: 2018-11-05 | End: 2018-11-07

## 2018-11-05 RX ORDER — APIXABAN 2.5 MG/1
5 TABLET, FILM COATED ORAL EVERY 12 HOURS
Qty: 0 | Refills: 0 | Status: DISCONTINUED | OUTPATIENT
Start: 2018-11-05 | End: 2018-11-07

## 2018-11-05 RX ADMIN — Medication 100 MILLIGRAM(S): at 12:22

## 2018-11-05 RX ADMIN — HYDROMORPHONE HYDROCHLORIDE 0.75 MILLIGRAM(S): 2 INJECTION INTRAMUSCULAR; INTRAVENOUS; SUBCUTANEOUS at 22:21

## 2018-11-05 RX ADMIN — HEPARIN SODIUM 1000 UNIT(S)/HR: 5000 INJECTION INTRAVENOUS; SUBCUTANEOUS at 06:53

## 2018-11-05 RX ADMIN — SENNA PLUS 2 TABLET(S): 8.6 TABLET ORAL at 22:21

## 2018-11-05 RX ADMIN — DEXTROSE MONOHYDRATE, SODIUM CHLORIDE, AND POTASSIUM CHLORIDE 50 MILLILITER(S): 50; .745; 4.5 INJECTION, SOLUTION INTRAVENOUS at 20:22

## 2018-11-05 RX ADMIN — HYDROMORPHONE HYDROCHLORIDE 0.75 MILLIGRAM(S): 2 INJECTION INTRAMUSCULAR; INTRAVENOUS; SUBCUTANEOUS at 22:36

## 2018-11-05 RX ADMIN — HYDROMORPHONE HYDROCHLORIDE 0.75 MILLIGRAM(S): 2 INJECTION INTRAMUSCULAR; INTRAVENOUS; SUBCUTANEOUS at 09:24

## 2018-11-05 RX ADMIN — PIPERACILLIN AND TAZOBACTAM 25 GRAM(S): 4; .5 INJECTION, POWDER, LYOPHILIZED, FOR SOLUTION INTRAVENOUS at 05:49

## 2018-11-05 RX ADMIN — OXYCODONE HYDROCHLORIDE 30 MILLIGRAM(S): 5 TABLET ORAL at 18:50

## 2018-11-05 RX ADMIN — DEXTROSE MONOHYDRATE, SODIUM CHLORIDE, AND POTASSIUM CHLORIDE 50 MILLILITER(S): 50; .745; 4.5 INJECTION, SOLUTION INTRAVENOUS at 05:49

## 2018-11-05 RX ADMIN — HYDROMORPHONE HYDROCHLORIDE 0.75 MILLIGRAM(S): 2 INJECTION INTRAMUSCULAR; INTRAVENOUS; SUBCUTANEOUS at 15:19

## 2018-11-05 RX ADMIN — APIXABAN 5 MILLIGRAM(S): 2.5 TABLET, FILM COATED ORAL at 17:59

## 2018-11-05 RX ADMIN — HYDROMORPHONE HYDROCHLORIDE 0.75 MILLIGRAM(S): 2 INJECTION INTRAMUSCULAR; INTRAVENOUS; SUBCUTANEOUS at 12:36

## 2018-11-05 RX ADMIN — HYDROMORPHONE HYDROCHLORIDE 0.75 MILLIGRAM(S): 2 INJECTION INTRAMUSCULAR; INTRAVENOUS; SUBCUTANEOUS at 02:45

## 2018-11-05 RX ADMIN — HYDROMORPHONE HYDROCHLORIDE 0.75 MILLIGRAM(S): 2 INJECTION INTRAMUSCULAR; INTRAVENOUS; SUBCUTANEOUS at 12:21

## 2018-11-05 RX ADMIN — PIPERACILLIN AND TAZOBACTAM 25 GRAM(S): 4; .5 INJECTION, POWDER, LYOPHILIZED, FOR SOLUTION INTRAVENOUS at 17:59

## 2018-11-05 RX ADMIN — DEXTROSE MONOHYDRATE, SODIUM CHLORIDE, AND POTASSIUM CHLORIDE 50 MILLILITER(S): 50; .745; 4.5 INJECTION, SOLUTION INTRAVENOUS at 18:00

## 2018-11-05 RX ADMIN — HYDROMORPHONE HYDROCHLORIDE 0.75 MILLIGRAM(S): 2 INJECTION INTRAMUSCULAR; INTRAVENOUS; SUBCUTANEOUS at 09:09

## 2018-11-05 RX ADMIN — HYDROMORPHONE HYDROCHLORIDE 0.75 MILLIGRAM(S): 2 INJECTION INTRAMUSCULAR; INTRAVENOUS; SUBCUTANEOUS at 15:04

## 2018-11-05 RX ADMIN — OXYCODONE HYDROCHLORIDE 30 MILLIGRAM(S): 5 TABLET ORAL at 17:58

## 2018-11-05 RX ADMIN — HYDROMORPHONE HYDROCHLORIDE 0.75 MILLIGRAM(S): 2 INJECTION INTRAMUSCULAR; INTRAVENOUS; SUBCUTANEOUS at 02:30

## 2018-11-05 RX ADMIN — OXYCODONE HYDROCHLORIDE 20 MILLIGRAM(S): 5 TABLET ORAL at 05:48

## 2018-11-05 RX ADMIN — HYDROMORPHONE HYDROCHLORIDE 0.75 MILLIGRAM(S): 2 INJECTION INTRAMUSCULAR; INTRAVENOUS; SUBCUTANEOUS at 05:45

## 2018-11-05 RX ADMIN — OXYCODONE HYDROCHLORIDE 20 MILLIGRAM(S): 5 TABLET ORAL at 07:03

## 2018-11-05 RX ADMIN — HYDROMORPHONE HYDROCHLORIDE 0.75 MILLIGRAM(S): 2 INJECTION INTRAMUSCULAR; INTRAVENOUS; SUBCUTANEOUS at 06:00

## 2018-11-05 NOTE — PROGRESS NOTE ADULT - ATTENDING COMMENTS
Patient seen and examined, chart and labs reviewed.    77M HTN, DM-2, COPD, PE on Eliquis, dysphagia, BCC s/p resection, Hep B, CKD4, and mutated NSCLC (s/p erlotinib, carboplatin/pemetrexed, atezolizumab) now on gilotrif (abatnif), p/w lethargy, aspiration pneumonia, LUX.  He was on mscontin 30 mg tid and oxycodone 10 mg q4h prn per pain management, though bone scan and PET neg for bone mets.    Assessment/plan:  # Lethargy, acute metabolic encephalopathy now resolved: in the setting of aspiration pneumonia/sepsis, mental status improved, CT head neg    # Dysphagia, Aspiration pneumonia: bilateral opacities with large LLL infiltrate on CXR, c/w zosyn for 7 day course  sputum cx klebsiella  seen by s/s, puree diet with honey thick liquid, aspiration precaution    # chronic pain: due to lung CA? no bone mets on PET/bone scan, calculated total Dilaudid requirements in past 24 hours and will increase dose of Oxycontin to 30mg BID with PRN IV Dilaudid.     #NSCLC: hold abatnif in the setting of infection, f/u onc as outpatient, pt states he wishes to resume treatment.     # LUX :  in the setting of sepsis, improving on IVF, renal US no hydronephrosis    # Hypokalemia: replete K, monitor lytes, on IVF with K supplementation     # HBV: hold tenofovir in the setting of LUX    # H/o PE: switched back to Eliquis now that LUX is resolving     #Dispo: PT recommends outpatient PT.

## 2018-11-05 NOTE — PROGRESS NOTE ADULT - PROBLEM SELECTOR PLAN 2
Pain controlled, still using significant amount of PRNs. Would increase Oxycontin to 30mg BID, continue with dilaudid IV PRNs. Please page for uncontrolled symptoms.

## 2018-11-05 NOTE — PROGRESS NOTE ADULT - SUBJECTIVE AND OBJECTIVE BOX
CHIEF COMPLAINT: SOB, lethargy 2/2 aspiration pneumonia    Interval Events:  No acute events overnight.     REVIEW OF SYSTEMS:  Constitutional: [ ] negative [ ] fevers [ ] chills [ ] weight loss [ ] weight gain  HEENT: [ ] negative [ ] dry eyes [ ] eye irritation [ ] postnasal drip [ ] nasal congestion  CV: [ ] negative  [ ] chest pain [ ] orthopnea [ ] palpitations [ ] murmur  Resp: [ ] negative [ ] cough [ ] shortness of breath [ ] dyspnea [ ] wheezing [ ] sputum [ ] hemoptysis  GI: [ ] negative [ ] nausea [ ] vomiting [ ] diarrhea [ ] constipation [ ] abd pain [ ] dysphagia   : [ ] negative [ ] dysuria [ ] nocturia [ ] hematuria [ ] increased urinary frequency  Musculoskeletal: [ ] negative [ ] back pain [ ] myalgias [ ] arthralgias [ ] fracture  Skin: [ ] negative [ ] rash [ ] itch  Neurological: [ ] negative [ ] headache [ ] dizziness [ ] syncope [ ] weakness [ ] numbness  Psychiatric: [ ] negative [ ] anxiety [ ] depression  Endocrine: [ ] negative [ ] diabetes [ ] thyroid problem  Hematologic/Lymphatic: [ ] negative [ ] anemia [ ] bleeding problem  Allergic/Immunologic: [ ] negative [ ] itchy eyes [ ] nasal discharge [ ] hives [ ] angioedema  [ ] All other systems negative  [ ] Unable to assess ROS because ________    OBJECTIVE:  T(C): 36.3 (05 Nov 2018 06:02), Max: 36.8 (04 Nov 2018 12:45)  T(F): 97.4 (05 Nov 2018 06:02), Max: 98.2 (04 Nov 2018 12:45)  HR: 72 (05 Nov 2018 06:02) (72 - 83)  BP: 147/83 (05 Nov 2018 06:02) (147/83 - 162/82)  RR: 19 (05 Nov 2018 06:02) (18 - 20)  SpO2: 98% (05 Nov 2018 06:02) (97% - 99%)        11-04 @ 07:01  -  11-05 @ 07:00  --------------------------------------------------------  IN: 0 mL / OUT: 1200 mL / NET: -1200 mL      CAPILLARY BLOOD GLUCOSE      POCT Blood Glucose.: 130 mg/dL (04 Nov 2018 22:42)      PHYSICAL EXAM:  General:   HEENT:   Lymph Nodes:  Neck:   Respiratory:   Cardiovascular:   Abdomen:   Extremities:   Skin:   Neurological:  Psychiatry:    LINES:    HOSPITAL MEDICATIONS:  heparin  Infusion.  Unit(s)/Hr IV Continuous <Continuous>    piperacillin/tazobactam IVPB. 3.375 Gram(s) IV Intermittent every 12 hours      dextrose 40% Gel 15 Gram(s) Oral once PRN  dextrose 50% Injectable 12.5 Gram(s) IV Push once  dextrose 50% Injectable 25 Gram(s) IV Push once  dextrose 50% Injectable 25 Gram(s) IV Push once  glucagon  Injectable 1 milliGRAM(s) IntraMuscular once PRN  insulin lispro (HumaLOG) corrective regimen sliding scale   SubCutaneous three times a day before meals  insulin lispro (HumaLOG) corrective regimen sliding scale   SubCutaneous at bedtime      HYDROmorphone  Injectable 0.75 milliGRAM(s) IV Push every 3 hours PRN  ondansetron Injectable 4 milliGRAM(s) IV Push every 6 hours PRN  oxyCODONE  ER Tablet 20 milliGRAM(s) Oral two times a day    bisacodyl Suppository 10 milliGRAM(s) Rectal daily PRN  docusate sodium 100 milliGRAM(s) Oral daily  senna 2 Tablet(s) Oral at bedtime        dextrose 5% + sodium chloride 0.45% with potassium chloride 20 mEq/L 1000 milliLiter(s) IV Continuous <Continuous>  dextrose 5%. 1000 milliLiter(s) IV Continuous <Continuous>            LABS:                        11.9   10.94 )-----------( 204      ( 05 Nov 2018 05:41 )             36.5     Hgb Trend: 11.9<--, 11.1<--, 10.9<--, 11.0<--, 10.7<--  11-05    140  |  102  |  9   ----------------------------<  123<H>  3.6   |  23  |  1.61<H>    Ca    8.9      05 Nov 2018 05:41  Phos  3.3     11-05  Mg     1.7     11-05      Creatinine Trend: 1.61<--, 1.64<--, 1.61<--, 1.70<--, 1.97<--, 1.98<--  PTT - ( 05 Nov 2018 05:41 )  PTT:108.6 SEC          MICROBIOLOGY:     RADIOLOGY:  [ ] Reviewed and interpreted by me    EKG: CHIEF COMPLAINT: SOB, lethargy 2/2 aspiration pneumonia    Interval Events:  No acute events overnight. This morning, patient was seen     REVIEW OF SYSTEMS:  Constitutional: [ ] negative [ ] fevers [ ] chills [ ] weight loss [ ] weight gain  HEENT: [ ] negative [ ] dry eyes [ ] eye irritation [ ] postnasal drip [ ] nasal congestion  CV: [ ] negative  [ ] chest pain [ ] orthopnea [ ] palpitations [ ] murmur  Resp: [ ] negative [ ] cough [ ] shortness of breath [ ] dyspnea [ ] wheezing [ ] sputum [ ] hemoptysis  GI: [ ] negative [ ] nausea [ ] vomiting [ ] diarrhea [ ] constipation [ ] abd pain [ ] dysphagia   : [ ] negative [ ] dysuria [ ] nocturia [ ] hematuria [ ] increased urinary frequency  Musculoskeletal: [ ] negative [ ] back pain [ ] myalgias [ ] arthralgias [ ] fracture  Skin: [ ] negative [ ] rash [ ] itch  Neurological: [ ] negative [ ] headache [ ] dizziness [ ] syncope [ ] weakness [ ] numbness  Psychiatric: [ ] negative [ ] anxiety [ ] depression  Endocrine: [ ] negative [ ] diabetes [ ] thyroid problem  Hematologic/Lymphatic: [ ] negative [ ] anemia [ ] bleeding problem  Allergic/Immunologic: [ ] negative [ ] itchy eyes [ ] nasal discharge [ ] hives [ ] angioedema  [ ] All other systems negative  [ ] Unable to assess ROS because ________    OBJECTIVE:  T(C): 36.3 (05 Nov 2018 06:02), Max: 36.8 (04 Nov 2018 12:45)  T(F): 97.4 (05 Nov 2018 06:02), Max: 98.2 (04 Nov 2018 12:45)  HR: 72 (05 Nov 2018 06:02) (72 - 83)  BP: 147/83 (05 Nov 2018 06:02) (147/83 - 162/82)  RR: 19 (05 Nov 2018 06:02) (18 - 20)  SpO2: 98% (05 Nov 2018 06:02) (97% - 99%)        11-04 @ 07:01  -  11-05 @ 07:00  --------------------------------------------------------  IN: 0 mL / OUT: 1200 mL / NET: -1200 mL      CAPILLARY BLOOD GLUCOSE      POCT Blood Glucose.: 130 mg/dL (04 Nov 2018 22:42)      PHYSICAL EXAM:  General:   HEENT:   Lymph Nodes:  Neck:   Respiratory:   Cardiovascular:   Abdomen:   Extremities:   Skin:   Neurological:  Psychiatry:    LINES:    HOSPITAL MEDICATIONS:  heparin  Infusion.  Unit(s)/Hr IV Continuous <Continuous>    piperacillin/tazobactam IVPB. 3.375 Gram(s) IV Intermittent every 12 hours      dextrose 40% Gel 15 Gram(s) Oral once PRN  dextrose 50% Injectable 12.5 Gram(s) IV Push once  dextrose 50% Injectable 25 Gram(s) IV Push once  dextrose 50% Injectable 25 Gram(s) IV Push once  glucagon  Injectable 1 milliGRAM(s) IntraMuscular once PRN  insulin lispro (HumaLOG) corrective regimen sliding scale   SubCutaneous three times a day before meals  insulin lispro (HumaLOG) corrective regimen sliding scale   SubCutaneous at bedtime      HYDROmorphone  Injectable 0.75 milliGRAM(s) IV Push every 3 hours PRN  ondansetron Injectable 4 milliGRAM(s) IV Push every 6 hours PRN  oxyCODONE  ER Tablet 20 milliGRAM(s) Oral two times a day    bisacodyl Suppository 10 milliGRAM(s) Rectal daily PRN  docusate sodium 100 milliGRAM(s) Oral daily  senna 2 Tablet(s) Oral at bedtime        dextrose 5% + sodium chloride 0.45% with potassium chloride 20 mEq/L 1000 milliLiter(s) IV Continuous <Continuous>  dextrose 5%. 1000 milliLiter(s) IV Continuous <Continuous>            LABS:                        11.9   10.94 )-----------( 204      ( 05 Nov 2018 05:41 )             36.5     Hgb Trend: 11.9<--, 11.1<--, 10.9<--, 11.0<--, 10.7<--  11-05    140  |  102  |  9   ----------------------------<  123<H>  3.6   |  23  |  1.61<H>    Ca    8.9      05 Nov 2018 05:41  Phos  3.3     11-05  Mg     1.7     11-05      Creatinine Trend: 1.61<--, 1.64<--, 1.61<--, 1.70<--, 1.97<--, 1.98<--  PTT - ( 05 Nov 2018 05:41 )  PTT:108.6 SEC          MICROBIOLOGY:     RADIOLOGY:  [ ] Reviewed and interpreted by me    EKG: CHIEF COMPLAINT: SOB, lethargy 2/2 aspiration pneumonia    Interval Events:  No acute events overnight. This morning, patient was seen and examined at bedside. He reports 6/10 chest pain and 4/10 upper back pain. He also endorsed nausea w/o vomiting and intermittent SOB. He denies abdominal pain. Patient's last BM was this morning    REVIEW OF SYSTEMS:  Constitutional: [x] negative [ ] fevers [ ] chills [ ] weight loss [ ] weight gain  HEENT: [x] negative [ ] dry eyes [ ] eye irritation [ ] postnasal drip [ ] nasal congestion  CV: [ ] negative  [x] chest pain [ ] orthopnea [ ] palpitations [ ] murmur  Resp: [ ] negative [ ] cough [x] shortness of breath [ ] dyspnea [ ] wheezing [ ] sputum [ ] hemoptysis  GI: [ ] negative [x] nausea [ ] vomiting [ ] diarrhea [ ] constipation [ ] abd pain [ ] dysphagia   : [x] negative [ ] dysuria [ ] nocturia [ ] hematuria [ ] increased urinary frequency  Musculoskeletal: [x] negative [ ] back pain [ ] myalgias [ ] arthralgias [ ] fracture  Skin: [x] negative [ ] rash [ ] itch  Neurological: [x] negative [ ] headache [ ] dizziness [ ] syncope [ ] weakness [ ] numbness  Psychiatric: [x] negative [ ] anxiety [ ] depression  Endocrine: [x] negative [ ] diabetes [ ] thyroid problem  Hematologic/Lymphatic: [x] negative [ ] anemia [ ] bleeding problem  Allergic/Immunologic: [x] negative [ ] itchy eyes [ ] nasal discharge [ ] hives [ ] angioedema  [ ] All other systems negative  [ ] Unable to assess ROS because ________    OBJECTIVE:  T(C): 36.3 (05 Nov 2018 06:02), Max: 36.8 (04 Nov 2018 12:45)  T(F): 97.4 (05 Nov 2018 06:02), Max: 98.2 (04 Nov 2018 12:45)  HR: 72 (05 Nov 2018 06:02) (72 - 83)  BP: 147/83 (05 Nov 2018 06:02) (147/83 - 162/82)  RR: 19 (05 Nov 2018 06:02) (18 - 20)  SpO2: 98% (05 Nov 2018 06:02) (97% - 99%)        11-04 @ 07:01  -  11-05 @ 07:00  --------------------------------------------------------  IN: 0 mL / OUT: 1200 mL / NET: -1200 mL      CAPILLARY BLOOD GLUCOSE      POCT Blood Glucose.: 130 mg/dL (04 Nov 2018 22:42)      PHYSICAL EXAM:  GENERAL: NAD, well-developed  HEAD:  Atraumatic, Normocephalic  EYES: EOMI, PERRLA, conjunctiva and sclera clear  NECK: Supple, No JVD  CHEST/LUNG: expiratory wheeze b/l, decreased breath sounds at bases,   HEART: S1 and S2, Regular rate and rhythm; No murmurs, rubs, or gallops  ABDOMEN: Soft, Nontender, Nondistended; Bowel sounds present  EXTREMITIES:  2+ Peripheral Pulses, No clubbing, cyanosis, or edema  PSYCH: AAOx3  NEUROLOGY: non-focal,CN grossly intact  SKIN: No rashes or lesions    LINES:    HOSPITAL MEDICATIONS:  heparin  Infusion.  Unit(s)/Hr IV Continuous <Continuous>    piperacillin/tazobactam IVPB. 3.375 Gram(s) IV Intermittent every 12 hours      dextrose 40% Gel 15 Gram(s) Oral once PRN  dextrose 50% Injectable 12.5 Gram(s) IV Push once  dextrose 50% Injectable 25 Gram(s) IV Push once  dextrose 50% Injectable 25 Gram(s) IV Push once  glucagon  Injectable 1 milliGRAM(s) IntraMuscular once PRN  insulin lispro (HumaLOG) corrective regimen sliding scale   SubCutaneous three times a day before meals  insulin lispro (HumaLOG) corrective regimen sliding scale   SubCutaneous at bedtime      HYDROmorphone  Injectable 0.75 milliGRAM(s) IV Push every 3 hours PRN  ondansetron Injectable 4 milliGRAM(s) IV Push every 6 hours PRN  oxyCODONE  ER Tablet 20 milliGRAM(s) Oral two times a day    bisacodyl Suppository 10 milliGRAM(s) Rectal daily PRN  docusate sodium 100 milliGRAM(s) Oral daily  senna 2 Tablet(s) Oral at bedtime        dextrose 5% + sodium chloride 0.45% with potassium chloride 20 mEq/L 1000 milliLiter(s) IV Continuous <Continuous>  dextrose 5%. 1000 milliLiter(s) IV Continuous <Continuous>            LABS:                        11.9   10.94 )-----------( 204      ( 05 Nov 2018 05:41 )             36.5     Hgb Trend: 11.9<--, 11.1<--, 10.9<--, 11.0<--, 10.7<--  11-05    140  |  102  |  9   ----------------------------<  123<H>  3.6   |  23  |  1.61<H>    Ca    8.9      05 Nov 2018 05:41  Phos  3.3     11-05  Mg     1.7     11-05      Creatinine Trend: 1.61<--, 1.64<--, 1.61<--, 1.70<--, 1.97<--, 1.98<--  PTT - ( 05 Nov 2018 05:41 )  PTT:108.6 SEC          MICROBIOLOGY:     RADIOLOGY:  [ ] Reviewed and interpreted by me    EKG:

## 2018-11-05 NOTE — PROGRESS NOTE ADULT - SUBJECTIVE AND OBJECTIVE BOX
INTERVAL HPI/OVERNIGHT EVENTS:   Pt tolerating oral now.  Started on Oxycontin over the weekend, still using significant amount of IV PRNs.  Pain controlled, c/o HA today.    Code Status: Full Code   Allergies    No Known Drug Allergies  tegaderm (Rash; Urticaria)    Intolerances    MEDICATIONS  (STANDING):  apixaban 5 milliGRAM(s) Oral every 12 hours  dextrose 5% + sodium chloride 0.45% with potassium chloride 20 mEq/L 1000 milliLiter(s) (50 mL/Hr) IV Continuous <Continuous>  dextrose 5%. 1000 milliLiter(s) (50 mL/Hr) IV Continuous <Continuous>  dextrose 50% Injectable 12.5 Gram(s) IV Push once  dextrose 50% Injectable 25 Gram(s) IV Push once  dextrose 50% Injectable 25 Gram(s) IV Push once  docusate sodium 100 milliGRAM(s) Oral daily  insulin lispro (HumaLOG) corrective regimen sliding scale   SubCutaneous three times a day before meals  insulin lispro (HumaLOG) corrective regimen sliding scale   SubCutaneous at bedtime  oxyCODONE  ER Tablet 30 milliGRAM(s) Oral two times a day  piperacillin/tazobactam IVPB. 3.375 Gram(s) IV Intermittent every 12 hours  senna 2 Tablet(s) Oral at bedtime    MEDICATIONS  (PRN):  bisacodyl Suppository 10 milliGRAM(s) Rectal daily PRN Constipation  dextrose 40% Gel 15 Gram(s) Oral once PRN Blood Glucose LESS THAN 70 milliGRAM(s)/deciliter  glucagon  Injectable 1 milliGRAM(s) IntraMuscular once PRN Glucose LESS THAN 70 milligrams/deciliter  HYDROmorphone  Injectable 0.75 milliGRAM(s) IV Push every 3 hours PRN Severe Pain (7 - 10)  ondansetron Injectable 4 milliGRAM(s) IV Push every 6 hours PRN Nausea and/or Vomiting    PRESENT SYMPTOMS: [ ]Unable to obtain due to poor mentation   Source if other than patient:  [ ]Family   [ ]Team     Pain (Impact on QOL):  Denies currently.  Continues to have intermittent generalized body pain - unable to qualify or quantify   Dyspnea:  Yes [ ] No [x ] - [ ]Mild [ ]Moderate [ ]Severe  Anxiety:    Yes [ ] No [ x] - [ ]Mild [ ]Moderate [ ]Severe  Fatigue:    Yes [x ] No [ ] - [ ]Mild [x ]Moderate [ ]Severe  Nausea:    Yes [ ] No [x ] - [ ]Mild [ ]Moderate [ ]Severe                         Loss of appetite: NPO            Constipation:  Yes [ ] No [x ] - [ ]Mild [ ]Moderate [ ]Severe    PAIN AD Score:	  [email or web address].[email or web address]/cog/painad.pdf (Ctrl + left click to view)    Other Symptoms:  [x ]All other review of systems negative     Karnofsky Performance Score/Palliative Performance Status Version 2:     40%    http://palliative.info/resource_material/PPSv2.pdf    PHYSICAL EXAM:  Vital Signs Last 24 Hrs  T(C): 36.4 (05 Nov 2018 14:18), Max: 36.7 (05 Nov 2018 02:04)  T(F): 97.6 (05 Nov 2018 14:18), Max: 98 (05 Nov 2018 02:04)  HR: 77 (05 Nov 2018 14:18) (72 - 83)  BP: 151/92 (05 Nov 2018 14:18) (147/83 - 162/82)  BP(mean): --  RR: 18 (05 Nov 2018 14:18) (18 - 33)  SpO2: 98% (05 Nov 2018 14:18) (96% - 98%) I&O's Summary    04 Nov 2018 07:01  -  05 Nov 2018 07:00  --------------------------------------------------------  IN: 0 mL / OUT: 1200 mL / NET: -1200 mL     GENERAL:  [x ]Alert, verbal   PULMONARY:   [x ]Clear - anteriorly   [ ]Rhonchi        [ ]Right [ ]Left [ ]Bilateral  [ ]Crackles        [ ]Right [ ]Left [ ]Bilateral  [ ]Wheezing     [ ]Right [ ]Left [ ]Bilateral  CARDIOVASCULAR:    [x ]Regular [ ]Irregular [ ]Tachy  [ ]Jerman [ ]Murmur [ ]Other  GASTROINTESTINAL:  [ x]Soft  [ ]Distended   [x ]+BS  [ x]Non tender [ ]Tender  [ ]PEG [ ]OGT/ NGT   Last BM: 11/1/18  GENITOURINARY:  [x ]Normal [ ] Incontinent   [ ]Oliguria/Anuria   [ ]Soni  MUSCULOSKELETAL:   [ ]Normal   [x ]Weakness  [ ]Bed/Wheelchair bound [ ]Edema  NEUROLOGIC:   [x ]No focal deficits  [ ] Cognitive impairment  [ ] Dysphagia [ ]Dysarthria [ ] Paresis [ ]Other   SKIN:   [ x]Normal   [ ]Pressure ulcer(s)  [ ]Rash    CRITICAL CARE:  [ ] Shock Present  [ ]Septic [ ]Cardiogenic [ ]Neurologic [ ]Hypovolemic  [ ]  Vasopressors [ ]  Inotropes   [ ] Respiratory failure present  [ ] Acute  [ ] Chronic [ ] Hypoxic  [ ] Hypercarbic [ ] Other  [ ] Other organ failure     PHYSICAL EXAM:  Vital Signs Last 24 Hrs  T(C): 36.4 (05 Nov 2018 14:18), Max: 36.7 (05 Nov 2018 02:04)  T(F): 97.6 (05 Nov 2018 14:18), Max: 98 (05 Nov 2018 02:04)  HR: 77 (05 Nov 2018 14:18) (72 - 83)  BP: 151/92 (05 Nov 2018 14:18) (147/83 - 162/82)  BP(mean): --  RR: 18 (05 Nov 2018 14:18) (18 - 33)  SpO2: 98% (05 Nov 2018 14:18) (96% - 98%) I&O's Summary    04 Nov 2018 07:01  -  05 Nov 2018 07:00  --------------------------------------------------------  IN: 0 mL / OUT: 1200 mL / NET: -1200 mL      RADIOLOGY & ADDITIONAL STUDIES:     < from: CT Head No Cont (11.01.18 @ 14:06) >    IMPRESSION:    No acute intracranial pathology is noted. If the patient has new and   persistent symptoms, consider short interval follow-up head CT or brain   MRI follow-up.    Protein Calorie Malnutrition Present: [ ] yes [ ] no  [ ] PPSV2 < or = 30%  [ ] significant weight loss [ ] poor nutritional intake [ ] anasarca [ ] catabolic state Albumin, Serum: 3.4 g/dL (10-31-18 @ 15:50)      REFERRALS:   [ ]Chaplaincy  [ ] Hospice  [ ]Child Life  [ ]Social Work  [ ]Case management [ ]Holistic Therapy   Goals of Care Document:

## 2018-11-05 NOTE — PROGRESS NOTE ADULT - PROBLEM SELECTOR PLAN 6
- patient on morphine 30q8 and and 10 of oxy q4 at home   -pain is mainly located in chest; recent PET had showed no bony mets  -initial concern for opioid induced lethargy, s/p narcan   -palliative consulted; appreciated recs  - pain management as noted above - patient on morphine 30q8 and and 10 of oxy q4 at home   -pain is mainly located in chest; recent PET had showed no bony mets  -initial concern for opioid induced lethargy, s/p narcan   -palliative consulted; appreciated recs  -pain management:  oxycontin 20 mg BID, increased dilaudid to 0.75 IV push q3h PRN. Overnight, received 3 PRNS

## 2018-11-05 NOTE — PROGRESS NOTE ADULT - PROBLEM SELECTOR PLAN 2
- likely secondary to sepsis and/or opioid intake  -At home, patient takes morphine 30 mg TID and oxycodone; given patient's LUX, concerned that morphine was not being excreted from his body. s/p narcan. Determined lethargy likely 2/2 infection  -Palliative consulted; appreciated recs  -pain management:  Increased oxycontin 20 mg BID, increased dilaudid to 0.75 IV push q3h PRN -Resolved; likely was secondary to sepsis   -Initially concerned of opioid overuse: patient takes morphine 30 mg TID and oxycodone; given patient's LUX, concerned that morphine was not being excreted from his body. s/p narcan. Determined lethargy likely 2/2 infection  -Palliative consulted; appreciated recs

## 2018-11-05 NOTE — PROGRESS NOTE ADULT - PROBLEM SELECTOR PLAN 1
- patient presented with tachycardia and elevated WBC count with evidence of PNA  - Sepsis now resolved.   -Day 4 of zosyn IV q8h  -D/Peter vancomycin as sensitivities of sputum culture revealed klebsiella sensitive to zosyn - patient presented with tachycardia and elevated WBC count with evidence of PNA  - Sepsis now resolved.   -Day 4/7 of zosyn IV q8h  -D/Peter vancomycin as sensitivities of sputum culture revealed klebsiella sensitive to zosyn

## 2018-11-06 PROCEDURE — 99233 SBSQ HOSP IP/OBS HIGH 50: CPT | Mod: GC

## 2018-11-06 PROCEDURE — 99232 SBSQ HOSP IP/OBS MODERATE 35: CPT | Mod: GC

## 2018-11-06 RX ORDER — METRONIDAZOLE 500 MG
500 TABLET ORAL EVERY 8 HOURS
Qty: 0 | Refills: 0 | Status: COMPLETED | OUTPATIENT
Start: 2018-11-06 | End: 2018-11-08

## 2018-11-06 RX ORDER — CIPROFLOXACIN LACTATE 400MG/40ML
500 VIAL (ML) INTRAVENOUS EVERY 12 HOURS
Qty: 0 | Refills: 0 | Status: DISCONTINUED | OUTPATIENT
Start: 2018-11-06 | End: 2018-11-08

## 2018-11-06 RX ORDER — OXYCODONE HYDROCHLORIDE 5 MG/1
10 TABLET ORAL EVERY 4 HOURS
Qty: 0 | Refills: 0 | Status: DISCONTINUED | OUTPATIENT
Start: 2018-11-06 | End: 2018-11-10

## 2018-11-06 RX ADMIN — HYDROMORPHONE HYDROCHLORIDE 0.75 MILLIGRAM(S): 2 INJECTION INTRAMUSCULAR; INTRAVENOUS; SUBCUTANEOUS at 02:08

## 2018-11-06 RX ADMIN — OXYCODONE HYDROCHLORIDE 30 MILLIGRAM(S): 5 TABLET ORAL at 18:54

## 2018-11-06 RX ADMIN — APIXABAN 5 MILLIGRAM(S): 2.5 TABLET, FILM COATED ORAL at 06:13

## 2018-11-06 RX ADMIN — SENNA PLUS 2 TABLET(S): 8.6 TABLET ORAL at 22:23

## 2018-11-06 RX ADMIN — APIXABAN 5 MILLIGRAM(S): 2.5 TABLET, FILM COATED ORAL at 18:55

## 2018-11-06 RX ADMIN — HYDROMORPHONE HYDROCHLORIDE 0.75 MILLIGRAM(S): 2 INJECTION INTRAMUSCULAR; INTRAVENOUS; SUBCUTANEOUS at 06:13

## 2018-11-06 RX ADMIN — HYDROMORPHONE HYDROCHLORIDE 0.75 MILLIGRAM(S): 2 INJECTION INTRAMUSCULAR; INTRAVENOUS; SUBCUTANEOUS at 12:50

## 2018-11-06 RX ADMIN — Medication 500 MILLIGRAM(S): at 18:55

## 2018-11-06 RX ADMIN — DEXTROSE MONOHYDRATE, SODIUM CHLORIDE, AND POTASSIUM CHLORIDE 50 MILLILITER(S): 50; .745; 4.5 INJECTION, SOLUTION INTRAVENOUS at 14:56

## 2018-11-06 RX ADMIN — HYDROMORPHONE HYDROCHLORIDE 0.75 MILLIGRAM(S): 2 INJECTION INTRAMUSCULAR; INTRAVENOUS; SUBCUTANEOUS at 01:54

## 2018-11-06 RX ADMIN — PIPERACILLIN AND TAZOBACTAM 25 GRAM(S): 4; .5 INJECTION, POWDER, LYOPHILIZED, FOR SOLUTION INTRAVENOUS at 06:18

## 2018-11-06 RX ADMIN — OXYCODONE HYDROCHLORIDE 10 MILLIGRAM(S): 5 TABLET ORAL at 15:30

## 2018-11-06 RX ADMIN — Medication 100 MILLIGRAM(S): at 12:30

## 2018-11-06 RX ADMIN — OXYCODONE HYDROCHLORIDE 30 MILLIGRAM(S): 5 TABLET ORAL at 07:10

## 2018-11-06 RX ADMIN — HYDROMORPHONE HYDROCHLORIDE 0.75 MILLIGRAM(S): 2 INJECTION INTRAMUSCULAR; INTRAVENOUS; SUBCUTANEOUS at 12:31

## 2018-11-06 RX ADMIN — Medication 1: at 12:30

## 2018-11-06 RX ADMIN — OXYCODONE HYDROCHLORIDE 10 MILLIGRAM(S): 5 TABLET ORAL at 20:10

## 2018-11-06 RX ADMIN — OXYCODONE HYDROCHLORIDE 30 MILLIGRAM(S): 5 TABLET ORAL at 06:13

## 2018-11-06 RX ADMIN — OXYCODONE HYDROCHLORIDE 10 MILLIGRAM(S): 5 TABLET ORAL at 14:57

## 2018-11-06 RX ADMIN — DEXTROSE MONOHYDRATE, SODIUM CHLORIDE, AND POTASSIUM CHLORIDE 50 MILLILITER(S): 50; .745; 4.5 INJECTION, SOLUTION INTRAVENOUS at 06:17

## 2018-11-06 RX ADMIN — OXYCODONE HYDROCHLORIDE 10 MILLIGRAM(S): 5 TABLET ORAL at 21:00

## 2018-11-06 RX ADMIN — HYDROMORPHONE HYDROCHLORIDE 0.75 MILLIGRAM(S): 2 INJECTION INTRAMUSCULAR; INTRAVENOUS; SUBCUTANEOUS at 06:28

## 2018-11-06 RX ADMIN — Medication 500 MILLIGRAM(S): at 22:21

## 2018-11-06 NOTE — PROGRESS NOTE ADULT - PROBLEM SELECTOR PLAN 2
Pain overall improved, patient sitting in the chair. Patient required 6 doses of Dilaudid 0.75mg IV in 24hrs.  Dilaudid discontinued and Oxycodone 10mg PO q4h PRN ordered by primary team.  Oxycontin increased to 30mg PO BID yesterday.  Will evaluate PRN usage and adjust long acting regimen as needed. Would not increase Oxycontin today as adjustment made yesterday.  Bowel regimen.  Please page for uncontrolled symptoms.

## 2018-11-06 NOTE — PROGRESS NOTE ADULT - SUBJECTIVE AND OBJECTIVE BOX
CHIEF COMPLAINT: SOB 2/2 aspiration pneumonia    Interval Events:  No acute events overnight.     REVIEW OF SYSTEMS:  Constitutional: [ ] negative [ ] fevers [ ] chills [ ] weight loss [ ] weight gain  HEENT: [ ] negative [ ] dry eyes [ ] eye irritation [ ] postnasal drip [ ] nasal congestion  CV: [ ] negative  [ ] chest pain [ ] orthopnea [ ] palpitations [ ] murmur  Resp: [ ] negative [ ] cough [ ] shortness of breath [ ] dyspnea [ ] wheezing [ ] sputum [ ] hemoptysis  GI: [ ] negative [ ] nausea [ ] vomiting [ ] diarrhea [ ] constipation [ ] abd pain [ ] dysphagia   : [ ] negative [ ] dysuria [ ] nocturia [ ] hematuria [ ] increased urinary frequency  Musculoskeletal: [ ] negative [ ] back pain [ ] myalgias [ ] arthralgias [ ] fracture  Skin: [ ] negative [ ] rash [ ] itch  Neurological: [ ] negative [ ] headache [ ] dizziness [ ] syncope [ ] weakness [ ] numbness  Psychiatric: [ ] negative [ ] anxiety [ ] depression  Endocrine: [ ] negative [ ] diabetes [ ] thyroid problem  Hematologic/Lymphatic: [ ] negative [ ] anemia [ ] bleeding problem  Allergic/Immunologic: [ ] negative [ ] itchy eyes [ ] nasal discharge [ ] hives [ ] angioedema  [ ] All other systems negative  [ ] Unable to assess ROS because ________    OBJECTIVE:  T(C): 36.3 (06 Nov 2018 06:12), Max: 36.7 (06 Nov 2018 01:48)  T(F): 97.3 (06 Nov 2018 06:12), Max: 98 (06 Nov 2018 01:48)  HR: 72 (06 Nov 2018 06:12) (72 - 79)  BP: 144/75 (06 Nov 2018 06:12) (144/75 - 159/90)  RR: 18 (06 Nov 2018 06:12) (17 - 33)  SpO2: 99% (06 Nov 2018 06:12) (95% - 99%)        CAPILLARY BLOOD GLUCOSE      POCT Blood Glucose.: 194 mg/dL (05 Nov 2018 22:27)      PHYSICAL EXAM:  General:   HEENT:   Lymph Nodes:  Neck:   Respiratory:   Cardiovascular:   Abdomen:   Extremities:   Skin:   Neurological:  Psychiatry:    LINES:    HOSPITAL MEDICATIONS:  apixaban 5 milliGRAM(s) Oral every 12 hours    piperacillin/tazobactam IVPB. 3.375 Gram(s) IV Intermittent every 12 hours      dextrose 40% Gel 15 Gram(s) Oral once PRN  dextrose 50% Injectable 12.5 Gram(s) IV Push once  dextrose 50% Injectable 25 Gram(s) IV Push once  dextrose 50% Injectable 25 Gram(s) IV Push once  glucagon  Injectable 1 milliGRAM(s) IntraMuscular once PRN  insulin lispro (HumaLOG) corrective regimen sliding scale   SubCutaneous three times a day before meals  insulin lispro (HumaLOG) corrective regimen sliding scale   SubCutaneous at bedtime      HYDROmorphone  Injectable 0.75 milliGRAM(s) IV Push every 3 hours PRN  ondansetron Injectable 4 milliGRAM(s) IV Push every 6 hours PRN  oxyCODONE  ER Tablet 30 milliGRAM(s) Oral two times a day    bisacodyl Suppository 10 milliGRAM(s) Rectal daily PRN  docusate sodium 100 milliGRAM(s) Oral daily  senna 2 Tablet(s) Oral at bedtime        dextrose 5% + sodium chloride 0.45% with potassium chloride 20 mEq/L 1000 milliLiter(s) IV Continuous <Continuous>  dextrose 5%. 1000 milliLiter(s) IV Continuous <Continuous>            LABS:                        11.9   10.94 )-----------( 204      ( 05 Nov 2018 05:41 )             36.5     Hgb Trend: 11.9<--, 11.1<--, 10.9<--, 11.0<--, 10.7<--  11-05    140  |  102  |  9   ----------------------------<  123<H>  3.6   |  23  |  1.61<H>    Ca    8.9      05 Nov 2018 05:41  Phos  3.3     11-05  Mg     1.7     11-05      Creatinine Trend: 1.61<--, 1.64<--, 1.61<--, 1.70<--, 1.97<--, 1.98<--  PTT - ( 05 Nov 2018 05:41 )  PTT:108.6 SEC          MICROBIOLOGY:     RADIOLOGY:  [ ] Reviewed and interpreted by me    EKG: CHIEF COMPLAINT: SOB 2/2 aspiration pneumonia    Interval Events:  No acute events overnight. No acute events overnight. This morning, patient endorses 6/10 CP that improved after receiving dilaudid. He states that overall he feels "better". He no longer has a headache. Denies N/V, abdominal pain, or weakness. His last BM was yesterday     REVIEW OF SYSTEMS:  Constitutional: [x] negative [ ] fevers [ ] chills [ ] weight loss [ ] weight gain  HEENT: [x] negative [ ] dry eyes [ ] eye irritation [ ] postnasal drip [ ] nasal congestion  CV: [ ] negative  [x] chest pain [ ] orthopnea [ ] palpitations [ ] murmur  Resp: [ ] negative [ ] cough [x] shortness of breath [ ] dyspnea [ ] wheezing [ ] sputum [ ] hemoptysis  GI: [x] negative [ ] nausea [ ] vomiting [ ] diarrhea [ ] constipation [ ] abd pain [ ] dysphagia   : [x] negative [ ] dysuria [ ] nocturia [ ] hematuria [ ] increased urinary frequency  Musculoskeletal: [x] negative [ ] back pain [ ] myalgias [ ] arthralgias [ ] fracture  Skin: [x] negative [ ] rash [ ] itch  Neurological: [x] negative [ ] headache [ ] dizziness [ ] syncope [ ] weakness [ ] numbness  Psychiatric: [x] negative [ ] anxiety [ ] depression  Endocrine: [x] negative [ ] diabetes [ ] thyroid problem  Hematologic/Lymphatic: [x] negative [ ] anemia [ ] bleeding problem  Allergic/Immunologic: [ ] negative [ ] itchy eyes [ ] nasal discharge [ ] hives [ ] angioedema  [ ] All other systems negative  [ ] Unable to assess ROS because ________    OBJECTIVE:  T(C): 36.3 (06 Nov 2018 06:12), Max: 36.7 (06 Nov 2018 01:48)  T(F): 97.3 (06 Nov 2018 06:12), Max: 98 (06 Nov 2018 01:48)  HR: 72 (06 Nov 2018 06:12) (72 - 79)  BP: 144/75 (06 Nov 2018 06:12) (144/75 - 159/90)  RR: 18 (06 Nov 2018 06:12) (17 - 33)  SpO2: 99% (06 Nov 2018 06:12) (95% - 99%)        CAPILLARY BLOOD GLUCOSE      POCT Blood Glucose.: 194 mg/dL (05 Nov 2018 22:27)    PHYSICAL EXAM:  GENERAL: NAD, well-developed  HEAD:  Atraumatic, Normocephalic  EYES: EOMI, PERRLA, conjunctiva and sclera clear  NECK: Supple, No JVD  CHEST/LUNG: expiratory wheeze b/l, decreased breath sounds at bases,   HEART: S1 and S2, Regular rate and rhythm; No murmurs, rubs, or gallops  ABDOMEN: Soft, Nontender, Nondistended; Bowel sounds present  EXTREMITIES:  2+ Peripheral Pulses, No clubbing, cyanosis, or edema  PSYCH: AAOx3  NEUROLOGY: non-focal,CN grossly intact  SKIN: No rashes or lesions  LINES:    HOSPITAL MEDICATIONS:  apixaban 5 milliGRAM(s) Oral every 12 hours    piperacillin/tazobactam IVPB. 3.375 Gram(s) IV Intermittent every 12 hours      dextrose 40% Gel 15 Gram(s) Oral once PRN  dextrose 50% Injectable 12.5 Gram(s) IV Push once  dextrose 50% Injectable 25 Gram(s) IV Push once  dextrose 50% Injectable 25 Gram(s) IV Push once  glucagon  Injectable 1 milliGRAM(s) IntraMuscular once PRN  insulin lispro (HumaLOG) corrective regimen sliding scale   SubCutaneous three times a day before meals  insulin lispro (HumaLOG) corrective regimen sliding scale   SubCutaneous at bedtime      HYDROmorphone  Injectable 0.75 milliGRAM(s) IV Push every 3 hours PRN  ondansetron Injectable 4 milliGRAM(s) IV Push every 6 hours PRN  oxyCODONE  ER Tablet 30 milliGRAM(s) Oral two times a day    bisacodyl Suppository 10 milliGRAM(s) Rectal daily PRN  docusate sodium 100 milliGRAM(s) Oral daily  senna 2 Tablet(s) Oral at bedtime        dextrose 5% + sodium chloride 0.45% with potassium chloride 20 mEq/L 1000 milliLiter(s) IV Continuous <Continuous>  dextrose 5%. 1000 milliLiter(s) IV Continuous <Continuous>            LABS:                        11.9   10.94 )-----------( 204      ( 05 Nov 2018 05:41 )             36.5     Hgb Trend: 11.9<--, 11.1<--, 10.9<--, 11.0<--, 10.7<--  11-05    140  |  102  |  9   ----------------------------<  123<H>  3.6   |  23  |  1.61<H>    Ca    8.9      05 Nov 2018 05:41  Phos  3.3     11-05  Mg     1.7     11-05      Creatinine Trend: 1.61<--, 1.64<--, 1.61<--, 1.70<--, 1.97<--, 1.98<--  PTT - ( 05 Nov 2018 05:41 )  PTT:108.6 SEC          MICROBIOLOGY:     RADIOLOGY:  [ ] Reviewed and interpreted by me    EKG:

## 2018-11-06 NOTE — PROGRESS NOTE ADULT - PROBLEM SELECTOR PLAN 9
- CT from April revealing bilateral pulmonary emboli  -c/w eliquis PO    11. Goals of care:  -Discussed resuscitation and intubation with son and patient. They have not come to a decision at this time, but will talk about it overnight. Full code for now. Are interested in filling out a MOLST form.    12. Hepatitis B- on tenofovir  - hold for now     13. DVT ppx  - on eliquis

## 2018-11-06 NOTE — PROGRESS NOTE ADULT - PROBLEM SELECTOR PLAN 6
- patient on morphine 30q8 and and 10 of oxy q4 at home   -pain is mainly located in chest; recent PET had showed no bony mets  -initial concern for opioid induced lethargy, s/p narcan   -palliative consulted; appreciated recs  -pain management:  oxycontin 30 mg BID, dilaudid to 0.75 IV push q3h PRN. Over 24 hours, required 6 PRNs - patient on morphine 30q8 and and 10 of oxy q4 at home   -pain is mainly located in chest; recent PET had showed no bony mets  -initial concern for opioid induced lethargy, s/p narcan   -palliative consulted; appreciated recs  -Over the past 24 hours, patient required 6 PRNS of dilaudid, but this morning subjectively felt "better". Given improvement in pain, d/jack dilaudid and switched to oxycodone IR 10 q4h PRN  pain management:  oxycontin 30 mg BID, oxycodone IR 10 q4h PRN

## 2018-11-06 NOTE — PROGRESS NOTE ADULT - PROBLEM SELECTOR PLAN 1
- patient presented with tachycardia and elevated WBC count with evidence of PNA  - Sepsis now resolved.   -Day 5/7 of zosyn IV q8h  -D/Peter vancomycin as sensitivities of sputum culture revealed klebsiella sensitive to zosyn - patient presented with tachycardia and elevated WBC count with evidence of PNA  - Sepsis now resolved.   -Day 5/7 of zosyn IV q8h; will consider switching to PO antibiotics for remaining two days  -D/Peter vancomycin as sensitivities of sputum culture revealed klebsiella sensitive to zosyn

## 2018-11-06 NOTE — PROGRESS NOTE ADULT - ATTENDING COMMENTS
Patient seen and examined, chart and labs reviewed.    77M HTN, DM-2, COPD, PE on Eliquis, dysphagia, BCC s/p resection, Hep B, CKD4, and mutated NSCLC (s/p erlotinib, carboplatin/pemetrexed, atezolizumab) now on gilotrif (abatnif), p/w lethargy, aspiration pneumonia, LUX.  He was on mscontin 30 mg tid and oxycodone 10 mg q4h prn per pain management, though bone scan and PET neg for bone mets.    Assessment/plan:  # Lethargy, acute metabolic encephalopathy now resolved: in the setting of aspiration pneumonia/sepsis, mental status improved, CT head neg    #Aspiration pneumonia: bilateral opacities with large LLL infiltrate on CXR,  sputum cx klebsiella sensitive to Cipro, will switch to Cipro and Flagyl for added anaerobic coverage.   seen by s/s, puree diet with honey thick liquid, aspiration precaution    # chronic pain: due to lung CA? no bone mets on PET/bone scan, calculated total Dilaudid requirements in past 24 hours, continue with MS contin 30mg BID and switch to PO Oxy 10mg IR q4 hrs PRN.     #NSCLC: hold abatnif in the setting of infection, f/u onc as outpatient, pt states he wishes to resume treatment.     # LUX :  in the setting of sepsis, improving on IVF, renal US no hydronephrosis    # Hypokalemia: replete K, monitor lytes, on IVF with K supplementation     # HBV: hold tenofovir in the setting of LUX    # H/o PE: switched back to Eliquis now that LUX is resolving     #Dispo: PT recommends outpatient PT.

## 2018-11-06 NOTE — PROGRESS NOTE ADULT - PROBLEM SELECTOR PLAN 2
-Resolved; likely was secondary to sepsis   -Initially concerned of opioid overuse: patient takes morphine 30 mg TID and oxycodone; given patient's LUX, concerned that morphine was not being excreted from his body. s/p narcan. Determined lethargy likely 2/2 infection  -Palliative consulted; appreciated recs

## 2018-11-06 NOTE — PROGRESS NOTE ADULT - SUBJECTIVE AND OBJECTIVE BOX
INTERVAL HPI/OVERNIGHT EVENTS:  Patient with intermittent right shoulder and generalized body pain     Code Status: Full Code   Allergies    No Known Drug Allergies  tegaderm (Rash; Urticaria)    Intolerances    MEDICATIONS  (STANDING):  apixaban 5 milliGRAM(s) Oral every 12 hours  ciprofloxacin     Tablet 500 milliGRAM(s) Oral every 12 hours  dextrose 5%. 1000 milliLiter(s) (50 mL/Hr) IV Continuous <Continuous>  dextrose 50% Injectable 12.5 Gram(s) IV Push once  dextrose 50% Injectable 25 Gram(s) IV Push once  dextrose 50% Injectable 25 Gram(s) IV Push once  docusate sodium 100 milliGRAM(s) Oral daily  insulin lispro (HumaLOG) corrective regimen sliding scale   SubCutaneous three times a day before meals  insulin lispro (HumaLOG) corrective regimen sliding scale   SubCutaneous at bedtime  metroNIDAZOLE    Tablet 500 milliGRAM(s) Oral every 8 hours  oxyCODONE  ER Tablet 30 milliGRAM(s) Oral two times a day  senna 2 Tablet(s) Oral at bedtime    MEDICATIONS  (PRN):  bisacodyl Suppository 10 milliGRAM(s) Rectal daily PRN Constipation  dextrose 40% Gel 15 Gram(s) Oral once PRN Blood Glucose LESS THAN 70 milliGRAM(s)/deciliter  glucagon  Injectable 1 milliGRAM(s) IntraMuscular once PRN Glucose LESS THAN 70 milligrams/deciliter  ondansetron Injectable 4 milliGRAM(s) IV Push every 6 hours PRN Nausea and/or Vomiting  oxyCODONE    IR 10 milliGRAM(s) Oral every 4 hours PRN breakthrough pain      PRESENT SYMPTOMS: [ ]Unable to obtain due to poor mentation   Source if other than patient:  [ ]Family   [ ]Team     Pain (Impact on QOL):  Patient reports generalized body pain/right shoulder pain 4/10 - unable to qualify type of pain - relieved with opioids, aggravated by movement, intermittent     Dyspnea:  Yes [ ] No x[x ] - [ ]Mild [ ]Moderate [ ]Severe  Anxiety:    Yes [ ] No [x ] - [ ]Mild [ ]Moderate [ ]Severe  Fatigue:    Yes [x ] No [ ] - [ x]Mild [ ]Moderate [ ]Severe  Nausea:    Yes [ ] No [x ] - [ ]Mild [ ]Moderate [ ]Severe                         Loss of appetite: Yes [ ] No [x ] - [ ]Mild [ ]Moderate [ ]Severe             Constipation:  Yes [ ] No [x ] - [ ]Mild [ ]Moderate [ ]Severe    PAIN AD Score:	  [email or web address]/[email or web address] (Ctrl + left click to view)    Other Symptoms:  [x ]All other review of systems negative     Karnofsky Performance Score/Palliative Performance Status Version 2:   50-60%    http://palliative.info/resource_material/[email or web address].pdf    PHYSICAL EXAM:  Vital Signs Last 24 Hrs  T(C): 36.7 (06 Nov 2018 14:51), Max: 36.7 (06 Nov 2018 01:48)  T(F): 98 (06 Nov 2018 14:51), Max: 98 (06 Nov 2018 01:48)  HR: 86 (06 Nov 2018 14:51) (72 - 86)  BP: 130/75 (06 Nov 2018 14:51) (130/75 - 154/70)  BP(mean): --  RR: 18 (06 Nov 2018 14:51) (17 - 20)  SpO2: 98% (06 Nov 2018 14:51) (95% - 99%) I&O's Summary    05 Nov 2018 07:01  -  06 Nov 2018 07:00  --------------------------------------------------------  IN: 800 mL / OUT: 900 mL / NET: -100 mL    06 Nov 2018 07:01  -  06 Nov 2018 17:25  --------------------------------------------------------  IN: 1000 mL / OUT: 0 mL / NET: 1000 mL     GENERAL:  [ ]Alert  [ ]Oriented x   [ ]Lethargic  [ ]Cachexia  [ ]Unarousable  [ ]Verbal  [ ]Non-Verbal  Behavioral:   [ ] Anxiety  [ ] Delirium [ ] Agitation [ ] Other  HEENT:  [ ]Normal   [ ]Dry mouth   [ ]ET Tube/Trach  [ ]Oral lesions  PULMONARY:   [ ]Clear [ ]Tachypnea  [ ]Audible excessive secretions   [ ]Rhonchi        [ ]Right [ ]Left [ ]Bilateral  [ ]Crackles        [ ]Right [ ]Left [ ]Bilateral  [ ]Wheezing     [ ]Right [ ]Left [ ]Bilateral  CARDIOVASCULAR:    [ ]Regular [ ]Irregular [ ]Tachy  [ ]Jerman [ ]Murmur [ ]Other  GASTROINTESTINAL:  [ ]Soft  [ ]Distended   [ ]+BS  [ ]Non tender [ ]Tender  [ ]PEG [ ]OGT/ NGT   Last BM:      GENITOURINARY:  [ ]Normal [ ] Incontinent   [ ]Oliguria/Anuria   [ ]Soni  MUSCULOSKELETAL:   [ ]Normal   [ ]Weakness  [ ]Bed/Wheelchair bound [ ]Edema  NEUROLOGIC:   [ ]No focal deficits  [ ] Cognitive impairment  [ ] Dysphagia [ ]Dysarthria [ ] Paresis [ ]Other   SKIN:   [ ]Normal   [ ]Pressure ulcer(s)  [ ]Rash    CRITICAL CARE:  [ ] Shock Present  [ ]Septic [ ]Cardiogenic [ ]Neurologic [ ]Hypovolemic  [ ]  Vasopressors [ ]  Inotropes   [ ] Respiratory failure present  [ ] Acute  [ ] Chronic [ ] Hypoxic  [ ] Hypercarbic [ ] Other  [ ] Other organ failure     LABS:                        11.9   10.94 )-----------( 204      ( 05 Nov 2018 05:41 )             36.5   11-05    140  |  102  |  9   ----------------------------<  123<H>  3.6   |  23  |  1.61<H>    Ca    8.9      05 Nov 2018 05:41  Phos  3.3     11-05  Mg     1.7     11-05    PTT - ( 05 Nov 2018 05:41 )  PTT:108.6 SEC      RADIOLOGY & ADDITIONAL STUDIES:    Protein Calorie Malnutrition Present: [ ] yes [ ] no  [ ] PPSV2 < or = 30%  [ ] significant weight loss [ ] poor nutritional intake [ ] anasarca [ ] catabolic state Albumin, Serum: 3.4 g/dL (10-31-18 @ 15:50)      REFERRALS:   [ ]Chaplaincy  [ ] Hospice  [ ]Child Life  [ ]Social Work  [ ]Case management [ ]Holistic Therapy   Goals of Care Document: INTERVAL HPI/OVERNIGHT EVENTS:  Patient with intermittent right shoulder and generalized body pain     Code Status: Full Code   Allergies    No Known Drug Allergies  tegaderm (Rash; Urticaria)    Intolerances    MEDICATIONS  (STANDING):  apixaban 5 milliGRAM(s) Oral every 12 hours  ciprofloxacin     Tablet 500 milliGRAM(s) Oral every 12 hours  dextrose 5%. 1000 milliLiter(s) (50 mL/Hr) IV Continuous <Continuous>  dextrose 50% Injectable 12.5 Gram(s) IV Push once  dextrose 50% Injectable 25 Gram(s) IV Push once  dextrose 50% Injectable 25 Gram(s) IV Push once  docusate sodium 100 milliGRAM(s) Oral daily  insulin lispro (HumaLOG) corrective regimen sliding scale   SubCutaneous three times a day before meals  insulin lispro (HumaLOG) corrective regimen sliding scale   SubCutaneous at bedtime  metroNIDAZOLE    Tablet 500 milliGRAM(s) Oral every 8 hours  oxyCODONE  ER Tablet 30 milliGRAM(s) Oral two times a day  senna 2 Tablet(s) Oral at bedtime    MEDICATIONS  (PRN):  bisacodyl Suppository 10 milliGRAM(s) Rectal daily PRN Constipation  dextrose 40% Gel 15 Gram(s) Oral once PRN Blood Glucose LESS THAN 70 milliGRAM(s)/deciliter  glucagon  Injectable 1 milliGRAM(s) IntraMuscular once PRN Glucose LESS THAN 70 milligrams/deciliter  ondansetron Injectable 4 milliGRAM(s) IV Push every 6 hours PRN Nausea and/or Vomiting  oxyCODONE    IR 10 milliGRAM(s) Oral every 4 hours PRN breakthrough pain      PRESENT SYMPTOMS: [ ]Unable to obtain due to poor mentation   Source if other than patient:  [ ]Family   [ ]Team     Pain (Impact on QOL):  Patient reports generalized body pain/right shoulder pain 4/10 - unable to qualify type of pain - relieved with opioids, aggravated by movement, intermittent     Dyspnea:  Yes [ ] No x[x ] - [ ]Mild [ ]Moderate [ ]Severe  Anxiety:    Yes [ ] No [x ] - [ ]Mild [ ]Moderate [ ]Severe  Fatigue:    Yes [x ] No [ ] - [ x]Mild [ ]Moderate [ ]Severe  Nausea:    Yes [ ] No [x ] - [ ]Mild [ ]Moderate [ ]Severe                         Loss of appetite: Yes [ ] No [x ] - [ ]Mild [ ]Moderate [ ]Severe             Constipation:  Yes [ ] No [x ] - [ ]Mild [ ]Moderate [ ]Severe    PAIN AD Score:	  [email or web address]/[email or web address]/[email or web address].pdf (Ctrl + left click to view)    Other Symptoms:  [x ]All other review of systems negative     Karnofsky Performance Score/Palliative Performance Status Version 2:   50-60%    http://palliative.info/resource_material/[email or web address].pdf    PHYSICAL EXAM:  Vital Signs Last 24 Hrs  T(C): 36.7 (06 Nov 2018 14:51), Max: 36.7 (06 Nov 2018 01:48)  T(F): 98 (06 Nov 2018 14:51), Max: 98 (06 Nov 2018 01:48)  HR: 86 (06 Nov 2018 14:51) (72 - 86)  BP: 130/75 (06 Nov 2018 14:51) (130/75 - 154/70)  BP(mean): --  RR: 18 (06 Nov 2018 14:51) (17 - 20)  SpO2: 98% (06 Nov 2018 14:51) (95% - 99%) I&O's Summary    05 Nov 2018 07:01  -  06 Nov 2018 07:00  --------------------------------------------------------  IN: 800 mL / OUT: 900 mL / NET: -100 mL    06 Nov 2018 07:01  -  06 Nov 2018 17:25  --------------------------------------------------------  IN: 1000 mL / OUT: 0 mL / NET: 1000 mL     GENERAL:  [x ]Alert  [ ]Oriented x   [ ]Lethargic  [ ]Cachexia  [ ]Unarousable  x[ ]Verbal  [ ]Non-Verbal  PULMONARY:   [ x]Clear anteriorly   [ ]Rhonchi        [ ]Right [ ]Left [ ]Bilateral  [ ]Crackles        [ ]Right [ ]Left [ ]Bilateral  [ ]Wheezing     [ ]Right [ ]Left [ ]Bilateral  CARDIOVASCULAR:    [x ]Regular [ ]Irregular [ ]Tachy  [ ]Jerman [ ]Murmur [ ]Other  GASTROINTESTINAL:  [x ]Soft  [ ]Distended   [x ]+BS  [x ]Non tender [ ]Tender  [ ]PEG [ ]OGT/ NGT   Last BM: 11/3/18     GENITOURINARY:  [x ]Normal [ ] Incontinent   [ ]Oliguria/Anuria   [ ]Soni  MUSCULOSKELETAL:   [ ]Normal   [x ]Weakness  [ ]Bed/Wheelchair bound [ ]Edema  NEUROLOGIC:   [x ]No focal deficits  [ ] Cognitive impairment  [ ] Dysphagia [ ]Dysarthria [ ] Paresis [ ]Other   SKIN:   [x ]Normal   [ ]Pressure ulcer(s)  [ ]Rash    CRITICAL CARE:  [ ] Shock Present  [ ]Septic [ ]Cardiogenic [ ]Neurologic [ ]Hypovolemic  [ ]  Vasopressors [ ]  Inotropes   [ ] Respiratory failure present  [ ] Acute  [ ] Chronic [ ] Hypoxic  [ ] Hypercarbic [ ] Other  [ ] Other organ failure     LABS:                        11.9   10.94 )-----------( 204      ( 05 Nov 2018 05:41 )             36.5   11-05    140  |  102  |  9   ----------------------------<  123<H>  3.6   |  23  |  1.61<H>    Ca    8.9      05 Nov 2018 05:41  Phos  3.3     11-05  Mg     1.7     11-05    PTT - ( 05 Nov 2018 05:41 )  PTT:108.6 SEC      RADIOLOGY & ADDITIONAL STUDIES:    Protein Calorie Malnutrition Present: [ ] yes [ ] no  [ ] PPSV2 < or = 30%  [ ] significant weight loss [ ] poor nutritional intake [ ] anasarca [ ] catabolic state Albumin, Serum: 3.4 g/dL (10-31-18 @ 15:50)      REFERRALS:   [ ]Chaplaincy  [ ] Hospice  [ ]Child Life  [ ]Social Work  [ ]Case management [ ]Holistic Therapy   Goals of Care Document: INTERVAL HPI/OVERNIGHT EVENTS:      Palliative Care following for symptom management.   Patient with intermittent right shoulder and generalized body pain     Code Status: Full Code   Allergies    No Known Drug Allergies  tegaderm (Rash; Urticaria)    Intolerances    MEDICATIONS  (STANDING):  apixaban 5 milliGRAM(s) Oral every 12 hours  ciprofloxacin     Tablet 500 milliGRAM(s) Oral every 12 hours  dextrose 5%. 1000 milliLiter(s) (50 mL/Hr) IV Continuous <Continuous>  dextrose 50% Injectable 12.5 Gram(s) IV Push once  dextrose 50% Injectable 25 Gram(s) IV Push once  dextrose 50% Injectable 25 Gram(s) IV Push once  docusate sodium 100 milliGRAM(s) Oral daily  insulin lispro (HumaLOG) corrective regimen sliding scale   SubCutaneous three times a day before meals  insulin lispro (HumaLOG) corrective regimen sliding scale   SubCutaneous at bedtime  metroNIDAZOLE    Tablet 500 milliGRAM(s) Oral every 8 hours  oxyCODONE  ER Tablet 30 milliGRAM(s) Oral two times a day  senna 2 Tablet(s) Oral at bedtime    MEDICATIONS  (PRN):  bisacodyl Suppository 10 milliGRAM(s) Rectal daily PRN Constipation  dextrose 40% Gel 15 Gram(s) Oral once PRN Blood Glucose LESS THAN 70 milliGRAM(s)/deciliter  glucagon  Injectable 1 milliGRAM(s) IntraMuscular once PRN Glucose LESS THAN 70 milligrams/deciliter  ondansetron Injectable 4 milliGRAM(s) IV Push every 6 hours PRN Nausea and/or Vomiting  oxyCODONE    IR 10 milliGRAM(s) Oral every 4 hours PRN breakthrough pain    PRESENT SYMPTOMS: [ ]Unable to obtain due to poor mentation   Source if other than patient:  [ ]Family   [ ]Team     Pain (Impact on QOL):  Patient reports generalized body pain/right shoulder pain 4/10 - unable to qualify type of pain - relieved with opioids, aggravated by movement, intermittent     Dyspnea:  Yes [ ] No x[x ] - [ ]Mild [ ]Moderate [ ]Severe  Anxiety:    Yes [ ] No [x ] - [ ]Mild [ ]Moderate [ ]Severe  Fatigue:    Yes [x ] No [ ] - [ x]Mild [ ]Moderate [ ]Severe  Nausea:    Yes [ ] No [x ] - [ ]Mild [ ]Moderate [ ]Severe                         Loss of appetite: Yes [ ] No [x ] - [ ]Mild [ ]Moderate [ ]Severe             Constipation:  Yes [ ] No [x ] - [ ]Mild [ ]Moderate [ ]Severe    PAIN AD Score:	  [email or web address]/painad.pdf (Ctrl + left click to view)    Other Symptoms:  [x ]All other review of systems negative     Karnofsky Performance Score/Palliative Performance Status Version 2:   50-60%    http://palliative.info/resource_material/[email or web address].pdf    PHYSICAL EXAM:  Vital Signs Last 24 Hrs  T(C): 36.7 (06 Nov 2018 14:51), Max: 36.7 (06 Nov 2018 01:48)  T(F): 98 (06 Nov 2018 14:51), Max: 98 (06 Nov 2018 01:48)  HR: 86 (06 Nov 2018 14:51) (72 - 86)  BP: 130/75 (06 Nov 2018 14:51) (130/75 - 154/70)  BP(mean): --  RR: 18 (06 Nov 2018 14:51) (17 - 20)  SpO2: 98% (06 Nov 2018 14:51) (95% - 99%) I&O's Summary    05 Nov 2018 07:01  -  06 Nov 2018 07:00  --------------------------------------------------------  IN: 800 mL / OUT: 900 mL / NET: -100 mL    06 Nov 2018 07:01  -  06 Nov 2018 17:25  --------------------------------------------------------  IN: 1000 mL / OUT: 0 mL / NET: 1000 mL     GENERAL:  [x ]Alert  [ ]Oriented x   [ ]Lethargic  [ ]Cachexia  [ ]Unarousable  x[ ]Verbal  [ ]Non-Verbal  PULMONARY:   [ x]Clear anteriorly   [ ]Rhonchi        [ ]Right [ ]Left [ ]Bilateral  [ ]Crackles        [ ]Right [ ]Left [ ]Bilateral  [ ]Wheezing     [ ]Right [ ]Left [ ]Bilateral  CARDIOVASCULAR:    [x ]Regular [ ]Irregular [ ]Tachy  [ ]Jerman [ ]Murmur [ ]Other  GASTROINTESTINAL:  [x ]Soft  [ ]Distended   [x ]+BS  [x ]Non tender [ ]Tender  [ ]PEG [ ]OGT/ NGT   Last BM: 11/3/18     GENITOURINARY:  [x ]Normal [ ] Incontinent   [ ]Oliguria/Anuria   [ ]Soni  MUSCULOSKELETAL:   [ ]Normal   [x ]Weakness  [ ]Bed/Wheelchair bound [ ]Edema  NEUROLOGIC:   [x ]No focal deficits  [ ] Cognitive impairment  [ ] Dysphagia [ ]Dysarthria [ ] Paresis [ ]Other   SKIN:   [x ]Normal   [ ]Pressure ulcer(s)  [ ]Rash    CRITICAL CARE:  [ ] Shock Present  [ ]Septic [ ]Cardiogenic [ ]Neurologic [ ]Hypovolemic  [ ]  Vasopressors [ ]  Inotropes   [ ] Respiratory failure present  [ ] Acute  [ ] Chronic [ ] Hypoxic  [ ] Hypercarbic [ ] Other  [ ] Other organ failure     LABS:                        11.9   10.94 )-----------( 204      ( 05 Nov 2018 05:41 )             36.5   11-05    140  |  102  |  9   ----------------------------<  123<H>  3.6   |  23  |  1.61<H>    Ca    8.9      05 Nov 2018 05:41  Phos  3.3     11-05  Mg     1.7     11-05    PTT - ( 05 Nov 2018 05:41 )  PTT:108.6 SEC      RADIOLOGY & ADDITIONAL STUDIES: reviewed.     Protein Calorie Malnutrition Present: [ ] yes [ ] no  [ ] PPSV2 < or = 30%  [ ] significant weight loss [ ] poor nutritional intake [ ] anasarca [ ] catabolic state Albumin, Serum: 3.4 g/dL (10-31-18 @ 15:50)      REFERRALS:   [ ]Chaplaincy  [ ] Hospice  [ ]Child Life  [ ]Social Work  [ ]Case management [ ]Holistic Therapy   Goals of Care Document:

## 2018-11-07 LAB
APPEARANCE UR: CLEAR — SIGNIFICANT CHANGE UP
BACTERIA # UR AUTO: NEGATIVE — SIGNIFICANT CHANGE UP
BASOPHILS # BLD AUTO: 0.04 K/UL — SIGNIFICANT CHANGE UP (ref 0–0.2)
BASOPHILS NFR BLD AUTO: 0.4 % — SIGNIFICANT CHANGE UP (ref 0–2)
BILIRUB UR-MCNC: NEGATIVE — SIGNIFICANT CHANGE UP
BLOOD UR QL VISUAL: HIGH
BUN SERPL-MCNC: 13 MG/DL — SIGNIFICANT CHANGE UP (ref 7–23)
CALCIUM SERPL-MCNC: 9.2 MG/DL — SIGNIFICANT CHANGE UP (ref 8.4–10.5)
CHLORIDE SERPL-SCNC: 103 MMOL/L — SIGNIFICANT CHANGE UP (ref 98–107)
CO2 SERPL-SCNC: 26 MMOL/L — SIGNIFICANT CHANGE UP (ref 22–31)
COLOR SPEC: SIGNIFICANT CHANGE UP
CREAT SERPL-MCNC: 1.89 MG/DL — HIGH (ref 0.5–1.3)
EOSINOPHIL # BLD AUTO: 0.26 K/UL — SIGNIFICANT CHANGE UP (ref 0–0.5)
EOSINOPHIL NFR BLD AUTO: 2.5 % — SIGNIFICANT CHANGE UP (ref 0–6)
GLUCOSE SERPL-MCNC: 109 MG/DL — HIGH (ref 70–99)
GLUCOSE UR-MCNC: NEGATIVE — SIGNIFICANT CHANGE UP
HCT VFR BLD CALC: 35.5 % — LOW (ref 39–50)
HGB BLD-MCNC: 11.7 G/DL — LOW (ref 13–17)
HYALINE CASTS # UR AUTO: NEGATIVE — SIGNIFICANT CHANGE UP
IMM GRANULOCYTES # BLD AUTO: 0.04 # — SIGNIFICANT CHANGE UP
IMM GRANULOCYTES NFR BLD AUTO: 0.4 % — SIGNIFICANT CHANGE UP (ref 0–1.5)
KETONES UR-MCNC: NEGATIVE — SIGNIFICANT CHANGE UP
LEUKOCYTE ESTERASE UR-ACNC: NEGATIVE — SIGNIFICANT CHANGE UP
LYMPHOCYTES # BLD AUTO: 1.79 K/UL — SIGNIFICANT CHANGE UP (ref 1–3.3)
LYMPHOCYTES # BLD AUTO: 17.3 % — SIGNIFICANT CHANGE UP (ref 13–44)
MAGNESIUM SERPL-MCNC: 1.9 MG/DL — SIGNIFICANT CHANGE UP (ref 1.6–2.6)
MCHC RBC-ENTMCNC: 28.4 PG — SIGNIFICANT CHANGE UP (ref 27–34)
MCHC RBC-ENTMCNC: 33 % — SIGNIFICANT CHANGE UP (ref 32–36)
MCV RBC AUTO: 86.2 FL — SIGNIFICANT CHANGE UP (ref 80–100)
MONOCYTES # BLD AUTO: 0.63 K/UL — SIGNIFICANT CHANGE UP (ref 0–0.9)
MONOCYTES NFR BLD AUTO: 6.1 % — SIGNIFICANT CHANGE UP (ref 2–14)
NEUTROPHILS # BLD AUTO: 7.56 K/UL — HIGH (ref 1.8–7.4)
NEUTROPHILS NFR BLD AUTO: 73.3 % — SIGNIFICANT CHANGE UP (ref 43–77)
NITRITE UR-MCNC: NEGATIVE — SIGNIFICANT CHANGE UP
NRBC # FLD: 0 — SIGNIFICANT CHANGE UP
PH UR: 7.5 — SIGNIFICANT CHANGE UP (ref 5–8)
PHOSPHATE SERPL-MCNC: 4.1 MG/DL — SIGNIFICANT CHANGE UP (ref 2.5–4.5)
PLATELET # BLD AUTO: 208 K/UL — SIGNIFICANT CHANGE UP (ref 150–400)
PMV BLD: 9.4 FL — SIGNIFICANT CHANGE UP (ref 7–13)
POTASSIUM SERPL-MCNC: 3.8 MMOL/L — SIGNIFICANT CHANGE UP (ref 3.5–5.3)
POTASSIUM SERPL-SCNC: 3.8 MMOL/L — SIGNIFICANT CHANGE UP (ref 3.5–5.3)
PROT UR-MCNC: 20 — SIGNIFICANT CHANGE UP
RBC # BLD: 4.12 M/UL — LOW (ref 4.2–5.8)
RBC # FLD: 13.5 % — SIGNIFICANT CHANGE UP (ref 10.3–14.5)
RBC CASTS # UR COMP ASSIST: >50 — HIGH (ref 0–?)
SODIUM SERPL-SCNC: 141 MMOL/L — SIGNIFICANT CHANGE UP (ref 135–145)
SP GR SPEC: 1.01 — SIGNIFICANT CHANGE UP (ref 1–1.04)
SQUAMOUS # UR AUTO: SIGNIFICANT CHANGE UP
UROBILINOGEN FLD QL: NORMAL — SIGNIFICANT CHANGE UP
WBC # BLD: 10.32 K/UL — SIGNIFICANT CHANGE UP (ref 3.8–10.5)
WBC # FLD AUTO: 10.32 K/UL — SIGNIFICANT CHANGE UP (ref 3.8–10.5)
WBC UR QL: SIGNIFICANT CHANGE UP (ref 0–?)

## 2018-11-07 PROCEDURE — 99233 SBSQ HOSP IP/OBS HIGH 50: CPT | Mod: GC

## 2018-11-07 PROCEDURE — 99232 SBSQ HOSP IP/OBS MODERATE 35: CPT | Mod: GC

## 2018-11-07 RX ORDER — TAMSULOSIN HYDROCHLORIDE 0.4 MG/1
0.4 CAPSULE ORAL AT BEDTIME
Qty: 0 | Refills: 0 | Status: DISCONTINUED | OUTPATIENT
Start: 2018-11-07 | End: 2018-11-10

## 2018-11-07 RX ORDER — OXYCODONE HYDROCHLORIDE 5 MG/1
30 TABLET ORAL EVERY 8 HOURS
Qty: 0 | Refills: 0 | Status: DISCONTINUED | OUTPATIENT
Start: 2018-11-07 | End: 2018-11-10

## 2018-11-07 RX ORDER — APIXABAN 2.5 MG/1
2.5 TABLET, FILM COATED ORAL EVERY 12 HOURS
Qty: 0 | Refills: 0 | Status: DISCONTINUED | OUTPATIENT
Start: 2018-11-07 | End: 2018-11-10

## 2018-11-07 RX ORDER — SUCRALFATE 1 G
1 TABLET ORAL
Qty: 0 | Refills: 0 | Status: DISCONTINUED | OUTPATIENT
Start: 2018-11-07 | End: 2018-11-10

## 2018-11-07 RX ORDER — PANTOPRAZOLE SODIUM 20 MG/1
40 TABLET, DELAYED RELEASE ORAL
Qty: 0 | Refills: 0 | Status: DISCONTINUED | OUTPATIENT
Start: 2018-11-07 | End: 2018-11-10

## 2018-11-07 RX ADMIN — OXYCODONE HYDROCHLORIDE 10 MILLIGRAM(S): 5 TABLET ORAL at 04:09

## 2018-11-07 RX ADMIN — OXYCODONE HYDROCHLORIDE 30 MILLIGRAM(S): 5 TABLET ORAL at 22:22

## 2018-11-07 RX ADMIN — OXYCODONE HYDROCHLORIDE 10 MILLIGRAM(S): 5 TABLET ORAL at 09:25

## 2018-11-07 RX ADMIN — OXYCODONE HYDROCHLORIDE 10 MILLIGRAM(S): 5 TABLET ORAL at 10:10

## 2018-11-07 RX ADMIN — Medication 100 MILLIGRAM(S): at 13:14

## 2018-11-07 RX ADMIN — OXYCODONE HYDROCHLORIDE 10 MILLIGRAM(S): 5 TABLET ORAL at 14:00

## 2018-11-07 RX ADMIN — Medication 500 MILLIGRAM(S): at 14:00

## 2018-11-07 RX ADMIN — SENNA PLUS 2 TABLET(S): 8.6 TABLET ORAL at 22:22

## 2018-11-07 RX ADMIN — OXYCODONE HYDROCHLORIDE 10 MILLIGRAM(S): 5 TABLET ORAL at 14:30

## 2018-11-07 RX ADMIN — OXYCODONE HYDROCHLORIDE 30 MILLIGRAM(S): 5 TABLET ORAL at 23:15

## 2018-11-07 RX ADMIN — Medication 1: at 18:11

## 2018-11-07 RX ADMIN — OXYCODONE HYDROCHLORIDE 30 MILLIGRAM(S): 5 TABLET ORAL at 14:00

## 2018-11-07 RX ADMIN — APIXABAN 2.5 MILLIGRAM(S): 2.5 TABLET, FILM COATED ORAL at 22:28

## 2018-11-07 RX ADMIN — PANTOPRAZOLE SODIUM 40 MILLIGRAM(S): 20 TABLET, DELAYED RELEASE ORAL at 18:12

## 2018-11-07 RX ADMIN — APIXABAN 5 MILLIGRAM(S): 2.5 TABLET, FILM COATED ORAL at 06:23

## 2018-11-07 RX ADMIN — OXYCODONE HYDROCHLORIDE 10 MILLIGRAM(S): 5 TABLET ORAL at 05:05

## 2018-11-07 RX ADMIN — Medication 500 MILLIGRAM(S): at 06:24

## 2018-11-07 RX ADMIN — OXYCODONE HYDROCHLORIDE 10 MILLIGRAM(S): 5 TABLET ORAL at 18:22

## 2018-11-07 RX ADMIN — OXYCODONE HYDROCHLORIDE 10 MILLIGRAM(S): 5 TABLET ORAL at 19:00

## 2018-11-07 RX ADMIN — OXYCODONE HYDROCHLORIDE 30 MILLIGRAM(S): 5 TABLET ORAL at 07:14

## 2018-11-07 RX ADMIN — TAMSULOSIN HYDROCHLORIDE 0.4 MILLIGRAM(S): 0.4 CAPSULE ORAL at 22:28

## 2018-11-07 RX ADMIN — Medication 500 MILLIGRAM(S): at 22:22

## 2018-11-07 RX ADMIN — Medication 1 GRAM(S): at 18:11

## 2018-11-07 RX ADMIN — OXYCODONE HYDROCHLORIDE 30 MILLIGRAM(S): 5 TABLET ORAL at 06:24

## 2018-11-07 RX ADMIN — Medication 500 MILLIGRAM(S): at 18:12

## 2018-11-07 NOTE — DIETITIAN INITIAL EVALUATION ADULT. - PERTINENT MEDS FT
apixaban  bisacodyl Suppository PRN  ciprofloxacin     Tablet  dextrose 40% Gel PRN  dextrose 5%.  dextrose 50% Injectable  dextrose 50% Injectable  dextrose 50% Injectable  docusate sodium  glucagon  Injectable PRN  insulin lispro (HumaLOG) corrective regimen sliding scale  insulin lispro (HumaLOG) corrective regimen sliding scale  metroNIDAZOLE    Tablet  ondansetron Injectable PRN  oxyCODONE    IR PRN  oxyCODONE  ER Tablet  pantoprazole    Tablet  senna  sucralfate suspension  tamsulosin

## 2018-11-07 NOTE — PROGRESS NOTE ADULT - SUBJECTIVE AND OBJECTIVE BOX
INTERVAL HPI/OVERNIGHT EVENTS: Patient continues to complain of generalized pain     Code Status: Full Code   Allergies    No Known Drug Allergies  tegaderm (Rash; Urticaria)    Intolerances    MEDICATIONS  (STANDING):  apixaban 5 milliGRAM(s) Oral every 12 hours  ciprofloxacin     Tablet 500 milliGRAM(s) Oral every 12 hours  dextrose 5%. 1000 milliLiter(s) (50 mL/Hr) IV Continuous <Continuous>  dextrose 50% Injectable 12.5 Gram(s) IV Push once  dextrose 50% Injectable 25 Gram(s) IV Push once  dextrose 50% Injectable 25 Gram(s) IV Push once  docusate sodium 100 milliGRAM(s) Oral daily  insulin lispro (HumaLOG) corrective regimen sliding scale   SubCutaneous three times a day before meals  insulin lispro (HumaLOG) corrective regimen sliding scale   SubCutaneous at bedtime  metroNIDAZOLE    Tablet 500 milliGRAM(s) Oral every 8 hours  oxyCODONE  ER Tablet 30 milliGRAM(s) Oral every 8 hours  pantoprazole    Tablet 40 milliGRAM(s) Oral before breakfast  senna 2 Tablet(s) Oral at bedtime  tamsulosin 0.4 milliGRAM(s) Oral at bedtime    MEDICATIONS  (PRN):  bisacodyl Suppository 10 milliGRAM(s) Rectal daily PRN Constipation  dextrose 40% Gel 15 Gram(s) Oral once PRN Blood Glucose LESS THAN 70 milliGRAM(s)/deciliter  glucagon  Injectable 1 milliGRAM(s) IntraMuscular once PRN Glucose LESS THAN 70 milligrams/deciliter  ondansetron Injectable 4 milliGRAM(s) IV Push every 6 hours PRN Nausea and/or Vomiting  oxyCODONE    IR 10 milliGRAM(s) Oral every 4 hours PRN breakthrough pain      PRESENT SYMPTOMS: [ ]Unable to obtain due to poor mentation   Source if other than patient:  [ ]Family   [ ]Team     Pain (Impact on QOL):  Patient complained of generalized body pain - 6/10 - unable to qualify - aggravated by movement and moderately relieved by opioids    Dyspnea:  Yes [ ] No [x ] - [ ]Mild [ ]Moderate [ ]Severe  Anxiety:    Yes [ ] No [x ] - [ ]Mild [ ]Moderate [ ]Severe  Fatigue:    Yes [ ] No [x ] - [ ]Mild [ ]Moderate [ ]Severe  Nausea:    Yes [ ] No [x ] - [ ]Mild [ ]Moderate [ ]Severe                         Loss of appetite: Yes [x ] No [ ] - [x ]Mild [ ]Moderate [ ]Severe             Constipation:  Yes [ ] No [x ] - [ ]Mild [ ]Moderate [ ]Severe    PAIN AD Score:	  [email or web address]/painad.pdf (Ctrl + left click to view)    Other Symptoms:  [ x]All other review of systems negative     Karnofsky Performance Score/Palliative Performance Status Version 2:  60%    http://palliative.info/resource_material/[email or web address].pdf    PHYSICAL EXAM:  Vital Signs Last 24 Hrs  T(C): 36.3 (2018 13:30), Max: 36.7 (2018 06:13)  T(F): 97.3 (:30), Max: 98 (2018 06:13)  HR: 88 (2018 13:30) (72 - 88)  BP: 127/80 (:30) (127/80 - 139/62)  BP(mean): --  RR: 21 (:30) (16 - 21)  SpO2: 95% (2018 13:30) (95% - 98%) I&O's Summary    2018 07:  -  2018 07:00  --------------------------------------------------------  IN: 1000 mL / OUT: 1250 mL / NET: -250 mL    2018 07:  -  2018 16:02  --------------------------------------------------------  IN: 0 mL / OUT: 200 mL / NET: -200 mL     GENERAL:  [x ]Alert  [x ]Oriented x 4  [ ]Lethargic  [ ]Cachexia  [ ]Unarousable  [x ]Verbal  [ ]Non-Verbal  HEENT:  [x ]Normal   [ ]Dry mouth   [ ]ET Tube/Trach  [ ]Oral lesions  PULMONARY:   [x ]Clear - anteriorly   [ ]Rhonchi        [ ]Right [ ]Left [ ]Bilateral  [ ]Crackles        [ ]Right [ ]Left [ ]Bilateral  [ ]Wheezing     [ ]Right [ ]Left [ ]Bilateral  CARDIOVASCULAR:    [x ]Regular [ ]Irregular [ ]Tachy  [ ]Jerman [ ]Murmur [ ]Other  GASTROINTESTINAL:  [x ]Soft  [ ]Distended   [ x]+BS  [x ]Non tender [ ]Tender  [ ]PEG [ ]OGT/ NGT   Last BM: 11/3/18  GENITOURINARY:  [x ]Normal [ ] Incontinent   [ ]Oliguria/Anuria   [ ]Soni  MUSCULOSKELETAL:   [ ]Normal   [x ]Weakness  [ ]Bed/Wheelchair bound [ ]Edema  NEUROLOGIC:   [x ]No focal deficits  [ ] Cognitive impairment  [ ] Dysphagia [ ]Dysarthria [ ] Paresis [ ]Other   SKIN:   [ x]Normal   [ ]Pressure ulcer(s)  [ ]Rash    CRITICAL CARE:  [ ] Shock Present  [ ]Septic [ ]Cardiogenic [ ]Neurologic [ ]Hypovolemic  [ ]  Vasopressors [ ]  Inotropes   [ ] Respiratory failure present  [ ] Acute  [ ] Chronic [ ] Hypoxic  [ ] Hypercarbic [ ] Other  [ ] Other organ failure     LABS:                        11.7   10.32 )-----------( 208      ( 2018 04:50 )             35.5   11-    141  |  103  |  13  ----------------------------<  109<H>  3.8   |  26  |  1.89<H>    Ca    9.2      2018 04:50  Phos  4.1       Mg     1.9             Urinalysis Basic - ( 2018 09:56 )    Color: LIGHT YELLOW / Appearance: CLEAR / S.009 / pH: 7.5  Gluc: NEGATIVE / Ketone: NEGATIVE  / Bili: NEGATIVE / Urobili: NORMAL   Blood: MODERATE / Protein: 20 / Nitrite: NEGATIVE   Leuk Esterase: NEGATIVE / RBC: >50 / WBC 0-2   Sq Epi: OCC / Non Sq Epi: x / Bacteria: NEGATIVE      RADIOLOGY & ADDITIONAL STUDIES:    Protein Calorie Malnutrition Present: [ ] yes [ ] no  [ ] PPSV2 < or = 30%  [ ] significant weight loss [ ] poor nutritional intake [ ] anasarca [ ] catabolic state Albumin, Serum: 3.4 g/dL (10-31-18 @ 15:50)      REFERRALS:   [ ]Chaplaincy  [ ] Hospice  [ ]Child Life  [ ]Social Work  [ ]Case management [ ]Holistic Therapy   Goals of Care Document:

## 2018-11-07 NOTE — DIETITIAN INITIAL EVALUATION ADULT. - PROBLEM SELECTOR PLAN 1
- patient presenting with tachycardia and elevated WBC count with evidence of PNA  - s/p 1 dose vanc/ zosyn  - continue with zosyn to cover for anaerobic and gram negative including pseudomonas given patient with multiple hospital admissions and immunocompromised on immunotherapy

## 2018-11-07 NOTE — DIETITIAN INITIAL EVALUATION ADULT. - PHYSICAL APPEARANCE
underweight/severe muscle mass wasting: clavicles, shoulders, quadriceps, calves. Moderate fat depletion of buccal region, orbital fat pads.

## 2018-11-07 NOTE — DIETITIAN INITIAL EVALUATION ADULT. - PROBLEM/PLAN-3
No changes    Echo is not needed as last two have been good    You can have oral surgery DISPLAY PLAN FREE TEXT

## 2018-11-07 NOTE — PROGRESS NOTE ADULT - PROBLEM SELECTOR PLAN 6
- patient on morphine 30q8 and and 10 of oxy q4 at home   -pain is mainly located in chest; recent PET had showed no bony mets  -initial concern for opioid induced lethargy, s/p narcan   -palliative consulted; appreciated recs  -Over the past 24 hours, patient required 6 PRNS of dilaudid, but this morning subjectively felt "better". Given improvement in pain, d/jack dilaudid and switched to oxycodone IR 10 q4h PRN  pain management:  oxycontin 30 mg BID, oxycodone IR 10 q4h PRN - patient on morphine 30q8 and and 10 of oxy q4 at home   -pain is mainly located in chest; recent PET had showed no bony mets  -initial concern for opioid induced lethargy, s/p narcan   -palliative consulted; appreciated recs  -Over the past 24 hours, patient required 3 RNS of oxycodone IR. Reports that his pain  has gotten worse since yesterday; increased oxycontin to 30 mg q8h  pain management:  oxycontin 30 mg q8h, oxycodone IR 10 q4h PRN

## 2018-11-07 NOTE — DIETITIAN INITIAL EVALUATION ADULT. - NS AS NUTRI INTERV MEALS SNACK
1. Continue Dysphagia 1 Pureed diet with Honey thickened liquids as recommended by SLP (11/02). 2. Recommend Ensure Pudding 3x daily (510 kcals, 12g protein).

## 2018-11-07 NOTE — DIETITIAN INITIAL EVALUATION ADULT. - OTHER INFO
Initial Dietitian Evaluation 2/2 to extended length of stay. 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 Patient consuming soft, ground foods at home. Patient noted to have coughed up pieces of grilled cheese sandwich at speech therapist office PTA. S/p cineesophagogram in-house (11/02) with recommendation for pureed diet with honey thick liquids. Son states that since admission patient has been eating little. No GI distress (nausea/vomiting/diarrhea/constipation) noted at this time. Patient endorses having pain but did not specify what type and location of pain. Patient with 10% weight loss since June. Verbally discussed Pureed diet and honey thick modifications with Son via phone. Written materials left at bedside. Will follow-up with in person diet education with Son tomorrow (11/08). Encouraged PO intake as tolerated.

## 2018-11-07 NOTE — PROGRESS NOTE ADULT - PROBLEM SELECTOR PLAN 1
Patient of Dr. Gross, currently receiving afatinib.  Plan for follow-up with Dr. Gross upon discharge.

## 2018-11-07 NOTE — PROGRESS NOTE ADULT - PROBLEM SELECTOR PLAN 4
Will continue to follow for ongoing pain management.  Psychosocial support provided to patient. Will continue to follow for ongoing pain management.  Psychosocial support provided to patient.  Follow up appointment with Dr. Best Marquez scheduled for 11/19/18 at 4pm.  Please include in discharge paperwork upon discharge - please provide patient with sufficient opioids to allow him time for follow-up appointment.  Endorsed to team. Will continue to follow for ongoing pain management.  Psychosocial support provided to patient.  Follow up appointment with Dr. Best Marquez scheduled for 11/19/18 at 4pm at [facility].  Please include in discharge paperwork upon discharge - please provide patient with sufficient opioids to allow him time for follow-up appointment.  Endorsed to team.

## 2018-11-07 NOTE — DIETITIAN INITIAL EVALUATION ADULT. - ENERGY NEEDS
Daily Weight in k (2018 01:45)  Height: 172.72cm  BMI: 20kg/m^2  IBW: 70kg +/-10%  Skin: No pressure ulcers/DTI noted per flowsheets. No edema noted.

## 2018-11-07 NOTE — PROGRESS NOTE ADULT - SUBJECTIVE AND OBJECTIVE BOX
CHIEF COMPLAINT:    Interval Events:   No acute events overnight.     REVIEW OF SYSTEMS:  Constitutional: [ ] negative [ ] fevers [ ] chills [ ] weight loss [ ] weight gain  HEENT: [ ] negative [ ] dry eyes [ ] eye irritation [ ] postnasal drip [ ] nasal congestion  CV: [ ] negative  [ ] chest pain [ ] orthopnea [ ] palpitations [ ] murmur  Resp: [ ] negative [ ] cough [ ] shortness of breath [ ] dyspnea [ ] wheezing [ ] sputum [ ] hemoptysis  GI: [ ] negative [ ] nausea [ ] vomiting [ ] diarrhea [ ] constipation [ ] abd pain [ ] dysphagia   : [ ] negative [ ] dysuria [ ] nocturia [ ] hematuria [ ] increased urinary frequency  Musculoskeletal: [ ] negative [ ] back pain [ ] myalgias [ ] arthralgias [ ] fracture  Skin: [ ] negative [ ] rash [ ] itch  Neurological: [ ] negative [ ] headache [ ] dizziness [ ] syncope [ ] weakness [ ] numbness  Psychiatric: [ ] negative [ ] anxiety [ ] depression  Endocrine: [ ] negative [ ] diabetes [ ] thyroid problem  Hematologic/Lymphatic: [ ] negative [ ] anemia [ ] bleeding problem  Allergic/Immunologic: [ ] negative [ ] itchy eyes [ ] nasal discharge [ ] hives [ ] angioedema  [ ] All other systems negative  [ ] Unable to assess ROS because ________    OBJECTIVE:  ICU Vital Signs Last 24 Hrs  T(C): 36.7 (07 Nov 2018 06:13), Max: 36.7 (06 Nov 2018 14:51)  T(F): 98 (07 Nov 2018 06:13), Max: 98 (06 Nov 2018 14:51)  HR: 72 (07 Nov 2018 06:13) (72 - 86)  BP: 134/60 (07 Nov 2018 06:13) (130/75 - 139/62)  BP(mean): --  ABP: --  ABP(mean): --  RR: 18 (07 Nov 2018 06:13) (16 - 20)  SpO2: 98% (07 Nov 2018 06:13) (95% - 98%)        11-06 @ 07:01  -  11-07 @ 07:00  --------------------------------------------------------  IN: 1000 mL / OUT: 1250 mL / NET: -250 mL      CAPILLARY BLOOD GLUCOSE      POCT Blood Glucose.: 141 mg/dL (06 Nov 2018 22:47)      PHYSICAL EXAM:  General:   HEENT:   Lymph Nodes:  Neck:   Respiratory:   Cardiovascular:   Abdomen:   Extremities:   Skin:   Neurological:  Psychiatry:    LINES:    HOSPITAL MEDICATIONS:  apixaban 5 milliGRAM(s) Oral every 12 hours    ciprofloxacin     Tablet 500 milliGRAM(s) Oral every 12 hours  metroNIDAZOLE    Tablet 500 milliGRAM(s) Oral every 8 hours      dextrose 40% Gel 15 Gram(s) Oral once PRN  dextrose 50% Injectable 12.5 Gram(s) IV Push once  dextrose 50% Injectable 25 Gram(s) IV Push once  dextrose 50% Injectable 25 Gram(s) IV Push once  glucagon  Injectable 1 milliGRAM(s) IntraMuscular once PRN  insulin lispro (HumaLOG) corrective regimen sliding scale   SubCutaneous three times a day before meals  insulin lispro (HumaLOG) corrective regimen sliding scale   SubCutaneous at bedtime      ondansetron Injectable 4 milliGRAM(s) IV Push every 6 hours PRN  oxyCODONE    IR 10 milliGRAM(s) Oral every 4 hours PRN  oxyCODONE  ER Tablet 30 milliGRAM(s) Oral two times a day    bisacodyl Suppository 10 milliGRAM(s) Rectal daily PRN  docusate sodium 100 milliGRAM(s) Oral daily  senna 2 Tablet(s) Oral at bedtime        dextrose 5%. 1000 milliLiter(s) IV Continuous <Continuous>            LABS:                        11.7   10.32 )-----------( 208      ( 07 Nov 2018 04:50 )             35.5     Hgb Trend: 11.7<--, 11.9<--, 11.1<--, 10.9<--, 11.0<--  11-07    141  |  103  |  13  ----------------------------<  109<H>  3.8   |  26  |  1.89<H>    Ca    9.2      07 Nov 2018 04:50  Phos  4.1     11-07  Mg     1.9     11-07      Creatinine Trend: 1.89<--, 1.61<--, 1.64<--, 1.61<--, 1.70<--, 1.97<--            MICROBIOLOGY:     RADIOLOGY:  [ ] Reviewed and interpreted by me    EKG: CHIEF COMPLAINT: SOB 2/2 aspiration pneumonia, course c/b uncontrolled pain     Interval Events:   No acute events overnight. Patient was seen and examined this morning. Skubana  was used. Patient stated that he had 7-8/10 chest pain. He required 3 oxycodone IR overnight. He also endorses lower abdominal and penis pain whenever he urinates that started yesterday. He stated that he feels that his frequency in urination has declined. He denies any hematuria or foul smell to urine. Patient denies fever/chills, N/V, or SOB. His last BM was this morning.     REVIEW OF SYSTEMS:  Constitutional: [x] negative [ ] fevers [ ] chills [ ] weight loss [ ] weight gain  HEENT: [x] negative [ ] dry eyes [ ] eye irritation [ ] postnasal drip [ ] nasal congestion  CV: [ ] negative  [x] chest pain [ ] orthopnea [ ] palpitations [ ] murmur  Resp: [ ] negative [ ] cough [x] shortness of breath [ ] dyspnea [ ] wheezing [ ] sputum [ ] hemoptysis  GI: [x] negative [ ] nausea [ ] vomiting [ ] diarrhea [ ] constipation [ ] abd pain [ ] dysphagia   : [x] negative [ ] dysuria [ ] nocturia [ ] hematuria [ ] increased urinary frequency  Musculoskeletal: [x] negative [ ] back pain [ ] myalgias [ ] arthralgias [ ] fracture  Skin: [x] negative [ ] rash [ ] itch  Neurological: [x] negative [ ] headache [ ] dizziness [ ] syncope [ ] weakness [ ] numbness  Psychiatric: [x] negative [ ] anxiety [ ] depression  Endocrine: [x] negative [ ] diabetes [ ] thyroid problem  Hematologic/Lymphatic: [x] negative [ ] anemia [ ] bleeding problem  Allergic/Immunologic: [ ] negative [ ] itchy eyes [ ] nasal discharge [ ] hives [ ] angioedema  [ ] All other systems negative  [ ] Unable to assess ROS because ________      OBJECTIVE:  T(C): 36.7 (07 Nov 2018 06:13), Max: 36.7 (06 Nov 2018 14:51)  T(F): 98 (07 Nov 2018 06:13), Max: 98 (06 Nov 2018 14:51)  HR: 72 (07 Nov 2018 06:13) (72 - 86)  BP: 134/60 (07 Nov 2018 06:13) (130/75 - 139/62)  RR: 18 (07 Nov 2018 06:13) (16 - 20)  SpO2: 98% (07 Nov 2018 06:13) (95% - 98%)        11-06 @ 07:01  -  11-07 @ 07:00  --------------------------------------------------------  IN: 1000 mL / OUT: 1250 mL / NET: -250 mL      CAPILLARY BLOOD GLUCOSE      POCT Blood Glucose.: 141 mg/dL (06 Nov 2018 22:47)      PHYSICAL EXAM:  GENERAL: NAD, well-developed  HEAD:  Atraumatic, Normocephalic  EYES: EOMI, PERRLA, conjunctiva and sclera clear  NECK: Supple, No JVD  CHEST/LUNG: expiratory wheeze b/l, decreased breath sounds at bases,   HEART: S1 and S2, Regular rate and rhythm; No murmurs, rubs, or gallops  ABDOMEN: Soft, Nontender, Nondistended; Bowel sounds present  EXTREMITIES:  2+ Peripheral Pulses, No clubbing, cyanosis, or edema  PSYCH: AAOx3  NEUROLOGY: non-focal,CN grossly intact  SKIN: No rashes or lesions    LINES:    HOSPITAL MEDICATIONS:  apixaban 5 milliGRAM(s) Oral every 12 hours    ciprofloxacin     Tablet 500 milliGRAM(s) Oral every 12 hours  metroNIDAZOLE    Tablet 500 milliGRAM(s) Oral every 8 hours      dextrose 40% Gel 15 Gram(s) Oral once PRN  dextrose 50% Injectable 12.5 Gram(s) IV Push once  dextrose 50% Injectable 25 Gram(s) IV Push once  dextrose 50% Injectable 25 Gram(s) IV Push once  glucagon  Injectable 1 milliGRAM(s) IntraMuscular once PRN  insulin lispro (HumaLOG) corrective regimen sliding scale   SubCutaneous three times a day before meals  insulin lispro (HumaLOG) corrective regimen sliding scale   SubCutaneous at bedtime      ondansetron Injectable 4 milliGRAM(s) IV Push every 6 hours PRN  oxyCODONE    IR 10 milliGRAM(s) Oral every 4 hours PRN  oxyCODONE  ER Tablet 30 milliGRAM(s) Oral two times a day    bisacodyl Suppository 10 milliGRAM(s) Rectal daily PRN  docusate sodium 100 milliGRAM(s) Oral daily  senna 2 Tablet(s) Oral at bedtime        dextrose 5%. 1000 milliLiter(s) IV Continuous <Continuous>            LABS:                        11.7   10.32 )-----------( 208      ( 07 Nov 2018 04:50 )             35.5     Hgb Trend: 11.7<--, 11.9<--, 11.1<--, 10.9<--, 11.0<--  11-07    141  |  103  |  13  ----------------------------<  109<H>  3.8   |  26  |  1.89<H>    Ca    9.2      07 Nov 2018 04:50  Phos  4.1     11-07  Mg     1.9     11-07      Creatinine Trend: 1.89<--, 1.61<--, 1.64<--, 1.61<--, 1.70<--, 1.97<--            MICROBIOLOGY:     RADIOLOGY:  [ ] Reviewed and interpreted by me    EKG:

## 2018-11-07 NOTE — PROGRESS NOTE ADULT - ATTENDING COMMENTS
Patient seen and examined, chart and labs reviewed.    77M HTN, DM-2, COPD, PE on Eliquis, dysphagia, BCC s/p resection, Hep B, CKD4, and mutated NSCLC (s/p erlotinib, carboplatin/pemetrexed, atezolizumab) now on gilotrif (abatnif), p/w lethargy, aspiration pneumonia, LUX.  He was on mscontin 30 mg tid and oxycodone 10 mg q4h prn per pain management, though bone scan and PET neg for bone mets.    Assessment/plan:  # Lethargy, acute metabolic encephalopathy now resolved: in the setting of aspiration pneumonia/sepsis, mental status improved, CT head neg    #Aspiration pneumonia: bilateral opacities with large LLL infiltrate on CXR,  sputum cx klebsiella sensitive to Cipro, will switch to Cipro and Flagyl for added anaerobic coverage, last day tomorrow.   seen by s/s, puree diet with honey thick liquid, aspiration precaution    # chronic pain: due to lung CA? no bone mets on PET/bone scan, calculated total Dilaudid requirements in past 24 hours, increased Oxy ER to 30mg TID today with Oxy IR 10mg PRN.      #Odynophagia: this has been a chronic problem for patient, had EGD in 2016.  Will restart PPI and Sucralfate.      #NSCLC: hold abatnif in the setting of infection, f/u onc as outpatient, pt states he wishes to resume treatment.     # LUX:  in the setting of sepsis, renal US no hydronephrosis, recheck urine studies.  Will restart trial of IVF today.         # HBV: hold tenofovir in the setting of LUX    # H/o PE: switched back to Eliquis now that LUX is resolving     #Dispo: PT recommends outpatient PT.

## 2018-11-07 NOTE — DIETITIAN INITIAL EVALUATION ADULT. - PERTINENT LABORATORY DATA
11-07 Na 141 mmol/L Glu 109 mg/dL<H> K+ 3.8 mmol/L Cr 1.89 mg/dL<H> BUN 13 mg/dL Phos 4.1 mg/dL Alb n/a   PAB n/a   Hgb 11.7 g/dL<L> Hct 35.5 %<L> HgA1C n/a    Hemoglobin A1C, Whole Blood: 5.8 % (11-01-18 @ 05:26)

## 2018-11-07 NOTE — DIETITIAN INITIAL EVALUATION ADULT. - ETIOLOGY
related to decrease ability to consume sufficient energy/protein, increased nutrient needs 2/2 catabolic illness

## 2018-11-07 NOTE — PROGRESS NOTE ADULT - PROBLEM SELECTOR PLAN 1
- patient presented with tachycardia and elevated WBC count with evidence of PNA  - Sepsis now resolved.   -Completed 6 days of zosyn IV q8h  -Switched to ciprofloxacin 500 PO BID and metronidazole 500 PO q8h for remaining day (based on sensitivities)  -D/Peter vancomycin as sensitivities of sputum culture revealed klebsiella sensitive to zosyn

## 2018-11-07 NOTE — PROGRESS NOTE ADULT - PROBLEM SELECTOR PLAN 4
- patient with elevated Cr; baseline is 0.8-0.9  Slowing improving   -On UA, noted to have large blood.   -Renal U/S did not show evidence of hydronephrosis - patient creatinine increased to 1.89 from 1.61( baseline is 0.8-0.9)  -pt complaining of pain w/ urination in his lower abdomen and penis, also c/o decrease in frequency in urination  -Patient making adequate amount of urine (1.2 L ON), BS showed 25 mL: patient unlikely to be retaining urine  -sent UA which showed moderate blood which is consistent w/ his previous UA. Negative for nitrites and leuk esterases; symptoms unlikely 2/2 UTI  -urine lytes reordered; f/u to determine etiology of increase in creatinine  -Renal U/S did not show evidence of hydronephrosis

## 2018-11-07 NOTE — DIETITIAN INITIAL EVALUATION ADULT. - SOURCE
family/significant other/Patient's wife at bedside, wife and patient Cantonese/[place] speaking. Declined  service when offered and preferred Son to translate via phone ([phone]). Medical record reviewed./patient

## 2018-11-07 NOTE — PROGRESS NOTE ADULT - PROBLEM SELECTOR PLAN 2
Pain overall improved, patient sitting in the chair. Patient required 1 dose of Dilaudid 0.75mg IV in 24hrs and 4 doses of Oxycodone 10mg PO.  Oxycontin increased to 30mg PO TID based on PRN usage.  Continue Oxycodone 10mg PO q4h PRN. Bowel regimen.  Please page for uncontrolled symptoms. Pain overall improved, patient sitting in the chair. Patient required 1 dose of Dilaudid 0.75mg IV in 24hrs and 4 doses of Oxycodone 10mg PO.  Oxycontin increased to 30mg PO TID based on PRN usage.  Continue Oxycodone 10mg PO q4h PRN. Bowel regimen.  Please page for uncontrolled symptoms.  Follow-up pain appointment details below.

## 2018-11-08 LAB
BUN SERPL-MCNC: 19 MG/DL — SIGNIFICANT CHANGE UP (ref 7–23)
CALCIUM SERPL-MCNC: 9.1 MG/DL — SIGNIFICANT CHANGE UP (ref 8.4–10.5)
CHLORIDE SERPL-SCNC: 104 MMOL/L — SIGNIFICANT CHANGE UP (ref 98–107)
CHLORIDE UR-SCNC: 82 MMOL/L — SIGNIFICANT CHANGE UP
CO2 SERPL-SCNC: 26 MMOL/L — SIGNIFICANT CHANGE UP (ref 22–31)
CREAT ?TM UR-MCNC: 81.8 MG/DL — SIGNIFICANT CHANGE UP
CREAT SERPL-MCNC: 1.91 MG/DL — HIGH (ref 0.5–1.3)
GLUCOSE SERPL-MCNC: 108 MG/DL — HIGH (ref 70–99)
MAGNESIUM SERPL-MCNC: 2 MG/DL — SIGNIFICANT CHANGE UP (ref 1.6–2.6)
PHOSPHATE SERPL-MCNC: 3.9 MG/DL — SIGNIFICANT CHANGE UP (ref 2.5–4.5)
POTASSIUM SERPL-MCNC: 3.6 MMOL/L — SIGNIFICANT CHANGE UP (ref 3.5–5.3)
POTASSIUM SERPL-SCNC: 3.6 MMOL/L — SIGNIFICANT CHANGE UP (ref 3.5–5.3)
POTASSIUM UR-SCNC: 37.5 MMOL/L — SIGNIFICANT CHANGE UP
SODIUM SERPL-SCNC: 141 MMOL/L — SIGNIFICANT CHANGE UP (ref 135–145)
SODIUM UR-SCNC: 100 MMOL/L — SIGNIFICANT CHANGE UP

## 2018-11-08 PROCEDURE — 99233 SBSQ HOSP IP/OBS HIGH 50: CPT | Mod: GC

## 2018-11-08 RX ORDER — SODIUM CHLORIDE 9 MG/ML
1000 INJECTION INTRAMUSCULAR; INTRAVENOUS; SUBCUTANEOUS
Qty: 0 | Refills: 0 | Status: DISCONTINUED | OUTPATIENT
Start: 2018-11-08 | End: 2018-11-09

## 2018-11-08 RX ORDER — OXYCODONE HYDROCHLORIDE 5 MG/1
1 TABLET ORAL
Qty: 33 | Refills: 0 | OUTPATIENT
Start: 2018-11-08 | End: 2018-11-18

## 2018-11-08 RX ORDER — OXYCODONE HYDROCHLORIDE 5 MG/1
1 TABLET ORAL
Qty: 66 | Refills: 0 | OUTPATIENT
Start: 2018-11-08 | End: 2018-11-18

## 2018-11-08 RX ADMIN — OXYCODONE HYDROCHLORIDE 30 MILLIGRAM(S): 5 TABLET ORAL at 22:30

## 2018-11-08 RX ADMIN — TAMSULOSIN HYDROCHLORIDE 0.4 MILLIGRAM(S): 0.4 CAPSULE ORAL at 22:34

## 2018-11-08 RX ADMIN — OXYCODONE HYDROCHLORIDE 10 MILLIGRAM(S): 5 TABLET ORAL at 11:44

## 2018-11-08 RX ADMIN — Medication 500 MILLIGRAM(S): at 22:31

## 2018-11-08 RX ADMIN — OXYCODONE HYDROCHLORIDE 30 MILLIGRAM(S): 5 TABLET ORAL at 13:22

## 2018-11-08 RX ADMIN — Medication 500 MILLIGRAM(S): at 05:48

## 2018-11-08 RX ADMIN — OXYCODONE HYDROCHLORIDE 10 MILLIGRAM(S): 5 TABLET ORAL at 18:48

## 2018-11-08 RX ADMIN — OXYCODONE HYDROCHLORIDE 30 MILLIGRAM(S): 5 TABLET ORAL at 23:10

## 2018-11-08 RX ADMIN — SODIUM CHLORIDE 100 MILLILITER(S): 9 INJECTION INTRAMUSCULAR; INTRAVENOUS; SUBCUTANEOUS at 18:15

## 2018-11-08 RX ADMIN — OXYCODONE HYDROCHLORIDE 30 MILLIGRAM(S): 5 TABLET ORAL at 05:48

## 2018-11-08 RX ADMIN — OXYCODONE HYDROCHLORIDE 30 MILLIGRAM(S): 5 TABLET ORAL at 06:30

## 2018-11-08 RX ADMIN — PANTOPRAZOLE SODIUM 40 MILLIGRAM(S): 20 TABLET, DELAYED RELEASE ORAL at 05:48

## 2018-11-08 RX ADMIN — Medication 1 GRAM(S): at 17:20

## 2018-11-08 RX ADMIN — APIXABAN 2.5 MILLIGRAM(S): 2.5 TABLET, FILM COATED ORAL at 22:33

## 2018-11-08 RX ADMIN — OXYCODONE HYDROCHLORIDE 30 MILLIGRAM(S): 5 TABLET ORAL at 14:15

## 2018-11-08 RX ADMIN — SODIUM CHLORIDE 75 MILLILITER(S): 9 INJECTION INTRAMUSCULAR; INTRAVENOUS; SUBCUTANEOUS at 17:20

## 2018-11-08 RX ADMIN — Medication 500 MILLIGRAM(S): at 17:20

## 2018-11-08 RX ADMIN — SENNA PLUS 2 TABLET(S): 8.6 TABLET ORAL at 22:32

## 2018-11-08 RX ADMIN — SODIUM CHLORIDE 100 MILLILITER(S): 9 INJECTION INTRAMUSCULAR; INTRAVENOUS; SUBCUTANEOUS at 22:35

## 2018-11-08 RX ADMIN — Medication 1: at 18:14

## 2018-11-08 RX ADMIN — Medication 100 MILLIGRAM(S): at 11:44

## 2018-11-08 RX ADMIN — OXYCODONE HYDROCHLORIDE 10 MILLIGRAM(S): 5 TABLET ORAL at 18:14

## 2018-11-08 RX ADMIN — OXYCODONE HYDROCHLORIDE 10 MILLIGRAM(S): 5 TABLET ORAL at 12:44

## 2018-11-08 RX ADMIN — Medication 500 MILLIGRAM(S): at 13:22

## 2018-11-08 RX ADMIN — Medication 1 GRAM(S): at 05:48

## 2018-11-08 RX ADMIN — APIXABAN 2.5 MILLIGRAM(S): 2.5 TABLET, FILM COATED ORAL at 11:44

## 2018-11-08 NOTE — PROGRESS NOTE ADULT - PROBLEM SELECTOR PLAN 9
- CT from April revealing bilateral pulmonary emboli  -c/w eliquis PO    11. Goals of care:  -Discussed resuscitation and intubation with son and patient. They have not come to a decision at this time, but will talk about it overnight. Full code for now. Are interested in filling out a MOLST form.    12. Hepatitis B- on tenofovir  - hold for now     13. DVT ppx  - on eliquis - CT from April revealing bilateral pulmonary emboli  -c/w eliquis PO    11. Goals of care:  -Discussed resuscitation and intubation with son and patient. They have not come to a decision at this time, but will talk about it overnight. Full code for now. Are interested in filling out a MOLST form.    12. Hepatitis B- on tenofovir  - hold for now     13. DVT ppx  - on eliquis (b/c of worsening creatinine clearance, decreased eliquis to 2.5)

## 2018-11-08 NOTE — PROGRESS NOTE ADULT - PROBLEM SELECTOR PLAN 3
- evaluated by ENT, Dr. 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

## 2018-11-08 NOTE — PROGRESS NOTE ADULT - PROBLEM SELECTOR PLAN 6
- patient on morphine 30q8 and and 10 of oxy q4 at home   -pain is mainly located in chest; recent PET had showed no bony mets  -initial concern for opioid induced lethargy, s/p narcan   -palliative consulted; appreciated recs  -Over the past 24 hours, patient required 3 RNS of oxycodone IR. Reports that his pain  has gotten worse since yesterday; increased oxycontin to 30 mg q8h  pain management:  oxycontin 30 mg q8h, oxycodone IR 10 q4h PRN - patient on morphine 30q8 and and 10 of oxy q4 at home   -pain is mainly located in chest; recent PET had showed no bony mets  -initial concern for opioid induced lethargy, s/p narcan   -palliative consulted; appreciated recs  -Over the past 24 hours, patient required 3 RNS of oxycodone IR. Reports that pain is improved  pain management:  oxycontin 30 mg q8h, oxycodone IR 10 q4h PRN  -oxycontin requires prior authorization, sent to pharmacy in advance

## 2018-11-08 NOTE — PROGRESS NOTE ADULT - PROBLEM SELECTOR PLAN 4
- patient creatinine increased to 1.9 ( baseline is 0.8-0.9)  -pt complaining of pain w/ urination in his lower abdomen and penis, also c/o decrease in frequency in urination  -Patient making adequate amount of urine (1.2 L ON), BS showed 25 mL: patient unlikely to be retaining urine  -sent UA which showed moderate blood which is consistent w/ his previous UA. Negative for nitrites and leuk esterases; symptoms unlikely 2/2 UTI  -urine lytes reordered; f/u to determine etiology of increase in creatinine  -Renal U/S did not show evidence of hydronephrosis - patient creatinine increased to 1.9 ( baseline is 0.8-0.9)  -pt complaining difficulty starting to urinate, likely 2/2 BPH. Tamsulosin restarted yesterday  -Patient making adequate amount of urine (1.2 L ON), BS showed 25 mL: patient unlikely to be retaining urine  -sent UA which showed moderate blood which is consistent w/ his previous UA. Negative for nitrites and leuk esterases; symptoms unlikely 2/2 UTI  -urine lytes reordered; f/u to determine etiology of increase in creatinine  -Renal U/S did not show evidence of hydronephrosis - patient creatinine increased to 1.9 ( baseline is 0.8-0.9)  -pt complaining difficulty starting to urinate, likely 2/2 BPH. Tamsulosin restarted yesterday  -Patient making adequate amount of urine (1.2 L ON), Bladder Scan showed 25 mL: patient unlikely to be retaining urine  -sent UA which showed moderate blood which is consistent w/ his previous UA. Negative for nitrites and leuk esterases; symptoms unlikely 2/2 UTI  -urine lytes reordered; f/u to determine etiology of increase in creatinine  -Renal U/S did not show evidence of hydronephrosis - patient creatinine increased to 1.9 ( baseline is 0.8-0.9)  -pt complaining difficulty starting to urinate, likely 2/2 BPH. Tamsulosin restarted yesterday  -Patient making adequate amount of urine (1.2 L ON), Bladder Scan showed 25 mL: patient unlikely to be retaining urine  -sent UA which showed moderate blood which is consistent w/ his previous UA. Negative for nitrites and leuk esterases; symptoms unlikely 2/2 UTI  -urine lytes reordered; f/u to determine etiology of increase in creatinine: addendum: FeNa 1.7%  -started  cc/hr  -Renal U/S did not show evidence of hydronephrosis

## 2018-11-08 NOTE — PROGRESS NOTE ADULT - NSHPATTENDINGPLANDISCUSS_GEN_ALL_CORE
patient and his son at bedside, medical resident
pt and medical resident
patient and his son at bedside, medical resident
patient and his son at bedside, medical resident

## 2018-11-08 NOTE — CHART NOTE - NSCHARTNOTEFT_GEN_A_CORE
Patient Cantonese speaking, declined  service and preferred son to provide translation services. Patient s/p Cinesophagram (11/02) with recommendation for Puree with Honey Thick Liquids via teaspoon. Provided diet education regarding dysphagia level 1 pureed diet modifications and honey thickened liquids. Written materials given to son as well. Patient and son receptive to education.    Please see Initial Dietitian Assessment from 11/07 for more information. RD will follow up per department protocol.     RD to remain available, Margot Mccall RD, CDN Pager #[phone].

## 2018-11-08 NOTE — PROGRESS NOTE ADULT - PROBLEM SELECTOR PLAN 1
- patient presented with tachycardia and elevated WBC count with evidence of PNA  - Sepsis now resolved.   -Completed 6 days of zosyn IV q8h  -Day 2/2 ciprofloxacin 500 PO BID and metronidazole 500 PO q8h (based on sensitivities)  -Toady is day 7/7 of total antibiotics  -D/Peter vancomycin as sensitivities of sputum culture revealed klebsiella sensitive to zosyn

## 2018-11-08 NOTE — CONSULT NOTE ADULT - ASSESSMENT
7M with PMhx of stage HTN, HLD, BPH, COPD, DM, GERD, BCC s/p resection, Hep B, IV CKD, and mutated NSCLC (s/p erlotinib, carboplatin/pemetrexed, atezolizumab) now on gilotrif (abatnif) presented to his oncologist office today was advised to come to the ED due to concern for aspiration PNA and lethargy.  Palliative consulted for pain management.
78 y/o metastatic NSCLC on afatinib a/w possible aspiration pneumonia
A/P: 77M with mildly hypomobile left true vocal fold, no evidence of white plaques or ulcerations, likely just food residue. But does have pooling of secretions consistent with SLP evaluation.   -no further ORL intervention  -f/u as outpatient with Dr. Prado -[phone]

## 2018-11-08 NOTE — PROGRESS NOTE ADULT - SUBJECTIVE AND OBJECTIVE BOX
CHIEF COMPLAINT: SOB 2/2 aspiration pneumonia, hospital course c/b uncontrolled pain     Interval Events:  No acute events overnight.     REVIEW OF SYSTEMS:  Constitutional: [ ] negative [ ] fevers [ ] chills [ ] weight loss [ ] weight gain  HEENT: [ ] negative [ ] dry eyes [ ] eye irritation [ ] postnasal drip [ ] nasal congestion  CV: [ ] negative  [ ] chest pain [ ] orthopnea [ ] palpitations [ ] murmur  Resp: [ ] negative [ ] cough [ ] shortness of breath [ ] dyspnea [ ] wheezing [ ] sputum [ ] hemoptysis  GI: [ ] negative [ ] nausea [ ] vomiting [ ] diarrhea [ ] constipation [ ] abd pain [ ] dysphagia   : [ ] negative [ ] dysuria [ ] nocturia [ ] hematuria [ ] increased urinary frequency  Musculoskeletal: [ ] negative [ ] back pain [ ] myalgias [ ] arthralgias [ ] fracture  Skin: [ ] negative [ ] rash [ ] itch  Neurological: [ ] negative [ ] headache [ ] dizziness [ ] syncope [ ] weakness [ ] numbness  Psychiatric: [ ] negative [ ] anxiety [ ] depression  Endocrine: [ ] negative [ ] diabetes [ ] thyroid problem  Hematologic/Lymphatic: [ ] negative [ ] anemia [ ] bleeding problem  Allergic/Immunologic: [ ] negative [ ] itchy eyes [ ] nasal discharge [ ] hives [ ] angioedema  [ ] All other systems negative  [ ] Unable to assess ROS because ________    OBJECTIVE:  T(C): 36.7 (2018 05:50), Max: 37.3 (2018 22:37)  T(F): 98 (2018 05:50), Max: 99.1 (2018 22:37)  HR: 84 (2018 05:50) (81 - 88)  BP: 140/78 (2018 05:50) (127/80 - 144/90)  RR: 17 (2018 05:50) (16 - 21)  SpO2: 98% (2018 05:50) (95% - 98%)         @ 07:01  -   @ 07:00  --------------------------------------------------------  IN: 0 mL / OUT: 200 mL / NET: -200 mL     @ 07:01  -   @ 07:25  --------------------------------------------------------  IN: 0 mL / OUT: 200 mL / NET: -200 mL      CAPILLARY BLOOD GLUCOSE      POCT Blood Glucose.: 103 mg/dL (2018 22:24)      PHYSICAL EXAM:  General:   HEENT:   Lymph Nodes:  Neck:   Respiratory:   Cardiovascular:   Abdomen:   Extremities:   Skin:   Neurological:  Psychiatry:    LINES:    HOSPITAL MEDICATIONS:  apixaban 2.5 milliGRAM(s) Oral every 12 hours    ciprofloxacin     Tablet 500 milliGRAM(s) Oral every 12 hours  metroNIDAZOLE    Tablet 500 milliGRAM(s) Oral every 8 hours    tamsulosin 0.4 milliGRAM(s) Oral at bedtime    dextrose 40% Gel 15 Gram(s) Oral once PRN  dextrose 50% Injectable 12.5 Gram(s) IV Push once  dextrose 50% Injectable 25 Gram(s) IV Push once  dextrose 50% Injectable 25 Gram(s) IV Push once  glucagon  Injectable 1 milliGRAM(s) IntraMuscular once PRN  insulin lispro (HumaLOG) corrective regimen sliding scale   SubCutaneous three times a day before meals  insulin lispro (HumaLOG) corrective regimen sliding scale   SubCutaneous at bedtime      ondansetron Injectable 4 milliGRAM(s) IV Push every 6 hours PRN  oxyCODONE    IR 10 milliGRAM(s) Oral every 4 hours PRN  oxyCODONE  ER Tablet 30 milliGRAM(s) Oral every 8 hours    bisacodyl Suppository 10 milliGRAM(s) Rectal daily PRN  docusate sodium 100 milliGRAM(s) Oral daily  pantoprazole    Tablet 40 milliGRAM(s) Oral before breakfast  senna 2 Tablet(s) Oral at bedtime  sucralfate suspension 1 Gram(s) Oral two times a day        dextrose 5%. 1000 milliLiter(s) IV Continuous <Continuous>            LABS:                        11.7   10.32 )-----------( 208      ( 2018 04:50 )             35.5     Hgb Trend: 11.7<--, 11.9<--, 11.1<--, 10.9<--, 11.0<--  11-08    141  |  104  |  19  ----------------------------<  108<H>  3.6   |  26  |  1.91<H>    Ca    9.1      2018 04:40  Phos  3.9       Mg     2.0           Creatinine Trend: 1.91<--, 1.89<--, 1.61<--, 1.64<--, 1.61<--, 1.70<--    Urinalysis Basic - ( 2018 09:56 )    Color: LIGHT YELLOW / Appearance: CLEAR / S.009 / pH: 7.5  Gluc: NEGATIVE / Ketone: NEGATIVE  / Bili: NEGATIVE / Urobili: NORMAL   Blood: MODERATE / Protein: 20 / Nitrite: NEGATIVE   Leuk Esterase: NEGATIVE / RBC: >50 / WBC 0-2   Sq Epi: OCC / Non Sq Epi: x / Bacteria: NEGATIVE            MICROBIOLOGY:     RADIOLOGY:  [ ] Reviewed and interpreted by me    EKG: CHIEF COMPLAINT: SOB 2/2 aspiration pneumonia, hospital course c/b uncontrolled pain     Interval Events:  No acute events overnight. Patient was seen and examined at bedside. He reports the pain is "better". His pain feels like "needles pricking him" and continues to be located in his upper chest, shoulders, and upper back. He also states that he has throat pain that is constant, it is not associated w/ swallowing or eating food.     REVIEW OF SYSTEMS:  Constitutional: [ ] negative [ ] fevers [ ] chills [ ] weight loss [ ] weight gain  HEENT: [ ] negative [ ] dry eyes [ ] eye irritation [ ] postnasal drip [ ] nasal congestion  CV: [ ] negative  [ ] chest pain [ ] orthopnea [ ] palpitations [ ] murmur  Resp: [ ] negative [ ] cough [ ] shortness of breath [ ] dyspnea [ ] wheezing [ ] sputum [ ] hemoptysis  GI: [ ] negative [ ] nausea [ ] vomiting [ ] diarrhea [ ] constipation [ ] abd pain [ ] dysphagia   : [ ] negative [ ] dysuria [ ] nocturia [ ] hematuria [ ] increased urinary frequency  Musculoskeletal: [ ] negative [ ] back pain [ ] myalgias [ ] arthralgias [ ] fracture  Skin: [ ] negative [ ] rash [ ] itch  Neurological: [ ] negative [ ] headache [ ] dizziness [ ] syncope [ ] weakness [ ] numbness  Psychiatric: [ ] negative [ ] anxiety [ ] depression  Endocrine: [ ] negative [ ] diabetes [ ] thyroid problem  Hematologic/Lymphatic: [ ] negative [ ] anemia [ ] bleeding problem  Allergic/Immunologic: [ ] negative [ ] itchy eyes [ ] nasal discharge [ ] hives [ ] angioedema  [ ] All other systems negative  [ ] Unable to assess ROS because ________    OBJECTIVE:  T(C): 36.7 (2018 05:50), Max: 37.3 (2018 22:37)  T(F): 98 (2018 05:50), Max: 99.1 (2018 22:37)  HR: 84 (2018 05:50) (81 - 88)  BP: 140/78 (2018 05:50) (127/80 - 144/90)  RR: 17 (2018 05:50) (16 - 21)  SpO2: 98% (2018 05:50) (95% - 98%)        11-07 @ 07:  -   @ 07:00  --------------------------------------------------------  IN: 0 mL / OUT: 200 mL / NET: -200 mL     @ 07: @ 07:25  --------------------------------------------------------  IN: 0 mL / OUT: 200 mL / NET: -200 mL      CAPILLARY BLOOD GLUCOSE      POCT Blood Glucose.: 103 mg/dL (2018 22:24)      PHYSICAL EXAM:  General:   HEENT:   Lymph Nodes:  Neck:   Respiratory:   Cardiovascular:   Abdomen:   Extremities:   Skin:   Neurological:  Psychiatry:    LINES:    HOSPITAL MEDICATIONS:  apixaban 2.5 milliGRAM(s) Oral every 12 hours    ciprofloxacin     Tablet 500 milliGRAM(s) Oral every 12 hours  metroNIDAZOLE    Tablet 500 milliGRAM(s) Oral every 8 hours    tamsulosin 0.4 milliGRAM(s) Oral at bedtime    dextrose 40% Gel 15 Gram(s) Oral once PRN  dextrose 50% Injectable 12.5 Gram(s) IV Push once  dextrose 50% Injectable 25 Gram(s) IV Push once  dextrose 50% Injectable 25 Gram(s) IV Push once  glucagon  Injectable 1 milliGRAM(s) IntraMuscular once PRN  insulin lispro (HumaLOG) corrective regimen sliding scale   SubCutaneous three times a day before meals  insulin lispro (HumaLOG) corrective regimen sliding scale   SubCutaneous at bedtime      ondansetron Injectable 4 milliGRAM(s) IV Push every 6 hours PRN  oxyCODONE    IR 10 milliGRAM(s) Oral every 4 hours PRN  oxyCODONE  ER Tablet 30 milliGRAM(s) Oral every 8 hours    bisacodyl Suppository 10 milliGRAM(s) Rectal daily PRN  docusate sodium 100 milliGRAM(s) Oral daily  pantoprazole    Tablet 40 milliGRAM(s) Oral before breakfast  senna 2 Tablet(s) Oral at bedtime  sucralfate suspension 1 Gram(s) Oral two times a day        dextrose 5%. 1000 milliLiter(s) IV Continuous <Continuous>            LABS:                        11.7   10.32 )-----------( 208      ( 2018 04:50 )             35.5     Hgb Trend: 11.7<--, 11.9<--, 11.1<--, 10.9<--, 11.0<--  11-08    141  |  104  |  19  ----------------------------<  108<H>  3.6   |  26  |  1.91<H>    Ca    9.1      2018 04:40  Phos  3.9       Mg     2.0           Creatinine Trend: 1.91<--, 1.89<--, 1.61<--, 1.64<--, 1.61<--, 1.70<--    Urinalysis Basic - ( 2018 09:56 )    Color: LIGHT YELLOW / Appearance: CLEAR / S.009 / pH: 7.5  Gluc: NEGATIVE / Ketone: NEGATIVE  / Bili: NEGATIVE / Urobili: NORMAL   Blood: MODERATE / Protein: 20 / Nitrite: NEGATIVE   Leuk Esterase: NEGATIVE / RBC: >50 / WBC 0-2   Sq Epi: OCC / Non Sq Epi: x / Bacteria: NEGATIVE            MICROBIOLOGY:     RADIOLOGY:  [ ] Reviewed and interpreted by me    EKG: CHIEF COMPLAINT: SOB 2/2 aspiration pneumonia, hospital course c/b uncontrolled pain     Interval Events:  No acute events overnight. Patient was seen and examined at bedside. He reports the pain is "better". His pain feels like "needles pricking him" and continues to be located in his upper chest, shoulders, and upper back. He also states that he has throat pain that is constant, it is not associated w/ swallowing or eating food. He denies fever/chills, N/V, abdominal pain, or dysuria. He continues to endorse difficulty urinating as it takes him longer to start urinating more than normal.  His last BM was yesterday.    REVIEW OF SYSTEMS:  Constitutional: [x] negative [ ] fevers [ ] chills [ ] weight loss [ ] weight gain  HEENT: [x] negative [ ] dry eyes [ ] eye irritation [ ] postnasal drip [ ] nasal congestion  CV: [ ] negative  [x] chest pain [ ] orthopnea [ ] palpitations [ ] murmur  Resp: [x] negative [ ] cough [ ] shortness of breath [ ] dyspnea [ ] wheezing [ ] sputum [ ] hemoptysis  GI: [x] negative [ ] nausea [ ] vomiting [ ] diarrhea [ ] constipation [ ] abd pain [ ] dysphagia   : [x] negative [ ] dysuria [ ] nocturia [ ] hematuria [ ] increased urinary frequency  Musculoskeletal: [ ] negative [x] back pain [ ] myalgias [ ] arthralgias [ ] fracture  Skin: [x] negative [ ] rash [ ] itch  Neurological: [x] negative [ ] headache [ ] dizziness [ ] syncope [ ] weakness [ ] numbness  Psychiatric: [x] negative [ ] anxiety [ ] depression  Endocrine: [x] negative [ ] diabetes [ ] thyroid problem  Hematologic/Lymphatic: [ ] negative [ ] anemia [ ] bleeding problem  Allergic/Immunologic: [ ] negative [ ] itchy eyes [ ] nasal discharge [ ] hives [ ] angioedema  [ ] All other systems negative  [ ] Unable to assess ROS because ________    OBJECTIVE:  T(C): 36.7 (2018 05:50), Max: 37.3 (2018 22:37)  T(F): 98 (2018 05:50), Max: 99.1 (2018 22:37)  HR: 84 (2018 05:50) (81 - 88)  BP: 140/78 (2018 05:50) (127/80 - 144/90)  RR: 17 (2018 05:50) (16 - 21)  SpO2: 98% (2018 05:50) (95% - 98%)         @ 07:  -   @ 07:00  --------------------------------------------------------  IN: 0 mL / OUT: 200 mL / NET: -200 mL     @ 07:  -   @ 07:25  --------------------------------------------------------  IN: 0 mL / OUT: 200 mL / NET: -200 mL      CAPILLARY BLOOD GLUCOSE      POCT Blood Glucose.: 103 mg/dL (2018 22:24)      PHYSICAL EXAM:  GENERAL: NAD, well-developed  HEAD:  Atraumatic, Normocephalic  EYES: EOMI, PERRLA, conjunctiva and sclera clear  NECK: Supple, No JVD  CHEST/LUNG: expiratory wheeze b/l, decreased breath sounds at bases,   HEART: S1 and S2, Regular rate and rhythm; No murmurs, rubs, or gallops  ABDOMEN: Soft, Nontender, Nondistended; Bowel sounds present  EXTREMITIES:  2+ Peripheral Pulses, No clubbing, cyanosis, or edema  PSYCH: AAOx3  NEUROLOGY: non-focal,CN grossly intact  SKIN: No rashes or lesions    LINES:    HOSPITAL MEDICATIONS:  apixaban 2.5 milliGRAM(s) Oral every 12 hours    ciprofloxacin     Tablet 500 milliGRAM(s) Oral every 12 hours  metroNIDAZOLE    Tablet 500 milliGRAM(s) Oral every 8 hours    tamsulosin 0.4 milliGRAM(s) Oral at bedtime    dextrose 40% Gel 15 Gram(s) Oral once PRN  dextrose 50% Injectable 12.5 Gram(s) IV Push once  dextrose 50% Injectable 25 Gram(s) IV Push once  dextrose 50% Injectable 25 Gram(s) IV Push once  glucagon  Injectable 1 milliGRAM(s) IntraMuscular once PRN  insulin lispro (HumaLOG) corrective regimen sliding scale   SubCutaneous three times a day before meals  insulin lispro (HumaLOG) corrective regimen sliding scale   SubCutaneous at bedtime      ondansetron Injectable 4 milliGRAM(s) IV Push every 6 hours PRN  oxyCODONE    IR 10 milliGRAM(s) Oral every 4 hours PRN  oxyCODONE  ER Tablet 30 milliGRAM(s) Oral every 8 hours    bisacodyl Suppository 10 milliGRAM(s) Rectal daily PRN  docusate sodium 100 milliGRAM(s) Oral daily  pantoprazole    Tablet 40 milliGRAM(s) Oral before breakfast  senna 2 Tablet(s) Oral at bedtime  sucralfate suspension 1 Gram(s) Oral two times a day        dextrose 5%. 1000 milliLiter(s) IV Continuous <Continuous>            LABS:                        11.7   10.32 )-----------( 208      ( 2018 04:50 )             35.5     Hgb Trend: 11.7<--, 11.9<--, 11.1<--, 10.9<--, 11.0<--  11-08    141  |  104  |  19  ----------------------------<  108<H>  3.6   |  26  |  1.91<H>    Ca    9.1      2018 04:40  Phos  3.9     11-08  Mg     2.0     11-08      Creatinine Trend: 1.91<--, 1.89<--, 1.61<--, 1.64<--, 1.61<--, 1.70<--    Urinalysis Basic - ( 2018 09:56 )    Color: LIGHT YELLOW / Appearance: CLEAR / S.009 / pH: 7.5  Gluc: NEGATIVE / Ketone: NEGATIVE  / Bili: NEGATIVE / Urobili: NORMAL   Blood: MODERATE / Protein: 20 / Nitrite: NEGATIVE   Leuk Esterase: NEGATIVE / RBC: >50 / WBC 0-2   Sq Epi: OCC / Non Sq Epi: x / Bacteria: NEGATIVE            MICROBIOLOGY:     RADIOLOGY:  [ ] Reviewed and interpreted by me    EKG:

## 2018-11-08 NOTE — CONSULT NOTE ADULT - SUBJECTIVE AND OBJECTIVE BOX
HPI  HPI:  77M with PMhx of stage HTN, HLD, BPH, COPD, DM, GERD, BCC s/p resection, Hep B, IV CKD, and mutated NSCLC (s/p erlotinib, carboplatin/pemetrexed, atezolizumab) now on gilotrif (abatnif) admitted from oncologist office today due to concern for aspiration PNA and lethargy. Patient with h/o previous admission for aspiration PNA in August and MICU admission for septic shock 2/2 gram negative/MRSA PNA and respiratory failure. Patient is followed by Dr. Austin Prado as outpatient, seen last week for scope evaluation of vocal cords.         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 1000 milliLiter(s) (50 mL/Hr) IV Continuous <Continuous>  dextrose 50% Injectable 12.5 Gram(s) IV Push once  dextrose 50% Injectable 25 Gram(s) IV Push once  dextrose 50% Injectable 25 Gram(s) IV Push once  docusate sodium 100 milliGRAM(s) Oral daily  insulin lispro (HumaLOG) corrective regimen sliding scale   SubCutaneous three times a day before meals  insulin lispro (HumaLOG) corrective regimen sliding scale   SubCutaneous at bedtime  metroNIDAZOLE    Tablet 500 milliGRAM(s) Oral every 8 hours  oxyCODONE  ER Tablet 30 milliGRAM(s) Oral every 8 hours  pantoprazole    Tablet 40 milliGRAM(s) Oral before breakfast  senna 2 Tablet(s) Oral at bedtime  sodium chloride 0.9%. 1000 milliLiter(s) (100 mL/Hr) IV Continuous <Continuous>  sucralfate suspension 1 Gram(s) Oral two times a day  tamsulosin 0.4 milliGRAM(s) Oral at bedtime    MEDICATIONS  (PRN):  bisacodyl Suppository 10 milliGRAM(s) Rectal daily PRN Constipation  dextrose 40% Gel 15 Gram(s) Oral once PRN Blood Glucose LESS THAN 70 milliGRAM(s)/deciliter  glucagon  Injectable 1 milliGRAM(s) IntraMuscular once PRN Glucose LESS THAN 70 milligrams/deciliter  ondansetron Injectable 4 milliGRAM(s) IV Push every 6 hours PRN Nausea and/or Vomiting  oxyCODONE    IR 10 milliGRAM(s) Oral every 4 hours PRN breakthrough pain      Allergies  No Known Drug Allergies  tegaderm (Rash; Urticaria)      Review of Systems  General: Negative except for HPI  Neurological: Negative.  Opthalmologic: Negative.  ENT: Negative except for HPI.  Respiratory: Negative.    Cardiovascular: Negative.    Gastrointestinal: Negative.    Genitourinary: Negative.    Musculoskeletal: Negative.    Dermatologic: Negative.    Endocrinology: Negative.    Hematological: Negative.    Psychiatric: Negative.      Vital signs past 24h  Vital Signs Last 24 Hrs  T(C): 36.4 (08 Nov 2018 15:57), Max: 37.3 (07 Nov 2018 22:37)  T(F): 97.5 (08 Nov 2018 15:57), Max: 99.1 (07 Nov 2018 22:37)  HR: 75 (08 Nov 2018 15:57) (75 - 84)  BP: 115/65 (08 Nov 2018 15:57) (98/58 - 144/90)  BP(mean): --  RR: 18 (08 Nov 2018 15:57) (16 - 18)  SpO2: 98% (08 Nov 2018 15:57) (95% - 98%)    Ins and Outs past 24h    11-07-18 @ 07:01  -  11-08-18 @ 07:00  --------------------------------------------------------  IN: 0 mL / OUT: 200 mL / NET: -200 mL    11-08-18 @ 07:01  -  11-08-18 @ 18:16  --------------------------------------------------------  IN: 0 mL / OUT: 550 mL / NET: -550 mL        Physical Exam  Constitutional: NAD, alert, awake  HENT:         Head: Normocephalic, atraumatic       Face: Symmetric, no lesion or mass       Ears:            Right: External ear normal, canal normal and tympanic membrane normal, no middle ear effusion, no mastoid tenderness fullness or erythema          Left:  External ear normal, canal normal and tympanic membrane normal, no middle ear effusion, no mastoid tenderness fullness or erythema       Nose: No external deformity, clear anteriorly       Oral cavity and oropharynx: tongue midline, floor of mouth flat and soft, uvula midline, no lesion or edema       Voice: Normal         Neck: Soft, flat, trachea midline, no lymphadenopathy  Eyes: EOMI  Pulmonary/Chest: Breathing comfortably on room air, no stridor or stertor  Abdominal: Non-distended  Genitourinary: Not indicated  Neurological: No focal neuro deficit, CN 2-12 grossly intact  Skin: No obvious lesion or rash  Musculoskeletal: No deformities noted, full ROM in all major joints    Psychiatric: Alert, appropriate responses and attention span     Flexible Laryngoscopy  NC: no turbinate hypertrophy, no copious secretions  OC/OP: palate, uvula, tonsils, vallecula, BOT, posterior pharyngeal wall wnl  Supraglottis: epiglottis, AE folds, arytenoids, false vocal cords wnl  Hypopharynx: pyriform sinuses, posterior pharyngeal wall, post-cricoid area wnl  Glottis: true vocal cords with normal mobility and no lesion, edema or erythema    Labs                        11.7   10.32 )-----------( 208      ( 07 Nov 2018 04:50 )             35.5     11-08    141  |  104  |  19  ----------------------------<  108<H>  3.6   |  26  |  1.91<H>    Ca    9.1      08 Nov 2018 04:40  Phos  3.9     11-08  Mg     2.0     11-08        Imaging    Assessment and Plan HPI  HPI:  77M with PMhx of stage HTN, HLD, BPH, COPD, DM, GERD, BCC s/p resection, Hep B, IV CKD, and mutated NSCLC (s/p erlotinib, carboplatin/pemetrexed, atezolizumab) now on gilotrif (abatnif) admitted from oncologist office to [facility] due to concern for aspiration PNA and lethargy. Patient with h/o previous admission for aspiration PNA in August and MICU admission for septic shock 2/2 gram negative/MRSA PNA and respiratory failure. Patient is followed by Dr. Austin Prado as outpatient, seen last week for scope evaluation of vocal cords. At that time, showed L true vocal fold paresis and "white plaques scattered through hypopharynx and larynx" for which ENT is consulted today.   Patient states he has throat and chest pain. Currently tolerating a puree/honey consistency diet without issue.       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 1000 milliLiter(s) (50 mL/Hr) IV Continuous <Continuous>  dextrose 50% Injectable 12.5 Gram(s) IV Push once  dextrose 50% Injectable 25 Gram(s) IV Push once  dextrose 50% Injectable 25 Gram(s) IV Push once  docusate sodium 100 milliGRAM(s) Oral daily  insulin lispro (HumaLOG) corrective regimen sliding scale   SubCutaneous three times a day before meals  insulin lispro (HumaLOG) corrective regimen sliding scale   SubCutaneous at bedtime  metroNIDAZOLE    Tablet 500 milliGRAM(s) Oral every 8 hours  oxyCODONE  ER Tablet 30 milliGRAM(s) Oral every 8 hours  pantoprazole    Tablet 40 milliGRAM(s) Oral before breakfast  senna 2 Tablet(s) Oral at bedtime  sodium chloride 0.9%. 1000 milliLiter(s) (100 mL/Hr) IV Continuous <Continuous>  sucralfate suspension 1 Gram(s) Oral two times a day  tamsulosin 0.4 milliGRAM(s) Oral at bedtime    MEDICATIONS  (PRN):  bisacodyl Suppository 10 milliGRAM(s) Rectal daily PRN Constipation  dextrose 40% Gel 15 Gram(s) Oral once PRN Blood Glucose LESS THAN 70 milliGRAM(s)/deciliter  glucagon  Injectable 1 milliGRAM(s) IntraMuscular once PRN Glucose LESS THAN 70 milligrams/deciliter  ondansetron Injectable 4 milliGRAM(s) IV Push every 6 hours PRN Nausea and/or Vomiting  oxyCODONE    IR 10 milliGRAM(s) Oral every 4 hours PRN breakthrough pain      Allergies  No Known Drug Allergies  tegaderm (Rash; Urticaria)      Review of Systems  neg except per HPI     Vital signs past 24h  Vital Signs Last 24 Hrs  T(C): 36.4 (08 Nov 2018 15:57), Max: 37.3 (07 Nov 2018 22:37)  T(F): 97.5 (08 Nov 2018 15:57), Max: 99.1 (07 Nov 2018 22:37)  HR: 75 (08 Nov 2018 15:57) (75 - 84)  BP: 115/65 (08 Nov 2018 15:57) (98/58 - 144/90)  BP(mean): --  RR: 18 (08 Nov 2018 15:57) (16 - 18)  SpO2: 98% (08 Nov 2018 15:57) (95% - 98%)    Ins and Outs past 24h    11-07-18 @ 07:01  -  11-08-18 @ 07:00  --------------------------------------------------------  IN: 0 mL / OUT: 200 mL / NET: -200 mL    11-08-18 @ 07:01  -  11-08-18 @ 18:16  --------------------------------------------------------  IN: 0 mL / OUT: 550 mL / NET: -550 mL        Physical Exam  Constitutional: NAD, alert, awake  HENT:         Head: Normocephalic, atraumatic       Face: Symmetric, no lesion or mass       Nose: No external deformity, clear anteriorly       Oral cavity and oropharynx: tongue midline, floor of mouth flat and soft, uvula midline, no lesion or edema seen in oropharynx       Voice: mildly dysphonic, hoarse, but good enunciation of words. Speaks in full sentences       Neck: Soft, flat, trachea midline, no lymphadenopathy  Eyes: EOMI  Pulmonary/Chest: Breathing comfortably on room air, no stridor or stertor    Flexible Laryngoscopy  NC: no turbinate hypertrophy, no copious secretions  OC/OP: palate, uvula, tonsils, vallecula, BOT, posterior pharyngeal wall wnl  Supraglottis: epiglottis, AE folds, arytenoids, false vocal cords wnl  Hypopharynx: pyriform sinuses, posterior pharyngeal wall, and post-cricoid area with some pooling of secretions but no erythema noted   Glottis: RIGHT true vocal cord has normal mobility, but the LEFT is mildly hypomobile and with some atrophy, thinned. but full glottic closure noted.       Labs                        11.7   10.32 )-----------( 208      ( 07 Nov 2018 04:50 )             35.5     11-08    141  |  104  |  19  ----------------------------<  108<H>  3.6   |  26  |  1.91<H>    Ca    9.1      08 Nov 2018 04:40  Phos  3.9     11-08  Mg     2.0     11-08

## 2018-11-08 NOTE — PROGRESS NOTE ADULT - ATTENDING COMMENTS
Patient seen and examined, chart and labs reviewed.    77M HTN, DM-2, COPD, PE on Eliquis, dysphagia, BCC s/p resection, Hep B, CKD4, and mutated NSCLC (s/p erlotinib, carboplatin/pemetrexed, atezolizumab) now on gilotrif (abatnif), p/w lethargy, aspiration pneumonia, LUX.  Pt has chronic pain that is slowly improving on increased doses of Oxy ER TID.  He was on mscontin 30 mg tid and oxycodone 10 mg q4h prn as outpt per pain management, though bone scan and PET neg for bone mets.  Pt has been having persistent pain in throat for which he saw ENT as outpt and had laryngoscopy done - notable for possible thrush plaques.      Assessment/plan:  # Lethargy, acute metabolic encephalopathy now resolved: in the setting of aspiration pneumonia/sepsis, mental status improved, CT head neg    #Aspiration pneumonia: bilateral opacities with large LLL infiltrate on CXR,  sputum cx klebsiella sensitive to Cipro, last day of abx today.   seen by s/s, puree diet with honey thick liquid, aspiration precaution    # chronic pain: due to lung CA? no bone mets on PET/bone scan. Pain slowly improving with increased Oxy ER 30mg TID dosing; pt now aware that he can ask for PRN doses of Oxy IR.  Based on next 24 hours, will calculate increased pain needs according to PRN requirements.      #Odynophagia: this has been a chronic problem for patient.  ENT to evaluate inpatient for laryngoscopy, would appreciate any findings of thrush/plaques.  Continue with PPI and Sucralfate, consider Diflucan.     #NSCLC: hold abatnif in the setting of infection, f/u onc as outpatient, pt states he wishes to resume treatment.     # LUX: in the setting of sepsis vs combination Vanco/Zosyn nephrotoxic combination? renal US no hydronephrosis, urine studies rechecked with FeNa 1.7%. Restart trial of IVF today and continue to monitor BMP.      # HBV: hold tenofovir in the setting of LUX    # H/o PE: switched back to Eliquis with renal dosing.     #Dispo: PT recommends outpatient PT. Patient seen and examined, chart and labs reviewed.    77M HTN, DM-2, COPD, PE on Eliquis, dysphagia, BCC s/p resection, Hep B, CKD4, and mutated NSCLC (s/p erlotinib, carboplatin/pemetrexed, atezolizumab) now on gilotrif (abatnif), p/w lethargy, aspiration pneumonia, LUX.  Pt has chronic pain that is slowly improving on increased doses of Oxy ER TID.  He was on mscontin 30 mg tid and oxycodone 10 mg q4h prn as outpt per pain management, though bone scan and PET neg for bone mets.  Pt has been having persistent pain in throat for which he saw ENT as outpt and had laryngoscopy done - notable for possible thrush plaques.      Assessment/plan:  # Lethargy, acute metabolic encephalopathy now resolved: in the setting of aspiration pneumonia/sepsis, mental status improved, CT head neg    #Aspiration pneumonia: bilateral opacities with large LLL infiltrate on CXR,  sputum cx klebsiella sensitive to Cipro, last day of abx today.   seen by s/s, puree diet with honey thick liquid, aspiration precaution    # chronic pain: due to lung CA? no bone mets on PET/bone scan. Pain slowly improving with increased Oxy ER 30mg TID dosing; pt now aware that he can ask for PRN doses of Oxy IR.  Based on next 24 hours, will calculate increased pain needs according to PRN requirements.      #Odynophagia: this has been a chronic problem for patient.  ENT to evaluate inpatient for laryngoscopy, would appreciate any findings of thrush/plaques.  Continue with PPI and Sucralfate, consider Diflucan.     #NSCLC: hold abatnif in the setting of infection, f/u onc as outpatient, pt states he wishes to resume treatment.     # LUX: in the setting of sepsis vs combination Vanco/Zosyn nephrotoxic combination? renal US no hydronephrosis, urine studies rechecked with FeNa 1.7%. Restart trial of IVF today and continue to monitor BMP.      # HBV: hold tenofovir in the setting of LUX    # H/o PE: switched back to Eliquis with renal dosing.     # Severe protein calorie malnutrition: appreciate dietary recs. On ensure supplementation.      #Dispo: PT recommends outpatient PT. Spoke extensively to patient's son at bedside today.

## 2018-11-09 ENCOUNTER — OUTPATIENT (OUTPATIENT)
Dept: OUTPATIENT SERVICES | Facility: HOSPITAL | Age: 78
LOS: 1 days | Discharge: HOME | End: 2018-11-09

## 2018-11-09 VITALS
SYSTOLIC BLOOD PRESSURE: 154 MMHG | RESPIRATION RATE: 18 BRPM | TEMPERATURE: 98 F | HEART RATE: 79 BPM | OXYGEN SATURATION: 98 % | DIASTOLIC BLOOD PRESSURE: 90 MMHG

## 2018-11-09 DIAGNOSIS — C44.311 BASAL CELL CARCINOMA OF SKIN OF NOSE: Chronic | ICD-10-CM

## 2018-11-09 DIAGNOSIS — C34.90 MALIGNANT NEOPLASM OF UNSPECIFIED PART OF UNSPECIFIED BRONCHUS OR LUNG: ICD-10-CM

## 2018-11-09 DIAGNOSIS — D04.9 CARCINOMA IN SITU OF SKIN, UNSPECIFIED: Chronic | ICD-10-CM

## 2018-11-09 LAB
BUN SERPL-MCNC: 21 MG/DL — SIGNIFICANT CHANGE UP (ref 7–23)
CALCIUM SERPL-MCNC: 8.5 MG/DL — SIGNIFICANT CHANGE UP (ref 8.4–10.5)
CHLORIDE SERPL-SCNC: 103 MMOL/L — SIGNIFICANT CHANGE UP (ref 98–107)
CO2 SERPL-SCNC: 24 MMOL/L — SIGNIFICANT CHANGE UP (ref 22–31)
CREAT SERPL-MCNC: 1.83 MG/DL — HIGH (ref 0.5–1.3)
GLUCOSE SERPL-MCNC: 139 MG/DL — HIGH (ref 70–99)
MAGNESIUM SERPL-MCNC: 1.9 MG/DL — SIGNIFICANT CHANGE UP (ref 1.6–2.6)
PHOSPHATE SERPL-MCNC: 3.2 MG/DL — SIGNIFICANT CHANGE UP (ref 2.5–4.5)
POTASSIUM SERPL-MCNC: 3.8 MMOL/L — SIGNIFICANT CHANGE UP (ref 3.5–5.3)
POTASSIUM SERPL-SCNC: 3.8 MMOL/L — SIGNIFICANT CHANGE UP (ref 3.5–5.3)
SODIUM SERPL-SCNC: 140 MMOL/L — SIGNIFICANT CHANGE UP (ref 135–145)

## 2018-11-09 PROCEDURE — 99233 SBSQ HOSP IP/OBS HIGH 50: CPT | Mod: GC

## 2018-11-09 RX ORDER — SODIUM CHLORIDE 9 MG/ML
1000 INJECTION INTRAMUSCULAR; INTRAVENOUS; SUBCUTANEOUS
Qty: 0 | Refills: 0 | Status: DISCONTINUED | OUTPATIENT
Start: 2018-11-09 | End: 2018-11-10

## 2018-11-09 RX ADMIN — SODIUM CHLORIDE 75 MILLILITER(S): 9 INJECTION INTRAMUSCULAR; INTRAVENOUS; SUBCUTANEOUS at 21:03

## 2018-11-09 RX ADMIN — OXYCODONE HYDROCHLORIDE 30 MILLIGRAM(S): 5 TABLET ORAL at 05:57

## 2018-11-09 RX ADMIN — TAMSULOSIN HYDROCHLORIDE 0.4 MILLIGRAM(S): 0.4 CAPSULE ORAL at 21:01

## 2018-11-09 RX ADMIN — Medication 1 GRAM(S): at 05:56

## 2018-11-09 RX ADMIN — Medication 1: at 13:07

## 2018-11-09 RX ADMIN — OXYCODONE HYDROCHLORIDE 30 MILLIGRAM(S): 5 TABLET ORAL at 21:35

## 2018-11-09 RX ADMIN — OXYCODONE HYDROCHLORIDE 10 MILLIGRAM(S): 5 TABLET ORAL at 18:46

## 2018-11-09 RX ADMIN — OXYCODONE HYDROCHLORIDE 30 MILLIGRAM(S): 5 TABLET ORAL at 13:07

## 2018-11-09 RX ADMIN — OXYCODONE HYDROCHLORIDE 30 MILLIGRAM(S): 5 TABLET ORAL at 21:01

## 2018-11-09 RX ADMIN — OXYCODONE HYDROCHLORIDE 30 MILLIGRAM(S): 5 TABLET ORAL at 14:07

## 2018-11-09 RX ADMIN — APIXABAN 2.5 MILLIGRAM(S): 2.5 TABLET, FILM COATED ORAL at 09:35

## 2018-11-09 RX ADMIN — Medication 100 MILLIGRAM(S): at 13:07

## 2018-11-09 RX ADMIN — OXYCODONE HYDROCHLORIDE 10 MILLIGRAM(S): 5 TABLET ORAL at 17:46

## 2018-11-09 RX ADMIN — APIXABAN 2.5 MILLIGRAM(S): 2.5 TABLET, FILM COATED ORAL at 21:01

## 2018-11-09 RX ADMIN — SODIUM CHLORIDE 75 MILLILITER(S): 9 INJECTION INTRAMUSCULAR; INTRAVENOUS; SUBCUTANEOUS at 17:46

## 2018-11-09 RX ADMIN — Medication 1 GRAM(S): at 17:46

## 2018-11-09 RX ADMIN — PANTOPRAZOLE SODIUM 40 MILLIGRAM(S): 20 TABLET, DELAYED RELEASE ORAL at 06:00

## 2018-11-09 NOTE — PROGRESS NOTE ADULT - SUBJECTIVE AND OBJECTIVE BOX
CHIEF COMPLAINT: SOB    Interval Events:  No acute events overnight.     REVIEW OF SYSTEMS:  Constitutional: [ ] negative [ ] fevers [ ] chills [ ] weight loss [ ] weight gain  HEENT: [ ] negative [ ] dry eyes [ ] eye irritation [ ] postnasal drip [ ] nasal congestion  CV: [ ] negative  [ ] chest pain [ ] orthopnea [ ] palpitations [ ] murmur  Resp: [ ] negative [ ] cough [ ] shortness of breath [ ] dyspnea [ ] wheezing [ ] sputum [ ] hemoptysis  GI: [ ] negative [ ] nausea [ ] vomiting [ ] diarrhea [ ] constipation [ ] abd pain [ ] dysphagia   : [ ] negative [ ] dysuria [ ] nocturia [ ] hematuria [ ] increased urinary frequency  Musculoskeletal: [ ] negative [ ] back pain [ ] myalgias [ ] arthralgias [ ] fracture  Skin: [ ] negative [ ] rash [ ] itch  Neurological: [ ] negative [ ] headache [ ] dizziness [ ] syncope [ ] weakness [ ] numbness  Psychiatric: [ ] negative [ ] anxiety [ ] depression  Endocrine: [ ] negative [ ] diabetes [ ] thyroid problem  Hematologic/Lymphatic: [ ] negative [ ] anemia [ ] bleeding problem  Allergic/Immunologic: [ ] negative [ ] itchy eyes [ ] nasal discharge [ ] hives [ ] angioedema  [ ] All other systems negative  [ ] Unable to assess ROS because ________    OBJECTIVE:  T(C): 36.5 (2018 06:01), Max: 36.8 (2018 11:31)  T(F): 97.7 (2018 06:01), Max: 98.3 (2018 14:56)  HR: 81 (2018 06:01) (75 - 82)  BP: 147/84 (2018 06:01) (98/58 - 147/84)  RR: 17 (2018 06:01) (16 - 18)  SpO2: 95% (2018 06:01) (95% - 98%)         @ 07:01  -   @ 07:00  --------------------------------------------------------  IN: 0 mL / OUT: 550 mL / NET: -550 mL      CAPILLARY BLOOD GLUCOSE      POCT Blood Glucose.: 143 mg/dL (2018 22:23)      PHYSICAL EXAM:  General:   HEENT:   Lymph Nodes:  Neck:   Respiratory:   Cardiovascular:   Abdomen:   Extremities:   Skin:   Neurological:  Psychiatry:    LINES:    HOSPITAL MEDICATIONS:  apixaban 2.5 milliGRAM(s) Oral every 12 hours      tamsulosin 0.4 milliGRAM(s) Oral at bedtime    dextrose 40% Gel 15 Gram(s) Oral once PRN  dextrose 50% Injectable 12.5 Gram(s) IV Push once  dextrose 50% Injectable 25 Gram(s) IV Push once  dextrose 50% Injectable 25 Gram(s) IV Push once  glucagon  Injectable 1 milliGRAM(s) IntraMuscular once PRN  insulin lispro (HumaLOG) corrective regimen sliding scale   SubCutaneous three times a day before meals  insulin lispro (HumaLOG) corrective regimen sliding scale   SubCutaneous at bedtime      ondansetron Injectable 4 milliGRAM(s) IV Push every 6 hours PRN  oxyCODONE    IR 10 milliGRAM(s) Oral every 4 hours PRN  oxyCODONE  ER Tablet 30 milliGRAM(s) Oral every 8 hours    bisacodyl Suppository 10 milliGRAM(s) Rectal daily PRN  docusate sodium 100 milliGRAM(s) Oral daily  pantoprazole    Tablet 40 milliGRAM(s) Oral before breakfast  senna 2 Tablet(s) Oral at bedtime  sucralfate suspension 1 Gram(s) Oral two times a day        dextrose 5%. 1000 milliLiter(s) IV Continuous <Continuous>  sodium chloride 0.9%. 1000 milliLiter(s) IV Continuous <Continuous>            LABS:    Hgb Trend: 11.7<--, 11.9<--, 11.1<--, 10.9<--  11-08    141  |  104  |  19  ----------------------------<  108<H>  3.6   |  26  |  1.91<H>    Ca    9.1      2018 04:40  Phos  3.9     11-08  Mg     2.0     08      Creatinine Trend: 1.91<--, 1.89<--, 1.61<--, 1.64<--, 1.61<--, 1.70<--    Urinalysis Basic - ( 2018 09:56 )    Color: LIGHT YELLOW / Appearance: CLEAR / S.009 / pH: 7.5  Gluc: NEGATIVE / Ketone: NEGATIVE  / Bili: NEGATIVE / Urobili: NORMAL   Blood: MODERATE / Protein: 20 / Nitrite: NEGATIVE   Leuk Esterase: NEGATIVE / RBC: >50 / WBC 0-2   Sq Epi: OCC / Non Sq Epi: x / Bacteria: NEGATIVE            MICROBIOLOGY:     RADIOLOGY:  [ ] Reviewed and interpreted by me    EKG: CHIEF COMPLAINT: SOB    Interval Events:  No acute events overnight. Patient was seen and examined at bedside. He reports that his pain is "better" and it is a 6/10. Per patient, yesterday was 8/10. He denies SOB, N/V, or headaches. He does endorse mild lower abdominal tenderness. Denies dysuria, difficulty urinating, or hematuria.     REVIEW OF SYSTEMS:  Constitutional: [x] negative [ ] fevers [ ] chills [ ] weight loss [ ] weight gain  HEENT: [x] negative [ ] dry eyes [ ] eye irritation [ ] postnasal drip [ ] nasal congestion  CV: [ ] negative  [x] chest pain [ ] orthopnea [ ] palpitations [ ] murmur  Resp: [x] negative [ ] cough [ ] shortness of breath [ ] dyspnea [ ] wheezing [ ] sputum [ ] hemoptysis  GI: [x] negative [ ] nausea [ ] vomiting [ ] diarrhea [ ] constipation [ ] abd pain [ ] dysphagia   : [x] negative [ ] dysuria [ ] nocturia [ ] hematuria [ ] increased urinary frequency  Musculoskeletal: [ ] negative [x] back pain [ ] myalgias [ ] arthralgias [ ] fracture  Skin: [x] negative [ ] rash [ ] itch  Neurological: [x] negative [ ] headache [ ] dizziness [ ] syncope [ ] weakness [ ] numbness  Psychiatric: [x] negative [ ] anxiety [ ] depression  Endocrine: [x] negative [ ] diabetes [ ] thyroid problem  Hematologic/Lymphatic: [ ] negative [ ] anemia [ ] bleeding problem  Allergic/Immunologic: [ ] negative [ ] itchy eyes [ ] nasal discharge [ ] hives [ ] angioedema  [ ] All other systems negative  [ ] Unable to assess ROS because ________    OBJECTIVE:  T(C): 36.5 (2018 06:01), Max: 36.8 (2018 11:31)  T(F): 97.7 (2018 06:01), Max: 98.3 (2018 14:56)  HR: 81 (2018 06:01) (75 - 82)  BP: 147/84 (2018 06:01) (98/58 - 147/84)  RR: 17 (2018 06:01) (16 - 18)  SpO2: 95% (2018 06:01) (95% - 98%)         @ 07:01  -   @ 07:00  --------------------------------------------------------  IN: 0 mL / OUT: 550 mL / NET: -550 mL      CAPILLARY BLOOD GLUCOSE      POCT Blood Glucose.: 143 mg/dL (2018 22:23)    PHYSICAL EXAM:  GENERAL: NAD, well-developed  HEAD:  Atraumatic, Normocephalic  EYES: EOMI, PERRLA, conjunctiva and sclera clear  NECK: Supple, No JVD  CHEST/LUNG: expiratory wheeze b/l, decreased breath sounds at bases,   HEART: S1 and S2, Regular rate and rhythm; No murmurs, rubs, or gallops  ABDOMEN: Soft, Nontender, Nondistended; Bowel sounds present  EXTREMITIES:  2+ Peripheral Pulses, No clubbing, cyanosis, or edema  PSYCH: AAOx3  NEUROLOGY: non-focal,CN grossly intact  SKIN: No rashes or lesions      LINES:    HOSPITAL MEDICATIONS:  apixaban 2.5 milliGRAM(s) Oral every 12 hours      tamsulosin 0.4 milliGRAM(s) Oral at bedtime    dextrose 40% Gel 15 Gram(s) Oral once PRN  dextrose 50% Injectable 12.5 Gram(s) IV Push once  dextrose 50% Injectable 25 Gram(s) IV Push once  dextrose 50% Injectable 25 Gram(s) IV Push once  glucagon  Injectable 1 milliGRAM(s) IntraMuscular once PRN  insulin lispro (HumaLOG) corrective regimen sliding scale   SubCutaneous three times a day before meals  insulin lispro (HumaLOG) corrective regimen sliding scale   SubCutaneous at bedtime      ondansetron Injectable 4 milliGRAM(s) IV Push every 6 hours PRN  oxyCODONE    IR 10 milliGRAM(s) Oral every 4 hours PRN  oxyCODONE  ER Tablet 30 milliGRAM(s) Oral every 8 hours    bisacodyl Suppository 10 milliGRAM(s) Rectal daily PRN  docusate sodium 100 milliGRAM(s) Oral daily  pantoprazole    Tablet 40 milliGRAM(s) Oral before breakfast  senna 2 Tablet(s) Oral at bedtime  sucralfate suspension 1 Gram(s) Oral two times a day        dextrose 5%. 1000 milliLiter(s) IV Continuous <Continuous>  sodium chloride 0.9%. 1000 milliLiter(s) IV Continuous <Continuous>            LABS:    Hgb Trend: 11.7<--, 11.9<--, 11.1<--, 10.9<--  11-08    141  |  104  |  19  ----------------------------<  108<H>  3.6   |  26  |  1.91<H>    Ca    9.1      2018 04:40  Phos  3.9       Mg     2.0           Creatinine Trend: 1.91<--, 1.89<--, 1.61<--, 1.64<--, 1.61<--, 1.70<--    Urinalysis Basic - ( 2018 09:56 )    Color: LIGHT YELLOW / Appearance: CLEAR / S.009 / pH: 7.5  Gluc: NEGATIVE / Ketone: NEGATIVE  / Bili: NEGATIVE / Urobili: NORMAL   Blood: MODERATE / Protein: 20 / Nitrite: NEGATIVE   Leuk Esterase: NEGATIVE / RBC: >50 / WBC 0-2   Sq Epi: OCC / Non Sq Epi: x / Bacteria: NEGATIVE            MICROBIOLOGY:     RADIOLOGY:  [ ] Reviewed and interpreted by me    EKG:

## 2018-11-09 NOTE — PROGRESS NOTE ADULT - PROBLEM SELECTOR PLAN 4
- patient creatinine increased to 1.9 ( baseline is 0.8-0.9)  -pt complaining difficulty starting to urinate, likely 2/2 BPH. Tamsulosin restarted yesterday  -Patient making adequate amount of urine (1.2 L ON), Bladder Scan showed 25 mL: patient unlikely to be retaining urine  -sent UA which showed moderate blood which is consistent w/ his previous UA. Negative for nitrites and leuk esterases; symptoms unlikely 2/2 UTI  -urine lytes show FeNa 1.7%-intrinsic kidney injury  -started  cc/hr  -Renal U/S did not show evidence of hydronephrosis

## 2018-11-09 NOTE — PROGRESS NOTE ADULT - PROBLEM SELECTOR PLAN 1
- patient presented with tachycardia and elevated WBC count with evidence of PNA  - Sepsis now resolved.   -Completed 6 days of zosyn IV q8h  -Day 2/2 ciprofloxacin 500 PO BID and metronidazole 500 PO q8h (based on sensitivities)  -completed 7/7 days of total abx

## 2018-11-09 NOTE — PROGRESS NOTE ADULT - PROBLEM SELECTOR PLAN 6
- patient on morphine 30q8 and and 10 of oxy q4 at home   -pain is mainly located in chest; recent PET had showed no bony mets  -initial concern for opioid induced lethargy, s/p narcan   -palliative consulted; appreciated recs  -Over the past 24 hours, patient required 3 RNS of oxycodone IR. Reports that pain is improved  pain management:  oxycontin 30 mg q8h, oxycodone IR 10 q4h PRN  -oxycontin requires prior authorization, sent to pharmacy in advance

## 2018-11-09 NOTE — PROGRESS NOTE ADULT - PROBLEM SELECTOR PLAN 9
- CT from April revealing bilateral pulmonary emboli  -c/w eliquis PO    11. Goals of care:  -Discussed resuscitation and intubation with son and patient. They have not come to a decision at this time, but will talk about it overnight. Full code for now. Are interested in filling out a MOLST form.    12. Hepatitis B- on tenofovir  - hold for now     13. DVT ppx  - on eliquis (b/c of worsening creatinine clearance, decreased eliquis to 2.5)

## 2018-11-09 NOTE — PROGRESS NOTE ADULT - PROBLEM SELECTOR PLAN 3
- evaluated by ENT, Dr. 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 Showed pooling of secretions which is consistent w/ speech eval. No further intervention needed  -patient will f/u Dr. Prado as outpt

## 2018-11-09 NOTE — PROGRESS NOTE ADULT - ATTENDING COMMENTS
Patient seen and examined, chart and labs reviewed.    77M HTN, DM-2, COPD, PE on Eliquis, dysphagia, BCC s/p resection, Hep B, CKD4, and mutated NSCLC (s/p erlotinib, carboplatin/pemetrexed, atezolizumab) now on gilotrif (abatnif), p/w lethargy, aspiration pneumonia, LUX.  Pt has chronic pain that is slowly improving on increased doses of Oxy ER TID.  He was on mscontin 30 mg tid and oxycodone 10 mg q4h prn as outpt per pain management, though bone scan and PET neg for bone mets.  Pt has been having persistent pain in throat for which he saw ENT as outpt and had laryngoscopy done - notable for possible thrush plaques.      Assessment/plan:  # Lethargy, acute metabolic encephalopathy now resolved: in the setting of aspiration pneumonia/sepsis, mental status improved, CT head neg    #Aspiration pneumonia: bilateral opacities with large LLL infiltrate on CXR,  sputum cx klebsiella sensitive to Cipro, completed course.   seen by s/s, puree diet with honey thick liquid, aspiration precaution    # chronic pain: due to lung CA? no bone mets on PET/bone scan. Pain slowly improving with increased Oxy ER 30mg TID dosing; pt now aware that he can ask for PRN doses of Oxy IR.  Based on next 24 hours, will calculate increased pain needs according to PRN requirements.      #Odynophagia: this has been a chronic problem for patient.  Appreciate ENT evaluation, no thrush noted on repeat laryngoscopy, likely food residue.     #NSCLC: hold abatnif in the setting of infection, f/u onc as outpatient, pt states he wishes to resume treatment.     # LUX: in the setting of sepsis vs combination Vanco/Zosyn nephrotoxic combination? renal US no hydronephrosis, urine studies rechecked with FeNa 1.7%.  Cr improving today.      # HBV: hold tenofovir in the setting of LUX    # H/o PE: switched back to Eliquis with renal dosing.     # Severe protein calorie malnutrition: appreciate dietary recs. On ensure supplementation.      #Dispo: PT recommends outpatient PT. Spoke extensively to patient's son.  anticipate d/c home tomorrow.

## 2018-11-10 PROCEDURE — 99239 HOSP IP/OBS DSCHRG MGMT >30: CPT

## 2018-11-10 RX ORDER — GABAPENTIN 400 MG/1
1 CAPSULE ORAL
Qty: 0 | Refills: 0 | COMMUNITY

## 2018-11-10 RX ORDER — SENNA PLUS 8.6 MG/1
2 TABLET ORAL
Qty: 0 | Refills: 0 | COMMUNITY

## 2018-11-10 RX ORDER — APIXABAN 2.5 MG/1
1 TABLET, FILM COATED ORAL
Qty: 60 | Refills: 0 | OUTPATIENT
Start: 2018-11-10 | End: 2018-12-09

## 2018-11-10 RX ADMIN — OXYCODONE HYDROCHLORIDE 10 MILLIGRAM(S): 5 TABLET ORAL at 10:41

## 2018-11-10 RX ADMIN — OXYCODONE HYDROCHLORIDE 30 MILLIGRAM(S): 5 TABLET ORAL at 14:15

## 2018-11-10 RX ADMIN — Medication 100 MILLIGRAM(S): at 13:15

## 2018-11-10 RX ADMIN — Medication 1 GRAM(S): at 05:44

## 2018-11-10 RX ADMIN — OXYCODONE HYDROCHLORIDE 10 MILLIGRAM(S): 5 TABLET ORAL at 09:41

## 2018-11-10 RX ADMIN — OXYCODONE HYDROCHLORIDE 30 MILLIGRAM(S): 5 TABLET ORAL at 13:15

## 2018-11-10 RX ADMIN — PANTOPRAZOLE SODIUM 40 MILLIGRAM(S): 20 TABLET, DELAYED RELEASE ORAL at 06:09

## 2018-11-10 RX ADMIN — OXYCODONE HYDROCHLORIDE 30 MILLIGRAM(S): 5 TABLET ORAL at 05:44

## 2018-11-10 RX ADMIN — OXYCODONE HYDROCHLORIDE 30 MILLIGRAM(S): 5 TABLET ORAL at 06:20

## 2018-11-10 RX ADMIN — APIXABAN 2.5 MILLIGRAM(S): 2.5 TABLET, FILM COATED ORAL at 09:38

## 2018-11-10 NOTE — PROGRESS NOTE ADULT - ASSESSMENT
77M with PMhx of HTN, HLD, BPH, COPD, DM, GERD, PE (on eliquis), dysphagia, BCC s/p resection, Hep B, IV CKD, and mutated NSCLC (s/p erlotinib, carboplatin/pemetrexed, atezolizumab) now on gilotrif (abatnif) presents with few days of worsening lethargy and weakness, with witnessed aspiration event, admitted for aspiration PNA, complicated by LUX on CKD as well as chronic pain not well controlled in hospital.
77M with PMhx of HTN, HLD, BPH, COPD, DM, GERD, PE (on eliquis), dysphagia, BCC s/p resection, Hep B, IV CKD, and mutated NSCLC (s/p erlotinib, carboplatin/pemetrexed, atezolizumab) now on gilotrif (abatnif) presents with few days of worsening lethargy and weakness, with witnessed aspiration event, admitted for aspiration PNA, complicated by LUX on CKD.
77M with PMhx of HTN, HLD, BPH, COPD, DM, GERD, PE (on eliquis), dysphagia, BCC s/p resection, Hep B, IV CKD, and mutated NSCLC (s/p erlotinib, carboplatin/pemetrexed, atezolizumab) now on gilotrif (abatnif) presents with few days of worsening lethargy and weakness, with witnessed aspiration event, admitted for aspiration PNA, complicated by LUX on CKD as well as chronic pain not well controlled in hospital.
77M with PMhx of stage HTN, HLD, BPH, COPD, DM, GERD, BCC s/p resection, Hep B, IV CKD, and mutated NSCLC (s/p erlotinib, carboplatin/pemetrexed, atezolizumab) now on gilotrif (abatnif) presented to his oncologist office today was advised to come to the ED due to concern for aspiration PNA and lethargy.  Palliative consulted for pain management.
77M with PMhx of HTN, HLD, BPH, COPD, DM, GERD, PE (on eliquis), dysphagia, BCC s/p resection, Hep B, IV CKD, and mutated NSCLC (s/p erlotinib, carboplatin/pemetrexed, atezolizumab) now on gilotrif (abatnif) presents with few days of worsening lethargy and weakness, with witnessed aspiration event, admitted for aspiration PNA, complicated by LUX on CKD as well as chronic pain not well controlled in hospital.
77M with PMhx of HTN, HLD, BPH, COPD, DM, GERD, PE (on eliquis), dysphagia, BCC s/p resection, Hep B, IV CKD, and mutated NSCLC (s/p erlotinib, carboplatin/pemetrexed, atezolizumab) now on gilotrif (abatnif) presents with few days of worsening lethargy and weakness, with witnessed aspiration event, admitted for aspiration PNA, complicated by LUX on CKD as well as chronic pain not well controlled in hospital.
77M with PMhx of stage HTN, HLD, BPH, COPD, DM, GERD, PE, dysphagia, BCC s/p resection, Hep B, IV CKD, and mutated NSCLC (s/p erlotinib, carboplatin/pemetrexed, atezolizumab) now on gilotrif (abatnif) presents with few days of worsening lethargy and weakness, with witnessed aspiration event, admitted for aspiration PNA, complicated by LUX on CKD.

## 2018-11-10 NOTE — PROGRESS NOTE ADULT - PROBLEM SELECTOR PROBLEM 8
Diabetes
COPD (chronic obstructive pulmonary disease)
COPD (chronic obstructive pulmonary disease)
Diabetes

## 2018-11-10 NOTE — PROGRESS NOTE ADULT - PROVIDER SPECIALTY LIST ADULT
Internal Medicine
Palliative Care
Internal Medicine

## 2018-11-10 NOTE — PROGRESS NOTE ADULT - REASON FOR ADMISSION
shortness of breath

## 2018-11-10 NOTE — PROGRESS NOTE ADULT - PROBLEM SELECTOR PROBLEM 9
Pulmonary embolism
Diabetes
Pulmonary embolism
Diabetes
Pulmonary embolism

## 2018-11-10 NOTE — PROGRESS NOTE ADULT - PROBLEM SELECTOR PLAN 4
- patient creatinine increased to 1.9 ( baseline is 0.8-0.9)  -pt complaining difficulty starting to urinate, likely 2/2 BPH. Tamsulosin restarted yesterday  -Patient making adequate amount of urine (1.2 L ON), Bladder Scan showed 25 mL: patient unlikely to be retaining urine  -sent UA which showed moderate blood which is consistent w/ his previous UA. Negative for nitrites and leuk esterases; symptoms unlikely 2/2 UTI  -urine lytes show FeNa 1.7%-intrinsic kidney injury  -NS 75 cc/hr  -Renal U/S did not show evidence of hydronephrosis

## 2018-11-10 NOTE — PROGRESS NOTE ADULT - PROBLEM SELECTOR PLAN 9
- CT from April revealing bilateral pulmonary emboli  -c/w eliquis PO    11. Goals of care:  -Discussed resuscitation and intubation with son and patient. They have not come to a decision at this time, but will talk about it overnight. Full code for now. Are interested in filling out a MOLST form.    12. Hepatitis B- on tenofovir  - hold for now     13. DVT ppx  - on eliquis (b/c of worsening creatinine clearance, decreased eliquis to 2.5) - CT from April revealing bilateral pulmonary emboli  -c/w eliquis PO    11. Goals of care:  -Discussed resuscitation and intubation with son and patient. They have not come to a decision at this time, but will talk about it overnight. Full code for now. Are interested in filling out a MOLST form.    12. Hepatitis B- on tenofovir  - hold for now     13. DVT ppx  - on eliquis (b/c of worsening creatinine clearance, decreased eliquis to 2.5)    Dispo: Discharge today

## 2018-11-10 NOTE — PROGRESS NOTE ADULT - PROBLEM SELECTOR PLAN 8
- c/w ISS  -A1c 5.8%
- c/w ISS  -A1c 5.8%
- patient does not use inhalers regularly at home nor nebulizers  - will give duonebs as needed  - does not require O2 at this time- maintain sat > 92%
- c/w ISS  -A1c 5.8%
- patient does not use inhalers regularly at home nor nebulizers  - will give duonebs as needed  - does not require O2 at this time- maintain sat > 92%
- c/w ISS  -A1c 5.8%

## 2018-11-10 NOTE — PROGRESS NOTE ADULT - PROBLEM SELECTOR PROBLEM 3
Metabolic encephalopathy
Dysphagia
Metabolic encephalopathy
Debility
Dysphagia

## 2018-11-10 NOTE — PROGRESS NOTE ADULT - PROBLEM SELECTOR PLAN 7
- patient does not use inhalers regularly at home nor nebulizers  - will give duonebs as needed  - does not require O2 at this time- maintain sat > 92%
- patient on morphine 30q8 and and 10 of oxy q4  - in ED, patient noted to be markedly lethargic- will hold of on pain medications at this time  - pall care consult in AM regarding patient medication titration  - unclear etiology of pain- PET scan is negative for bony mets; may be pleural pain from cancer +/- GERD
- patient does not use inhalers regularly at home nor nebulizers  - will give duonebs as needed  - does not require O2 at this time- maintain sat > 92%
- patient on morphine 30q8 and and 10 of oxy q4  -pain is mainly located in chest; recent PET had showed no bony mets  -initial concern for opioid induced lethargy, s/p narcan   -palliative consulted; appreciated recs  -Pain regimen: dilaudid 0.5 IV q6h, dilaudid 0.4 IV q2h PRN
- patient does not use inhalers regularly at home nor nebulizers  - will give duonebs as needed  - does not require O2 at this time- maintain sat > 92%

## 2018-11-10 NOTE — PROGRESS NOTE ADULT - PROBLEM SELECTOR PROBLEM 6
Neoplasm related pain
BPH (benign prostatic hyperplasia)
BPH (benign prostatic hyperplasia)
Neoplasm related pain

## 2018-11-10 NOTE — PROGRESS NOTE ADULT - PROBLEM SELECTOR PROBLEM 1
Sepsis due to pneumonia
Lung cancer
Sepsis due to pneumonia

## 2018-11-10 NOTE — PROGRESS NOTE ADULT - PROBLEM SELECTOR PROBLEM 4
LUX (acute kidney injury)
Dysphagia
Dysphagia
LUX (acute kidney injury)
Palliative care encounter
LUX (acute kidney injury)

## 2018-11-10 NOTE — PROGRESS NOTE ADULT - PROBLEM SELECTOR PROBLEM 7
COPD (chronic obstructive pulmonary disease)
Neoplasm related pain
COPD (chronic obstructive pulmonary disease)
Neoplasm related pain
COPD (chronic obstructive pulmonary disease)

## 2018-11-10 NOTE — PROGRESS NOTE ADULT - SUBJECTIVE AND OBJECTIVE BOX
CHIEF COMPLAINT:    Interval Events:    REVIEW OF SYSTEMS:  Constitutional: [ ] negative [ ] fevers [ ] chills [ ] weight loss [ ] weight gain  HEENT: [ ] negative [ ] dry eyes [ ] eye irritation [ ] postnasal drip [ ] nasal congestion  CV: [ ] negative  [ ] chest pain [ ] orthopnea [ ] palpitations [ ] murmur  Resp: [ ] negative [ ] cough [ ] shortness of breath [ ] dyspnea [ ] wheezing [ ] sputum [ ] hemoptysis  GI: [ ] negative [ ] nausea [ ] vomiting [ ] diarrhea [ ] constipation [ ] abd pain [ ] dysphagia   : [ ] negative [ ] dysuria [ ] nocturia [ ] hematuria [ ] increased urinary frequency  Musculoskeletal: [ ] negative [ ] back pain [ ] myalgias [ ] arthralgias [ ] fracture  Skin: [ ] negative [ ] rash [ ] itch  Neurological: [ ] negative [ ] headache [ ] dizziness [ ] syncope [ ] weakness [ ] numbness  Psychiatric: [ ] negative [ ] anxiety [ ] depression  Endocrine: [ ] negative [ ] diabetes [ ] thyroid problem  Hematologic/Lymphatic: [ ] negative [ ] anemia [ ] bleeding problem  Allergic/Immunologic: [ ] negative [ ] itchy eyes [ ] nasal discharge [ ] hives [ ] angioedema  [ ] All other systems negative  [ ] Unable to assess ROS because ________    OBJECTIVE:  ICU Vital Signs Last 24 Hrs  T(C): 36.9 (09 Nov 2018 22:01), Max: 36.9 (09 Nov 2018 22:01)  T(F): 98.4 (09 Nov 2018 22:01), Max: 98.4 (09 Nov 2018 22:01)  HR: 79 (09 Nov 2018 22:01) (79 - 83)  BP: 154/90 (09 Nov 2018 22:01) (124/75 - 154/90)  BP(mean): --  ABP: --  ABP(mean): --  RR: 18 (09 Nov 2018 22:01) (17 - 18)  SpO2: 98% (09 Nov 2018 22:01) (96% - 98%)        CAPILLARY BLOOD GLUCOSE      POCT Blood Glucose.: 109 mg/dL (10 Nov 2018 09:14)      PHYSICAL EXAM:  General:   HEENT:   Lymph Nodes:  Neck:   Respiratory:   Cardiovascular:   Abdomen:   Extremities:   Skin:   Neurological:  Psychiatry:    LINES:    HOSPITAL MEDICATIONS:  apixaban 2.5 milliGRAM(s) Oral every 12 hours      tamsulosin 0.4 milliGRAM(s) Oral at bedtime    dextrose 40% Gel 15 Gram(s) Oral once PRN  dextrose 50% Injectable 12.5 Gram(s) IV Push once  dextrose 50% Injectable 25 Gram(s) IV Push once  dextrose 50% Injectable 25 Gram(s) IV Push once  glucagon  Injectable 1 milliGRAM(s) IntraMuscular once PRN  insulin lispro (HumaLOG) corrective regimen sliding scale   SubCutaneous three times a day before meals  insulin lispro (HumaLOG) corrective regimen sliding scale   SubCutaneous at bedtime      ondansetron Injectable 4 milliGRAM(s) IV Push every 6 hours PRN  oxyCODONE    IR 10 milliGRAM(s) Oral every 4 hours PRN  oxyCODONE  ER Tablet 30 milliGRAM(s) Oral every 8 hours    bisacodyl Suppository 10 milliGRAM(s) Rectal daily PRN  docusate sodium 100 milliGRAM(s) Oral daily  pantoprazole    Tablet 40 milliGRAM(s) Oral before breakfast  senna 2 Tablet(s) Oral at bedtime  sucralfate suspension 1 Gram(s) Oral two times a day        dextrose 5%. 1000 milliLiter(s) IV Continuous <Continuous>  sodium chloride 0.9%. 1000 milliLiter(s) IV Continuous <Continuous>            LABS:    Hgb Trend: 11.7<--, 11.9<--, 11.1<--  11-09    140  |  103  |  21  ----------------------------<  139<H>  3.8   |  24  |  1.83<H>    Ca    8.5      09 Nov 2018 09:40  Phos  3.2     11-09  Mg     1.9     11-09      Creatinine Trend: 1.83<--, 1.91<--, 1.89<--, 1.61<--, 1.64<--, 1.61<--            MICROBIOLOGY:     RADIOLOGY:  [ ] Reviewed and interpreted by me    EKG: CHIEF COMPLAINT: SOB 2/2 aspiration pneumonia c/b uncontrolled neoplasm related pain and LUX    Interval Events:  No acute events overnight. No PRNs were given ON, and over past 24 hours, patient 2 PRNs. Patient was seen and examined at bedside. He reports that his chest pain is 8/10. Denies SOB, N/V, or abdominal pain. He reports he had a BM this morning.     REVIEW OF SYSTEMS:  Constitutional: [x] negative [ ] fevers [ ] chills [ ] weight loss [ ] weight gain  HEENT: [x] negative [ ] dry eyes [ ] eye irritation [ ] postnasal drip [ ] nasal congestion  CV: [ ] negative  [x] chest pain [ ] orthopnea [ ] palpitations [ ] murmur  Resp: [x] negative [ ] cough [ ] shortness of breath [ ] dyspnea [ ] wheezing [ ] sputum [ ] hemoptysis  GI: [x] negative [ ] nausea [ ] vomiting [ ] diarrhea [ ] constipation [ ] abd pain [ ] dysphagia   : [x] negative [ ] dysuria [ ] nocturia [ ] hematuria [ ] increased urinary frequency  Musculoskeletal: [ ] negative [x] back pain [ ] myalgias [ ] arthralgias [ ] fracture  Skin: [x] negative [ ] rash [ ] itch  Neurological: [x] negative [ ] headache [ ] dizziness [ ] syncope [ ] weakness [ ] numbness  Psychiatric: [x] negative [ ] anxiety [ ] depression  Endocrine: [x] negative [ ] diabetes [ ] thyroid problem  Hematologic/Lymphatic: [ ] negative [ ] anemia [ ] bleeding problem  Allergic/Immunologic: [ ] negative [ ] itchy eyes [ ] nasal discharge [ ] hives [ ] angioedema  [ ] All other systems negative  [ ] Unable to assess ROS because ________    OBJECTIVE:  T(C): 36.9 (09 Nov 2018 22:01), Max: 36.9 (09 Nov 2018 22:01)  T(F): 98.4 (09 Nov 2018 22:01), Max: 98.4 (09 Nov 2018 22:01)  HR: 79 (09 Nov 2018 22:01) (79 - 83)  BP: 154/90 (09 Nov 2018 22:01) (124/75 - 154/90)  RR: 18 (09 Nov 2018 22:01) (17 - 18)  SpO2: 98% (09 Nov 2018 22:01) (96% - 98%)        CAPILLARY BLOOD GLUCOSE      POCT Blood Glucose.: 109 mg/dL (10 Nov 2018 09:14)      PHYSICAL EXAM:  GENERAL: NAD, well-developed  HEAD:  Atraumatic, Normocephalic  EYES: EOMI, PERRLA, conjunctiva and sclera clear  NECK: Supple, No JVD  CHEST/LUNG: expiratory wheeze b/l, decreased breath sounds at bases,   HEART: S1 and S2, Regular rate and rhythm; No murmurs, rubs, or gallops  ABDOMEN: Soft, Nontender, Nondistended; Bowel sounds present  EXTREMITIES:  2+ Peripheral Pulses, No clubbing, cyanosis, or edema  PSYCH: AAOx3  NEUROLOGY: non-focal,CN grossly intact  SKIN: No rashes or lesions    LINES:    HOSPITAL MEDICATIONS:  apixaban 2.5 milliGRAM(s) Oral every 12 hours      tamsulosin 0.4 milliGRAM(s) Oral at bedtime    dextrose 40% Gel 15 Gram(s) Oral once PRN  dextrose 50% Injectable 12.5 Gram(s) IV Push once  dextrose 50% Injectable 25 Gram(s) IV Push once  dextrose 50% Injectable 25 Gram(s) IV Push once  glucagon  Injectable 1 milliGRAM(s) IntraMuscular once PRN  insulin lispro (HumaLOG) corrective regimen sliding scale   SubCutaneous three times a day before meals  insulin lispro (HumaLOG) corrective regimen sliding scale   SubCutaneous at bedtime      ondansetron Injectable 4 milliGRAM(s) IV Push every 6 hours PRN  oxyCODONE    IR 10 milliGRAM(s) Oral every 4 hours PRN  oxyCODONE  ER Tablet 30 milliGRAM(s) Oral every 8 hours    bisacodyl Suppository 10 milliGRAM(s) Rectal daily PRN  docusate sodium 100 milliGRAM(s) Oral daily  pantoprazole    Tablet 40 milliGRAM(s) Oral before breakfast  senna 2 Tablet(s) Oral at bedtime  sucralfate suspension 1 Gram(s) Oral two times a day        dextrose 5%. 1000 milliLiter(s) IV Continuous <Continuous>  sodium chloride 0.9%. 1000 milliLiter(s) IV Continuous <Continuous>            LABS:    Hgb Trend: 11.7<--, 11.9<--, 11.1<--  11-09    140  |  103  |  21  ----------------------------<  139<H>  3.8   |  24  |  1.83<H>    Ca    8.5      09 Nov 2018 09:40  Phos  3.2     11-09  Mg     1.9     11-09      Creatinine Trend: 1.83<--, 1.91<--, 1.89<--, 1.61<--, 1.64<--, 1.61<--            MICROBIOLOGY:     RADIOLOGY:  [ ] Reviewed and interpreted by me    EKG:

## 2018-11-10 NOTE — PROGRESS NOTE ADULT - ATTENDING COMMENTS
Patient seen and examined, chart and labs reviewed.    77M HTN, DM-2, COPD, PE on Eliquis, dysphagia, BCC s/p resection, Hep B, CKD4, and mutated NSCLC (s/p erlotinib, carboplatin/pemetrexed, atezolizumab) now on gilotrif (abatnif), p/w lethargy, aspiration pneumonia, LUX.  Pt has chronic pain that is slowly improving on increased doses of Oxy ER TID.  He was on mscontin 30 mg tid and oxycodone 10 mg q4h prn as outpt per pain management, though bone scan and PET neg for bone mets.  Pt has been having persistent pain in throat for which he saw ENT as outpt and had laryngoscopy done - notable for possible thrush plaques, repeat laryngoscopy here with no evidence of thrush.     Assessment/plan:  # Lethargy, acute metabolic encephalopathy now resolved: in the setting of aspiration pneumonia/sepsis, mental status improved, CT head neg    #Aspiration pneumonia: bilateral opacities with large LLL infiltrate on CXR,  sputum cx klebsiella sensitive to Cipro, completed course.   seen by s/s, puree diet with honey thick liquid, aspiration precaution    # chronic pain: due to lung CA? no bone mets on PET/bone scan. Pain slowly improving with increased Oxy ER 30mg TID dosing; pt now aware that he can ask for PRN doses of Oxy IR.  Pt seen comfortably sleeping, has been reporting improved pain control.  Spoke with son Parmjit, who is in agreement with taking pt home with current pain medications.     #Odynophagia: this has been a chronic problem for patient.  Appreciate ENT evaluation, no thrush noted on repeat laryngoscopy, likely food residue.     #NSCLC: hold abatnif in the setting of infection, f/u onc as outpatient, pt states he wishes to resume treatment.     # LUX: in the setting of sepsis vs combination Vanco/Zosyn nephrotoxic combination? renal US no hydronephrosis, urine studies rechecked with FeNa 1.7%.  Cr improving, pt's PMD aware to monitor BMP/Cr.     # HBV: hold tenofovir in the setting of LUX    # H/o PE: switched back to Eliquis with renal dosing.     # Severe protein calorie malnutrition: appreciate dietary recs. On ensure supplementation.      #Dispo: Medically stable for d/c home today.  D/C time: 32 minutes including discussion of hospital course with son Parmjit.

## 2018-11-10 NOTE — PROGRESS NOTE ADULT - PROBLEM SELECTOR PROBLEM 2
Multifocal pneumonia
Metabolic encephalopathy
Multifocal pneumonia
Metabolic encephalopathy
Pain, neoplasm-related
Metabolic encephalopathy
Metabolic encephalopathy

## 2018-11-10 NOTE — PROGRESS NOTE ADULT - PROBLEM SELECTOR PROBLEM 5
BPH (benign prostatic hyperplasia)
LUX (acute kidney injury)
BPH (benign prostatic hyperplasia)
LXU (acute kidney injury)
BPH (benign prostatic hyperplasia)

## 2018-11-12 ENCOUNTER — INBOUND DOCUMENT (OUTPATIENT)
Age: 78
End: 2018-11-12

## 2018-11-16 ENCOUNTER — OTHER (OUTPATIENT)
Age: 78
End: 2018-11-16

## 2018-11-16 ENCOUNTER — APPOINTMENT (OUTPATIENT)
Dept: HEMATOLOGY ONCOLOGY | Facility: CLINIC | Age: 78
End: 2018-11-16
Payer: MEDICAID

## 2018-11-16 ENCOUNTER — RESULT REVIEW (OUTPATIENT)
Age: 78
End: 2018-11-16

## 2018-11-16 VITALS
BODY MASS INDEX: 20.11 KG/M2 | SYSTOLIC BLOOD PRESSURE: 102 MMHG | WEIGHT: 132.28 LBS | OXYGEN SATURATION: 88 % | DIASTOLIC BLOOD PRESSURE: 67 MMHG | RESPIRATION RATE: 16 BRPM | HEART RATE: 91 BPM | TEMPERATURE: 97.9 F

## 2018-11-16 LAB
BASOPHILS # BLD AUTO: 0 K/UL — SIGNIFICANT CHANGE UP (ref 0–0.2)
BASOPHILS NFR BLD AUTO: 0.2 % — SIGNIFICANT CHANGE UP (ref 0–2)
EOSINOPHIL # BLD AUTO: 0.1 K/UL — SIGNIFICANT CHANGE UP (ref 0–0.5)
EOSINOPHIL NFR BLD AUTO: 0.9 % — SIGNIFICANT CHANGE UP (ref 0–6)
HCT VFR BLD CALC: 36.2 % — LOW (ref 39–50)
HGB BLD-MCNC: 12 G/DL — LOW (ref 13–17)
LYMPHOCYTES # BLD AUTO: 1.6 K/UL — SIGNIFICANT CHANGE UP (ref 1–3.3)
LYMPHOCYTES # BLD AUTO: 13.9 % — SIGNIFICANT CHANGE UP (ref 13–44)
MCHC RBC-ENTMCNC: 29.2 PG — SIGNIFICANT CHANGE UP (ref 27–34)
MCHC RBC-ENTMCNC: 33.1 G/DL — SIGNIFICANT CHANGE UP (ref 32–36)
MCV RBC AUTO: 88.1 FL — SIGNIFICANT CHANGE UP (ref 80–100)
MONOCYTES # BLD AUTO: 0.5 K/UL — SIGNIFICANT CHANGE UP (ref 0–0.9)
MONOCYTES NFR BLD AUTO: 4.8 % — SIGNIFICANT CHANGE UP (ref 2–14)
NEUTROPHILS # BLD AUTO: 9 K/UL — HIGH (ref 1.8–7.4)
NEUTROPHILS NFR BLD AUTO: 80.2 % — HIGH (ref 43–77)
PLATELET # BLD AUTO: 181 K/UL — SIGNIFICANT CHANGE UP (ref 150–400)
RBC # BLD: 4.11 M/UL — LOW (ref 4.2–5.8)
RBC # FLD: 14 % — SIGNIFICANT CHANGE UP (ref 10.3–14.5)
WBC # BLD: 11.2 K/UL — HIGH (ref 3.8–10.5)
WBC # FLD AUTO: 11.2 K/UL — HIGH (ref 3.8–10.5)

## 2018-11-16 PROCEDURE — 99215 OFFICE O/P EST HI 40 MIN: CPT

## 2018-11-16 RX ORDER — DOCUSATE SODIUM 100 MG/1
100 CAPSULE ORAL 3 TIMES DAILY
Qty: 90 | Refills: 3 | Status: DISCONTINUED | COMMUNITY
Start: 2017-05-01 | End: 2018-11-16

## 2018-11-16 RX ORDER — SENNOSIDES 8.6 MG TABLETS 8.6 MG/1
8.6 TABLET ORAL
Qty: 180 | Refills: 3 | Status: DISCONTINUED | COMMUNITY
Start: 2017-05-01 | End: 2018-11-16

## 2018-11-16 RX ORDER — MORPHINE SULFATE 30 MG/1
30 TABLET, FILM COATED, EXTENDED RELEASE ORAL 3 TIMES DAILY
Qty: 90 | Refills: 0 | Status: DISCONTINUED | COMMUNITY
Start: 2018-07-20 | End: 2018-11-16

## 2018-11-16 RX ORDER — SIMVASTATIN 10 MG/1
10 TABLET, FILM COATED ORAL
Qty: 30 | Refills: 0 | Status: DISCONTINUED | COMMUNITY
Start: 2018-08-31 | End: 2018-11-16

## 2018-11-16 RX ORDER — KETOCONAZOLE 20 MG/G
2 CREAM TOPICAL
Qty: 1 | Refills: 2 | Status: DISCONTINUED | COMMUNITY
Start: 2018-04-27 | End: 2018-11-16

## 2018-11-16 RX ORDER — TRIAMCINOLONE ACETONIDE 1 MG/G
0.1 CREAM TOPICAL 3 TIMES DAILY
Qty: 1 | Refills: 5 | Status: DISCONTINUED | COMMUNITY
Start: 2017-10-09 | End: 2018-11-16

## 2018-11-16 NOTE — REVIEW OF SYSTEMS
[Fatigue] : fatigue [Recent Change In Weight] : ~T recent weight change [Dysphagia] : dysphagia [Mucosal Pain] : mucosal pain [SOB on Exertion] : shortness of breath during exertion [Vomiting] : vomiting [Confused] : confusion [Dizziness] : dizziness [Difficulty Walking] : difficulty walking [Negative] : Allergic/Immunologic [Fever] : no fever [Chills] : no chills [Eye Pain] : no eye pain [Odynophagia] : no odynophagia [Chest Pain] : no chest pain [Palpitations] : no palpitations [Leg Claudication] : no intermittent leg claudication [Lower Ext Edema] : no lower extremity edema [Shortness Of Breath] : no shortness of breath [Wheezing] : no wheezing [Cough] : no cough [Abdominal Pain] : no abdominal pain [Constipation] : no constipation [Diarrhea] : no diarrhea [Dysuria] : no dysuria [Joint Pain] : no joint pain [Joint Stiffness] : no joint stiffness [Muscle Pain] : no muscle pain [Skin Rash] : no skin rash [Fainting] : no fainting [Anxiety] : no anxiety [Depression] : no depression [Muscle Weakness] : no muscle weakness [Easy Bleeding] : no tendency for easy bleeding [Easy Bruising] : no tendency for easy bruising [Swollen Glands] : no swollen glands [FreeTextEntry2] : feels cold all the time, poor appetite [FreeTextEntry4] : aspiration [FreeTextEntry6] : improved [FreeTextEntry7] : Nausea

## 2018-11-16 NOTE — HISTORY OF PRESENT ILLNESS
[Disease: _____________________] : Disease: [unfilled] [M: ___] : M[unfilled] [AJCC Stage: ____] : AJCC Stage: [unfilled] [Cardiovascular] : Cardiovascular [Infectious] : Infectious [Pulmonary] : Pulmonary [de-identified] : Translation provided by Mr. Sainz's sonParmjit at pt's request\par \par Mr. Sainz is a 76yo man with stage IV EGFR mutated NSCLC s/p progression through erlotinib, carboplatin/pemetrexed, atezolizumab,  now on gilotrif\par Tarceva 5/2016-4/2017\par Carbo/pem - 4/24/17 - 6/26/17\par Atezolizumab 7/13/17- 9/14/17\par Afatinib ~2/12/18 - \par The patient was started on 40 mg daily but could not tolerate it because of diarrhea and bloating. He is currently taking 20 mg daily of afatinib. He has been this dose for 3 months and has been tolerating it better though has episodes of diarrhea that are sometimes disabling. He has had multiple NGS of peripheral blood/tumor showing persistent EGFR L858R mutation + additional mutations but no T790M (last bx was in January 2018). He has had multiple admissions recently for PNA/sepsis. Most recent admission was last week when he presented with severe lethargy. There was concern for aspiration PNA vs. drug overdose. He was sent to [facility] where he was given Narcan with severe exacerbation of pain. He was started on gabapentin and oxycontin which he is continuing with good effect. The patient feels better overall. No fever or chills. He has on and off dysphonia. He is not taking afatinib as per discharge recs (due to LUX).

## 2018-11-16 NOTE — REASON FOR VISIT
[Follow-Up Visit] : a follow-up [Spouse] : spouse [Family Member] : family member [FreeTextEntry2] : lung cancer

## 2018-11-16 NOTE — PHYSICAL EXAM
[Ambulatory and capable of all self care but unable to carry out any work activities] : Status 2- Ambulatory and capable of all self care but unable to carry out any work activities. Up and about more than 50% of waking hours [Mucositis] : mucositis [Normal] : affect appropriate [de-identified] : Lethargic but improved since last visit [de-identified] : Decreased air entry b/l, grossly clear

## 2018-11-19 ENCOUNTER — APPOINTMENT (OUTPATIENT)
Dept: GERIATRICS | Facility: CLINIC | Age: 78
End: 2018-11-19
Payer: MEDICAID

## 2018-11-19 ENCOUNTER — APPOINTMENT (OUTPATIENT)
Dept: INFUSION THERAPY | Facility: HOSPITAL | Age: 78
End: 2018-11-19

## 2018-11-19 VITALS
OXYGEN SATURATION: 93 % | BODY MASS INDEX: 20.11 KG/M2 | RESPIRATION RATE: 16 BRPM | SYSTOLIC BLOOD PRESSURE: 129 MMHG | WEIGHT: 132.28 LBS | DIASTOLIC BLOOD PRESSURE: 77 MMHG | HEART RATE: 88 BPM | TEMPERATURE: 97.7 F

## 2018-11-19 LAB
ALBUMIN SERPL ELPH-MCNC: 3.7 G/DL
ALP BLD-CCNC: 63 U/L
ALT SERPL-CCNC: 12 U/L
ANION GAP SERPL CALC-SCNC: 15 MMOL/L
AST SERPL-CCNC: 26 U/L
BILIRUB SERPL-MCNC: 0.4 MG/DL
BUN SERPL-MCNC: 21 MG/DL
CALCIUM SERPL-MCNC: 8.9 MG/DL
CEA SERPL-MCNC: 2 NG/ML
CHLORIDE SERPL-SCNC: 99 MMOL/L
CO2 SERPL-SCNC: 27 MMOL/L
CREAT SERPL-MCNC: 2.04 MG/DL
GLUCOSE SERPL-MCNC: 129 MG/DL
MAGNESIUM SERPL-MCNC: 1.9 MG/DL
POTASSIUM SERPL-SCNC: 4.1 MMOL/L
PROT SERPL-MCNC: 7.3 G/DL
SODIUM SERPL-SCNC: 141 MMOL/L
TSH SERPL-ACNC: 1.16 UIU/ML

## 2018-11-19 PROCEDURE — 99215 OFFICE O/P EST HI 40 MIN: CPT

## 2018-11-19 RX ORDER — ONDANSETRON 4 MG/1
4 TABLET ORAL EVERY 6 HOURS
Qty: 60 | Refills: 2 | Status: ACTIVE | COMMUNITY
Start: 2018-03-29 | End: 1900-01-01

## 2018-11-20 ENCOUNTER — APPOINTMENT (OUTPATIENT)
Dept: INFUSION THERAPY | Facility: HOSPITAL | Age: 78
End: 2018-11-20

## 2018-11-21 DIAGNOSIS — E86.0 DEHYDRATION: ICD-10-CM

## 2018-11-30 ENCOUNTER — APPOINTMENT (OUTPATIENT)
Dept: HEMATOLOGY ONCOLOGY | Facility: CLINIC | Age: 78
End: 2018-11-30
Payer: MEDICAID

## 2018-11-30 ENCOUNTER — RESULT REVIEW (OUTPATIENT)
Age: 78
End: 2018-11-30

## 2018-11-30 VITALS
BODY MASS INDEX: 19.27 KG/M2 | WEIGHT: 126.77 LBS | RESPIRATION RATE: 16 BRPM | SYSTOLIC BLOOD PRESSURE: 130 MMHG | OXYGEN SATURATION: 94 % | TEMPERATURE: 98.2 F | DIASTOLIC BLOOD PRESSURE: 70 MMHG | HEART RATE: 95 BPM

## 2018-11-30 LAB
BASOPHILS # BLD AUTO: 0 K/UL — SIGNIFICANT CHANGE UP (ref 0–0.2)
BASOPHILS NFR BLD AUTO: 0.6 % — SIGNIFICANT CHANGE UP (ref 0–2)
EOSINOPHIL # BLD AUTO: 0 K/UL — SIGNIFICANT CHANGE UP (ref 0–0.5)
EOSINOPHIL NFR BLD AUTO: 0.4 % — SIGNIFICANT CHANGE UP (ref 0–6)
HCT VFR BLD CALC: 37.2 % — LOW (ref 39–50)
HGB BLD-MCNC: 12.2 G/DL — LOW (ref 13–17)
LYMPHOCYTES # BLD AUTO: 1.1 K/UL — SIGNIFICANT CHANGE UP (ref 1–3.3)
LYMPHOCYTES # BLD AUTO: 15.8 % — SIGNIFICANT CHANGE UP (ref 13–44)
MCHC RBC-ENTMCNC: 29.1 PG — SIGNIFICANT CHANGE UP (ref 27–34)
MCHC RBC-ENTMCNC: 32.8 G/DL — SIGNIFICANT CHANGE UP (ref 32–36)
MCV RBC AUTO: 88.9 FL — SIGNIFICANT CHANGE UP (ref 80–100)
MONOCYTES # BLD AUTO: 0.3 K/UL — SIGNIFICANT CHANGE UP (ref 0–0.9)
MONOCYTES NFR BLD AUTO: 4.4 % — SIGNIFICANT CHANGE UP (ref 2–14)
NEUTROPHILS # BLD AUTO: 5.5 K/UL — SIGNIFICANT CHANGE UP (ref 1.8–7.4)
NEUTROPHILS NFR BLD AUTO: 78.8 % — HIGH (ref 43–77)
PLATELET # BLD AUTO: 176 K/UL — SIGNIFICANT CHANGE UP (ref 150–400)
RBC # BLD: 4.18 M/UL — LOW (ref 4.2–5.8)
RBC # FLD: 13.8 % — SIGNIFICANT CHANGE UP (ref 10.3–14.5)
WBC # BLD: 6.9 K/UL — SIGNIFICANT CHANGE UP (ref 3.8–10.5)
WBC # FLD AUTO: 6.9 K/UL — SIGNIFICANT CHANGE UP (ref 3.8–10.5)

## 2018-11-30 PROCEDURE — 99214 OFFICE O/P EST MOD 30 MIN: CPT

## 2018-12-03 ENCOUNTER — APPOINTMENT (OUTPATIENT)
Dept: GERIATRICS | Facility: CLINIC | Age: 78
End: 2018-12-03
Payer: MEDICAID

## 2018-12-03 VITALS
RESPIRATION RATE: 16 BRPM | OXYGEN SATURATION: 97 % | DIASTOLIC BLOOD PRESSURE: 70 MMHG | HEART RATE: 95 BPM | WEIGHT: 126.74 LBS | TEMPERATURE: 97.7 F | BODY MASS INDEX: 19.27 KG/M2 | SYSTOLIC BLOOD PRESSURE: 105 MMHG

## 2018-12-03 DIAGNOSIS — M25.512 PAIN IN RIGHT SHOULDER: ICD-10-CM

## 2018-12-03 DIAGNOSIS — G89.3 NEOPLASM RELATED PAIN (ACUTE) (CHRONIC): ICD-10-CM

## 2018-12-03 DIAGNOSIS — Z51.5 ENCOUNTER FOR PALLIATIVE CARE: ICD-10-CM

## 2018-12-03 DIAGNOSIS — M25.511 PAIN IN RIGHT SHOULDER: ICD-10-CM

## 2018-12-03 PROCEDURE — 99214 OFFICE O/P EST MOD 30 MIN: CPT

## 2018-12-06 PROBLEM — G89.3 PAIN, NEOPLASM-RELATED: Status: ACTIVE | Noted: 2017-07-07

## 2018-12-06 PROBLEM — M25.511 BILATERAL SHOULDER PAIN, UNSPECIFIED CHRONICITY: Status: ACTIVE | Noted: 2017-06-12

## 2018-12-06 PROBLEM — Z51.5 ENCOUNTER FOR PALLIATIVE CARE: Status: ACTIVE | Noted: 2017-08-28

## 2018-12-07 ENCOUNTER — APPOINTMENT (OUTPATIENT)
Dept: HEMATOLOGY ONCOLOGY | Facility: CLINIC | Age: 78
End: 2018-12-07
Payer: MEDICAID

## 2018-12-11 ENCOUNTER — OUTPATIENT (OUTPATIENT)
Dept: OUTPATIENT SERVICES | Facility: HOSPITAL | Age: 78
LOS: 1 days | Discharge: HOME | End: 2018-12-11

## 2018-12-11 DIAGNOSIS — C34.90 MALIGNANT NEOPLASM OF UNSPECIFIED PART OF UNSPECIFIED BRONCHUS OR LUNG: ICD-10-CM

## 2018-12-11 DIAGNOSIS — C44.311 BASAL CELL CARCINOMA OF SKIN OF NOSE: Chronic | ICD-10-CM

## 2018-12-11 DIAGNOSIS — D04.9 CARCINOMA IN SITU OF SKIN, UNSPECIFIED: Chronic | ICD-10-CM

## 2018-12-11 NOTE — PROVIDER CONTACT NOTE (MEDICATION) - ACTION/TREATMENT ORDERED:
Malignant neoplasm of female breast    8/21/2018 Biopsy     Right breast architectural distortion at the retroareolar anterior position         8/21/2018 Initial Diagnosis     RIGHT BREAST, BIOPSY:  Invasive mammary carcinoma with lobular features  OMC,WHR4ODG,ER,PGR  (Gilbert scoring - overall grade 1; duct formation - 3, nuclear pleomorphism - 1, mitosis - 1)  Longest continuous focus of tumor - 1.5mm.         8/21/2018 Tumor Markers     Estrogen Receptor: Positive 50-90%  Progesterone Receptor: Positive 10-50%  HER2: Negative  Ki67: <10%         11/14/2018 Surgery     Right breast mastectomy, pT1c N1a  Invasive lobular carcinoma, grade 2, 2.0 cm  Margins uninvolved, >20 mm from anterior and posterior margins   2/15 lymph nodes with a macromets, largest deposit is 11 mm, KIAN   Left breast mastectomy: Benign            11/14/2018 Cancer Staged     Cancer Staging  pT1c pN1  Stage IB per AJCC 8th ed. pathologic prognostic staging system           12/11/2018 Tumor Conference     mammaprint pending to determine adjuvant systemic therapy    Patient did have extranodal extension and under 50 years old. Radiation would like to at least have discussion with patient                 
Give Mg and KCL as ordered.
Notified provider of completion and recommendations; provider to evaluate appropriateness.
will monitor and reassess
pain med given with satisfactory relief will closely monitor and reassess
will administer pain med as pordered and closely monitor

## 2018-12-14 ENCOUNTER — APPOINTMENT (OUTPATIENT)
Dept: OTOLARYNGOLOGY | Facility: CLINIC | Age: 78
End: 2018-12-14

## 2018-12-14 ENCOUNTER — RESULT REVIEW (OUTPATIENT)
Age: 78
End: 2018-12-14

## 2018-12-14 ENCOUNTER — APPOINTMENT (OUTPATIENT)
Dept: HEMATOLOGY ONCOLOGY | Facility: CLINIC | Age: 78
End: 2018-12-14
Payer: MEDICAID

## 2018-12-14 VITALS
DIASTOLIC BLOOD PRESSURE: 70 MMHG | OXYGEN SATURATION: 95 % | WEIGHT: 130.07 LBS | HEART RATE: 91 BPM | TEMPERATURE: 98 F | SYSTOLIC BLOOD PRESSURE: 106 MMHG | BODY MASS INDEX: 19.78 KG/M2 | RESPIRATION RATE: 16 BRPM

## 2018-12-14 VITALS — SYSTOLIC BLOOD PRESSURE: 90 MMHG | DIASTOLIC BLOOD PRESSURE: 76 MMHG

## 2018-12-14 DIAGNOSIS — H91.90 UNSPECIFIED HEARING LOSS, UNSPECIFIED EAR: ICD-10-CM

## 2018-12-14 LAB
BASOPHILS # BLD AUTO: 0 K/UL — SIGNIFICANT CHANGE UP (ref 0–0.2)
BASOPHILS NFR BLD AUTO: 0.1 % — SIGNIFICANT CHANGE UP (ref 0–2)
EOSINOPHIL # BLD AUTO: 0 K/UL — SIGNIFICANT CHANGE UP (ref 0–0.5)
EOSINOPHIL NFR BLD AUTO: 0.2 % — SIGNIFICANT CHANGE UP (ref 0–6)
HCT VFR BLD CALC: 36.8 % — LOW (ref 39–50)
HGB BLD-MCNC: 11.8 G/DL — LOW (ref 13–17)
LYMPHOCYTES # BLD AUTO: 0.9 K/UL — LOW (ref 1–3.3)
LYMPHOCYTES # BLD AUTO: 6.6 % — LOW (ref 13–44)
MCHC RBC-ENTMCNC: 28.9 PG — SIGNIFICANT CHANGE UP (ref 27–34)
MCHC RBC-ENTMCNC: 32.2 G/DL — SIGNIFICANT CHANGE UP (ref 32–36)
MCV RBC AUTO: 89.8 FL — SIGNIFICANT CHANGE UP (ref 80–100)
MONOCYTES # BLD AUTO: 0.4 K/UL — SIGNIFICANT CHANGE UP (ref 0–0.9)
MONOCYTES NFR BLD AUTO: 2.7 % — SIGNIFICANT CHANGE UP (ref 2–14)
NEUTROPHILS # BLD AUTO: 13 K/UL — HIGH (ref 1.8–7.4)
NEUTROPHILS NFR BLD AUTO: 90.4 % — HIGH (ref 43–77)
PLATELET # BLD AUTO: 150 K/UL — SIGNIFICANT CHANGE UP (ref 150–400)
RBC # BLD: 4.1 M/UL — LOW (ref 4.2–5.8)
RBC # FLD: 14.5 % — SIGNIFICANT CHANGE UP (ref 10.3–14.5)
WBC # BLD: 14.3 K/UL — HIGH (ref 3.8–10.5)
WBC # FLD AUTO: 14.3 K/UL — HIGH (ref 3.8–10.5)

## 2018-12-14 PROCEDURE — 99215 OFFICE O/P EST HI 40 MIN: CPT

## 2018-12-14 RX ORDER — NYSTATIN 100000 [USP'U]/ML
100000 SUSPENSION ORAL 3 TIMES DAILY
Qty: 315 | Refills: 2 | Status: DISCONTINUED | COMMUNITY
Start: 2018-10-31 | End: 2018-12-14

## 2018-12-14 RX ORDER — OXYCODONE HYDROCHLORIDE 20 MG/1
20 TABLET, FILM COATED, EXTENDED RELEASE ORAL 3 TIMES DAILY
Qty: 90 | Refills: 0 | Status: DISCONTINUED | COMMUNITY
Start: 2018-11-19 | End: 2018-12-14

## 2018-12-14 RX ORDER — ACETAMINOPHEN 650 MG/1
650 TABLET, EXTENDED RELEASE ORAL 3 TIMES DAILY
Qty: 60 | Refills: 0 | Status: COMPLETED | COMMUNITY
Start: 2018-11-26 | End: 2018-12-14

## 2018-12-14 RX ORDER — MIRTAZAPINE 15 MG/1
15 TABLET, FILM COATED ORAL
Qty: 30 | Refills: 2 | Status: ACTIVE | COMMUNITY
Start: 2017-08-24 | End: 1900-01-01

## 2018-12-14 RX ORDER — LIDOCAINE HYDROCHLORIDE 20 MG/ML
2 SOLUTION OROPHARYNGEAL
Refills: 0 | Status: DISCONTINUED | COMMUNITY
Start: 2018-08-31 | End: 2018-12-14

## 2018-12-14 RX ORDER — GABAPENTIN 100 MG/1
100 CAPSULE ORAL
Qty: 90 | Refills: 3 | Status: DISCONTINUED | COMMUNITY
Start: 2018-05-03 | End: 2018-12-14

## 2018-12-14 RX ORDER — DEXAMETHASONE 2 MG/1
2 TABLET ORAL DAILY
Qty: 30 | Refills: 3 | Status: ACTIVE | COMMUNITY
Start: 2018-11-30 | End: 1900-01-01

## 2018-12-14 NOTE — ASSESSMENT
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 \par Will check labs today\par Will PET/CT to assess for unexplained pain\par BRain MRI to r/o leptomeningeal disease.\par Follow up with Purvi Dhillon and Dr. Prado, ENT\par Continue Afatinib 20 mg daily\par Continue Eliquis at 2.5 mg BID\par Hypoxia- use home O2 as needed\par Trial of dexamethasone 2 mg qam\par OV 2 weeks.

## 2018-12-14 NOTE — PHYSICAL EXAM
[Ambulatory and capable of all self care but unable to carry out any work activities] : Status 2- Ambulatory and capable of all self care but unable to carry out any work activities. Up and about more than 50% of waking hours [Mucositis] : mucositis [Normal] : affect appropriate [de-identified] : Lethargic but improved since last visit [de-identified] : clear b/l

## 2018-12-14 NOTE — HISTORY OF PRESENT ILLNESS
[Disease: _____________________] : Disease: [unfilled] [M: ___] : M[unfilled] [AJCC Stage: ____] : AJCC Stage: [unfilled] [Cardiovascular] : Cardiovascular [Infectious] : Infectious [Pulmonary] : Pulmonary [de-identified] : Translation provided by Mr. Sainz's son, Parmjit at pt's request\par \par Mr. Sainz is a 79 yo man with stage IV EGFR mutated NSCLC s/p progression through erlotinib, carboplatin/pemetrexed, atezolizumab,  now on gilotrif\par Tarceva 5/2016-4/2017\par Carbo/pem - 4/24/17 - 6/26/17\par Atezolizumab 7/13/17- 9/14/17\par Afatinib ~2/12/18 - \par The patient was started on 40 mg daily in Feb 2018 but could not tolerate it because of diarrhea and bloating. He is currently taking 20 mg daily of afatinib. He has been this dose for 5 months and has been tolerating it better though has episodes of diarrhea that are sometimes disabling. He has had multiple NGS of peripheral blood/tumor showing persistent EGFR L858R mutation + additional mutations but no T790M (last bx was in January 2018). He has had multiple admissions recently for PNA/sepsis with interruptions, at one time close to a month.  iMost recent admission was in October when he presented with severe lethargy. There was concern for aspiration PNA vs. drug overdose. He was sent to [facility] where he was given Narcan with severe exacerbation of pain. He was started on oxycontin 30 mg TID. He takes one oxycodone 10 mg in the AM. Throughout the rest of the day, he substitutes with Tylenol liquid prn. He follows with Dr. Robertson. Since last visit, pt has sustained 2 falls due to dizziness. He is eating better and has gained weight. He was started on dexamethasone 2 mg once daily as prescribed by Angela last visit, which is greatly helping with appetite and pain. Pt states he has some pain b/l ribs which is now new. He has been using 5% lidocaine patches for this. He had no head trauma after the recent fall. He was noted to be orthostatic here- BP sitting 106/70 and standing 90/76. Son also reports worsening hearing.

## 2018-12-15 LAB
ALBUMIN SERPL ELPH-MCNC: 3.8 G/DL
ALBUMIN SERPL ELPH-MCNC: 4 G/DL
ALP BLD-CCNC: 58 U/L
ALP BLD-CCNC: 62 U/L
ALT SERPL-CCNC: 21 U/L
ALT SERPL-CCNC: 6 U/L
ANION GAP SERPL CALC-SCNC: 12 MMOL/L
ANION GAP SERPL CALC-SCNC: 14 MMOL/L
AST SERPL-CCNC: 11 U/L
AST SERPL-CCNC: 15 U/L
BILIRUB SERPL-MCNC: 0.3 MG/DL
BILIRUB SERPL-MCNC: 0.3 MG/DL
BUN SERPL-MCNC: 26 MG/DL
BUN SERPL-MCNC: 37 MG/DL
CALCIUM SERPL-MCNC: 9 MG/DL
CALCIUM SERPL-MCNC: 9.4 MG/DL
CEA SERPL-MCNC: 2.2 NG/ML
CHLORIDE SERPL-SCNC: 100 MMOL/L
CHLORIDE SERPL-SCNC: 104 MMOL/L
CO2 SERPL-SCNC: 25 MMOL/L
CO2 SERPL-SCNC: 27 MMOL/L
CREAT SERPL-MCNC: 1.21 MG/DL
CREAT SERPL-MCNC: 1.58 MG/DL
GLUCOSE SERPL-MCNC: 153 MG/DL
GLUCOSE SERPL-MCNC: 169 MG/DL
HBV DNA # SERPL NAA+PROBE: NOT DETECTED IU/ML
HEPB DNA PCR LOG: NOT DETECTED LOGIU/ML
POTASSIUM SERPL-SCNC: 4.3 MMOL/L
POTASSIUM SERPL-SCNC: 4.5 MMOL/L
PROT SERPL-MCNC: 6.6 G/DL
PROT SERPL-MCNC: 7.3 G/DL
SODIUM SERPL-SCNC: 141 MMOL/L
SODIUM SERPL-SCNC: 141 MMOL/L
TSH SERPL-ACNC: 0.99 UIU/ML

## 2018-12-18 RX ORDER — OXYCODONE HYDROCHLORIDE 30 MG/1
30 TABLET, FILM COATED, EXTENDED RELEASE ORAL
Qty: 90 | Refills: 0 | Status: DISCONTINUED | COMMUNITY
Start: 2018-11-16 | End: 2018-12-18

## 2018-12-20 ENCOUNTER — FORM ENCOUNTER (OUTPATIENT)
Age: 78
End: 2018-12-20

## 2018-12-21 ENCOUNTER — OUTPATIENT (OUTPATIENT)
Dept: OUTPATIENT SERVICES | Facility: HOSPITAL | Age: 78
LOS: 1 days | End: 2018-12-21
Payer: MEDICAID

## 2018-12-21 ENCOUNTER — APPOINTMENT (OUTPATIENT)
Dept: MRI IMAGING | Facility: IMAGING CENTER | Age: 78
End: 2018-12-21
Payer: MEDICAID

## 2018-12-21 DIAGNOSIS — D04.9 CARCINOMA IN SITU OF SKIN, UNSPECIFIED: Chronic | ICD-10-CM

## 2018-12-21 DIAGNOSIS — C34.90 MALIGNANT NEOPLASM OF UNSPECIFIED PART OF UNSPECIFIED BRONCHUS OR LUNG: ICD-10-CM

## 2018-12-21 DIAGNOSIS — C44.311 BASAL CELL CARCINOMA OF SKIN OF NOSE: Chronic | ICD-10-CM

## 2018-12-21 PROCEDURE — 70553 MRI BRAIN STEM W/O & W/DYE: CPT | Mod: 26

## 2018-12-21 PROCEDURE — 70553 MRI BRAIN STEM W/O & W/DYE: CPT

## 2018-12-21 PROCEDURE — A9585: CPT

## 2018-12-23 ENCOUNTER — FORM ENCOUNTER (OUTPATIENT)
Age: 78
End: 2018-12-23

## 2018-12-24 ENCOUNTER — APPOINTMENT (OUTPATIENT)
Dept: NUCLEAR MEDICINE | Facility: IMAGING CENTER | Age: 78
End: 2018-12-24
Payer: MEDICAID

## 2018-12-24 ENCOUNTER — OUTPATIENT (OUTPATIENT)
Dept: OUTPATIENT SERVICES | Facility: HOSPITAL | Age: 78
LOS: 1 days | End: 2018-12-24
Payer: MEDICAID

## 2018-12-24 DIAGNOSIS — D04.9 CARCINOMA IN SITU OF SKIN, UNSPECIFIED: Chronic | ICD-10-CM

## 2018-12-24 DIAGNOSIS — C34.90 MALIGNANT NEOPLASM OF UNSPECIFIED PART OF UNSPECIFIED BRONCHUS OR LUNG: ICD-10-CM

## 2018-12-24 DIAGNOSIS — C44.311 BASAL CELL CARCINOMA OF SKIN OF NOSE: Chronic | ICD-10-CM

## 2018-12-24 PROCEDURE — 78815 PET IMAGE W/CT SKULL-THIGH: CPT | Mod: 26,PS

## 2018-12-24 PROCEDURE — 78815 PET IMAGE W/CT SKULL-THIGH: CPT

## 2018-12-24 PROCEDURE — A9552: CPT

## 2018-12-26 ENCOUNTER — INPATIENT (INPATIENT)
Facility: HOSPITAL | Age: 78
LOS: 6 days | Discharge: HOME | DRG: 183 | End: 2019-01-02
Attending: HOSPITALIST | Admitting: INTERNAL MEDICINE
Payer: MEDICAID

## 2018-12-26 VITALS
HEIGHT: 67 IN | HEART RATE: 102 BPM | TEMPERATURE: 98 F | SYSTOLIC BLOOD PRESSURE: 105 MMHG | OXYGEN SATURATION: 92 % | WEIGHT: 126.99 LBS | DIASTOLIC BLOOD PRESSURE: 67 MMHG

## 2018-12-26 DIAGNOSIS — D04.9 CARCINOMA IN SITU OF SKIN, UNSPECIFIED: Chronic | ICD-10-CM

## 2018-12-26 DIAGNOSIS — C44.311 BASAL CELL CARCINOMA OF SKIN OF NOSE: Chronic | ICD-10-CM

## 2018-12-26 LAB
GAS PNL BLDV: SIGNIFICANT CHANGE UP
HCT VFR BLD CALC: 38.4 % — LOW (ref 39–50)
HGB BLD-MCNC: 12.9 G/DL — LOW (ref 13–17)
MCHC RBC-ENTMCNC: 30.8 PG — SIGNIFICANT CHANGE UP (ref 27–34)
MCHC RBC-ENTMCNC: 33.6 GM/DL — SIGNIFICANT CHANGE UP (ref 32–36)
MCV RBC AUTO: 91.7 FL — SIGNIFICANT CHANGE UP (ref 80–100)
PLATELET # BLD AUTO: 171 K/UL — SIGNIFICANT CHANGE UP (ref 150–400)
RBC # BLD: 4.19 M/UL — LOW (ref 4.2–5.8)
RBC # FLD: 15.5 % — HIGH (ref 10.3–14.5)
WBC # BLD: 31.7 K/UL — HIGH (ref 3.8–10.5)
WBC # FLD AUTO: 31.7 K/UL — HIGH (ref 3.8–10.5)

## 2018-12-26 PROCEDURE — 93010 ELECTROCARDIOGRAM REPORT: CPT

## 2018-12-26 PROCEDURE — 71250 CT THORAX DX C-: CPT | Mod: 26

## 2018-12-26 PROCEDURE — 99284 EMERGENCY DEPT VISIT MOD MDM: CPT | Mod: 25

## 2018-12-26 RX ORDER — SODIUM CHLORIDE 9 MG/ML
500 INJECTION INTRAMUSCULAR; INTRAVENOUS; SUBCUTANEOUS ONCE
Qty: 0 | Refills: 0 | Status: COMPLETED | OUTPATIENT
Start: 2018-12-26 | End: 2018-12-26

## 2018-12-26 RX ORDER — HYDROMORPHONE HYDROCHLORIDE 2 MG/ML
0.8 INJECTION INTRAMUSCULAR; INTRAVENOUS; SUBCUTANEOUS ONCE
Qty: 0 | Refills: 0 | Status: DISCONTINUED | OUTPATIENT
Start: 2018-12-26 | End: 2018-12-26

## 2018-12-26 RX ADMIN — HYDROMORPHONE HYDROCHLORIDE 0.8 MILLIGRAM(S): 2 INJECTION INTRAMUSCULAR; INTRAVENOUS; SUBCUTANEOUS at 22:54

## 2018-12-26 RX ADMIN — SODIUM CHLORIDE 500 MILLILITER(S): 9 INJECTION INTRAMUSCULAR; INTRAVENOUS; SUBCUTANEOUS at 22:54

## 2018-12-26 NOTE — ED ADULT NURSE NOTE - NSIMPLEMENTINTERV_GEN_ALL_ED
Implemented All Fall with Harm Risk Interventions:  [place] to call system. Call bell, personal items and telephone within reach. Instruct patient to call for assistance. Room bathroom lighting operational. Non-slip footwear when patient is off stretcher. Physically safe environment: no spills, clutter or unnecessary equipment. Stretcher in lowest position, wheels locked, appropriate side rails in place. Provide visual cue, wrist band, yellow gown, etc. Monitor gait and stability. Monitor for mental status changes and reorient to person, place, and time. Review medications for side effects contributing to fall risk. Reinforce activity limits and safety measures with patient and family. Provide visual clues: red socks.

## 2018-12-26 NOTE — ED PROVIDER NOTE - ATTENDING CONTRIBUTION TO CARE
Private Physician [facility], Ivan,[place] PCP  78y male pmh HTN,HLD,COPD,DM,Gerd,BPH, Hep B Carries. Lung ca sp chemo no rt/surg,CKD, comes to ed complains of pain left chest since last night. Pt has hx of remote trauma (fell twice three weeks ago) with back pain no rib pain. No fever chills sputum, Abd pain, PE [place] male looking chronically ill normocephalic atraumatic mild cordell temporal wasting. chest +ttp left chest inferiorly lat no rash visible deformity. abd soft +bs no mass guarding. neruo moves all extr pain light touch intact  Farhan Wilson MD, Facep

## 2018-12-26 NOTE — ED ADULT NURSE NOTE - CHPI ED NUR SYMPTOMS NEG
no edema/no fever/no body aches/no hemoptysis/no diaphoresis/no wheezing/no cough/no headache/no chills

## 2018-12-26 NOTE — ED PROVIDER NOTE - OBJECTIVE STATEMENT
77M with PMhx of HTN, HLD, BPH, COPD, DM, GERD, PE (on eliquis), dysphagia, BCC s/p resection, Hep B, IV CKD, and mutated NSCLC (s/p erlotinib, carboplatin/pemetrexed, atezolizumab) now on gilotrif (abatnif) presenting with left sided chest pain. c/o pain since yesterday. 9/10 pain worsens with movement and breathing. h/o lung ca ?stage 4, pt of Dr. Gross. Recently discharged for aspiration pna. MRI from 12/21 showing  2.3 mm nodule of enhancement   in the left superior frontal gyrus which is likely a metastasis. PET scan from october did not demonstrate bony mets. No substernal chest pain. Pt complains of baseline sob. Pt takes oxycontin 30mg TID and oxycodone 10mg for break through pain. Last dose was oxycodone around 5pm.

## 2018-12-26 NOTE — ED ADULT NURSE REASSESSMENT NOTE - NS ED NURSE REASSESS COMMENT FT1
Pt reporting relief of pain at this time. Pain level decreased from 9/10 to 6/10 pain at this time. Respirations spontaneous, non-labored. Oxygen saturation 96% on 3 L of oxygen via nasal cannula. NAD. Pt to CT.

## 2018-12-26 NOTE — ED PROVIDER NOTE - MEDICAL DECISION MAKING DETAILS
79 yo h/o Lung CA stage 4 with mets to brain, p/w left side posterior rib pain. ddx include rib fracture from fall 3 weeks ago vs pathologic fx. CT chest, cbc, cmp, trops, ekg. giving 0.8mg dilaudid (equivalent dose for 10mg oxycodone, pt's home dose). Pt appears dry, 500cc NS bolus. 77 yo h/o Lung CA stage 4 with mets to brain, p/w left side posterior rib pain. ddx include rib fracture from fall 3 weeks ago vs pathologic fx. CT chest, cbc, cmp, trops, ekg. giving 0.8mg dilaudid (equivalent dose for 10mg oxycodone, pt's home dose). Pt appears dry, 500cc NS bolus.  Update: pt admitted to medicine. There is a concern for PE given h/o PE, new onset chest pain and sob. Pt recent decreased dose of eliquis for renal function change.

## 2018-12-26 NOTE — ED ADULT TRIAGE NOTE - NSTRIAGECARE_GEN_A_ER
Supplemental Oxygen (see orders)/3 liters by nc 3 liters by nc; increased to 93%/Supplemental Oxygen (see orders)

## 2018-12-26 NOTE — ED PROVIDER NOTE - PHYSICAL EXAMINATION
PHYSICAL EXAM:    GENERAL: uncomfortable, cachectic   HEAD:  Normocephalic, atraumatic  EYES: EOMI, PERRLA  HEENT: dry mucous membranes  NECK: Supple, No JVD  NERVOUS SYSTEM:  Alert & Oriented X3, Motor Strength 5/5 B/L upper and lower extremities  CHEST/LUNG: Crackles b/l bases  HEART: Regular rate and rhythm, no murmur   ABDOMEN: Soft, Nontender, Nondistended, Bowel sounds present  EXTREMITIES:   No clubbing, cyanosis, or edema  MUSCULOSKELETAL: No muscle tenderness. Left posterior rib tenderness to palpation.  SKIN:  warm and dry, no rash PHYSICAL EXAM:    GENERAL: uncomfortable, cachectic   HEAD:  Normocephalic, atraumatic  EYES: EOMI, PERRLA  HEENT: dry mucous membranes  NECK: Supple, No JVD  NERVOUS SYSTEM:  Alert & Oriented X3, Motor Strength 5/5 B/L upper and lower extremities  CHEST/LUNG: Crackles b/l bases  HEART: Regular rate and rhythm, no murmur   ABDOMEN: Soft, Nontender, Nondistended, Bowel sounds present. no gross blood on rectal exam.   EXTREMITIES:   No clubbing, cyanosis, or edema  MUSCULOSKELETAL: No muscle tenderness. Left posterior rib tenderness to palpation.  SKIN:  warm and dry, no rash

## 2018-12-26 NOTE — ED PROVIDER NOTE - PROGRESS NOTE DETAILS
Endorsed to Dr Jyoti Wilson MD, Facep Pain improved on dilaudid. Will admit to Dr. Mckeon. Starting heparin drip without bolus per hospitalist. Rectal exam shows no gross blood. ordering CTA

## 2018-12-26 NOTE — ED ADULT NURSE NOTE - OBJECTIVE STATEMENT
77 yo m reporting to ED for left sided rib pain. PMH of Lung Cancer (Stage 4), COPD (pt on 2 L of oxygen via nasal cannula at baseline), PE (on Eliquis), DM (Type 2), dysphagia (on pureed diet), Hepatitis B, HTN, HLD, GERD & BPH. Pt is primarily chinese speaking, pt refusing  services at this time; son, , at bedside translating at this time. Pt reporting left-sided lower chest pain beginning yesterday. Pain is 9/10, localized, "sharp", and constant. pt denies injury or trauma to the area. Pt reports pain increases with deep breaths. Pt is on Oxycontin 30mg every 8 hours & oxycodone 10 mg every 4 hours PRN for breakthrough pain. Here, pt is 95% on 3 L of O2. On exam, Pt AAOx3, NAD, respirations spontaneous, non-labored, crackles noted to the bi-lateral lungs, abdomen soft nontender nondistended, strong peripheral pulses x 4, cap refill < 2 seconds, skin warm and dry. Pt denies headache, dizziness, palpitations, cough, abdominal pain, n/v/d, urinary symptoms, fevers, chills at this time. Family at the bedside. Safety & comfort measures maintained. Will continue to reassess. 77 yo m reporting to ED for left sided rib pain. PMH of Lung Cancer (Stage 4), COPD (pt on 2 L of oxygen via nasal cannula at baseline), PE (on Eliquis), DM (Type 2), dysphagia (on pureed diet), Hepatitis B, HTN, HLD, GERD & BPH. Pt is primarily chinese speaking, pt refusing  services at this time; son at bedside translating at this time. Pt reporting left-sided lower chest pain beginning yesterday. Pain is 9/10, localized, "sharp", and constant. pt denies injury or trauma to the area. Pt reports pain increases with deep breaths. Pt is on Oxycontin 30mg every 8 hours & oxycodone 10 mg every 4 hours PRN for breakthrough pain. Here, pt is 95% on 3 L of O2. On exam, Pt AAOx3, NAD, respirations spontaneous, non-labored, crackles noted to the bi-lateral lungs, abdomen soft nontender nondistended, strong peripheral pulses x 4, cap refill < 2 seconds, skin warm and dry. Pt denies headache, dizziness, palpitations, cough, abdominal pain, n/v/d, urinary symptoms, fevers, chills at this time. Family at the bedside. Safety & comfort measures maintained. Will continue to reassess.

## 2018-12-27 DIAGNOSIS — N18.4 CHRONIC KIDNEY DISEASE, STAGE 4 (SEVERE): ICD-10-CM

## 2018-12-27 DIAGNOSIS — B19.10 UNSPECIFIED VIRAL HEPATITIS B WITHOUT HEPATIC COMA: ICD-10-CM

## 2018-12-27 DIAGNOSIS — N40.0 BENIGN PROSTATIC HYPERPLASIA WITHOUT LOWER URINARY TRACT SYMPTOMS: ICD-10-CM

## 2018-12-27 DIAGNOSIS — G89.3 NEOPLASM RELATED PAIN (ACUTE) (CHRONIC): ICD-10-CM

## 2018-12-27 DIAGNOSIS — R52 PAIN, UNSPECIFIED: ICD-10-CM

## 2018-12-27 DIAGNOSIS — R65.10 SYSTEMIC INFLAMMATORY RESPONSE SYNDROME (SIRS) OF NON-INFECTIOUS ORIGIN WITHOUT ACUTE ORGAN DYSFUNCTION: ICD-10-CM

## 2018-12-27 DIAGNOSIS — C34.90 MALIGNANT NEOPLASM OF UNSPECIFIED PART OF UNSPECIFIED BRONCHUS OR LUNG: ICD-10-CM

## 2018-12-27 DIAGNOSIS — S22.42XD MULTIPLE FRACTURES OF RIBS, LEFT SIDE, SUBSEQUENT ENCOUNTER FOR FRACTURE WITH ROUTINE HEALING: ICD-10-CM

## 2018-12-27 DIAGNOSIS — R07.89 OTHER CHEST PAIN: ICD-10-CM

## 2018-12-27 DIAGNOSIS — Z51.5 ENCOUNTER FOR PALLIATIVE CARE: ICD-10-CM

## 2018-12-27 DIAGNOSIS — S22.39XA FRACTURE OF ONE RIB, UNSPECIFIED SIDE, INITIAL ENCOUNTER FOR CLOSED FRACTURE: ICD-10-CM

## 2018-12-27 DIAGNOSIS — Z29.9 ENCOUNTER FOR PROPHYLACTIC MEASURES, UNSPECIFIED: ICD-10-CM

## 2018-12-27 DIAGNOSIS — R05 COUGH: ICD-10-CM

## 2018-12-27 DIAGNOSIS — I26.99 OTHER PULMONARY EMBOLISM WITHOUT ACUTE COR PULMONALE: ICD-10-CM

## 2018-12-27 LAB
ALBUMIN SERPL ELPH-MCNC: 3.4 G/DL — SIGNIFICANT CHANGE UP (ref 3.3–5)
ALP SERPL-CCNC: 129 U/L — HIGH (ref 40–120)
ALT FLD-CCNC: 14 U/L — SIGNIFICANT CHANGE UP (ref 10–45)
ANION GAP SERPL CALC-SCNC: 13 MMOL/L — SIGNIFICANT CHANGE UP (ref 5–17)
ANION GAP SERPL CALC-SCNC: 19 MMOL/L — HIGH (ref 5–17)
APTT BLD: 33.7 SEC — SIGNIFICANT CHANGE UP (ref 27.5–36.3)
AST SERPL-CCNC: 12 U/L — SIGNIFICANT CHANGE UP (ref 10–40)
BILIRUB SERPL-MCNC: 0.5 MG/DL — SIGNIFICANT CHANGE UP (ref 0.2–1.2)
BUN SERPL-MCNC: 47 MG/DL — HIGH (ref 7–23)
BUN SERPL-MCNC: 54 MG/DL — HIGH (ref 7–23)
CALCIUM SERPL-MCNC: 9.3 MG/DL — SIGNIFICANT CHANGE UP (ref 8.4–10.5)
CALCIUM SERPL-MCNC: 9.6 MG/DL — SIGNIFICANT CHANGE UP (ref 8.4–10.5)
CHLORIDE SERPL-SCNC: 102 MMOL/L — SIGNIFICANT CHANGE UP (ref 96–108)
CHLORIDE SERPL-SCNC: 103 MMOL/L — SIGNIFICANT CHANGE UP (ref 96–108)
CO2 SERPL-SCNC: 19 MMOL/L — LOW (ref 22–31)
CO2 SERPL-SCNC: 24 MMOL/L — SIGNIFICANT CHANGE UP (ref 22–31)
CREAT SERPL-MCNC: 1.35 MG/DL — HIGH (ref 0.5–1.3)
CREAT SERPL-MCNC: 1.49 MG/DL — HIGH (ref 0.5–1.3)
GLUCOSE BLDC GLUCOMTR-MCNC: 102 MG/DL — HIGH (ref 70–99)
GLUCOSE BLDC GLUCOMTR-MCNC: 146 MG/DL — HIGH (ref 70–99)
GLUCOSE BLDC GLUCOMTR-MCNC: 88 MG/DL — SIGNIFICANT CHANGE UP (ref 70–99)
GLUCOSE SERPL-MCNC: 108 MG/DL — HIGH (ref 70–99)
GLUCOSE SERPL-MCNC: 139 MG/DL — HIGH (ref 70–99)
HCT VFR BLD CALC: 36.9 % — LOW (ref 39–50)
HGB BLD-MCNC: 11.6 G/DL — LOW (ref 13–17)
INR BLD: 1.32 RATIO — HIGH (ref 0.88–1.16)
INR BLD: 1.34 RATIO — HIGH (ref 0.88–1.16)
MAGNESIUM SERPL-MCNC: 1.7 MG/DL — SIGNIFICANT CHANGE UP (ref 1.6–2.6)
MCHC RBC-ENTMCNC: 29.4 PG — SIGNIFICANT CHANGE UP (ref 27–34)
MCHC RBC-ENTMCNC: 31.4 GM/DL — LOW (ref 32–36)
MCV RBC AUTO: 93.7 FL — SIGNIFICANT CHANGE UP (ref 80–100)
OB PNL STL: NEGATIVE — SIGNIFICANT CHANGE UP
PHOSPHATE SERPL-MCNC: 3.9 MG/DL — SIGNIFICANT CHANGE UP (ref 2.5–4.5)
PLATELET # BLD AUTO: 180 K/UL — SIGNIFICANT CHANGE UP (ref 150–400)
POTASSIUM SERPL-MCNC: 4 MMOL/L — SIGNIFICANT CHANGE UP (ref 3.5–5.3)
POTASSIUM SERPL-MCNC: 4.7 MMOL/L — SIGNIFICANT CHANGE UP (ref 3.5–5.3)
POTASSIUM SERPL-SCNC: 4 MMOL/L — SIGNIFICANT CHANGE UP (ref 3.5–5.3)
POTASSIUM SERPL-SCNC: 4.7 MMOL/L — SIGNIFICANT CHANGE UP (ref 3.5–5.3)
PROT SERPL-MCNC: 7.6 G/DL — SIGNIFICANT CHANGE UP (ref 6–8.3)
PROTHROM AB SERPL-ACNC: 14.9 SEC — HIGH (ref 10–13.1)
PROTHROM AB SERPL-ACNC: 15.6 SEC — HIGH (ref 10–12.9)
RBC # BLD: 3.94 M/UL — LOW (ref 4.2–5.8)
RBC # FLD: 17 % — HIGH (ref 10.3–14.5)
SODIUM SERPL-SCNC: 140 MMOL/L — SIGNIFICANT CHANGE UP (ref 135–145)
SODIUM SERPL-SCNC: 140 MMOL/L — SIGNIFICANT CHANGE UP (ref 135–145)
TROPONIN T, HIGH SENSITIVITY RESULT: 11 NG/L — SIGNIFICANT CHANGE UP (ref 0–51)
WBC # BLD: 23.68 K/UL — HIGH (ref 3.8–10.5)
WBC # FLD AUTO: 23.68 K/UL — HIGH (ref 3.8–10.5)

## 2018-12-27 PROCEDURE — 99223 1ST HOSP IP/OBS HIGH 75: CPT

## 2018-12-27 PROCEDURE — 71045 X-RAY EXAM CHEST 1 VIEW: CPT | Mod: 26

## 2018-12-27 PROCEDURE — 78582 LUNG VENTILAT&PERFUS IMAGING: CPT | Mod: 26

## 2018-12-27 PROCEDURE — 12345: CPT | Mod: NC,GC

## 2018-12-27 PROCEDURE — 99223 1ST HOSP IP/OBS HIGH 75: CPT | Mod: GC

## 2018-12-27 PROCEDURE — 93970 EXTREMITY STUDY: CPT | Mod: 26

## 2018-12-27 RX ORDER — HYDROMORPHONE HYDROCHLORIDE 2 MG/ML
0.5 INJECTION INTRAMUSCULAR; INTRAVENOUS; SUBCUTANEOUS ONCE
Qty: 0 | Refills: 0 | Status: DISCONTINUED | OUTPATIENT
Start: 2018-12-27 | End: 2018-12-27

## 2018-12-27 RX ORDER — DEXAMETHASONE 0.5 MG/5ML
2 ELIXIR ORAL DAILY
Qty: 0 | Refills: 0 | Status: DISCONTINUED | OUTPATIENT
Start: 2018-12-27 | End: 2019-01-02

## 2018-12-27 RX ORDER — DEXTROSE 50 % IN WATER 50 %
12.5 SYRINGE (ML) INTRAVENOUS ONCE
Qty: 0 | Refills: 0 | Status: DISCONTINUED | OUTPATIENT
Start: 2018-12-27 | End: 2018-12-27

## 2018-12-27 RX ORDER — OXYCODONE HYDROCHLORIDE 5 MG/1
10 TABLET ORAL EVERY 4 HOURS
Qty: 0 | Refills: 0 | Status: DISCONTINUED | OUTPATIENT
Start: 2018-12-27 | End: 2018-12-27

## 2018-12-27 RX ORDER — HYDROMORPHONE HYDROCHLORIDE 2 MG/ML
0.8 INJECTION INTRAMUSCULAR; INTRAVENOUS; SUBCUTANEOUS
Qty: 0 | Refills: 0 | Status: DISCONTINUED | OUTPATIENT
Start: 2018-12-27 | End: 2018-12-31

## 2018-12-27 RX ORDER — DEXTROSE 50 % IN WATER 50 %
25 SYRINGE (ML) INTRAVENOUS ONCE
Qty: 0 | Refills: 0 | Status: DISCONTINUED | OUTPATIENT
Start: 2018-12-27 | End: 2018-12-27

## 2018-12-27 RX ORDER — TAMSULOSIN HYDROCHLORIDE 0.4 MG/1
0.4 CAPSULE ORAL AT BEDTIME
Qty: 0 | Refills: 0 | Status: DISCONTINUED | OUTPATIENT
Start: 2018-12-27 | End: 2019-01-02

## 2018-12-27 RX ORDER — INSULIN LISPRO 100/ML
VIAL (ML) SUBCUTANEOUS
Qty: 0 | Refills: 0 | Status: DISCONTINUED | OUTPATIENT
Start: 2018-12-27 | End: 2018-12-27

## 2018-12-27 RX ORDER — PIPERACILLIN AND TAZOBACTAM 4; .5 G/20ML; G/20ML
3.38 INJECTION, POWDER, LYOPHILIZED, FOR SOLUTION INTRAVENOUS EVERY 8 HOURS
Qty: 0 | Refills: 0 | Status: DISCONTINUED | OUTPATIENT
Start: 2018-12-27 | End: 2018-12-30

## 2018-12-27 RX ORDER — DIPHENHYDRAMINE HYDROCHLORIDE AND LIDOCAINE HYDROCHLORIDE AND ALUMINUM HYDROXIDE AND MAGNESIUM HYDRO
5 KIT THREE TIMES A DAY
Qty: 0 | Refills: 0 | Status: DISCONTINUED | OUTPATIENT
Start: 2018-12-27 | End: 2019-01-02

## 2018-12-27 RX ORDER — VANCOMYCIN HCL 1 G
VIAL (EA) INTRAVENOUS
Qty: 0 | Refills: 0 | Status: DISCONTINUED | OUTPATIENT
Start: 2018-12-28 | End: 2018-12-29

## 2018-12-27 RX ORDER — VANCOMYCIN HCL 1 G
750 VIAL (EA) INTRAVENOUS ONCE
Qty: 0 | Refills: 0 | Status: COMPLETED | OUTPATIENT
Start: 2018-12-27 | End: 2018-12-28

## 2018-12-27 RX ORDER — ACETAMINOPHEN 500 MG
640 TABLET ORAL EVERY 6 HOURS
Qty: 0 | Refills: 0 | Status: DISCONTINUED | OUTPATIENT
Start: 2018-12-27 | End: 2018-12-28

## 2018-12-27 RX ORDER — HEPARIN SODIUM 5000 [USP'U]/ML
2000 INJECTION INTRAVENOUS; SUBCUTANEOUS EVERY 6 HOURS
Qty: 0 | Refills: 0 | Status: DISCONTINUED | OUTPATIENT
Start: 2018-12-27 | End: 2018-12-27

## 2018-12-27 RX ORDER — APIXABAN 2.5 MG/1
2.5 TABLET, FILM COATED ORAL EVERY 12 HOURS
Qty: 0 | Refills: 0 | Status: DISCONTINUED | OUTPATIENT
Start: 2018-12-27 | End: 2019-01-02

## 2018-12-27 RX ORDER — NYSTATIN 500MM UNIT
500000 POWDER (EA) MISCELLANEOUS
Qty: 0 | Refills: 0 | Status: DISCONTINUED | OUTPATIENT
Start: 2018-12-27 | End: 2019-01-02

## 2018-12-27 RX ORDER — IPRATROPIUM/ALBUTEROL SULFATE 18-103MCG
3 AEROSOL WITH ADAPTER (GRAM) INHALATION EVERY 6 HOURS
Qty: 0 | Refills: 0 | Status: DISCONTINUED | OUTPATIENT
Start: 2018-12-27 | End: 2019-01-02

## 2018-12-27 RX ORDER — DEXTROSE 50 % IN WATER 50 %
15 SYRINGE (ML) INTRAVENOUS ONCE
Qty: 0 | Refills: 0 | Status: DISCONTINUED | OUTPATIENT
Start: 2018-12-27 | End: 2018-12-27

## 2018-12-27 RX ORDER — TENOFOVIR DISOPROXIL FUMARATE 300 MG/1
300 TABLET, FILM COATED ORAL DAILY
Qty: 0 | Refills: 0 | Status: DISCONTINUED | OUTPATIENT
Start: 2018-12-27 | End: 2018-12-28

## 2018-12-27 RX ORDER — OXYCODONE HYDROCHLORIDE 5 MG/1
20 TABLET ORAL EVERY 8 HOURS
Qty: 0 | Refills: 0 | Status: DISCONTINUED | OUTPATIENT
Start: 2018-12-27 | End: 2018-12-31

## 2018-12-27 RX ORDER — OXYCODONE HYDROCHLORIDE 5 MG/1
10 TABLET ORAL EVERY 6 HOURS
Qty: 0 | Refills: 0 | Status: DISCONTINUED | OUTPATIENT
Start: 2018-12-27 | End: 2018-12-27

## 2018-12-27 RX ORDER — PANTOPRAZOLE SODIUM 20 MG/1
40 TABLET, DELAYED RELEASE ORAL
Qty: 0 | Refills: 0 | Status: DISCONTINUED | OUTPATIENT
Start: 2018-12-27 | End: 2019-01-02

## 2018-12-27 RX ORDER — HEPARIN SODIUM 5000 [USP'U]/ML
4500 INJECTION INTRAVENOUS; SUBCUTANEOUS EVERY 6 HOURS
Qty: 0 | Refills: 0 | Status: DISCONTINUED | OUTPATIENT
Start: 2018-12-27 | End: 2018-12-27

## 2018-12-27 RX ORDER — HYDROMORPHONE HYDROCHLORIDE 2 MG/ML
0.5 INJECTION INTRAMUSCULAR; INTRAVENOUS; SUBCUTANEOUS
Qty: 0 | Refills: 0 | Status: DISCONTINUED | OUTPATIENT
Start: 2018-12-27 | End: 2018-12-27

## 2018-12-27 RX ORDER — VANCOMYCIN HCL 1 G
750 VIAL (EA) INTRAVENOUS EVERY 12 HOURS
Qty: 0 | Refills: 0 | Status: DISCONTINUED | OUTPATIENT
Start: 2018-12-28 | End: 2018-12-29

## 2018-12-27 RX ORDER — INSULIN LISPRO 100/ML
VIAL (ML) SUBCUTANEOUS AT BEDTIME
Qty: 0 | Refills: 0 | Status: DISCONTINUED | OUTPATIENT
Start: 2018-12-27 | End: 2018-12-27

## 2018-12-27 RX ORDER — GLUCAGON INJECTION, SOLUTION 0.5 MG/.1ML
1 INJECTION, SOLUTION SUBCUTANEOUS ONCE
Qty: 0 | Refills: 0 | Status: DISCONTINUED | OUTPATIENT
Start: 2018-12-27 | End: 2018-12-27

## 2018-12-27 RX ORDER — OXYCODONE HYDROCHLORIDE 5 MG/1
20 TABLET ORAL EVERY 12 HOURS
Qty: 0 | Refills: 0 | Status: DISCONTINUED | OUTPATIENT
Start: 2018-12-27 | End: 2018-12-27

## 2018-12-27 RX ORDER — SODIUM CHLORIDE 9 MG/ML
1000 INJECTION, SOLUTION INTRAVENOUS
Qty: 0 | Refills: 0 | Status: DISCONTINUED | OUTPATIENT
Start: 2018-12-27 | End: 2018-12-27

## 2018-12-27 RX ORDER — AFATINIB 40 MG/1
20 TABLET, FILM COATED ORAL DAILY
Qty: 0 | Refills: 0 | Status: DISCONTINUED | OUTPATIENT
Start: 2018-12-27 | End: 2018-12-27

## 2018-12-27 RX ORDER — SIMVASTATIN 20 MG/1
1 TABLET, FILM COATED ORAL
Qty: 0 | Refills: 0 | COMMUNITY

## 2018-12-27 RX ORDER — HEPARIN SODIUM 5000 [USP'U]/ML
INJECTION INTRAVENOUS; SUBCUTANEOUS
Qty: 25000 | Refills: 0 | Status: DISCONTINUED | OUTPATIENT
Start: 2018-12-27 | End: 2018-12-27

## 2018-12-27 RX ADMIN — TENOFOVIR DISOPROXIL FUMARATE 300 MILLIGRAM(S): 300 TABLET, FILM COATED ORAL at 12:02

## 2018-12-27 RX ADMIN — DIPHENHYDRAMINE HYDROCHLORIDE AND LIDOCAINE HYDROCHLORIDE AND ALUMINUM HYDROXIDE AND MAGNESIUM HYDRO 5 MILLILITER(S): KIT at 22:56

## 2018-12-27 RX ADMIN — Medication 500000 UNIT(S): at 23:12

## 2018-12-27 RX ADMIN — OXYCODONE HYDROCHLORIDE 20 MILLIGRAM(S): 5 TABLET ORAL at 22:51

## 2018-12-27 RX ADMIN — Medication 2 MILLIGRAM(S): at 23:13

## 2018-12-27 RX ADMIN — Medication 500000 UNIT(S): at 12:50

## 2018-12-27 RX ADMIN — PIPERACILLIN AND TAZOBACTAM 25 GRAM(S): 4; .5 INJECTION, POWDER, LYOPHILIZED, FOR SOLUTION INTRAVENOUS at 22:52

## 2018-12-27 RX ADMIN — HYDROMORPHONE HYDROCHLORIDE 0.5 MILLIGRAM(S): 2 INJECTION INTRAMUSCULAR; INTRAVENOUS; SUBCUTANEOUS at 12:52

## 2018-12-27 RX ADMIN — Medication 3 MILLILITER(S): at 18:30

## 2018-12-27 RX ADMIN — OXYCODONE HYDROCHLORIDE 20 MILLIGRAM(S): 5 TABLET ORAL at 17:00

## 2018-12-27 RX ADMIN — Medication 3 MILLILITER(S): at 12:02

## 2018-12-27 RX ADMIN — OXYCODONE HYDROCHLORIDE 20 MILLIGRAM(S): 5 TABLET ORAL at 16:30

## 2018-12-27 RX ADMIN — OXYCODONE HYDROCHLORIDE 20 MILLIGRAM(S): 5 TABLET ORAL at 23:21

## 2018-12-27 RX ADMIN — APIXABAN 2.5 MILLIGRAM(S): 2.5 TABLET, FILM COATED ORAL at 18:30

## 2018-12-27 RX ADMIN — TAMSULOSIN HYDROCHLORIDE 0.4 MILLIGRAM(S): 0.4 CAPSULE ORAL at 23:11

## 2018-12-27 RX ADMIN — HYDROMORPHONE HYDROCHLORIDE 0.8 MILLIGRAM(S): 2 INJECTION INTRAMUSCULAR; INTRAVENOUS; SUBCUTANEOUS at 16:47

## 2018-12-27 RX ADMIN — HYDROMORPHONE HYDROCHLORIDE 0.8 MILLIGRAM(S): 2 INJECTION INTRAMUSCULAR; INTRAVENOUS; SUBCUTANEOUS at 04:34

## 2018-12-27 RX ADMIN — Medication 640 MILLIGRAM(S): at 18:30

## 2018-12-27 RX ADMIN — Medication 3 MILLILITER(S): at 23:10

## 2018-12-27 RX ADMIN — Medication 100 MILLIGRAM(S): at 22:54

## 2018-12-27 RX ADMIN — Medication 640 MILLIGRAM(S): at 23:11

## 2018-12-27 RX ADMIN — DIPHENHYDRAMINE HYDROCHLORIDE AND LIDOCAINE HYDROCHLORIDE AND ALUMINUM HYDROXIDE AND MAGNESIUM HYDRO 5 MILLILITER(S): KIT at 12:50

## 2018-12-27 RX ADMIN — HYDROMORPHONE HYDROCHLORIDE 0.5 MILLIGRAM(S): 2 INJECTION INTRAMUSCULAR; INTRAVENOUS; SUBCUTANEOUS at 12:02

## 2018-12-27 RX ADMIN — HEPARIN SODIUM 1100 UNIT(S)/HR: 5000 INJECTION INTRAVENOUS; SUBCUTANEOUS at 04:54

## 2018-12-27 RX ADMIN — Medication 640 MILLIGRAM(S): at 19:00

## 2018-12-27 RX ADMIN — OXYCODONE HYDROCHLORIDE 10 MILLIGRAM(S): 5 TABLET ORAL at 07:56

## 2018-12-27 RX ADMIN — HYDROMORPHONE HYDROCHLORIDE 0.8 MILLIGRAM(S): 2 INJECTION INTRAMUSCULAR; INTRAVENOUS; SUBCUTANEOUS at 16:31

## 2018-12-27 NOTE — CONSULT NOTE ADULT - PROBLEM SELECTOR RECOMMENDATION 2
Patient had fall episodes 3 weeks ago, however he fell on his back and it is not clear if his Fx was secondary to his fall.  Pathologic Fx can be sec to metastasis of NSCLC, Possible constant cough as a cause?

## 2018-12-27 NOTE — H&P ADULT - PROBLEM SELECTOR PLAN 5
- hx of DVT vs PE in the past now on elliquis  - given hx of clotting episode, malignancy and immobility - - will order V/Q scan and duplex U/S  - c/w heparin gtt in the interim - Creatinine appears improved from prior   - monitor I/O and daily BMPs

## 2018-12-27 NOTE — PROGRESS NOTE ADULT - ASSESSMENT
77 year old with hx of HTN, HLD, COPD, T2DM, ?PE on Apixaban, Stage IV CKD, NSCLC (progression of disease on erlotinib, carbo/pemetrexed, ateoluzumab, now on afatanib), basal cell cancer s/p resection, hep B (on tenofovir) and dysphagia who presents with rib pain on the left side, likely due to nondisplaced rib fractures. Possibly secondary to fall vs increased coughing. Patient with increased new _____. 77 year old with hx of HTN, HLD, COPD, T2DM, ?PE on Apixaban, Stage IV CKD, NSCLC (progression of disease on erlotinib, carbo/pemetrexed, ateoluzumab, now on afatanib), basal cell cancer s/p resection, hep B (on tenofovir) and dysphagia who presents with rib pain on the left side, likely due to nondisplaced rib fractures. Possibly secondary to fall vs increased coughing. Patient with increased nodular airspace opacities on CT concerning for metastasis vs infection. Less likely infection as patient has been afebrile and appears non-toxic. Patient does have increased WBC which may be explained by increased tumor burden

## 2018-12-27 NOTE — CONSULT NOTE ADULT - SUBJECTIVE AND OBJECTIVE BOX
HPI:  77 year old with hx of HTN, HLD, COPD, T2DM, ?PE on Apixaban, Stage IV CKD, NSCLC (progression of disease on erlotinib, carbo/pemetrexed, ateoluzumab, now on afatanib), basal cell cancer s/p resection, hep B (on tenofovir) and dysphagia who presents with rib pain on the left side. Of note patient fell twice, 3 weeks ago with some associated rib pain. Patient states that falls occurred when he was trying to get out of bed to walk to the bathroom and then when he had tried to pull open a door. He did not lose consciousness with the falls. After falls, patient complained of back pain, but did not endorse left sided chest wall pain. On night prior to admission, had complained worsening left side pain: on the anterior chest spreading to the mid and back. Worse with palpation, with some associated pleuritic chest pain and sensation of feeling short of breath. As per grandson, took his usual pain medication regimen (takes oxycontin 20 TID and oxycodone 10mg q6 hrs PRN with tylenol 650 q 6 hrs PRN) without improvement in symptoms.     Of note, patient was recently on a regimen of decadron for bone pain which reportedly helped somewhat. However, this pain is reportedly different from his typical back and bone pain. Denies any fevers, chills, no productive cough (however has difficulty producing cough at baseline), denies any diarrhea, dysuria or increased urinary frequency.     In the ED:  Afebrile, HR 86 -102 -112/67-73 RR 16-21 O2: 92 % RA, 95% on 3L NC   NS 500cc bolus, Dilaudid 0.8 mg IV x 1 (27 Dec 2018 05:46)    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  Basal cell carcinoma of nostril    FAMILY HISTORY:  Family history of diabetes mellitus (Sibling): brother - living  Family history of liver cancer: Mother   Family history of liver cancer  Family history of stomach cancer (Sibling): brother    ITEMS NOT CHECKED ARE NOT PRESENT    SOCIAL HISTORY:   Significant other/partner:  [ ]    Children:  [ ]    Nondenominational/Spirituality:  Substance hx:  [ ]   Tobacco hx:  [ ]   Alcohol hx: [ ] Drug use hx: [ ]  Home Opioid hx:  [ ] (I-Stop Reference No: )  Living Situation: [ ]Home  [ ]Long term care  [ ]Rehab [ ]Other  Occupation:    ADVANCE DIRECTIVES:    DNR [ ]  MOLST  [ ]    Living Will  [ ]   DECISION MAKER(s):  [ ] Health Care Proxy(s)  [ ] Surrogate(s)  [ ] Guardian           Name(s) and Contact(s):    BASELINE (I)ADL(s) (prior to admission):  [place]: [ ]Total  [ ] Moderate [ ]Dependent    Allergies    No Known Drug Allergies  tegaderm (Rash; Urticaria)    Intolerances    MEDICATIONS  (STANDING):  acetaminophen    Suspension .. 640 milliGRAM(s) Oral every 6 hours  ALBUTerol/ipratropium for Nebulization 3 milliLiter(s) Nebulizer every 6 hours  apixaban 2.5 milliGRAM(s) Oral every 12 hours  dextrose 5%. 1000 milliLiter(s) (50 mL/Hr) IV Continuous <Continuous>  dextrose 50% Injectable 12.5 Gram(s) IV Push once  dextrose 50% Injectable 25 Gram(s) IV Push once  dextrose 50% Injectable 25 Gram(s) IV Push once  FIRST- Mouthwash  BLM 5 milliLiter(s) Swish and Swallow three times a day  insulin lispro (HumaLOG) corrective regimen sliding scale   SubCutaneous three times a day before meals  insulin lispro (HumaLOG) corrective regimen sliding scale   SubCutaneous at bedtime  nystatin    Suspension 805207 Unit(s) Oral four times a day  pantoprazole    Tablet 40 milliGRAM(s) Oral before breakfast  tamsulosin 0.4 milliGRAM(s) Oral at bedtime  tenofovir disoproxil fumarate (VIREAD) 300 milliGRAM(s) Oral daily    MEDICATIONS  (PRN):  benzonatate 100 milliGRAM(s) Oral three times a day PRN Cough  dextrose 40% Gel 15 Gram(s) Oral once PRN Blood Glucose LESS THAN 70 milliGRAM(s)/deciliter  glucagon  Injectable 1 milliGRAM(s) IntraMuscular once PRN Glucose LESS THAN 70 milligrams/deciliter    PRESENT SYMPTOMS:   Source if other than patient:  [ ]Family   [ ]Team     Pain (Impact on QOL):    Location -         Minimal acceptable level (0-10 scale):                    Aggravating factors -  Quality -  Radiation -  Severity (0-10 scale) -    Timing -    PAIN AD Score:     [email or web address] (press ctrl +  left click to view)    Dyspnea:                           [ ]Mild [ ]Moderate [ ]Severe  Anxiety:                             [ ]Mild [ ]Moderate [ ]Severe  Fatigue:                             [ ]Mild [ ]Moderate [ ]Severe  Nausea:                             [ ]Mild [ ]Moderate [ ]Severe  Loss of appetite:              [ ]Mild [ ]Moderate [ ]Severe  Constipation:                    [ ]Mild [ ]Moderate [ ]Severe    Other Symptoms:  [ ]All other review of systems negative   [ ]Unable to obtain due to poor mentation     Karnofsky Performance Score/Palliative Performance Status Version 2:         %    PHYSICAL EXAM:  Vital Signs Last 24 Hrs  T(C): 36.4 (27 Dec 2018 07:44), Max: 36.7 (26 Dec 2018 21:44)  T(F): 97.5 (27 Dec 2018 07:44), Max: 98.1 (26 Dec 2018 22:06)  HR: 83 (27 Dec 2018 07:44) (76 - 102)  BP: 112/74 (27 Dec 2018 07:44) (104/74 - 116/69)  BP(mean): --  RR: 18 (27 Dec 2018 07:44) (16 - 21)  SpO2: 94% (27 Dec 2018 07:44) (92% - 96%) I&O's Summary      GENERAL:  [ ]Alert  [ ]Oriented x   [ ]Lethargic  [ ]Cachexia  [ ]Unarousable  [ ]Verbal  [ ]Non-Verbal    Behavioral:   [ ] Anxiety  [ ] Delirium [ ] Agitation [ ] Other    HEENT:  [ ]Normal   [ ]Dry mouth   [ ]ET Tube/Trach  [ ]Oral lesions    PULMONARY:   [ ]Clear [ ]Tachypnea  [ ]Audible excessive secretions   [ ]Rhonchi        [ ]Right [ ]Left [ ]Bilateral  [ ]Crackles        [ ]Right [ ]Left [ ]Bilateral  [ ]Wheezing     [ ]Right [ ]Left [ ]Bilateral    CARDIOVASCULAR:    [ ]Regular [ ]Irregular [ ]Tachy  [ ]Jerman [ ]Murmur [ ]Other    GASTROINTESTINAL:  [ ]Soft  [ ]Distended   [ ]+BS  [ ]Non tender [ ]Tender  [ ]PEG [ ]OGT/ NGT  Last BM:     GENITOURINARY:  [ ]Normal [ ] Incontinent   [ ]Oliguria/Anuria   [ ]Soni    MUSCULOSKELETAL:   [ ]Normal   [ ]Weakness  [ ]Bed/Wheelchair bound [ ]Edema    NEUROLOGIC:   [ ]No focal deficits  [ ] Cognitive impairment  [ ] Dysphagia [ ]Dysarthria [ ] Paresis [ ]Other     SKIN:   [ ]Normal   [ ]Pressure ulcer(s)  [ ]Rash    CRITICAL CARE:  [ ] Shock Present  [ ]Septic [ ]Cardiogenic [ ]Neurologic [ ]Hypovolemic  [ ]  Vasopressors [ ]  Inotropes   [ ] Respiratory failure present  [ ] Acute  [ ] Chronic [ ] Hypoxic  [ ] Hypercarbic [ ] Other  [ ] Other organ failure     LABS:                        11.6   23.68 )-----------( 180      ( 27 Dec 2018 08:04 )             36.9       140  |  103  |  47<H>  ----------------------------<  108<H>  4.0   |  24  |  1.35<H>    Ca    9.3      27 Dec 2018 06:55  Phos  3.9       Mg     1.7         TPro  7.6  /  Alb  3.4  /  TBili  0.5  /  DBili  x   /  AST  12  /  ALT  14  /  AlkPhos  129<H>    PT/INR - ( 27 Dec 2018 09:27 )   PT: 14.9 sec;   INR: 1.32 ratio         PTT - ( 27 Dec 2018 03:43 )  PTT:33.7 sec      RADIOLOGY & ADDITIONAL STUDIES:    PROTEIN CALORIE MALNUTRITION:   [ ] PPSV2 < or = to 30% [ ] significant weight loss  [ ] poor nutritional intake [ ] catabolic state [ ] anasarca     Albumin, Serum: 3.4 g/dL (18 @ 22:42)  Artificial Nutrition [ ]     REFERRALS:   [ ]Chaplaincy  [ ] Hospice  [ ]Child Life  [ ]Social Work  [ ]Case management [ ]Holistic Therapy     Goals of Care Discussion Document: HPI:  77 year old with hx of HTN, HLD, COPD, T2DM, ?PE on Apixaban, Stage IV CKD, NSCLC (progression of disease on erlotinib, carbo/pemetrexed, ateoluzumab, now on afatanib), basal cell cancer s/p resection, hep B (on tenofovir) and dysphagia who presents with rib pain on the left side. Of note patient fell twice, 3 weeks ago with some associated rib pain. Patient states that falls occurred when he was trying to get out of bed to walk to the bathroom and then when he had tried to pull open a door. He did not lose consciousness with the falls. After falls, patient complained of back pain, but did not endorse left sided chest wall pain. On night prior to admission, had complained worsening left side pain: on the anterior chest spreading to the mid and back. Worse with palpation, with some associated pleuritic chest pain and sensation of feeling short of breath. As per grandson, took his usual pain medication regimen (takes oxycontin 20 TID and oxycodone 10mg q6 hrs PRN with tylenol 650 q 6 hrs PRN) without improvement in symptoms.     Of note, patient was recently on a regimen of decadron for bone pain which reportedly helped somewhat. However, this pain is reportedly different from his typical back and bone pain. Denies any fevers, chills, no productive cough (however has difficulty producing cough at baseline), denies any diarrhea, dysuria or increased urinary frequency.     PAlliative care was consulted for GOC    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  Basal cell carcinoma of nostril    FAMILY HISTORY:  Family history of diabetes mellitus (Sibling): brother - living  Family history of liver cancer: Mother   Family history of liver cancer  Family history of stomach cancer (Sibling): brother    ITEMS NOT CHECKED ARE NOT PRESENT    SOCIAL HISTORY:   Significant other/partner: Wife: Robert Humphrey   Children:  [ ]    Roman Catholic/Spirituality:  Substance hx:  [ ]   Tobacco hx:  [X ] Quit when lung CA was Dx  Alcohol hx: [ ] Drug use hx: [ ]  Home Opioid hx:  [ ] (I-Stop Reference No: [id number]  Living Situation: [X ]Home  [ ]Long term care  [ ]Rehab [ ]Other  Occupation:    ADVANCE DIRECTIVES:    DNR [ ]  MOLST  [ ]    Living Will  [ ]   DECISION MAKER(s): PAtient   [ ] Health Care Proxy(s)  [X ] Surrogate(s) Wife and son  [ ] Guardian           Name(s) and Contact(s): Wife: Robert Humphrey     BASELINE (I)ADL(s) (prior to admission):  [place]: [ ]Total  [X ] Moderate [ ]Dependent    Allergies    No Known Drug Allergies  tegaderm (Rash; Urticaria)    Intolerances    MEDICATIONS  (STANDING):  acetaminophen    Suspension .. 640 milliGRAM(s) Oral every 6 hours  ALBUTerol/ipratropium for Nebulization 3 milliLiter(s) Nebulizer every 6 hours  apixaban 2.5 milliGRAM(s) Oral every 12 hours  dextrose 5%. 1000 milliLiter(s) (50 mL/Hr) IV Continuous <Continuous>  dextrose 50% Injectable 12.5 Gram(s) IV Push once  dextrose 50% Injectable 25 Gram(s) IV Push once  dextrose 50% Injectable 25 Gram(s) IV Push once  FIRST- Mouthwash  BLM 5 milliLiter(s) Swish and Swallow three times a day  insulin lispro (HumaLOG) corrective regimen sliding scale   SubCutaneous three times a day before meals  insulin lispro (HumaLOG) corrective regimen sliding scale   SubCutaneous at bedtime  nystatin    Suspension 747337 Unit(s) Oral four times a day  pantoprazole    Tablet 40 milliGRAM(s) Oral before breakfast  tamsulosin 0.4 milliGRAM(s) Oral at bedtime  tenofovir disoproxil fumarate (VIREAD) 300 milliGRAM(s) Oral daily    MEDICATIONS  (PRN):  benzonatate 100 milliGRAM(s) Oral three times a day PRN Cough  dextrose 40% Gel 15 Gram(s) Oral once PRN Blood Glucose LESS THAN 70 milliGRAM(s)/deciliter  glucagon  Injectable 1 milliGRAM(s) IntraMuscular once PRN Glucose LESS THAN 70 milligrams/deciliter    PRESENT SYMPTOMS:   Source if other than patient:  [ ]Family   [ ]Team     Pain (Impact on QOL): Yes   Location -  Left sided chest wall - also chronic pain on back of his chest wall   Minimal acceptable level (0-10 scale): 1-2                   Aggravating factors - respiration, tender on touch   Quality - pleuritic   Radiation - N  Severity (0-10 scale) - 9/10 and after pain medication (Dilaudid) come down to 4/10. Pain on back of chest wall is 3-4/10   Timing - constant     PAIN AD Score:     [email or web address] (press ctrl +  left click to view)    Dyspnea:                           [X ]Mild [ ]Moderate [ ]Severe  Anxiety:                             [ ]Mild [ ]Moderate [ ]Severe  Fatigue:                             [ ]Mild [X ]Moderate [ ]Severe  Nausea:                             [X ]Mild [ ]Moderate [ ]Severe  Loss of appetite:              [X ]Mild [ ]Moderate [ ]Severe  Constipation:                    [ ]Mild [ ]Moderate [ ]Severe    Other Symptoms:  [ ]All other review of systems negative   [ ]Unable to obtain due to poor mentation     Karnofsky Performance Score/Palliative Performance Status Version 2:     40-50 %    PHYSICAL EXAM:  Vital Signs Last 24 Hrs  T(C): 36.4 (27 Dec 2018 07:44), Max: 36.7 (26 Dec 2018 21:44)  T(F): 97.5 (27 Dec 2018 07:44), Max: 98.1 (26 Dec 2018 22:06)  HR: 83 (27 Dec 2018 07:44) (76 - 102)  BP: 112/74 (27 Dec 2018 07:44) (104/74 - 116/69)  BP(mean): --  RR: 18 (27 Dec 2018 07:44) (16 - 21)  SpO2: 94% (27 Dec 2018 07:44) (92% - 96%) I&O's Summary      GENERAL:  [X ]Alert  [X ]Oriented    [X ]Lethargic  [ ]Cachexia  [ ]Unarousable  [X ]Verbal  [ ]Non-Verbal    Behavioral:   [ ] Anxiety  [ ] Delirium [ ] Agitation [ ] Other    HEENT:  [ ]Normal   [ ]Dry mouth   [ ]ET Tube/Trach  [X ]Oral lesions    PULMONARY:   [ ]Clear [ ]Tachypnea  [ ]Audible excessive secretions decreased BS on the left side   [ ]Rhonchi        [ ]Right [ ]Left [ ]Bilateral  [ ]Crackles        [ ]Right [ ]Left [ ]Bilateral  [ ]Wheezing     [ ]Right [ ]Left [ ]Bilateral    CARDIOVASCULAR:    [X ]Regular [ ]Irregular [ ]Tachy  [ ]Jerman [ ]Murmur [ ]Other    GASTROINTESTINAL:  [X ]Soft  [ ]Distended   [X ]+BS  [ ]Non tender [X ]Tender over RLQ and epigastric  [ ]PEG [ ]OGT/ NGT  Last BM: Last night (2018)     GENITOURINARY:  [X ]Normal [ ] Incontinent   [ ]Oliguria/Anuria   [ ]Soni    MUSCULOSKELETAL:   [ ]Normal   [ ]Weakness  [ ]Bed/Wheelchair bound [ ]Edema tenderness over chest wall (left side)     NEUROLOGIC:   [X ]No focal deficits  [ ] Cognitive impairment  [ ] Dysphagia [ ]Dysarthria [ ] Paresis [ ]Other     SKIN:   [X ]Normal   [ ]Pressure ulcer(s)  [ ]Rash    CRITICAL CARE:  [ ] Shock Present  [ ]Septic [ ]Cardiogenic [ ]Neurologic [ ]Hypovolemic  [ ]  Vasopressors [ ]  Inotropes   [ ] Respiratory failure present  [ ] Acute  [ ] Chronic [ ] Hypoxic  [ ] Hypercarbic [ ] Other  [ ] Other organ failure     LABS:                        11.6   23.68 )-----------( 180      ( 27 Dec 2018 08:04 )             36.9       140  |  103  |  47<H>  ----------------------------<  108<H>  4.0   |  24  |  1.35<H>    Ca    9.3      27 Dec 2018 06:55  Phos  3.9       Mg     1.7         TPro  7.6  /  Alb  3.4  /  TBili  0.5  /  DBili  x   /  AST  12  /  ALT  14  /  AlkPhos  129<H>    PT/INR - ( 27 Dec 2018 09:27 )   PT: 14.9 sec;   INR: 1.32 ratio         PTT - ( 27 Dec 2018 03:43 )  PTT:33.7 sec      RADIOLOGY & ADDITIONAL STUDIES:  CT of chest W/O C (2018): IMPRESSION:    Nondisplaced fractures of the left 10th through 12th ribs with mild   callus formation. These are new since 9/15/2018 and were present on   2018.  Ventilation/Perfusion Scan (2018):     IMPRESSION: Abnormal ventilation/perfusion lung scan.  Low probability of pulmonary embolus.    VA Dupplex LE: IMPRESSION:     No evidence of bilateral lower extremity deep venous thrombosis.  PROTEIN CALORIE MALNUTRITION:   [ ] PPSV2 < or = to 30% [ ] significant weight loss  [ ] poor nutritional intake [ ] catabolic state [ ] anasarca     Albumin, Serum: 3.4 g/dL (18 @ 22:42)  Artificial Nutrition [ ]     REFERRALS:   [ ]Chaplaincy  [ ] Hospice  [ ]Child Life  [ ]Social Work  [ ]Case management [ ]Holistic Therapy     Goals of Care Discussion Document:

## 2018-12-27 NOTE — CONSULT NOTE ADULT - ASSESSMENT
77 year old with hx of HTN, HLD, COPD, T2DM, ?PE on Apixaban, Stage IV CKD, NSCLC w EGFR mutation (progression of disease on erlotinib, carbo/pemetrexed, atezoluzumab, now on afatanib since 2/12/18-), basal cell cancer s/p resection, hep B (on tenofovir) and dysphagia who presents with rib pain on the left side after fall couple weeks ago and PET scan showing rib fractures on the left rib 10-12.  Also with acute on chronic SOB and PET scan showing possible infection vs. POD.      #Left rib pain  -likely traumatic given this is new pain after fall and consistent with PET scan findings  -will review with radiology whether these fx are pathologic  -c/w pain control  -consider thoracic surgery consult for eval   -palliative consult appreciated    #Acute on chronic SOB  -PET scan lung findings and leukocytosis from baseline concerning for possible pna  -leukocytosis likely not due to dex, was not taking it for 2 days per son  -less likely pneumonitis as pt is not on immunotherapy  -rec to treat w full course of abx for pna  -c/w oxygen supplementation    #NSCLC  -unclear from PET scan if there is POD in the lungs.  MRI brain shows a small 2.3mm lesion, otherwise other sites of disease stable and unchanged.  Family aware.  -hold Afatinib while treating possible pna  -follows with Dr. [facility] at [facility], will inform her of the above results.  [facility] outpt    #PE (dx in 4/2017)   -prev on Lovenox, now on Eliquis renally dosed.'    #Dysphagia from recent illness  -on puree diet  -complains of throat pain; consider ENT eval if persistent pain  -no oral candidiasis on exam    Abby Corona  Oncology Fellow  [phone]
77 year old with hx of HTN, HLD, COPD, T2DM, ?PE on Apixaban, Stage IV CKD, NSCLC (progression of disease on erlotinib, carbo/pemetrexed, ateoluzumab, now on afatanib), Hep B Hx on Tenofovir,  basal cell cancer s/p resection, hep B (on tenofovir) and dysphagia who presents with rib pain on the left side. He had a fall episode 3 weeks ago.     Being follow at palliative care out patient clinic.

## 2018-12-27 NOTE — H&P ADULT - NSHPPHYSICALEXAM_GEN_ALL_CORE
General: elderly male, cachectic, NAD   HEENT: PERRL, EOMI, MMM (on inspection, no evidence of vesicles or erythema)   Neck: Supple, no JVD  Cardiac: RRR, normal s1 and s2, no murmur   Lungs: diminished breath sounds at the left upper lobe, coarse breath sounds in the middle to lower lobes b/l   Abdomen: soft, nondistended abdomen. Bowel sounds present. no organomegaly   Extremities: no cynanosis, pitting or edema  Skin: no open wounds or sores   Neuro: Alert to person, time and place (knows that he is in a hospital) Vital Signs Last 24 Hrs  T(C): 36.4 (27 Dec 2018 07:44), Max: 36.7 (26 Dec 2018 21:44)  T(F): 97.5 (27 Dec 2018 07:44), Max: 98.1 (26 Dec 2018 22:06)  HR: 83 (27 Dec 2018 07:44) (76 - 102)  BP: 112/74 (27 Dec 2018 07:44) (104/74 - 116/69)  BP(mean): --  RR: 18 (27 Dec 2018 07:44) (16 - 21)  SpO2: 94% (27 Dec 2018 07:44) (92% - 96%)    General: elderly male, cachectic, NAD   HEENT: PERRL, EOMI, MMM (on inspection, no evidence of vesicles or erythema)   Neck: Supple, no JVD  Cardiac: RRR, normal s1 and s2, no murmur   Lungs: diminished breath sounds at the left upper lobe, coarse breath sounds in the middle to lower lobes b/l   Abdomen: soft, nondistended abdomen. Bowel sounds present. no organomegaly   Extremities: no cynanosis, pitting or edema  Skin: no open wounds or sores   Neuro: Alert to person, time and place (knows that he is in a hospital)  Vasc: 2+ bilateral radial pulses

## 2018-12-27 NOTE — CONSULT NOTE ADULT - PROBLEM SELECTOR RECOMMENDATION 9
Stage IV CKD, NSCLC (progression of disease on erlotinib, carbo/pemetrexed, ateoluzumab, now on afatanib).

## 2018-12-27 NOTE — H&P ADULT - ATTENDING COMMENTS
Patient assigned to me by night hospitalist in charge for management and care for patient for this evening only. Care to be resumed by day hospitalist in the morning and thereafter.    Spoke with patient with Nic sawyer, acting as  per patient preference. This patient is a 77yoM with PMH of COPD, NSCLC, on chemotherapy, and other comorbidities, presenting with complaint of new onset L sided chest pain x1 day. Pt denies any recent trauma to L chest wall, but has pleuritic chest pain. On exam, NAD, lying in bed comfortably, PERRL, EOMI, moist mucous membranes, lungs with decreased breath sounds at L anterior chest wall, cardiac exam NS1S2 no r/m/g, abd soft and nontender. Neuro exam with full sensation to light touch, moving all extremities spontaneously, following commands well. Noncontrast CT chest imaging revealed L rib fractures. Will control pain, encourage use of incentive spirometry to avoid splinting. Seems less likely that patient has PE as cause of chest pain given that he is already on DOAC, is not tachycardic, and not tachypneic. Pt currently undergoing VQ scan- r/o PE. Plan of care discussed with digna and patient, both of whom expressed understanding.

## 2018-12-27 NOTE — H&P ADULT - PROBLEM SELECTOR PLAN 1
- reported chest wall pain with associated shortness of breath that started yesterday in setting of fall 3 weeks ago with noted broken ribs on imaging  - unclear as to why pain is occurring now ? possible because patient stopped decadron several days ago  - possible that patient has a PE, given hx of PE, immobility and improved creatinine on renally doses elliquis   - will get duplex U/S and V/Q scan, will hold CTA with contrast given contrast exposure  - nonischemic in origin: EKG unremarkable and no evidence of significant troponemia  - pain control to prevent atelectasis and subsequent PNA: would start oxycodone 10 mg q 6 hrs PRN for mod pain with tylenol 650 PO Q 6 hrs PRN for mild pain - reported chest wall pain with associated shortness of breath that started yesterday. CT chest noted that fracture was present on imaging from 12/24/2018, but new since 9/2018. Unclear when injury may have occurred  - unclear as to why pain is occurring now ? possible because patient stopped decadron several days ago  - CP unlikely to be related to ischemic chest pain- EKG unremarkable and no evidence of significant troponemia  - pain control to prevent atelectasis and subsequent PNA: would start oxycodone 10 mg q 6 hrs PRN for mod pain with tylenol 650 PO Q 6 hrs PRN for mild pain

## 2018-12-27 NOTE — PROGRESS NOTE ADULT - PROBLEM SELECTOR PLAN 1
- reported chest wall pain with associated shortness of breath that started yesterday. CT chest noted that fracture was present on imaging from 12/24/2018, but new since 9/2018. Unclear when injury may have occurred  - unclear as to why pain is occurring now ? possible because patient stopped decadron several days ago  - CP unlikely to be related to ischemic chest pain- EKG unremarkable and no evidence of significant troponemia  - pain control to prevent atelectasis and subsequent PNA: c/w oxycodone 10 mg q 6 hrs PRN for mod pain with tylenol 650 PO Q 6 hrs PRN for mild pain  - will convert to longer acting medications based on patient's pain needs today

## 2018-12-27 NOTE — H&P ADULT - PROBLEM SELECTOR PLAN 4
- c/w tenofovir  - if patient has a rise in creatinine, will need to consider deescalating dose, however given creatinine improvement as compared to prior would leave at current dosage and monitor

## 2018-12-27 NOTE — CONSULT NOTE ADULT - PROBLEM SELECTOR RECOMMENDATION 3
Patient was on Oxycontin 20mg TID. and Oxycodone 10 mg PRN Q 6.  Dilaudid 0.5 mg IV stat was give n at ED, and his pain came down from 9/10 to 3-4/10.  We recommend re-starting Oxycontin 20mg TID.  -start Dilaudid 0.5 IV Q2hr PRN   Decision about re-starting his Dexamethasone 2mg (he stopped it 1 week ago because he ran out of based on grand son) can be made by primary team, as patient has elevated WBC (possible infection).   If patient is not able to tolerate oral Oxycontin, please contact palliative team for IV pain regimen.

## 2018-12-27 NOTE — CONSULT NOTE ADULT - SUBJECTIVE AND OBJECTIVE BOX
HPI:  77 year old with hx of HTN, HLD, COPD, T2DM, ?PE on Apixaban, Stage IV CKD, NSCLC w EGFR mutation (progression of disease on erlotinib, carbo/pemetrexed, ateoluzumab, now on afatanib since 20-), basal cell cancer s/p resection, hep B (on tenofovir) and dysphagia who presents with rib pain on the left side.     Of note patient fell twice, 3 weeks ago with some associated rib pain. Patient states that falls occurred when he was trying to get out of bed to walk to the bathroom and then when he had tried to pull open a door. He did not lose consciousness with the falls. After falls, patient complained of back pain, but did not endorse left sided chest wall pain and right knee pain. On night prior to admission, had complained worsening left side pain: on the anterior chest spreading to the mid and back. Worse with palpation, with some associated pleuritic chest pain and sensation of feeling short of breath. As per grandson, took his usual pain medication regimen (takes oxycontin 20 TID and oxycodone 10mg q6 hrs PRN with tylenol 650 q 6 hrs PRN) without improvement in symptoms. Patient was recently on a regimen of decadron for bone pain which reportedly helped somewhat. However, this pain is reportedly different from his typical back and bone pain. Denies any fevers, chills, no productive cough (however has difficulty producing cough at baseline), denies any diarrhea, dysuria or increased urinary frequency.     Patient had a PET scan on  which showed interval increase in FDG-avid bilateral confluent and nodular airspace opacities which likely represent a combination of neoplastic disease and superimposed infection and/or pneumonitis, possibly related to immunotherapy; otherwise no significant change in other lesions.  Nondisplaced fractures of left 10th through 12th ribs with mild callus formation, new since 9/15/2018, likely are secondary to trauma.  MRI brain showed one 2.3 mm nodule of enhancement in the left superior frontal gyrus which is likely a metastasis. No mass effect or edema.    Allergies  No Known Drug Allergies  tegaderm (Rash; Urticaria)    MEDICATIONS  (STANDING):  acetaminophen    Suspension .. 640 milliGRAM(s) Oral every 6 hours  ALBUTerol/ipratropium for Nebulization 3 milliLiter(s) Nebulizer every 6 hours  apixaban 2.5 milliGRAM(s) Oral every 12 hours  dexamethasone     Tablet 2 milliGRAM(s) Oral daily  dextrose 5%. 1000 milliLiter(s) (50 mL/Hr) IV Continuous <Continuous>  dextrose 50% Injectable 12.5 Gram(s) IV Push once  dextrose 50% Injectable 25 Gram(s) IV Push once  dextrose 50% Injectable 25 Gram(s) IV Push once  FIRST- Mouthwash  BLM 5 milliLiter(s) Swish and Swallow three times a day  insulin lispro (HumaLOG) corrective regimen sliding scale   SubCutaneous three times a day before meals  insulin lispro (HumaLOG) corrective regimen sliding scale   SubCutaneous at bedtime  nystatin    Suspension 936979 Unit(s) Oral four times a day  oxyCODONE  ER Tablet 20 milliGRAM(s) Oral every 8 hours  pantoprazole    Tablet 40 milliGRAM(s) Oral before breakfast  piperacillin/tazobactam IVPB. 3.375 Gram(s) IV Intermittent every 8 hours  tamsulosin 0.4 milliGRAM(s) Oral at bedtime  tenofovir disoproxil fumarate (VIREAD) 300 milliGRAM(s) Oral daily  vancomycin  IVPB      vancomycin  IVPB 750 milliGRAM(s) IV Intermittent once    MEDICATIONS  (PRN):  benzonatate 100 milliGRAM(s) Oral three times a day PRN Cough  dextrose 40% Gel 15 Gram(s) Oral once PRN Blood Glucose LESS THAN 70 milliGRAM(s)/deciliter  glucagon  Injectable 1 milliGRAM(s) IntraMuscular once PRN Glucose LESS THAN 70 milligrams/deciliter  HYDROmorphone  Injectable 0.8 milliGRAM(s) IV Push every 2 hours PRN Moderate Pain (4 - 6) or severe pain      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    Height (cm): 172.72 ( @ 16:12)  Weight (kg): 56 ( @ 16:12)  BMI (kg/m2): 18.8 ( @ 16:12)  BSA (m2): 1.67 ( @ 16:12)    T(F): 97.5 (18 @ 16:12), Max: 98.1 (18 @ 22:06)  HR: 84 (18 @ 16:12)  BP: 123/74 (18 @ 16:12)  RR: 18 (18 @ 16:12)  SpO2: 94% (18 @ 16:12)  Wt(kg): --    GENERAL: NAD  HEENT: PERRL, EOMI, erythema in mucosa, no candida  CHEST/LUNG: anteriorly clear lungs intermittent crackles  HEART: Regular rate and rhythm; No murmurs, rubs, or gallops  ABDOMEN: Soft, Nontender, Nondistended; Bowel sounds present  EXTREMITIES:  2+ Peripheral Pulses, No clubbing, cyanosis, or edema  NEUROLOGY: non-focal  SKIN: No rashes or lesions                          11.6   23.68 )-----------( 180      ( 27 Dec 2018 08:04 )             36.9           140  |  103  |  47<H>  ----------------------------<  108<H>  4.0   |  24  |  1.35<H>    Ca    9.3      27 Dec 2018 06:55  Phos  3.9       Mg     1.7         TPro  7.6  /  Alb  3.4  /  TBili  0.5  /  DBili  x   /  AST  12  /  ALT  14  /  AlkPhos  129<H>        Magnesium, Serum: 1.7 mg/dL ( @ 06:55)  Phosphorus Level, Serum: 3.9 mg/dL ( @ 06:55)

## 2018-12-27 NOTE — H&P ADULT - PROBLEM SELECTOR PLAN 3
- hx of NSCLC currently on afatanib started in 2/2018   - Recent MRI brain with 2mm small nodule enhancement in left superior frontal gyrus which is likely a small brain mets  - recent PET SCan performed with read pending  - will need ONC consult in AM to restart medication

## 2018-12-27 NOTE — H&P ADULT - ASSESSMENT
77 year old with hx of HTN, HLD, COPD, T2DM, ?PE on Apixaban, Stage IV CKD, NSCLC (progression of disease on erlotinib, carbo/pemetrexed, ateoluzumab, now on afatanib), basal cell cancer s/p resection, hep B (on tenofovir) and dysphagia who presents with rib pain on the left side, likely in setting of nondisplaced rib fractures 77 year old with hx of HTN, HLD, COPD, T2DM, ?PE on Apixaban, Stage IV CKD, NSCLC (progression of disease on erlotinib, carbo/pemetrexed, ateoluzumab, now on afatanib), basal cell cancer s/p resection, hep B (on tenofovir) and dysphagia who presents with rib pain on the left side, likely due to nondisplaced rib fractures.

## 2018-12-27 NOTE — CONSULT NOTE ADULT - ATTENDING COMMENTS
Agree with above. Discussed with son at bedside.   Will review recent CT Chest with radiology tomorrow. Findings in lung can represent infections vs pneumonitis. Would treat for all possible reversible causes and consider starting Ab and restarting Decadron.   Will d/w Dr. Gross if afanitinib is to be changed.   Hold Afatinib for now  MRI brain with very small frontal lobe lesion. Can receive SRS when clinically stable. Doubt its contributing to his current symptoms.   Chest pain is 2/2 rib pain/fracture. Its unclear if pt fell due to liightheadness, dizzy from orthostasis.
Recent Fx  Etiology unknown  Last fall was on his back  No reports of other falls  pain medication held  no reports of narcosis, sedation, increase in medications, change in medications  moderate degree of mobility at home  narrative and clinical evaluation provide little support for medication related fall  ? orthostasis in the setting of poor po intake given odynophagia (thrush)  Pain post dilaudid 0.8 mg prn  Scores out of 10  max pre prn 8-9  min post prn 4-5  personalized pain goal 5  prn duration 4-6 hours  Oxycontin withheld?  Resume oxycontin 20mg q8H ATC with hold parameters  60 mg total daily dose  dilaudid IV 0.8 mg q2H PRN is commensurate with 10-20% of total dose used for prn determination  No adverse effects felt or noted  Serial APAP IV 1000mg for 24 hours q8H ATC  Lidoderm patch  Tesselon PErle for cough wih potential hycodan transition  Opiates will help to provide antitussive action

## 2018-12-27 NOTE — CHART NOTE - NSCHARTNOTEFT_GEN_A_CORE
ISTOP CHART NOTE    Others' Prescriptions  Patient Name:	Sada Sainz	YOB: 1940  Address:	[address] [place] [address], [place] [address]	Sex:	Male  Rx Written	Rx Dispensed	Drug	Quantity	Days Supply	Prescriber Name  12/18/2018	12/19/2018	oxycontin er 20 mg tablet	60	30	Angela Alanis  12/14/2018	12/14/2018	oxycontin er 30 mg tablet	90	30	Brandan Gross MD  11/19/2018	11/23/2018	oxycontin er 20 mg tablet	90	30	Nery Robertson)  09/28/2018	10/19/2018	oxycodone hcl 10 mg tablet	84	14	Nery Robertson)  09/28/2018	10/19/2018	morphine sulf er 30 mg tablet	90	30	Nery Robertson)  09/19/2018	10/03/2018	morphine sulf er 30 mg tablet	90	30	Angela Alanis  09/19/2018	10/03/2018	oxycodone hcl 10 mg tablet	84	14	Angela Alanis  07/20/2018	07/25/2018	oxycodone hcl 10 mg tablet	84	14	Nery Robertson)  07/20/2018	07/25/2018	morphine sulf er 15 mg tablet	90	30	Nery Robertson)  07/20/2018	07/25/2018	morphine sulf er 30 mg tablet	90	30	Nery Robertson)  06/04/2018	06/14/2018	oxycodone hcl 5 mg tablet	180	15	Kockenmeister, [place]  06/04/2018	06/14/2018	morphine sulf er 30 mg tablet	90	30	Kockenmeister, [place]  05/07/2018	05/08/2018	morphine sulf er 30 mg tablet	90	30	Kockenmeister, [place]  03/29/2018	04/10/2018	morphine sulf er 30 mg tablet	90	30	Kockenmeister, [place]  03/05/2018	03/05/2018	morphine sulf er 30 mg tablet	90	30	Kockenmeister, [place]  02/02/2018	02/05/2018	oxycodone hcl 5 mg tablet	180	15	Nery Robertson)  02/02/2018	02/05/2018	morphine sulf er 30 mg tablet	90	30	Nery Robertson (MD)  01/05/2018	01/05/2018	morphine sulf er 30 mg tablet	90	30	Nery Robertson (MD)  Patient Name:	Sada Sainz	YOB: 1940  Address[address] [address]	Sex:	Male  Rx Written	Rx Dispensed	Drug	Quantity	Days Supply	Prescriber Name  11/08/2018	11/10/2018	oxycodone hcl 10 mg tablet	66	11	[facility]  11/08/2018	11/10/2018	oxycontin 30 mg tablet	33	11	[facility]  Patient Name:	Sada Sainz	YOB: 1940  Address:	[address] [place] [address] [place] [address]	Sex:	Male  Rx Written	Rx Dispensed	Drug	Quantity	Days Supply	Prescriber Name  09/04/2018	09/04/2018	morphine sulf er 15 mg tablet	180	30	Tali Perez, L  08/28/2018	08/28/2018	morphine sulf er 15 mg tablet	180	20	Tali Perez, L  08/13/2018	08/13/2018	morphine sulfate ir 15 mg tab	135	15	Steffany Witt MD  08/10/2018	08/10/2018	oxycodone hcl 10 mg tablet	90	15	Tali Perez, L  08/10/2018	08/10/2018	morphine sulf er 15 mg tablet	180	20	Steffany Witt MD  Patient Name:	Sada Sainz	YOB: 1940  Address:	[address] [place] [address]	Sex:	Male  Rx Written	Rx Dispensed	Drug	Quantity	Days Supply	Prescriber Name  06/04/2018	07/25/2018	luxlyte harmony drops 2.5mg thc and 2.5mg cbd/1ml drops	1	5	Nery Robertson)  Patient Name:	Sada Sainz	YOB: 1940  Address:	[address] [place] [id number]	Sex:	Male  Rx Written	Rx Dispensed	Drug	Quantity	Days Supply	Prescriber Name  07/14/2018	07/15/2018	oxycodone hcl 10 mg tablet	80	20	[facility]  07/14/2018	07/15/2018	morphine sulf er 15 mg tablet	180	30	[facility]  * - Drugs marked with an asterisk are compound drugs. If the compound drug is made up of more than one controlled substance, then each controlled substance will be a separate row in the table.

## 2018-12-27 NOTE — H&P ADULT - PROBLEM SELECTOR PLAN 2
- meets criteria by WBC, tachypnea and tachycardia  - clinically does not appear infectious, possible 2/2 pain vs ?PE on renal dosing of elliquis - Patient is currently on eliquis for unclear reasons- patient does not recall if he had been started on the medication due to history of  blood clot vs arrhythmia.   -  While it would be unusual for patient to have new PE while on eliquis, this could potentially cause similar L sided chest wall pain and this patient, with his known malignancy, decreased mobility will r/o PE   - will get duplex U/S and V/Q scan, will hold CTA with contrast given contrast exposure  - c/w heparin gtt in the interim. Discussed with patient risks and benefits of anticoagulation with heparin gtt, including but not limited to bleeding. Patient expressed agreement to continuing heparin gtt.

## 2018-12-27 NOTE — H&P ADULT - HISTORY OF PRESENT ILLNESS
77 year old with hx of HTN, HLD, COPD, T2DM, ?PE on Apixaban, Stage IV CKD, NSCLC (progression of disease on erlotinib, carbo/pemetrexed, ateoluzumab, now on afatanib), basal cell cancer s/p resection, hep B (on tenofovir) and dysphagia who presents with rib pain on the left side. Of note patient fell twice, 3 weeks ago with some associated rib pain presumably possibly medication related. Last night, had complained worsening left side pain: on the anterior chest spreading to the mid and back. Worse with palpation, with some associated pleuritic chest pain and sensation of feeling short of breath. As per grandson, took his usual pain medication regimen (takes oxycontin 20 TID and oxycodone 10mg q6 hrs PRN with tylenol 650 q 6 hrs PRN) without improvement in symptoms.     Of note, patient was recently on a regimen of decadron for bone pain which reportedly helped somewhat. However, this pain is reportedly different from his typical back and bone pain. Denies any fevers, chills, no productive cough (however has difficulty producing cough at baseline), denies any diarrhea, dysuria or increased urinary frequency.     In the ED:  Afebrile, HR 86 -102 -112/67-73 RR 16-21 O2: 92 % RA, 95% on 3L NC   NS 500cc bolus, Dilaudid 0.8 mg IV x 1 77 year old with hx of HTN, HLD, COPD, T2DM, ?PE on Apixaban, Stage IV CKD, NSCLC (progression of disease on erlotinib, carbo/pemetrexed, ateoluzumab, now on afatanib), basal cell cancer s/p resection, hep B (on tenofovir) and dysphagia who presents with rib pain on the left side. Of note patient fell twice, 3 weeks ago with some associated rib pain. Patient states that falls occurred when he was trying to get out of bed to walk to the bathroom and then when he had tried to pull open a door. He did not lose consciousness with the falls. After falls, patient complained of back pain, but did not endorse left sided chest wall pain. On night prior to admission, had complained worsening left side pain: on the anterior chest spreading to the mid and back. Worse with palpation, with some associated pleuritic chest pain and sensation of feeling short of breath. As per grandson, took his usual pain medication regimen (takes oxycontin 20 TID and oxycodone 10mg q6 hrs PRN with tylenol 650 q 6 hrs PRN) without improvement in symptoms.     Of note, patient was recently on a regimen of decadron for bone pain which reportedly helped somewhat. However, this pain is reportedly different from his typical back and bone pain. Denies any fevers, chills, no productive cough (however has difficulty producing cough at baseline), denies any diarrhea, dysuria or increased urinary frequency.     In the ED:  Afebrile, HR 86 -102 -112/67-73 RR 16-21 O2: 92 % RA, 95% on 3L NC   NS 500cc bolus, Dilaudid 0.8 mg IV x 1

## 2018-12-27 NOTE — H&P ADULT - NSHPSOCIALHISTORY_GEN_ALL_CORE
Lives at home with wife and son is nearby. Extensive smoking hx (quit 4 y/a), denies any alcohol or recreational drug use

## 2018-12-27 NOTE — H&P ADULT - NSHPREVIEWOFSYSTEMS_GEN_ALL_CORE
General: denies fevers, chills, nausea, vomiting   HEENT denies any vision changes, trouble and pain with swallowing (on a pureed honey thickened diet)   Cardiac: reproducible chest pain, denies chest tightness, intermittently reports palpitations  Lungs: difficulty producing cough at baseline (no worsening urge to cough and non productive), pleuritic chest pain with difficulty with inspiration   Abdomen: denies any abdominal pain, constipation or diarrhea   Extremities: denies any focal pain, weakness or numbness  Skin: denies any bruising, bleeding, open wounds or sores   Neuro: denies any dizziness, lightheadedness, focal numbness or sensory deficits   Heme: denies any bleeding or bruising   Psych: denies any SI/HI General: denies fevers, chills, nausea, vomiting   HEENT denies any vision changes, trouble and pain with swallowing (on a pureed honey thickened diet)   Cardiac: reproducible chest pain, denies chest tightness, intermittently reports palpitations  Lungs: difficulty producing cough at baseline (no worsening urge to cough and non productive), pleuritic chest pain with difficulty with inspiration   Abdomen: denies any abdominal pain, constipation or diarrhea   Extremities: denies any focal pain, weakness or numbness  Skin: denies any bruising, bleeding, open wounds or sores   Neuro: denies any dizziness, lightheadedness, focal numbness or sensory deficits   Heme: denies any bleeding or bruising   Psych: denies any SI/HI  MSK: L lateral chest wall pain, no joint pain

## 2018-12-27 NOTE — CONSULT NOTE ADULT - PROBLEM SELECTOR RECOMMENDATION 5
Patient was seen at ED, and translation from Mandarin was done by digna (Nic). Initial encounter was spent to gather more information about patient's living condition and pain history.   He lives with his wife. Son lives nearby and grandson came from [place].

## 2018-12-28 ENCOUNTER — APPOINTMENT (OUTPATIENT)
Dept: HEMATOLOGY ONCOLOGY | Facility: CLINIC | Age: 78
End: 2018-12-28

## 2018-12-28 DIAGNOSIS — J15.9 UNSPECIFIED BACTERIAL PNEUMONIA: ICD-10-CM

## 2018-12-28 LAB
ANION GAP SERPL CALC-SCNC: 17 MMOL/L — SIGNIFICANT CHANGE UP (ref 5–17)
BASOPHILS # BLD AUTO: 0 K/UL — SIGNIFICANT CHANGE UP (ref 0–0.2)
BASOPHILS NFR BLD AUTO: 0 % — SIGNIFICANT CHANGE UP (ref 0–2)
BUN SERPL-MCNC: 38 MG/DL — HIGH (ref 7–23)
CALCIUM SERPL-MCNC: 9.1 MG/DL — SIGNIFICANT CHANGE UP (ref 8.4–10.5)
CHLORIDE SERPL-SCNC: 99 MMOL/L — SIGNIFICANT CHANGE UP (ref 96–108)
CO2 SERPL-SCNC: 19 MMOL/L — LOW (ref 22–31)
CREAT SERPL-MCNC: 1.48 MG/DL — HIGH (ref 0.5–1.3)
EOSINOPHIL # BLD AUTO: 0 K/UL — SIGNIFICANT CHANGE UP (ref 0–0.5)
EOSINOPHIL NFR BLD AUTO: 0 % — SIGNIFICANT CHANGE UP (ref 0–6)
GLUCOSE BLDC GLUCOMTR-MCNC: 178 MG/DL — HIGH (ref 70–99)
GLUCOSE SERPL-MCNC: 213 MG/DL — HIGH (ref 70–99)
HCT VFR BLD CALC: 36.5 % — LOW (ref 39–50)
HGB BLD-MCNC: 11.4 G/DL — LOW (ref 13–17)
LYMPHOCYTES # BLD AUTO: 1.07 K/UL — SIGNIFICANT CHANGE UP (ref 1–3.3)
LYMPHOCYTES # BLD AUTO: 5 % — LOW (ref 13–44)
MAGNESIUM SERPL-MCNC: 2 MG/DL — SIGNIFICANT CHANGE UP (ref 1.6–2.6)
MCHC RBC-ENTMCNC: 29.5 PG — SIGNIFICANT CHANGE UP (ref 27–34)
MCHC RBC-ENTMCNC: 31.2 GM/DL — LOW (ref 32–36)
MCV RBC AUTO: 94.6 FL — SIGNIFICANT CHANGE UP (ref 80–100)
MONOCYTES # BLD AUTO: 0.21 K/UL — SIGNIFICANT CHANGE UP (ref 0–0.9)
MONOCYTES NFR BLD AUTO: 1 % — LOW (ref 2–14)
NEUTROPHILS # BLD AUTO: 20.02 K/UL — HIGH (ref 1.8–7.4)
NEUTROPHILS NFR BLD AUTO: 94 % — HIGH (ref 43–77)
PHOSPHATE SERPL-MCNC: 5.1 MG/DL — HIGH (ref 2.5–4.5)
PLATELET # BLD AUTO: 163 K/UL — SIGNIFICANT CHANGE UP (ref 150–400)
POTASSIUM SERPL-MCNC: 4.7 MMOL/L — SIGNIFICANT CHANGE UP (ref 3.5–5.3)
POTASSIUM SERPL-SCNC: 4.7 MMOL/L — SIGNIFICANT CHANGE UP (ref 3.5–5.3)
RBC # BLD: 3.86 M/UL — LOW (ref 4.2–5.8)
RBC # FLD: 17 % — HIGH (ref 10.3–14.5)
SODIUM SERPL-SCNC: 135 MMOL/L — SIGNIFICANT CHANGE UP (ref 135–145)
WBC # BLD: 21.3 K/UL — HIGH (ref 3.8–10.5)
WBC # FLD AUTO: 21.3 K/UL — HIGH (ref 3.8–10.5)

## 2018-12-28 PROCEDURE — 99232 SBSQ HOSP IP/OBS MODERATE 35: CPT | Mod: GC

## 2018-12-28 PROCEDURE — 99233 SBSQ HOSP IP/OBS HIGH 50: CPT | Mod: GC

## 2018-12-28 PROCEDURE — 99233 SBSQ HOSP IP/OBS HIGH 50: CPT

## 2018-12-28 RX ORDER — SODIUM CHLORIDE 9 MG/ML
1000 INJECTION INTRAMUSCULAR; INTRAVENOUS; SUBCUTANEOUS
Qty: 0 | Refills: 0 | Status: DISCONTINUED | OUTPATIENT
Start: 2018-12-28 | End: 2018-12-30

## 2018-12-28 RX ORDER — TENOFOVIR DISOPROXIL FUMARATE 300 MG/1
300 TABLET, FILM COATED ORAL
Qty: 0 | Refills: 0 | Status: DISCONTINUED | OUTPATIENT
Start: 2018-12-28 | End: 2019-01-02

## 2018-12-28 RX ORDER — ACETAMINOPHEN 500 MG
325 TABLET ORAL EVERY 6 HOURS
Qty: 0 | Refills: 0 | Status: DISCONTINUED | OUTPATIENT
Start: 2018-12-28 | End: 2019-01-01

## 2018-12-28 RX ORDER — ACETAMINOPHEN 500 MG
640 TABLET ORAL EVERY 6 HOURS
Qty: 0 | Refills: 0 | Status: DISCONTINUED | OUTPATIENT
Start: 2018-12-28 | End: 2018-12-28

## 2018-12-28 RX ADMIN — Medication 500000 UNIT(S): at 05:35

## 2018-12-28 RX ADMIN — OXYCODONE HYDROCHLORIDE 20 MILLIGRAM(S): 5 TABLET ORAL at 05:34

## 2018-12-28 RX ADMIN — DIPHENHYDRAMINE HYDROCHLORIDE AND LIDOCAINE HYDROCHLORIDE AND ALUMINUM HYDROXIDE AND MAGNESIUM HYDRO 5 MILLILITER(S): KIT at 21:19

## 2018-12-28 RX ADMIN — Medication 3 MILLILITER(S): at 23:30

## 2018-12-28 RX ADMIN — DIPHENHYDRAMINE HYDROCHLORIDE AND LIDOCAINE HYDROCHLORIDE AND ALUMINUM HYDROXIDE AND MAGNESIUM HYDRO 5 MILLILITER(S): KIT at 05:36

## 2018-12-28 RX ADMIN — Medication 640 MILLIGRAM(S): at 13:03

## 2018-12-28 RX ADMIN — HYDROMORPHONE HYDROCHLORIDE 0.8 MILLIGRAM(S): 2 INJECTION INTRAMUSCULAR; INTRAVENOUS; SUBCUTANEOUS at 17:41

## 2018-12-28 RX ADMIN — Medication 500000 UNIT(S): at 17:25

## 2018-12-28 RX ADMIN — OXYCODONE HYDROCHLORIDE 20 MILLIGRAM(S): 5 TABLET ORAL at 13:33

## 2018-12-28 RX ADMIN — OXYCODONE HYDROCHLORIDE 20 MILLIGRAM(S): 5 TABLET ORAL at 21:44

## 2018-12-28 RX ADMIN — PANTOPRAZOLE SODIUM 40 MILLIGRAM(S): 20 TABLET, DELAYED RELEASE ORAL at 05:35

## 2018-12-28 RX ADMIN — PIPERACILLIN AND TAZOBACTAM 25 GRAM(S): 4; .5 INJECTION, POWDER, LYOPHILIZED, FOR SOLUTION INTRAVENOUS at 05:36

## 2018-12-28 RX ADMIN — Medication 640 MILLIGRAM(S): at 13:33

## 2018-12-28 RX ADMIN — Medication 640 MILLIGRAM(S): at 06:35

## 2018-12-28 RX ADMIN — Medication 250 MILLIGRAM(S): at 00:41

## 2018-12-28 RX ADMIN — Medication 3 MILLILITER(S): at 05:35

## 2018-12-28 RX ADMIN — PIPERACILLIN AND TAZOBACTAM 25 GRAM(S): 4; .5 INJECTION, POWDER, LYOPHILIZED, FOR SOLUTION INTRAVENOUS at 21:19

## 2018-12-28 RX ADMIN — TAMSULOSIN HYDROCHLORIDE 0.4 MILLIGRAM(S): 0.4 CAPSULE ORAL at 21:20

## 2018-12-28 RX ADMIN — HYDROMORPHONE HYDROCHLORIDE 0.8 MILLIGRAM(S): 2 INJECTION INTRAMUSCULAR; INTRAVENOUS; SUBCUTANEOUS at 23:28

## 2018-12-28 RX ADMIN — Medication 640 MILLIGRAM(S): at 00:11

## 2018-12-28 RX ADMIN — Medication 640 MILLIGRAM(S): at 05:35

## 2018-12-28 RX ADMIN — Medication 500000 UNIT(S): at 23:30

## 2018-12-28 RX ADMIN — Medication 500000 UNIT(S): at 13:04

## 2018-12-28 RX ADMIN — APIXABAN 2.5 MILLIGRAM(S): 2.5 TABLET, FILM COATED ORAL at 17:25

## 2018-12-28 RX ADMIN — Medication 3 MILLILITER(S): at 13:03

## 2018-12-28 RX ADMIN — OXYCODONE HYDROCHLORIDE 20 MILLIGRAM(S): 5 TABLET ORAL at 21:20

## 2018-12-28 RX ADMIN — DIPHENHYDRAMINE HYDROCHLORIDE AND LIDOCAINE HYDROCHLORIDE AND ALUMINUM HYDROXIDE AND MAGNESIUM HYDRO 5 MILLILITER(S): KIT at 13:04

## 2018-12-28 RX ADMIN — Medication 250 MILLIGRAM(S): at 13:02

## 2018-12-28 RX ADMIN — HYDROMORPHONE HYDROCHLORIDE 0.8 MILLIGRAM(S): 2 INJECTION INTRAMUSCULAR; INTRAVENOUS; SUBCUTANEOUS at 09:12

## 2018-12-28 RX ADMIN — Medication 3 MILLILITER(S): at 17:25

## 2018-12-28 RX ADMIN — HYDROMORPHONE HYDROCHLORIDE 0.8 MILLIGRAM(S): 2 INJECTION INTRAMUSCULAR; INTRAVENOUS; SUBCUTANEOUS at 17:26

## 2018-12-28 RX ADMIN — PIPERACILLIN AND TAZOBACTAM 25 GRAM(S): 4; .5 INJECTION, POWDER, LYOPHILIZED, FOR SOLUTION INTRAVENOUS at 13:02

## 2018-12-28 RX ADMIN — TENOFOVIR DISOPROXIL FUMARATE 300 MILLIGRAM(S): 300 TABLET, FILM COATED ORAL at 13:04

## 2018-12-28 RX ADMIN — APIXABAN 2.5 MILLIGRAM(S): 2.5 TABLET, FILM COATED ORAL at 05:34

## 2018-12-28 RX ADMIN — OXYCODONE HYDROCHLORIDE 20 MILLIGRAM(S): 5 TABLET ORAL at 06:34

## 2018-12-28 RX ADMIN — OXYCODONE HYDROCHLORIDE 20 MILLIGRAM(S): 5 TABLET ORAL at 13:03

## 2018-12-28 RX ADMIN — HYDROMORPHONE HYDROCHLORIDE 0.8 MILLIGRAM(S): 2 INJECTION INTRAMUSCULAR; INTRAVENOUS; SUBCUTANEOUS at 08:57

## 2018-12-28 NOTE — PROGRESS NOTE ADULT - SUBJECTIVE AND OBJECTIVE BOX
INTERVAL HPI/OVERNIGHT EVENTS:  Patient S&E at bedside. No o/n events, patient resting comfortably.     VITAL SIGNS:  T(F): 97.8 (12-28-18 @ 04:41)  HR: 75 (12-28-18 @ 13:38)  BP: 120/77 (12-28-18 @ 13:38)  RR: 18 (12-28-18 @ 13:38)  SpO2: 95% (12-28-18 @ 13:38)  Wt(kg): --    PHYSICAL EXAM:  Constitutional: NAD  Eyes: EOMI, sclera non-icteric  Neck: supple, no masses, no JVD  Respiratory: CTA b/l, good air entry b/l  Cardiovascular: RRR, no M/R/G  Gastrointestinal: soft, NTND, no masses palpable, + BS  Extremities: no c/c/e  Neurological: AAOx3    MEDICATIONS  (STANDING):  acetaminophen    Suspension .. 640 milliGRAM(s) Oral every 6 hours  ALBUTerol/ipratropium for Nebulization 3 milliLiter(s) Nebulizer every 6 hours  apixaban 2.5 milliGRAM(s) Oral every 12 hours  dexamethasone     Tablet 2 milliGRAM(s) Oral daily  FIRST- Mouthwash  BLM 5 milliLiter(s) Swish and Swallow three times a day  nystatin    Suspension 686395 Unit(s) Oral four times a day  oxyCODONE  ER Tablet 20 milliGRAM(s) Oral every 8 hours  pantoprazole    Tablet 40 milliGRAM(s) Oral before breakfast  piperacillin/tazobactam IVPB. 3.375 Gram(s) IV Intermittent every 8 hours  sodium chloride 0.9%. 1000 milliLiter(s) (75 mL/Hr) IV Continuous <Continuous>  tamsulosin 0.4 milliGRAM(s) Oral at bedtime  tenofovir disoproxil fumarate (VIREAD) 300 milliGRAM(s) Oral <User Schedule>  vancomycin  IVPB 750 milliGRAM(s) IV Intermittent every 12 hours  vancomycin  IVPB        MEDICATIONS  (PRN):  benzonatate 100 milliGRAM(s) Oral three times a day PRN Cough  HYDROmorphone  Injectable 0.8 milliGRAM(s) IV Push every 2 hours PRN Moderate Pain (4 - 6) or severe pain      Allergies    No Known Drug Allergies  tegaderm (Rash; Urticaria)    Intolerances        LABS:                        11.4   21.30 )-----------( 163      ( 28 Dec 2018 09:41 )             36.5     12-28    135  |  99  |  38<H>  ----------------------------<  213<H>  4.7   |  19<L>  |  1.48<H>    Ca    9.1      28 Dec 2018 08:21  Phos  5.1     12-28  Mg     2.0     12-28    TPro  7.6  /  Alb  3.4  /  TBili  0.5  /  DBili  x   /  AST  12  /  ALT  14  /  AlkPhos  129<H>  12-26    PT/INR - ( 27 Dec 2018 09:27 )   PT: 14.9 sec;   INR: 1.32 ratio         PTT - ( 27 Dec 2018 03:43 )  PTT:33.7 sec      RADIOLOGY & ADDITIONAL TESTS:  Studies reviewed.    ASSESSMENT & PLAN: INTERVAL HPI/OVERNIGHT EVENTS:  Patient S&E at bedside. No o/n events, patient resting comfortably. SOB and pain improved.  Grandson at bedside.    VITAL SIGNS:  T(F): 97.8 (12-28-18 @ 04:41)  HR: 75 (12-28-18 @ 13:38)  BP: 120/77 (12-28-18 @ 13:38)  RR: 18 (12-28-18 @ 13:38)  SpO2: 95% (12-28-18 @ 13:38)  Wt(kg): --    PHYSICAL EXAM:  Constitutional: NAD  Eyes: EOMI, sclera non-icteric  Neck: supple, no masses, no JVD  Respiratory: dull right lung base  Cardiovascular: RRR, no M/R/G  Gastrointestinal: soft, NTND, no masses palpable, + BS  Extremities: no c/c/e  Neurological: AAOx3    MEDICATIONS  (STANDING):  acetaminophen    Suspension .. 640 milliGRAM(s) Oral every 6 hours  ALBUTerol/ipratropium for Nebulization 3 milliLiter(s) Nebulizer every 6 hours  apixaban 2.5 milliGRAM(s) Oral every 12 hours  dexamethasone     Tablet 2 milliGRAM(s) Oral daily  FIRST- Mouthwash  BLM 5 milliLiter(s) Swish and Swallow three times a day  nystatin    Suspension 613801 Unit(s) Oral four times a day  oxyCODONE  ER Tablet 20 milliGRAM(s) Oral every 8 hours  pantoprazole    Tablet 40 milliGRAM(s) Oral before breakfast  piperacillin/tazobactam IVPB. 3.375 Gram(s) IV Intermittent every 8 hours  sodium chloride 0.9%. 1000 milliLiter(s) (75 mL/Hr) IV Continuous <Continuous>  tamsulosin 0.4 milliGRAM(s) Oral at bedtime  tenofovir disoproxil fumarate (VIREAD) 300 milliGRAM(s) Oral <User Schedule>  vancomycin  IVPB 750 milliGRAM(s) IV Intermittent every 12 hours  vancomycin  IVPB        MEDICATIONS  (PRN):  benzonatate 100 milliGRAM(s) Oral three times a day PRN Cough  HYDROmorphone  Injectable 0.8 milliGRAM(s) IV Push every 2 hours PRN Moderate Pain (4 - 6) or severe pain      Allergies    No Known Drug Allergies  tegaderm (Rash; Urticaria)    Intolerances        LABS:                        11.4   21.30 )-----------( 163      ( 28 Dec 2018 09:41 )             36.5     12-28    135  |  99  |  38<H>  ----------------------------<  213<H>  4.7   |  19<L>  |  1.48<H>    Ca    9.1      28 Dec 2018 08:21  Phos  5.1     12-28  Mg     2.0     12-28    TPro  7.6  /  Alb  3.4  /  TBili  0.5  /  DBili  x   /  AST  12  /  ALT  14  /  AlkPhos  129<H>  12-26    PT/INR - ( 27 Dec 2018 09:27 )   PT: 14.9 sec;   INR: 1.32 ratio         PTT - ( 27 Dec 2018 03:43 )  PTT:33.7 sec      RADIOLOGY & ADDITIONAL TESTS:  Studies reviewed.    ASSESSMENT & PLAN:

## 2018-12-28 NOTE — PROGRESS NOTE ADULT - PROBLEM SELECTOR PLAN 1
New since 9/2018, s/p fall ~3 wks ago but pain started within past few days.   - c/w oxycodone ER 20 mg q8h  - c/w tylenol 650 PO Q 6 hrs PRN for mild pain, Dilaudid 0.8mg IV q8h PRN for mod-severe pain  - appreciate palliative recs, following for symptom control

## 2018-12-28 NOTE — PROGRESS NOTE ADULT - SUBJECTIVE AND OBJECTIVE BOX
Resting comfortable  IV dilaudid 0.8 X 1 yesterday        T(C): 36.6 (18 @ 04:41), Max: 36.8 (18 @ 20:54)  HR: 84 (18 @ 04:41) (80 - 92)  BP: 99/64 (18 @ 04:41) (99/64 - 128/69)  RR: 18 (18 @ 04:41) (18 - 18)  SpO2: 95% (18 @ 04:41) (91% - 95%)  Wt(kg): --      27 Dec 2018 07:01  -  28 Dec 2018 07:00  --------------------------------------------------------  IN:    IV PiggyBack: 450 mL    Oral Fluid: 240 mL  Total IN: 690 mL    OUT:    Voided: 500 mL  Total OUT: 500 mL    Total NET: 190 mL           @ 07:01  -   @ 07:00  --------------------------------------------------------  IN: 690 mL / OUT: 500 mL / NET: 190 mL      CAPILLARY BLOOD GLUCOSE      POCT Blood Glucose.: 178 mg/dL (28 Dec 2018 08:36)  POCT Blood Glucose.: 88 mg/dL (27 Dec 2018 22:30)  POCT Blood Glucose.: 146 mg/dL (27 Dec 2018 18:29)  POCT Blood Glucose.: 102 mg/dL (27 Dec 2018 12:48)        acetaminophen    Suspension .. 640 milliGRAM(s) Oral every 6 hours  ALBUTerol/ipratropium for Nebulization 3 milliLiter(s) Nebulizer every 6 hours  apixaban 2.5 milliGRAM(s) Oral every 12 hours  benzonatate 100 milliGRAM(s) Oral three times a day PRN  dexamethasone     Tablet 2 milliGRAM(s) Oral daily  FIRST- Mouthwash  BLM 5 milliLiter(s) Swish and Swallow three times a day  HYDROmorphone  Injectable 0.8 milliGRAM(s) IV Push every 2 hours PRN  nystatin    Suspension [id number] Unit(s) Oral four times a day  oxyCODONE  ER Tablet 20 milliGRAM(s) Oral every 8 hours  pantoprazole    Tablet 40 milliGRAM(s) Oral before breakfast  piperacillin/tazobactam IVPB. 3.375 Gram(s) IV Intermittent every 8 hours  sodium chloride 0.9%. 1000 milliLiter(s) IV Continuous <Continuous>  tamsulosin 0.4 milliGRAM(s) Oral at bedtime  tenofovir disoproxil fumarate (VIREAD) 300 milliGRAM(s) Oral daily  vancomycin  IVPB 750 milliGRAM(s) IV Intermittent every 12 hours  vancomycin  IVPB                  135  |  99  |  38<H>  ----------------------------<  213<H>  4.7   |  19<L>  |  1.48<H>    Ca    9.1      28 Dec 2018 08:21  Phos  5.1       Mg     2.0         TPro  7.6  /  Alb  3.4  /  TBili  0.5  /  DBili  x   /  AST  12  /  ALT  14  /  AlkPhos  129<H>        Procalc  BNP  ABG                          11.4   21.30 )-----------( 163      ( 28 Dec 2018 09:41 )             36.5   PT/INR - ( 27 Dec 2018 09:27 )   PT: 14.9 sec;   INR: 1.32 ratio         PTT - ( 27 Dec 2018 03:43 )  PTT:33.7 sec        blood and urine cultures            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  Basal cell carcinoma of nostril    FAMILY HISTORY:  Family history of diabetes mellitus (Sibling): brother - living  Family history of liver cancer: Mother   Family history of liver cancer  Family history of stomach cancer (Sibling): brother    ITEMS NOT CHECKED ARE NOT PRESENT    SOCIAL HISTORY:   Significant other/partner: Wife: Robert Humphrey   Children:  [ ]    Yazidi/Spirituality:  Substance hx:  [ ]   Tobacco hx:  [X ] Quit when lung CA was Dx  Alcohol hx: [ ] Drug use hx: [ ]  Home Opioid hx:  [ ] (I-Stop Reference No: [id number]  Living Situation: [X ]Home  [ ]Long term care  [ ]Rehab [ ]Other  Occupation:    ADVANCE DIRECTIVES:    DNR [ ]  MOLST  [ ]    Living Will  [ ]   DECISION MAKER(s): PAtient   [ ] Health Care Proxy(s)  [X ] Surrogate(s) Wife and son  [ ] Guardian           Name(s) and Contact(s): Wife: Robert Humphrey     BASELINE (I)ADL(s) (prior to admission):  [place]: [ ]Total  [X ] Moderate [ ]Dependent    Allergies    No Known Drug Allergies  tegaderm (Rash; Urticaria)    Intolerances    MEDICATIONS  (STANDING):  acetaminophen    Suspension .. 640 milliGRAM(s) Oral every 6 hours  ALBUTerol/ipratropium for Nebulization 3 milliLiter(s) Nebulizer every 6 hours  apixaban 2.5 milliGRAM(s) Oral every 12 hours  dextrose 5%. 1000 milliLiter(s) (50 mL/Hr) IV Continuous <Continuous>  dextrose 50% Injectable 12.5 Gram(s) IV Push once  dextrose 50% Injectable 25 Gram(s) IV Push once  dextrose 50% Injectable 25 Gram(s) IV Push once  FIRST- Mouthwash  BLM 5 milliLiter(s) Swish and Swallow three times a day  insulin lispro (HumaLOG) corrective regimen sliding scale   SubCutaneous three times a day before meals  insulin lispro (HumaLOG) corrective regimen sliding scale   SubCutaneous at bedtime  nystatin    Suspension 785901 Unit(s) Oral four times a day  pantoprazole    Tablet 40 milliGRAM(s) Oral before breakfast  tamsulosin 0.4 milliGRAM(s) Oral at bedtime  tenofovir disoproxil fumarate (VIREAD) 300 milliGRAM(s) Oral daily    MEDICATIONS  (PRN):  benzonatate 100 milliGRAM(s) Oral three times a day PRN Cough  dextrose 40% Gel 15 Gram(s) Oral once PRN Blood Glucose LESS THAN 70 milliGRAM(s)/deciliter  glucagon  Injectable 1 milliGRAM(s) IntraMuscular once PRN Glucose LESS THAN 70 milligrams/deciliter    PRESENT SYMPTOMS:   Source if other than patient:  [ ]Family   [ ]Team     Pain (Impact on QOL): Yes   Location -  Left sided chest wall - also chronic pain on back of his chest wall   Minimal acceptable level (0-10 scale): 1-2                   Aggravating factors - respiration, tender on touch   Quality - pleuritic   Radiation - N  Severity (0-10 scale) - 9/10 and after pain medication (Dilaudid) come down to 4/10. Pain on back of chest wall is 3-4/10   Timing - constant     PAIN AD Score:     [email or web address] (press ctrl +  left click to view)    Dyspnea:                           [X ]Mild [ ]Moderate [ ]Severe  Anxiety:                             [ ]Mild [ ]Moderate [ ]Severe  Fatigue:                             [ ]Mild [X ]Moderate [ ]Severe  Nausea:                             [X ]Mild [ ]Moderate [ ]Severe  Loss of appetite:              [X ]Mild [ ]Moderate [ ]Severe  Constipation:                    [ ]Mild [ ]Moderate [ ]Severe    Other Symptoms:  [ ]All other review of systems negative   [ ]Unable to obtain due to poor mentation     Karnofsky Performance Score/Palliative Performance Status Version 2:     40-50 %    PHYSICAL EXAM:  Vital Signs Last 24 Hrs  T(C): 36.4 (27 Dec 2018 07:44), Max: 36.7 (26 Dec 2018 21:44)  T(F): 97.5 (27 Dec 2018 07:44), Max: 98.1 (26 Dec 2018 22:06)  HR: 83 (27 Dec 2018 07:44) (76 - 102)  BP: 112/74 (27 Dec 2018 07:44) (104/74 - 116/69)  BP(mean): --  RR: 18 (27 Dec 2018 07:44) (16 - 21)  SpO2: 94% (27 Dec 2018 07:44) (92% - 96%) I&O's Summary      GENERAL:  [X ]Alert  [X ]Oriented    [X ]Lethargic  [ ]Cachexia  [ ]Unarousable  [X ]Verbal  [ ]Non-Verbal    Behavioral:   [ ] Anxiety  [ ] Delirium [ ] Agitation [ ] Other    HEENT:  [ ]Normal   [ ]Dry mouth   [ ]ET Tube/Trach  [X ]Oral lesions    PULMONARY:   [ ]Clear [ ]Tachypnea  [ ]Audible excessive secretions decreased BS on the left side   [ ]Rhonchi        [ ]Right [ ]Left [ ]Bilateral  [ ]Crackles        [ ]Right [ ]Left [ ]Bilateral  [ ]Wheezing     [ ]Right [ ]Left [ ]Bilateral    CARDIOVASCULAR:    [X ]Regular [ ]Irregular [ ]Tachy  [ ]Jerman [ ]Murmur [ ]Other    GASTROINTESTINAL:  [X ]Soft  [ ]Distended   [X ]+BS  [ ]Non tender [X ]Tender over RLQ and epigastric  [ ]PEG [ ]OGT/ NGT  Last BM: Last night (2018)     GENITOURINARY:  [X ]Normal [ ] Incontinent   [ ]Oliguria/Anuria   [ ]Soni    MUSCULOSKELETAL:   [ ]Normal   [ ]Weakness  [ ]Bed/Wheelchair bound [ ]Edema tenderness over chest wall (left side)     NEUROLOGIC:   [X ]No focal deficits  [ ] Cognitive impairment  [ ] Dysphagia [ ]Dysarthria [ ] Paresis [ ]Other     SKIN:   [X ]Normal   [ ]Pressure ulcer(s)  [ ]Rash    CRITICAL CARE:  [ ] Shock Present  [ ]Septic [ ]Cardiogenic [ ]Neurologic [ ]Hypovolemic  [ ]  Vasopressors [ ]  Inotropes   [ ] Respiratory failure present  [ ] Acute  [ ] Chronic [ ] Hypoxic  [ ] Hypercarbic [ ] Other  [ ] Other organ failure     LABS:                        11.6   23.68 )-----------( 180      ( 27 Dec 2018 08:04 )             36.9       140  |  103  |  47<H>  ----------------------------<  108<H>  4.0   |  24  |  1.35<H>    Ca    9.3      27 Dec 2018 06:55  Phos  3.9       Mg     1.7         TPro  7.6  /  Alb  3.4  /  TBili  0.5  /  DBili  x   /  AST  12  /  ALT  14  /  AlkPhos  129<H>    PT/INR - ( 27 Dec 2018 09:27 )   PT: 14.9 sec;   INR: 1.32 ratio         PTT - ( 27 Dec 2018 03:43 )  PTT:33.7 sec      RADIOLOGY & ADDITIONAL STUDIES:  CT of chest W/O C (2018): IMPRESSION:    Nondisplaced fractures of the left 10th through 12th ribs with mild   callus formation. These are new since 9/15/2018 and were present on   2018.  Ventilation/Perfusion Scan (2018):     IMPRESSION: Abnormal ventilation/perfusion lung scan.  Low probability of pulmonary embolus.    VA Dupplex LE: IMPRESSION:     No evidence of bilateral lower extremity deep venous thrombosis.  PROTEIN CALORIE MALNUTRITION:   [ ] PPSV2 < or = to 30% [ ] significant weight loss  [ ] poor nutritional intake [ ] catabolic state [ ] anasarca     Albumin, Serum: 3.4 g/dL (18 @ 22:42)  Artificial Nutrition [ ]     REFERRALS:   [ ]Chaplaincy  [ ] Hospice  [ ]Child Life  [ ]Social Work  [ ]Case management [ ]Holistic Therapy     Goals of Care Discussion Document:

## 2018-12-28 NOTE — PROGRESS NOTE ADULT - SUBJECTIVE AND OBJECTIVE BOX
CHIEF COMPLAINT: Patient is a 78y old  Male who presents with a chief complaint of left side rib pain (27 Dec 2018 19:26)      SUBJECTIVE / OVERNIGHT EVENTS: No acute events overnight. Yesterday, pt was started on oxycodone ER 20 TID and required breakthrough dilaudid 0.5mg IV x1 and 0.8mg x1 (both given prior to starting extended release oxy). Pt seen and examined at bedside, interview obtained by author who speaks pt's native language- pt declined  phone. Pt feels somewhat better than yesterday. L rib pain is currently 6/10, breakthrough Dilaudid was offered and pt refused. R parasternal chest pain from yesterday has improved and isn't bothering him right now. Pt states he ate yesterday but was only able to finish ~30% of the plate. Denies nausea/vomiting. Breathing is better than yesterday.       MEDICATIONS:  MEDICATIONS  (STANDING):  acetaminophen    Suspension .. 640 milliGRAM(s) Oral every 6 hours  ALBUTerol/ipratropium for Nebulization 3 milliLiter(s) Nebulizer every 6 hours  apixaban 2.5 milliGRAM(s) Oral every 12 hours  dexamethasone     Tablet 2 milliGRAM(s) Oral daily  FIRST- Mouthwash  BLM 5 milliLiter(s) Swish and Swallow three times a day  nystatin    Suspension 076200 Unit(s) Oral four times a day  oxyCODONE  ER Tablet 20 milliGRAM(s) Oral every 8 hours  pantoprazole    Tablet 40 milliGRAM(s) Oral before breakfast  piperacillin/tazobactam IVPB. 3.375 Gram(s) IV Intermittent every 8 hours  tamsulosin 0.4 milliGRAM(s) Oral at bedtime  tenofovir disoproxil fumarate (VIREAD) 300 milliGRAM(s) Oral daily  vancomycin  IVPB      vancomycin  IVPB 750 milliGRAM(s) IV Intermittent every 12 hours    MEDICATIONS  (PRN):  benzonatate 100 milliGRAM(s) Oral three times a day PRN Cough  HYDROmorphone  Injectable 0.8 milliGRAM(s) IV Push every 2 hours PRN Moderate Pain (4 - 6) or severe pain        OBJECTIVE:  Vital Signs Last 24 Hrs  T(C): 36.6 (28 Dec 2018 04:41), Max: 36.8 (27 Dec 2018 20:54)  T(F): 97.8 (28 Dec 2018 04:41), Max: 98.2 (27 Dec 2018 20:54)  HR: 84 (28 Dec 2018 04:41) (80 - 92)  BP: 99/64 (28 Dec 2018 04:41) (99/64 - 128/69)  BP(mean): --  RR: 18 (28 Dec 2018 04:41) (18 - 18)  SpO2: 95% (28 Dec 2018 04:41) (91% - 95%)  CAPILLARY BLOOD GLUCOSE      POCT Blood Glucose.: 178 mg/dL (28 Dec 2018 08:36)  POCT Blood Glucose.: 88 mg/dL (27 Dec 2018 22:30)  POCT Blood Glucose.: 146 mg/dL (27 Dec 2018 18:29)  POCT Blood Glucose.: 102 mg/dL (27 Dec 2018 12:48)    I&O's Summary    27 Dec 2018 07:01  -  28 Dec 2018 07:00  --------------------------------------------------------  IN: 690 mL / OUT: 500 mL / NET: 190 mL          PHYSICAL EXAM:  GENERAL: NAD, thin elderly male, lying back on NC  NECK: Supple, No JVD  CHEST/LUNG: Clear to auscultation bilaterally; No wheeze  HEART: Regular rate and rhythm; No murmurs, rubs, or gallops  ABDOMEN: Soft, Nontender, Nondistended; Bowel sounds present  EXTREMITIES:  2+ Peripheral Pulses, No clubbing, cyanosis, or edema  NEURO/PSYCH: AAOx3, nonfocal    LABS:                        11.6   23.68 )-----------( 180      ( 27 Dec 2018 08:04 )             36.9     12-28    135  |  99  |  38<H>  ----------------------------<  213<H>  4.7   |  19<L>  |  1.48<H>    Ca    9.1      28 Dec 2018 08:21  Phos  5.1     12-28  Mg     2.0     12-28    TPro  7.6  /  Alb  3.4  /  TBili  0.5  /  DBili  x   /  AST  12  /  ALT  14  /  AlkPhos  129<H>  12-26    PT/INR - ( 27 Dec 2018 09:27 )   PT: 14.9 sec;   INR: 1.32 ratio         PTT - ( 27 Dec 2018 03:43 )  PTT:33.7 sec

## 2018-12-28 NOTE — PROGRESS NOTE ADULT - PROBLEM SELECTOR PLAN 2
NSCLC on afatanib started in 2/2018. Recent MRI brain with 2mm small nodule enhancement in left superior frontal gyrus which is likely a small brain mets  - oncology following, appreciate recs

## 2018-12-28 NOTE — PROGRESS NOTE ADULT - PROBLEM SELECTOR PLAN 2
On ATC oxy ER 20 mg q8H  with dilaudid 0.8mg  Path fx versus musculoskeletal injury  lidoderm  consider ATC APAP 3gm maximum per day

## 2018-12-28 NOTE — PROGRESS NOTE ADULT - ASSESSMENT
77 year old with hx of HTN, HLD, COPD, T2DM, ?PE on Apixaban, Stage IV CKD, NSCLC w EGFR mutation (progression of disease on erlotinib, carbo/pemetrexed, atezoluzumab, now on afatanib since 2/12/18-), basal cell cancer s/p resection, hep B (on tenofovir) and dysphagia who presents with rib pain on the left side after fall couple weeks ago and PET scan showing rib fractures on the left rib 10-12.  Also with acute on chronic SOB and PET scan showing possible infection vs. POD.      #Left rib pain  -likely traumatic given this is new pain after fall and consistent with PET scan findings (reviewed with radiology)  -c/w pain control  -consider thoracic surgery consult for eval   -palliative consult appreciated    #Acute on chronic SOB  - rec to complete tx for pna; more likely due to POD  -c/w oxygen supplementation    #NSCLC  -reviewed with radiology the CT chest/PET scan and the lung findings concerning for POD rather than infection or pneumonitis.  MRI brain shows a small 2.3mm lesion, otherwise other sites of disease stable and unchanged.  Family aware.  -hold Afatinib while treating possible pna  -follows with Dr. Gross at [facility], she is aware of above findings.    #PE (dx in 4/2017)   -prev on Lovenox, now on Eliquis renally dosed.    #Dysphagia from recent illness  -on puree diet  -complains of throat pain; consider ENT eval if persistent pain  -no oral candidiasis on exam    Abby Corona  Oncology Fellow  [phone] 77 year old with hx of HTN, HLD, COPD, T2DM, ?PE on Apixaban, Stage IV CKD, NSCLC w EGFR mutation (progression of disease on erlotinib, carbo/pemetrexed, atezoluzumab, now on afatanib since 2/12/18-), basal cell cancer s/p resection, hep B (on tenofovir) and dysphagia who presents with rib pain on the left side after fall couple weeks ago and PET scan showing rib fractures on the left rib 10-12.  Also with acute on chronic SOB and PET scan showing possible infection vs. POD.      #Left rib pain  -likely traumatic given this is new pain after fall and consistent with PET scan findings (reviewed with radiology)  -c/w pain control  -consider thoracic surgery consult for eval   -palliative consult appreciated    #Acute on chronic SOB  - rec to complete tx for pna; will determine if POD after pna tx  - c/w oxygen supplementation    #NSCLC  -reviewed with radiology the CT chest/PET scan and the lung findings concerning for POD rather than infection or pneumonitis. Will likely need repeat scan after pna tx. MRI brain shows a small 2.3mm lesion, otherwise other sites of disease stable and unchanged.  Family aware.  -hold Afatinib while treating possible pna  -follows with Dr. Gross at [facility], she is aware of above findings.    #PE (dx in 4/2017)   -prev on Lovenox, now on Eliquis renally dosed.    #Dysphagia from recent illness  -on puree diet  -complains of throat pain; consider ENT eval if persistent pain  -no oral candidiasis on exam    Abby Corona  Oncology Fellow  [phone]

## 2018-12-28 NOTE — PROGRESS NOTE ADULT - ASSESSMENT
77 year old with hx of HTN, HLD, COPD, T2DM, ?PE on Apixaban, Stage IV CKD, NSCLC (progression of disease on erlotinib, carbo/pemetrexed, ateoluzumab, now on afatanib), basal cell cancer s/p resection, hep B (on tenofovir) and dysphagia who presents with rib pain on the left side, likely due to nondisplaced rib fractures. Also found with increased nodular airspace opacities on CT concerning for metastasis vs infection vs pneumonitis.

## 2018-12-28 NOTE — PROGRESS NOTE ADULT - ASSESSMENT
77 year old with hx of HTN, HLD, COPD, T2DM, ?PE on Apixaban, Stage IV CKD, NSCLC (progression of disease on erlotinib, carbo/pemetrexed, ateoluzumab, now on afatanib), Hep B Hx on Tenofovir,  basal cell cancer s/p resection, hep B (on tenofovir) and dysphagia who presents with rib pain on the left side. He had a fall episode 3 weeks ago.     Being follow at palliative care out patient clinic.

## 2018-12-29 LAB
ANION GAP SERPL CALC-SCNC: 14 MMOL/L — SIGNIFICANT CHANGE UP (ref 5–17)
BUN SERPL-MCNC: 31 MG/DL — HIGH (ref 7–23)
CALCIUM SERPL-MCNC: 8.9 MG/DL — SIGNIFICANT CHANGE UP (ref 8.4–10.5)
CHLORIDE SERPL-SCNC: 104 MMOL/L — SIGNIFICANT CHANGE UP (ref 96–108)
CO2 SERPL-SCNC: 25 MMOL/L — SIGNIFICANT CHANGE UP (ref 22–31)
CREAT SERPL-MCNC: 1.51 MG/DL — HIGH (ref 0.5–1.3)
GLUCOSE SERPL-MCNC: 121 MG/DL — HIGH (ref 70–99)
HCT VFR BLD CALC: 34.8 % — LOW (ref 39–50)
HGB BLD-MCNC: 10.8 G/DL — LOW (ref 13–17)
MAGNESIUM SERPL-MCNC: 2 MG/DL — SIGNIFICANT CHANGE UP (ref 1.6–2.6)
MCHC RBC-ENTMCNC: 29 PG — SIGNIFICANT CHANGE UP (ref 27–34)
MCHC RBC-ENTMCNC: 31 GM/DL — LOW (ref 32–36)
MCV RBC AUTO: 93.5 FL — SIGNIFICANT CHANGE UP (ref 80–100)
PHOSPHATE SERPL-MCNC: 3.6 MG/DL — SIGNIFICANT CHANGE UP (ref 2.5–4.5)
PLATELET # BLD AUTO: 181 K/UL — SIGNIFICANT CHANGE UP (ref 150–400)
POTASSIUM SERPL-MCNC: 4.4 MMOL/L — SIGNIFICANT CHANGE UP (ref 3.5–5.3)
POTASSIUM SERPL-SCNC: 4.4 MMOL/L — SIGNIFICANT CHANGE UP (ref 3.5–5.3)
RBC # BLD: 3.72 M/UL — LOW (ref 4.2–5.8)
RBC # FLD: 16.9 % — HIGH (ref 10.3–14.5)
SODIUM SERPL-SCNC: 142 MMOL/L — SIGNIFICANT CHANGE UP (ref 135–145)
VANCOMYCIN TROUGH SERPL-MCNC: 13.9 UG/ML — SIGNIFICANT CHANGE UP (ref 10–20)
WBC # BLD: 16.88 K/UL — HIGH (ref 3.8–10.5)
WBC # FLD AUTO: 16.88 K/UL — HIGH (ref 3.8–10.5)

## 2018-12-29 PROCEDURE — 99233 SBSQ HOSP IP/OBS HIGH 50: CPT | Mod: GC

## 2018-12-29 RX ADMIN — HYDROMORPHONE HYDROCHLORIDE 0.8 MILLIGRAM(S): 2 INJECTION INTRAMUSCULAR; INTRAVENOUS; SUBCUTANEOUS at 14:19

## 2018-12-29 RX ADMIN — Medication 2 MILLIGRAM(S): at 06:23

## 2018-12-29 RX ADMIN — HYDROMORPHONE HYDROCHLORIDE 0.8 MILLIGRAM(S): 2 INJECTION INTRAMUSCULAR; INTRAVENOUS; SUBCUTANEOUS at 20:25

## 2018-12-29 RX ADMIN — OXYCODONE HYDROCHLORIDE 20 MILLIGRAM(S): 5 TABLET ORAL at 08:00

## 2018-12-29 RX ADMIN — APIXABAN 2.5 MILLIGRAM(S): 2.5 TABLET, FILM COATED ORAL at 17:29

## 2018-12-29 RX ADMIN — OXYCODONE HYDROCHLORIDE 20 MILLIGRAM(S): 5 TABLET ORAL at 08:30

## 2018-12-29 RX ADMIN — PIPERACILLIN AND TAZOBACTAM 25 GRAM(S): 4; .5 INJECTION, POWDER, LYOPHILIZED, FOR SOLUTION INTRAVENOUS at 06:49

## 2018-12-29 RX ADMIN — HYDROMORPHONE HYDROCHLORIDE 0.8 MILLIGRAM(S): 2 INJECTION INTRAMUSCULAR; INTRAVENOUS; SUBCUTANEOUS at 13:49

## 2018-12-29 RX ADMIN — OXYCODONE HYDROCHLORIDE 20 MILLIGRAM(S): 5 TABLET ORAL at 13:49

## 2018-12-29 RX ADMIN — OXYCODONE HYDROCHLORIDE 20 MILLIGRAM(S): 5 TABLET ORAL at 22:30

## 2018-12-29 RX ADMIN — Medication 3 MILLILITER(S): at 13:49

## 2018-12-29 RX ADMIN — HYDROMORPHONE HYDROCHLORIDE 0.8 MILLIGRAM(S): 2 INJECTION INTRAMUSCULAR; INTRAVENOUS; SUBCUTANEOUS at 00:44

## 2018-12-29 RX ADMIN — APIXABAN 2.5 MILLIGRAM(S): 2.5 TABLET, FILM COATED ORAL at 06:23

## 2018-12-29 RX ADMIN — Medication 250 MILLIGRAM(S): at 01:25

## 2018-12-29 RX ADMIN — DIPHENHYDRAMINE HYDROCHLORIDE AND LIDOCAINE HYDROCHLORIDE AND ALUMINUM HYDROXIDE AND MAGNESIUM HYDRO 5 MILLILITER(S): KIT at 13:50

## 2018-12-29 RX ADMIN — PIPERACILLIN AND TAZOBACTAM 25 GRAM(S): 4; .5 INJECTION, POWDER, LYOPHILIZED, FOR SOLUTION INTRAVENOUS at 21:56

## 2018-12-29 RX ADMIN — HYDROMORPHONE HYDROCHLORIDE 0.8 MILLIGRAM(S): 2 INJECTION INTRAMUSCULAR; INTRAVENOUS; SUBCUTANEOUS at 08:30

## 2018-12-29 RX ADMIN — DIPHENHYDRAMINE HYDROCHLORIDE AND LIDOCAINE HYDROCHLORIDE AND ALUMINUM HYDROXIDE AND MAGNESIUM HYDRO 5 MILLILITER(S): KIT at 21:56

## 2018-12-29 RX ADMIN — OXYCODONE HYDROCHLORIDE 20 MILLIGRAM(S): 5 TABLET ORAL at 14:19

## 2018-12-29 RX ADMIN — TENOFOVIR DISOPROXIL FUMARATE 300 MILLIGRAM(S): 300 TABLET, FILM COATED ORAL at 06:28

## 2018-12-29 RX ADMIN — Medication 500000 UNIT(S): at 23:08

## 2018-12-29 RX ADMIN — Medication 3 MILLILITER(S): at 06:23

## 2018-12-29 RX ADMIN — Medication 500000 UNIT(S): at 17:29

## 2018-12-29 RX ADMIN — TAMSULOSIN HYDROCHLORIDE 0.4 MILLIGRAM(S): 0.4 CAPSULE ORAL at 21:55

## 2018-12-29 RX ADMIN — OXYCODONE HYDROCHLORIDE 20 MILLIGRAM(S): 5 TABLET ORAL at 21:55

## 2018-12-29 RX ADMIN — HYDROMORPHONE HYDROCHLORIDE 0.8 MILLIGRAM(S): 2 INJECTION INTRAMUSCULAR; INTRAVENOUS; SUBCUTANEOUS at 08:00

## 2018-12-29 RX ADMIN — Medication 3 MILLILITER(S): at 17:29

## 2018-12-29 RX ADMIN — HYDROMORPHONE HYDROCHLORIDE 0.8 MILLIGRAM(S): 2 INJECTION INTRAMUSCULAR; INTRAVENOUS; SUBCUTANEOUS at 17:41

## 2018-12-29 RX ADMIN — PANTOPRAZOLE SODIUM 40 MILLIGRAM(S): 20 TABLET, DELAYED RELEASE ORAL at 06:23

## 2018-12-29 RX ADMIN — HYDROMORPHONE HYDROCHLORIDE 0.8 MILLIGRAM(S): 2 INJECTION INTRAMUSCULAR; INTRAVENOUS; SUBCUTANEOUS at 21:00

## 2018-12-29 RX ADMIN — Medication 500000 UNIT(S): at 06:49

## 2018-12-29 RX ADMIN — DIPHENHYDRAMINE HYDROCHLORIDE AND LIDOCAINE HYDROCHLORIDE AND ALUMINUM HYDROXIDE AND MAGNESIUM HYDRO 5 MILLILITER(S): KIT at 06:23

## 2018-12-29 RX ADMIN — Medication 500000 UNIT(S): at 13:50

## 2018-12-29 RX ADMIN — PIPERACILLIN AND TAZOBACTAM 25 GRAM(S): 4; .5 INJECTION, POWDER, LYOPHILIZED, FOR SOLUTION INTRAVENOUS at 13:50

## 2018-12-29 RX ADMIN — Medication 3 MILLILITER(S): at 23:07

## 2018-12-29 NOTE — PROGRESS NOTE ADULT - SUBJECTIVE AND OBJECTIVE BOX
CHIEF COMPLAINT: Patient is a 78y old  Male who presents with a chief complaint of left side rib pain (28 Dec 2018 14:48)      SUBJECTIVE / OVERNIGHT EVENTS: No acute events overnight. Yesterday required Dilaudid 0.8mg x3 for breakthrough pain. This morning, BP was 95/60- pt was asymptomatic, oxy 20mg ER was held per night float. Pt seen and examined at bedside. Pt appears in distress, complaining of 10/10 generalized pain. Oxy 20mg ER was given several minutes ago and Dilaudid 0.8mg IV was given just before I saw pt. Pt currently still in 10/10 pain. States pain had been well controlled and he was requesting his standing oxy this morning, however it was held for low BP. Appetite continues to be poor. Otherwise has no new complaints, but interview was limited as pt was in severe pain.       MEDICATIONS:  MEDICATIONS  (STANDING):  ALBUTerol/ipratropium for Nebulization 3 milliLiter(s) Nebulizer every 6 hours  apixaban 2.5 milliGRAM(s) Oral every 12 hours  dexamethasone     Tablet 2 milliGRAM(s) Oral daily  FIRST- Mouthwash  BLM 5 milliLiter(s) Swish and Swallow three times a day  nystatin    Suspension 706801 Unit(s) Oral four times a day  oxyCODONE  ER Tablet 20 milliGRAM(s) Oral every 8 hours  pantoprazole    Tablet 40 milliGRAM(s) Oral before breakfast  piperacillin/tazobactam IVPB. 3.375 Gram(s) IV Intermittent every 8 hours  sodium chloride 0.9%. 1000 milliLiter(s) (75 mL/Hr) IV Continuous <Continuous>  tamsulosin 0.4 milliGRAM(s) Oral at bedtime  tenofovir disoproxil fumarate (VIREAD) 300 milliGRAM(s) Oral <User Schedule>  vancomycin  IVPB 750 milliGRAM(s) IV Intermittent every 12 hours  vancomycin  IVPB        MEDICATIONS  (PRN):  acetaminophen   Tablet .. 325 milliGRAM(s) Oral every 6 hours PRN Moderate Pain (4 - 6)  benzonatate 100 milliGRAM(s) Oral three times a day PRN Cough  HYDROmorphone  Injectable 0.8 milliGRAM(s) IV Push every 2 hours PRN Moderate Pain (4 - 6) or severe pain        OBJECTIVE:  Vital Signs Last 24 Hrs  T(C): 36.5 (29 Dec 2018 06:08), Max: 36.7 (28 Dec 2018 20:53)  T(F): 97.7 (29 Dec 2018 06:08), Max: 98.1 (28 Dec 2018 20:53)  HR: 98 (29 Dec 2018 07:57) (75 - 98)  BP: 114/76 (29 Dec 2018 07:57) (95/60 - 122/65)  BP(mean): --  RR: 18 (29 Dec 2018 07:57) (18 - 18)  SpO2: 92% (29 Dec 2018 07:57) (92% - 98%)  CAPILLARY BLOOD GLUCOSE        I&O's Summary    28 Dec 2018 07:01  -  29 Dec 2018 07:00  --------------------------------------------------------  IN: 2070 mL / OUT: 1400 mL / NET: 670 mL          PHYSICAL EXAM:  GENERAL: in acute distress 2/2 generalized pain, thin elderly male, wearing NC  NECK: Supple, No JVD  CHEST/LUNG: Clear to auscultation bilaterally; No wheeze  HEART: Regular rate and rhythm; No murmurs, rubs, or gallops  ABDOMEN: Soft, Nontender, Nondistended; Bowel sounds present  EXTREMITIES:  2+ Peripheral Pulses, No clubbing, cyanosis, or edema  NEURO/PSYCH: AAOx3, nonfocal    LABS:                        10.8   16.88 )-----------( 181      ( 29 Dec 2018 07:45 )             34.8     12-29    142  |  104  |  x   ----------------------------<  x   4.4   |  x   |  x     Ca    9.1      28 Dec 2018 08:21  Phos  5.1     12-28  Mg     2.0     12-28      PT/INR - ( 27 Dec 2018 09:27 )   PT: 14.9 sec;   INR: 1.32 ratio                   Culture - Blood (collected 27 Dec 2018 22:39)  Source: .Blood Blood-Venous  Preliminary Report (28 Dec 2018 23:01):    No growth to date.    Culture - Blood (collected 27 Dec 2018 22:17)  Source: .Blood Blood-Peripheral  Preliminary Report (28 Dec 2018 23:01):    No growth to date.

## 2018-12-29 NOTE — PROGRESS NOTE ADULT - ASSESSMENT
77 year old with hx of HTN, HLD, COPD, T2DM, ?PE on Apixaban, Stage IV CKD, NSCLC (progression of disease on erlotinib, carbo/pemetrexed, ateoluzumab, now on afatanib), basal cell cancer s/p resection, hep B (on tenofovir) and dysphagia who presents with rib pain on the left side, likely due to nondisplaced rib fractures. Also found with increased nodular airspace opacities on CT concerning for progression of disease vs infection vs pneumonitis.

## 2018-12-29 NOTE — PROGRESS NOTE ADULT - PROBLEM SELECTOR PLAN 1
New since 9/2018, s/p fall ~3 wks ago but pain started within past few days.   - c/w oxycodone ER 20 mg q8h  - c/w tylenol 325 PO Q 6 hrs PRN for mild pain, Dilaudid 0.8mg IV q8h PRN for mod-severe pain  - appreciate palliative recs, following for symptom control

## 2018-12-30 ENCOUNTER — TRANSCRIPTION ENCOUNTER (OUTPATIENT)
Age: 78
End: 2018-12-30

## 2018-12-30 LAB
ANION GAP SERPL CALC-SCNC: 13 MMOL/L — SIGNIFICANT CHANGE UP (ref 5–17)
BUN SERPL-MCNC: 32 MG/DL — HIGH (ref 7–23)
CALCIUM SERPL-MCNC: 8.9 MG/DL — SIGNIFICANT CHANGE UP (ref 8.4–10.5)
CHLORIDE SERPL-SCNC: 101 MMOL/L — SIGNIFICANT CHANGE UP (ref 96–108)
CO2 SERPL-SCNC: 26 MMOL/L — SIGNIFICANT CHANGE UP (ref 22–31)
CREAT SERPL-MCNC: 1.43 MG/DL — HIGH (ref 0.5–1.3)
GLUCOSE SERPL-MCNC: 117 MG/DL — HIGH (ref 70–99)
HCT VFR BLD CALC: 33.4 % — LOW (ref 39–50)
HGB BLD-MCNC: 10.6 G/DL — LOW (ref 13–17)
MAGNESIUM SERPL-MCNC: 2 MG/DL — SIGNIFICANT CHANGE UP (ref 1.6–2.6)
MCHC RBC-ENTMCNC: 29.6 PG — SIGNIFICANT CHANGE UP (ref 27–34)
MCHC RBC-ENTMCNC: 31.7 GM/DL — LOW (ref 32–36)
MCV RBC AUTO: 93.3 FL — SIGNIFICANT CHANGE UP (ref 80–100)
PHOSPHATE SERPL-MCNC: 3 MG/DL — SIGNIFICANT CHANGE UP (ref 2.5–4.5)
PLATELET # BLD AUTO: 191 K/UL — SIGNIFICANT CHANGE UP (ref 150–400)
POTASSIUM SERPL-MCNC: 4 MMOL/L — SIGNIFICANT CHANGE UP (ref 3.5–5.3)
POTASSIUM SERPL-SCNC: 4 MMOL/L — SIGNIFICANT CHANGE UP (ref 3.5–5.3)
RBC # BLD: 3.58 M/UL — LOW (ref 4.2–5.8)
RBC # FLD: 16.6 % — HIGH (ref 10.3–14.5)
SODIUM SERPL-SCNC: 140 MMOL/L — SIGNIFICANT CHANGE UP (ref 135–145)
WBC # BLD: 13.23 K/UL — HIGH (ref 3.8–10.5)
WBC # FLD AUTO: 13.23 K/UL — HIGH (ref 3.8–10.5)

## 2018-12-30 PROCEDURE — 99232 SBSQ HOSP IP/OBS MODERATE 35: CPT | Mod: GC

## 2018-12-30 RX ADMIN — PANTOPRAZOLE SODIUM 40 MILLIGRAM(S): 20 TABLET, DELAYED RELEASE ORAL at 05:51

## 2018-12-30 RX ADMIN — APIXABAN 2.5 MILLIGRAM(S): 2.5 TABLET, FILM COATED ORAL at 05:51

## 2018-12-30 RX ADMIN — Medication 500000 UNIT(S): at 05:51

## 2018-12-30 RX ADMIN — OXYCODONE HYDROCHLORIDE 20 MILLIGRAM(S): 5 TABLET ORAL at 05:51

## 2018-12-30 RX ADMIN — OXYCODONE HYDROCHLORIDE 20 MILLIGRAM(S): 5 TABLET ORAL at 13:11

## 2018-12-30 RX ADMIN — DIPHENHYDRAMINE HYDROCHLORIDE AND LIDOCAINE HYDROCHLORIDE AND ALUMINUM HYDROXIDE AND MAGNESIUM HYDRO 5 MILLILITER(S): KIT at 13:12

## 2018-12-30 RX ADMIN — HYDROMORPHONE HYDROCHLORIDE 0.8 MILLIGRAM(S): 2 INJECTION INTRAMUSCULAR; INTRAVENOUS; SUBCUTANEOUS at 22:50

## 2018-12-30 RX ADMIN — TAMSULOSIN HYDROCHLORIDE 0.4 MILLIGRAM(S): 0.4 CAPSULE ORAL at 21:04

## 2018-12-30 RX ADMIN — HYDROMORPHONE HYDROCHLORIDE 0.8 MILLIGRAM(S): 2 INJECTION INTRAMUSCULAR; INTRAVENOUS; SUBCUTANEOUS at 23:56

## 2018-12-30 RX ADMIN — Medication 3 MILLILITER(S): at 23:56

## 2018-12-30 RX ADMIN — PIPERACILLIN AND TAZOBACTAM 25 GRAM(S): 4; .5 INJECTION, POWDER, LYOPHILIZED, FOR SOLUTION INTRAVENOUS at 05:52

## 2018-12-30 RX ADMIN — HYDROMORPHONE HYDROCHLORIDE 0.8 MILLIGRAM(S): 2 INJECTION INTRAMUSCULAR; INTRAVENOUS; SUBCUTANEOUS at 09:28

## 2018-12-30 RX ADMIN — DIPHENHYDRAMINE HYDROCHLORIDE AND LIDOCAINE HYDROCHLORIDE AND ALUMINUM HYDROXIDE AND MAGNESIUM HYDRO 5 MILLILITER(S): KIT at 05:51

## 2018-12-30 RX ADMIN — HYDROMORPHONE HYDROCHLORIDE 0.8 MILLIGRAM(S): 2 INJECTION INTRAMUSCULAR; INTRAVENOUS; SUBCUTANEOUS at 14:08

## 2018-12-30 RX ADMIN — HYDROMORPHONE HYDROCHLORIDE 0.8 MILLIGRAM(S): 2 INJECTION INTRAMUSCULAR; INTRAVENOUS; SUBCUTANEOUS at 17:42

## 2018-12-30 RX ADMIN — HYDROMORPHONE HYDROCHLORIDE 0.8 MILLIGRAM(S): 2 INJECTION INTRAMUSCULAR; INTRAVENOUS; SUBCUTANEOUS at 10:24

## 2018-12-30 RX ADMIN — Medication 2 MILLIGRAM(S): at 05:51

## 2018-12-30 RX ADMIN — OXYCODONE HYDROCHLORIDE 20 MILLIGRAM(S): 5 TABLET ORAL at 14:13

## 2018-12-30 RX ADMIN — Medication 500000 UNIT(S): at 13:12

## 2018-12-30 RX ADMIN — HYDROMORPHONE HYDROCHLORIDE 0.8 MILLIGRAM(S): 2 INJECTION INTRAMUSCULAR; INTRAVENOUS; SUBCUTANEOUS at 21:42

## 2018-12-30 RX ADMIN — Medication 3 MILLILITER(S): at 17:18

## 2018-12-30 RX ADMIN — APIXABAN 2.5 MILLIGRAM(S): 2.5 TABLET, FILM COATED ORAL at 17:18

## 2018-12-30 RX ADMIN — DIPHENHYDRAMINE HYDROCHLORIDE AND LIDOCAINE HYDROCHLORIDE AND ALUMINUM HYDROXIDE AND MAGNESIUM HYDRO 5 MILLILITER(S): KIT at 21:04

## 2018-12-30 RX ADMIN — Medication 3 MILLILITER(S): at 05:51

## 2018-12-30 RX ADMIN — Medication 500000 UNIT(S): at 23:55

## 2018-12-30 RX ADMIN — OXYCODONE HYDROCHLORIDE 20 MILLIGRAM(S): 5 TABLET ORAL at 06:24

## 2018-12-30 RX ADMIN — OXYCODONE HYDROCHLORIDE 20 MILLIGRAM(S): 5 TABLET ORAL at 21:03

## 2018-12-30 RX ADMIN — HYDROMORPHONE HYDROCHLORIDE 0.8 MILLIGRAM(S): 2 INJECTION INTRAMUSCULAR; INTRAVENOUS; SUBCUTANEOUS at 16:38

## 2018-12-30 RX ADMIN — Medication 500000 UNIT(S): at 17:18

## 2018-12-30 RX ADMIN — Medication 3 MILLILITER(S): at 14:14

## 2018-12-30 RX ADMIN — HYDROMORPHONE HYDROCHLORIDE 0.8 MILLIGRAM(S): 2 INJECTION INTRAMUSCULAR; INTRAVENOUS; SUBCUTANEOUS at 18:52

## 2018-12-30 RX ADMIN — OXYCODONE HYDROCHLORIDE 20 MILLIGRAM(S): 5 TABLET ORAL at 22:30

## 2018-12-30 NOTE — DIETITIAN INITIAL EVALUATION ADULT. - NUTRITION INTERVENTION
Meals and Snack/Medical Food Supplements Meals and Snack/Medical Food Supplements/Collaboration and Referral of Nutrition Care

## 2018-12-30 NOTE — DIETITIAN INITIAL EVALUATION ADULT. - OTHER INFO
seen for unintentional weight loss >10% consult, admitted for left side rib pain, consuming, denies nausea/vomit, denies difficulty chewing /swallow. last BM . NKFA seen for unintentional weight loss >10% consult, admitted for left side rib pain, consuming >75% of meals, denies nausea/vomit, difficulty swallowing being addressed with modified diet.  last BM 12/29 NKFA. he is not weighed at home.

## 2018-12-30 NOTE — DISCHARGE NOTE ADULT - MEDICATION SUMMARY - MEDICATIONS TO STOP TAKING
I will STOP taking the medications listed below when I get home from the hospital:    oxyCODONE 10 mg oral tablet  -- 1 tab(s) by mouth every 4 hours, As needed, breakthrough pain MDD:6 tablets    oxyCODONE 20 mg oral tablet, extended release  -- 1 tab(s) by mouth 3 times a day

## 2018-12-30 NOTE — CHART NOTE - NSCHARTNOTEFT_GEN_A_CORE
Upon Nutritional Assessment by the Registered Dietitian your patient was determined to meet criteria / has evidence of the following diagnosis/diagnoses:          [ ]  Mild Protein Calorie Malnutrition        [ ]  Moderate Protein Calorie Malnutrition        [ ] Severe Protein Calorie Malnutrition        [ ] Unspecified Protein Calorie Malnutrition        [ x] Underweight / BMI <19         [ ] Morbid Obesity / BMI > 40      Findings as based on:  [ x] Comprehensive nutrition assessment (BMI 18.8)  [ ] Nutrition Focused Physical Exam  [ ] Other:       Nutrition Plan/Recommendations:    · Meals and Snacks: consistent carbohydrate (no snack) dysphagia 1 nectar consistency, 60Gram protein  · Medical and Food Supplements: Suplena x 1 daily          PROVIDER Section:     By signing this assessment you are acknowledging and agree with the diagnosis/diagnoses assigned by the Registered Dietitian    Comments:

## 2018-12-30 NOTE — DISCHARGE NOTE ADULT - CARE PROVIDERS DIRECT ADDRESSES
[email or web address]

## 2018-12-30 NOTE — DIETITIAN INITIAL EVALUATION ADULT. - PERTINENT MEDS FT
acetaminophen   Tablet .. 325 PRN  ALBUTerol/ipratropium for Nebulization 3  apixaban 2.5  benzonatate 100 PRN  dexamethasone     Tablet 2  FIRST- Mouthwash  BLM 5  HYDROmorphone  Injectable 0.8 PRN  nystatin    Suspension [id number]  oxyCODONE  ER Tablet 20  pantoprazole    Tablet 40  sodium chloride 0.9%. 1000  tamsulosin 0.4  tenofovir disoproxil fumarate (VIREAD) 300

## 2018-12-30 NOTE — DISCHARGE NOTE ADULT - ADDITIONAL INSTRUCTIONS
Follow up with your outpatient oncologist for continued management.  Follow up with your palliative doctor/ pain specialist within one week of discharge.  Follow up with your Hepatologist within 1-2 weeks of discharge.

## 2018-12-30 NOTE — DISCHARGE NOTE ADULT - CARE PROVIDER_API CALL
Brandan Gross; MBBS), Hematology; [facility]; Medical Oncology  [address], [place] [id number]  Phone: [phone]  Fax: [phone]    Nery Kramer), [facility]; Internal Medicine  [address] [place] [address]  Phone: [phone]  Fax: [phone]

## 2018-12-30 NOTE — PROGRESS NOTE ADULT - PROBLEM SELECTOR PLAN 5
DVT PPx: Eliquis 2.5mg BID (h/o PE)  Diet: Dysphagia 1 pureed  Dispo: home with homecare, PT recs pending.    Burak Ernandez M.D.  Internal Medicine PGY-2  Pager: [phone]/ [facility] [phone]  After 7 PM on weekdays and 12 PM on weekends page [phone]

## 2018-12-30 NOTE — DISCHARGE NOTE ADULT - CARE PLAN
Principal Discharge DX:	Rib fractures Principal Discharge DX:	Rib fractures  Goal:	Follow up  Assessment and plan of treatment:	You were found to have several rib fractures. You were treated with intravenous pain medication as well as oral medication. Please continue to take your Oxycodone ER tablets three times a day and your Oxycodone IR tablets as prescribed by your pain management doctor. Please follow up with your chronic pain specialist within 1-2 weeks of discharge.  Secondary Diagnosis:	Lung cancer  Goal:	Follow up  Assessment and plan of treatment:	You have a history of lung cancer. Please follow up with your Oncologist for further management within one to two weeks of discharge.  Secondary Diagnosis:	Chronic hepatitis B  Goal:	Follow up  Assessment and plan of treatment:	You have a history of Hepatitis B infection. Please follow up with your Hepatologist within 1-2 weeks of discharge for follow up and continued care. Principal Discharge DX:	Rib fractures  Goal:	Follow up  Assessment and plan of treatment:	You were found to have several rib fractures. You were treated with intravenous pain medication as well as oral medication. Please continue to take your Oxycodone ER 30mg tablets three times a day along with the Hydromorphone 2mg tablets as needed for additional pain. You were found to have been previously prescribed Oxycodone extended release. You will be given 1 week supply of each pain medication to cover you until you can follow up with your primary medical doctor and/or pain specialist. Please follow up with your chronic pain specialist within 1-2 weeks of discharge.  Secondary Diagnosis:	Lung cancer  Goal:	Follow up  Assessment and plan of treatment:	You have a history of lung cancer. Please follow up with your Oncologist for further management within one to two weeks of discharge.  Secondary Diagnosis:	Chronic hepatitis B  Goal:	Follow up  Assessment and plan of treatment:	You have a history of Hepatitis B infection. Please follow up with your Hepatologist within 1-2 weeks of discharge for follow up and continued care.

## 2018-12-30 NOTE — DIETITIAN INITIAL EVALUATION ADULT. - PROBLEM SELECTOR PLAN 2
- Patient is currently on eliquis for unclear reasons- patient does not recall if he had been started on the medication due to history of  blood clot vs arrhythmia.   -  While it would be unusual for patient to have new PE while on eliquis, this could potentially cause similar L sided chest wall pain and this patient, with his known malignancy, decreased mobility will r/o PE   - will get duplex U/S and V/Q scan, will hold CTA with contrast given contrast exposure  - c/w heparin gtt in the interim. Discussed with patient risks and benefits of anticoagulation with heparin gtt, including but not limited to bleeding. Patient expressed agreement to continuing heparin gtt.

## 2018-12-30 NOTE — DISCHARGE NOTE ADULT - HOSPITAL COURSE
77 year old with hx of HTN, HLD, COPD, T2DM, PE on Apixaban, Stage IV CKD, NSCLC (progression of disease on erlotinib, carbo/pemetrexed, ateoluzumab, now on afatanib), basal cell cancer s/p resection, hep B (on tenofovir) and dysphagia who presents with rib pain on the left side. Of note patient fell twice, 3 weeks ago with some associated rib pain. Patient states that falls occurred when he was trying to get out of bed to walk to the bathroom and then when he had tried to pull open a door. He did not lose consciousness with the falls. After falls, patient complained of back pain, but did not endorse left sided chest wall pain. On night prior to admission, had complained worsening left side pain: on the anterior chest spreading to the mid and back. Worse with palpation, with some associated pleuritic chest pain and sensation of feeling short of breath. As per grandson, took his usual pain medication regimen (takes oxycontin 20 TID and oxycodone 10mg q6 hrs PRN with tylenol 650 q 6 hrs PRN) without improvement in symptoms.     In the ED, CT chest showed left rib 10-12 fractures which were closed and nondisplaced, without pneumothorax. Bilateral LE dopplers were negative for PE and V/Q scan showed low probability of PE (pt also had LUX on CKD at presentation). Pt was treated with IV dilaudid for breakthrough pain, and palliative was consulted (also following as outpt) to assist with symptom management. On recent PET scan, pt was found to have increased bilateral opacities concerning for progression of disease vs infection vs pneumonitis. Oncology was consulted and felt imaging was most consistent with progression of disease; however, pt presented with remarkable leukocytosis (may be partially explained by decadron use for bone pain); though pt showed no s+s of infection, he was treated empirically with zosyn for 5 days. 77 year old with hx of HTN, HLD, COPD, T2DM, PE on Apixaban, Stage IV CKD, NSCLC (progression of disease on erlotinib, carbo/pemetrexed, ateoluzumab, now on afatanib), basal cell cancer s/p resection, hep B (on tenofovir) and dysphagia who presents with rib pain on the left side. Of note patient fell twice, 3 weeks ago with some associated rib pain. Patient states that falls occurred when he was trying to get out of bed to walk to the bathroom and then when he had tried to pull open a door. He did not lose consciousness with the falls. After falls, patient complained of back pain, but did not endorse left sided chest wall pain. On night prior to admission, had complained worsening left side pain: on the anterior chest spreading to the mid and back. Worse with palpation, with some associated pleuritic chest pain and sensation of feeling short of breath. As per grandson, took his usual pain medication regimen (takes oxycontin 20 TID and oxycodone 10mg q6 hrs PRN with tylenol 650 q 6 hrs PRN) without improvement in symptoms.     In the ED, CT chest showed left rib 10-12 fractures which were closed and nondisplaced, without pneumothorax. Bilateral LE dopplers were negative for PE and V/Q scan showed low probability of PE (pt also had LUX on CKD at presentation). Pt was treated with IV dilaudid for breakthrough pain, and palliative was consulted (also following as outpt) to assist with symptom management. On recent PET scan, pt was found to have increased bilateral opacities concerning for progression of disease vs infection vs pneumonitis. Oncology was consulted and felt imaging was most consistent with progression of disease; however, pt presented with remarkable leukocytosis (may be partially explained by decadron use for bone pain); though pt showed no s+s of infection, he was treated empirically with zosyn for 5 days.   Pt's pain was treated with Oxycodone 20mg ER TID and Dilaudid 0.8mg IVP q2hr PRN. Pt's pain regimen changed to Oxycodone 30mg TID with Hydromorphone IR 2mg/4mg PRN for moderate/severe pain.    Pt was examined and deemed stable for discharge to home.

## 2018-12-30 NOTE — PROGRESS NOTE ADULT - SUBJECTIVE AND OBJECTIVE BOX
Patient is a 78y old  Male who presents with a chief complaint of left side rib pain (29 Dec 2018 08:36)    dx = pain 2/2 rib fracture    SUBJECTIVE / OVERNIGHT EVENTS: No overnight events. No complaints this AM. Says pain is improved. Does not require PRN pain control overnight or this AM however was requiring dilaudid PRN's yesterday evening.     MEDICATIONS  (STANDING):  ALBUTerol/ipratropium for Nebulization 3 milliLiter(s) Nebulizer every 6 hours  apixaban 2.5 milliGRAM(s) Oral every 12 hours  dexamethasone     Tablet 2 milliGRAM(s) Oral daily  FIRST- Mouthwash  BLM 5 milliLiter(s) Swish and Swallow three times a day  nystatin    Suspension 197032 Unit(s) Oral four times a day  oxyCODONE  ER Tablet 20 milliGRAM(s) Oral every 8 hours  pantoprazole    Tablet 40 milliGRAM(s) Oral before breakfast  piperacillin/tazobactam IVPB. 3.375 Gram(s) IV Intermittent every 8 hours  sodium chloride 0.9%. 1000 milliLiter(s) (75 mL/Hr) IV Continuous <Continuous>  tamsulosin 0.4 milliGRAM(s) Oral at bedtime  tenofovir disoproxil fumarate (VIREAD) 300 milliGRAM(s) Oral <User Schedule>    MEDICATIONS  (PRN):  acetaminophen   Tablet .. 325 milliGRAM(s) Oral every 6 hours PRN Moderate Pain (4 - 6)  benzonatate 100 milliGRAM(s) Oral three times a day PRN Cough  HYDROmorphone  Injectable 0.8 milliGRAM(s) IV Push every 2 hours PRN Moderate Pain (4 - 6) or severe pain    Vitals  T(C): 36.6 (12-30-18 @ 08:24), Max: 36.8 (12-29-18 @ 19:27)  HR: 106 (12-30-18 @ 08:24) (71 - 106)  BP: 100/60 (12-30-18 @ 08:24) (98/59 - 123/76)  RR: 18 (12-30-18 @ 08:24) (18 - 18)  SpO2: 95% (12-30-18 @ 08:24) (95% - 97%) on 2 L NC    PHYSICAL EXAM  GENERAL: elderly cachectic male in NAD  NEURO: AO x3, nonfocal exam  HEAD:  Atraumatic, Normocephalic  EYES: conjunctiva and sclera clear  CHEST/LUNG: Clear to auscultation bilaterally; No wheezes, rales or rhonchi  HEART: Regular rate and rhythm; No murmurs, rubs, or gallops  ABDOMEN: Soft, Nondistended; Bowel sounds present, no masses. +TTP on L overlying anterior ribs, pain extending into L. flank.   EXTREMITIES:  2+ Peripheral Pulses, No clubbing, cyanosis, or edema  SKIN: Warm, dry, in tact, no rashes or lesions  PSYCH: affect appropriate    LABS:                        10.6   13.23 )-----------( 191      ( 30 Dec 2018 08:29 )             33.4     12-30    140  |  101  |  32<H>  ----------------------------<  117<H>  4.0   |  26  |  1.43<H>    Ca    8.9      30 Dec 2018 07:18  Phos  3.0     12-30  Mg     2.0     12-30        I&O's Summary    29 Dec 2018 07:01  -  30 Dec 2018 07:00  --------------------------------------------------------  IN: 1040 mL / OUT: 2250 mL / NET: -1210 mL    Burak Ernandez M.D.  Internal Medicine PGY-2  Pager: [phone]/ [facility] [phone]  After 7 PM on weekdays and 12 PM on weekends page [phone]

## 2018-12-30 NOTE — DIETITIAN INITIAL EVALUATION ADULT. - PROBLEM SELECTOR PLAN 1
- reported chest wall pain with associated shortness of breath that started yesterday. CT chest noted that fracture was present on imaging from 12/24/2018, but new since 9/2018. Unclear when injury may have occurred  - unclear as to why pain is occurring now ? possible because patient stopped decadron several days ago  - CP unlikely to be related to ischemic chest pain- EKG unremarkable and no evidence of significant troponemia  - pain control to prevent atelectasis and subsequent PNA: would start oxycodone 10 mg q 6 hrs PRN for mod pain with tylenol 650 PO Q 6 hrs PRN for mild pain

## 2018-12-30 NOTE — PROGRESS NOTE ADULT - PROBLEM SELECTOR PLAN 1
-For pain control, c/w oxycodone ER 20 mg q8h, tylenol 325 PO Q 6 hrs PRN for mild pain, Dilaudid 0.8mg IV q8h PRN for mod-severe pain. Currently not requiring diludid PRN's. If can tolerate PO pain regimen will dispo plan.   - appreciate palliative recs, following for symptom control

## 2018-12-30 NOTE — PROGRESS NOTE ADULT - ASSESSMENT
77 year old with hx of HTN, HLD, COPD, T2DM, ?PE on Apixaban, Stage IV CKD, hep B on tenofovir, NSCLC on immunotherapy, p/w rib pain 2/2 fracture, also with likely progression of disease vs infectious process as seen on CXR, pain stable.

## 2018-12-30 NOTE — DISCHARGE NOTE ADULT - MEDICATION SUMMARY - MEDICATIONS TO TAKE
I will START or STAY ON the medications listed below when I get home from the hospital:    Decadron  -- 2 milligram(s) by mouth once a day  -- Indication: For Pain     HYDROmorphone 2 mg oral tablet  -- 1 tab(s) by mouth every 3 hours, As needed, Moderate Pain (4 - 6) or Severe Pain (7-10) MDD:20mg  -- Indication: For Pain    oxyCODONE 30 mg oral tablet, extended release  -- 1 tab(s) by mouth every 8 hours MDD:90mg  -- Indication: For Pain    acetaminophen 160 mg/5 mL oral liquid  -- 20 milliliter(s) by mouth every 6 hours  -- Indication: For Pain    Flomax 0.4 mg oral capsule  -- 1 cap(s) by mouth once a day   -- Indication: For BPH (benign prostatic hyperplasia)    apixaban 2.5 mg oral tablet  -- 1 tab(s) by mouth every 12 hours  -- Indication: For Pulmonary embolism    alogliptin-metformin 12.5 mg-500 mg oral tablet  -- 1 tab(s) by mouth once a day  -- Indication: For Diabetes    loperamide 2 mg oral capsule  -- Diarrhea   -- Indication: For Diarrhea    Zofran 4 mg oral tablet  -- 1 tab(s) by mouth every 8 hours, As Needed  -- Indication: For Nausea    benzonatate 100 mg oral capsule  -- 1 cap(s) by mouth 3 times a day  -- Indication: For Cough    tenofovir disoproxil fumarate 300 mg oral tablet  -- 1 tab(s) by mouth once a day  -- Indication: For Hepatitis B    ipratropium-albuterol 0.5 mg-2.5 mg/3 mLinhalation solution  -- 3 milliliter(s) inhaled every 8 hours, As needed, Shortness of Breath and/or Wheezing  -- Indication: For Shortness of breath    lidocaine 2% mucous membrane solution  -- 10 milliliter(s) mucous membrane every 6 hours, As Needed  Swish and Spit   -- Indication: For Pain    guaiFENesin 100 mg/5 mL oral liquid  -- 5 milliliter(s) by mouth every 8 hours  -- Indication: For Cough    afatinib 20 mg oral tablet  -- 1 tab(s) by mouth once a day  -- Indication: For Lung cancer    omeprazole 40 mg oral delayed release capsule  -- 1 cap(s) by mouth once a day  -- Indication: For Reflux

## 2018-12-30 NOTE — DISCHARGE NOTE ADULT - PLAN OF CARE
Follow up You were found to have several rib fractures. You were treated with intravenous pain medication as well as oral medication. Please continue to take your Oxycodone ER tablets three times a day and your Oxycodone IR tablets as prescribed by your pain management doctor. Please follow up with your chronic pain specialist within 1-2 weeks of discharge. You have a history of lung cancer. Please follow up with your Oncologist for further management within one to two weeks of discharge. You have a history of Hepatitis B infection. Please follow up with your Hepatologist within 1-2 weeks of discharge for follow up and continued care. You were found to have several rib fractures. You were treated with intravenous pain medication as well as oral medication. Please continue to take your Oxycodone ER 30mg tablets three times a day along with the Hydromorphone 2mg tablets as needed for additional pain. You were found to have been previously prescribed Oxycodone extended release. You will be given 1 week supply of each pain medication to cover you until you can follow up with your primary medical doctor and/or pain specialist. Please follow up with your chronic pain specialist within 1-2 weeks of discharge.

## 2018-12-30 NOTE — PROGRESS NOTE ADULT - PROBLEM SELECTOR PLAN 2
NSCLC on afatanib started in 2/2018, being held this hospitalization given concern for possible PNA. Disease likely metastatic given recent MRI brain with 2mm small nodule enhancement in left superior frontal gyrus  -f/u outpatient onc Dr. Gross for repeat CT/PET, immunotherapy, possible rad/onc f/u

## 2018-12-31 DIAGNOSIS — B18.1 CHRONIC VIRAL HEPATITIS B WITHOUT DELTA-AGENT: ICD-10-CM

## 2018-12-31 LAB
ANION GAP SERPL CALC-SCNC: 13 MMOL/L — SIGNIFICANT CHANGE UP (ref 5–17)
BUN SERPL-MCNC: 39 MG/DL — HIGH (ref 7–23)
CALCIUM SERPL-MCNC: 9 MG/DL — SIGNIFICANT CHANGE UP (ref 8.4–10.5)
CHLORIDE SERPL-SCNC: 100 MMOL/L — SIGNIFICANT CHANGE UP (ref 96–108)
CO2 SERPL-SCNC: 26 MMOL/L — SIGNIFICANT CHANGE UP (ref 22–31)
CREAT SERPL-MCNC: 1.37 MG/DL — HIGH (ref 0.5–1.3)
GLUCOSE SERPL-MCNC: 123 MG/DL — HIGH (ref 70–99)
HCT VFR BLD CALC: 34.5 % — LOW (ref 39–50)
HGB BLD-MCNC: 11 G/DL — LOW (ref 13–17)
MAGNESIUM SERPL-MCNC: 2 MG/DL — SIGNIFICANT CHANGE UP (ref 1.6–2.6)
MCHC RBC-ENTMCNC: 29.6 PG — SIGNIFICANT CHANGE UP (ref 27–34)
MCHC RBC-ENTMCNC: 31.9 GM/DL — LOW (ref 32–36)
MCV RBC AUTO: 93 FL — SIGNIFICANT CHANGE UP (ref 80–100)
PHOSPHATE SERPL-MCNC: 3.1 MG/DL — SIGNIFICANT CHANGE UP (ref 2.5–4.5)
PLATELET # BLD AUTO: 200 K/UL — SIGNIFICANT CHANGE UP (ref 150–400)
POTASSIUM SERPL-MCNC: 3.9 MMOL/L — SIGNIFICANT CHANGE UP (ref 3.5–5.3)
POTASSIUM SERPL-SCNC: 3.9 MMOL/L — SIGNIFICANT CHANGE UP (ref 3.5–5.3)
RBC # BLD: 3.71 M/UL — LOW (ref 4.2–5.8)
RBC # FLD: 16.4 % — HIGH (ref 10.3–14.5)
SODIUM SERPL-SCNC: 139 MMOL/L — SIGNIFICANT CHANGE UP (ref 135–145)
WBC # BLD: 14.05 K/UL — HIGH (ref 3.8–10.5)
WBC # FLD AUTO: 14.05 K/UL — HIGH (ref 3.8–10.5)

## 2018-12-31 PROCEDURE — 99233 SBSQ HOSP IP/OBS HIGH 50: CPT

## 2018-12-31 PROCEDURE — 99233 SBSQ HOSP IP/OBS HIGH 50: CPT | Mod: GC

## 2018-12-31 RX ORDER — OXYCODONE HYDROCHLORIDE 5 MG/1
20 TABLET ORAL ONCE
Qty: 0 | Refills: 0 | Status: DISCONTINUED | OUTPATIENT
Start: 2018-12-31 | End: 2018-12-31

## 2018-12-31 RX ORDER — OXYCODONE HYDROCHLORIDE 5 MG/1
40 TABLET ORAL EVERY 8 HOURS
Qty: 0 | Refills: 0 | Status: DISCONTINUED | OUTPATIENT
Start: 2018-12-31 | End: 2019-01-01

## 2018-12-31 RX ORDER — OXYCODONE HYDROCHLORIDE 5 MG/1
5 TABLET ORAL EVERY 4 HOURS
Qty: 0 | Refills: 0 | Status: DISCONTINUED | OUTPATIENT
Start: 2018-12-31 | End: 2019-01-01

## 2018-12-31 RX ADMIN — Medication 3 MILLILITER(S): at 05:23

## 2018-12-31 RX ADMIN — OXYCODONE HYDROCHLORIDE 20 MILLIGRAM(S): 5 TABLET ORAL at 14:34

## 2018-12-31 RX ADMIN — Medication 500000 UNIT(S): at 05:24

## 2018-12-31 RX ADMIN — Medication 2 MILLIGRAM(S): at 05:24

## 2018-12-31 RX ADMIN — DIPHENHYDRAMINE HYDROCHLORIDE AND LIDOCAINE HYDROCHLORIDE AND ALUMINUM HYDROXIDE AND MAGNESIUM HYDRO 5 MILLILITER(S): KIT at 21:21

## 2018-12-31 RX ADMIN — APIXABAN 2.5 MILLIGRAM(S): 2.5 TABLET, FILM COATED ORAL at 16:39

## 2018-12-31 RX ADMIN — DIPHENHYDRAMINE HYDROCHLORIDE AND LIDOCAINE HYDROCHLORIDE AND ALUMINUM HYDROXIDE AND MAGNESIUM HYDRO 5 MILLILITER(S): KIT at 13:15

## 2018-12-31 RX ADMIN — OXYCODONE HYDROCHLORIDE 40 MILLIGRAM(S): 5 TABLET ORAL at 19:21

## 2018-12-31 RX ADMIN — HYDROMORPHONE HYDROCHLORIDE 0.8 MILLIGRAM(S): 2 INJECTION INTRAMUSCULAR; INTRAVENOUS; SUBCUTANEOUS at 00:27

## 2018-12-31 RX ADMIN — HYDROMORPHONE HYDROCHLORIDE 0.8 MILLIGRAM(S): 2 INJECTION INTRAMUSCULAR; INTRAVENOUS; SUBCUTANEOUS at 06:32

## 2018-12-31 RX ADMIN — DIPHENHYDRAMINE HYDROCHLORIDE AND LIDOCAINE HYDROCHLORIDE AND ALUMINUM HYDROXIDE AND MAGNESIUM HYDRO 5 MILLILITER(S): KIT at 05:24

## 2018-12-31 RX ADMIN — APIXABAN 2.5 MILLIGRAM(S): 2.5 TABLET, FILM COATED ORAL at 05:24

## 2018-12-31 RX ADMIN — Medication 3 MILLILITER(S): at 16:39

## 2018-12-31 RX ADMIN — Medication 500000 UNIT(S): at 13:15

## 2018-12-31 RX ADMIN — OXYCODONE HYDROCHLORIDE 20 MILLIGRAM(S): 5 TABLET ORAL at 13:14

## 2018-12-31 RX ADMIN — PANTOPRAZOLE SODIUM 40 MILLIGRAM(S): 20 TABLET, DELAYED RELEASE ORAL at 05:26

## 2018-12-31 RX ADMIN — OXYCODONE HYDROCHLORIDE 20 MILLIGRAM(S): 5 TABLET ORAL at 14:01

## 2018-12-31 RX ADMIN — Medication 500000 UNIT(S): at 16:39

## 2018-12-31 RX ADMIN — HYDROMORPHONE HYDROCHLORIDE 0.8 MILLIGRAM(S): 2 INJECTION INTRAMUSCULAR; INTRAVENOUS; SUBCUTANEOUS at 13:06

## 2018-12-31 RX ADMIN — OXYCODONE HYDROCHLORIDE 20 MILLIGRAM(S): 5 TABLET ORAL at 08:09

## 2018-12-31 RX ADMIN — Medication 100 MILLIGRAM(S): at 16:39

## 2018-12-31 RX ADMIN — OXYCODONE HYDROCHLORIDE 20 MILLIGRAM(S): 5 TABLET ORAL at 14:05

## 2018-12-31 RX ADMIN — OXYCODONE HYDROCHLORIDE 20 MILLIGRAM(S): 5 TABLET ORAL at 07:48

## 2018-12-31 RX ADMIN — TENOFOVIR DISOPROXIL FUMARATE 300 MILLIGRAM(S): 300 TABLET, FILM COATED ORAL at 05:23

## 2018-12-31 RX ADMIN — Medication 3 MILLILITER(S): at 13:15

## 2018-12-31 RX ADMIN — HYDROMORPHONE HYDROCHLORIDE 0.8 MILLIGRAM(S): 2 INJECTION INTRAMUSCULAR; INTRAVENOUS; SUBCUTANEOUS at 14:01

## 2018-12-31 RX ADMIN — HYDROMORPHONE HYDROCHLORIDE 0.8 MILLIGRAM(S): 2 INJECTION INTRAMUSCULAR; INTRAVENOUS; SUBCUTANEOUS at 05:23

## 2018-12-31 RX ADMIN — HYDROMORPHONE HYDROCHLORIDE 0.8 MILLIGRAM(S): 2 INJECTION INTRAMUSCULAR; INTRAVENOUS; SUBCUTANEOUS at 08:09

## 2018-12-31 RX ADMIN — OXYCODONE HYDROCHLORIDE 5 MILLIGRAM(S): 5 TABLET ORAL at 23:43

## 2018-12-31 RX ADMIN — OXYCODONE HYDROCHLORIDE 40 MILLIGRAM(S): 5 TABLET ORAL at 20:00

## 2018-12-31 RX ADMIN — TAMSULOSIN HYDROCHLORIDE 0.4 MILLIGRAM(S): 0.4 CAPSULE ORAL at 21:21

## 2018-12-31 RX ADMIN — Medication 3 MILLILITER(S): at 23:44

## 2018-12-31 RX ADMIN — Medication 500000 UNIT(S): at 23:44

## 2018-12-31 RX ADMIN — HYDROMORPHONE HYDROCHLORIDE 0.8 MILLIGRAM(S): 2 INJECTION INTRAMUSCULAR; INTRAVENOUS; SUBCUTANEOUS at 07:45

## 2018-12-31 NOTE — PHYSICAL THERAPY INITIAL EVALUATION ADULT - PERTINENT HX OF CURRENT PROBLEM, REHAB EVAL
77yoM w/ h/o HTN, HLD, COPD, T2DM, ?PE on Apixaban, Stage IV CKD, NSCLC (progression of disease on erlotinib, carbo/pemetrexed, ateoluzumab, now on afatanib), Hep B Hx on Tenofovir,  basal cell cancer s/p resection, hep B (on tenofovir) and dysphagia who p/w rib pain on the L side. He had a fall episode 3 wks ago
77yoM w/ h/o HTN, HLD, COPD, T2DM, ?PE on Apixaban, Stage IV CKD, NSCLC (progression of disease on erlotinib, carbo/pemetrexed, ateoluzumab, now on afatanib), Hep B Hx on Tenofovir,  basal cell cancer s/p resection, hep B (on tenofovir) and dysphagia who p/w rib pain on the L side. He had a fall episode 3 wks ago

## 2018-12-31 NOTE — PHYSICAL THERAPY INITIAL EVALUATION ADULT - GENERAL OBSERVATIONS, REHAB EVAL
Pt rec'd in bed, NAD, spouse at bedside. Pt guadalupe 40 min eval well. Pt flagged by medical team for plan. Pt Mandarin speaking ( Ward [phone]). Pt minimally verbal during session, agreeable to session.

## 2018-12-31 NOTE — PROGRESS NOTE ADULT - PROBLEM SELECTOR PLAN 1
-For pain control, c/w oxycodone ER 20 mg q8h, tylenol 325 PO Q 6 hrs PRN for mild pain, Dilaudid 0.8mg IV q8h PRN for mod-severe pain. Currently not requiring diludid PRN's. If can tolerate PO pain regimen will dispo plan.   - appreciate palliative recs, following for symptom control Currently requiring Dilaudid 0.8mg IVP x 4.   - c/w oxycodone ER 20 mg q8h, tylenol 325 PO Q 6 hrs PRN for mild pain, Dilaudid 0.8mg IV q8h PRN for mod-severe pain.  - Consider uptitrating oxycodone ER given PRN Dilaudid requirement.   - appreciate palliative recs, following for symptom control

## 2018-12-31 NOTE — PHYSICAL THERAPY INITIAL EVALUATION ADULT - ADDITIONAL COMMENTS
History provided by spouse. Spouse states pt lives in a private home with son and herself with a flight of steps to enter (+handrails). Pt was independent with ambulation using a RW. Spouse would assist pt with ADLs such as bathing prior to admission. Pt was receiving home PT services prior to admission.

## 2018-12-31 NOTE — PROGRESS NOTE ADULT - PROBLEM SELECTOR PLAN 1
Undergoing DMT at [facility]  ? forthcoming DMT continuation

## 2018-12-31 NOTE — PROGRESS NOTE ADULT - PROBLEM SELECTOR PLAN 2
NSCLC on afatanib started in 2/2018, being held this hospitalization given concern for possible PNA. Disease likely metastatic given recent MRI brain with 2mm small nodule enhancement in left superior frontal gyrus  -f/u outpatient onc Dr. Gross for repeat CT/PET, immunotherapy, possible rad/onc f/u NSCLC on afatanib started in 2/2018, being held this hospitalization given concern for possible PNA. Disease likely metastatic given recent MRI brain with 2mm small nodule enhancement in left superior frontal gyrus  -f/u outpatient onc Dr. Gross for repeat CT/PET, immunotherapy, possible rad/onc f/u  - C/w Decadron 2mg PO as per Onc for anorexia/cachexia

## 2018-12-31 NOTE — PROGRESS NOTE ADULT - PROBLEM SELECTOR PLAN 6
- c/w flomax
DVT PPx: Eliquis 2.5mg BID (h/o PE)  Diet: Dysphagia 1 pureed  Dispo: home with homecare, PT recs pending.

## 2018-12-31 NOTE — PROGRESS NOTE ADULT - PROBLEM SELECTOR PLAN 2
High suspicion for musculoskeletal injury given history and PET  Would be disinclined to increase opiates given injury type  Conisder ATC APAP;  3gm maximum per day  On ATC oxy ER 20 mg q8H  with dilaudid 0.8mg breakthrough  lidoderm patch

## 2018-12-31 NOTE — PROGRESS NOTE ADULT - SUBJECTIVE AND OBJECTIVE BOX
? reports of improving pain  Large number of prns used  Sitting up comfortable          T(C): 36.7 (18 @ 12:02), Max: 36.7 (18 @ 20:18)  HR: 88 (18 @ 12:02) (64 - 88)  BP: 109/67 (18 @ 12:02) (98/50 - 124/71)  RR: 18 (18 @ 12:02) (18 - 18)  SpO2: 96% (18 @ 12:02) (94% - 96%)  Wt(kg): --      30 Dec 2018 07:01  -  31 Dec 2018 07:00  --------------------------------------------------------  IN:    Oral Fluid: 800 mL  Total IN: 800 mL    OUT:    Voided: 750 mL  Total OUT: 750 mL    Total NET: 50 mL      31 Dec 2018 07  -  31 Dec 2018 13:17  --------------------------------------------------------  IN:    Oral Fluid: 240 mL  Total IN: 240 mL    OUT:    Voided: 300 mL  Total OUT: 300 mL    Total NET: -60 mL           @ 07:  -   @ 07:00  --------------------------------------------------------  IN: 800 mL / OUT: 750 mL / NET: 50 mL     @ 07:  -   @ 13:17  --------------------------------------------------------  IN: 240 mL / OUT: 300 mL / NET: -60 mL      CAPILLARY BLOOD GLUCOSE            acetaminophen   Tablet .. 325 milliGRAM(s) Oral every 6 hours PRN  ALBUTerol/ipratropium for Nebulization 3 milliLiter(s) Nebulizer every 6 hours  apixaban 2.5 milliGRAM(s) Oral every 12 hours  benzonatate 100 milliGRAM(s) Oral three times a day PRN  dexamethasone     Tablet 2 milliGRAM(s) Oral daily  FIRST- Mouthwash  BLM 5 milliLiter(s) Swish and Swallow three times a day  HYDROmorphone  Injectable 0.8 milliGRAM(s) IV Push every 2 hours PRN  nystatin    Suspension 815451 Unit(s) Oral four times a day  oxyCODONE  ER Tablet 20 milliGRAM(s) Oral every 8 hours  pantoprazole    Tablet 40 milliGRAM(s) Oral before breakfast  tamsulosin 0.4 milliGRAM(s) Oral at bedtime  tenofovir disoproxil fumarate (VIREAD) 300 milliGRAM(s) Oral <User Schedule>              139  |  100  |  39<H>  ----------------------------<  123<H>  3.9   |  26  |  1.37<H>    Ca    9.0      31 Dec 2018 07:03  Phos  3.1       Mg     2.0             Procalc  BNP  ABG                          11.0   14.05 )-----------( 200      ( 31 Dec 2018 08:10 )             34.5           blood and urine cultures                    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  Basal cell carcinoma of nostril    FAMILY HISTORY:  Family history of diabetes mellitus (Sibling): brother - living  Family history of liver cancer: Mother   Family history of liver cancer  Family history of stomach cancer (Sibling): brother    ITEMS NOT CHECKED ARE NOT PRESENT    SOCIAL HISTORY:   Significant other/partner: Wife: Robert Humphrey   Children:  [ ]    [employer]ist/Spirituality:  Substance hx:  [ ]   Tobacco hx:  [X ] Quit when lung CA was Dx  Alcohol hx: [ ] Drug use hx: [ ]  Home Opioid hx:  [ ] (I-Stop Reference No: [id number]  Living Situation: [X ]Home  [ ]Long term care  [ ]Rehab [ ]Other  Occupation:    ADVANCE DIRECTIVES:    DNR [ ]  MOLST  [ ]    Living Will  [ ]   DECISION MAKER(s): PAtient   [ ] Health Care Proxy(s)  [X ] Surrogate(s) Wife and son  [ ] Guardian           Name(s) and Contact(s): Wife: Robert Humphrey     BASELINE (I)ADL(s) (prior to admission):  Post Falls: [ ]Total  [X ] Moderate [ ]Dependent    Allergies    No Known Drug Allergies  tegaderm (Rash; Urticaria)    Intolerances    MEDICATIONS  (STANDING):  acetaminophen    Suspension .. 640 milliGRAM(s) Oral every 6 hours  ALBUTerol/ipratropium for Nebulization 3 milliLiter(s) Nebulizer every 6 hours  apixaban 2.5 milliGRAM(s) Oral every 12 hours  dextrose 5%. 1000 milliLiter(s) (50 mL/Hr) IV Continuous <Continuous>  dextrose 50% Injectable 12.5 Gram(s) IV Push once  dextrose 50% Injectable 25 Gram(s) IV Push once  dextrose 50% Injectable 25 Gram(s) IV Push once  FIRST- Mouthwash  BLM 5 milliLiter(s) Swish and Swallow three times a day  insulin lispro (HumaLOG) corrective regimen sliding scale   SubCutaneous three times a day before meals  insulin lispro (HumaLOG) corrective regimen sliding scale   SubCutaneous at bedtime  nystatin    Suspension 153636 Unit(s) Oral four times a day  pantoprazole    Tablet 40 milliGRAM(s) Oral before breakfast  tamsulosin 0.4 milliGRAM(s) Oral at bedtime  tenofovir disoproxil fumarate (VIREAD) 300 milliGRAM(s) Oral daily    MEDICATIONS  (PRN):  benzonatate 100 milliGRAM(s) Oral three times a day PRN Cough  dextrose 40% Gel 15 Gram(s) Oral once PRN Blood Glucose LESS THAN 70 milliGRAM(s)/deciliter  glucagon  Injectable 1 milliGRAM(s) IntraMuscular once PRN Glucose LESS THAN 70 milligrams/deciliter    PRESENT SYMPTOMS:   Source if other than patient:  [ ]Family   [ ]Team     Pain (Impact on QOL): Yes   Location -  Left sided chest wall - also chronic pain on back of his chest wall   Minimal acceptable level (0-10 scale): 1-2                   Aggravating factors - respiration, tender on touch   Quality - pleuritic   Radiation - N  Severity (0-10 scale) - 9/10 and after pain medication (Dilaudid) come down to 4/10. Pain on back of chest wall is 3-4/10   Timing - constant     PAIN AD Score:     [email or web address] (press ctrl +  left click to view)    Dyspnea:                           [X ]Mild [ ]Moderate [ ]Severe  Anxiety:                             [ ]Mild [ ]Moderate [ ]Severe  Fatigue:                             [ ]Mild [X ]Moderate [ ]Severe  Nausea:                             [X ]Mild [ ]Moderate [ ]Severe  Loss of appetite:              [X ]Mild [ ]Moderate [ ]Severe  Constipation:                    [ ]Mild [ ]Moderate [ ]Severe    Other Symptoms:  [ ]All other review of systems negative   [ ]Unable to obtain due to poor mentation     Karnofsky Performance Score/Palliative Performance Status Version 2:     40-50 %    PHYSICAL EXAM:  Vital Signs Last 24 Hrs  T(C): 36.4 (27 Dec 2018 07:44), Max: 36.7 (26 Dec 2018 21:44)  T(F): 97.5 (27 Dec 2018 07:44), Max: 98.1 (26 Dec 2018 22:06)  HR: 83 (27 Dec 2018 07:44) (76 - 102)  BP: 112/74 (27 Dec 2018 07:44) (104/74 - 116/69)  BP(mean): --  RR: 18 (27 Dec 2018 07:44) (16 - 21)  SpO2: 94% (27 Dec 2018 07:44) (92% - 96%) I&O's Summary      GENERAL:  [X ]Alert  [X ]Oriented    [X ]Lethargic  [ ]Cachexia  [ ]Unarousable  [X ]Verbal  [ ]Non-Verbal    Behavioral:   [ ] Anxiety  [ ] Delirium [ ] Agitation [ ] Other    HEENT:  [ ]Normal   [ ]Dry mouth   [ ]ET Tube/Trach  [X ]Oral lesions    PULMONARY:   [ ]Clear [ ]Tachypnea  [ ]Audible excessive secretions decreased BS on the left side   [ ]Rhonchi        [ ]Right [ ]Left [ ]Bilateral  [ ]Crackles        [ ]Right [ ]Left [ ]Bilateral  [ ]Wheezing     [ ]Right [ ]Left [ ]Bilateral    CARDIOVASCULAR:    [X ]Regular [ ]Irregular [ ]Tachy  [ ]Jerman [ ]Murmur [ ]Other    GASTROINTESTINAL:  [X ]Soft  [ ]Distended   [X ]+BS  [ ]Non tender [X ]Tender over RLQ and epigastric  [ ]PEG [ ]OGT/ NGT  Last BM: Last night (2018)     GENITOURINARY:  [X ]Normal [ ] Incontinent   [ ]Oliguria/Anuria   [ ]Soni    MUSCULOSKELETAL:   [ ]Normal   [ ]Weakness  [ ]Bed/Wheelchair bound [ ]Edema tenderness over chest wall (left side)     NEUROLOGIC:   [X ]No focal deficits  [ ] Cognitive impairment  [ ] Dysphagia [ ]Dysarthria [ ] Paresis [ ]Other     SKIN:   [X ]Normal   [ ]Pressure ulcer(s)  [ ]Rash    CRITICAL CARE:  [ ] Shock Present  [ ]Septic [ ]Cardiogenic [ ]Neurologic [ ]Hypovolemic  [ ]  Vasopressors [ ]  Inotropes   [ ] Respiratory failure present  [ ] Acute  [ ] Chronic [ ] Hypoxic  [ ] Hypercarbic [ ] Other  [ ] Other organ failure     LABS:                        11.6   23.68 )-----------( 180      ( 27 Dec 2018 08:04 )             36.9       140  |  103  |  47<H>  ----------------------------<  108<H>  4.0   |  24  |  1.35<H>    Ca    9.3      27 Dec 2018 06:55  Phos  3.9       Mg     1.7         TPro  7.6  /  Alb  3.4  /  TBili  0.5  /  DBili  x   /  AST  12  /  ALT  14  /  AlkPhos  129<H>    PT/INR - ( 27 Dec 2018 09:27 )   PT: 14.9 sec;   INR: 1.32 ratio         PTT - ( 27 Dec 2018 03:43 )  PTT:33.7 sec      RADIOLOGY & ADDITIONAL STUDIES:  CT of chest W/O C (2018): IMPRESSION:    Nondisplaced fractures of the left 10th through 12th ribs with mild   callus formation. These are new since 9/15/2018 and were present on   2018.  Ventilation/Perfusion Scan (2018):     IMPRESSION: Abnormal ventilation/perfusion lung scan.  Low probability of pulmonary embolus.    VA Dupplex LE: IMPRESSION:     No evidence of bilateral lower extremity deep venous thrombosis.  PROTEIN CALORIE MALNUTRITION:   [ ] PPSV2 < or = to 30% [ ] significant weight loss  [ ] poor nutritional intake [ ] catabolic state [ ] anasarca     Albumin, Serum: 3.4 g/dL (18 @ 22:42)  Artificial Nutrition [ ]     REFERRALS:   [ ]Chaplaincy  [ ] Hospice  [ ]Child Life  [ ]Social Work  [ ]Case management [ ]Holistic Therapy     Goals of Care Discussion Document:

## 2018-12-31 NOTE — PROGRESS NOTE ADULT - SUBJECTIVE AND OBJECTIVE BOX
Nash Clarke MD  PGY 1 Department of Internal Medicine  Pager: [phone]/ [phone]    Patient is a 78y old  Male who presents with a chief complaint of left side rib pain (30 Dec 2018 13:25)      SUBJECTIVE / OVERNIGHT EVENTS: Pt seen and examined. No acute overnight events. Pt reported no subjective complaints.     MEDICATIONS  (STANDING):  ALBUTerol/ipratropium for Nebulization 3 milliLiter(s) Nebulizer every 6 hours  apixaban 2.5 milliGRAM(s) Oral every 12 hours  dexamethasone     Tablet 2 milliGRAM(s) Oral daily  FIRST- Mouthwash  BLM 5 milliLiter(s) Swish and Swallow three times a day  nystatin    Suspension 035765 Unit(s) Oral four times a day  oxyCODONE  ER Tablet 20 milliGRAM(s) Oral every 8 hours  pantoprazole    Tablet 40 milliGRAM(s) Oral before breakfast  tamsulosin 0.4 milliGRAM(s) Oral at bedtime  tenofovir disoproxil fumarate (VIREAD) 300 milliGRAM(s) Oral <User Schedule>    MEDICATIONS  (PRN):  acetaminophen   Tablet .. 325 milliGRAM(s) Oral every 6 hours PRN Moderate Pain (4 - 6)  benzonatate 100 milliGRAM(s) Oral three times a day PRN Cough  HYDROmorphone  Injectable 0.8 milliGRAM(s) IV Push every 2 hours PRN Moderate Pain (4 - 6) or severe pain      I&O's Summary    29 Dec 2018 07:01  -  30 Dec 2018 07:00  --------------------------------------------------------  IN: 1040 mL / OUT: 2250 mL / NET: -1210 mL    30 Dec 2018 07:01  -  31 Dec 2018 06:42  --------------------------------------------------------  IN: 800 mL / OUT: 750 mL / NET: 50 mL        Vital Signs Last 24 Hrs  T(C): 36.3 (31 Dec 2018 04:18), Max: 36.7 (30 Dec 2018 20:18)  T(F): 97.4 (31 Dec 2018 04:18), Max: 98.1 (30 Dec 2018 20:18)  HR: 64 (31 Dec 2018 04:18) (64 - 106)  BP: 98/50 (31 Dec 2018 04:18) (98/50 - 124/71)  BP(mean): --  RR: 18 (31 Dec 2018 04:18) (18 - 18)  SpO2: 96% (31 Dec 2018 04:18) (94% - 96%)    CAPILLARY BLOOD GLUCOSE          PHYSICAL EXAM:      LABS:                        10.6   13.23 )-----------( 191      ( 30 Dec 2018 08:29 )             33.4     Auto Eosinophil # x     / Auto Eosinophil % x     / Auto Neutrophil # x     / Auto Neutrophil % x     / BANDS % x                            10.8   16.88 )-----------( 181      ( 29 Dec 2018 07:45 )             34.8     Auto Eosinophil # x     / Auto Eosinophil % x     / Auto Neutrophil # x     / Auto Neutrophil % x     / BANDS % x        12-30    140  |  101  |  32<H>  ----------------------------<  117<H>  4.0   |  26  |  1.43<H>    Ca    8.9      30 Dec 2018 07:18  Mg     2.0     12-30  Phos  3.0     12-30                  RADIOLOGY & ADDITIONAL TESTS:    Imaging Personally Reviewed:    Consultant(s) Notes Reviewed:      Care Discussed with Consultants/Other Providers: Nash Clarke MD  PGY 1 Department of Internal Medicine  Pager: [phone]/ [phone]    Patient is a 78y old  Male who presents with a chief complaint of left side rib pain (30 Dec 2018 13:25)      SUBJECTIVE / OVERNIGHT EVENTS: Pt seen and examined. Pt hypotensive this AM (98/50). Pt reported continued L sided rib pain. Pt reported no other subjective complaints. Mandarin  #[id number] was use to obtain interval history.      MEDICATIONS  (STANDING):  ALBUTerol/ipratropium for Nebulization 3 milliLiter(s) Nebulizer every 6 hours  apixaban 2.5 milliGRAM(s) Oral every 12 hours  dexamethasone     Tablet 2 milliGRAM(s) Oral daily  FIRST- Mouthwash  BLM 5 milliLiter(s) Swish and Swallow three times a day  nystatin    Suspension 565791 Unit(s) Oral four times a day  oxyCODONE  ER Tablet 20 milliGRAM(s) Oral every 8 hours  pantoprazole    Tablet 40 milliGRAM(s) Oral before breakfast  tamsulosin 0.4 milliGRAM(s) Oral at bedtime  tenofovir disoproxil fumarate (VIREAD) 300 milliGRAM(s) Oral <User Schedule>    MEDICATIONS  (PRN):  acetaminophen   Tablet .. 325 milliGRAM(s) Oral every 6 hours PRN Moderate Pain (4 - 6)  benzonatate 100 milliGRAM(s) Oral three times a day PRN Cough  HYDROmorphone  Injectable 0.8 milliGRAM(s) IV Push every 2 hours PRN Moderate Pain (4 - 6) or severe pain      I&O's Summary    29 Dec 2018 07:01  -  30 Dec 2018 07:00  --------------------------------------------------------  IN: 1040 mL / OUT: 2250 mL / NET: -1210 mL    30 Dec 2018 07:01  -  31 Dec 2018 06:42  --------------------------------------------------------  IN: 800 mL / OUT: 750 mL / NET: 50 mL        Vital Signs Last 24 Hrs  T(C): 36.3 (31 Dec 2018 04:18), Max: 36.7 (30 Dec 2018 20:18)  T(F): 97.4 (31 Dec 2018 04:18), Max: 98.1 (30 Dec 2018 20:18)  HR: 64 (31 Dec 2018 04:18) (64 - 106)  BP: 98/50 (31 Dec 2018 04:18) (98/50 - 124/71)  BP(mean): --  RR: 18 (31 Dec 2018 04:18) (18 - 18)  SpO2: 96% (31 Dec 2018 04:18) (94% - 96%)    CAPILLARY BLOOD GLUCOSE      PHYSICAL EXAM:  GENERAL: elderly, cachectic, NAD  HEAD:  Atraumatic, Normocephalic  EYES: conjunctiva and sclera clear  CHEST/LUNG: Clear to auscultation bilaterally; No wheezes, rales or rhonchi;  +TTP on L overlying anterior ribs, pain extending into L. flank.   HEART: Regular rate and rhythm; No murmurs, rubs, or gallops  ABDOMEN: Soft, Nondistended; Bowel sounds present, no masses.  EXTREMITIES:  2+ Peripheral Pulses, No clubbing, cyanosis, or edema  NEURO: AOx3, nonfocal exam  SKIN: Warm, dry, in tact, no rashes or lesions  PSYCH: affect appropriate      LABS:                                   11.0   14.05 )-----------( 200      ( 31 Dec 2018 08:10 )             34.5     Auto Eosinophil # x     / Auto Eosinophil % x     / Auto Neutrophil # x     / Auto Neutrophil % x     / BANDS % x                            10.6   13.23 )-----------( 191      ( 30 Dec 2018 08:29 )             33.4     Auto Eosinophil # x     / Auto Eosinophil % x     / Auto Neutrophil # x     / Auto Neutrophil % x     / BANDS % x        12-31    139  |  100  |  39<H>  ----------------------------<  123<H>  3.9   |  26  |  1.37<H>  12-30    140  |  101  |  32<H>  ----------------------------<  117<H>  4.0   |  26  |  1.43<H>    Ca    9.0      31 Dec 2018 07:03  Mg     2.0     12-31  Phos  3.1     12-31

## 2018-12-31 NOTE — PROGRESS NOTE ADULT - PROBLEM SELECTOR PLAN 5
DVT PPx: Eliquis 2.5mg BID (h/o PE)  Diet: Dysphagia 1 pureed  Dispo: home with homecare, PT recs pending.    Burak Ernandez M.D.  Internal Medicine PGY-2  Pager: [phone]/ [facility] [phone]  After 7 PM on weekdays and 12 PM on weekends page [phone] DVT PPx: Eliquis 2.5mg BID (h/o PE)  Diet: Dysphagia 1 pureed  Dispo: home with homecare, PT recs pending. Pt hepatitis B infection currently on tenofovir.   - C/w tenofovir; Pt to follow up with outpatient PMD/ hepatologist stable  Pt hepatitis B infection currently on tenofovir.   - C/w tenofovir; Pt to follow up with outpatient PMD/ hepatologist

## 2019-01-01 LAB
ANION GAP SERPL CALC-SCNC: 15 MMOL/L — SIGNIFICANT CHANGE UP (ref 5–17)
BUN SERPL-MCNC: 41 MG/DL — HIGH (ref 7–23)
CALCIUM SERPL-MCNC: 9.4 MG/DL — SIGNIFICANT CHANGE UP (ref 8.4–10.5)
CHLORIDE SERPL-SCNC: 100 MMOL/L — SIGNIFICANT CHANGE UP (ref 96–108)
CO2 SERPL-SCNC: 26 MMOL/L — SIGNIFICANT CHANGE UP (ref 22–31)
CREAT SERPL-MCNC: 1.45 MG/DL — HIGH (ref 0.5–1.3)
CULTURE RESULTS: SIGNIFICANT CHANGE UP
CULTURE RESULTS: SIGNIFICANT CHANGE UP
GLUCOSE SERPL-MCNC: 116 MG/DL — HIGH (ref 70–99)
HCT VFR BLD CALC: 36.7 % — LOW (ref 39–50)
HGB BLD-MCNC: 11.8 G/DL — LOW (ref 13–17)
MAGNESIUM SERPL-MCNC: 2 MG/DL — SIGNIFICANT CHANGE UP (ref 1.6–2.6)
MCHC RBC-ENTMCNC: 29.6 PG — SIGNIFICANT CHANGE UP (ref 27–34)
MCHC RBC-ENTMCNC: 32.2 GM/DL — SIGNIFICANT CHANGE UP (ref 32–36)
MCV RBC AUTO: 92.2 FL — SIGNIFICANT CHANGE UP (ref 80–100)
PHOSPHATE SERPL-MCNC: 3.6 MG/DL — SIGNIFICANT CHANGE UP (ref 2.5–4.5)
PLATELET # BLD AUTO: 223 K/UL — SIGNIFICANT CHANGE UP (ref 150–400)
POTASSIUM SERPL-MCNC: 3.9 MMOL/L — SIGNIFICANT CHANGE UP (ref 3.5–5.3)
POTASSIUM SERPL-SCNC: 3.9 MMOL/L — SIGNIFICANT CHANGE UP (ref 3.5–5.3)
RBC # BLD: 3.98 M/UL — LOW (ref 4.2–5.8)
RBC # FLD: 16 % — HIGH (ref 10.3–14.5)
SODIUM SERPL-SCNC: 142 MMOL/L — SIGNIFICANT CHANGE UP (ref 135–145)
SPECIMEN SOURCE: SIGNIFICANT CHANGE UP
SPECIMEN SOURCE: SIGNIFICANT CHANGE UP
WBC # BLD: 12.24 K/UL — HIGH (ref 3.8–10.5)
WBC # FLD AUTO: 12.24 K/UL — HIGH (ref 3.8–10.5)

## 2019-01-01 PROCEDURE — 99233 SBSQ HOSP IP/OBS HIGH 50: CPT | Mod: GC

## 2019-01-01 RX ORDER — HYDROMORPHONE HYDROCHLORIDE 2 MG/ML
4 INJECTION INTRAMUSCULAR; INTRAVENOUS; SUBCUTANEOUS EVERY 6 HOURS
Qty: 0 | Refills: 0 | Status: DISCONTINUED | OUTPATIENT
Start: 2019-01-01 | End: 2019-01-02

## 2019-01-01 RX ORDER — OXYCODONE HYDROCHLORIDE 5 MG/1
5 TABLET ORAL EVERY 4 HOURS
Qty: 0 | Refills: 0 | Status: DISCONTINUED | OUTPATIENT
Start: 2019-01-01 | End: 2019-01-01

## 2019-01-01 RX ORDER — HYDROMORPHONE HYDROCHLORIDE 2 MG/ML
0.8 INJECTION INTRAMUSCULAR; INTRAVENOUS; SUBCUTANEOUS EVERY 6 HOURS
Qty: 0 | Refills: 0 | Status: DISCONTINUED | OUTPATIENT
Start: 2019-01-01 | End: 2019-01-01

## 2019-01-01 RX ORDER — LIDOCAINE 4 G/100G
1 CREAM TOPICAL EVERY 24 HOURS
Qty: 0 | Refills: 0 | Status: DISCONTINUED | OUTPATIENT
Start: 2019-01-01 | End: 2019-01-01

## 2019-01-01 RX ORDER — LIDOCAINE 4 G/100G
2 CREAM TOPICAL EVERY 24 HOURS
Qty: 0 | Refills: 0 | Status: DISCONTINUED | OUTPATIENT
Start: 2019-01-01 | End: 2019-01-02

## 2019-01-01 RX ORDER — OXYCODONE HYDROCHLORIDE 5 MG/1
30 TABLET ORAL EVERY 8 HOURS
Qty: 0 | Refills: 0 | Status: DISCONTINUED | OUTPATIENT
Start: 2019-01-01 | End: 2019-01-02

## 2019-01-01 RX ORDER — HYDROMORPHONE HYDROCHLORIDE 2 MG/ML
2 INJECTION INTRAMUSCULAR; INTRAVENOUS; SUBCUTANEOUS EVERY 6 HOURS
Qty: 0 | Refills: 0 | Status: DISCONTINUED | OUTPATIENT
Start: 2019-01-01 | End: 2019-01-02

## 2019-01-01 RX ADMIN — APIXABAN 2.5 MILLIGRAM(S): 2.5 TABLET, FILM COATED ORAL at 05:37

## 2019-01-01 RX ADMIN — LIDOCAINE 2 PATCH: 4 CREAM TOPICAL at 19:40

## 2019-01-01 RX ADMIN — Medication 3 MILLILITER(S): at 23:15

## 2019-01-01 RX ADMIN — OXYCODONE HYDROCHLORIDE 40 MILLIGRAM(S): 5 TABLET ORAL at 17:10

## 2019-01-01 RX ADMIN — HYDROMORPHONE HYDROCHLORIDE 2 MILLIGRAM(S): 2 INJECTION INTRAMUSCULAR; INTRAVENOUS; SUBCUTANEOUS at 20:31

## 2019-01-01 RX ADMIN — Medication 500000 UNIT(S): at 13:24

## 2019-01-01 RX ADMIN — Medication 3 MILLILITER(S): at 13:23

## 2019-01-01 RX ADMIN — OXYCODONE HYDROCHLORIDE 40 MILLIGRAM(S): 5 TABLET ORAL at 06:25

## 2019-01-01 RX ADMIN — Medication 500000 UNIT(S): at 17:11

## 2019-01-01 RX ADMIN — Medication 500000 UNIT(S): at 23:15

## 2019-01-01 RX ADMIN — PANTOPRAZOLE SODIUM 40 MILLIGRAM(S): 20 TABLET, DELAYED RELEASE ORAL at 05:37

## 2019-01-01 RX ADMIN — DIPHENHYDRAMINE HYDROCHLORIDE AND LIDOCAINE HYDROCHLORIDE AND ALUMINUM HYDROXIDE AND MAGNESIUM HYDRO 5 MILLILITER(S): KIT at 05:37

## 2019-01-01 RX ADMIN — OXYCODONE HYDROCHLORIDE 40 MILLIGRAM(S): 5 TABLET ORAL at 05:37

## 2019-01-01 RX ADMIN — HYDROMORPHONE HYDROCHLORIDE 0.8 MILLIGRAM(S): 2 INJECTION INTRAMUSCULAR; INTRAVENOUS; SUBCUTANEOUS at 09:19

## 2019-01-01 RX ADMIN — APIXABAN 2.5 MILLIGRAM(S): 2.5 TABLET, FILM COATED ORAL at 17:10

## 2019-01-01 RX ADMIN — Medication 3 MILLILITER(S): at 05:37

## 2019-01-01 RX ADMIN — Medication 3 MILLILITER(S): at 17:11

## 2019-01-01 RX ADMIN — Medication 2 MILLIGRAM(S): at 05:37

## 2019-01-01 RX ADMIN — LIDOCAINE 2 PATCH: 4 CREAM TOPICAL at 13:23

## 2019-01-01 RX ADMIN — Medication 500000 UNIT(S): at 05:37

## 2019-01-01 RX ADMIN — Medication 100 MILLIGRAM(S): at 13:24

## 2019-01-01 RX ADMIN — LIDOCAINE 1 PATCH: 4 CREAM TOPICAL at 17:10

## 2019-01-01 RX ADMIN — OXYCODONE HYDROCHLORIDE 5 MILLIGRAM(S): 5 TABLET ORAL at 01:15

## 2019-01-01 RX ADMIN — TAMSULOSIN HYDROCHLORIDE 0.4 MILLIGRAM(S): 0.4 CAPSULE ORAL at 21:39

## 2019-01-01 RX ADMIN — HYDROMORPHONE HYDROCHLORIDE 0.8 MILLIGRAM(S): 2 INJECTION INTRAMUSCULAR; INTRAVENOUS; SUBCUTANEOUS at 08:32

## 2019-01-01 RX ADMIN — LIDOCAINE 1 PATCH: 4 CREAM TOPICAL at 07:57

## 2019-01-01 RX ADMIN — LIDOCAINE 1 PATCH: 4 CREAM TOPICAL at 06:53

## 2019-01-01 RX ADMIN — HYDROMORPHONE HYDROCHLORIDE 2 MILLIGRAM(S): 2 INJECTION INTRAMUSCULAR; INTRAVENOUS; SUBCUTANEOUS at 21:30

## 2019-01-01 RX ADMIN — OXYCODONE HYDROCHLORIDE 40 MILLIGRAM(S): 5 TABLET ORAL at 13:24

## 2019-01-01 RX ADMIN — OXYCODONE HYDROCHLORIDE 30 MILLIGRAM(S): 5 TABLET ORAL at 21:39

## 2019-01-01 RX ADMIN — DIPHENHYDRAMINE HYDROCHLORIDE AND LIDOCAINE HYDROCHLORIDE AND ALUMINUM HYDROXIDE AND MAGNESIUM HYDRO 5 MILLILITER(S): KIT at 13:23

## 2019-01-01 RX ADMIN — DIPHENHYDRAMINE HYDROCHLORIDE AND LIDOCAINE HYDROCHLORIDE AND ALUMINUM HYDROXIDE AND MAGNESIUM HYDRO 5 MILLILITER(S): KIT at 21:39

## 2019-01-01 NOTE — PROGRESS NOTE ADULT - PROBLEM SELECTOR PLAN 5
DVT PPx: Eliquis 2.5mg BID (h/o PE)  Diet: Dysphagia 1 pureed  Dispo: home with homecare, PT recs pending.

## 2019-01-01 NOTE — PROGRESS NOTE ADULT - I HAVE PERSONALLY SEEN, EXAMINED, AND PARTICIPATED IN THE CARE OF THIS PATIENT.  I HAVE REVIEWED ALL PERTINENT CLINICAL INFORMATION, INCLUDING HISTORY, PHYSICAL EXAM, PLAN AND THE MEDICAL/PA/NP STUDENT’S NOTE AND AGREE EXCEPT AS NOTED.
Statement Selected
Statement Selected
No Residual Tumor Seen Histology Text: There was no skin cancer in the sections examined.

## 2019-01-01 NOTE — PROGRESS NOTE ADULT - PROBLEM SELECTOR PLAN 1
Pain regimen was switched to account for Dilaudid IVP requirement. Oxycodone increased to 40mg q8hr. Pt required oxycodone IR 5mg x1 o/n for breakthrough pain.   - c/w oxycodone ER 40 mg q8h, tylenol 325 PO Q 6 hrs PRN for mild pain,  - Oxycodone 5mg IR for breakthrough pain.   - appreciate palliative recs, following for symptom control Pain regimen was switched to account for Dilaudid IVP requirement. Oxycodone increased to 40mg q8hr. Pt required oxycodone IR 5mg x1 o/n for breakthrough pain. Pain still not controlled.  - c/w oxycodone ER 40 mg q8h,   - DC'd tylenol 325 PO Q 6 hrs PRN f  - Oxycodone 5mg IR for moderate pain  - Dilaudid 0.8mg IVP q6hr for breakthrough pain.    - appreciate palliative recs, following for symptom control Pain regimen was switched to account for Dilaudid IVP requirement. Oxycodone increased to 40mg q8hr. Pt required oxycodone IR 5mg x1 o/n for breakthrough pain. Pain still not controlled on PO meds  - c/w oxycodone ER 40 mg q8h,   - DC'd tylenol 325 PO Q 6 hrs PRN f  - DC'd Oxycodone 5mg IR  - Dilaudid 0.8mg IVP q6hr for breakthrough pain.    - appreciate palliative recs, following for symptom control

## 2019-01-01 NOTE — PROGRESS NOTE ADULT - SUBJECTIVE AND OBJECTIVE BOX
Nash Clarke MD  PGY 1 Department of Internal Medicine  Pager: [phone]/ [phone]    Patient is a 78y old  Male who presents with a chief complaint of left side rib pain (31 Dec 2018 13:16)      SUBJECTIVE / OVERNIGHT EVENTS: Pt seen and examined. No acute overnight events. Pt reported no subjective complaints.     MEDICATIONS  (STANDING):  ALBUTerol/ipratropium for Nebulization 3 milliLiter(s) Nebulizer every 6 hours  apixaban 2.5 milliGRAM(s) Oral every 12 hours  dexamethasone     Tablet 2 milliGRAM(s) Oral daily  FIRST- Mouthwash  BLM 5 milliLiter(s) Swish and Swallow three times a day  nystatin    Suspension 214930 Unit(s) Oral four times a day  oxyCODONE  ER Tablet 40 milliGRAM(s) Oral every 8 hours  pantoprazole    Tablet 40 milliGRAM(s) Oral before breakfast  tamsulosin 0.4 milliGRAM(s) Oral at bedtime  tenofovir disoproxil fumarate (VIREAD) 300 milliGRAM(s) Oral <User Schedule>    MEDICATIONS  (PRN):  acetaminophen   Tablet .. 325 milliGRAM(s) Oral every 6 hours PRN Moderate Pain (4 - 6)  benzonatate 100 milliGRAM(s) Oral three times a day PRN Cough  oxyCODONE    IR 5 milliGRAM(s) Oral every 4 hours PRN Breakthrough pain      I&O's Summary    30 Dec 2018 07:01  -  31 Dec 2018 07:00  --------------------------------------------------------  IN: 800 mL / OUT: 750 mL / NET: 50 mL    31 Dec 2018 07:01  -  01 Jan 2019 06:46  --------------------------------------------------------  IN: 720 mL / OUT: 1950 mL / NET: -1230 mL        Vital Signs Last 24 Hrs  T(C): 36.2 (01 Jan 2019 04:36), Max: 36.8 (31 Dec 2018 16:09)  T(F): 97.2 (01 Jan 2019 04:36), Max: 98.3 (31 Dec 2018 16:09)  HR: 95 (01 Jan 2019 04:36) (81 - 95)  BP: 104/66 (01 Jan 2019 04:36) (104/66 - 124/80)  BP(mean): --  RR: 18 (01 Jan 2019 04:36) (18 - 18)  SpO2: 94% (01 Jan 2019 04:36) (94% - 97%)      PHYSICAL EXAM:  GENERAL: elderly, cachectic, NAD  HEAD:  Atraumatic, Normocephalic  EYES: conjunctiva and sclera clear  CHEST/LUNG: Clear to auscultation bilaterally; No wheezes, rales or rhonchi;  +TTP on L overlying anterior ribs, pain extending into L. flank.   HEART: Regular rate and rhythm; No murmurs, rubs, or gallops  ABDOMEN: Soft, Nondistended; Bowel sounds present, no masses.  EXTREMITIES:  2+ Peripheral Pulses, No clubbing, cyanosis, or edema  NEURO: AOx3, nonfocal exam  SKIN: Warm, dry, in tact, no rashes or lesions  PSYCH: affect appropriate    LABS:                        11.0   14.05 )-----------( 200      ( 31 Dec 2018 08:10 )             34.5     Auto Eosinophil # x     / Auto Eosinophil % x     / Auto Neutrophil # x     / Auto Neutrophil % x     / BANDS % x                            10.6   13.23 )-----------( 191      ( 30 Dec 2018 08:29 )             33.4     Auto Eosinophil # x     / Auto Eosinophil % x     / Auto Neutrophil # x     / Auto Neutrophil % x     / BANDS % x        12-31    139  |  100  |  39<H>  ----------------------------<  123<H>  3.9   |  26  |  1.37<H>  12-30    140  |  101  |  32<H>  ----------------------------<  117<H>  4.0   |  26  |  1.43<H>    Ca    9.0      31 Dec 2018 07:03  Mg     2.0     12-31  Phos  3.1     12-31                  RADIOLOGY & ADDITIONAL TESTS:    Imaging Personally Reviewed:    Consultant(s) Notes Reviewed:  Palliative    Care Discussed with Consultants/Other Providers: Nash Clarke MD  PGY 1 Department of Internal Medicine  Pager: [phone]/ [phone]    Patient is a 78y old  Male who presents with a chief complaint of left side rib pain (31 Dec 2018 13:16)      SUBJECTIVE / OVERNIGHT EVENTS: Pt seen and examined. No acute overnight events. Pt reported continued L sided chest pain. Pt reported that the oral medication did not help control pain overnight. Pt reported no fever, chills, SOB, dyspnea, abdominal pain. or dysuria.       MEDICATIONS  (STANDING):  ALBUTerol/ipratropium for Nebulization 3 milliLiter(s) Nebulizer every 6 hours  apixaban 2.5 milliGRAM(s) Oral every 12 hours  dexamethasone     Tablet 2 milliGRAM(s) Oral daily  FIRST- Mouthwash  BLM 5 milliLiter(s) Swish and Swallow three times a day  nystatin    Suspension 514086 Unit(s) Oral four times a day  oxyCODONE  ER Tablet 40 milliGRAM(s) Oral every 8 hours  pantoprazole    Tablet 40 milliGRAM(s) Oral before breakfast  tamsulosin 0.4 milliGRAM(s) Oral at bedtime  tenofovir disoproxil fumarate (VIREAD) 300 milliGRAM(s) Oral <User Schedule>    MEDICATIONS  (PRN):  acetaminophen   Tablet .. 325 milliGRAM(s) Oral every 6 hours PRN Moderate Pain (4 - 6)  benzonatate 100 milliGRAM(s) Oral three times a day PRN Cough  oxyCODONE    IR 5 milliGRAM(s) Oral every 4 hours PRN Breakthrough pain      I&O's Summary    30 Dec 2018 07:01  -  31 Dec 2018 07:00  --------------------------------------------------------  IN: 800 mL / OUT: 750 mL / NET: 50 mL    31 Dec 2018 07:01  -  01 Jan 2019 06:46  --------------------------------------------------------  IN: 720 mL / OUT: 1950 mL / NET: -1230 mL        Vital Signs Last 24 Hrs  T(C): 36.2 (01 Jan 2019 04:36), Max: 36.8 (31 Dec 2018 16:09)  T(F): 97.2 (01 Jan 2019 04:36), Max: 98.3 (31 Dec 2018 16:09)  HR: 95 (01 Jan 2019 04:36) (81 - 95)  BP: 104/66 (01 Jan 2019 04:36) (104/66 - 124/80)  BP(mean): --  RR: 18 (01 Jan 2019 04:36) (18 - 18)  SpO2: 94% (01 Jan 2019 04:36) (94% - 97%)      PHYSICAL EXAM:  GENERAL: elderly, cachectic, NAD  HEAD:  Atraumatic, Normocephalic  EYES: conjunctiva and sclera clear  CHEST/LUNG: Clear to auscultation bilaterally; No wheezes, rales or rhonchi;  +TTP on L overlying anterior ribs, pain extending into L. flank.   HEART: Regular rate and rhythm; No murmurs, rubs, or gallops  ABDOMEN: Soft, Nondistended; Bowel sounds present, no masses.  EXTREMITIES:  2+ Peripheral Pulses, No clubbing, cyanosis, or edema  NEURO: AOx3, nonfocal exam  SKIN: Warm, dry, in tact, no rashes or lesions  PSYCH: affect appropriate    LABS:                        11.0   14.05 )-----------( 200      ( 31 Dec 2018 08:10 )             34.5     Auto Eosinophil # x     / Auto Eosinophil % x     / Auto Neutrophil # x     / Auto Neutrophil % x     / BANDS % x                            10.6   13.23 )-----------( 191      ( 30 Dec 2018 08:29 )             33.4     Auto Eosinophil # x     / Auto Eosinophil % x     / Auto Neutrophil # x     / Auto Neutrophil % x     / BANDS % x        12-31    139  |  100  |  39<H>  ----------------------------<  123<H>  3.9   |  26  |  1.37<H>  12-30    140  |  101  |  32<H>  ----------------------------<  117<H>  4.0   |  26  |  1.43<H>    Ca    9.0      31 Dec 2018 07:03  Mg     2.0     12-31  Phos  3.1     12-31                  RADIOLOGY & ADDITIONAL TESTS:    Imaging Personally Reviewed:    Consultant(s) Notes Reviewed:  Palliative    Care Discussed with Consultants/Other Providers:

## 2019-01-01 NOTE — PROGRESS NOTE ADULT - PROBLEM SELECTOR PLAN 2
NSCLC on afatanib started in 2/2018, being held this hospitalization given concern for possible PNA. Disease likely metastatic given recent MRI brain with 2mm small nodule enhancement in left superior frontal gyrus  -f/u outpatient onc Dr. Gross for repeat CT/PET, immunotherapy, possible rad/onc f/u  - C/w Decadron 2mg PO as per outpt Onc for anorexia/cachexia

## 2019-01-02 ENCOUNTER — APPOINTMENT (OUTPATIENT)
Dept: OTOLARYNGOLOGY | Facility: CLINIC | Age: 79
End: 2019-01-02

## 2019-01-02 VITALS
DIASTOLIC BLOOD PRESSURE: 62 MMHG | TEMPERATURE: 98 F | SYSTOLIC BLOOD PRESSURE: 98 MMHG | OXYGEN SATURATION: 64 % | WEIGHT: 135.58 LBS | RESPIRATION RATE: 18 BRPM | HEART RATE: 81 BPM

## 2019-01-02 LAB
ANION GAP SERPL CALC-SCNC: 16 MMOL/L — SIGNIFICANT CHANGE UP (ref 5–17)
BUN SERPL-MCNC: 45 MG/DL — HIGH (ref 7–23)
CALCIUM SERPL-MCNC: 9.2 MG/DL — SIGNIFICANT CHANGE UP (ref 8.4–10.5)
CHLORIDE SERPL-SCNC: 97 MMOL/L — SIGNIFICANT CHANGE UP (ref 96–108)
CO2 SERPL-SCNC: 27 MMOL/L — SIGNIFICANT CHANGE UP (ref 22–31)
CREAT SERPL-MCNC: 1.46 MG/DL — HIGH (ref 0.5–1.3)
GLUCOSE SERPL-MCNC: 174 MG/DL — HIGH (ref 70–99)
HCT VFR BLD CALC: 36.2 % — LOW (ref 39–50)
HGB BLD-MCNC: 11.4 G/DL — LOW (ref 13–17)
MAGNESIUM SERPL-MCNC: 1.8 MG/DL — SIGNIFICANT CHANGE UP (ref 1.6–2.6)
MCHC RBC-ENTMCNC: 29.3 PG — SIGNIFICANT CHANGE UP (ref 27–34)
MCHC RBC-ENTMCNC: 31.5 GM/DL — LOW (ref 32–36)
MCV RBC AUTO: 93.1 FL — SIGNIFICANT CHANGE UP (ref 80–100)
PHOSPHATE SERPL-MCNC: 3.4 MG/DL — SIGNIFICANT CHANGE UP (ref 2.5–4.5)
PLATELET # BLD AUTO: 227 K/UL — SIGNIFICANT CHANGE UP (ref 150–400)
POTASSIUM SERPL-MCNC: 4 MMOL/L — SIGNIFICANT CHANGE UP (ref 3.5–5.3)
POTASSIUM SERPL-SCNC: 4 MMOL/L — SIGNIFICANT CHANGE UP (ref 3.5–5.3)
RBC # BLD: 3.89 M/UL — LOW (ref 4.2–5.8)
RBC # FLD: 15.7 % — HIGH (ref 10.3–14.5)
SODIUM SERPL-SCNC: 140 MMOL/L — SIGNIFICANT CHANGE UP (ref 135–145)
WBC # BLD: 17.33 K/UL — HIGH (ref 3.8–10.5)
WBC # FLD AUTO: 17.33 K/UL — HIGH (ref 3.8–10.5)

## 2019-01-02 PROCEDURE — 99233 SBSQ HOSP IP/OBS HIGH 50: CPT

## 2019-01-02 PROCEDURE — 99239 HOSP IP/OBS DSCHRG MGMT >30: CPT

## 2019-01-02 RX ORDER — HYDROMORPHONE HYDROCHLORIDE 2 MG/ML
1 INJECTION INTRAMUSCULAR; INTRAVENOUS; SUBCUTANEOUS
Qty: 80 | Refills: 0 | OUTPATIENT
Start: 2019-01-02 | End: 2019-01-11

## 2019-01-02 RX ORDER — HYDROMORPHONE HYDROCHLORIDE 2 MG/ML
2 INJECTION INTRAMUSCULAR; INTRAVENOUS; SUBCUTANEOUS
Qty: 0 | Refills: 0 | Status: DISCONTINUED | OUTPATIENT
Start: 2019-01-02 | End: 2019-01-02

## 2019-01-02 RX ORDER — OXYCODONE HYDROCHLORIDE 5 MG/1
1 TABLET ORAL
Qty: 21 | Refills: 0 | OUTPATIENT
Start: 2019-01-02 | End: 2019-01-08

## 2019-01-02 RX ORDER — HYDROMORPHONE HYDROCHLORIDE 2 MG/ML
1 INJECTION INTRAMUSCULAR; INTRAVENOUS; SUBCUTANEOUS
Qty: 0 | Refills: 0 | COMMUNITY
Start: 2019-01-02 | End: 2019-01-11

## 2019-01-02 RX ORDER — OXYCODONE HYDROCHLORIDE 5 MG/1
1 TABLET ORAL
Qty: 0 | Refills: 0 | COMMUNITY
Start: 2019-01-02

## 2019-01-02 RX ORDER — OXYCODONE HYDROCHLORIDE 5 MG/1
1 TABLET ORAL
Qty: 0 | Refills: 0 | COMMUNITY

## 2019-01-02 RX ORDER — OXYCODONE HYDROCHLORIDE 5 MG/1
1 TABLET ORAL
Qty: 15 | Refills: 0 | OUTPATIENT
Start: 2019-01-02 | End: 2019-01-06

## 2019-01-02 RX ORDER — HYDROMORPHONE HYDROCHLORIDE 2 MG/ML
1 INJECTION INTRAMUSCULAR; INTRAVENOUS; SUBCUTANEOUS
Qty: 0 | Refills: 0 | COMMUNITY
Start: 2019-01-02

## 2019-01-02 RX ORDER — HYDROMORPHONE HYDROCHLORIDE 2 MG/ML
1 INJECTION INTRAMUSCULAR; INTRAVENOUS; SUBCUTANEOUS
Qty: 40 | Refills: 0 | OUTPATIENT
Start: 2019-01-02 | End: 2019-01-06

## 2019-01-02 RX ADMIN — OXYCODONE HYDROCHLORIDE 30 MILLIGRAM(S): 5 TABLET ORAL at 14:06

## 2019-01-02 RX ADMIN — Medication 3 MILLILITER(S): at 13:57

## 2019-01-02 RX ADMIN — OXYCODONE HYDROCHLORIDE 30 MILLIGRAM(S): 5 TABLET ORAL at 05:13

## 2019-01-02 RX ADMIN — Medication 3 MILLILITER(S): at 18:09

## 2019-01-02 RX ADMIN — Medication 500000 UNIT(S): at 05:14

## 2019-01-02 RX ADMIN — APIXABAN 2.5 MILLIGRAM(S): 2.5 TABLET, FILM COATED ORAL at 18:09

## 2019-01-02 RX ADMIN — DIPHENHYDRAMINE HYDROCHLORIDE AND LIDOCAINE HYDROCHLORIDE AND ALUMINUM HYDROXIDE AND MAGNESIUM HYDRO 5 MILLILITER(S): KIT at 13:58

## 2019-01-02 RX ADMIN — DIPHENHYDRAMINE HYDROCHLORIDE AND LIDOCAINE HYDROCHLORIDE AND ALUMINUM HYDROXIDE AND MAGNESIUM HYDRO 5 MILLILITER(S): KIT at 05:14

## 2019-01-02 RX ADMIN — HYDROMORPHONE HYDROCHLORIDE 2 MILLIGRAM(S): 2 INJECTION INTRAMUSCULAR; INTRAVENOUS; SUBCUTANEOUS at 11:30

## 2019-01-02 RX ADMIN — Medication 100 MILLIGRAM(S): at 14:08

## 2019-01-02 RX ADMIN — LIDOCAINE 2 PATCH: 4 CREAM TOPICAL at 11:13

## 2019-01-02 RX ADMIN — Medication 3 MILLILITER(S): at 05:14

## 2019-01-02 RX ADMIN — Medication 2 MILLIGRAM(S): at 05:14

## 2019-01-02 RX ADMIN — Medication 500000 UNIT(S): at 13:57

## 2019-01-02 RX ADMIN — LIDOCAINE 2 PATCH: 4 CREAM TOPICAL at 01:33

## 2019-01-02 RX ADMIN — PANTOPRAZOLE SODIUM 40 MILLIGRAM(S): 20 TABLET, DELAYED RELEASE ORAL at 05:13

## 2019-01-02 RX ADMIN — APIXABAN 2.5 MILLIGRAM(S): 2.5 TABLET, FILM COATED ORAL at 05:13

## 2019-01-02 RX ADMIN — Medication 500000 UNIT(S): at 18:09

## 2019-01-02 RX ADMIN — TENOFOVIR DISOPROXIL FUMARATE 300 MILLIGRAM(S): 300 TABLET, FILM COATED ORAL at 05:13

## 2019-01-02 RX ADMIN — OXYCODONE HYDROCHLORIDE 30 MILLIGRAM(S): 5 TABLET ORAL at 05:43

## 2019-01-02 RX ADMIN — HYDROMORPHONE HYDROCHLORIDE 2 MILLIGRAM(S): 2 INJECTION INTRAMUSCULAR; INTRAVENOUS; SUBCUTANEOUS at 10:56

## 2019-01-02 RX ADMIN — OXYCODONE HYDROCHLORIDE 30 MILLIGRAM(S): 5 TABLET ORAL at 14:36

## 2019-01-02 NOTE — PROGRESS NOTE ADULT - SUBJECTIVE AND OBJECTIVE BOX
One dilaudid prn used overnight  Brief episode of pain overnight from 11pm to 2 am, unclear and unnoticed precipitant   His pain is well controlled throughout most of the waking hours  The patient is ambulating without incident.  Extensive pain review with the patient in Mandarin with the daughter and grandson translating at family unit request        T(C): 36.4 (19 @ 10:37), Max: 36.7 (19 @ 23:53)  HR: 81 (19 @ 10:37) (81 - 92)  BP: 98/62 (19 @ 10:37) (98/62 - 137/75)  RR: 18 (19 @ 10:37) (18 - 18)  SpO2: 64% (19 @ 10:37) (64% - 95%)  Wt(kg): --      2019 07:  -  2019 07:00  --------------------------------------------------------  IN:    Oral Fluid: 960 mL  Total IN: 960 mL    OUT:    Voided: 1500 mL  Total OUT: 1500 mL    Total NET: -540 mL      2019 07:  -  2019 13:35  --------------------------------------------------------  IN:    Oral Fluid: 120 mL  Total IN: 120 mL    OUT:    Voided: 250 mL  Total OUT: 250 mL    Total NET: -130 mL           @ 07:  -   @ 07:00  --------------------------------------------------------  IN: 960 mL / OUT: 1500 mL / NET: -540 mL     @ 07:  -   @ 13:35  --------------------------------------------------------  IN: 120 mL / OUT: 250 mL / NET: -130 mL      CAPILLARY BLOOD GLUCOSE            ALBUTerol/ipratropium for Nebulization 3 milliLiter(s) Nebulizer every 6 hours  apixaban 2.5 milliGRAM(s) Oral every 12 hours  benzonatate 100 milliGRAM(s) Oral three times a day PRN  dexamethasone     Tablet 2 milliGRAM(s) Oral daily  FIRST- Mouthwash  BLM 5 milliLiter(s) Swish and Swallow three times a day  HYDROmorphone   Tablet 2 milliGRAM(s) Oral every 6 hours PRN  HYDROmorphone   Tablet 4 milliGRAM(s) Oral every 6 hours PRN  lidocaine   Patch 2 Patch Transdermal every 24 hours  nystatin    Suspension 863027 Unit(s) Oral four times a day  oxyCODONE  ER Tablet 30 milliGRAM(s) Oral every 8 hours  pantoprazole    Tablet 40 milliGRAM(s) Oral before breakfast  tamsulosin 0.4 milliGRAM(s) Oral at bedtime  tenofovir disoproxil fumarate (VIREAD) 300 milliGRAM(s) Oral <User Schedule>              140  |  97  |  45<H>  ----------------------------<  174<H>  4.0   |  27  |  1.46<H>    Ca    9.2      2019 06:30  Phos  3.4       Mg     1.8             Procalc  BNP  ABG                          11.4   17.33 )-----------( 227      ( 2019 07:28 )             36.2           blood and urine cultures          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  Basal cell carcinoma of nostril    FAMILY HISTORY:  Family history of diabetes mellitus (Sibling): brother - living  Family history of liver cancer: Mother   Family history of liver cancer  Family history of stomach cancer (Sibling): brother    ITEMS NOT CHECKED ARE NOT PRESENT    SOCIAL HISTORY:   Significant other/partner: Wife: Robert Humphrey   Children:  [ ]    Mu-ism/Spirituality:  Substance hx:  [ ]   Tobacco hx:  [X ] Quit when lung CA was Dx  Alcohol hx: [ ] Drug use hx: [ ]  Home Opioid hx:  [ ] (I-Stop Reference No: [id number]  Living Situation: [X ]Home  [ ]Long term care  [ ]Rehab [ ]Other  Occupation:    ADVANCE DIRECTIVES:    DNR [ ]  MOLST  [ ]    Living Will  [ ]   DECISION MAKER(s): PAtient   [ ] Health Care Proxy(s)  [X ] Surrogate(s) Wife and son  [ ] Guardian           Name(s) and Contact(s): Wife: Robert Humphrey     BASELINE (I)ADL(s) (prior to admission):  Carroll: [ ]Total  [X ] Moderate [ ]Dependent    Allergies    No Known Drug Allergies  tegaderm (Rash; Urticaria)    Intolerances    MEDICATIONS  (STANDING):  acetaminophen    Suspension .. 640 milliGRAM(s) Oral every 6 hours  ALBUTerol/ipratropium for Nebulization 3 milliLiter(s) Nebulizer every 6 hours  apixaban 2.5 milliGRAM(s) Oral every 12 hours  dextrose 5%. 1000 milliLiter(s) (50 mL/Hr) IV Continuous <Continuous>  dextrose 50% Injectable 12.5 Gram(s) IV Push once  dextrose 50% Injectable 25 Gram(s) IV Push once  dextrose 50% Injectable 25 Gram(s) IV Push once  FIRST- Mouthwash  BLM 5 milliLiter(s) Swish and Swallow three times a day  insulin lispro (HumaLOG) corrective regimen sliding scale   SubCutaneous three times a day before meals  insulin lispro (HumaLOG) corrective regimen sliding scale   SubCutaneous at bedtime  nystatin    Suspension 355234 Unit(s) Oral four times a day  pantoprazole    Tablet 40 milliGRAM(s) Oral before breakfast  tamsulosin 0.4 milliGRAM(s) Oral at bedtime  tenofovir disoproxil fumarate (VIREAD) 300 milliGRAM(s) Oral daily    MEDICATIONS  (PRN):  benzonatate 100 milliGRAM(s) Oral three times a day PRN Cough  dextrose 40% Gel 15 Gram(s) Oral once PRN Blood Glucose LESS THAN 70 milliGRAM(s)/deciliter  glucagon  Injectable 1 milliGRAM(s) IntraMuscular once PRN Glucose LESS THAN 70 milligrams/deciliter    PRESENT SYMPTOMS:   Source if other than patient:  [ ]Family   [ ]Team     Pain (Impact on QOL): Yes   Location -  Left sided chest wall - also chronic pain on back of his chest wall   Minimal acceptable level (0-10 scale): 1-2                   Aggravating factors - respiration, tender on touch   Quality - pleuritic   Radiation - N  Severity (0-10 scale) - 9/10 and after pain medication (Dilaudid) come down to 4/10. Pain on back of chest wall is 3-/10   Timing - constant     PAIN AD Score:     [email or web address] (press ctrl +  left click to view)    Dyspnea:                           [X ]Mild [ ]Moderate [ ]Severe  Anxiety:                             [ ]Mild [ ]Moderate [ ]Severe  Fatigue:                             [ ]Mild [X ]Moderate [ ]Severe  Nausea:                             [X ]Mild [ ]Moderate [ ]Severe  Loss of appetite:              [X ]Mild [ ]Moderate [ ]Severe  Constipation:                    [ ]Mild [ ]Moderate [ ]Severe    Other Symptoms:  [ ]All other review of systems negative   [ ]Unable to obtain due to poor mentation     Karnofsky Performance Score/Palliative Performance Status Version 2:     40-50 %    PHYSICAL EXAM:  Vital Signs Last 24 Hrs  T(C): 36.4 (27 Dec 2018 07:44), Max: 36.7 (26 Dec 2018 21:44)  T(F): 97.5 (27 Dec 2018 07:44), Max: 98.1 (26 Dec 2018 22:06)  HR: 83 (27 Dec 2018 07:44) (76 - 102)  BP: 112/74 (27 Dec 2018 07:44) (104/74 - 116/69)  BP(mean): --  RR: 18 (27 Dec 2018 07:44) (16 - 21)  SpO2: 94% (27 Dec 2018 07:44) (92% - 96%) I&O's Summary      GENERAL:  [X ]Alert  [X ]Oriented    [X ]Lethargic  [ ]Cachexia  [ ]Unarousable  [X ]Verbal  [ ]Non-Verbal    Behavioral:   [ ] Anxiety  [ ] Delirium [ ] Agitation [ ] Other    HEENT:  [ ]Normal   [ ]Dry mouth   [ ]ET Tube/Trach  [X ]Oral lesions    PULMONARY:   [ ]Clear [ ]Tachypnea  [ ]Audible excessive secretions decreased BS on the left side   [ ]Rhonchi        [ ]Right [ ]Left [ ]Bilateral  [ ]Crackles        [ ]Right [ ]Left [ ]Bilateral  [ ]Wheezing     [ ]Right [ ]Left [ ]Bilateral    CARDIOVASCULAR:    [X ]Regular [ ]Irregular [ ]Tachy  [ ]Jerman [ ]Murmur [ ]Other    GASTROINTESTINAL:  [X ]Soft  [ ]Distended   [X ]+BS  [ ]Non tender [X ]Tender over RLQ and epigastric  [ ]PEG [ ]OGT/ NGT  Last BM: Last night (2018)     GENITOURINARY:  [X ]Normal [ ] Incontinent   [ ]Oliguria/Anuria   [ ]Soni    MUSCULOSKELETAL:   [ ]Normal   [ ]Weakness  [ ]Bed/Wheelchair bound [ ]Edema tenderness over chest wall (left side)     NEUROLOGIC:   [X ]No focal deficits  [ ] Cognitive impairment  [ ] Dysphagia [ ]Dysarthria [ ] Paresis [ ]Other     SKIN:   [X ]Normal   [ ]Pressure ulcer(s)  [ ]Rash    CRITICAL CARE:  [ ] Shock Present  [ ]Septic [ ]Cardiogenic [ ]Neurologic [ ]Hypovolemic  [ ]  Vasopressors [ ]  Inotropes   [ ] Respiratory failure present  [ ] Acute  [ ] Chronic [ ] Hypoxic  [ ] Hypercarbic [ ] Other  [ ] Other organ failure     LABS:                        11.6   23.68 )-----------( 180      ( 27 Dec 2018 08:04 )             36.9       140  |  103  |  47<H>  ----------------------------<  108<H>  4.0   |  24  |  1.35<H>    Ca    9.3      27 Dec 2018 06:55  Phos  3.9       Mg     1.7         TPro  7.6  /  Alb  3.4  /  TBili  0.5  /  DBili  x   /  AST  12  /  ALT  14  /  AlkPhos  129<H>    PT/INR - ( 27 Dec 2018 09:27 )   PT: 14.9 sec;   INR: 1.32 ratio         PTT - ( 27 Dec 2018 03:43 )  PTT:33.7 sec      RADIOLOGY & ADDITIONAL STUDIES:  CT of chest W/O C (2018): IMPRESSION:    Nondisplaced fractures of the left 10th through 12th ribs with mild   callus formation. These are new since 9/15/2018 and were present on   2018.  Ventilation/Perfusion Scan (2018):     IMPRESSION: Abnormal ventilation/perfusion lung scan.  Low probability of pulmonary embolus.    VA Dupplex LE: IMPRESSION:     No evidence of bilateral lower extremity deep venous thrombosis.  PROTEIN CALORIE MALNUTRITION:   [ ] PPSV2 < or = to 30% [ ] significant weight loss  [ ] poor nutritional intake [ ] catabolic state [ ] anasarca     Albumin, Serum: 3.4 g/dL (18 @ 22:42)  Artificial Nutrition [ ]     REFERRALS:   [ ]Chaplaincy  [ ] Hospice  [ ]Child Life  [ ]Social Work  [ ]Case management [ ]Holistic Therapy     Goals of Care Discussion Document:

## 2019-01-02 NOTE — PROGRESS NOTE ADULT - SUBJECTIVE AND OBJECTIVE BOX
Nash Clarke MD  PGY 1 Department of Internal Medicine  Pager: [phone]/ [phone]    Patient is a 78y old  Male who presents with a chief complaint of left side rib pain (01 Jan 2019 06:45)      SUBJECTIVE / OVERNIGHT EVENTS: Pt seen and examined. No acute overnight events. Pt reported continued L sided rib pain, however improved. Pt reported no SOB, dyspnea, or abdominal pain.     MEDICATIONS  (STANDING):  ALBUTerol/ipratropium for Nebulization 3 milliLiter(s) Nebulizer every 6 hours  apixaban 2.5 milliGRAM(s) Oral every 12 hours  dexamethasone     Tablet 2 milliGRAM(s) Oral daily  FIRST- Mouthwash  BLM 5 milliLiter(s) Swish and Swallow three times a day  lidocaine   Patch 2 Patch Transdermal every 24 hours  nystatin    Suspension [id number] Unit(s) Oral four times a day  oxyCODONE  ER Tablet 30 milliGRAM(s) Oral every 8 hours  pantoprazole    Tablet 40 milliGRAM(s) Oral before breakfast  tamsulosin 0.4 milliGRAM(s) Oral at bedtime  tenofovir disoproxil fumarate (VIREAD) 300 milliGRAM(s) Oral <User Schedule>    MEDICATIONS  (PRN):  benzonatate 100 milliGRAM(s) Oral three times a day PRN Cough  HYDROmorphone   Tablet 2 milliGRAM(s) Oral every 6 hours PRN Moderate Pain (4 - 6)  HYDROmorphone   Tablet 4 milliGRAM(s) Oral every 6 hours PRN Severe Pain (7 - 10)      I&O's Summary    31 Dec 2018 07:01  -  01 Jan 2019 07:00  --------------------------------------------------------  IN: 720 mL / OUT: 1950 mL / NET: -1230 mL    01 Jan 2019 07:01  -  02 Jan 2019 06:32  --------------------------------------------------------  IN: 720 mL / OUT: 1500 mL / NET: -780 mL        Vital Signs Last 24 Hrs  T(C): 36.6 (02 Jan 2019 04:41), Max: 36.7 (01 Jan 2019 23:53)  T(F): 97.8 (02 Jan 2019 04:41), Max: 98 (01 Jan 2019 23:53)  HR: 92 (02 Jan 2019 04:41) (82 - 92)  BP: 123/72 (02 Jan 2019 04:41) (98/65 - 137/75)  BP(mean): --  RR: 18 (02 Jan 2019 04:41) (18 - 18)  SpO2: 95% (02 Jan 2019 04:41) (93% - 95%)    CAPILLARY BLOOD GLUCOSE      PHYSICAL EXAM:  GENERAL: elderly, cachectic, NAD  HEAD:  Atraumatic, Normocephalic  EYES: conjunctiva and sclera clear  CHEST/LUNG: Clear to auscultation bilaterally; No wheezes, rales or rhonchi;  +TTP on L overlying anterior ribs, pain extending into L. flank.   HEART: Regular rate and rhythm; No murmurs, rubs, or gallops  ABDOMEN: Soft, Nondistended; Bowel sounds present, no masses.  EXTREMITIES:  2+ Peripheral Pulses, No clubbing, cyanosis, or edema  NEURO: AOx3, nonfocal exam  SKIN: Warm, dry, in tact, no rashes or lesions  PSYCH: affect appropriate    LABS:                        11.8   12.24 )-----------( 223      ( 01 Jan 2019 09:08 )             36.7     Auto Eosinophil # x     / Auto Eosinophil % x     / Auto Neutrophil # x     / Auto Neutrophil % x     / BANDS % x                            11.0   14.05 )-----------( 200      ( 31 Dec 2018 08:10 )             34.5     Auto Eosinophil # x     / Auto Eosinophil % x     / Auto Neutrophil # x     / Auto Neutrophil % x     / BANDS % x        01-01    142  |  100  |  41<H>  ----------------------------<  116<H>  3.9   |  26  |  1.45<H>  12-31    139  |  100  |  39<H>  ----------------------------<  123<H>  3.9   |  26  |  1.37<H>    Ca    9.4      01 Jan 2019 06:45  Mg     2.0     01-01  Phos  3.6     01-01

## 2019-01-02 NOTE — PROGRESS NOTE ADULT - ATTENDING COMMENTS
Agree with above.   Pt clinically improved. COnt Ab for now. Reviewed CT with radiology. Infiltrates may be POD, however pt feels better. He does have other options for therapy. Recommend f/u with Dr. Gross. Can f/u with rad onc re frontal lobe lesion.
Pt seen and examined. No acute events overnight. Reports that pain is well controlled. Denies cough ; fever/chills.  Bld cx negative. Would d/c Zosyn today and monitor off abx.
Pt with metastatic lung cancer on Afatinib aw L sided chest pain due to recent rib fractures. Pain now improved.     Worsening consolidations on CT chest, leucocytosis- unclear if neoplastic or infectious. Started on Vanco and Zosyn yesterday. Cough and leukocytosis improving. Will complete 5 days of abx.     Monitor symptoms. PT eval.
plan for discharge home on oral pain meds
Pt with metastatic lung cancer aw L sided chest pain due to recent rib fractures.     Worsening consolidations on CT chest, leucocytosis- unclear if neoplastic or infectious. Will start broad spectrum abx coverage.     Monitor symptoms. PT eval.

## 2019-01-02 NOTE — PROGRESS NOTE ADULT - PROBLEM SELECTOR PROBLEM 4
Hepatitis B
CKD (chronic kidney disease), stage IV
Chronic hepatitis B
Chronic hepatitis B
Cough

## 2019-01-02 NOTE — PROGRESS NOTE ADULT - PROBLEM SELECTOR PROBLEM 3
Lung cancer
CKD (chronic kidney disease), stage IV
CKD (chronic kidney disease), stage IV
Pain
Pneumonia, bacterial

## 2019-01-02 NOTE — PROGRESS NOTE ADULT - PROBLEM SELECTOR PLAN 4
- c/w tenofovir  - monitor creatinine
- renally dose meds  - monitor I/O and daily BMPs
- renally dose meds  - monitor I/O and daily BMPs
-stable   - renally dose meds  - monitor I/O and daily BMPs
-stable   - renally dose meds  - monitor I/O and daily BMPs
Hycodan
Hycodan
Will add mucokinetic  Pt is having a difficult time  with expectoration  ? due to sarcopenia  guaifenesin ATC and the patient may refuse, he agreed to this  guaiFENesin   100mg q8H ATC
stable  Pt hepatitis B infection currently on tenofovir.   - C/w tenofovir; Pt to follow up with outpatient PMD/ hepatologist
stable  Pt hepatitis B infection currently on tenofovir.   - C/w tenofovir; Pt to follow up with outpatient PMD/ hepatologist

## 2019-01-02 NOTE — PROGRESS NOTE ADULT - PROBLEM SELECTOR PROBLEM 2
R/O Pulmonary embolism
Closed fracture of multiple ribs of left side with routine healing, subsequent encounter
Lung cancer

## 2019-01-02 NOTE — PROGRESS NOTE ADULT - PROBLEM SELECTOR PLAN 2
High suspicion for musculoskeletal injury given history and PET  Now ATC oxy ER 30 mg q8H  and dilaudid po prn  Dilaudid 2mg po is working well  Consider q3H administration to match t1/2 and duration of action  Consider discontinuing 4 mg po dilaudid  Recommend serial APAP (650mg q8H ATC) as a three day trial to reduce opiate burden  lidoderm patch

## 2019-01-02 NOTE — PROGRESS NOTE ADULT - PROVIDER SPECIALTY LIST ADULT
Heme/Onc
Internal Medicine
Palliative Care
Internal Medicine

## 2019-01-02 NOTE — PROGRESS NOTE ADULT - PROBLEM SELECTOR PLAN 1
After speaking w/ outpatient pain specialitst oxycodone ER was decreased to 30mg TID with Oxycodone IR 2mg/4mg for moderate/severe pain prns.   - c/w oxycodone ER 30 mg q8h,   - DC'd tylenol 325 PO Q 6 hrs PRN f  - DC'd Oxycodone 2mg/4mg IR prns for moderate/sever pain After speaking w/ outpatient pain specialitst oxycodone ER was decreased to 30mg TID with Oxycodone IR 2mg/4mg for moderate/severe pain prns.   - c/w oxycodone ER 30 mg q8h,   - C/w Oxycodone 2mg/4mg IR prns for moderate/severe pain After speaking w/ outpatient pain specialist oxycodone ER was decreased to 30mg TID with Oxycodone IR 2mg/4mg for moderate/severe pain prns.   - c/w oxycodone ER 30 mg q8h,   - C/w Oxycodone 2mg/4mg IR prns for moderate/severe pain After speaking w/ outpatient pain specialist oxycodone ER was decreased to 30mg TID with Hydromorphone 2mg/4mg for moderate/severe pain prns.   - c/w oxycodone ER 30 mg q8h,   - C/w Oxycodone 2mg/4mg IR prns for moderate/severe pain After speaking w/ outpatient pain specialist oxycodone ER was decreased to 30mg TID with Hydromorphone 2mg/4mg for moderate/severe pain prns.   - c/w oxycodone ER 30 mg q8h,   - c/w Hydromorphone 2mg/4mg IR prns for moderate/severe pain

## 2019-01-02 NOTE — PROGRESS NOTE ADULT - PROBLEM SELECTOR PLAN 5
DVT PPx: Eliquis 2.5mg BID (h/o PE)  Diet: Dysphagia 1 pureed  Dispo: home with homecare, PT recs pending. DVT PPx: Eliquis 2.5mg BID (h/o PE)  Diet: Dysphagia 1 pureed  Dispo: home with homecare and home PT.

## 2019-01-02 NOTE — PROGRESS NOTE ADULT - REASON FOR ADMISSION
left side rib pain

## 2019-01-02 NOTE — PROGRESS NOTE ADULT - PROBLEM SELECTOR PLAN 3
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
-being treated empirically for PNA, although lung lesions on CT can represent progression of disease. WBC improving while holding steroids  - c/w zosyn renally dosed (started 12/27), monitor OFF vancomycin since yesterday. Consider d/c zosyn after 5 day course if patient remains clinically improved
-being treated empirically for PNA, although lung lesions on CT can represent progression of disease. WBC improving while holding steroids  - c/w zosyn renally dosed (started 12/27), monitor OFF vancomycin since yesterday. Consider d/c zosyn after 5 day course if patient remains clinically improved
-stable   - renally dose meds  - monitor I/O and daily BMPs
-stable   - renally dose meds  - monitor I/O and daily BMPs
CT chest w cont shows increased bilateral enhancement, per oncology (personally reviewed images with radiology) more likely progression of disease as opposed to infection or pneumonitis; pt is afebrile; given remarkable leukocytosis (which could also be attributed to chronic decadron) and high risk 2/2 frequently hospitalizations will treat empirically for PNA  - c/w zosyn renally dosed (started 12/27)  - d/c vanc if BCx clear at 48hrs this morning
CT chest w cont shows increased bilateral enhancement, unclear whether this represents infection vs worsening disease vs pneumonitis; pt is afebrile; given remarkable leukocytosis (which could also be attributed to chronic decadron) will treat empirically for PNA  - c/w vanc and zosyn renally dosed (started 12/27)  - f/u BCx
Controlled
See aforementioned plan
See plan above

## 2019-01-02 NOTE — PROGRESS NOTE ADULT - ASSESSMENT
77 year old with hx of HTN, HLD, COPD, T2DM, ?PE on Apixaban, Stage IV CKD, hep B on tenofovir, NSCLC on immunotherapy, p/w rib pain 2/2 fracture, also with likely progression of disease vs infectious process as seen on CXR, pain stable pending DC w/ home PT.

## 2019-01-02 NOTE — CHART NOTE - NSCHARTNOTEFT_GEN_A_CORE
Search Terms: Sada Sainz, 1940     Search Date: 01/02/2019 05:31:58 PM     Searching on behalf of: [id number]     The Drug Utilization Report below displays all of the controlled substance prescriptions, if any, that your patient has filled in the last twelve months. The information displayed on this report is compiled from pharmacy submissions to the Department, and accurately reflects the information as submitted by the pharmacies.    This report was requested by: Javy Marcial | Reference #: [id number]             Others' Prescriptions    Patient Name: Sada Sainz YOB: 1940   Address: [phone] [place] [id number] Sex: Male         Rx Written    Rx Dispensed    Drug    Quantity    Days Supply    Prescriber Name              12/18/2018 12/19/2018 oxycontin er 20 mg tablet  60 30 Angela Alanis     12/14/2018 12/14/2018 oxycontin er 30 mg tablet  90 30 Brandan Gross MD     11/19/2018 11/23/2018 oxycontin er 20 mg tablet  90 30 Nery Robertson)     09/28/2018 10/19/2018 oxycodone hcl 10 mg tablet  84 14 Nery Robertson)     09/28/2018 10/19/2018 morphine sulf er 30 mg tablet  90 30 Nery Robertson)     09/19/2018 10/03/2018 morphine sulf er 30 mg tablet  90 30 Angela Alanis     09/19/2018 10/03/2018 oxycodone hcl 10 mg tablet  84 14 Angela Alanis     07/20/2018 07/25/2018 oxycodone hcl 10 mg tablet  84 14 Nery Robertson)     07/20/2018 07/25/2018 morphine sulf er 15 mg tablet  90 30 Nery Robertson)     07/20/2018 07/25/2018 morphine sulf er 30 mg tablet  90 30 Nery Robertson)     06/04/2018 06/14/2018 oxycodone hcl 5 mg tablet  180 15 [place], [place]     06/04/2018 06/14/2018 morphine sulf er 30 mg tablet  90 30 [place], [place]     05/07/2018 05/08/2018 morphine sulf er 30 mg tablet  90 30 [place], [place]     03/29/2018 04/10/2018 morphine sulf er 30 mg tablet  90 30 [place], [place]     03/05/2018 03/05/2018 morphine sulf er 30 mg tablet  90 30 [place], [place]     02/02/2018 02/05/2018 oxycodone hcl 5 mg tablet  180 15 Nery Robertson (MD)     02/02/2018 02/05/2018 morphine sulf er 30 mg tablet  90 30 Nery Robertson (MD)     01/05/2018 01/05/2018 morphine sulf er 30 mg tablet  90 30 Nery Robertson (MD)         Patient Name: Sada Sainz YOB: 1940   Address: [address] [place] [id number] Sex: Male         Rx Written    Rx Dispensed    Drug    Quantity    Days Supply    Prescriber Name              11/08/2018 11/10/2018 oxycodone hcl 10 mg tablet  66 11 [facility]     11/08/2018 11/10/2018 oxycontin 30 mg tablet  33 11 [facility]         Patient Name: Sada Sainz YOB: 1940   Address: [address] [place] [address] Sex: Male         Rx Written    Rx Dispensed    Drug    Quantity    Days Supply    Prescriber Name              09/04/2018 09/04/2018 morphine sulf er 15 mg tablet  180 30 Tali Perez, L     08/28/2018 08/28/2018 morphine sulf er 15 mg tablet  180 20 Tali Perez, L     08/13/2018 08/13/2018 morphine sulfate ir 15 mg tab  135 15 Steffany Witt MD     08/10/2018 08/10/2018 oxycodone hcl 10 mg tablet  90 15 Tali Perez, L     08/10/2018 08/10/2018 morphine sulf er 15 mg tablet  180 20 Steffany Witt MD         Patient Name: Sada Sainz YOB: 1940   Address: [address] [place] [address] Sex: Male         Rx Written    Rx Dispensed    Drug    Quantity    Days Supply    Prescriber Name              06/04/2018 07/25/2018 luxlyte harmony drops 2.5mg thc and 2.5mg cbd/1ml drops  1 5 Nery Robertson (MD)         Patient Name: Sada Sainz YOB: 1940   Address: [address] [place] [address], [facility] Sex: Male         Rx Written    Rx Dispensed    Drug    Quantity    Days Supply    Prescriber Name              07/14/2018 07/15/2018 oxycodone hcl 10 mg tablet  80 20 [facility]     07/14/2018 07/15/2018 morphine sulf er 15 mg tablet  180 30 [facility]

## 2019-01-09 PROCEDURE — 93970 EXTREMITY STUDY: CPT

## 2019-01-09 PROCEDURE — 84132 ASSAY OF SERUM POTASSIUM: CPT

## 2019-01-09 PROCEDURE — 82962 GLUCOSE BLOOD TEST: CPT

## 2019-01-09 PROCEDURE — A9567: CPT

## 2019-01-09 PROCEDURE — 80202 ASSAY OF VANCOMYCIN: CPT

## 2019-01-09 PROCEDURE — 83735 ASSAY OF MAGNESIUM: CPT

## 2019-01-09 PROCEDURE — 82565 ASSAY OF CREATININE: CPT

## 2019-01-09 PROCEDURE — 71250 CT THORAX DX C-: CPT

## 2019-01-09 PROCEDURE — 83605 ASSAY OF LACTIC ACID: CPT

## 2019-01-09 PROCEDURE — 99285 EMERGENCY DEPT VISIT HI MDM: CPT | Mod: 25

## 2019-01-09 PROCEDURE — 84100 ASSAY OF PHOSPHORUS: CPT

## 2019-01-09 PROCEDURE — 85730 THROMBOPLASTIN TIME PARTIAL: CPT

## 2019-01-09 PROCEDURE — 84295 ASSAY OF SERUM SODIUM: CPT

## 2019-01-09 PROCEDURE — 71045 X-RAY EXAM CHEST 1 VIEW: CPT

## 2019-01-09 PROCEDURE — 82435 ASSAY OF BLOOD CHLORIDE: CPT

## 2019-01-09 PROCEDURE — 80048 BASIC METABOLIC PNL TOTAL CA: CPT

## 2019-01-09 PROCEDURE — 85610 PROTHROMBIN TIME: CPT

## 2019-01-09 PROCEDURE — 82330 ASSAY OF CALCIUM: CPT

## 2019-01-09 PROCEDURE — 87040 BLOOD CULTURE FOR BACTERIA: CPT

## 2019-01-09 PROCEDURE — 82272 OCCULT BLD FECES 1-3 TESTS: CPT

## 2019-01-09 PROCEDURE — 85014 HEMATOCRIT: CPT

## 2019-01-09 PROCEDURE — 97161 PT EVAL LOW COMPLEX 20 MIN: CPT

## 2019-01-09 PROCEDURE — 96374 THER/PROPH/DIAG INJ IV PUSH: CPT

## 2019-01-09 PROCEDURE — 94640 AIRWAY INHALATION TREATMENT: CPT

## 2019-01-09 PROCEDURE — 84484 ASSAY OF TROPONIN QUANT: CPT

## 2019-01-09 PROCEDURE — 78582 LUNG VENTILAT&PERFUS IMAGING: CPT

## 2019-01-09 PROCEDURE — 80053 COMPREHEN METABOLIC PANEL: CPT

## 2019-01-09 PROCEDURE — 93005 ELECTROCARDIOGRAM TRACING: CPT

## 2019-01-09 PROCEDURE — 85027 COMPLETE CBC AUTOMATED: CPT

## 2019-01-09 PROCEDURE — 97116 GAIT TRAINING THERAPY: CPT

## 2019-01-09 PROCEDURE — 82947 ASSAY GLUCOSE BLOOD QUANT: CPT

## 2019-01-09 PROCEDURE — A9540: CPT

## 2019-01-09 PROCEDURE — 82803 BLOOD GASES ANY COMBINATION: CPT

## 2019-01-10 ENCOUNTER — APPOINTMENT (OUTPATIENT)
Dept: HEMATOLOGY ONCOLOGY | Facility: CLINIC | Age: 79
End: 2019-01-10
Payer: MEDICAID

## 2019-01-10 VITALS
BODY MASS INDEX: 20.11 KG/M2 | DIASTOLIC BLOOD PRESSURE: 61 MMHG | OXYGEN SATURATION: 94 % | HEART RATE: 91 BPM | WEIGHT: 132.28 LBS | SYSTOLIC BLOOD PRESSURE: 94 MMHG | RESPIRATION RATE: 16 BRPM | TEMPERATURE: 98.2 F

## 2019-01-10 PROCEDURE — 99214 OFFICE O/P EST MOD 30 MIN: CPT

## 2019-01-10 RX ORDER — OSIMERTINIB 40 1/1
40 TABLET, FILM COATED ORAL DAILY
Qty: 30 | Refills: 8 | Status: ACTIVE | COMMUNITY
Start: 2019-01-10 | End: 1900-01-01

## 2019-01-14 ENCOUNTER — APPOINTMENT (OUTPATIENT)
Dept: GERIATRICS | Facility: CLINIC | Age: 79
End: 2019-01-14

## 2019-01-14 ENCOUNTER — APPOINTMENT (OUTPATIENT)
Dept: GERIATRICS | Facility: CLINIC | Age: 79
End: 2019-01-14
Payer: MEDICAID

## 2019-01-14 VITALS
WEIGHT: 130.95 LBS | TEMPERATURE: 98.5 F | SYSTOLIC BLOOD PRESSURE: 96 MMHG | RESPIRATION RATE: 15 BRPM | HEART RATE: 88 BPM | OXYGEN SATURATION: 94 % | DIASTOLIC BLOOD PRESSURE: 65 MMHG | BODY MASS INDEX: 19.91 KG/M2

## 2019-01-14 DIAGNOSIS — M79.18 MYALGIA, OTHER SITE: ICD-10-CM

## 2019-01-14 DIAGNOSIS — Z00.00 ENCOUNTER FOR GENERAL ADULT MEDICAL EXAMINATION W/OUT ABNORMAL FINDINGS: ICD-10-CM

## 2019-01-14 PROCEDURE — 99214 OFFICE O/P EST MOD 30 MIN: CPT

## 2019-01-14 RX ORDER — AFATINIB 20 MG/1
20 TABLET, FILM COATED ORAL DAILY
Qty: 30 | Refills: 3 | Status: DISCONTINUED | COMMUNITY
Start: 2018-03-19 | End: 2019-01-14

## 2019-01-15 PROBLEM — M79.18 MUSCULOSKELETAL PAIN: Status: ACTIVE | Noted: 2017-12-08

## 2019-01-15 NOTE — ASSESSMENT
[Pain Management] : pain management [Medication Management] : medication management [FreeTextEntry1] : 77yoM with:\par \par 1. NSCLC - GFR is up to 42 as of last Friday. Per Onc, OK to resume Afatinib at 20mg daily.  \par \par 2. Neoplasm pain +/- musculoskeletal + acute pain from recent rib fx - C/w Oxycontin 30mg TID. C/w PRN Hydromorphone 2mg but again recommend use of Tylenol 650mg TID PRN first. Advised use of liquid form instead of pill. \par Lidocaine 5% patch to painful area on back may be of some benefit.\par \par 3. Fatigue/weakness - PRN hydration. Supportive care. \par \par 4. Anorexia/cachexia - son to obtain medicinal cannabis oil. \par c/w Dex 2mg daily per Onc. \par \par 5. Encounter for Palliative Care - Will need to discuss ACP at follow up visit. \par \par RTO 1 month

## 2019-01-15 NOTE — REVIEW OF SYSTEMS
[Feeling Poorly] : feeling poorly [Feeling Tired] : feeling tired [Sore Throat] : sore throat [Cough] : cough [Constipation] : constipation [Heartburn] : heartburn [As Noted in HPI] : as noted in HPI [Arthralgias] : arthralgias [Joint Pain] : joint pain [Emotional Problems] : emotional problems [Negative] : Heme/Lymph [Fever] : no fever [Chills] : no chills [Earache] : no earache [Loss Of Hearing] : no hearing loss [Nosebleeds] : no nosebleeds [Nasal Discharge] : no nasal discharge [Hoarseness] : no hoarseness [Abdominal Pain] : no abdominal pain [Vomiting] : no vomiting [Diarrhea] : no diarrhea [Melena] : no melena [Joint Swelling] : no joint swelling [Joint Stiffness] : no joint stiffness [Limb Pain] : no limb pain [Limb Swelling] : no limb swelling [FreeTextEntry7] : nausea

## 2019-01-15 NOTE — HISTORY OF PRESENT ILLNESS
[FreeTextEntry1] : 77yoM with NSCLC dx 4/2016 on chemo presents for follow up palliative care visit. He is a former smoker (50yrs), quit at the time of his diagnosis. Patient's son provided translation. \par \par Patient was hospitalized 10/31-11/10 with aspiration pneumonia, lethargy. Is currently on a thickened liquid and pureed diet.  Uses PRN supplemental O2. Was hospitalized once again on 12/27/18-1/2/19 with L sided rib pain 2/2 fractures from a recent fall. \par \par For the past 1-2 months he has been experiencing numbness and coldness of the R arm and hand, along the ulnar nerve distribution.  \par \par Pain remains an issue - localized to the back, shoulders. Is on Oxycontin 30mg TID (dose was increased during recent hospitalization).  Short acting opioid was change from Oxycodone to Hydromorphone 2mg PRN (uses 0-2 times daily). Uses Lidocaine patch to the L flank which he finds helpful.\par \par Remains on Dexamethasone 2mg daily. \par \par ROS:\par +fatigue \par +trouble sleeping - had stopped using mirtazapine and when he took it last night  \par +weakness\par +dry mouth\par +low appetite - son plans on reinstating medical cannabis into regimen. \par Occ nausea/vomiting, using Reglan PRN\par +dry mouth/throat\par Denies constipation, SOB\par \par Is receiving home PT through [facility]. \par \par I-Stop Ref#: [id number]

## 2019-01-15 NOTE — DATA REVIEWED
[No studies available for review at this time.] : No studies available for review at this time. [FreeTextEntry1] : PET/CT (12/24/18) - \par 1. Interval increase in FDG-avid bilateral confluent and nodular airspace opacities which likely represent a combination of neoplastic disease and superimposed infection and/or pneumonitis, possibly related to \par immunotherapy. Consider follow-up CT chest in 6-8 weeks, following treatment for infection/pneumonitis, to assess for progression of disease.\par \par 2. Few nonspecific small, mildly FDG-avid mediastinal and bilateral hilar lymph nodes, unchanged. \par \par 3. Mild nodularity of left adrenal gland, unchanged.\par \par 4.  Nondisplaced fractures of left 10th through 12th ribs with mild callus formation, new since 9/15/2018, likely are secondary to trauma.\par \par 5. Resolution of nonspecific pancolonic hypermetabolism in distal esophageal hypermetabolism\par

## 2019-01-15 NOTE — PHYSICAL EXAM
[General Appearance - Alert] : alert [General Appearance - In No Acute Distress] : in no acute distress [Sclera] : the sclera and conjunctiva were normal [No Oral Cyanosis] : no oral cyanosis [Outer Ear] : the ears and nose were normal in appearance [Examination Of The Oral Cavity] : the lips and gums were normal [Nasal Cavity] : the nasal mucosa and septum were normal [Neck Appearance] : the appearance of the neck was normal [Neck Cervical Mass (___cm)] : no neck mass was observed [Jugular Venous Distention Increased] : there was no jugular-venous distention [] : no respiratory distress [Auscultation Breath Sounds / Voice Sounds] : lungs were clear to auscultation bilaterally [Heart Rate And Rhythm] : heart rate was normal and rhythm regular [Heart Sounds] : normal S1 and S2 [Murmurs] : no murmurs [Edema] : there was no peripheral edema [Bowel Sounds] : normal bowel sounds [Abdomen Soft] : soft [Abdomen Tenderness] : non-tender [No CVA Tenderness] : no ~M costovertebral angle tenderness [No Spinal Tenderness] : no spinal tenderness [No Focal Deficits] : no focal deficits [Oriented To Time, Place, And Person] : oriented to person, place, and time [Impaired Insight] : insight and judgment were intact [FreeTextEntry1] : Mild forgetfulness, sad affect, mood congruent, no suicidal or homicidal ideation.

## 2019-01-24 ENCOUNTER — APPOINTMENT (OUTPATIENT)
Dept: GERIATRICS | Facility: CLINIC | Age: 79
End: 2019-01-24

## 2019-01-25 ENCOUNTER — APPOINTMENT (OUTPATIENT)
Dept: SPEECH THERAPY | Facility: CLINIC | Age: 79
End: 2019-01-25

## 2019-01-29 ENCOUNTER — RX RENEWAL (OUTPATIENT)
Age: 79
End: 2019-01-29

## 2019-01-30 ENCOUNTER — APPOINTMENT (OUTPATIENT)
Dept: SPEECH THERAPY | Facility: CLINIC | Age: 79
End: 2019-01-30

## 2019-02-01 ENCOUNTER — OUTPATIENT (OUTPATIENT)
Dept: OUTPATIENT SERVICES | Facility: HOSPITAL | Age: 79
LOS: 1 days | Discharge: HOME | End: 2019-02-01

## 2019-02-01 DIAGNOSIS — D04.9 CARCINOMA IN SITU OF SKIN, UNSPECIFIED: Chronic | ICD-10-CM

## 2019-02-01 DIAGNOSIS — C44.311 BASAL CELL CARCINOMA OF SKIN OF NOSE: Chronic | ICD-10-CM

## 2019-02-01 DIAGNOSIS — C34.90 MALIGNANT NEOPLASM OF UNSPECIFIED PART OF UNSPECIFIED BRONCHUS OR LUNG: ICD-10-CM

## 2019-02-06 ENCOUNTER — APPOINTMENT (OUTPATIENT)
Dept: OTOLARYNGOLOGY | Facility: CLINIC | Age: 79
End: 2019-02-06
Payer: MEDICAID

## 2019-02-06 VITALS
BODY MASS INDEX: 18.94 KG/M2 | SYSTOLIC BLOOD PRESSURE: 92 MMHG | WEIGHT: 125 LBS | HEIGHT: 68 IN | HEART RATE: 94 BPM | DIASTOLIC BLOOD PRESSURE: 61 MMHG

## 2019-02-06 PROCEDURE — 99214 OFFICE O/P EST MOD 30 MIN: CPT | Mod: 25

## 2019-02-06 PROCEDURE — 31574Z: CUSTOM

## 2019-02-06 NOTE — HISTORY OF PRESENT ILLNESS
[de-identified] : voice with some improvement but still dysphagia to solids>liquids\par taking chemo, feeling lethargic overall\par no new SOB\par took nystatin, not taking omeprazole\par suffers from vocal fatigue frequently\par seeing SW therapy, on pureed diet\par \par \par

## 2019-02-06 NOTE — PHYSICAL EXAM
[Midline] : trachea located in midline position [Laryngoscopy Performed] : laryngoscopy was performed, see procedure section for findings [Normal] : no rashes [de-identified] : breathy voice, max phonation 3-4 sec

## 2019-02-06 NOTE — REASON FOR VISIT
[Subsequent Evaluation] : a subsequent evaluation for [Spouse] : spouse [Family Member] : family member [FreeTextEntry2] : vocal fold paresis

## 2019-02-06 NOTE — HISTORY OF PRESENT ILLNESS
[de-identified] : voice with some improvement but still dysphagia to solids>liquids\par taking chemo, feeling lethargic overall\par no new SOB\par took nystatin, not taking omeprazole\par suffers from vocal fatigue frequently\par seeing SW therapy, on pureed diet\par \par \par

## 2019-02-06 NOTE — CONSULT LETTER
[Dear  ___] : Dear  [unfilled], [Consult Letter:] : I had the pleasure of evaluating your patient, [unfilled]. [Please see my note below.] : Please see my note below. [Consult Closing:] : Thank you very much for allowing me to participate in the care of this patient.  If you have any questions, please do not hesitate to contact me. [Sincerely,] : Sincerely, [FreeTextEntry3] : Soto Prado MD, PhD\par Chief, Division of Laryngology\par Department of Otolaryngology\par [facility]\par Pediatric Otolaryngology, [facility]\par  of Otolaryngology\par [facility] [facility] [employer] [facility]\par \par \par

## 2019-02-06 NOTE — CONSULT LETTER
[Dear  ___] : Dear  [unfilled], [Consult Letter:] : I had the pleasure of evaluating your patient, [unfilled]. [Please see my note below.] : Please see my note below. [Consult Closing:] : Thank you very much for allowing me to participate in the care of this patient.  If you have any questions, please do not hesitate to contact me. [Sincerely,] : Sincerely, [FreeTextEntry3] : Soto Prado MD, PhD\par Chief, Division of Laryngology\par Department of Otolaryngology\par [facility]\par Pediatric Otolaryngology, [facility]\par  of Otolaryngology\par [facility]\par \par \par

## 2019-02-06 NOTE — PHYSICAL EXAM
[Midline] : trachea located in midline position [Laryngoscopy Performed] : laryngoscopy was performed, see procedure section for findings [Normal] : no rashes [de-identified] : breathy voice, max phonation 3-4 sec

## 2019-02-08 NOTE — ED PROVIDER NOTE - NS ED MD DISPO ADMIT [facility] UNIT
I had a lengthy discussion with the patient  I explained to her that her COPD is from smoking cigarettes and she needs to stop smoking  She requested I give her a letter so she can stop paying her rent  She reports that her landlord will not do the necessary repairs needed in the apartment  She reports occasional exposure to mold that is in the basement of the house  I explained to her that mold is not the cause of her COPD especially occasional exposure  I encouraged her to talk to her   I offered to consult LSW however patient refused 
The USPSTF recommends biennial screening mammography for women aged 48 to 76 years  Patient has agreed to undergo testing at this time  All questions were answered 
TELEMETRY

## 2019-02-12 ENCOUNTER — RESULT REVIEW (OUTPATIENT)
Age: 79
End: 2019-02-12

## 2019-02-12 ENCOUNTER — APPOINTMENT (OUTPATIENT)
Dept: HEMATOLOGY ONCOLOGY | Facility: CLINIC | Age: 79
End: 2019-02-12
Payer: MEDICAID

## 2019-02-12 VITALS
OXYGEN SATURATION: 94 % | WEIGHT: 131.18 LBS | RESPIRATION RATE: 16 BRPM | BODY MASS INDEX: 19.94 KG/M2 | HEART RATE: 93 BPM | TEMPERATURE: 97.7 F | SYSTOLIC BLOOD PRESSURE: 108 MMHG | DIASTOLIC BLOOD PRESSURE: 71 MMHG

## 2019-02-12 DIAGNOSIS — R68.2 DRY MOUTH, UNSPECIFIED: ICD-10-CM

## 2019-02-12 LAB
BASOPHILS # BLD AUTO: 0 K/UL — SIGNIFICANT CHANGE UP (ref 0–0.2)
BASOPHILS NFR BLD AUTO: 0.2 % — SIGNIFICANT CHANGE UP (ref 0–2)
EOSINOPHIL # BLD AUTO: 0.1 K/UL — SIGNIFICANT CHANGE UP (ref 0–0.5)
EOSINOPHIL NFR BLD AUTO: 0.5 % — SIGNIFICANT CHANGE UP (ref 0–6)
HCT VFR BLD CALC: 36.3 % — LOW (ref 39–50)
HGB BLD-MCNC: 11.6 G/DL — LOW (ref 13–17)
LYMPHOCYTES # BLD AUTO: 1.6 K/UL — SIGNIFICANT CHANGE UP (ref 1–3.3)
LYMPHOCYTES # BLD AUTO: 11.2 % — LOW (ref 13–44)
MCHC RBC-ENTMCNC: 30.3 PG — SIGNIFICANT CHANGE UP (ref 27–34)
MCHC RBC-ENTMCNC: 31.9 G/DL — LOW (ref 32–36)
MCV RBC AUTO: 95.2 FL — SIGNIFICANT CHANGE UP (ref 80–100)
MONOCYTES # BLD AUTO: 0.8 K/UL — SIGNIFICANT CHANGE UP (ref 0–0.9)
MONOCYTES NFR BLD AUTO: 5.4 % — SIGNIFICANT CHANGE UP (ref 2–14)
NEUTROPHILS # BLD AUTO: 12.1 K/UL — HIGH (ref 1.8–7.4)
NEUTROPHILS NFR BLD AUTO: 82.7 % — HIGH (ref 43–77)
PLATELET # BLD AUTO: 216 K/UL — SIGNIFICANT CHANGE UP (ref 150–400)
RBC # BLD: 3.81 M/UL — LOW (ref 4.2–5.8)
RBC # FLD: 13.7 % — SIGNIFICANT CHANGE UP (ref 10.3–14.5)
WBC # BLD: 14.6 K/UL — HIGH (ref 3.8–10.5)
WBC # FLD AUTO: 14.6 K/UL — HIGH (ref 3.8–10.5)

## 2019-02-12 PROCEDURE — 99215 OFFICE O/P EST HI 40 MIN: CPT

## 2019-02-12 RX ORDER — LIDOCAINE 5% 700 MG/1
5 PATCH TOPICAL
Qty: 30 | Refills: 3 | Status: ACTIVE | COMMUNITY
Start: 2018-09-28 | End: 1900-01-01

## 2019-02-12 NOTE — ASSESSMENT
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 \par MRI brain revealed very small 2.3 mm lesion.Referred to Dr. Montoya for GK- but has not made any appointment as yet. Will repeat MRI in March. \par Follow up with Purvi Dhillon and Dr. Prado, ENT\par Continue Eliquis at 2.5 mg BID\par Hypoxia- use home O2 as needed\par On Tagrisso 40mg since 2 weeks tolerating well\par Dry mouth- likely sec to polypharmacy. Recommend Biotene spray or mouthwash. \par Recommend incentive spirometry\par F/U with Dr. Robertson as scheduled\par Labs today\par OV 1 month.\par

## 2019-02-12 NOTE — REVIEW OF SYSTEMS
[Fatigue] : fatigue [Recent Change In Weight] : ~T recent weight change [Dysphagia] : dysphagia [SOB on Exertion] : shortness of breath during exertion [Confused] : confusion [Dizziness] : dizziness [Difficulty Walking] : difficulty walking [Negative] : Gastrointestinal [Fever] : no fever [Chills] : no chills [Eye Pain] : no eye pain [Odynophagia] : no odynophagia [Mucosal Pain] : no mucosal pain [Chest Pain] : no chest pain [Palpitations] : no palpitations [Leg Claudication] : no intermittent leg claudication [Lower Ext Edema] : no lower extremity edema [Shortness Of Breath] : no shortness of breath [Wheezing] : no wheezing [Cough] : no cough [Abdominal Pain] : no abdominal pain [Vomiting] : no vomiting [Constipation] : no constipation [Diarrhea] : no diarrhea [Dysuria] : no dysuria [Joint Pain] : no joint pain [Joint Stiffness] : no joint stiffness [Muscle Pain] : no muscle pain [Skin Rash] : no skin rash [Fainting] : no fainting [Anxiety] : no anxiety [Depression] : no depression [Muscle Weakness] : no muscle weakness [Easy Bleeding] : no tendency for easy bleeding [Easy Bruising] : no tendency for easy bruising [Swollen Glands] : no swollen glands [FreeTextEntry2] : stable aweight, appetite fair [FreeTextEntry4] : no recent aspiration episodes on puree diet [FreeTextEntry6] : improved

## 2019-02-12 NOTE — PHYSICAL EXAM
[Ambulatory and capable of all self care but unable to carry out any work activities] : Status 2- Ambulatory and capable of all self care but unable to carry out any work activities. Up and about more than 50% of waking hours [Normal] : affect appropriate [Mucositis] : no mucositis [de-identified] : More alert NAD [de-identified] : clear b/l

## 2019-02-12 NOTE — HISTORY OF PRESENT ILLNESS
[Disease: _____________________] : Disease: [unfilled] [M: ___] : M[unfilled] [AJCC Stage: ____] : AJCC Stage: [unfilled] [Cardiovascular] : Cardiovascular [Infectious] : Infectious [Pulmonary] : Pulmonary [de-identified] : Translation provided by Mr. Sainz's sonParmjit at pt's request\par \par Mr. Sainz is a 77 yo man with stage IV EGFR mutated NSCLC s/p progression through erlotinib, carboplatin/pemetrexed, atezolizumab,  now on gilotrif\par Tarceva 5/2016-4/2017\par Carbo/pem - 4/24/17 - 6/26/17\par Atezolizumab 7/13/17- 9/14/17\par Afatinib ~2/12/18 - 12/2018\par Tagrisso- 1/30/19- presentadmission was 12/27/18 to 1/3/19. Last visit, afatinib was stopped for toxicity (fatigue) and Tagrisso was recommended. There were some insurance-related delays since pt does not have a documented T790M mut. Through appeal process, tagrisso was approved and pt started taking it 2 weeks ago. He also started medical cannabis 3 weeks ago. Overall, he is doing better. He is more ambulatory and communicative at home. He is on oxycontin 30 mg TID and Hydromorphone 2 mg as needed (uses approx. 0-2 times/day). Appetite fair and weight is stable. He is till on puree diet and has follow up with speech therapy tomorrow. \par

## 2019-02-13 ENCOUNTER — APPOINTMENT (OUTPATIENT)
Dept: SPEECH THERAPY | Facility: CLINIC | Age: 79
End: 2019-02-13

## 2019-02-20 LAB
ALBUMIN SERPL ELPH-MCNC: 3.4 G/DL
ALP BLD-CCNC: 113 U/L
ALT SERPL-CCNC: 460 U/L
ANION GAP SERPL CALC-SCNC: 16 MMOL/L
AST SERPL-CCNC: 279 U/L
BILIRUB SERPL-MCNC: 0.2 MG/DL
BUN SERPL-MCNC: 29 MG/DL
CALCIUM SERPL-MCNC: 9.2 MG/DL
CEA SERPL-MCNC: 2.7 NG/ML
CHLORIDE SERPL-SCNC: 97 MMOL/L
CO2 SERPL-SCNC: 26 MMOL/L
CREAT SERPL-MCNC: 1.53 MG/DL
FOLATE SERPL-MCNC: 15.3 NG/ML
GLUCOSE SERPL-MCNC: 97 MG/DL
POTASSIUM SERPL-SCNC: 5 MMOL/L
PROT SERPL-MCNC: 7.5 G/DL
SODIUM SERPL-SCNC: 139 MMOL/L
TSH SERPL-ACNC: 4.16 UIU/ML
VIT B12 SERPL-MCNC: 1257 PG/ML

## 2019-02-22 ENCOUNTER — RX RENEWAL (OUTPATIENT)
Age: 79
End: 2019-02-22

## 2019-03-05 ENCOUNTER — OUTPATIENT (OUTPATIENT)
Dept: OUTPATIENT SERVICES | Facility: HOSPITAL | Age: 79
LOS: 1 days | Discharge: HOME | End: 2019-03-05

## 2019-03-05 DIAGNOSIS — C34.90 MALIGNANT NEOPLASM OF UNSPECIFIED PART OF UNSPECIFIED BRONCHUS OR LUNG: ICD-10-CM

## 2019-03-05 DIAGNOSIS — D04.9 CARCINOMA IN SITU OF SKIN, UNSPECIFIED: Chronic | ICD-10-CM

## 2019-03-05 DIAGNOSIS — C44.311 BASAL CELL CARCINOMA OF SKIN OF NOSE: Chronic | ICD-10-CM

## 2019-03-08 ENCOUNTER — APPOINTMENT (OUTPATIENT)
Dept: SPEECH THERAPY | Facility: HOSPITAL | Age: 79
End: 2019-03-08
Payer: MEDICAID

## 2019-03-08 ENCOUNTER — OUTPATIENT (OUTPATIENT)
Dept: OUTPATIENT SERVICES | Facility: HOSPITAL | Age: 79
LOS: 1 days | End: 2019-03-08

## 2019-03-08 ENCOUNTER — APPOINTMENT (OUTPATIENT)
Dept: RADIOLOGY | Facility: HOSPITAL | Age: 79
End: 2019-03-08
Payer: MEDICAID

## 2019-03-08 DIAGNOSIS — R13.10 DYSPHAGIA, UNSPECIFIED: ICD-10-CM

## 2019-03-08 DIAGNOSIS — C44.311 BASAL CELL CARCINOMA OF SKIN OF NOSE: Chronic | ICD-10-CM

## 2019-03-08 DIAGNOSIS — D04.9 CARCINOMA IN SITU OF SKIN, UNSPECIFIED: Chronic | ICD-10-CM

## 2019-03-08 PROCEDURE — 74230 X-RAY XM SWLNG FUNCJ C+: CPT | Mod: 26

## 2019-03-12 ENCOUNTER — RESULT REVIEW (OUTPATIENT)
Age: 79
End: 2019-03-12

## 2019-03-12 ENCOUNTER — APPOINTMENT (OUTPATIENT)
Dept: HEMATOLOGY ONCOLOGY | Facility: CLINIC | Age: 79
End: 2019-03-12
Payer: MEDICAID

## 2019-03-12 VITALS
HEART RATE: 96 BPM | WEIGHT: 132.28 LBS | SYSTOLIC BLOOD PRESSURE: 130 MMHG | OXYGEN SATURATION: 93 % | DIASTOLIC BLOOD PRESSURE: 70 MMHG | RESPIRATION RATE: 16 BRPM | TEMPERATURE: 98.2 F | BODY MASS INDEX: 20.11 KG/M2

## 2019-03-12 LAB
BASOPHILS # BLD AUTO: 0 K/UL — SIGNIFICANT CHANGE UP (ref 0–0.2)
BASOPHILS NFR BLD AUTO: 0.3 % — SIGNIFICANT CHANGE UP (ref 0–2)
EOSINOPHIL # BLD AUTO: 0.1 K/UL — SIGNIFICANT CHANGE UP (ref 0–0.5)
EOSINOPHIL NFR BLD AUTO: 0.6 % — SIGNIFICANT CHANGE UP (ref 0–6)
HCT VFR BLD CALC: 34.3 % — LOW (ref 39–50)
HGB BLD-MCNC: 11.2 G/DL — LOW (ref 13–17)
LYMPHOCYTES # BLD AUTO: 1.8 K/UL — SIGNIFICANT CHANGE UP (ref 1–3.3)
LYMPHOCYTES # BLD AUTO: 11 % — LOW (ref 13–44)
MCHC RBC-ENTMCNC: 30.3 PG — SIGNIFICANT CHANGE UP (ref 27–34)
MCHC RBC-ENTMCNC: 32.6 G/DL — SIGNIFICANT CHANGE UP (ref 32–36)
MCV RBC AUTO: 93 FL — SIGNIFICANT CHANGE UP (ref 80–100)
MONOCYTES # BLD AUTO: 0.9 K/UL — SIGNIFICANT CHANGE UP (ref 0–0.9)
MONOCYTES NFR BLD AUTO: 5.3 % — SIGNIFICANT CHANGE UP (ref 2–14)
NEUTROPHILS # BLD AUTO: 13.9 K/UL — HIGH (ref 1.8–7.4)
NEUTROPHILS NFR BLD AUTO: 82.8 % — HIGH (ref 43–77)
PLATELET # BLD AUTO: 216 K/UL — SIGNIFICANT CHANGE UP (ref 150–400)
RBC # BLD: 3.69 M/UL — LOW (ref 4.2–5.8)
RBC # FLD: 13.2 % — SIGNIFICANT CHANGE UP (ref 10.3–14.5)
WBC # BLD: 16.8 K/UL — HIGH (ref 3.8–10.5)
WBC # FLD AUTO: 16.8 K/UL — HIGH (ref 3.8–10.5)

## 2019-03-12 PROCEDURE — 99214 OFFICE O/P EST MOD 30 MIN: CPT

## 2019-03-12 NOTE — REVIEW OF SYSTEMS
[Fatigue] : fatigue [Recent Change In Weight] : ~T recent weight change [Dysphagia] : dysphagia [SOB on Exertion] : shortness of breath during exertion [Confused] : confusion [Dizziness] : dizziness [Difficulty Walking] : difficulty walking [Negative] : Allergic/Immunologic [Fever] : no fever [Chills] : no chills [Eye Pain] : no eye pain [Odynophagia] : no odynophagia [Mucosal Pain] : no mucosal pain [Chest Pain] : no chest pain [Palpitations] : no palpitations [Leg Claudication] : no intermittent leg claudication [Lower Ext Edema] : no lower extremity edema [Shortness Of Breath] : no shortness of breath [Wheezing] : no wheezing [Cough] : no cough [Abdominal Pain] : no abdominal pain [Vomiting] : no vomiting [Constipation] : no constipation [Diarrhea] : no diarrhea [Dysuria] : no dysuria [Joint Pain] : no joint pain [Joint Stiffness] : no joint stiffness [Muscle Pain] : no muscle pain [Skin Rash] : no skin rash [Fainting] : no fainting [Anxiety] : no anxiety [Depression] : no depression [Muscle Weakness] : no muscle weakness [Easy Bleeding] : no tendency for easy bleeding [Easy Bruising] : no tendency for easy bruising [Swollen Glands] : no swollen glands [FreeTextEntry2] : stable weight, appetite fair. Pain as above [FreeTextEntry4] : no recent aspiration episodes on puree diet [FreeTextEntry6] : improved

## 2019-03-12 NOTE — PHYSICAL EXAM
[Ambulatory and capable of all self care but unable to carry out any work activities] : Status 2- Ambulatory and capable of all self care but unable to carry out any work activities. Up and about more than 50% of waking hours [Normal] : affect appropriate [Mucositis] : no mucositis [de-identified] : More alert NAD. Obvious discomfort.  [de-identified] : clear b/l

## 2019-03-12 NOTE — ASSESSMENT
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 Pt had not taken short acting prior to visit today\par MRI brain revealed very small 2.3 mm lesion.Referred to Dr. Montoya for GK- but has not made any appointment as yet. Will repeat MRI now\par Follow up with Purvi Dhillon and Dr. Prado, ENT\par Continue Eliquis at 2.5 mg BID\par Continue Tagrisso 40mg since 2 weeks tolerating well\par Dry mouth- likely sec to polypharmacy. Recommend Biotene spray or mouthwash. \par Repeat imaging requested\par Labs today\par OV following imaging.\par

## 2019-03-12 NOTE — HISTORY OF PRESENT ILLNESS
[Disease: _____________________] : Disease: [unfilled] [M: ___] : M[unfilled] [AJCC Stage: ____] : AJCC Stage: [unfilled] [Cardiovascular] : Cardiovascular [Infectious] : Infectious [Pulmonary] : Pulmonary [de-identified] : Translation provided by Mr. Sainz's sonParmjit at pt's request\par \par Mr. Sainz is a 79 yo man with stage IV EGFR mutated NSCLC s/p progression through erlotinib, carboplatin/pemetrexed, atezolizumab,  now on gilotrif\par Tarceva 5/2016-4/2017\par Carbo/pem - 4/24/17 - 6/26/17\par Atezolizumab 7/13/17- 9/14/17\par Afatinib ~2/12/18 - 12/2018\par Tagrisso- 1/30/19- present admission was 12/27/18 to 1/3/19. Last visit, afatinib was stopped for toxicity (fatigue) and Tagrisso was recommended. There were some insurance-related delays since pt does not have a documented T790M mut. Through appeal process, tagrisso was approved and pt started taking it 2 weeks ago. He also started medical cannabis 3 weeks ago. Overall, he is doing better. He is more ambulatory and communicative at home. He is on oxycontin 30 mg TID and Hydromorphone 2 mg as needed (uses approx. 0-2 times/day). Appetite fair and weight is stable. He is till on puree diet and has follow up with speech therapy tomorrow. \par \par 3/12/19: Pt presents for follow up. Pt has been on Tagrisso and is tolerating well. Today however he c/o severe diffuse pain worse in thorax but also extends to abdomen. BM normal qod. Just took short acting (hydrocodone) a few minutes ago. Sleeping ok. Spends most of time iin bed. Appetite fair. Breathing has been ok. Using thick it for all liquids. Last imaging in December. \par

## 2019-03-13 LAB
ALBUMIN SERPL ELPH-MCNC: 3 G/DL
ALP BLD-CCNC: 76 U/L
ALT SERPL-CCNC: 33 U/L
ANION GAP SERPL CALC-SCNC: 15 MMOL/L
AST SERPL-CCNC: 17 U/L
BILIRUB SERPL-MCNC: 0.2 MG/DL
BUN SERPL-MCNC: 22 MG/DL
CALCIUM SERPL-MCNC: 8.6 MG/DL
CEA SERPL-MCNC: 2.6 NG/ML
CHLORIDE SERPL-SCNC: 100 MMOL/L
CO2 SERPL-SCNC: 27 MMOL/L
CREAT SERPL-MCNC: 1.47 MG/DL
GLUCOSE SERPL-MCNC: 89 MG/DL
POTASSIUM SERPL-SCNC: 3.6 MMOL/L
PROT SERPL-MCNC: 7.7 G/DL
SODIUM SERPL-SCNC: 142 MMOL/L

## 2019-03-14 ENCOUNTER — APPOINTMENT (OUTPATIENT)
Dept: OTOLARYNGOLOGY | Facility: CLINIC | Age: 79
End: 2019-03-14
Payer: MEDICAID

## 2019-03-14 VITALS
WEIGHT: 132.28 LBS | HEART RATE: 85 BPM | SYSTOLIC BLOOD PRESSURE: 106 MMHG | HEIGHT: 68 IN | DIASTOLIC BLOOD PRESSURE: 69 MMHG | BODY MASS INDEX: 20.05 KG/M2

## 2019-03-14 DIAGNOSIS — K21.9 GASTRO-ESOPHAGEAL REFLUX DISEASE W/OUT ESOPHAGITIS: ICD-10-CM

## 2019-03-14 PROCEDURE — 31579 LARYNGOSCOPY TELESCOPIC: CPT

## 2019-03-14 PROCEDURE — 99214 OFFICE O/P EST MOD 30 MIN: CPT | Mod: 25

## 2019-03-14 RX ORDER — NYSTATIN 100000 [USP'U]/ML
100000 SUSPENSION ORAL 3 TIMES DAILY
Qty: 1 | Refills: 1 | Status: ACTIVE | COMMUNITY
Start: 2019-03-14 | End: 1900-01-01

## 2019-03-14 RX ORDER — OMEPRAZOLE 40 MG/1
40 CAPSULE, DELAYED RELEASE ORAL TWICE DAILY
Qty: 60 | Refills: 3 | Status: ACTIVE | COMMUNITY
Start: 2018-10-19 | End: 1900-01-01

## 2019-03-14 NOTE — PHYSICAL EXAM
[Midline] : trachea located in midline position [Normal] : no rashes [Laryngoscopy Performed] : laryngoscopy was performed, see procedure section for findings [de-identified] : much better more clear voice

## 2019-03-14 NOTE — REASON FOR VISIT
[Subsequent Evaluation] : a subsequent evaluation for [Family Member] : family member [FreeTextEntry2] : follow up visit for vocal fold paresis.

## 2019-03-14 NOTE — CONSULT LETTER
[Dear  ___] : Dear  [unfilled], [Consult Letter:] : I had the pleasure of evaluating your patient, [unfilled]. [Please see my note below.] : Please see my note below. [Consult Closing:] : Thank you very much for allowing me to participate in the care of this patient.  If you have any questions, please do not hesitate to contact me. [Sincerely,] : Sincerely, [FreeTextEntry3] : Soto Prado MD, PhD\par Chief, Division of Laryngology\par Department of Otolaryngology\par [facility]\par Pediatric Otolaryngology, [facility]\par  of Otolaryngology\par [employer]\par \par \par

## 2019-03-14 NOTE — HISTORY OF PRESENT ILLNESS
[de-identified] : 79yo M with improved voice after injection laryngoplasty. Swallowing has improved as well. No issues with anything thus far. Has been having larger bites. Has been having increased dry mouth. No longer taking PPI. He finished taking it and didn’t take anymore after that. Admits to increased acid reflux since stopping. Breathing has been stable. No SOB. Feels globus sensation. No heartburn.\par

## 2019-03-15 ENCOUNTER — TRANSCRIPTION ENCOUNTER (OUTPATIENT)
Age: 79
End: 2019-03-15

## 2019-03-23 ENCOUNTER — INPATIENT (INPATIENT)
Facility: HOSPITAL | Age: 79
LOS: 5 days | Discharge: HOME | DRG: 871 | End: 2019-03-29
Attending: INTERNAL MEDICINE | Admitting: HOSPITALIST
Payer: MEDICAID

## 2019-03-23 VITALS
DIASTOLIC BLOOD PRESSURE: 72 MMHG | RESPIRATION RATE: 22 BRPM | HEART RATE: 106 BPM | HEIGHT: 68 IN | SYSTOLIC BLOOD PRESSURE: 110 MMHG | WEIGHT: 130.07 LBS | OXYGEN SATURATION: 99 %

## 2019-03-23 DIAGNOSIS — A41.9 SEPSIS, UNSPECIFIED ORGANISM: ICD-10-CM

## 2019-03-23 DIAGNOSIS — D04.9 CARCINOMA IN SITU OF SKIN, UNSPECIFIED: Chronic | ICD-10-CM

## 2019-03-23 DIAGNOSIS — N17.9 ACUTE KIDNEY FAILURE, UNSPECIFIED: ICD-10-CM

## 2019-03-23 DIAGNOSIS — N40.0 BENIGN PROSTATIC HYPERPLASIA WITHOUT LOWER URINARY TRACT SYMPTOMS: ICD-10-CM

## 2019-03-23 DIAGNOSIS — R31.9 HEMATURIA, UNSPECIFIED: ICD-10-CM

## 2019-03-23 DIAGNOSIS — C44.311 BASAL CELL CARCINOMA OF SKIN OF NOSE: Chronic | ICD-10-CM

## 2019-03-23 DIAGNOSIS — C34.90 MALIGNANT NEOPLASM OF UNSPECIFIED PART OF UNSPECIFIED BRONCHUS OR LUNG: ICD-10-CM

## 2019-03-23 DIAGNOSIS — Z29.9 ENCOUNTER FOR PROPHYLACTIC MEASURES, UNSPECIFIED: ICD-10-CM

## 2019-03-23 DIAGNOSIS — G62.9 POLYNEUROPATHY, UNSPECIFIED: ICD-10-CM

## 2019-03-23 DIAGNOSIS — R29.898 OTHER SYMPTOMS AND SIGNS INVOLVING THE MUSCULOSKELETAL SYSTEM: ICD-10-CM

## 2019-03-23 DIAGNOSIS — E11.9 TYPE 2 DIABETES MELLITUS WITHOUT COMPLICATIONS: ICD-10-CM

## 2019-03-23 DIAGNOSIS — R15.9 FULL INCONTINENCE OF FECES: ICD-10-CM

## 2019-03-23 DIAGNOSIS — J96.21 ACUTE AND CHRONIC RESPIRATORY FAILURE WITH HYPOXIA: ICD-10-CM

## 2019-03-23 LAB
ALBUMIN SERPL ELPH-MCNC: 2.5 G/DL — LOW (ref 3.3–5)
ALP SERPL-CCNC: 53 U/L — SIGNIFICANT CHANGE UP (ref 40–120)
ALT FLD-CCNC: 15 U/L — SIGNIFICANT CHANGE UP (ref 10–45)
ANION GAP SERPL CALC-SCNC: 13 MMOL/L — SIGNIFICANT CHANGE UP (ref 5–17)
APPEARANCE UR: ABNORMAL
APTT BLD: 31.1 SEC — SIGNIFICANT CHANGE UP (ref 27.5–36.3)
AST SERPL-CCNC: 12 U/L — SIGNIFICANT CHANGE UP (ref 10–40)
BACTERIA # UR AUTO: NEGATIVE — SIGNIFICANT CHANGE UP
BASOPHILS # BLD AUTO: 0 K/UL — SIGNIFICANT CHANGE UP (ref 0–0.2)
BASOPHILS NFR BLD AUTO: 0.2 % — SIGNIFICANT CHANGE UP (ref 0–2)
BILIRUB SERPL-MCNC: 0.5 MG/DL — SIGNIFICANT CHANGE UP (ref 0.2–1.2)
BILIRUB UR-MCNC: NEGATIVE — SIGNIFICANT CHANGE UP
BUN SERPL-MCNC: 37 MG/DL — HIGH (ref 7–23)
CALCIUM SERPL-MCNC: 8.9 MG/DL — SIGNIFICANT CHANGE UP (ref 8.4–10.5)
CHLORIDE SERPL-SCNC: 99 MMOL/L — SIGNIFICANT CHANGE UP (ref 96–108)
CO2 SERPL-SCNC: 27 MMOL/L — SIGNIFICANT CHANGE UP (ref 22–31)
COLOR SPEC: ABNORMAL
CREAT SERPL-MCNC: 2.29 MG/DL — HIGH (ref 0.5–1.3)
DIFF PNL FLD: ABNORMAL
EOSINOPHIL # BLD AUTO: 0 K/UL — SIGNIFICANT CHANGE UP (ref 0–0.5)
EOSINOPHIL NFR BLD AUTO: 0.2 % — SIGNIFICANT CHANGE UP (ref 0–6)
EPI CELLS # UR: 1 /HPF — SIGNIFICANT CHANGE UP
GAS PNL BLDV: SIGNIFICANT CHANGE UP
GLUCOSE SERPL-MCNC: 168 MG/DL — HIGH (ref 70–99)
GLUCOSE UR QL: NEGATIVE — SIGNIFICANT CHANGE UP
HCT VFR BLD CALC: 31.1 % — LOW (ref 39–50)
HGB BLD-MCNC: 10 G/DL — LOW (ref 13–17)
HYALINE CASTS # UR AUTO: 4 /LPF — HIGH (ref 0–2)
INR BLD: 1.41 RATIO — HIGH (ref 0.88–1.16)
KETONES UR-MCNC: NEGATIVE — SIGNIFICANT CHANGE UP
LEUKOCYTE ESTERASE UR-ACNC: NEGATIVE — SIGNIFICANT CHANGE UP
LYMPHOCYTES # BLD AUTO: 1.5 K/UL — SIGNIFICANT CHANGE UP (ref 1–3.3)
LYMPHOCYTES # BLD AUTO: 8 % — LOW (ref 13–44)
MCHC RBC-ENTMCNC: 30.5 PG — SIGNIFICANT CHANGE UP (ref 27–34)
MCHC RBC-ENTMCNC: 32.1 GM/DL — SIGNIFICANT CHANGE UP (ref 32–36)
MCV RBC AUTO: 94.9 FL — SIGNIFICANT CHANGE UP (ref 80–100)
MONOCYTES # BLD AUTO: 1.1 K/UL — HIGH (ref 0–0.9)
MONOCYTES NFR BLD AUTO: 6 % — SIGNIFICANT CHANGE UP (ref 2–14)
NEUTROPHILS # BLD AUTO: 16.3 K/UL — HIGH (ref 1.8–7.4)
NEUTROPHILS NFR BLD AUTO: 85.7 % — HIGH (ref 43–77)
NITRITE UR-MCNC: NEGATIVE — SIGNIFICANT CHANGE UP
PH UR: 6 — SIGNIFICANT CHANGE UP (ref 5–8)
PLATELET # BLD AUTO: 160 K/UL — SIGNIFICANT CHANGE UP (ref 150–400)
POTASSIUM SERPL-MCNC: 3.7 MMOL/L — SIGNIFICANT CHANGE UP (ref 3.5–5.3)
POTASSIUM SERPL-SCNC: 3.7 MMOL/L — SIGNIFICANT CHANGE UP (ref 3.5–5.3)
PROT SERPL-MCNC: 7 G/DL — SIGNIFICANT CHANGE UP (ref 6–8.3)
PROT UR-MCNC: 100 — SIGNIFICANT CHANGE UP
PROTHROM AB SERPL-ACNC: 16.2 SEC — HIGH (ref 10–12.9)
RAPID RVP RESULT: SIGNIFICANT CHANGE UP
RBC # BLD: 3.28 M/UL — LOW (ref 4.2–5.8)
RBC # FLD: 13.8 % — SIGNIFICANT CHANGE UP (ref 10.3–14.5)
RBC CASTS # UR COMP ASSIST: 1049 /HPF — HIGH (ref 0–4)
SODIUM SERPL-SCNC: 139 MMOL/L — SIGNIFICANT CHANGE UP (ref 135–145)
SP GR SPEC: 1.02 — SIGNIFICANT CHANGE UP (ref 1.01–1.02)
UROBILINOGEN FLD QL: NEGATIVE — SIGNIFICANT CHANGE UP
WBC # BLD: 19 K/UL — HIGH (ref 3.8–10.5)
WBC # FLD AUTO: 19 K/UL — HIGH (ref 3.8–10.5)
WBC UR QL: 19 /HPF — HIGH (ref 0–5)

## 2019-03-23 PROCEDURE — 99291 CRITICAL CARE FIRST HOUR: CPT

## 2019-03-23 PROCEDURE — 93010 ELECTROCARDIOGRAM REPORT: CPT

## 2019-03-23 PROCEDURE — 71045 X-RAY EXAM CHEST 1 VIEW: CPT | Mod: 26

## 2019-03-23 PROCEDURE — 74176 CT ABD & PELVIS W/O CONTRAST: CPT | Mod: 26

## 2019-03-23 PROCEDURE — 99223 1ST HOSP IP/OBS HIGH 75: CPT | Mod: GC

## 2019-03-23 PROCEDURE — 71250 CT THORAX DX C-: CPT | Mod: 26

## 2019-03-23 RX ORDER — HYDROMORPHONE HYDROCHLORIDE 2 MG/ML
2 INJECTION INTRAMUSCULAR; INTRAVENOUS; SUBCUTANEOUS
Qty: 0 | Refills: 0 | Status: DISCONTINUED | OUTPATIENT
Start: 2019-03-23 | End: 2019-03-23

## 2019-03-23 RX ORDER — TENOFOVIR DISOPROXIL FUMARATE 300 MG/1
300 TABLET, FILM COATED ORAL DAILY
Qty: 0 | Refills: 0 | Status: DISCONTINUED | OUTPATIENT
Start: 2019-03-24 | End: 2019-03-29

## 2019-03-23 RX ORDER — SODIUM CHLORIDE 9 MG/ML
500 INJECTION INTRAMUSCULAR; INTRAVENOUS; SUBCUTANEOUS ONCE
Qty: 0 | Refills: 0 | Status: COMPLETED | OUTPATIENT
Start: 2019-03-23 | End: 2019-03-23

## 2019-03-23 RX ORDER — DEXAMETHASONE 0.5 MG/5ML
2 ELIXIR ORAL DAILY
Qty: 0 | Refills: 0 | Status: DISCONTINUED | OUTPATIENT
Start: 2019-03-23 | End: 2019-03-29

## 2019-03-23 RX ORDER — HYDROMORPHONE HYDROCHLORIDE 2 MG/ML
2 INJECTION INTRAMUSCULAR; INTRAVENOUS; SUBCUTANEOUS EVERY 12 HOURS
Qty: 0 | Refills: 0 | Status: DISCONTINUED | OUTPATIENT
Start: 2019-03-23 | End: 2019-03-24

## 2019-03-23 RX ORDER — VANCOMYCIN HCL 1 G
1000 VIAL (EA) INTRAVENOUS ONCE
Qty: 0 | Refills: 0 | Status: COMPLETED | OUTPATIENT
Start: 2019-03-23 | End: 2019-03-23

## 2019-03-23 RX ORDER — SODIUM CHLORIDE 9 MG/ML
3 INJECTION INTRAMUSCULAR; INTRAVENOUS; SUBCUTANEOUS EVERY 6 HOURS
Qty: 0 | Refills: 0 | Status: DISCONTINUED | OUTPATIENT
Start: 2019-03-23 | End: 2019-03-29

## 2019-03-23 RX ORDER — PIPERACILLIN AND TAZOBACTAM 4; .5 G/20ML; G/20ML
3.38 INJECTION, POWDER, LYOPHILIZED, FOR SOLUTION INTRAVENOUS ONCE
Qty: 0 | Refills: 0 | Status: COMPLETED | OUTPATIENT
Start: 2019-03-23 | End: 2019-03-23

## 2019-03-23 RX ORDER — ACETAMINOPHEN 500 MG
650 TABLET ORAL ONCE
Qty: 0 | Refills: 0 | Status: DISCONTINUED | OUTPATIENT
Start: 2019-03-23 | End: 2019-03-23

## 2019-03-23 RX ORDER — OXYCODONE HYDROCHLORIDE 5 MG/1
30 TABLET ORAL EVERY 8 HOURS
Qty: 0 | Refills: 0 | Status: DISCONTINUED | OUTPATIENT
Start: 2019-03-23 | End: 2019-03-23

## 2019-03-23 RX ORDER — IPRATROPIUM/ALBUTEROL SULFATE 18-103MCG
3 AEROSOL WITH ADAPTER (GRAM) INHALATION EVERY 6 HOURS
Qty: 0 | Refills: 0 | Status: DISCONTINUED | OUTPATIENT
Start: 2019-03-23 | End: 2019-03-29

## 2019-03-23 RX ORDER — VANCOMYCIN HCL 1 G
1500 VIAL (EA) INTRAVENOUS ONCE
Qty: 0 | Refills: 0 | Status: DISCONTINUED | OUTPATIENT
Start: 2019-03-23 | End: 2019-03-23

## 2019-03-23 RX ORDER — PANTOPRAZOLE SODIUM 20 MG/1
40 TABLET, DELAYED RELEASE ORAL
Qty: 0 | Refills: 0 | Status: DISCONTINUED | OUTPATIENT
Start: 2019-03-23 | End: 2019-03-29

## 2019-03-23 RX ORDER — SODIUM CHLORIDE 9 MG/ML
1850 INJECTION, SOLUTION INTRAVENOUS ONCE
Qty: 0 | Refills: 0 | Status: COMPLETED | OUTPATIENT
Start: 2019-03-23 | End: 2019-03-23

## 2019-03-23 RX ORDER — ACETAMINOPHEN 500 MG
325 TABLET ORAL EVERY 4 HOURS
Qty: 0 | Refills: 0 | Status: DISCONTINUED | OUTPATIENT
Start: 2019-03-23 | End: 2019-03-29

## 2019-03-23 RX ORDER — GABAPENTIN 400 MG/1
100 CAPSULE ORAL DAILY
Qty: 0 | Refills: 0 | Status: DISCONTINUED | OUTPATIENT
Start: 2019-03-23 | End: 2019-03-26

## 2019-03-23 RX ORDER — ONDANSETRON 8 MG/1
4 TABLET, FILM COATED ORAL EVERY 8 HOURS
Qty: 0 | Refills: 0 | Status: DISCONTINUED | OUTPATIENT
Start: 2019-03-23 | End: 2019-03-23

## 2019-03-23 RX ORDER — TAMSULOSIN HYDROCHLORIDE 0.4 MG/1
0.4 CAPSULE ORAL AT BEDTIME
Qty: 0 | Refills: 0 | Status: DISCONTINUED | OUTPATIENT
Start: 2019-03-23 | End: 2019-03-29

## 2019-03-23 RX ORDER — ACETAMINOPHEN 500 MG
650 TABLET ORAL EVERY 6 HOURS
Qty: 0 | Refills: 0 | Status: DISCONTINUED | OUTPATIENT
Start: 2019-03-23 | End: 2019-03-29

## 2019-03-23 RX ORDER — PIPERACILLIN AND TAZOBACTAM 4; .5 G/20ML; G/20ML
3.38 INJECTION, POWDER, LYOPHILIZED, FOR SOLUTION INTRAVENOUS EVERY 12 HOURS
Qty: 0 | Refills: 0 | Status: COMPLETED | OUTPATIENT
Start: 2019-03-24 | End: 2019-03-29

## 2019-03-23 RX ORDER — ACETAMINOPHEN 500 MG
1000 TABLET ORAL ONCE
Qty: 0 | Refills: 0 | Status: COMPLETED | OUTPATIENT
Start: 2019-03-23 | End: 2019-03-23

## 2019-03-23 RX ORDER — APIXABAN 2.5 MG/1
2.5 TABLET, FILM COATED ORAL EVERY 12 HOURS
Qty: 0 | Refills: 0 | Status: DISCONTINUED | OUTPATIENT
Start: 2019-03-23 | End: 2019-03-23

## 2019-03-23 RX ORDER — AFATINIB 40 MG/1
1 TABLET, FILM COATED ORAL
Qty: 0 | Refills: 0 | COMMUNITY

## 2019-03-23 RX ORDER — LOPERAMIDE HCL 2 MG
0 TABLET ORAL
Qty: 0 | Refills: 0 | COMMUNITY

## 2019-03-23 RX ORDER — ACETAMINOPHEN 500 MG
650 TABLET ORAL EVERY 6 HOURS
Qty: 0 | Refills: 0 | Status: DISCONTINUED | OUTPATIENT
Start: 2019-03-23 | End: 2019-03-23

## 2019-03-23 RX ADMIN — SODIUM CHLORIDE 1000 MILLILITER(S): 9 INJECTION INTRAMUSCULAR; INTRAVENOUS; SUBCUTANEOUS at 21:44

## 2019-03-23 RX ADMIN — TAMSULOSIN HYDROCHLORIDE 0.4 MILLIGRAM(S): 0.4 CAPSULE ORAL at 23:37

## 2019-03-23 RX ADMIN — PIPERACILLIN AND TAZOBACTAM 25 GRAM(S): 4; .5 INJECTION, POWDER, LYOPHILIZED, FOR SOLUTION INTRAVENOUS at 23:37

## 2019-03-23 RX ADMIN — SODIUM CHLORIDE 1000 MILLILITER(S): 9 INJECTION INTRAMUSCULAR; INTRAVENOUS; SUBCUTANEOUS at 21:06

## 2019-03-23 RX ADMIN — PIPERACILLIN AND TAZOBACTAM 200 GRAM(S): 4; .5 INJECTION, POWDER, LYOPHILIZED, FOR SOLUTION INTRAVENOUS at 12:15

## 2019-03-23 RX ADMIN — SODIUM CHLORIDE 1850 MILLILITER(S): 9 INJECTION, SOLUTION INTRAVENOUS at 12:36

## 2019-03-23 RX ADMIN — Medication 250 MILLIGRAM(S): at 13:41

## 2019-03-23 RX ADMIN — Medication 400 MILLIGRAM(S): at 13:00

## 2019-03-23 NOTE — H&P ADULT - PROBLEM SELECTOR PLAN 7
- pt w/ Stage IV EGFR + NSCLC   - c/w Tagrisso   - will call heme/onc in AM   - Oncologist is Dr. Norah Gross   - oncologist is - pt w/ Stage IV EGFR + NSCLC   - c/w Tagrisso and decadron  - c/w home pain management regimen   - will call heme/onc in AM   - Oncologist is Dr. Norah Gross   - oncologist is - may be 2/2 to new chemo drug in setting of recent change  - gabapentin 100mg qd started; will monitor symptoms

## 2019-03-23 NOTE — H&P ADULT - PROBLEM SELECTOR PLAN 6
- may be 2/2 to new chemo drug in setting of recent change - MRI lumbosacral spine to r/o cord compression   - MRI head to r/o further brain mets - MRI lumbosacral spine to r/o cord compression - unlikely with normal rectal tone however does have new incontinence and LLE weakness. No saddle paresthesias.   - MRI head to r/o further brain mets or hemorrhagic conversion with new LE weakness and AMS.

## 2019-03-23 NOTE — H&P ADULT - HISTORY OF PRESENT ILLNESS
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  As per son, on Thursday his father developed a fever and was having subjective chills and was less oriented than normal (only able to know that he was home).  Pt continued to have fevers/chills w/ cough and began developing SOB today prompting the family to call EMS.  Son reports that the cough is productive w/ thick sputum .  Last hospitalization for patient was in 01/2019 for pain found to be a/w a rib fracture.  Denies recent travel or sick contacts.  ROS significant for new onset of fecal incontinence 2 days ago as well as shooting pains in hands and feet (not radiating from back) that have also recently started.  Denies abdominal pain, N/V/D/C.  Pt has a history of urinary incontinence; family did not mention hematuria or dysuria.      Upon arrival to ED vitals were as follows: T 102 rectal, , /72, RR 22 85% on RA as per EMS, 99% on NRB at 15L/min (currently 98% on 4L NC)   Labs: CBC sig for WBC 19 (85.7% neutrophilia), Hbg 10 (stable), Cr 2.29 (BL 1.35), BUN/Cr 16, U/A + for RBC 1049, neg nitrites/LE, INR 1.41, RVP negative, VBG pH 7.44/pCO2 47  Imaging: CT chest/abd/pelvis sig for opacities/consolidation in VENTURA and LLL (increased from PET/CT 12/24/18) w/ small L and trace R pleural effusions.  Kidney showed no hydro or renal calculi, bladder wnl     2 sets of blood cx and a urine cx were sent.  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  As per son, on Thursday his father developed a fever and was having subjective chills and was less oriented than normal (only able to know that he was home).  Pt continued to have fevers/chills w/ cough and began developing SOB today prompting the family to call EMS.  Son reports that the cough is productive w/ thick sputum .  Last hospitalization for patient was in 01/2019 for pain found to be a/w a rib fracture.  Denies recent travel or sick contacts.  ROS significant for new onset of fecal incontinence 2 days ago as well as shooting pains in hands and feet (not radiating from back) that have also recently started.  Denies abdominal pain, N/V/D/C.  Pt has a history of urinary incontinence; family did not mention hematuria or dysuria.      Upon arrival to ED vitals were as follows: T 102 rectal, , /72, RR 22 85% on RA as per EMS, 99% on NRB at 15L/min (currently 98% on 4L NC)   Labs: CBC sig for WBC 19 (85.7% neutrophilia), Hbg 10 (stable), Cr 2.29 (BL 1.35), BUN/Cr 16, U/A + for RBC 1049, neg nitrites/LE, INR 1.41, RVP negative, VBG pH 7.44/pCO2 47  Imaging: CT chest/abd/pelvis sig for opacities/consolidation in VENTURA and LLL (increased from PET/CT 12/24/18) w/ small L and trace R pleural effusions.  Kidney showed no hydro or renal calculi, bladder wnl     2 sets of blood cx and a urine cx were sent.  Pt was given 1.85 LR bolus, 1gm IV Tylenol and started on vanc and zosyn

## 2019-03-23 NOTE — PATIENT PROFILE ADULT - NSPROEDALEARNPREFOTH_GEN_A_NUR
Note Title:  Individual Outpatient Psychotherapy Progress Note      Name:  Elinor Breaux  MRN:  [id number]  :  1986  Age:  30 y.o.    PCP:  Cyn Guerin PA-C    Service Rendered:  Individual Psychotherapy  Date of Service:  3/24/2017  Length of Service:  35 minutes    Persons in Attendence: Elinor    Interim History:  Pt reported continued improvements in her anxiety. Has not had a panic attack for several months now. Still experiences anticipatory anxiety or relaxation-induced anxiety, but anxiety has lessened in intensity, frequency and duration. Pt reported that she notices anxiety, worry and tension in response to physical sensations, especially heartburn. Engaged in mindfulness of breath and physical sensations with acceptance. Discussed practice. Pt reported noticing her bodily responses of tension and mental alert to the sensations. Reported that through the exercise she was able to modulate this response and practice observation/williness/acceptance of discomfort. Pt will continue mindfulness practice on own.     Diagnostic Impressions:    1. Panic disorder without agoraphobia    2. Situational phobia      Mental Status Exam:   Appearance:  Well groomed, good hygiene, appears stated age.   Behavior:  Pleasant, sociable, cooperative.   Mood:  “good,” slightly anxious.   Affect:  Appropriate to mood, constricted range.   Speech/Language:  Normal rate, rhythm, and tone.   Sensorium:  Alert and oriented to person, place, time, and situation.   Memory and Cognition:  Within normal limits, no evidence of gross cognitive, intellectual, or memory impairments   Thought Process/Thought Content:  Logical, linear, goal directed. Reality testing appears intact.    Insight/Judgment: Good.      Risk Assessment:  Elinor denied concerns regarding risk to self or others.       Treatment Recommendations and Plan:    Will continue individual therapy utilizing ACT approach to anxiety treatment.     The above  diagnostic impressions, recommendations, and treatment plan were discussed with and agreed upon by Elinor. Care will be coordinated with Elinor’s healthcare team, as appropriate.      Cleo Talbert, PhD  Licensed Psychologist  [facility] of the [facility]     verbal instruction/individual instruction/skill demonstration

## 2019-03-23 NOTE — H&P ADULT - ASSESSMENT
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

## 2019-03-23 NOTE — H&P ADULT - PROBLEM SELECTOR PLAN 5
- Cr 2.29 (BL 1.35)  - BUN/Cr ratio <20; likely intrinsic renal disease in setting of significant hematuria  - f/u renal US  - ordered urine lytes - Cr 2.29 (BL 1.35)  - BUN/Cr ratio <20; likely intrinsic renal disease in setting of significant hematuria  - f/u renal US  - renal consult in AM   - ordered urine lytes - Cr 2.29 (BL 1.35)  - BUN/Cr ratio <20; likely intrinsic renal disease in setting of significant hematuria  - bedside bladder scan showed no retention   - f/u renal US  - f/u urine lytes   - renal consult in AM   - ordered urine lytes

## 2019-03-23 NOTE — ED PROVIDER NOTE - PROGRESS NOTE DETAILS
Alisson Siu, DO: blood in urine without evidence of UTI. clean catch specimen. Will obtain CTs to eval for septic stone vs. PNA as leading ddx. Pending RVP. Alisson Siu, DO: CTs unable to cross over, no evidence of renal stones. b/l opacities on CT. Clinical concern for PNA. Will endorse and admit to hospitalist. Alisson Siu, DO: Patient saturating 96% on 4L NC. BP stable. CTs unable to cross over, no evidence of kidney stones. b/l opacities on CT. Clinical concern for PNA. Will endorse and admit to hospitalist.

## 2019-03-23 NOTE — H&P ADULT - PROBLEM SELECTOR PLAN 10
- DVT ppx: - DVT ppx: Eliquis  - Diet: regular soft diet - DVT ppx: Eliquis  - Diet: dysphagia 1 10.  Chronic hep B - continue Tenofovir   11.  BPH - c/w flomax   - DVT ppx: Eliquis  - Diet: dysphagia 1 9. Hx of PE - pt has been on Eliquis for >1 year for remote history of PE; will hold in setting of hematuria   10.  Chronic hep B - continue Tenofovir   11.  BPH - c/w flomax   - DVT ppx: SCDs in setting of hematuria   - Diet: dysphagia 1

## 2019-03-23 NOTE — ED ADULT NURSE REASSESSMENT NOTE - NS ED NURSE REASSESS COMMENT FT1
Pt on monitor. Pt breathing at 92% spo2 on RA. Pt placed on 4L NC, SPO2 97%. MD Mohan aware. Will continue to monitor.

## 2019-03-23 NOTE — ED ADULT NURSE NOTE - NSIMPLEMENTINTERV_GEN_ALL_ED
Implemented All Fall Risk Interventions:  [place] to call system. Call bell, personal items and telephone within reach. Instruct patient to call for assistance. Room bathroom lighting operational. Non-slip footwear when patient is off stretcher. Physically safe environment: no spills, clutter or unnecessary equipment. Stretcher in lowest position, wheels locked, appropriate side rails in place. Provide visual cue, wrist band, yellow gown, etc. Monitor gait and stability. Monitor for mental status changes and reorient to person, place, and time. Review medications for side effects contributing to fall risk. Reinforce activity limits and safety measures with patient and family.

## 2019-03-23 NOTE — ED PROVIDER NOTE - OBJECTIVE STATEMENT
Alisson Siu, DO: 78M hx of DM, HTN, HLD, Stage 4 lung ca BIB EMS from home for AMS, weakness and fever x 3 days w. cough. As per son, pt has been progressively weak, has hx of aspiration pna.+ Cough. Denies any abd pain, n/v.   As per EMS pt was hypoxic to 85 on RA, on intermittent oxygen at home.   Patient is awake,alert,oriented x 2. Patient is well appearing and in no acute distress. Patient's chest w decreased BS on left. +s1s2. Abdomen is soft nd/nt +BS. Extremity with no swelling or calf tenderness.   Pt noted to be tachycardic, Check Labs, cultures, Xray. Eval for infection. Empiric antibiotics. Alisson Siu, DO: 78M hx of DM, HTN, HLD, Stage 4 lung ca on oral chemo BIB EMS from home for AMS, weakness and fever x 3 days w. cough. As per discussion with EMS, patient was hypoxic to 85% on RA, on intermittent oxygen at home.  As per son, pt has been progressively weak, has hx of aspiration pna.+ Cough. Denies any abd pain, n/v.     Offered translation services but patient declined, son translating at bedside.     Patient is awake,alert,oriented x 2. Patient is well appearing and in no acute distress. Patient's chest w decreased BS on left. +s1s2. Abdomen is soft nd/nt +BS. Extremity with no swelling or calf tenderness.   Pt noted to be tachycardic, Check Labs, cultures, Xray. Eval for infection. Empiric antibiotics. Alisson Siu, DO: 78M hx of DM, HTN, HLD, Stage 4 lung ca on oral chemo BIB EMS from home for AMS, weakness and fever x 3 days w. cough. As per discussion with EMS, patient was hypoxic to 85% on RA, on oxygen PRN at home. + NP cough. Denies any abd pain, n/v.     Offered translation services but patient declined, son translating at bedside. Alisson Siu, DO: 78M hx of DM, HTN, HLD, Stage 4 lung ca on oral chemo BIB EMS from home for AMS, weakness and fever x 3 days /w. cough. As per discussion with EMS, patient was hypoxic to 85% on RA, on oxygen PRN at home. + NP cough. Denies any abd pain, n/v.     Offered translation services but patient declined, son translating at bedside.

## 2019-03-23 NOTE — H&P ADULT - NSICDXFAMILYHX_GEN_ALL_CORE_FT
FAMILY HISTORY:  Family history of liver cancer  Family history of liver cancer, Mother     Sibling  Still living? Yes, Estimated age: Age Unknown  Family history of stomach cancer, Age at diagnosis: Age Unknown  Family history of diabetes mellitus, Age at diagnosis: Age Unknown

## 2019-03-23 NOTE — ED PROVIDER NOTE - PHYSICAL EXAMINATION
Alisson Siu, DO:   Gen: ill appearing, AAOx2  Head: NCAT  HEENT: PERRL, MMM, normal conjunctiva, anicteric, neck supple  Lung: decreased BS on L, symmetric & unlabored respirations  CV: tachycardic with regular rhythm  Abd: soft, NTND, no rebound or guarding  MSK: No edema, no visible deformities  Neuro: Moving all extremities equally  Skin: Warm and dry, no evidence of rash  Psych: calm & cooperative

## 2019-03-23 NOTE — ED PROVIDER NOTE - CARE PLAN
Principal Discharge DX:	Sepsis  Secondary Diagnosis:	Lung cancer  Secondary Diagnosis:	PNA (pneumonia)

## 2019-03-23 NOTE — H&P ADULT - PROBLEM SELECTOR PLAN 2
DDX: HAP (hospitalized in Jan) vs PE   - Pt presented w/ fever, leukocytosis and CT findings showing increased opacities/consolidations in VENTURA and LLL; c/w renal dosing of vanc in setting or LUX (vanc trough goal 15-20) and zosyn   - Pt w/ history of adenocarcinoma, tachycardia and hypoxia however pt on Eliquis  - duonebs q6 PRN, chest PT, acapella, IS w/ PRN hypertonic saline  - Pulm consulted - likely 2/2 HAP (hospitalized in Jan)   - Pt presented w/ fever, leukocytosis and CT findings showing increased opacities/consolidations in VENTURA and LLL; c/w renal dosing of vanc in setting or LUX (vanc trough goal 15-20) and zosyn.  Sputum culture ordered   - Pt w/ history of adenocarcinoma, tachycardia and hypoxia however pt on Eliquis; if pt does not improve w/ abx will consider CTA   - duonebs q6 PRN, chest PT, acapella, IS w/ PRN hypertonic saline  - Pulm consulted - likely 2/2 HAP (hospitalized in Jan) and immunocompromised with lung cancer on chemotherapy   - Pt presented w/ fever, leukocytosis and CT findings showing increased opacities/consolidations in VENTURA and LLL; c/w renal dosing of vanc in setting or LUX (vanc trough goal 15-20) and zosyn.  Sputum culture ordered   - Pt w/ history of adenocarcinoma, tachycardia and hypoxia however pt on Eliquis; if pt does not improve w/ abx will consider CTA   - duonebs q6 PRN, chest PT, acapella, IS w/ PRN hypertonic saline  - Pulm consulted

## 2019-03-23 NOTE — ED PROVIDER NOTE - CLINICAL SUMMARY MEDICAL DECISION MAKING FREE TEXT BOX
Alisson Sherron, DO: 78M with AMS and likely fever meeting SIRS/qSOFA criteria with darkened urine concerning for tea-colored urine vs. concentrated urine. If bilirubin in urine or hyperbilirubinemia/transaminitis, low suspicion for advanced imaging despite no abdominal pain/TTP. Patient received antibiotics on arrival and IVF.

## 2019-03-23 NOTE — H&P ADULT - PROBLEM SELECTOR PLAN 8
- c/w flomax   - obtain post-void bladder scan to screen for retention - pt w/ Stage IV EGFR + NSCLC   - c/w Tagrisso and decadron  - c/w home pain management regimen   - will call heme/onc in AM   - Oncologist is Dr. Norah Gross   - oncologist is - pt w/ Stage IV EGFR + NSCLC   - holding Tagrisso and decadron in setting of infection; will f/u w/ heme/onc   - c/w home pain management regimen   - will call heme/onc in AM   - Oncologist is Dr. Norah Gross   - oncologist is - pt w/ Stage IV EGFR + NSCLC   - holding Tagrisso in setting of infection; will f/u w/ heme/onc   - c/w decardon as pt has been taking for a long time   - c/w home pain management regimen   - will call heme/onc in AM   - Oncologist is Dr. Norah Gross   - oncologist is - pt w/ Stage IV EGFR + NSCLC   - holding Tagrisso in setting of infection; will f/u w/ heme/onc   - c/w decardon for nausea and tumor burden  - c/w home pain management regimen   - will call heme/onc in AM   - Oncologist is Dr. Norah Gross   - oncologist is

## 2019-03-23 NOTE — ED ADULT NURSE REASSESSMENT NOTE - NS ED NURSE REASSESS COMMENT FT1
Report received from  LORA Zarate  Pt resting comfortably with family at bedside.   Awaiting medicine bed  Safety maintained at all times, bed in lowest position, call bell in hand.  Will continue to monitor closely.

## 2019-03-23 NOTE — H&P ADULT - PROBLEM SELECTOR PLAN 4
- CT abd/pelvis showed no evidence of renal calculi w/ no hydro   - ordered renal US for further eval  - bedside bladder scan showed no retention  - c/w Flomax - CT abd/pelvis showed no evidence of renal calculi w/ no hydro   - ordered renal US for further eval  - bedside bladder scan showed no retention  - c/w Flomax  - cbc q12h; transfuse for Hbg < 7 Acute blood loss anemia possibly 2/2 Hematuria  - CT abd/pelvis showed no evidence of renal calculi w/ no hydro   - ordered renal US for further eval  - bedside bladder scan showed no retention  - c/w Flomax  - cbc q12h; transfuse for Hbg < 7

## 2019-03-23 NOTE — H&P ADULT - PROBLEM SELECTOR PLAN 3
- concerning for mets w/ cord compression   - MRI spine w/ out IV contrast in setting of LUX - concerning for mets w/ cord compression   - MRI lumbosacral spine w/ out IV contrast in setting of LUX

## 2019-03-23 NOTE — H&P ADULT - PROBLEM SELECTOR PLAN 1
- Pt w/ sepsis on admission   - Likely 2/2 PNA  - c/w broad spectrum abx (vanc + zosyn)  - U/A neg for LE/nitrites but urine cx and 2 sets of blood cultures are pending - Pt w/ sepsis on admission   - Pt admitted w/ tachycardia, tachypnea, fever, leukocytosis, source of infection and LUX  - Likely 2/2 PNA  - c/w broad spectrum abx (vanc + zosyn)  - U/A neg for LE/nitrites but urine cx and 2 sets of blood cultures are pending - Pt w/ sepsis on admission   - Pt admitted w/ tachycardia, tachypnea, fever, leukocytosis, source of infection and LUX  - Likely 2/2 PNA  - c/w broad spectrum abx (vanc + zosyn)  - U/A neg for LE/nitrites but urine cx and 2 sets of blood cultures are pending  -weight based IVR given, Cap refill 2sec on reassessment.

## 2019-03-23 NOTE — CHART NOTE - NSCHARTNOTEFT_GEN_A_CORE
Paged by RN for hypotension ~80s/50s. Started 500 cc bolus of NS. Patient was seen and examined at bedside by me. Patient sleeping, tired. Vitals BP 96/68, HR 88, SpO2 98%.  - C/w NS bolus. Continue to monitor BP to assess for additional fluids/pressors.

## 2019-03-23 NOTE — ED ADULT NURSE REASSESSMENT NOTE - NS ED NURSE REASSESS COMMENT FT1
pt more responsive. Pt responding when asked a question. Pt straight cathed per MD order. 200cc of michelle urine noted. Sterile technique used and maintained. 2 RN present (RN Jak) Family at bedside.  Will continue to monitor.

## 2019-03-23 NOTE — ED ADULT NURSE NOTE - OBJECTIVE STATEMENT
78YOM pmh DM, HTN, HLD, lung CA stage 4 on oral chemo BIBE from home for AMS, fever, cought x3 days. Son at bedside prefers to translate. Pt A&Ox2, baseline A&Ox4. Pt 85% on RA, placed on NRB. MD Jhaveri and Sherron at bedside. Pt denies CP, HA, dizziness, N/V/D, abd pain, burning upon urination. Safety and comfort measures provided. Will continue to monitor.

## 2019-03-23 NOTE — ED PROVIDER NOTE - ATTENDING CONTRIBUTION TO CARE
****ATTENDING**** 79yo male hx DM, HTN, HLD, Stage 4 lung ca, BIB EMS from home for AMS, weakness and fever since Thursday. As per son, pt has been progressively weak, has hx of aspiration pna, + Cough. Denies any abd pain, n/v.   As per EMS pt was hypoxic to 85 on RA, on intermittent oxygen at home.   Patient is awake,alert,oriented x 2. Patient is well appearing and in no acute distress. Patient's chest w decreased BS on left. +s1s2. Abdomen is soft nd/nt +BS. Extremity with no swelling or calf tenderness.   Pt noted to be tachycardic, Check Labs, cultures, Xray. Eval for infection. Empiric antibiotics.

## 2019-03-23 NOTE — H&P ADULT - NSHPLABSRESULTS_GEN_ALL_CORE
LABS:  CAPILLARY BLOOD GLUCOSE      POCT Blood Glucose.: 172 mg/dL (23 Mar 2019 12:06)                          10.0   19.0  )-----------( 160      ( 23 Mar 2019 12:35 )             31.1     Auto Eosinophil # 0.0   / Auto Eosinophil % 0.2   / Auto Neutrophil # 16.3  / Auto Neutrophil % 85.7  / BANDS % x        Hgb Trend: 10.0<--  03-23    139  |  99  |  37<H>  ----------------------------<  168<H>  3.7   |  27  |  2.29<H>    Ca    8.9      23 Mar 2019 12:35  TPro  7.0  /  Alb  2.5<L>  /  TBili  0.5  /  DBili  x   /  AST  12  /  ALT  15  /  AlkPhos  53  23    Creatinine Trend: 2.29<--  PT/INR - ( 23 Mar 2019 12:35 )   PT: 16.2 sec;   INR: 1.41 ratio         PTT - ( 23 Mar 2019 12:35 )  PTT:31.1 sec      Urinalysis Basic - ( 23 Mar 2019 13:29 )    Color: Light Orange / Appearance: Slightly Turbid / S.018 / pH: x  Gluc: x / Ketone: Negative  / Bili: Negative / Urobili: Negative   Blood: x / Protein: 100 / Nitrite: Negative   Leuk Esterase: Negative / RBC: 1049 /hpf / WBC 19 /HPF   Sq Epi: x / Non Sq Epi: 1 /hpf / Bacteria: Negative            ABG:   VBG: ( 12:35 ) - VBG - pH: 7.44  | pCO2: 47    | pO2: 61    | Lactate: 1.3

## 2019-03-23 NOTE — H&P ADULT - NSHPPHYSICALEXAM_GEN_ALL_CORE
PHYSICAL EXAM:  GENERAL: appears ill   HEAD:  Atraumatic, Normocephalic  EYES: EOMI, PERRLA, conjunctiva and sclera clear  NECK: Supple, No JVD  CHEST/LUNG: decreased breath sounds in L lung; better but also poor breath sounds auscultated in R lung; No wheeze  HEART: Regular rate and rhythm; No murmurs, rubs, or gallops  ABDOMEN: Soft, Nontender, Nondistended; Bowel sounds present  EXTREMITIES:  2+ Peripheral Pulses, No clubbing, cyanosis, or edema  PSYCH: AAO x 2 as per pt (knows self and location not time)  NEUROLOGY: sensation in upper and lower extremities intact; 4/5 strength in R lower leg and 3/5 in L  SKIN: No rashes or lesions PHYSICAL EXAM:  GENERAL: appears ill   HEAD:  Atraumatic, Normocephalic  EYES: EOMI, PERRLA, conjunctiva and sclera clear  NECK: Supple, No JVD  CHEST/LUNG: decreased breath sounds in L lung; better but also poor breath sounds auscultated in R lung; No wheeze  HEART: Regular rate and rhythm; No murmurs, rubs, or gallops  ABDOMEN: Soft, Nontender, Nondistended; Bowel sounds present  EXTREMITIES:  2+ Peripheral Pulses, No clubbing, cyanosis, or edema  PSYCH: AAO x 2 as per pt (knows self and location not time)  NEUROLOGY: sensation in upper and lower extremities intact; 4/5 strength in R lower leg and 3/5 in L; cranial nerves intact; rectal tone wnl   SKIN: No rashes or lesions

## 2019-03-23 NOTE — H&P ADULT - NSHPSOCIALHISTORY_GEN_ALL_CORE
Lives at home w/ wife and son, no aide   Extensive smoking hx (quit 4 y/a), denies any alcohol or recreational drug use

## 2019-03-23 NOTE — H&P ADULT - NSICDXPASTSURGICALHX_GEN_ALL_CORE_FT
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

## 2019-03-24 DIAGNOSIS — R29.898 OTHER SYMPTOMS AND SIGNS INVOLVING THE MUSCULOSKELETAL SYSTEM: ICD-10-CM

## 2019-03-24 DIAGNOSIS — A41.9 SEPSIS, UNSPECIFIED ORGANISM: ICD-10-CM

## 2019-03-24 DIAGNOSIS — G89.29 OTHER CHRONIC PAIN: ICD-10-CM

## 2019-03-24 DIAGNOSIS — Z51.5 ENCOUNTER FOR PALLIATIVE CARE: ICD-10-CM

## 2019-03-24 DIAGNOSIS — J18.9 PNEUMONIA, UNSPECIFIED ORGANISM: ICD-10-CM

## 2019-03-24 DIAGNOSIS — C34.90 MALIGNANT NEOPLASM OF UNSPECIFIED PART OF UNSPECIFIED BRONCHUS OR LUNG: ICD-10-CM

## 2019-03-24 LAB
-  COAGULASE NEGATIVE STAPHYLOCOCCUS: SIGNIFICANT CHANGE UP
ANION GAP SERPL CALC-SCNC: 13 MMOL/L — SIGNIFICANT CHANGE UP (ref 5–17)
APTT BLD: 32.6 SEC — SIGNIFICANT CHANGE UP (ref 27.5–36.3)
BASOPHILS # BLD AUTO: 0.04 K/UL — SIGNIFICANT CHANGE UP (ref 0–0.2)
BASOPHILS NFR BLD AUTO: 0.2 % — SIGNIFICANT CHANGE UP (ref 0–2)
BUN SERPL-MCNC: 31 MG/DL — HIGH (ref 7–23)
CALCIUM SERPL-MCNC: 8.5 MG/DL — SIGNIFICANT CHANGE UP (ref 8.4–10.5)
CHLORIDE SERPL-SCNC: 99 MMOL/L — SIGNIFICANT CHANGE UP (ref 96–108)
CO2 SERPL-SCNC: 27 MMOL/L — SIGNIFICANT CHANGE UP (ref 22–31)
CREAT SERPL-MCNC: 2.06 MG/DL — HIGH (ref 0.5–1.3)
CULTURE RESULTS: NO GROWTH — SIGNIFICANT CHANGE UP
EOSINOPHIL # BLD AUTO: 0.07 K/UL — SIGNIFICANT CHANGE UP (ref 0–0.5)
EOSINOPHIL NFR BLD AUTO: 0.4 % — SIGNIFICANT CHANGE UP (ref 0–6)
GLUCOSE SERPL-MCNC: 120 MG/DL — HIGH (ref 70–99)
GRAM STN FLD: SIGNIFICANT CHANGE UP
HCT VFR BLD CALC: 30.5 % — LOW (ref 39–50)
HGB BLD-MCNC: 9.1 G/DL — LOW (ref 13–17)
IMM GRANULOCYTES NFR BLD AUTO: 0.4 % — SIGNIFICANT CHANGE UP (ref 0–1.5)
INR BLD: 1.28 RATIO — HIGH (ref 0.88–1.16)
LYMPHOCYTES # BLD AUTO: 0.69 K/UL — LOW (ref 1–3.3)
LYMPHOCYTES # BLD AUTO: 3.8 % — LOW (ref 13–44)
MAGNESIUM SERPL-MCNC: 1.6 MG/DL — SIGNIFICANT CHANGE UP (ref 1.6–2.6)
MCHC RBC-ENTMCNC: 29.2 PG — SIGNIFICANT CHANGE UP (ref 27–34)
MCHC RBC-ENTMCNC: 29.8 GM/DL — LOW (ref 32–36)
MCV RBC AUTO: 97.8 FL — SIGNIFICANT CHANGE UP (ref 80–100)
METHOD TYPE: SIGNIFICANT CHANGE UP
MONOCYTES # BLD AUTO: 0.76 K/UL — SIGNIFICANT CHANGE UP (ref 0–0.9)
MONOCYTES NFR BLD AUTO: 4.2 % — SIGNIFICANT CHANGE UP (ref 2–14)
NEUTROPHILS # BLD AUTO: 16.44 K/UL — HIGH (ref 1.8–7.4)
NEUTROPHILS NFR BLD AUTO: 91 % — HIGH (ref 43–77)
PHOSPHATE SERPL-MCNC: 3.4 MG/DL — SIGNIFICANT CHANGE UP (ref 2.5–4.5)
PLATELET # BLD AUTO: 156 K/UL — SIGNIFICANT CHANGE UP (ref 150–400)
POTASSIUM SERPL-MCNC: 3.5 MMOL/L — SIGNIFICANT CHANGE UP (ref 3.5–5.3)
POTASSIUM SERPL-SCNC: 3.5 MMOL/L — SIGNIFICANT CHANGE UP (ref 3.5–5.3)
PROTHROM AB SERPL-ACNC: 14.8 SEC — HIGH (ref 10–13.1)
RBC # BLD: 3.12 M/UL — LOW (ref 4.2–5.8)
RBC # FLD: 14.2 % — SIGNIFICANT CHANGE UP (ref 10.3–14.5)
SODIUM SERPL-SCNC: 139 MMOL/L — SIGNIFICANT CHANGE UP (ref 135–145)
SPECIMEN SOURCE: SIGNIFICANT CHANGE UP
SPECIMEN SOURCE: SIGNIFICANT CHANGE UP
VANCOMYCIN TROUGH SERPL-MCNC: 8.1 UG/ML — LOW (ref 10–20)
WBC # BLD: 18.07 K/UL — HIGH (ref 3.8–10.5)
WBC # FLD AUTO: 18.07 K/UL — HIGH (ref 3.8–10.5)

## 2019-03-24 PROCEDURE — 72148 MRI LUMBAR SPINE W/O DYE: CPT | Mod: 26

## 2019-03-24 PROCEDURE — 72141 MRI NECK SPINE W/O DYE: CPT | Mod: 26

## 2019-03-24 PROCEDURE — 70551 MRI BRAIN STEM W/O DYE: CPT | Mod: 26

## 2019-03-24 PROCEDURE — 99223 1ST HOSP IP/OBS HIGH 75: CPT | Mod: GC

## 2019-03-24 PROCEDURE — 99233 SBSQ HOSP IP/OBS HIGH 50: CPT | Mod: GC

## 2019-03-24 RX ORDER — HYDROMORPHONE HYDROCHLORIDE 2 MG/ML
1 INJECTION INTRAMUSCULAR; INTRAVENOUS; SUBCUTANEOUS EVERY 4 HOURS
Qty: 0 | Refills: 0 | Status: DISCONTINUED | OUTPATIENT
Start: 2019-03-24 | End: 2019-03-29

## 2019-03-24 RX ORDER — OXYCODONE HYDROCHLORIDE 5 MG/1
20 TABLET ORAL THREE TIMES A DAY
Qty: 0 | Refills: 0 | Status: DISCONTINUED | OUTPATIENT
Start: 2019-03-24 | End: 2019-03-27

## 2019-03-24 RX ORDER — HYDROMORPHONE HYDROCHLORIDE 2 MG/ML
2 INJECTION INTRAMUSCULAR; INTRAVENOUS; SUBCUTANEOUS EVERY 6 HOURS
Qty: 0 | Refills: 0 | Status: DISCONTINUED | OUTPATIENT
Start: 2019-03-24 | End: 2019-03-24

## 2019-03-24 RX ORDER — HYDROMORPHONE HYDROCHLORIDE 2 MG/ML
1.25 INJECTION INTRAMUSCULAR; INTRAVENOUS; SUBCUTANEOUS EVERY 6 HOURS
Qty: 0 | Refills: 0 | Status: DISCONTINUED | OUTPATIENT
Start: 2019-03-24 | End: 2019-03-24

## 2019-03-24 RX ORDER — VANCOMYCIN HCL 1 G
750 VIAL (EA) INTRAVENOUS EVERY 24 HOURS
Qty: 0 | Refills: 0 | Status: DISCONTINUED | OUTPATIENT
Start: 2019-03-24 | End: 2019-03-25

## 2019-03-24 RX ORDER — HYDROMORPHONE HYDROCHLORIDE 2 MG/ML
1.25 INJECTION INTRAMUSCULAR; INTRAVENOUS; SUBCUTANEOUS EVERY 8 HOURS
Qty: 0 | Refills: 0 | Status: DISCONTINUED | OUTPATIENT
Start: 2019-03-24 | End: 2019-03-24

## 2019-03-24 RX ORDER — HYDROMORPHONE HYDROCHLORIDE 2 MG/ML
2 INJECTION INTRAMUSCULAR; INTRAVENOUS; SUBCUTANEOUS EVERY 6 HOURS
Qty: 0 | Refills: 0 | Status: DISCONTINUED | OUTPATIENT
Start: 2019-03-24 | End: 2019-03-29

## 2019-03-24 RX ORDER — HYDROMORPHONE HYDROCHLORIDE 2 MG/ML
1 INJECTION INTRAMUSCULAR; INTRAVENOUS; SUBCUTANEOUS EVERY 6 HOURS
Qty: 0 | Refills: 0 | Status: DISCONTINUED | OUTPATIENT
Start: 2019-03-24 | End: 2019-03-24

## 2019-03-24 RX ORDER — OXYCODONE HYDROCHLORIDE 5 MG/1
20 TABLET ORAL EVERY 12 HOURS
Qty: 0 | Refills: 0 | Status: DISCONTINUED | OUTPATIENT
Start: 2019-03-24 | End: 2019-03-24

## 2019-03-24 RX ORDER — VANCOMYCIN HCL 1 G
1000 VIAL (EA) INTRAVENOUS ONCE
Qty: 0 | Refills: 0 | Status: DISCONTINUED | OUTPATIENT
Start: 2019-03-24 | End: 2019-03-24

## 2019-03-24 RX ORDER — MAGNESIUM SULFATE 500 MG/ML
2 VIAL (ML) INJECTION ONCE
Qty: 0 | Refills: 0 | Status: COMPLETED | OUTPATIENT
Start: 2019-03-24 | End: 2019-03-24

## 2019-03-24 RX ADMIN — HYDROMORPHONE HYDROCHLORIDE 2 MILLIGRAM(S): 2 INJECTION INTRAMUSCULAR; INTRAVENOUS; SUBCUTANEOUS at 04:23

## 2019-03-24 RX ADMIN — HYDROMORPHONE HYDROCHLORIDE 1 MILLIGRAM(S): 2 INJECTION INTRAMUSCULAR; INTRAVENOUS; SUBCUTANEOUS at 09:45

## 2019-03-24 RX ADMIN — TAMSULOSIN HYDROCHLORIDE 0.4 MILLIGRAM(S): 0.4 CAPSULE ORAL at 21:03

## 2019-03-24 RX ADMIN — HYDROMORPHONE HYDROCHLORIDE 1 MILLIGRAM(S): 2 INJECTION INTRAMUSCULAR; INTRAVENOUS; SUBCUTANEOUS at 09:29

## 2019-03-24 RX ADMIN — PANTOPRAZOLE SODIUM 40 MILLIGRAM(S): 20 TABLET, DELAYED RELEASE ORAL at 05:17

## 2019-03-24 RX ADMIN — OXYCODONE HYDROCHLORIDE 20 MILLIGRAM(S): 5 TABLET ORAL at 21:30

## 2019-03-24 RX ADMIN — OXYCODONE HYDROCHLORIDE 20 MILLIGRAM(S): 5 TABLET ORAL at 14:54

## 2019-03-24 RX ADMIN — TENOFOVIR DISOPROXIL FUMARATE 300 MILLIGRAM(S): 300 TABLET, FILM COATED ORAL at 13:40

## 2019-03-24 RX ADMIN — OXYCODONE HYDROCHLORIDE 20 MILLIGRAM(S): 5 TABLET ORAL at 14:24

## 2019-03-24 RX ADMIN — HYDROMORPHONE HYDROCHLORIDE 2 MILLIGRAM(S): 2 INJECTION INTRAMUSCULAR; INTRAVENOUS; SUBCUTANEOUS at 12:08

## 2019-03-24 RX ADMIN — Medication 50 GRAM(S): at 09:42

## 2019-03-24 RX ADMIN — HYDROMORPHONE HYDROCHLORIDE 2 MILLIGRAM(S): 2 INJECTION INTRAMUSCULAR; INTRAVENOUS; SUBCUTANEOUS at 11:38

## 2019-03-24 RX ADMIN — HYDROMORPHONE HYDROCHLORIDE 2 MILLIGRAM(S): 2 INJECTION INTRAMUSCULAR; INTRAVENOUS; SUBCUTANEOUS at 17:49

## 2019-03-24 RX ADMIN — PIPERACILLIN AND TAZOBACTAM 25 GRAM(S): 4; .5 INJECTION, POWDER, LYOPHILIZED, FOR SOLUTION INTRAVENOUS at 13:40

## 2019-03-24 RX ADMIN — HYDROMORPHONE HYDROCHLORIDE 2 MILLIGRAM(S): 2 INJECTION INTRAMUSCULAR; INTRAVENOUS; SUBCUTANEOUS at 18:19

## 2019-03-24 RX ADMIN — Medication 2 MILLIGRAM(S): at 05:16

## 2019-03-24 RX ADMIN — Medication 250 MILLIGRAM(S): at 11:38

## 2019-03-24 RX ADMIN — OXYCODONE HYDROCHLORIDE 20 MILLIGRAM(S): 5 TABLET ORAL at 21:03

## 2019-03-24 RX ADMIN — GABAPENTIN 100 MILLIGRAM(S): 400 CAPSULE ORAL at 13:40

## 2019-03-24 RX ADMIN — HYDROMORPHONE HYDROCHLORIDE 2 MILLIGRAM(S): 2 INJECTION INTRAMUSCULAR; INTRAVENOUS; SUBCUTANEOUS at 03:53

## 2019-03-24 NOTE — CONSULT NOTE ADULT - PROBLEM SELECTOR RECOMMENDATION 4
Patient on Oxycontin 30mg TID at baseline, uses PRN Hydromorphone 2mg approximately 0-1 time daily. Patient on Oxycontin 30mg TID at baseline, uses PRN Hydromorphone 2mg approximately 0-1 time daily.   Given patient's acute illness and associated lethargy, recommend lowering the Oxycontin to 20mg TID, keeping the Hydromorphone 2mg Q4h PRN to control breakthrough pain. Low threshold to increase Oxycontin back up to 30mg TID once patient more awake/alert.

## 2019-03-24 NOTE — PROGRESS NOTE ADULT - PROBLEM SELECTOR PLAN 8
- pt w/ Stage IV EGFR + NSCLC   - holding Tagrisso in setting of infection; will f/u w/ heme/onc   - c/w decardon for nausea and tumor burden  - pts pain not controlled on medications listed in ISTOP (2 mg PO dilaudid q12) [id number].  Started on standing IV dilaudid 1.25 q8 w/ hold parameters for respiratory depression w/ 2mg PO dilaudid q6 PRN for breakthrough pain and consulted palliative care for pain management consult. Of note, pt had a lot more pain meds than what was seen in istop so may have developed tolerance.   - will call heme/onc in AM   - Oncologist is Dr. Norah Gross   - oncologist is

## 2019-03-24 NOTE — PROGRESS NOTE ADULT - ASSESSMENT
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 (on Tagrisso; started 01/10/19), DM2, BPH, chronic hep B,  who is presenting with 3 days of fevers/chills associated w/ a productive cough w/ imaging who is presenting w/ severe sepsis likely 2/2 PNA w/ labs concerning for renal malignancy who is also presenting w/ new onset of fecal incontinence

## 2019-03-24 NOTE — PROGRESS NOTE ADULT - PROBLEM SELECTOR PLAN 4
Acute blood loss anemia possibly 2/2 Hematuria  - CT abd/pelvis showed no evidence of renal calculi w/ no hydro   - ordered renal US for further eval  - bedside bladder scan showed no retention  - c/w Flomax  - cbc q12h; transfuse for Hbg < 7

## 2019-03-24 NOTE — PROGRESS NOTE ADULT - PROBLEM SELECTOR PLAN 5
- Cr 2.29 (BL 1.35)  - BUN/Cr ratio <20; likely intrinsic renal disease in setting of significant hematuria  - bedside bladder scan showed no retention   - f/u renal US  - f/u urine lytes   - renal consult in AM   - ordered urine lytes

## 2019-03-24 NOTE — CONSULT NOTE ADULT - PROBLEM SELECTOR RECOMMENDATION 5
Spoke with son Parmjit (surrogate decision maker) by phone.  Discussed code status with him in light of patient's weakened condition and overall declining health.  He will discuss with patient's wife and is likely to consent to DNR/DNI for patient. Parmjit stresses that the most important thing for him is that patient's pain is controlled.

## 2019-03-24 NOTE — PROGRESS NOTE ADULT - SUBJECTIVE AND OBJECTIVE BOX
Maricruz Garrido MD  PGY1 | Dept of Internal Medicine  [facility] [phone]        Patient is a 78y old  Male who presents with a chief complaint of SOB 2/2 hypoxic respiratory failure (23 Mar 2019 19:06)      SUBJECTIVE / OVERNIGHT EVENTS:  Pt endorsing generalized pain 8/10 and reports that his upper extremities are weaker from yesterday and that his numbness and tingling has gotten worse.  Called and confirmed that MRI head, cervical & lumbosacral spine at 4:30 today.  Increased pain meds and consulted palliative care for pain mgmt recs.         Review of Systems:  General: fatigue [ ], fever/chills [ ], weakness [ ], weight loss [ ]  HEENT: headache [ ], hearing changes [ ], vision changes [ ], hoarseness [ ], sore throat [ ], nasal discharge [ ]  Cardiovascular: chest pain [ ], palpitations [ ], dyspnea on exertion [ ], orthopnea [ ], PND [ ]  Respiratory: SOB [ ], cough [ ], wheeze [ ], exercise intolerance [ ]  Gastrointestinal: abdominal pain [ ], unintentional weight loss [ ], heartburn [ ], nausea [ ], vomiting [ ], hematochezia [ ], melena [ ]  Genitourinary: dysuria [ ], frequency [ ], urgency [ ], hematuria [ ], incontinence [ ]  Skin: lesions [ ], rashes [ ]  Neuro: headache [ ], changes in senses [ ], speech problems [ ], balance problems [ ], numbness/tingling [ ], weakness [ ]          Vital Signs Last 24 Hrs  T(C): 36.3 (24 Mar 2019 10:53), Max: 38.9 (23 Mar 2019 12:00)  T(F): 97.3 (24 Mar 2019 10:53), Max: 102 (23 Mar 2019 12:00)  HR: 90 (24 Mar 2019 05:13) (65 - 110)  BP: 118/73 (24 Mar 2019 10:53) (81/51 - 125/71)  BP(mean): --  RR: 20 (24 Mar 2019 10:53) (18 - 24)  SpO2: 93% (24 Mar 2019 10:53) (93% - 100%)    I&O's Summary    23 Mar 2019 07:01  -  24 Mar 2019 07:00  --------------------------------------------------------  IN: 580 mL / OUT: 400 mL / NET: 180 mL        PHYSICAL EXAM:  GENERAL: cachectic, ill appearing   HEAD:  Atraumatic, Normocephalic  EYES: EOMI, PERRLA, conjunctiva and sclera clear  NECK: Supple, No JVD  CHEST/LUNG: decreased breath sounds throughout L lung; rhonchi in R lung   HEART: Regular rate and rhythm; No murmurs, rubs, or gallops  ABDOMEN: Soft, Nontender, Nondistended; Bowel sounds present  EXTREMITIES:  2+ Peripheral Pulses, No clubbing, cyanosis, or edema  PSYCH: AAOx3  NEUROLOGY: 2/5  strength bilaterally, 3/5 LLE 4/5 RLE, cranial nerves intact   SKIN: No rashes or lesions      MEDICATIONS  (STANDING):  dexamethasone     Tablet 2 milliGRAM(s) Oral daily  gabapentin 100 milliGRAM(s) Oral daily  HYDROmorphone  Injectable 1.25 milliGRAM(s) IV Push every 8 hours  pantoprazole    Tablet 40 milliGRAM(s) Oral before breakfast  piperacillin/tazobactam IVPB. 3.375 Gram(s) IV Intermittent every 12 hours  tamsulosin 0.4 milliGRAM(s) Oral at bedtime  tenofovir disoproxil fumarate (VIREAD) 300 milliGRAM(s) Oral daily  vancomycin  IVPB 750 milliGRAM(s) IV Intermittent every 24 hours    MEDICATIONS  (PRN):  acetaminophen   Tablet .. 650 milliGRAM(s) Oral every 6 hours PRN Temp greater or equal to 38.5C (101.3F)  acetaminophen   Tablet .. 325 milliGRAM(s) Oral every 4 hours PRN Temp greater or equal to 38C (100.4F)  ALBUTerol/ipratropium for Nebulization 3 milliLiter(s) Nebulizer every 6 hours PRN Shortness of Breath and/or Wheezing  HYDROmorphone   Tablet 2 milliGRAM(s) Oral every 6 hours PRN Severe Pain (7 - 10)  sodium chloride 0.9% for Nebulization 3 milliLiter(s) Nebulizer every 6 hours PRN thick mucous      LABS:  CAPILLARY BLOOD GLUCOSE      POCT Blood Glucose.: 172 mg/dL (23 Mar 2019 12:06)                          9.1    18.07 )-----------( 156      ( 24 Mar 2019 09:57 )             30.5     Auto Eosinophil # 0.07  / Auto Eosinophil % 0.4   / Auto Neutrophil # 16.44 / Auto Neutrophil % 91.0  / BANDS % x                            10.0   19.0  )-----------( 160      ( 23 Mar 2019 12:35 )             31.1     Auto Eosinophil # 0.0   / Auto Eosinophil % 0.2   / Auto Neutrophil # 16.3  / Auto Neutrophil % 85.7  / BANDS % x        Hgb Trend: 9.1<--, 10.0<--  0324    139  |  99  |  31<H>  ----------------------------<  120<H>  3.5   |  27  |  2.06<H>      139  |  99  |  37<H>  ----------------------------<  168<H>  3.7   |  27  |  2.29<H>    Ca    8.5      24 Mar 2019 07:26  Mg     1.6       Phos  3.4       TPro  7.0  /  Alb  2.5<L>  /  TBili  0.5  /  DBili  x   /  AST  12  /  ALT  15  /  AlkPhos  53  23    Creatinine Trend: 2.06<--, 2.29<--  PT/INR - ( 24 Mar 2019 09:22 )   PT: 14.8 sec;   INR: 1.28 ratio         PTT - ( 24 Mar 2019 09:22 )  PTT:32.6 sec      Urinalysis Basic - ( 23 Mar 2019 13:29 )    Color: Light Orange / Appearance: Slightly Turbid / S.018 / pH: x  Gluc: x / Ketone: Negative  / Bili: Negative / Urobili: Negative   Blood: x / Protein: 100 / Nitrite: Negative   Leuk Esterase: Negative / RBC: 1049 /hpf / WBC 19 /HPF   Sq Epi: x / Non Sq Epi: 1 /hpf / Bacteria: Negative            ABG:   VBG: ( 12:35 ) - VBG - pH: 7.44  | pCO2: 47    | pO2: 61    | Lactate: 1.3

## 2019-03-24 NOTE — CONSULT NOTE ADULT - PROBLEM SELECTOR RECOMMENDATION 9
F/u imaging, Onc consulted for input. F/u imaging, Onc consulted for input.  MRI of head, c-spine, l-spine performed, results pending.

## 2019-03-24 NOTE — PROGRESS NOTE ADULT - PROBLEM SELECTOR PLAN 2
- likely 2/2 HAP (hospitalized in Jan) and immunocompromised with lung cancer on chemotherapy   - Pt presented w/ fever, leukocytosis and CT findings showing increased opacities/consolidations in VENTURA and LLL; c/w renal dosing of vanc in setting or LUX (vanc trough goal 15-20) and zosyn.  Sputum culture ordered   - Pt w/ history of adenocarcinoma, tachycardia and hypoxia however pt on Eliquis; if pt does not improve w/ abx will consider CTA   - duonebs q6 PRN, chest PT, acapella, IS w/ PRN hypertonic saline

## 2019-03-24 NOTE — PROGRESS NOTE ADULT - PROBLEM SELECTOR PLAN 1
- Pt w/ sepsis on admission   - Pt admitted w/ tachycardia, tachypnea, fever, leukocytosis, source of infection and LUX  - Likely 2/2 PNA  - c/w broad spectrum abx (vanc + zosyn)  - U/A neg for LE/nitrites but urine cx and 2 sets of blood cultures are pending  -weight based IVF given, Cap refill 2sec on reassessment.

## 2019-03-24 NOTE — CONSULT NOTE ADULT - SUBJECTIVE AND OBJECTIVE BOX
78yoM with Stage IV NSCLC dx 2016 on Tagrisso presents with SOB.  PMH also significant for Hepatitis C.     Pt has EGFR mutated NSCLC s/p progression through erlotinib, carboplatin/pemetrexed, atezolizumab, Afatinib, now on Tagrisso.     Patient well known to me in the outpatient setting, is on long-standing opioids for chronic pain with multiple failed attempts at weaning off. He is currently on Oxycontin 30mg TID with PRN Hydromorphone 2mg.       INTERVAL HPI/OVERNIGHT EVENTS:      Allergies    No Known Drug Allergies  tegaderm (Rash; Urticaria)    Intolerances        ADVANCE DIRECTIVES:    DNR: [ ] YES [ ] NO           PRESENT SYMPTOMS:   SOURCE:  [ ] Patient   [X] Family   [ ] Team     Pain:     Dyspnea:  [ ] YES [ ] NO  Anxiety:  [ ] YES [ ] NO  Fatigue: [ ] YES [ ] NO  Nausea: [ ] YES [ ] NO  Loss of Appetite: [ ] YES [ ] NO  Constipation [ ] YES   [ ] No     OTHER SYMPTOMS:  [ ] All other ROS negative     [ ] Unable to obtain due to poor mentation    MEDICATIONS  (STANDING):  dexamethasone     Tablet 2 milliGRAM(s) Oral daily  gabapentin 100 milliGRAM(s) Oral daily  HYDROmorphone  Injectable 1.25 milliGRAM(s) IV Push every 8 hours  pantoprazole    Tablet 40 milliGRAM(s) Oral before breakfast  piperacillin/tazobactam IVPB. 3.375 Gram(s) IV Intermittent every 12 hours  tamsulosin 0.4 milliGRAM(s) Oral at bedtime  tenofovir disoproxil fumarate (VIREAD) 300 milliGRAM(s) Oral daily  vancomycin  IVPB 750 milliGRAM(s) IV Intermittent every 24 hours    MEDICATIONS  (PRN):  acetaminophen   Tablet .. 650 milliGRAM(s) Oral every 6 hours PRN Temp greater or equal to 38.5C (101.3F)  acetaminophen   Tablet .. 325 milliGRAM(s) Oral every 4 hours PRN Temp greater or equal to 38C (100.4F)  ALBUTerol/ipratropium for Nebulization 3 milliLiter(s) Nebulizer every 6 hours PRN Shortness of Breath and/or Wheezing  HYDROmorphone   Tablet 2 milliGRAM(s) Oral every 6 hours PRN Severe Pain (7 - 10)  sodium chloride 0.9% for Nebulization 3 milliLiter(s) Nebulizer every 6 hours PRN thick mucous      Karnofsky Performance Score/Palliative Performance Status Version 2:         %  Protein Calorie Malnutrition:  [ ] Mild   [ ] Moderate   [ ] Severe     Physical Exam:    General: [ ] Alert,  A&O x     [ ] lethargic   [ ] Agitated   [ ] Cachexia   HEENT: [ ] Normal   [ ] Dry mouth   [ ] ET Tube    [ ] Trach   Lungs: [ ] Clear [ ] Rhonchi  [ ] Crackles [ ] Wheezing [ ] Tachypnea  [ ] Audible excessive secretions   Cardiovascular:  [ ] Regular rate and rhythm  [ ] Irregular [ ] Tachycardia   [ ] Bradycardia   Abdomen: [ ] Soft  [ ] Distended  [ ]  [ ] +BS  [ ] Non tender [ ] Tender  [ ]PEG   [ ] NGT   Last BM:     Genitourinary:  [ ] Normal [ ] Incontinent   [ ] Oliguria/Anuria   [ ] Soni  Musculoskeletal:  [ ] Normal   [ ] Generalized weakness  [ ] Bedbound   Neurological: [ ] No focal deficits  [ ] Cognitive impairment     Skin: [ ] Normal   [ ] Pressure ulcers     Vital Signs Last 24 Hrs  T(C): 36.3 (24 Mar 2019 10:53), Max: 37.2 (23 Mar 2019 13:26)  T(F): 97.3 (24 Mar 2019 10:53), Max: 99 (23 Mar 2019 13:26)  HR: 90 (24 Mar 2019 05:13) (65 - 99)  BP: 118/73 (24 Mar 2019 10:53) (81/51 - 125/71)  BP(mean): --  RR: 20 (24 Mar 2019 10:53) (18 - 22)  SpO2: 93% (24 Mar 2019 10:53) (93% - 100%)    LABS:                        9.1    18.07 )-----------( 156      ( 24 Mar 2019 09:57 )             30.5     03-24    139  |  99  |  31<H>  ----------------------------<  120<H>  3.5   |  27  |  2.06<H>    Ca    8.5      24 Mar 2019 07:26  Phos  3.4     03-24  Mg     1.6     03-24    TPro  7.0  /  Alb  2.5<L>  /  TBili  0.5  /  DBili  x   /  AST  12  /  ALT  15  /  AlkPhos  53  03-23    PT/INR - ( 24 Mar 2019 09:22 )   PT: 14.8 sec;   INR: 1.28 ratio         PTT - ( 24 Mar 2019 09:22 )  PTT:32.6 sec  Urinalysis Basic - ( 23 Mar 2019 13:29 )    Color: Light Orange / Appearance: Slightly Turbid / S.018 / pH: x  Gluc: x / Ketone: Negative  / Bili: Negative / Urobili: Negative   Blood: x / Protein: 100 / Nitrite: Negative   Leuk Esterase: Negative / RBC: 1049 /hpf / WBC 19 /HPF   Sq Epi: x / Non Sq Epi: 1 /hpf / Bacteria: Negative      I&O's Summary    23 Mar 2019 07:01  -  24 Mar 2019 07:00  --------------------------------------------------------  IN: 580 mL / OUT: 400 mL / NET: 180 mL        RADIOLOGY & ADDITIONAL STUDIES: 78yoM with Stage IV NSCLC dx 2016 on Tagrisso presents with SOB.  PMH also significant for chronic Hepatitis B, DM2, PE on Eliquis.     Pt has EGFR mutated NSCLC s/p progression through erlotinib, carboplatin/pemetrexed, atezolizumab, Afatinib, now on Tagrisso.     Patient well known to me in the outpatient setting, is on long-standing opioids for chronic pain with multiple failed attempts at weaning off. He is currently on Oxycontin 30mg TID with PRN Hydromorphone 2mg.       INTERVAL HPI/OVERNIGHT EVENTS:      Allergies    No Known Drug Allergies  tegaderm (Rash; Urticaria)    Intolerances        ADVANCE DIRECTIVES:    DNR: [ ] YES [ ] NO           PRESENT SYMPTOMS:   SOURCE:  [ ] Patient   [X] Family   [ ] Team     Pain:     Dyspnea:  [ ] YES [ ] NO  Anxiety:  [ ] YES [ ] NO  Fatigue: [ ] YES [ ] NO  Nausea: [ ] YES [ ] NO  Loss of Appetite: [ ] YES [ ] NO  Constipation [ ] YES   [ ] No     OTHER SYMPTOMS:  [ ] All other ROS negative     [ ] Unable to obtain due to poor mentation    MEDICATIONS  (STANDING):  dexamethasone     Tablet 2 milliGRAM(s) Oral daily  gabapentin 100 milliGRAM(s) Oral daily  HYDROmorphone  Injectable 1.25 milliGRAM(s) IV Push every 8 hours  pantoprazole    Tablet 40 milliGRAM(s) Oral before breakfast  piperacillin/tazobactam IVPB. 3.375 Gram(s) IV Intermittent every 12 hours  tamsulosin 0.4 milliGRAM(s) Oral at bedtime  tenofovir disoproxil fumarate (VIREAD) 300 milliGRAM(s) Oral daily  vancomycin  IVPB 750 milliGRAM(s) IV Intermittent every 24 hours    MEDICATIONS  (PRN):  acetaminophen   Tablet .. 650 milliGRAM(s) Oral every 6 hours PRN Temp greater or equal to 38.5C (101.3F)  acetaminophen   Tablet .. 325 milliGRAM(s) Oral every 4 hours PRN Temp greater or equal to 38C (100.4F)  ALBUTerol/ipratropium for Nebulization 3 milliLiter(s) Nebulizer every 6 hours PRN Shortness of Breath and/or Wheezing  HYDROmorphone   Tablet 2 milliGRAM(s) Oral every 6 hours PRN Severe Pain (7 - 10)  sodium chloride 0.9% for Nebulization 3 milliLiter(s) Nebulizer every 6 hours PRN thick mucous      Karnofsky Performance Score/Palliative Performance Status Version 2:         %  Protein Calorie Malnutrition:  [ ] Mild   [ ] Moderate   [ ] Severe     Physical Exam:    General: [ ] Alert,  A&O x     [ ] lethargic   [ ] Agitated   [ ] Cachexia   HEENT: [ ] Normal   [ ] Dry mouth   [ ] ET Tube    [ ] Trach   Lungs: [ ] Clear [ ] Rhonchi  [ ] Crackles [ ] Wheezing [ ] Tachypnea  [ ] Audible excessive secretions   Cardiovascular:  [ ] Regular rate and rhythm  [ ] Irregular [ ] Tachycardia   [ ] Bradycardia   Abdomen: [ ] Soft  [ ] Distended  [ ]  [ ] +BS  [ ] Non tender [ ] Tender  [ ]PEG   [ ] NGT   Last BM:     Genitourinary:  [ ] Normal [ ] Incontinent   [ ] Oliguria/Anuria   [ ] Soni  Musculoskeletal:  [ ] Normal   [ ] Generalized weakness  [ ] Bedbound   Neurological: [ ] No focal deficits  [ ] Cognitive impairment     Skin: [ ] Normal   [ ] Pressure ulcers     Vital Signs Last 24 Hrs  T(C): 36.3 (24 Mar 2019 10:53), Max: 37.2 (23 Mar 2019 13:26)  T(F): 97.3 (24 Mar 2019 10:53), Max: 99 (23 Mar 2019 13:26)  HR: 90 (24 Mar 2019 05:13) (65 - 99)  BP: 118/73 (24 Mar 2019 10:53) (81/51 - 125/71)  BP(mean): --  RR: 20 (24 Mar 2019 10:53) (18 - 22)  SpO2: 93% (24 Mar 2019 10:53) (93% - 100%)    LABS:                        9.1    18.07 )-----------( 156      ( 24 Mar 2019 09:57 )             30.5     03-24    139  |  99  |  31<H>  ----------------------------<  120<H>  3.5   |  27  |  2.06<H>    Ca    8.5      24 Mar 2019 07:26  Phos  3.4     03-24  Mg     1.6     03-24    TPro  7.0  /  Alb  2.5<L>  /  TBili  0.5  /  DBili  x   /  AST  12  /  ALT  15  /  AlkPhos  53  03-23    PT/INR - ( 24 Mar 2019 09:22 )   PT: 14.8 sec;   INR: 1.28 ratio         PTT - ( 24 Mar 2019 09:22 )  PTT:32.6 sec  Urinalysis Basic - ( 23 Mar 2019 13:29 )    Color: Light Orange / Appearance: Slightly Turbid / S.018 / pH: x  Gluc: x / Ketone: Negative  / Bili: Negative / Urobili: Negative   Blood: x / Protein: 100 / Nitrite: Negative   Leuk Esterase: Negative / RBC: 1049 /hpf / WBC 19 /HPF   Sq Epi: x / Non Sq Epi: 1 /hpf / Bacteria: Negative      I&O's Summary    23 Mar 2019 07:01  -  24 Mar 2019 07:00  --------------------------------------------------------  IN: 580 mL / OUT: 400 mL / NET: 180 mL        RADIOLOGY & ADDITIONAL STUDIES: 78yoM with Stage IV NSCLC dx 2016 on Tagrisso presents with SOB.  PMH also significant for chronic Hepatitis B, DM2, PE on Eliquis.     Pt has EGFR mutated NSCLC s/p progression through erlotinib, carboplatin/pemetrexed, atezolizumab, Afatinib, now on Tagrisso. Was last hospitalized in 2019 with PNA. Now returns with SOB, fevers/chills 2/2 PNA, being treated for hospital-acquired PNA.     Patient well known to me in the outpatient setting, is on long-standing opioids for chronic pain with multiple failed attempts at weaning down. He is currently on Oxycontin 30mg TID with PRN Hydromorphone 2mg which he uses approximately once daily.     INTERVAL HPI/OVERNIGHT EVENTS:      Allergies    No Known Drug Allergies  tegaderm (Rash; Urticaria)    Intolerances        ADVANCE DIRECTIVES:    DNR: [ ] YES [X ] NO           PRESENT SYMPTOMS:   SOURCE:  [ ] Patient   [X] Family   [ ] Team     Pain:     Dyspnea:  [X ] YES [ ] NO  Anxiety:  [ ] YES [X ] NO  Fatigue: [X ] YES [ ] NO  Nausea: [ ] YES [X ] NO  Loss of Appetite: [X ] YES [ ] NO  Constipation [ ] YES   [X ] No     OTHER SYMPTOMS:  [ ] All other ROS negative     [ ] Unable to obtain due to poor mentation    MEDICATIONS  (STANDING):  dexamethasone     Tablet 2 milliGRAM(s) Oral daily  gabapentin 100 milliGRAM(s) Oral daily  HYDROmorphone  Injectable 1.25 milliGRAM(s) IV Push every 8 hours  pantoprazole    Tablet 40 milliGRAM(s) Oral before breakfast  piperacillin/tazobactam IVPB. 3.375 Gram(s) IV Intermittent every 12 hours  tamsulosin 0.4 milliGRAM(s) Oral at bedtime  tenofovir disoproxil fumarate (VIREAD) 300 milliGRAM(s) Oral daily  vancomycin  IVPB 750 milliGRAM(s) IV Intermittent every 24 hours    MEDICATIONS  (PRN):  acetaminophen   Tablet .. 650 milliGRAM(s) Oral every 6 hours PRN Temp greater or equal to 38.5C (101.3F)  acetaminophen   Tablet .. 325 milliGRAM(s) Oral every 4 hours PRN Temp greater or equal to 38C (100.4F)  ALBUTerol/ipratropium for Nebulization 3 milliLiter(s) Nebulizer every 6 hours PRN Shortness of Breath and/or Wheezing  HYDROmorphone   Tablet 2 milliGRAM(s) Oral every 6 hours PRN Severe Pain (7 - 10)  sodium chloride 0.9% for Nebulization 3 milliLiter(s) Nebulizer every 6 hours PRN thick mucous      Karnofsky Performance Score/Palliative Performance Status Version 2:         %  Protein Calorie Malnutrition:  [ ] Mild   [ ] Moderate   [ ] Severe     Physical Exam:    General: [ ] Alert,  A&O x     [X ] lethargic   [ ] Agitated   [ ] Cachexia   HEENT: [ ] Normal   [X ] Dry mouth   [ ] ET Tube    [ ] Trach   Lungs: [ ] Clear [ ] Rhonchi  [ ] Crackles [ ] Wheezing [ ] Tachypnea  [ ] Audible excessive secretions scattered ronchi  Cardiovascular:  [X ] Regular rate and rhythm  [ ] Irregular [ ] Tachycardia   [ ] Bradycardia   Abdomen: [X ] Soft  [ ] Distended  [X ] +BS  [X ] Non tender [ ] Tender  [ ]PEG   [ ] NGT   Last BM:     Genitourinary:  [ ] Normal [ ] Incontinent   [ ] Oliguria/Anuria   [ ] Soni  Musculoskeletal:  [ ] Normal   [X ] Generalized weakness  [ ] Bedbound   Neurological: [X ] No focal deficits  [ ] Cognitive impairment     Skin: [X ] Normal   [ ] Pressure ulcers     Vital Signs Last 24 Hrs  T(C): 36.3 (24 Mar 2019 10:53), Max: 37.2 (23 Mar 2019 13:26)  T(F): 97.3 (24 Mar 2019 10:53), Max: 99 (23 Mar 2019 13:26)  HR: 90 (24 Mar 2019 05:13) (65 - 99)  BP: 118/73 (24 Mar 2019 10:53) (81/51 - 125/71)  BP(mean): --  RR: 20 (24 Mar 2019 10:53) (18 - 22)  SpO2: 93% (24 Mar 2019 10:53) (93% - 100%)    LABS:                        9.1    18.07 )-----------( 156      ( 24 Mar 2019 09:57 )             30.5     03-24    139  |  99  |  31<H>  ----------------------------<  120<H>  3.5   |  27  |  2.06<H>    Ca    8.5      24 Mar 2019 07:26  Phos  3.4     -  Mg     1.6     -    TPro  7.0  /  Alb  2.5<L>  /  TBili  0.5  /  DBili  x   /  AST  12  /  ALT  15  /  AlkPhos  53      PT/INR - ( 24 Mar 2019 09:22 )   PT: 14.8 sec;   INR: 1.28 ratio         PTT - ( 24 Mar 2019 09:22 )  PTT:32.6 sec  Urinalysis Basic - ( 23 Mar 2019 13:29 )    Color: Light Orange / Appearance: Slightly Turbid / S.018 / pH: x  Gluc: x / Ketone: Negative  / Bili: Negative / Urobili: Negative   Blood: x / Protein: 100 / Nitrite: Negative   Leuk Esterase: Negative / RBC: 1049 /hpf / WBC 19 /HPF   Sq Epi: x / Non Sq Epi: 1 /hpf / Bacteria: Negative      I&O's Summary    23 Mar 2019 07:01  -  24 Mar 2019 07:00  --------------------------------------------------------  IN: 580 mL / OUT: 400 mL / NET: 180 mL        RADIOLOGY & ADDITIONAL STUDIES:

## 2019-03-24 NOTE — PROGRESS NOTE ADULT - PROBLEM SELECTOR PLAN 7
- MRI lumbosacral spine to r/o cord compression - unlikely with normal rectal tone however does have new incontinence and LLE weakness. No saddle paresthesias.   - MRI head to r/o further brain mets or hemorrhagic conversion with new LE weakness and AMS.

## 2019-03-24 NOTE — CONSULT NOTE ADULT - PROBLEM SELECTOR RECOMMENDATION 3
Patient with recurrent aspiration in past, has been on honey-thickened liquid diet. Patient with recurrent aspiration in past, has been on honey-thickened liquid diet.  Broad spectrum antibiotics.

## 2019-03-24 NOTE — PROGRESS NOTE ADULT - PROBLEM SELECTOR PLAN 6
- pt w/ increased weakness and numbness/tingling concerning for cervical cord compression  - ordered MRI cervical spine.  Called to make sure its done today; scheduled for 4:30  - If cord compression, needs stat neurosurgery consult

## 2019-03-25 DIAGNOSIS — J18.9 PNEUMONIA, UNSPECIFIED ORGANISM: ICD-10-CM

## 2019-03-25 LAB
ANION GAP SERPL CALC-SCNC: 11 MMOL/L — SIGNIFICANT CHANGE UP (ref 5–17)
BASOPHILS # BLD AUTO: 0.02 K/UL — SIGNIFICANT CHANGE UP (ref 0–0.2)
BASOPHILS NFR BLD AUTO: 0.1 % — SIGNIFICANT CHANGE UP (ref 0–2)
BLD GP AB SCN SERPL QL: NEGATIVE — SIGNIFICANT CHANGE UP
BUN SERPL-MCNC: 31 MG/DL — HIGH (ref 7–23)
CALCIUM SERPL-MCNC: 8.2 MG/DL — LOW (ref 8.4–10.5)
CHLORIDE SERPL-SCNC: 100 MMOL/L — SIGNIFICANT CHANGE UP (ref 96–108)
CO2 SERPL-SCNC: 27 MMOL/L — SIGNIFICANT CHANGE UP (ref 22–31)
CREAT ?TM UR-MCNC: 15 MG/DL — SIGNIFICANT CHANGE UP
CREAT SERPL-MCNC: 1.84 MG/DL — HIGH (ref 0.5–1.3)
CULTURE RESULTS: SIGNIFICANT CHANGE UP
EOSINOPHIL # BLD AUTO: 0.03 K/UL — SIGNIFICANT CHANGE UP (ref 0–0.5)
EOSINOPHIL NFR BLD AUTO: 0.2 % — SIGNIFICANT CHANGE UP (ref 0–6)
GLUCOSE SERPL-MCNC: 169 MG/DL — HIGH (ref 70–99)
HCT VFR BLD CALC: 27.4 % — LOW (ref 39–50)
HGB BLD-MCNC: 8.6 G/DL — LOW (ref 13–17)
IMM GRANULOCYTES NFR BLD AUTO: 0.5 % — SIGNIFICANT CHANGE UP (ref 0–1.5)
LYMPHOCYTES # BLD AUTO: 0.83 K/UL — LOW (ref 1–3.3)
LYMPHOCYTES # BLD AUTO: 5.4 % — LOW (ref 13–44)
MAGNESIUM SERPL-MCNC: 2.1 MG/DL — SIGNIFICANT CHANGE UP (ref 1.6–2.6)
MCHC RBC-ENTMCNC: 29.5 PG — SIGNIFICANT CHANGE UP (ref 27–34)
MCHC RBC-ENTMCNC: 31.4 GM/DL — LOW (ref 32–36)
MCV RBC AUTO: 93.8 FL — SIGNIFICANT CHANGE UP (ref 80–100)
MONOCYTES # BLD AUTO: 0.54 K/UL — SIGNIFICANT CHANGE UP (ref 0–0.9)
MONOCYTES NFR BLD AUTO: 3.5 % — SIGNIFICANT CHANGE UP (ref 2–14)
NEUTROPHILS # BLD AUTO: 13.84 K/UL — HIGH (ref 1.8–7.4)
NEUTROPHILS NFR BLD AUTO: 90.3 % — HIGH (ref 43–77)
ORGANISM # SPEC MICROSCOPIC CNT: SIGNIFICANT CHANGE UP
ORGANISM # SPEC MICROSCOPIC CNT: SIGNIFICANT CHANGE UP
PHOSPHATE SERPL-MCNC: 3.2 MG/DL — SIGNIFICANT CHANGE UP (ref 2.5–4.5)
PLATELET # BLD AUTO: 162 K/UL — SIGNIFICANT CHANGE UP (ref 150–400)
POTASSIUM SERPL-MCNC: 4.1 MMOL/L — SIGNIFICANT CHANGE UP (ref 3.5–5.3)
POTASSIUM SERPL-SCNC: 4.1 MMOL/L — SIGNIFICANT CHANGE UP (ref 3.5–5.3)
RBC # BLD: 2.92 M/UL — LOW (ref 4.2–5.8)
RBC # FLD: 13.6 % — SIGNIFICANT CHANGE UP (ref 10.3–14.5)
RH IG SCN BLD-IMP: POSITIVE — SIGNIFICANT CHANGE UP
SODIUM SERPL-SCNC: 138 MMOL/L — SIGNIFICANT CHANGE UP (ref 135–145)
SODIUM UR-SCNC: 27 MMOL/L — SIGNIFICANT CHANGE UP
SPECIMEN SOURCE: SIGNIFICANT CHANGE UP
WBC # BLD: 15.33 K/UL — HIGH (ref 3.8–10.5)
WBC # FLD AUTO: 15.33 K/UL — HIGH (ref 3.8–10.5)

## 2019-03-25 PROCEDURE — 99233 SBSQ HOSP IP/OBS HIGH 50: CPT

## 2019-03-25 PROCEDURE — 99233 SBSQ HOSP IP/OBS HIGH 50: CPT | Mod: GC

## 2019-03-25 RX ORDER — SODIUM CHLORIDE 9 MG/ML
1000 INJECTION, SOLUTION INTRAVENOUS
Qty: 0 | Refills: 0 | Status: DISCONTINUED | OUTPATIENT
Start: 2019-03-25 | End: 2019-03-29

## 2019-03-25 RX ORDER — SODIUM CHLORIDE 9 MG/ML
500 INJECTION, SOLUTION INTRAVENOUS ONCE
Qty: 0 | Refills: 0 | Status: COMPLETED | OUTPATIENT
Start: 2019-03-25 | End: 2019-03-25

## 2019-03-25 RX ADMIN — OXYCODONE HYDROCHLORIDE 20 MILLIGRAM(S): 5 TABLET ORAL at 12:52

## 2019-03-25 RX ADMIN — OXYCODONE HYDROCHLORIDE 20 MILLIGRAM(S): 5 TABLET ORAL at 05:47

## 2019-03-25 RX ADMIN — HYDROMORPHONE HYDROCHLORIDE 2 MILLIGRAM(S): 2 INJECTION INTRAMUSCULAR; INTRAVENOUS; SUBCUTANEOUS at 09:05

## 2019-03-25 RX ADMIN — HYDROMORPHONE HYDROCHLORIDE 1 MILLIGRAM(S): 2 INJECTION INTRAMUSCULAR; INTRAVENOUS; SUBCUTANEOUS at 19:42

## 2019-03-25 RX ADMIN — OXYCODONE HYDROCHLORIDE 20 MILLIGRAM(S): 5 TABLET ORAL at 21:42

## 2019-03-25 RX ADMIN — PIPERACILLIN AND TAZOBACTAM 25 GRAM(S): 4; .5 INJECTION, POWDER, LYOPHILIZED, FOR SOLUTION INTRAVENOUS at 00:47

## 2019-03-25 RX ADMIN — Medication 2 MILLIGRAM(S): at 05:48

## 2019-03-25 RX ADMIN — PANTOPRAZOLE SODIUM 40 MILLIGRAM(S): 20 TABLET, DELAYED RELEASE ORAL at 05:48

## 2019-03-25 RX ADMIN — PIPERACILLIN AND TAZOBACTAM 25 GRAM(S): 4; .5 INJECTION, POWDER, LYOPHILIZED, FOR SOLUTION INTRAVENOUS at 12:22

## 2019-03-25 RX ADMIN — TAMSULOSIN HYDROCHLORIDE 0.4 MILLIGRAM(S): 0.4 CAPSULE ORAL at 21:42

## 2019-03-25 RX ADMIN — OXYCODONE HYDROCHLORIDE 20 MILLIGRAM(S): 5 TABLET ORAL at 22:42

## 2019-03-25 RX ADMIN — GABAPENTIN 100 MILLIGRAM(S): 400 CAPSULE ORAL at 12:22

## 2019-03-25 RX ADMIN — HYDROMORPHONE HYDROCHLORIDE 1 MILLIGRAM(S): 2 INJECTION INTRAMUSCULAR; INTRAVENOUS; SUBCUTANEOUS at 12:22

## 2019-03-25 RX ADMIN — HYDROMORPHONE HYDROCHLORIDE 1 MILLIGRAM(S): 2 INJECTION INTRAMUSCULAR; INTRAVENOUS; SUBCUTANEOUS at 12:37

## 2019-03-25 RX ADMIN — OXYCODONE HYDROCHLORIDE 20 MILLIGRAM(S): 5 TABLET ORAL at 06:16

## 2019-03-25 RX ADMIN — SODIUM CHLORIDE 75 MILLILITER(S): 9 INJECTION, SOLUTION INTRAVENOUS at 00:50

## 2019-03-25 RX ADMIN — HYDROMORPHONE HYDROCHLORIDE 1 MILLIGRAM(S): 2 INJECTION INTRAMUSCULAR; INTRAVENOUS; SUBCUTANEOUS at 20:12

## 2019-03-25 RX ADMIN — HYDROMORPHONE HYDROCHLORIDE 2 MILLIGRAM(S): 2 INJECTION INTRAMUSCULAR; INTRAVENOUS; SUBCUTANEOUS at 08:52

## 2019-03-25 RX ADMIN — TENOFOVIR DISOPROXIL FUMARATE 300 MILLIGRAM(S): 300 TABLET, FILM COATED ORAL at 12:22

## 2019-03-25 RX ADMIN — OXYCODONE HYDROCHLORIDE 20 MILLIGRAM(S): 5 TABLET ORAL at 12:22

## 2019-03-25 RX ADMIN — SODIUM CHLORIDE 1000 MILLILITER(S): 9 INJECTION, SOLUTION INTRAVENOUS at 00:46

## 2019-03-25 NOTE — PROGRESS NOTE ADULT - PROBLEM SELECTOR PLAN 4
Acute blood loss anemia possibly 2/2 Hematuria  - CT abd/pelvis showed no evidence of renal calculi w/ no hydro   - ordered renal US for further eval  - bedside bladder scan showed no retention  - c/w Flomax  - cbc q12h; transfuse for Hbg < 7 Acute blood loss anemia possibly 2/2 Hematuria: can be in the setting of BPH or malignancy  - CT abd/pelvis showed no evidence of renal calculi w/ no hydro   - cbc q24h; transfuse for Hbg < 7  - will communicate this to outpatient oncologist to monitor

## 2019-03-25 NOTE — PROGRESS NOTE ADULT - PROBLEM SELECTOR PLAN 6
- pt w/ increased weakness and numbness/tingling concerning for cervical cord compression  - ordered MRI cervical spine.  Called to make sure its done today; scheduled for 4:30  - If cord compression, needs stat neurosurgery consult - pt w/ increased weakness and numbness/tingling concerning for cervical cord compression  - f/u MRI cervical spine read   - If cord compression, needs stat neurosurgery consult

## 2019-03-25 NOTE — PROGRESS NOTE ADULT - ASSESSMENT
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

## 2019-03-25 NOTE — PROGRESS NOTE ADULT - PROBLEM SELECTOR PLAN 3
Known to our pain outpatient clinic:  followed by Dr Ratliff.  Appreciate recommendations.  ON 20mg tid oxycodone ER with prn available.   Low tolerance to increase to 30mg tid (home regimen) .  Has received three doses dilaudid prn totalling 3 mg /24 hours.  Continue current regimen

## 2019-03-25 NOTE — PROGRESS NOTE ADULT - SUBJECTIVE AND OBJECTIVE BOX
SUBJECTIVE AND OBJECTIVE:  INTERVAL HPI/OVERNIGHT EVENTS:  Chinese speaking , refusing ,  acknowledges Parmjit as son .  Pointing to chest, nodded "yes" to pain questions.    DNR on chart:   Allergies    No Known Drug Allergies  tegaderm (Rash; Urticaria)    Intolerances    MEDICATIONS  (STANDING):  dexamethasone     Tablet 2 milliGRAM(s) Oral daily  gabapentin 100 milliGRAM(s) Oral daily  lactated ringers. 1000 milliLiter(s) (75 mL/Hr) IV Continuous <Continuous>  oxyCODONE  ER Tablet 20 milliGRAM(s) Oral three times a day  pantoprazole    Tablet 40 milliGRAM(s) Oral before breakfast  piperacillin/tazobactam IVPB. 3.375 Gram(s) IV Intermittent every 12 hours  tamsulosin 0.4 milliGRAM(s) Oral at bedtime  tenofovir disoproxil fumarate (VIREAD) 300 milliGRAM(s) Oral daily    MEDICATIONS  (PRN):  acetaminophen   Tablet .. 650 milliGRAM(s) Oral every 6 hours PRN Temp greater or equal to 38.5C (101.3F)  acetaminophen   Tablet .. 325 milliGRAM(s) Oral every 4 hours PRN Temp greater or equal to 38C (100.4F)  ALBUTerol/ipratropium for Nebulization 3 milliLiter(s) Nebulizer every 6 hours PRN Shortness of Breath and/or Wheezing  HYDROmorphone   Tablet 2 milliGRAM(s) Oral every 6 hours PRN Moderate Pain (4 - 6)  HYDROmorphone  Injectable 1 milliGRAM(s) IV Push every 4 hours PRN Severe Pain (7 - 10)  sodium chloride 0.9% for Nebulization 3 milliLiter(s) Nebulizer every 6 hours PRN thick mucous      ITEMS UNCHECKED ARE NOT PRESENT    PRESENT SYMPTOMS: [ x]Unable to obtain due to chinese speaking and refusing .   "yes" to sob/pain across chest   Source if other than patient:  [ ]Family   [ ]Team     Pain (Impact on QOL):    Location:  chest and back  Minimal acceptable level (0-10 scale):            Aggravating factors:  Quality:  Radiation:  Severity (0-10 scale):    Timing:    Dyspnea:                           [ ]Mild [ ]Moderate [ ]Severe  Anxiety:                             [ ]Mild [ ]Moderate [ ]Severe  Fatigue:                             [ ]Mild [ ]Moderate [ ]Severe  Nausea:                             [ ]Mild [ ]Moderate [ ]Severe  Loss of appetite:              [ ]Mild [ ]Moderate [ ]Severe  Constipation:                    [ ]Mild [ ]Moderate [ ]Severe    PAIN AD Score:	4  [email or web address] (Ctrl + left click to view)    Other Symptoms:  [ ]All other review of systems negative     Karnofsky Performance Score/Palliative Performance Status Version 2:     30    %    http://palliative.info/resource_material/[email or web address]  PHYSICAL EXAM:  Vital Signs Last 24 Hrs  T(C): 36.3 (25 Mar 2019 10:52), Max: 36.4 (25 Mar 2019 00:15)  T(F): 97.3 (25 Mar 2019 10:52), Max: 97.5 (25 Mar 2019 00:15)  HR: 74 (25 Mar 2019 10:52) (65 - 74)  BP: 128/75 (25 Mar 2019 10:52) (90/52 - 128/75)  BP(mean): --  RR: 18 (25 Mar 2019 10:52) (18 - 19)  SpO2: 94% (25 Mar 2019 10:52) (92% - 96%) I&O's Summary    24 Mar 2019 07:01  -  25 Mar 2019 07:00  --------------------------------------------------------  IN: 1590 mL / OUT: 1000 mL / NET: 590 mL    25 Mar 2019 07:01  -  25 Mar 2019 13:11  --------------------------------------------------------  IN: 240 mL / OUT: 150 mL / NET: 90 mL     GENERAL:  [x ]Alert  [ ]Oriented x   [ ]Lethargic  [ ]Cachexia  [ ]Unarousable  [ x]Verbal :  chinese speaking  [ ]Non-Verbal    Behavioral:   [ ] Anxiety  [ ] Delirium [ ] Agitation [ ] Other    HEENT:  [ ]Normal   [ x]Dry mouth   [ ]ET Tube/Trach  [ ]Oral lesions    PULMONARY:   [ ]Clear [ x]Tachypnea  [ ]Audible excessive secretions   [ ]Rhonchi        [ ]Right [ ]Left [ ]Bilateral  [ ]Crackles        [ ]Right [ ]Left [ ]Bilateral  [ ]Wheezing     [ ]Right [ ]Left [ ]Bilateral    CARDIOVASCULAR:    [x ]Regular [ ]Irregular [ ]Tachy  [ ]Jerman [ ]Murmur [ ]Other    GASTROINTESTINAL:  [x ]Soft  [ ]Distended   [x ]+BS  [ ]Non tender [ ]Tender  [ ]PEG [ ]OGT/ NGT   Last BM:    GENITOURINARY:  [ x]Normal [ ] Incontinent   [ ]Oliguria/Anuria   [ ]Soni    MUSCULOSKELETAL:   [ ]Normal   [x ]Weakness  [ ]Bed/Wheelchair bound [ ]Edema    NEUROLOGIC:   [ x]No focal deficits  [ ] Cognitive impairment  [ ] Dysphagia [ ]Dysarthria [ ] Paresis [ ]Other     SKIN:   [x ]Normal   [ ]Pressure ulcer(s)  [ ]Rash    CRITICAL CARE:  [ ] Shock Present  [ ]Septic [ ]Cardiogenic [ ]Neurologic [ ]Hypovolemic  [ ]  Vasopressors [ ]  Inotropes   [ ] Respiratory failure present  [ ] Acute  [ ] Chronic [ ] Hypoxic  [ ] Hypercarbic [ ] Other  [ ] Other organ failure     LABS:                        8.6    15.33 )-----------( 162      ( 25 Mar 2019 08:58 )             27.4   03-25    138  |  100  |  31<H>  ----------------------------<  169<H>  4.1   |  27  |  1.84<H>    Ca    8.2<L>      25 Mar 2019 07:17  Phos  3.2     03-25  Mg     2.1     03-25    PT/INR - ( 24 Mar 2019 09:22 )   PT: 14.8 sec;   INR: 1.28 ratio         PTT - ( 24 Mar 2019 09:22 )  PTT:32.6 sec    Urinalysis Basic - ( 23 Mar 2019 13:29 )    Color: Light Orange / Appearance: Slightly Turbid / S.018 / pH: x  Gluc: x / Ketone: Negative  / Bili: Negative / Urobili: Negative   Blood: x / Protein: 100 / Nitrite: Negative   Leuk Esterase: Negative / RBC: 1049 /hpf / WBC 19 /HPF   Sq Epi: x / Non Sq Epi: 1 /hpf / Bacteria: Negative      RADIOLOGY & ADDITIONAL STUDIES:  NTERPRETATION:  CLINICAL INFORMATION: History of lung cancer with   hypoxia and hematuria.    COMPARISON: CT chest dated 2018. PET/CT dated 2018.    PROCEDURE:   CT of the Chest, Abdomen and Pelvis was performed without intravenous   contrast.   Intravenous contrast: None.  Oral contrast: None.  Sagittal and coronal reformats were performed.    FINDINGS:    CHEST:     LUNGS AND LARGE AIRWAYS: Secretions within the distal trachea and right   mainstem bronchus.Extensive opacities/consolidation are noted within the   left upper and left lower lobes. They have increased in compared to   previous exam. Interval decrease in groundglass opacities within the   right lung, with some patchy areas of persistent opacification and   consolidation. Multiple nodular opacities in the right lower lobe appear   unchanged, for example a 1.0 cm nodule in the right lower lobe (2:69).   Centrilobular emphysema.  PLEURA: Small left and trace right pleural effusions.  VESSELS: Coronary atherosclerosis.  HEART: Heart size is normal. No pericardial effusion.  MEDIASTINUM AND DUSTIN: Once again, multiple lymph nodes are present in the   pretracheal space, AP window and the subcarinal region. Few of the nodes   are calcified. Allowing for differences in technique, they're unchanged   when compared to previous exam.  CHEST WALL AND LOWER NECK: Chronic left posterior rib fractures.    ABDOMEN AND PELVIS:    LIVER: Within normal limits.  BILE DUCTS: Normal caliber.  GALLBLADDER: Within normal limits.  SPLEEN: Within normal limits.  PANCREAS: Within normal limits.  ADRENALS: Unchanged mild left adrenal nodularity.  KIDNEYS/URETERS: No hydronephrosis. No renal calculi.    BLADDER: Within normal limits.  REPRODUCTIVE ORGANS: Prostate is enlarged.  BOWEL: High density material noted within the cecum/terminal ileum. No   bowel obstruction. Appendix is not visualized.  PERITONEUM: No ascites.  VESSELS:  Atherosclerosis.  RETROPERITONEUM: No lymphadenopathy.    ABDOMINAL WALL: Within normal limits.   BONES: Degenerative changes.    IMPRESSION:     Opacities/consolidation in the left upper and left lower lobes have   increased when compared to previous exam.    Opacities within the right lung as described above have decreased when   compared to previous exam.    Small left and trace right pleural effusions.    No acute intra-abdominal pathology              Protein Calorie Malnutrition Present: [ ] yes [ ] no  [ ] PPSV2 < or = 30%  [ ] significant weight loss [ ] poor nutritional intake [ ] anasarca [ ] catabolic state Albumin, Serum: 2.5 g/dL (19 @ 12:35)  Artificial Nutrition [ ]     REFERRALS:   [ ]Chaplaincy  [ ] Hospice  [ ]Child Life  [ ]Social Work  [ ]Case management [ ]Holistic Therapy   Goals of Care Document:

## 2019-03-25 NOTE — PROGRESS NOTE ADULT - PROBLEM SELECTOR PLAN 3
- concerning for mets w/ cord compression   - MRI lumbosacral spine w/ out IV contrast in setting of LUX

## 2019-03-25 NOTE — PHYSICAL THERAPY INITIAL EVALUATION ADULT - GENERAL OBSERVATIONS, REHAB EVAL
Pt received semisupine in bed with +IV and +2L of O2 via NC. NAD noted. Pt's wife present at bedside.

## 2019-03-25 NOTE — PROGRESS NOTE ADULT - PROBLEM SELECTOR PLAN 1
- Pt w/ sepsis on admission   - Pt admitted w/ tachycardia, tachypnea, fever, leukocytosis, source of infection and LUX  - Likely 2/2 PNA  - c/w broad spectrum abx (vanc + zosyn)  - U/A neg for LE/nitrites but urine cx and 2 sets of blood cultures are pending  -weight based IVF given, Cap refill 2sec on reassessment. -on admission, pt meeting criteria for severe sepsis found to have multifocal pneumonia/HAP (hospitalized in Jan) on CT chest (VENTURA and LLL).  -c/w zosyn (Day 3 of 7)  -blood cx 03/23 (coag neg staph)  -f/u 03/26 cx

## 2019-03-25 NOTE — PHYSICAL THERAPY INITIAL EVALUATION ADULT - PERTINENT HX OF CURRENT PROBLEM, REHAB EVAL
Pt is a 77 yo M with a PMH stage IV lung (EGFR +, NSC) cancer (followed at [facility]) with suspected brain mets, on Osimertinib since 01/10/19), DM2, BPH, and chronic HBV (on Tenofovir) who presented with fevers/weakness and new fecal incontinence, thought to have multifocal PNA, L>R with worsening lung masses. (-) Abdomen and Pelvis CT 3/23/19. Continued below.

## 2019-03-25 NOTE — PROGRESS NOTE ADULT - SUBJECTIVE AND OBJECTIVE BOX
Internal Medicine Team Progress Note  Phil Davidson, PGY 1  [phone]    CITLALY ODALIS  Patient is a 78y old  Male who presents with a chief complaint of SOB 2/2 hypoxic respiratory failure (24 Mar 2019 12:36)      INTERVAL HPI / SUBJECTIVE:          MEDICATIONS:   MEDICATIONS  (STANDING):  dexamethasone     Tablet 2 milliGRAM(s) Oral daily  gabapentin 100 milliGRAM(s) Oral daily  lactated ringers. 1000 milliLiter(s) (75 mL/Hr) IV Continuous <Continuous>  oxyCODONE  ER Tablet 20 milliGRAM(s) Oral three times a day  pantoprazole    Tablet 40 milliGRAM(s) Oral before breakfast  piperacillin/tazobactam IVPB. 3.375 Gram(s) IV Intermittent every 12 hours  tamsulosin 0.4 milliGRAM(s) Oral at bedtime  tenofovir disoproxil fumarate (VIREAD) 300 milliGRAM(s) Oral daily    MEDICATIONS  (PRN):  acetaminophen   Tablet .. 650 milliGRAM(s) Oral every 6 hours PRN Temp greater or equal to 38.5C (101.3F)  acetaminophen   Tablet .. 325 milliGRAM(s) Oral every 4 hours PRN Temp greater or equal to 38C (100.4F)  ALBUTerol/ipratropium for Nebulization 3 milliLiter(s) Nebulizer every 6 hours PRN Shortness of Breath and/or Wheezing  HYDROmorphone   Tablet 2 milliGRAM(s) Oral every 6 hours PRN Moderate Pain (4 - 6)  HYDROmorphone  Injectable 1 milliGRAM(s) IV Push every 4 hours PRN Severe Pain (7 - 10)  sodium chloride 0.9% for Nebulization 3 milliLiter(s) Nebulizer every 6 hours PRN thick mucous      ALLERGIES:   Allergies    No Known Drug Allergies  tegaderm (Rash; Urticaria)    Intolerances        OBJECTIVE:  Vital Signs Last 24 Hrs  T(C): 36.3 (25 Mar 2019 05:39), Max: 36.4 (25 Mar 2019 00:15)  T(F): 97.3 (25 Mar 2019 05:39), Max: 97.5 (25 Mar 2019 00:15)  HR: 65 (25 Mar 2019 05:39) (65 - 67)  BP: 104/60 (25 Mar 2019 05:46) (90/52 - 118/73)  BP(mean): --  RR: 18 (25 Mar 2019 05:39) (18 - 20)  SpO2: 94% (25 Mar 2019 05:39) (92% - 96%)    I&O's Summary    23 Mar 2019 07:01  -  24 Mar 2019 07:00  --------------------------------------------------------  IN: 580 mL / OUT: 400 mL / NET: 180 mL    24 Mar 2019 07:01  -  25 Mar 2019 06:20  --------------------------------------------------------  IN: 1590 mL / OUT: 1000 mL / NET: 590 mL        PHYSICAL EXAM:  General: Well-appearing, NAD  HEENT:  EOMI, PERRL, conjunctiva and sclera clear, normal oropharynx  Neck:  Supple, no JVD, normal thyroid  Chest/Lungs: CTA B/L, no rales, rhonchi, rub or wheezing  Heart: RRR, normal S1, S2, no murmurs or gallops  Abdomen: Soft, nondistended, NTTP, normoactive bowel sounds  Extremities: Peripheral pulses 2+ B/L, no edema, cyanosis or clubbing  Skin: Warm, well-perfused, no rashes or lesions  Neurological: A&Ox3, no focal deficits      LABS:      IMAGING:       Labs and imaging personally reviewed and interpreted [ x ]  Consult notes reviewed [x  ]  Case discussed with consultants [ x ] Internal Medicine Team Progress Note  Phil Davidson, PGY 1  [phone]    ODALIS BOUCHER  Patient is a 78y old  Male who presents with a chief complaint of SOB 2/2 hypoxic respiratory failure (24 Mar 2019 12:36)      INTERVAL HPI / SUBJECTIVE:    Overnight, pt hypotensive to 90/52.  1/2NS bolus given and started on maintenance fluids. Pt BP increased to 101/60.  Pt complaining of R sided chest pain that is worse with breathing, eating, and palpation.  Denies any difficulty breathing.  Currently on 2L NC.  Denies any fevers, chills, night sweats.  Denies any abdominal pain, nausea, vomiting.         MEDICATIONS:   MEDICATIONS  (STANDING):  dexamethasone     Tablet 2 milliGRAM(s) Oral daily  gabapentin 100 milliGRAM(s) Oral daily  lactated ringers. 1000 milliLiter(s) (75 mL/Hr) IV Continuous <Continuous>  oxyCODONE  ER Tablet 20 milliGRAM(s) Oral three times a day  pantoprazole    Tablet 40 milliGRAM(s) Oral before breakfast  piperacillin/tazobactam IVPB. 3.375 Gram(s) IV Intermittent every 12 hours  tamsulosin 0.4 milliGRAM(s) Oral at bedtime  tenofovir disoproxil fumarate (VIREAD) 300 milliGRAM(s) Oral daily    MEDICATIONS  (PRN):  acetaminophen   Tablet .. 650 milliGRAM(s) Oral every 6 hours PRN Temp greater or equal to 38.5C (101.3F)  acetaminophen   Tablet .. 325 milliGRAM(s) Oral every 4 hours PRN Temp greater or equal to 38C (100.4F)  ALBUTerol/ipratropium for Nebulization 3 milliLiter(s) Nebulizer every 6 hours PRN Shortness of Breath and/or Wheezing  HYDROmorphone   Tablet 2 milliGRAM(s) Oral every 6 hours PRN Moderate Pain (4 - 6)  HYDROmorphone  Injectable 1 milliGRAM(s) IV Push every 4 hours PRN Severe Pain (7 - 10)  sodium chloride 0.9% for Nebulization 3 milliLiter(s) Nebulizer every 6 hours PRN thick mucous      ALLERGIES:   Allergies    No Known Drug Allergies  tegaderm (Rash; Urticaria)    Intolerances        OBJECTIVE:  Vital Signs Last 24 Hrs  T(C): 36.3 (25 Mar 2019 05:39), Max: 36.4 (25 Mar 2019 00:15)  T(F): 97.3 (25 Mar 2019 05:39), Max: 97.5 (25 Mar 2019 00:15)  HR: 65 (25 Mar 2019 05:39) (65 - 67)  BP: 104/60 (25 Mar 2019 05:46) (90/52 - 118/73)  BP(mean): --  RR: 18 (25 Mar 2019 05:39) (18 - 20)  SpO2: 94% (25 Mar 2019 05:39) (92% - 96%)    I&O's Summary    23 Mar 2019 07:01  -  24 Mar 2019 07:00  --------------------------------------------------------  IN: 580 mL / OUT: 400 mL / NET: 180 mL    24 Mar 2019 07:01  -  25 Mar 2019 06:20  --------------------------------------------------------  IN: 1590 mL / OUT: 1000 mL / NET: 590 mL        PHYSICAL EXAM:  General: Well-appearing, NAD  HEENT:  EOMI, PERRL, conjunctiva and sclera clear, normal oropharynx  Neck:  Supple,  Chest/Lungs: CTA B/L, no rales, rhonchi, rub or wheezing; R chest tender to palpation   Heart: RRR, normal S1, S2, no murmurs or gallops  Abdomen: Soft, nondistended, NTTP, normoactive bowel sounds  Extremities: Peripheral pulses 2+ B/L, no edema, cyanosis or clubbing  Skin: Warm, well-perfused, no rashes or lesions  Neurological: A&Ox3, no focal deficits; 4/5 strength in all 4 extremities       LABS:    CBC Full  -  ( 25 Mar 2019 08:58 )  WBC Count : 15.33 K/uL  Hemoglobin : 8.6 g/dL  Hematocrit : 27.4 %  Platelet Count - Automated : 162 K/uL  Mean Cell Volume : 93.8 fl  Mean Cell Hemoglobin : 29.5 pg  Mean Cell Hemoglobin Concentration : 31.4 gm/dL  Auto Neutrophil # : 13.84 K/uL  Auto Lymphocyte # : 0.83 K/uL  Auto Monocyte # : 0.54 K/uL  Auto Eosinophil # : 0.03 K/uL  Auto Basophil # : 0.02 K/uL  Auto Neutrophil % : 90.3 %  Auto Lymphocyte % : 5.4 %  Auto Monocyte % : 3.5 %  Auto Eosinophil % : 0.2 %  Auto Basophil % : 0.1 %          138  |  100  |  31<H>  ----------------------------<  169<H>  4.1   |  27  |  1.84<H>  24    139  |  99  |  31<H>  ----------------------------<  120<H>  3.5   |  27  |  2.06<H>  23    139  |  99  |  37<H>  ----------------------------<  168<H>  3.7   |  27  |  2.29<H>    Ca    8.2<L>      25 Mar 2019 07:17  Ca    8.5      24 Mar 2019 07:26  Ca    8.9      23 Mar 2019 12:35  Phos  3.2       Mg     2.1         TPro  7.0  /  Alb  2.5<L>  /  TBili  0.5  /  DBili  x   /  AST  12  /  ALT  15  /  AlkPhos  53  23      LIVER FUNCTIONS - ( 23 Mar 2019 12:35 )  Alb: 2.5 g/dL / Pro: 7.0 g/dL / ALK PHOS: 53 U/L / ALT: 15 U/L / AST: 12 U/L / GGT: x             PT/INR - ( 24 Mar 2019 09:22 )   PT: 14.8 sec;   INR: 1.28 ratio         PTT - ( 24 Mar 2019 09:22 )  PTT:32.6 sec          Culture - Urine (collected 23 Mar 2019 22:40)  Source: .Urine Clean Catch (Midstream)  Final Report (24 Mar 2019 16:26):    No growth    Culture - Blood (collected 23 Mar 2019 22:05)  Source: .Blood Blood-Peripheral  Preliminary Report (24 Mar 2019 23:01):    No growth to date.    Culture - Blood (collected 23 Mar 2019 22:05)  Source: .Blood Blood-Peripheral  Gram Stain (24 Mar 2019 21:02):    Growth in aerobic bottle: Gram Positive Cocci in Clusters  Preliminary Report (24 Mar 2019 21:02):    Growth in aerobic bottle: Gram Positive Cocci in Clusters    "Due to technical problems, Proteus sp. will Not be reported as part of    the BCID panel until further notice"    ***Blood Panel PCR results on this specimen are available    approximately 3 hours after the Gram stain result.***    Gram stain, PCR, and/or culture results may not always    correspond due to difference in methodologies.    ************************************************************    This PCR assay was performed using [employer].    The following targets are tested for: Enterococcus,    vancomycin resistant enterococci, Listeria monocytogenes,    coagulase negative staphylococci, S. aureus,    methicillin resistant S. aureus, Streptococcus agalactiae    (Group B), S. pneumoniae, S.pyogenes (Group A),    Acinetobacter baumannii, Enterobacter cloacae, E. coli,    Klebsiella oxytoca, K. pneumoniae, Proteus sp.,    Serratia marcescens, Haemophilus influenzae,    Neisseria meningitidis, Pseudomonas aeruginosa, Candida    albicans, C. glabrata, C krusei, C parapsilosis,    C. tropicalis and the KPC resistance gene.  Organism: Blood Culture PCR (24 Mar 2019 22:17)  Organism: Blood Culture PCR (24 Mar 2019 22:17)        Urinalysis Basic - ( 23 Mar 2019 13:29 )    Color: Light Orange / Appearance: Slightly Turbid / S.018 / pH: x  Gluc: x / Ketone: Negative  / Bili: Negative / Urobili: Negative   Blood: x / Protein: 100 / Nitrite: Negative   Leuk Esterase: Negative / RBC: 1049 /hpf / WBC 19 /HPF   Sq Epi: x / Non Sq Epi: 1 /hpf / Bacteria: Negative          IMAGING: pending MRI read       Labs and imaging personally reviewed and interpreted [ x ]  Consult notes reviewed [x  ]  Case discussed with consultants [ x ]

## 2019-03-25 NOTE — PROGRESS NOTE ADULT - PROBLEM SELECTOR PLAN 2
- likely 2/2 HAP (hospitalized in Jan) and immunocompromised with lung cancer on chemotherapy   - Pt presented w/ fever, leukocytosis and CT findings showing increased opacities/consolidations in VENTURA and LLL; c/w renal dosing of vanc in setting or LUX (vanc trough goal 15-20) and zosyn.  Sputum culture ordered   - Pt w/ history of adenocarcinoma, tachycardia and hypoxia however pt on Eliquis; if pt does not improve w/ abx will consider CTA   - duonebs q6 PRN, chest PT, acapella, IS w/ PRN hypertonic saline -currently on 2L NC;  - duonebs q6 PRN, chest PT, acapella, IS w/ PRN hypertonic saline

## 2019-03-25 NOTE — PHYSICAL THERAPY INITIAL EVALUATION ADULT - PRECAUTIONS/LIMITATIONS, REHAB EVAL
fall precautions/+Chest CT 3/23/19: Opacities/consolidation in the left upper and left lower lobes have increased. Opacities within the right lung have decreased. Small left and trace right pleural effusions. +CXR 3/23/19: New masslike opacity within the left upper lobe. Multifocal airspace opacities within the mid to lower left lung and basilar right lung. Loculated left pleural effusion. Head MRI 3/24/19: No acute hemorrhage or infarct. Mild to moderate microvascular ischemic change. Subtle metastatic disease. Cervical spine MRI 3/24/19: No evidence of osseous metastatic disease. Lumbar spine MRI 3/24/19: Small focus of signal abnormality within the right superior aspect of the L2 vertebral body, which is benign.

## 2019-03-25 NOTE — PHYSICAL THERAPY INITIAL EVALUATION ADULT - ADDITIONAL COMMENTS
Pt lives with his wife and son in a house with 14-15 steps to enter and 12-13 steps inside, +HR. Pt uses a RW/cane for ambulation. Pt is independent with all ADLs and ambulation.

## 2019-03-25 NOTE — PROGRESS NOTE ADULT - PROBLEM SELECTOR PLAN 8
- pt w/ Stage IV EGFR + NSCLC   - holding Tagrisso in setting of infection; will f/u w/ heme/onc   - c/w decardon for nausea and tumor burden  - pts pain not controlled on medications listed in ISTOP (2 mg PO dilaudid q12) [id number].  Started on standing IV dilaudid 1.25 q8 w/ hold parameters for respiratory depression w/ 2mg PO dilaudid q6 PRN for breakthrough pain and consulted palliative care for pain management consult. Of note, pt had a lot more pain meds than what was seen in istop so may have developed tolerance.   - will call heme/onc in AM   - Oncologist is Dr. Norah Gross   - oncologist is - pt w/ Stage IV EGFR + NSCLC   - holding Tagrisso in setting of infection; will f/u w/ heme/onc   - c/w decardon for nausea and tumor burden  - pain regimen: oxycontin 20mg TID; hydromorphone 2mg q4hrs prn for moderate pain; hydromorphone 1mg prn for severe pain   - will call heme/onc in AM   - Oncologist is Dr. Norah Gross   - oncologist is

## 2019-03-26 ENCOUNTER — INBOUND DOCUMENT (OUTPATIENT)
Age: 79
End: 2019-03-26

## 2019-03-26 DIAGNOSIS — Z71.89 OTHER SPECIFIED COUNSELING: ICD-10-CM

## 2019-03-26 DIAGNOSIS — G89.3 NEOPLASM RELATED PAIN (ACUTE) (CHRONIC): ICD-10-CM

## 2019-03-26 LAB
ALBUMIN SERPL ELPH-MCNC: 2.4 G/DL — LOW (ref 3.3–5)
ALP SERPL-CCNC: 52 U/L — SIGNIFICANT CHANGE UP (ref 40–120)
ALT FLD-CCNC: 22 U/L — SIGNIFICANT CHANGE UP (ref 10–45)
ANION GAP SERPL CALC-SCNC: 11 MMOL/L — SIGNIFICANT CHANGE UP (ref 5–17)
AST SERPL-CCNC: 23 U/L — SIGNIFICANT CHANGE UP (ref 10–40)
BASOPHILS # BLD AUTO: 0.03 K/UL — SIGNIFICANT CHANGE UP (ref 0–0.2)
BASOPHILS NFR BLD AUTO: 0.2 % — SIGNIFICANT CHANGE UP (ref 0–2)
BILIRUB SERPL-MCNC: 0.2 MG/DL — SIGNIFICANT CHANGE UP (ref 0.2–1.2)
BUN SERPL-MCNC: 26 MG/DL — HIGH (ref 7–23)
CALCIUM SERPL-MCNC: 8.5 MG/DL — SIGNIFICANT CHANGE UP (ref 8.4–10.5)
CHLORIDE SERPL-SCNC: 101 MMOL/L — SIGNIFICANT CHANGE UP (ref 96–108)
CO2 SERPL-SCNC: 28 MMOL/L — SIGNIFICANT CHANGE UP (ref 22–31)
CREAT SERPL-MCNC: 1.85 MG/DL — HIGH (ref 0.5–1.3)
EOSINOPHIL # BLD AUTO: 0.04 K/UL — SIGNIFICANT CHANGE UP (ref 0–0.5)
EOSINOPHIL NFR BLD AUTO: 0.3 % — SIGNIFICANT CHANGE UP (ref 0–6)
GLUCOSE SERPL-MCNC: 122 MG/DL — HIGH (ref 70–99)
HCT VFR BLD CALC: 30.7 % — LOW (ref 39–50)
HGB BLD-MCNC: 9.7 G/DL — LOW (ref 13–17)
IMM GRANULOCYTES NFR BLD AUTO: 0.4 % — SIGNIFICANT CHANGE UP (ref 0–1.5)
LYMPHOCYTES # BLD AUTO: 1.25 K/UL — SIGNIFICANT CHANGE UP (ref 1–3.3)
LYMPHOCYTES # BLD AUTO: 10.1 % — LOW (ref 13–44)
MCHC RBC-ENTMCNC: 29.2 PG — SIGNIFICANT CHANGE UP (ref 27–34)
MCHC RBC-ENTMCNC: 31.6 GM/DL — LOW (ref 32–36)
MCV RBC AUTO: 92.5 FL — SIGNIFICANT CHANGE UP (ref 80–100)
MONOCYTES # BLD AUTO: 0.74 K/UL — SIGNIFICANT CHANGE UP (ref 0–0.9)
MONOCYTES NFR BLD AUTO: 6 % — SIGNIFICANT CHANGE UP (ref 2–14)
NEUTROPHILS # BLD AUTO: 10.26 K/UL — HIGH (ref 1.8–7.4)
NEUTROPHILS NFR BLD AUTO: 83 % — HIGH (ref 43–77)
OSMOLALITY UR: 154 MOS/KG — SIGNIFICANT CHANGE UP (ref 50–1200)
PLATELET # BLD AUTO: 176 K/UL — SIGNIFICANT CHANGE UP (ref 150–400)
POTASSIUM SERPL-MCNC: 3.7 MMOL/L — SIGNIFICANT CHANGE UP (ref 3.5–5.3)
POTASSIUM SERPL-SCNC: 3.7 MMOL/L — SIGNIFICANT CHANGE UP (ref 3.5–5.3)
PROT SERPL-MCNC: 6.3 G/DL — SIGNIFICANT CHANGE UP (ref 6–8.3)
RBC # BLD: 3.32 M/UL — LOW (ref 4.2–5.8)
RBC # FLD: 13.5 % — SIGNIFICANT CHANGE UP (ref 10.3–14.5)
SODIUM SERPL-SCNC: 140 MMOL/L — SIGNIFICANT CHANGE UP (ref 135–145)
WBC # BLD: 12.37 K/UL — HIGH (ref 3.8–10.5)
WBC # FLD AUTO: 12.37 K/UL — HIGH (ref 3.8–10.5)

## 2019-03-26 PROCEDURE — 99233 SBSQ HOSP IP/OBS HIGH 50: CPT | Mod: GC

## 2019-03-26 PROCEDURE — 99233 SBSQ HOSP IP/OBS HIGH 50: CPT

## 2019-03-26 RX ORDER — ACETAMINOPHEN 500 MG
1000 TABLET ORAL ONCE
Qty: 0 | Refills: 0 | Status: COMPLETED | OUTPATIENT
Start: 2019-03-26 | End: 2019-03-26

## 2019-03-26 RX ORDER — HYDROMORPHONE HYDROCHLORIDE 2 MG/ML
1 INJECTION INTRAMUSCULAR; INTRAVENOUS; SUBCUTANEOUS ONCE
Qty: 0 | Refills: 0 | Status: DISCONTINUED | OUTPATIENT
Start: 2019-03-26 | End: 2019-03-26

## 2019-03-26 RX ORDER — TENOFOVIR DISOPROXIL FUMARATE 300 MG/1
300 TABLET ORAL
Qty: 30 | Refills: 3 | Status: ACTIVE | COMMUNITY
Start: 2017-04-20 | End: 1900-01-01

## 2019-03-26 RX ORDER — GABAPENTIN 400 MG/1
200 CAPSULE ORAL DAILY
Qty: 0 | Refills: 0 | Status: DISCONTINUED | OUTPATIENT
Start: 2019-03-26 | End: 2019-03-29

## 2019-03-26 RX ADMIN — HYDROMORPHONE HYDROCHLORIDE 1 MILLIGRAM(S): 2 INJECTION INTRAMUSCULAR; INTRAVENOUS; SUBCUTANEOUS at 09:36

## 2019-03-26 RX ADMIN — HYDROMORPHONE HYDROCHLORIDE 1 MILLIGRAM(S): 2 INJECTION INTRAMUSCULAR; INTRAVENOUS; SUBCUTANEOUS at 19:24

## 2019-03-26 RX ADMIN — TAMSULOSIN HYDROCHLORIDE 0.4 MILLIGRAM(S): 0.4 CAPSULE ORAL at 21:34

## 2019-03-26 RX ADMIN — OXYCODONE HYDROCHLORIDE 20 MILLIGRAM(S): 5 TABLET ORAL at 14:45

## 2019-03-26 RX ADMIN — HYDROMORPHONE HYDROCHLORIDE 1 MILLIGRAM(S): 2 INJECTION INTRAMUSCULAR; INTRAVENOUS; SUBCUTANEOUS at 07:58

## 2019-03-26 RX ADMIN — PANTOPRAZOLE SODIUM 40 MILLIGRAM(S): 20 TABLET, DELAYED RELEASE ORAL at 06:07

## 2019-03-26 RX ADMIN — HYDROMORPHONE HYDROCHLORIDE 1 MILLIGRAM(S): 2 INJECTION INTRAMUSCULAR; INTRAVENOUS; SUBCUTANEOUS at 08:15

## 2019-03-26 RX ADMIN — TENOFOVIR DISOPROXIL FUMARATE 300 MILLIGRAM(S): 300 TABLET, FILM COATED ORAL at 12:49

## 2019-03-26 RX ADMIN — Medication 400 MILLIGRAM(S): at 09:37

## 2019-03-26 RX ADMIN — HYDROMORPHONE HYDROCHLORIDE 1 MILLIGRAM(S): 2 INJECTION INTRAMUSCULAR; INTRAVENOUS; SUBCUTANEOUS at 09:50

## 2019-03-26 RX ADMIN — PIPERACILLIN AND TAZOBACTAM 25 GRAM(S): 4; .5 INJECTION, POWDER, LYOPHILIZED, FOR SOLUTION INTRAVENOUS at 12:49

## 2019-03-26 RX ADMIN — GABAPENTIN 200 MILLIGRAM(S): 400 CAPSULE ORAL at 12:49

## 2019-03-26 RX ADMIN — OXYCODONE HYDROCHLORIDE 20 MILLIGRAM(S): 5 TABLET ORAL at 06:07

## 2019-03-26 RX ADMIN — PIPERACILLIN AND TAZOBACTAM 25 GRAM(S): 4; .5 INJECTION, POWDER, LYOPHILIZED, FOR SOLUTION INTRAVENOUS at 01:55

## 2019-03-26 RX ADMIN — OXYCODONE HYDROCHLORIDE 20 MILLIGRAM(S): 5 TABLET ORAL at 21:34

## 2019-03-26 RX ADMIN — OXYCODONE HYDROCHLORIDE 20 MILLIGRAM(S): 5 TABLET ORAL at 22:04

## 2019-03-26 RX ADMIN — OXYCODONE HYDROCHLORIDE 20 MILLIGRAM(S): 5 TABLET ORAL at 14:13

## 2019-03-26 RX ADMIN — Medication 2 MILLIGRAM(S): at 06:07

## 2019-03-26 RX ADMIN — Medication 1000 MILLIGRAM(S): at 09:50

## 2019-03-26 RX ADMIN — HYDROMORPHONE HYDROCHLORIDE 1 MILLIGRAM(S): 2 INJECTION INTRAMUSCULAR; INTRAVENOUS; SUBCUTANEOUS at 19:54

## 2019-03-26 RX ADMIN — HYDROMORPHONE HYDROCHLORIDE 1 MILLIGRAM(S): 2 INJECTION INTRAMUSCULAR; INTRAVENOUS; SUBCUTANEOUS at 12:57

## 2019-03-26 RX ADMIN — HYDROMORPHONE HYDROCHLORIDE 1 MILLIGRAM(S): 2 INJECTION INTRAMUSCULAR; INTRAVENOUS; SUBCUTANEOUS at 13:15

## 2019-03-26 NOTE — PROGRESS NOTE ADULT - PROBLEM SELECTOR PLAN 1
Known to [facility]  Team 3 to have Hem/Onc consult.  Family wish to know if there will be further treatment options.  Tagrisso on hold in setting of infection per primary team

## 2019-03-26 NOTE — PROGRESS NOTE ADULT - SUBJECTIVE AND OBJECTIVE BOX
SUBJECTIVE AND OBJECTIVE:  INTERVAL HPI/OVERNIGHT EVENTS:  Chinese speaking refusing , in no acute distress     DNR on chart:   Allergies    No Known Drug Allergies  tegaderm (Rash; Urticaria)    Intolerances    MEDICATIONS  (STANDING):  dexamethasone     Tablet 2 milliGRAM(s) Oral daily  gabapentin 200 milliGRAM(s) Oral daily  lactated ringers. 1000 milliLiter(s) (75 mL/Hr) IV Continuous <Continuous>  oxyCODONE  ER Tablet 20 milliGRAM(s) Oral three times a day  pantoprazole    Tablet 40 milliGRAM(s) Oral before breakfast  piperacillin/tazobactam IVPB. 3.375 Gram(s) IV Intermittent every 12 hours  tamsulosin 0.4 milliGRAM(s) Oral at bedtime  tenofovir disoproxil fumarate (VIREAD) 300 milliGRAM(s) Oral daily    MEDICATIONS  (PRN):  acetaminophen   Tablet .. 650 milliGRAM(s) Oral every 6 hours PRN Temp greater or equal to 38.5C (101.3F)  acetaminophen   Tablet .. 325 milliGRAM(s) Oral every 4 hours PRN Temp greater or equal to 38C (100.4F)  ALBUTerol/ipratropium for Nebulization 3 milliLiter(s) Nebulizer every 6 hours PRN Shortness of Breath and/or Wheezing  HYDROmorphone   Tablet 2 milliGRAM(s) Oral every 6 hours PRN Moderate Pain (4 - 6)  HYDROmorphone  Injectable 1 milliGRAM(s) IV Push every 4 hours PRN Severe Pain (7 - 10)  sodium chloride 0.9% for Nebulization 3 milliLiter(s) Nebulizer every 6 hours PRN thick mucous      ITEMS UNCHECKED ARE NOT PRESENT    PRESENT SYMPTOMS: [ ]Unable to obtain due to poor mentation   Source if other than patient:  [ ]Family   [ ]Team     Pain (Impact on QOL):    Location:  Minimal acceptable level (0-10 scale):            Aggravating factors:  Quality:  Radiation:  Severity (0-10 scale):    Timing:    Dyspnea:                           [ ]Mild [ ]Moderate [ ]Severe  Anxiety:                             [ ]Mild [ ]Moderate [ ]Severe  Fatigue:                             [ ]Mild [ ]Moderate [ ]Severe  Nausea:                             [ ]Mild [ ]Moderate [ ]Severe  Loss of appetite:              [ ]Mild [ ]Moderate [ ]Severe  Constipation:                    [ ]Mild [ ]Moderate [ ]Severe    PAIN AD Score:	  [email or web address] (Ctrl + left click to view)    Other Symptoms:  [ ]All other review of systems negative     Karnofsky Performance Score/Palliative Performance Status Version 2:         %    http://palliative.info/resource_material/[email or web address].pdf  PHYSICAL EXAM:  Vital Signs Last 24 Hrs  T(C): 36.4 (26 Mar 2019 05:56), Max: 36.9 (26 Mar 2019 00:34)  T(F): 97.5 (26 Mar 2019 05:56), Max: 98.5 (26 Mar 2019 00:34)  HR: 68 (26 Mar 2019 05:56) (68 - 74)  BP: 126/77 (26 Mar 2019 05:56) (113/64 - 147/75)  BP(mean): --  RR: 18 (26 Mar 2019 05:56) (18 - 18)  SpO2: 92% (26 Mar 2019 05:56) (92% - 95%) I&O's Summary    25 Mar 2019 07:01  -  26 Mar 2019 07:00  --------------------------------------------------------  IN: 640 mL / OUT: 2100 mL / NET: -1460 mL    26 Mar 2019 07:01  -  26 Mar 2019 12:21  --------------------------------------------------------  IN: 240 mL / OUT: 0 mL / NET: 240 mL     GENERAL:  [ ]Alert  [ ]Oriented x   [ ]Lethargic  [ ]Cachexia  [ ]Unarousable  [ ]Verbal  [ ]Non-Verbal    Behavioral:   [ ] Anxiety  [ ] Delirium [ ] Agitation [ ] Other    HEENT:  [ ]Normal   [ ]Dry mouth   [ ]ET Tube/Trach  [ ]Oral lesions    PULMONARY:   [ ]Clear [ ]Tachypnea  [ ]Audible excessive secretions   [ ]Rhonchi        [ ]Right [ ]Left [ ]Bilateral  [ ]Crackles        [ ]Right [ ]Left [ ]Bilateral  [ ]Wheezing     [ ]Right [ ]Left [ ]Bilateral    CARDIOVASCULAR:    [ ]Regular [ ]Irregular [ ]Tachy  [ ]Jerman [ ]Murmur [ ]Other    GASTROINTESTINAL:  [ ]Soft  [ ]Distended   [ ]+BS  [ ]Non tender [ ]Tender  [ ]PEG [ ]OGT/ NGT   Last BM:    GENITOURINARY:  [ ]Normal [ ] Incontinent   [ ]Oliguria/Anuria   [ ]Soni    MUSCULOSKELETAL:   [ ]Normal   [ ]Weakness  [ ]Bed/Wheelchair bound [ ]Edema    NEUROLOGIC:   [ ]No focal deficits  [ ] Cognitive impairment  [ ] Dysphagia [ ]Dysarthria [ ] Paresis [ ]Other     SKIN:   [ ]Normal   [ ]Pressure ulcer(s)  [ ]Rash    CRITICAL CARE:  [ ] Shock Present  [ ]Septic [ ]Cardiogenic [ ]Neurologic [ ]Hypovolemic  [ ]  Vasopressors [ ]  Inotropes   [ ] Respiratory failure present  [ ] Acute  [ ] Chronic [ ] Hypoxic  [ ] Hypercarbic [ ] Other  [ ] Other organ failure     LABS:                        9.7    12.37 )-----------( 176      ( 26 Mar 2019 08:38 )             30.7   03-26    140  |  101  |  26<H>  ----------------------------<  122<H>  3.7   |  28  |  1.85<H>    Ca    8.5      26 Mar 2019 07:19  Phos  3.2     03-25  Mg     2.1     03-25    TPro  6.3  /  Alb  2.4<L>  /  TBili  0.2  /  DBili  x   /  AST  23  /  ALT  22  /  AlkPhos  52  03-26        RADIOLOGY & ADDITIONAL STUDIES:    Protein Calorie Malnutrition Present: [ ] yes [ ] no  [ ] PPSV2 < or = 30%  [ ] significant weight loss [ ] poor nutritional intake [ ] anasarca [ ] catabolic state Albumin, Serum: 2.4 g/dL (03-26-19 @ 07:19)  Artificial Nutrition [ ]     REFERRALS:   [ ]Chaplaincy  [ ] Hospice  [ ]Child Life  [ ]Social Work  [ ]Case management [ ]Holistic Therapy   Goals of Care Document: SUBJECTIVE AND OBJECTIVE:  INTERVAL HPI/OVERNIGHT EVENTS:  Chinese speaking refusing , in no acute distress     DNR on chart:   Allergies    No Known Drug Allergies  tegaderm (Rash; Urticaria)    Intolerances    MEDICATIONS  (STANDING):  dexamethasone     Tablet 2 milliGRAM(s) Oral daily  gabapentin 200 milliGRAM(s) Oral daily  lactated ringers. 1000 milliLiter(s) (75 mL/Hr) IV Continuous <Continuous>  oxyCODONE  ER Tablet 20 milliGRAM(s) Oral three times a day  pantoprazole    Tablet 40 milliGRAM(s) Oral before breakfast  piperacillin/tazobactam IVPB. 3.375 Gram(s) IV Intermittent every 12 hours  tamsulosin 0.4 milliGRAM(s) Oral at bedtime  tenofovir disoproxil fumarate (VIREAD) 300 milliGRAM(s) Oral daily    MEDICATIONS  (PRN):  acetaminophen   Tablet .. 650 milliGRAM(s) Oral every 6 hours PRN Temp greater or equal to 38.5C (101.3F)  acetaminophen   Tablet .. 325 milliGRAM(s) Oral every 4 hours PRN Temp greater or equal to 38C (100.4F)  ALBUTerol/ipratropium for Nebulization 3 milliLiter(s) Nebulizer every 6 hours PRN Shortness of Breath and/or Wheezing  HYDROmorphone   Tablet 2 milliGRAM(s) Oral every 6 hours PRN Moderate Pain (4 - 6)  HYDROmorphone  Injectable 1 milliGRAM(s) IV Push every 4 hours PRN Severe Pain (7 - 10)  sodium chloride 0.9% for Nebulization 3 milliLiter(s) Nebulizer every 6 hours PRN thick mucous      ITEMS UNCHECKED ARE NOT PRESENT    PRESENT SYMPTOMS: [ x]Unable to obtain due language barrier, pt refusing , no family at bedside  Source if other than patient:  [ ]Family   [ ]Team     Pain (Impact on QOL):    Location:  Minimal acceptable level (0-10 scale):            Aggravating factors:  Quality:  Radiation:  Severity (0-10 scale):    Timing:    Dyspnea:                           [ ]Mild [ ]Moderate [ ]Severe  Anxiety:                             [ ]Mild [ ]Moderate [ ]Severe  Fatigue:                             [ ]Mild [ ]Moderate [ ]Severe  Nausea:                             [ ]Mild [ ]Moderate [ ]Severe  Loss of appetite:              [ ]Mild [ ]Moderate [ ]Severe  Constipation:                    [ ]Mild [ ]Moderate [ ]Severe    PAIN AD Score:	  [email or web address] (Ctrl + left click to view)    Other Symptoms:  [ ]All other review of systems negative     Karnofsky Performance Score/Palliative Performance Status Version 2:    40     %    http://palliative.info/resource_material/[email or web address].pdf  PHYSICAL EXAM:  Vital Signs Last 24 Hrs  T(C): 36.4 (26 Mar 2019 05:56), Max: 36.9 (26 Mar 2019 00:34)  T(F): 97.5 (26 Mar 2019 05:56), Max: 98.5 (26 Mar 2019 00:34)  HR: 68 (26 Mar 2019 05:56) (68 - 74)  BP: 126/77 (26 Mar 2019 05:56) (113/64 - 147/75)  BP(mean): --  RR: 18 (26 Mar 2019 05:56) (18 - 18)  SpO2: 92% (26 Mar 2019 05:56) (92% - 95%) I&O's Summary    25 Mar 2019 07:01  -  26 Mar 2019 07:00  --------------------------------------------------------  IN: 640 mL / OUT: 2100 mL / NET: -1460 mL    26 Mar 2019 07:01  -  26 Mar 2019 12:21  --------------------------------------------------------  IN: 240 mL / OUT: 0 mL / NET: 240 mL     GENERAL:  [x]Alert  [ ]Oriented x   [ ]Lethargic  [ ]Cachexia  [ ]Unarousable  [x ]Verbal/chinese speaking   [ ]Non-Verbal    Behavioral:   [ ] Anxiety  [ ] Delirium [ ] Agitation [ ] Other    HEENT:  [ ]Normal   [ x]Dry mouth   [ ]ET Tube/Trach  [ ]Oral lesions    PULMONARY:   [ ]Clear [ x]Tachypnea  [ ]Audible excessive secretions   [ ]Rhonchi        [ ]Right [ ]Left [ ]Bilateral  [ ]Crackles        [ ]Right [ ]Left [ ]Bilateral  [ ]Wheezing     [ ]Right [ ]Left [ ]Bilateral    CARDIOVASCULAR:    [ ]Regular [ x]Irregular [ ]Tachy  [ ]Jerman [ ]Murmur [ ]Other    GASTROINTESTINAL:  [x ]Soft  [ ]Distended   [x ]+BS  [x ]Non tender [ ]Tender  [ ]PEG [ ]OGT/ NGT   Last BM:    GENITOURINARY:  [x ]Normal [ ] Incontinent   [ ]Oliguria/Anuria   [ ]Soni    MUSCULOSKELETAL:   [ ]Normal   [x ]Weakness  [ ]Bed/Wheelchair bound [ ]Edema    NEUROLOGIC:   [x ]No focal deficits  [ ] Cognitive impairment  [ ] Dysphagia [ ]Dysarthria [ ] Paresis [ ]Other     SKIN:   [x ]Normal   [ ]Pressure ulcer(s)  [ ]Rash    CRITICAL CARE:  [ ] Shock Present  [ ]Septic [ ]Cardiogenic [ ]Neurologic [ ]Hypovolemic  [ ]  Vasopressors [ ]  Inotropes   [ ] Respiratory failure present  [ ] Acute  [ ] Chronic [ ] Hypoxic  [ ] Hypercarbic [ ] Other  [ ] Other organ failure     LABS:                        9.7    12.37 )-----------( 176      ( 26 Mar 2019 08:38 )             30.7   03-26    140  |  101  |  26<H>  ----------------------------<  122<H>  3.7   |  28  |  1.85<H>    Ca    8.5      26 Mar 2019 07:19  Phos  3.2     03-25  Mg     2.1     03-25    TPro  6.3  /  Alb  2.4<L>  /  TBili  0.2  /  DBili  x   /  AST  23  /  ALT  22  /  AlkPhos  52  03-26        RADIOLOGY & ADDITIONAL STUDIES:    Protein Calorie Malnutrition Present: [ ] yes [ ] no  [ ] PPSV2 < or = 30%  [ ] significant weight loss [x ] poor nutritional intake [ ] anasarca [ ] catabolic state Albumin, Serum: 2.4 g/dL (03-26-19 @ 07:19)  Artificial Nutrition [ ]     REFERRALS:   [ ]Chaplaincy  [ ] Hospice  [ ]Child Life  [ ]Social Work  [x ]Case management [ ]Holistic Therapy   Goals of Care Document:

## 2019-03-26 NOTE — PROGRESS NOTE ADULT - PROBLEM SELECTOR PLAN 2
-currently on 2L NC;  - duonebs q6 PRN, chest PT, acapella, IS w/ PRN hypertonic saline -currently on 3L NC; wean pt off of oxygen   - duonebs q6 PRN, chest PT, acapella, IS w/ PRN hypertonic saline

## 2019-03-26 NOTE — PROGRESS NOTE ADULT - PROBLEM SELECTOR PLAN 3
Known to Dr Doyle Marquez , pain management .      Dilaudid  PRN =  5 mg po total; 1 mg IV   Oxycodone ER 20 mg TID ( home RX 30 mg TID)  Team increased Neurontin today.  Holding off on increasing ATC back to home dose.   Will reassess tomorrow.

## 2019-03-26 NOTE — PROGRESS NOTE ADULT - PROBLEM SELECTOR PLAN 4
Spent>30 minutes patients son Parmjit.  Discussed code status at length. Reviewed MOLST form.   Unable to make a decision at this time.  Discussed Chinese culture, when there is a cancer diagnosis it is not discussed amongst family or friends.  Discussing this with his mother will  upset her , however also states he will do his best to discuss this with her tonight.  Existential suffering:  Son is tearful, stating he and his Dad always lived in different countries, they were always far apart from each other.  He is grateful that his dad has been living with him for the last few years.   Parmjit and I will meet again tomorrow morning  to continue our discussion surrounding code status.

## 2019-03-26 NOTE — PROGRESS NOTE ADULT - PROBLEM SELECTOR PLAN 5
- Cr trending down to 1.84 (BL 1.35)  - BUN/Cr ratio <20; likely intrinsic renal disease in setting of hematuria  - bedside bladder scan showed no retention - Cr trending down to 1.85 (BL 1.35)  - BUN/Cr ratio <20; likely intrinsic renal disease in setting of hematuria  - bedside bladder scan showed no retention

## 2019-03-26 NOTE — PROGRESS NOTE ADULT - PROBLEM SELECTOR PLAN 6
- pt w/ increased weakness and numbness/tingling concerning for cervical cord compression  - MRI negative for cord compression  -numbness/tingling may be a side effect of chemo - pt w/ increased weakness and numbness/tingling concerning for cervical cord compression  - MRI negative for cord compression

## 2019-03-26 NOTE — PROGRESS NOTE ADULT - PROBLEM SELECTOR PLAN 3
- concerning for mets w/ cord compression   - MRI lumbosacral spine w/ out IV contrast negative for cord compression

## 2019-03-26 NOTE — PROGRESS NOTE ADULT - PROBLEM SELECTOR PLAN 1
-on admission, pt meeting criteria for severe sepsis found to have multifocal pneumonia/HAP (hospitalized in Jan) on CT chest ([facility] and [facility]).  -c/w zosyn (Day 4 of 7)  -blood cx 03/23 (coag neg staph)  -f/u 03/26 cx

## 2019-03-26 NOTE — PROGRESS NOTE ADULT - SUBJECTIVE AND OBJECTIVE BOX
Internal Medicine Team Progress Note  Phil Davidson, PGY 1  [phone]    CITLALY SARITAANDRADE  Patient is a 78y old  Male who presents with a chief complaint of SOB 2/2 hypoxic respiratory failure (25 Mar 2019 13:07)      INTERVAL HPI / SUBJECTIVE:    No acute events overnight.          MEDICATIONS:   MEDICATIONS  (STANDING):  dexamethasone     Tablet 2 milliGRAM(s) Oral daily  gabapentin 100 milliGRAM(s) Oral daily  lactated ringers. 1000 milliLiter(s) (75 mL/Hr) IV Continuous <Continuous>  oxyCODONE  ER Tablet 20 milliGRAM(s) Oral three times a day  pantoprazole    Tablet 40 milliGRAM(s) Oral before breakfast  piperacillin/tazobactam IVPB. 3.375 Gram(s) IV Intermittent every 12 hours  tamsulosin 0.4 milliGRAM(s) Oral at bedtime  tenofovir disoproxil fumarate (VIREAD) 300 milliGRAM(s) Oral daily    MEDICATIONS  (PRN):  acetaminophen   Tablet .. 650 milliGRAM(s) Oral every 6 hours PRN Temp greater or equal to 38.5C (101.3F)  acetaminophen   Tablet .. 325 milliGRAM(s) Oral every 4 hours PRN Temp greater or equal to 38C (100.4F)  ALBUTerol/ipratropium for Nebulization 3 milliLiter(s) Nebulizer every 6 hours PRN Shortness of Breath and/or Wheezing  HYDROmorphone   Tablet 2 milliGRAM(s) Oral every 6 hours PRN Moderate Pain (4 - 6)  HYDROmorphone  Injectable 1 milliGRAM(s) IV Push every 4 hours PRN Severe Pain (7 - 10)  sodium chloride 0.9% for Nebulization 3 milliLiter(s) Nebulizer every 6 hours PRN thick mucous      ALLERGIES:   Allergies    No Known Drug Allergies  tegaderm (Rash; Urticaria)    Intolerances        OBJECTIVE:  Vital Signs Last 24 Hrs  T(C): 36.4 (26 Mar 2019 05:56), Max: 36.9 (26 Mar 2019 00:34)  T(F): 97.5 (26 Mar 2019 05:56), Max: 98.5 (26 Mar 2019 00:34)  HR: 68 (26 Mar 2019 05:56) (68 - 74)  BP: 126/77 (26 Mar 2019 05:56) (113/64 - 147/75)  BP(mean): --  RR: 18 (26 Mar 2019 05:56) (18 - 18)  SpO2: 92% (26 Mar 2019 05:56) (92% - 95%)    I&O's Summary    25 Mar 2019 07:01  -  26 Mar 2019 07:00  --------------------------------------------------------  IN: 640 mL / OUT: 2100 mL / NET: -1460 mL        PHYSICAL EXAM:  General: Well-appearing, NAD  HEENT:  EOMI, conjunctiva and sclera clear, normal oropharynx  Neck:  Supple,   Chest/Lungs: CTA B/L, no rales, rhonchi, rub or wheezing  Heart: RRR, normal S1, S2, no murmurs or gallops  Abdomen: Soft, nondistended, NTTP, normoactive bowel sounds  Extremities: Peripheral pulses 2+ B/L, no edema, cyanosis or clubbing  Skin: Warm, well-perfused, no rashes or lesions  Neurological: A&Ox3, no focal deficits      LABS:            IMAGING:    Impression:    Brain MRI: No acute hemorrhage or infarct. Mild to moderate microvascular   ischemic change. Previously identified left frontal lobe enhancing nodule   is unable to be visualized without contrast. Subtle metastatic disease   cannot be excluded without intravenous contrast material.    Cervical spine MRI: No evidence of osseous metastatic disease.   Leptomeningeal carcinomatosis cannot be excluded without intravenous   contrast material.    Lumbar spine MRI: Small focus of signal abnormality within the right   superior aspect of the L2 vertebral body can be further evaluated with   contrast material. Findings on previous abdominal pelvic CTs suggests   that this is benign in nature. Probable leptomeningeal carcinomatosis   cannot be excluded without intravenous contrast material.      Labs and imaging personally reviewed and interpreted [ x ]  Consult notes reviewed [x  ]  Case discussed with consultants [ x ] Internal Medicine Team Progress Note  Phil Davidson, PGY 1  [phone]    ODALIS BOUCHER  Patient is a 78y old  Male who presents with a chief complaint of SOB 2/2 hypoxic respiratory failure (25 Mar 2019 13:07)      INTERVAL HPI / SUBJECTIVE:    No acute events overnight.  Patient is complaining of R sided chest pain worse with deep breaths and palpation.  Patient is frustrated because of frequent blood draws and not being able to drink water.  Pt denies any fevers, chills, night sweats.  Pt will have a family meeting today with palliative care.        MEDICATIONS:   MEDICATIONS  (STANDING):  dexamethasone     Tablet 2 milliGRAM(s) Oral daily  gabapentin 100 milliGRAM(s) Oral daily  lactated ringers. 1000 milliLiter(s) (75 mL/Hr) IV Continuous <Continuous>  oxyCODONE  ER Tablet 20 milliGRAM(s) Oral three times a day  pantoprazole    Tablet 40 milliGRAM(s) Oral before breakfast  piperacillin/tazobactam IVPB. 3.375 Gram(s) IV Intermittent every 12 hours  tamsulosin 0.4 milliGRAM(s) Oral at bedtime  tenofovir disoproxil fumarate (VIREAD) 300 milliGRAM(s) Oral daily    MEDICATIONS  (PRN):  acetaminophen   Tablet .. 650 milliGRAM(s) Oral every 6 hours PRN Temp greater or equal to 38.5C (101.3F)  acetaminophen   Tablet .. 325 milliGRAM(s) Oral every 4 hours PRN Temp greater or equal to 38C (100.4F)  ALBUTerol/ipratropium for Nebulization 3 milliLiter(s) Nebulizer every 6 hours PRN Shortness of Breath and/or Wheezing  HYDROmorphone   Tablet 2 milliGRAM(s) Oral every 6 hours PRN Moderate Pain (4 - 6)  HYDROmorphone  Injectable 1 milliGRAM(s) IV Push every 4 hours PRN Severe Pain (7 - 10)  sodium chloride 0.9% for Nebulization 3 milliLiter(s) Nebulizer every 6 hours PRN thick mucous      ALLERGIES:   Allergies    No Known Drug Allergies  tegaderm (Rash; Urticaria)    Intolerances        OBJECTIVE:  Vital Signs Last 24 Hrs  T(C): 36.4 (26 Mar 2019 05:56), Max: 36.9 (26 Mar 2019 00:34)  T(F): 97.5 (26 Mar 2019 05:56), Max: 98.5 (26 Mar 2019 00:34)  HR: 68 (26 Mar 2019 05:56) (68 - 74)  BP: 126/77 (26 Mar 2019 05:56) (113/64 - 147/75)  BP(mean): --  RR: 18 (26 Mar 2019 05:56) (18 - 18)  SpO2: 92% (26 Mar 2019 05:56) (92% - 95%)    I&O's Summary    25 Mar 2019 07:01  -  26 Mar 2019 07:00  --------------------------------------------------------  IN: 640 mL / OUT: 2100 mL / NET: -1460 mL        PHYSICAL EXAM:  General: Well-appearing, in pain  HEENT:  EOMI, conjunctiva and sclera clear, normal oropharynx  Neck:  Supple,   Chest/Lungs: CTA B/L, no rales, rhonchi, rub or wheezing anteriorly  Heart: RRR, normal S1, S2, no murmurs or gallops  Abdomen: Soft, nondistended, NTTP, normoactive bowel sounds  Extremities: Peripheral pulses 2+ B/L, no edema, cyanosis or clubbing  Skin: Warm, well-perfused, no rashes or lesions  Neurological: A&Ox3, no focal deficits      LABS:    CBC Full  -  ( 26 Mar 2019 08:38 )  WBC Count : 12.37 K/uL  RBC Count : 3.32 M/uL  Hemoglobin : 9.7 g/dL  Hematocrit : 30.7 %  Platelet Count - Automated : 176 K/uL  Mean Cell Volume : 92.5 fl  Mean Cell Hemoglobin : 29.2 pg  Mean Cell Hemoglobin Concentration : 31.6 gm/dL  Auto Neutrophil # : 10.26 K/uL  Auto Lymphocyte # : 1.25 K/uL  Auto Monocyte # : 0.74 K/uL  Auto Eosinophil # : 0.04 K/uL  Auto Basophil # : 0.03 K/uL  Auto Neutrophil % : 83.0 %  Auto Lymphocyte % : 10.1 %  Auto Monocyte % : 6.0 %  Auto Eosinophil % : 0.3 %  Auto Basophil % : 0.2 %      03-26    140  |  101  |  26<H>  ----------------------------<  122<H>  3.7   |  28  |  1.85<H>  03-25    138  |  100  |  31<H>  ----------------------------<  169<H>  4.1   |  27  |  1.84<H>  03-24    139  |  99  |  31<H>  ----------------------------<  120<H>  3.5   |  27  |  2.06<H>    Ca    8.5      26 Mar 2019 07:19  Ca    8.2<L>      25 Mar 2019 07:17  Ca    8.5      24 Mar 2019 07:26  Phos  3.2     03-25  Mg     2.1     03-25    TPro  6.3  /  Alb  2.4<L>  /  TBili  0.2  /  DBili  x   /  AST  23  /  ALT  22  /  AlkPhos  52  03-26      LIVER FUNCTIONS - ( 26 Mar 2019 07:19 )  Alb: 2.4 g/dL / Pro: 6.3 g/dL / ALK PHOS: 52 U/L / ALT: 22 U/L / AST: 23 U/L / GGT: x                       Culture - Urine (collected 23 Mar 2019 22:40)  Source: .Urine Clean Catch (Midstream)  Final Report (24 Mar 2019 16:26):    No growth    Culture - Blood (collected 23 Mar 2019 22:05)  Source: .Blood Blood-Peripheral  Preliminary Report (24 Mar 2019 23:01):    No growth to date.    Culture - Blood (collected 23 Mar 2019 22:05)  Source: .Blood Blood-Peripheral  Gram Stain (24 Mar 2019 21:02):    Growth in aerobic bottle: Gram Positive Cocci in Clusters  Final Report (25 Mar 2019 20:12):    Growth in aerobic bottle: Coag Negative Staphylococcus    Single set isolate, possible contaminant. Contact    Microbiology if susceptibility testing clinically    indicated.    "Due to technical problems, Proteus sp. will Not be reported as part of    the BCID panel until further notice"    ***Blood Panel PCR results on this specimen are available    approximately 3 hours after the Gram stain result.***    Gram stain, PCR, and/or culture results may not always    correspond due to difference in methodologies.    ************************************************************    This PCR assay was performed using [employer].    The following targets are tested for: Enterococcus,    vancomycin resistant enterococci, Listeria monocytogenes,    coagulase negative staphylococci, S. aureus,    methicillin resistant S. aureus, Streptococcus agalactiae    (Group B), S. pneumoniae, S. pyogenes (Group A),    Acinetobacter baumannii, Enterobacter cloacae, E. coli,    Klebsiella oxytoca, K. pneumoniae, Proteus sp.,    Serratia marcescens, Haemophilus influenzae,    Neisseria meningitidis, Pseudomonas aeruginosa, Candida    albicans, C. glabrata, C krusei, C parapsilosis,    C. tropicalis and the KPC resistance gene.  Organism: Blood Culture PCR (25 Mar 2019 20:12)  Organism: Blood Culture PCR (25 Mar 2019 20:12)                  IMAGING:    Impression:    Brain MRI: No acute hemorrhage or infarct. Mild to moderate microvascular   ischemic change. Previously identified left frontal lobe enhancing nodule   is unable to be visualized without contrast. Subtle metastatic disease   cannot be excluded without intravenous contrast material.    Cervical spine MRI: No evidence of osseous metastatic disease.   Leptomeningeal carcinomatosis cannot be excluded without intravenous   contrast material.    Lumbar spine MRI: Small focus of signal abnormality within the right   superior aspect of the L2 vertebral body can be further evaluated with   contrast material. Findings on previous abdominal pelvic CTs suggests   that this is benign in nature. Probable leptomeningeal carcinomatosis   cannot be excluded without intravenous contrast material.      Labs and imaging personally reviewed and interpreted [ x ]  Consult notes reviewed [x  ]  Case discussed with consultants [ x ]

## 2019-03-26 NOTE — PROGRESS NOTE ADULT - PROBLEM SELECTOR PLAN 7
- MRI lumbosacral spine to r/o cord compression - unlikely with normal rectal tone however does have new incontinence and LLE weakness. No saddle paresthesias.

## 2019-03-26 NOTE — PROGRESS NOTE ADULT - ASSESSMENT
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

## 2019-03-26 NOTE — PROGRESS NOTE ADULT - PROBLEM SELECTOR PLAN 8
- pt w/ Stage IV EGFR + NSCLC   - holding [place] in setting of infection; will f/u w/ heme/onc   - c/w decardon for nausea and tumor burden  - pain regimen: oxycontin 20mg TID; hydromorphone 2mg q4hrs prn for moderate pain; hydromorphone 1mg prn for severe pain   - Oncologist is Dr. Norah Gross - pt w/ Stage IV EGFR + NSCLC   - holding [place] in setting of infection; will f/u w/ heme/onc   - c/w decardon for nausea and tumor burden  - pain regimen: oxycontin 20mg TID; hydromorphone 2mg q4hrs prn for moderate pain; hydromorphone 1mg prn for severe pain; continue to give IV tylenol for pain control  - Oncologist is Dr. Norah Gross

## 2019-03-26 NOTE — PROGRESS NOTE ADULT - PROBLEM SELECTOR PLAN 4
Acute blood loss anemia possibly 2/2 Hematuria: can be in the setting of BPH or malignancy  - CT abd/pelvis showed no evidence of renal calculi w/ no hydro   - cbc q24h; transfuse for Hbg < 7  - outpatient oncologist aware

## 2019-03-27 ENCOUNTER — TRANSCRIPTION ENCOUNTER (OUTPATIENT)
Age: 79
End: 2019-03-27

## 2019-03-27 DIAGNOSIS — R13.10 DYSPHAGIA, UNSPECIFIED: ICD-10-CM

## 2019-03-27 LAB
ANION GAP SERPL CALC-SCNC: 12 MMOL/L — SIGNIFICANT CHANGE UP (ref 5–17)
BUN SERPL-MCNC: 22 MG/DL — SIGNIFICANT CHANGE UP (ref 7–23)
CALCIUM SERPL-MCNC: 8.7 MG/DL — SIGNIFICANT CHANGE UP (ref 8.4–10.5)
CHLORIDE SERPL-SCNC: 97 MMOL/L — SIGNIFICANT CHANGE UP (ref 96–108)
CO2 SERPL-SCNC: 26 MMOL/L — SIGNIFICANT CHANGE UP (ref 22–31)
CREAT SERPL-MCNC: 1.81 MG/DL — HIGH (ref 0.5–1.3)
GLUCOSE SERPL-MCNC: 275 MG/DL — HIGH (ref 70–99)
HCT VFR BLD CALC: 33.9 % — LOW (ref 39–50)
HGB BLD-MCNC: 10.6 G/DL — LOW (ref 13–17)
MAGNESIUM SERPL-MCNC: 1.8 MG/DL — SIGNIFICANT CHANGE UP (ref 1.6–2.6)
MCHC RBC-ENTMCNC: 29.4 PG — SIGNIFICANT CHANGE UP (ref 27–34)
MCHC RBC-ENTMCNC: 31.3 GM/DL — LOW (ref 32–36)
MCV RBC AUTO: 94.2 FL — SIGNIFICANT CHANGE UP (ref 80–100)
PHOSPHATE SERPL-MCNC: 2.6 MG/DL — SIGNIFICANT CHANGE UP (ref 2.5–4.5)
PLATELET # BLD AUTO: 188 K/UL — SIGNIFICANT CHANGE UP (ref 150–400)
POTASSIUM SERPL-MCNC: 4.1 MMOL/L — SIGNIFICANT CHANGE UP (ref 3.5–5.3)
POTASSIUM SERPL-SCNC: 4.1 MMOL/L — SIGNIFICANT CHANGE UP (ref 3.5–5.3)
RBC # BLD: 3.6 M/UL — LOW (ref 4.2–5.8)
RBC # FLD: 13.5 % — SIGNIFICANT CHANGE UP (ref 10.3–14.5)
SODIUM SERPL-SCNC: 135 MMOL/L — SIGNIFICANT CHANGE UP (ref 135–145)
WBC # BLD: 13.07 K/UL — HIGH (ref 3.8–10.5)
WBC # FLD AUTO: 13.07 K/UL — HIGH (ref 3.8–10.5)

## 2019-03-27 PROCEDURE — 99233 SBSQ HOSP IP/OBS HIGH 50: CPT | Mod: GC

## 2019-03-27 PROCEDURE — 99233 SBSQ HOSP IP/OBS HIGH 50: CPT

## 2019-03-27 RX ORDER — ACETAMINOPHEN 500 MG
1000 TABLET ORAL ONCE
Qty: 0 | Refills: 0 | Status: DISCONTINUED | OUTPATIENT
Start: 2019-03-27 | End: 2019-03-29

## 2019-03-27 RX ORDER — OXYCODONE HYDROCHLORIDE 5 MG/1
30 TABLET ORAL THREE TIMES A DAY
Qty: 0 | Refills: 0 | Status: DISCONTINUED | OUTPATIENT
Start: 2019-03-27 | End: 2019-03-28

## 2019-03-27 RX ADMIN — TAMSULOSIN HYDROCHLORIDE 0.4 MILLIGRAM(S): 0.4 CAPSULE ORAL at 22:27

## 2019-03-27 RX ADMIN — OXYCODONE HYDROCHLORIDE 30 MILLIGRAM(S): 5 TABLET ORAL at 23:27

## 2019-03-27 RX ADMIN — HYDROMORPHONE HYDROCHLORIDE 1 MILLIGRAM(S): 2 INJECTION INTRAMUSCULAR; INTRAVENOUS; SUBCUTANEOUS at 09:30

## 2019-03-27 RX ADMIN — OXYCODONE HYDROCHLORIDE 30 MILLIGRAM(S): 5 TABLET ORAL at 15:10

## 2019-03-27 RX ADMIN — HYDROMORPHONE HYDROCHLORIDE 1 MILLIGRAM(S): 2 INJECTION INTRAMUSCULAR; INTRAVENOUS; SUBCUTANEOUS at 13:30

## 2019-03-27 RX ADMIN — HYDROMORPHONE HYDROCHLORIDE 1 MILLIGRAM(S): 2 INJECTION INTRAMUSCULAR; INTRAVENOUS; SUBCUTANEOUS at 18:35

## 2019-03-27 RX ADMIN — Medication 2 MILLIGRAM(S): at 04:56

## 2019-03-27 RX ADMIN — OXYCODONE HYDROCHLORIDE 20 MILLIGRAM(S): 5 TABLET ORAL at 06:51

## 2019-03-27 RX ADMIN — TENOFOVIR DISOPROXIL FUMARATE 300 MILLIGRAM(S): 300 TABLET, FILM COATED ORAL at 13:06

## 2019-03-27 RX ADMIN — PIPERACILLIN AND TAZOBACTAM 25 GRAM(S): 4; .5 INJECTION, POWDER, LYOPHILIZED, FOR SOLUTION INTRAVENOUS at 13:06

## 2019-03-27 RX ADMIN — HYDROMORPHONE HYDROCHLORIDE 1 MILLIGRAM(S): 2 INJECTION INTRAMUSCULAR; INTRAVENOUS; SUBCUTANEOUS at 09:11

## 2019-03-27 RX ADMIN — OXYCODONE HYDROCHLORIDE 20 MILLIGRAM(S): 5 TABLET ORAL at 07:35

## 2019-03-27 RX ADMIN — GABAPENTIN 200 MILLIGRAM(S): 400 CAPSULE ORAL at 13:06

## 2019-03-27 RX ADMIN — HYDROMORPHONE HYDROCHLORIDE 1 MILLIGRAM(S): 2 INJECTION INTRAMUSCULAR; INTRAVENOUS; SUBCUTANEOUS at 18:17

## 2019-03-27 RX ADMIN — HYDROMORPHONE HYDROCHLORIDE 1 MILLIGRAM(S): 2 INJECTION INTRAMUSCULAR; INTRAVENOUS; SUBCUTANEOUS at 04:55

## 2019-03-27 RX ADMIN — PIPERACILLIN AND TAZOBACTAM 25 GRAM(S): 4; .5 INJECTION, POWDER, LYOPHILIZED, FOR SOLUTION INTRAVENOUS at 23:50

## 2019-03-27 RX ADMIN — OXYCODONE HYDROCHLORIDE 30 MILLIGRAM(S): 5 TABLET ORAL at 15:40

## 2019-03-27 RX ADMIN — HYDROMORPHONE HYDROCHLORIDE 1 MILLIGRAM(S): 2 INJECTION INTRAMUSCULAR; INTRAVENOUS; SUBCUTANEOUS at 01:14

## 2019-03-27 RX ADMIN — HYDROMORPHONE HYDROCHLORIDE 1 MILLIGRAM(S): 2 INJECTION INTRAMUSCULAR; INTRAVENOUS; SUBCUTANEOUS at 13:07

## 2019-03-27 RX ADMIN — HYDROMORPHONE HYDROCHLORIDE 1 MILLIGRAM(S): 2 INJECTION INTRAMUSCULAR; INTRAVENOUS; SUBCUTANEOUS at 00:44

## 2019-03-27 RX ADMIN — PANTOPRAZOLE SODIUM 40 MILLIGRAM(S): 20 TABLET, DELAYED RELEASE ORAL at 04:56

## 2019-03-27 RX ADMIN — HYDROMORPHONE HYDROCHLORIDE 1 MILLIGRAM(S): 2 INJECTION INTRAMUSCULAR; INTRAVENOUS; SUBCUTANEOUS at 05:25

## 2019-03-27 RX ADMIN — OXYCODONE HYDROCHLORIDE 30 MILLIGRAM(S): 5 TABLET ORAL at 22:27

## 2019-03-27 RX ADMIN — PIPERACILLIN AND TAZOBACTAM 25 GRAM(S): 4; .5 INJECTION, POWDER, LYOPHILIZED, FOR SOLUTION INTRAVENOUS at 00:35

## 2019-03-27 NOTE — PROGRESS NOTE ADULT - PROBLEM SELECTOR PLAN 1
Known to [facility]  Team 3 to have Hem/Onc consult.  Tag[place] on hold in setting of infection per primary team.  Follow up with [facility] upon discharge

## 2019-03-27 NOTE — SWALLOW BEDSIDE ASSESSMENT ADULT - ADDITIONAL RECOMMENDATIONS
addtnl hx: 3/23/19 CT abd/pelvis: Opacities/consolidation in the left upper and left lower lobes have   increased when compared to previous exam.    Opacities within the right lung as described above have decreased when   compared to previous exam.    Small left and trace right pleural effusions.    No acute intra-abdominal pathology

## 2019-03-27 NOTE — PROGRESS NOTE ADULT - PROBLEM SELECTOR PLAN 8
- pt w/ Stage IV EGFR + NSCLC   - holding [place] in setting of infection; will f/u w/ heme/onc   - c/w decardon for nausea and tumor burden  - pain regimen: oxycontin 20mg TID; hydromorphone 2mg q4hrs prn for moderate pain; hydromorphone 1mg prn for severe pain; continue to give IV tylenol for pain control  - Oncologist is Dr. Norah Gross - pt w/ Stage IV EGFR + NSCLC   - holding [place] in setting of infection; will f/u w/ heme/onc   - c/w decardon for nausea and tumor burden  - pain regimen: oxycontin 20mg TID; hydromorphone 2mg q4hrs prn for moderate pain; hydromorphone 1mg prn for severe pain; continue to give IV tylenol for pain control  - Oncologist is Dr. Norah Gross  - will touch base with palliative regarding increasing oxycodone to home dose of 30mg ER

## 2019-03-27 NOTE — SWALLOW BEDSIDE ASSESSMENT ADULT - ASR SWALLOW LINGUAL MOBILITY
impaired left lateral movement/impaired right lateral movement/impaired protrusion/mild with tremulous movements noted upon protrusion

## 2019-03-27 NOTE — PROGRESS NOTE ADULT - SUBJECTIVE AND OBJECTIVE BOX
SUBJECTIVE AND OBJECTIVE:  INTERVAL HPI/OVERNIGHT EVENTS:  Patient seen ambulating with Physical Therapy, again refuses  and defers decisions  to his son Parmjit    DNR on chart: Yes    Allergies    No Known Drug Allergies  tegaderm (Rash; Urticaria)    Intolerances    MEDICATIONS  (STANDING):  acetaminophen  IVPB .. 1000 milliGRAM(s) IV Intermittent once  dexamethasone     Tablet 2 milliGRAM(s) Oral daily  gabapentin 200 milliGRAM(s) Oral daily  lactated ringers. 1000 milliLiter(s) (75 mL/Hr) IV Continuous <Continuous>  oxyCODONE  ER Tablet 30 milliGRAM(s) Oral three times a day  pantoprazole    Tablet 40 milliGRAM(s) Oral before breakfast  piperacillin/tazobactam IVPB. 3.375 Gram(s) IV Intermittent every 12 hours  tamsulosin 0.4 milliGRAM(s) Oral at bedtime  tenofovir disoproxil fumarate (VIREAD) 300 milliGRAM(s) Oral daily    MEDICATIONS  (PRN):  acetaminophen   Tablet .. 650 milliGRAM(s) Oral every 6 hours PRN Temp greater or equal to 38.5C (101.3F)  acetaminophen   Tablet .. 325 milliGRAM(s) Oral every 4 hours PRN Temp greater or equal to 38C (100.4F)  ALBUTerol/ipratropium for Nebulization 3 milliLiter(s) Nebulizer every 6 hours PRN Shortness of Breath and/or Wheezing  HYDROmorphone   Tablet 2 milliGRAM(s) Oral every 6 hours PRN Moderate Pain (4 - 6)  HYDROmorphone  Injectable 1 milliGRAM(s) IV Push every 4 hours PRN Severe Pain (7 - 10)  sodium chloride 0.9% for Nebulization 3 milliLiter(s) Nebulizer every 6 hours PRN thick mucous      ITEMS UNCHECKED ARE NOT PRESENT    PRESENT SYMPTOMS: [ ]Unable to obtain due to poor mentation   Source if other than patient:  [ ]Family   [ ]Team     Pain (Impact on QOL):    INTERPRETED WITH SON AT BEDSIDE   Location:  shoulders, chest   Minimal acceptable level (0-10 scale):            Aggravating factors: movement   Quality:  dull  Radiation: NONE  Severity (0-10 scale):  At time of my visit 5  Timing:    Dyspnea:                           [ ]Mild [ x]Moderate [ ]Severe  Anxiety:                             [x ]Mild [ ]Moderate [ ]Severe  Fatigue:                             [ ]Mild [ x]Moderate [ ]Severe  Nausea:                             [ ]Mild [ ]Moderate [ ]Severe  Loss of appetite:              [ ]Mild [x ]Moderate [ ]Severe  Constipation:                    [ ]Mild [ ]Moderate [ ]Severe    PAIN AD Score:	  [email or web address] (Ctrl + left click to view)    Other Symptoms:  [ ]All other review of systems negative     Karnofsky Performance Score/Palliative Performance Status Version 2: 40        %    http://palliative.info/resource_material/[email or web address].pdf  PHYSICAL EXAM:  Vital Signs Last 24 Hrs  T(C): 36.3 (27 Mar 2019 11:52), Max: 36.9 (27 Mar 2019 00:05)  T(F): 97.4 (27 Mar 2019 11:52), Max: 98.4 (27 Mar 2019 00:05)  HR: 80 (27 Mar 2019 11:52) (63 - 82)  BP: 149/80 (27 Mar 2019 11:52) (129/69 - 151/81)  BP(mean): --  RR: 18 (27 Mar 2019 11:52) (18 - 18)  SpO2: 93% (27 Mar 2019 11:52) (93% - 98%) I&O's Summary    26 Mar 2019 07:01  -  27 Mar 2019 07:00  --------------------------------------------------------  IN: 1250 mL / OUT: 2450 mL / NET: -1200 mL    27 Mar 2019 07:01  -  27 Mar 2019 12:12  --------------------------------------------------------  IN: 240 mL / OUT: 0 mL / NET: 240 mL     GENERAL:  [x ]Alert  [x ]Oriented x  4 [ ]Lethargic  x[ ]Cachexia  [ ]Unarousable  [x ]Verbal  [ ]Non-Verbal    Behavioral:   [ ] Anxiety  [ ] Delirium [ ] Agitation [ ] Other    HEENT:  [ ]Normal   [ x]Dry mouth   [ ]ET Tube/Trach  [ ]Oral lesions    PULMONARY: Poor exhange   [ ]Clear [ x]Tachypnea  [ ]Audible excessive secretions   [ ]Rhonchi        [ ]Right [ ]Left [ ]Bilateral  [ ]Crackles        [ ]Right [ ]Left [ ]Bilateral  [ ]Wheezing     [ ]Right [ ]Left [ ]Bilateral    CARDIOVASCULAR:    [ ]Regular [ x]Irregular [ ]Tachy  [ ]Jerman [ ]Murmur [ ]Other    xGASTROINTESTINAL:  [ ]Soft  [ ]Distended   [ x]+BS  [x ]Non tender [ ]Tender  [ ]PEG [ ]OGT/ NGT   Last BM:    GENITOURINARY:  [x ]Normal [ ] Incontinent   [ ]Oliguria/Anuria   [ ]Soni    MUSCULOSKELETAL:   [ ]Normal   [ x]Weakness  [ ]Bed/Wheelchair bound [ ]Edema    NEUROLOGIC:   x[ ]No focal deficits  [ ] Cognitive impairment  [ ] Dysphagia [ ]Dysarthria [ ] Paresis [ ]Other     SKIN:   [x ]Normal   [ ]Pressure ulcer(s)  [ ]Rash    CRITICAL CARE:  [ ] Shock Present  [ ]Septic [ ]Cardiogenic [ ]Neurologic [ ]Hypovolemic  [ ]  Vasopressors [ ]  Inotropes   [x ] Respiratory Difficulties present   [ ] Acute  [x ] Chronic [ ] Hypoxic  [ ] Hypercarbic [ ] Other  [ ] Other organ failure     LABS:                        9.7    12.37 )-----------( 176      ( 26 Mar 2019 08:38 )             30.7   03-27    135  |  97  |  22  ----------------------------<  275<H>  4.1   |  26  |  1.81<H>    Ca    8.7      27 Mar 2019 10:59  Phos  2.6     03-27  Mg     1.8     03-27    TPro  6.3  /  Alb  2.4<L>  /  TBili  0.2  /  DBili  x   /  AST  23  /  ALT  22  /  AlkPhos  52  03-26        RADIOLOGY & ADDITIONAL STUDIES:      INTERPRETATION:  Non-contrast MRI of the brain, cervical and lumbar spine.    CLINICAL INDICATION: Fever, altered mental status, rule out metastatic   disease, history of lung cancer and left frontal lobe metastasis    TECHNIQUE:  Multiplanar, multisequence MR images of the brain were   obtained without the administration of intravenous contrast.      COMPARISON: Brain MRI dated 12/21/2018, cervical spine CT dated 7/5/2018    FINDINGS:    Brain MRI:     No acute hemorrhage or infarct is identified. Age-appropriate   involutional changes and moderate microvascular ischemic changes are   similar in appearance.    The previously noted enhancing left frontal nodule is not able to be   clearly visualized without contrast material. There is persistent signal   abnormality on FLAIR/T2-weighted images in this location.    No hydrocephalus, midline shift or extra-axial collections are identified.    Major flow voids at the skull base are within normal limits.    The orbits, paranasal sinuses and tympanomastoid cavities are not   remarkable in appearance.    Cervical spine MRI:    The normal cervical lordosis is maintained. Other disc spaces are   well-maintained. There are minimal degenerative changes with mild disc   bulging at C5-6 and C6-7 mild anterior spondylosis at C3-4 through C6-7.   No central canal orneural foraminal compromise is present.    The marrow signal is normal. No intrinsic spinal cord abnormality is   identified.    No intrinsic spinal cord abnormality is identified. Left upper lobe   signal abnormality is noted. This is unchanged compared with the chest CT   of 3/23/2019.    Lumbar spine MRI:    The normal lumbar stenosis is maintained. No central canal or neural   foraminal compromise is present.    There is a small focus of decreased signal on T1 increased signal on   T2-weighted images along the posterior superior right aspect of the L2   vertebral body which is nonspecific in appearance. This corresponds with   a very subtle lucency noted on abdominal CTs dated 3/23/2019 and   8/2/2018, suggesting that it is chronic in nature. The remaining marrow   signal is normal.     The disc spaces are well-maintained. There is no evidence for central   canal or neural foraminal comment Pancho.     The conus is within normal limits.    A right renal cyst is identified.    Impression:    Brain MRI: No acute hemorrhage or infarct. Mild to moderate microvascular   ischemic change. Previously identified left frontal lobe enhancing nodule   is unable to be visualized without contrast. Subtle metastatic disease   cannot be excluded without intravenous contrast material.    Cervical spine MRI: No evidence of osseous metastatic disease.   Leptomeningeal carcinomatosis cannot be excluded without intravenous   contrast material.    Lumbar spine MRI: Small focus of signal abnormality within the right   superior aspect of the L2 vertebral body can be further evaluated with   contrast material. Findings on previous abdominal pelvic CTs suggests   that this is benign in nature. Probable leptomeningeal carcinomatosis   cannot be excluded without intravenous contrast material.        Protein Calorie Malnutrition Present: [ ] yes [ ] no  [ ] PPSV2 < or = 30%  [ ] significant weight loss [ x] poor nutritional intake [ ] anasarca [ ] catabolic state Albumin, Serum: 2.4 g/dL (03-26-19 @ 07:19)  Artificial Nutrition [ ]     REFERRALS:   [ ]Chaplaincy  [ ] Hospice  [ ]Child Life  [ ]Social Work  [ x]Case management [ ]Holistic Therapy   Goals of Care Document:

## 2019-03-27 NOTE — PROGRESS NOTE ADULT - PROBLEM SELECTOR PLAN 1
-on admission, pt meeting criteria for severe sepsis found to have multifocal pneumonia/HAP (hospitalized in Jan) on CT chest ([facility] and [facility]).  -c/w zosyn (Day 5 of 7)  -blood cx 03/23 (coag neg staph)  -f/u 03/26 cx

## 2019-03-27 NOTE — SWALLOW BEDSIDE ASSESSMENT ADULT - COMMENTS
Patient presents with Mild Oral Stage and Moderate to Severe Pharyngeal Stage Dysphagia;  The Pharyngeal Stage is characterized by delayed initiation of the pharyngeal swallow (Bolus head is at the pyriforms for Thin Liquids), reduced laryngeal elevation with incomplete laryngeal vestibular closure, reduced tongue base retraction with reduced epiglottic deflection resulting in moderate vallecular residue post primary swallow for puree and reduced pharyngeal constriction resulting in mild pyriforms/pharyngeal wall residue post primary swallow. There is moderate pharyngeal clearance deficits located in the vallecular/pyriforms/pharyngeal wall post swallow.   There was one episode of Trace Laryngeal Penetration for Puree consistency leaving Trace residue in the laryngeal vestibule without complete retrieval. Patient is verbally cued to cough to expel Trace contrast. Additional puree trials did not replicate Penetration.  There was Deep Laryngeal Penetration for Nectar Thick Liquids without retrieval leaving residue in laryngeal vestibule. There was Aspiration (Silent) observed during the swallow for Thin Liquids. Patient is not sensate to the Aspiration given no reflexive cough response. Compensatory strategy of Chin Down posture did not benefit to eliminate the Penetration/Aspiration for Nectar Thick Liquids.  There was No Aspiration observed before, during or after the swallow for puree: Recd: 1.) Puree with Honey Thick Liquids via teaspoon.  2.) Feeding/Swallowing Guidelines: Sit upright, encourage Puree via small teaspoon (5ml) at a time, and honey thick liquids via teaspoon (5ml); two swallows each for puree and honey thick liquids; alternate puree and honey thick liquids.  3.) Aspiration Precautions.  Of note, Pt attended Outpatient Swallowing Therapy at [facility] . Per [facility]BS at [facility] November 18, 2018 Patient presents with Mild Oral Stage and Moderate to Severe Pharyngeal Stage Dysphagia;  The Pharyngeal Stage is characterized by delayed initiation of the pharyngeal swallow (Bolus head is at the pyriforms for Thin Liquids), reduced laryngeal elevation with incomplete laryngeal vestibular closure, reduced tongue base retraction with reduced epiglottic deflection resulting in moderate vallecular residue post primary swallow for puree and reduced pharyngeal constriction resulting in mild pyriforms/pharyngeal wall residue post primary swallow. There is moderate pharyngeal clearance deficits located in the vallecular/pyriforms/pharyngeal wall post swallow.   There was one episode of Trace Laryngeal Penetration for Puree consistency leaving Trace residue in the laryngeal vestibule without complete retrieval. Patient is verbally cued to cough to expel Trace contrast. Additional puree trials did not replicate Penetration.  There was Deep Laryngeal Penetration for Nectar Thick Liquids without retrieval leaving residue in laryngeal vestibule. There was Aspiration (Silent) observed during the swallow for Thin Liquids. Patient is not sensate to the Aspiration given no reflexive cough response. Compensatory strategy of Chin Down posture did not benefit to eliminate the Penetration/Aspiration for Nectar Thick Liquids.  There was No Aspiration observed before, during or after the swallow for puree: Recd: 1.) Puree with Honey Thick Liquids via teaspoon.  2.) Feeding/Swallowing Guidelines: Sit upright, encourage Puree via small teaspoon (5ml) at a time, and honey thick liquids via teaspoon (5ml); two swallows each for puree and honey thick liquids; alternate puree and honey thick liquids.  3.) Aspiration Precautions.  Of note, Pt attended Outpatient Swallowing Therapy at [facility] .

## 2019-03-27 NOTE — PROGRESS NOTE ADULT - SUBJECTIVE AND OBJECTIVE BOX
Internal Medicine Team Progress Note  Phil Davidson, PGY 1  [phone]    CITLALY ODALIS  Patient is a 78y old  Male who presents with a chief complaint of SOB 2/2 hypoxic respiratory failure (26 Mar 2019 12:20)      INTERVAL HPI / SUBJECTIVE:    No acute events overnight.           MEDICATIONS:   MEDICATIONS  (STANDING):  dexamethasone     Tablet 2 milliGRAM(s) Oral daily  gabapentin 200 milliGRAM(s) Oral daily  lactated ringers. 1000 milliLiter(s) (75 mL/Hr) IV Continuous <Continuous>  oxyCODONE  ER Tablet 20 milliGRAM(s) Oral three times a day  pantoprazole    Tablet 40 milliGRAM(s) Oral before breakfast  piperacillin/tazobactam IVPB. 3.375 Gram(s) IV Intermittent every 12 hours  tamsulosin 0.4 milliGRAM(s) Oral at bedtime  tenofovir disoproxil fumarate (VIREAD) 300 milliGRAM(s) Oral daily    MEDICATIONS  (PRN):  acetaminophen   Tablet .. 650 milliGRAM(s) Oral every 6 hours PRN Temp greater or equal to 38.5C (101.3F)  acetaminophen   Tablet .. 325 milliGRAM(s) Oral every 4 hours PRN Temp greater or equal to 38C (100.4F)  ALBUTerol/ipratropium for Nebulization 3 milliLiter(s) Nebulizer every 6 hours PRN Shortness of Breath and/or Wheezing  HYDROmorphone   Tablet 2 milliGRAM(s) Oral every 6 hours PRN Moderate Pain (4 - 6)  HYDROmorphone  Injectable 1 milliGRAM(s) IV Push every 4 hours PRN Severe Pain (7 - 10)  sodium chloride 0.9% for Nebulization 3 milliLiter(s) Nebulizer every 6 hours PRN thick mucous      ALLERGIES:   Allergies    No Known Drug Allergies  tegaderm (Rash; Urticaria)    Intolerances        OBJECTIVE:  Vital Signs Last 24 Hrs  T(C): 36.3 (27 Mar 2019 04:53), Max: 36.9 (27 Mar 2019 00:05)  T(F): 97.3 (27 Mar 2019 04:53), Max: 98.4 (27 Mar 2019 00:05)  HR: 82 (27 Mar 2019 04:53) (63 - 82)  BP: 151/81 (27 Mar 2019 04:53) (129/69 - 151/81)  BP(mean): --  RR: 18 (27 Mar 2019 04:53) (18 - 18)  SpO2: 95% (27 Mar 2019 04:53) (95% - 98%)    I&O's Summary    25 Mar 2019 07:01  -  26 Mar 2019 07:00  --------------------------------------------------------  IN: 640 mL / OUT: 2100 mL / NET: -1460 mL    26 Mar 2019 07:01  -  27 Mar 2019 06:57  --------------------------------------------------------  IN: 950 mL / OUT: 1350 mL / NET: -400 mL        PHYSICAL EXAM:  General: Well-appearing, NAD  HEENT:  EOMI, PERRL, conjunctiva and sclera clear, normal oropharynx  Neck:  Supple,  Chest/Lungs: CTA B/L, no rales, rhonchi, rub or wheezing; R chest tender to palpation   Heart: RRR, normal S1, S2, no murmurs or gallops  Abdomen: Soft, nondistended, NTTP, normoactive bowel sounds  Extremities: Peripheral pulses 2+ B/L, no edema, cyanosis or clubbing  Skin: Warm, well-perfused, no rashes or lesions  Neurological: A&Ox3, no focal deficits; 4/5 strength in all 4 extremities       LABS:        IMAGING: No new imaging       Labs and imaging personally reviewed and interpreted [ x ]  Consult notes reviewed [ x ]  Case discussed with consultants [ x ] Internal Medicine Team Progress Note  Phil Davidson, PGY 1  [phone]    ODALIS BOUCHER  Patient is a 78y old  Male who presents with a chief complaint of SOB 2/2 hypoxic respiratory failure (26 Mar 2019 12:20)      INTERVAL HPI / SUBJECTIVE:    No acute events overnight.  Patient continues to complain of R sided chest pain.  Denies any fevers, chills, night sweats.            MEDICATIONS:   MEDICATIONS  (STANDING):  dexamethasone     Tablet 2 milliGRAM(s) Oral daily  gabapentin 200 milliGRAM(s) Oral daily  lactated ringers. 1000 milliLiter(s) (75 mL/Hr) IV Continuous <Continuous>  oxyCODONE  ER Tablet 20 milliGRAM(s) Oral three times a day  pantoprazole    Tablet 40 milliGRAM(s) Oral before breakfast  piperacillin/tazobactam IVPB. 3.375 Gram(s) IV Intermittent every 12 hours  tamsulosin 0.4 milliGRAM(s) Oral at bedtime  tenofovir disoproxil fumarate (VIREAD) 300 milliGRAM(s) Oral daily    MEDICATIONS  (PRN):  acetaminophen   Tablet .. 650 milliGRAM(s) Oral every 6 hours PRN Temp greater or equal to 38.5C (101.3F)  acetaminophen   Tablet .. 325 milliGRAM(s) Oral every 4 hours PRN Temp greater or equal to 38C (100.4F)  ALBUTerol/ipratropium for Nebulization 3 milliLiter(s) Nebulizer every 6 hours PRN Shortness of Breath and/or Wheezing  HYDROmorphone   Tablet 2 milliGRAM(s) Oral every 6 hours PRN Moderate Pain (4 - 6)  HYDROmorphone  Injectable 1 milliGRAM(s) IV Push every 4 hours PRN Severe Pain (7 - 10)  sodium chloride 0.9% for Nebulization 3 milliLiter(s) Nebulizer every 6 hours PRN thick mucous      ALLERGIES:   Allergies    No Known Drug Allergies  tegaderm (Rash; Urticaria)    Intolerances        OBJECTIVE:  Vital Signs Last 24 Hrs  T(C): 36.3 (27 Mar 2019 04:53), Max: 36.9 (27 Mar 2019 00:05)  T(F): 97.3 (27 Mar 2019 04:53), Max: 98.4 (27 Mar 2019 00:05)  HR: 82 (27 Mar 2019 04:53) (63 - 82)  BP: 151/81 (27 Mar 2019 04:53) (129/69 - 151/81)  BP(mean): --  RR: 18 (27 Mar 2019 04:53) (18 - 18)  SpO2: 95% (27 Mar 2019 04:53) (95% - 98%)    I&O's Summary    25 Mar 2019 07:01  -  26 Mar 2019 07:00  --------------------------------------------------------  IN: 640 mL / OUT: 2100 mL / NET: -1460 mL    26 Mar 2019 07:01  -  27 Mar 2019 06:57  --------------------------------------------------------  IN: 950 mL / OUT: 1350 mL / NET: -400 mL        PHYSICAL EXAM:  General: Well-appearing, NAD  HEENT:  EOMI, PERRL, conjunctiva and sclera clear, normal oropharynx  Neck:  Supple,  Chest/Lungs: CTA B/L, no rales, rhonchi, rub or wheezing; R chest tender to palpation   Heart: RRR, normal S1, S2, no murmurs or gallops  Abdomen: Soft, nondistended, NTTP, normoactive bowel sounds  Extremities: Peripheral pulses 2+ B/L, no edema, cyanosis or clubbing  Skin: Warm, well-perfused, no rashes or lesions  Neurological: A&Ox3, no focal deficits; 4/5 strength in all 4 extremities       LABS:  CBC Full  -  ( 26 Mar 2019 08:38 )  WBC Count : 12.37 K/uL  RBC Count : 3.32 M/uL  Hemoglobin : 9.7 g/dL  Hematocrit : 30.7 %  Platelet Count - Automated : 176 K/uL  Mean Cell Volume : 92.5 fl  Mean Cell Hemoglobin : 29.2 pg  Mean Cell Hemoglobin Concentration : 31.6 gm/dL  Auto Neutrophil # : 10.26 K/uL  Auto Lymphocyte # : 1.25 K/uL  Auto Monocyte # : 0.74 K/uL  Auto Eosinophil # : 0.04 K/uL  Auto Basophil # : 0.03 K/uL  Auto Neutrophil % : 83.0 %  Auto Lymphocyte % : 10.1 %  Auto Monocyte % : 6.0 %  Auto Eosinophil % : 0.3 %  Auto Basophil % : 0.2 %      03-26    140  |  101  |  26<H>  ----------------------------<  122<H>  3.7   |  28  |  1.85<H>  03-25    138  |  100  |  31<H>  ----------------------------<  169<H>  4.1   |  27  |  1.84<H>    Ca    8.5      26 Mar 2019 07:19  Ca    8.2<L>      25 Mar 2019 07:17    TPro  6.3  /  Alb  2.4<L>  /  TBili  0.2  /  DBili  x   /  AST  23  /  ALT  22  /  AlkPhos  52  03-26      LIVER FUNCTIONS - ( 26 Mar 2019 07:19 )  Alb: 2.4 g/dL / Pro: 6.3 g/dL / ALK PHOS: 52 U/L / ALT: 22 U/L / AST: 23 U/L / GGT: x                       Culture - Blood (collected 26 Mar 2019 09:26)  Source: .Blood Blood-Peripheral  Preliminary Report (27 Mar 2019 10:01):    No growth to date.                IMAGING: No new imaging       Labs and imaging personally reviewed and interpreted [ x ]  Consult notes reviewed [ x ]  Case discussed with consultants [ x ]

## 2019-03-27 NOTE — SWALLOW BEDSIDE ASSESSMENT ADULT - PHARYNGEAL PHASE
Multiple swallows/Decreased laryngeal elevation/Cough noted x 1 upon initial encounter and was not duplicated during additional PO trials; however aspiration not r/o at b/s given Pt's h/o silent laryngeal penetration/aspiration. wet vocal quality noted upon initial encounter at baseline and following tspn of honey thickened liquids however wet vocal qualilty was not evident during additional PO trials with family at b/s; of note, aspiration not r/o at b/s given Pt's h/o silent laryngeal penetration/aspiration/Decreased laryngeal elevation/Delayed pharyngeal swallow/Multiple swallows

## 2019-03-27 NOTE — PROGRESS NOTE ADULT - PROBLEM SELECTOR PLAN 5
- Cr trending down to 1.85 (BL 1.35)  - BUN/Cr ratio <20; likely intrinsic renal disease in setting of hematuria  - bedside bladder scan showed no retention

## 2019-03-27 NOTE — SWALLOW BEDSIDE ASSESSMENT ADULT - NS ASR SWALLOW FINDINGS DISCUS
NP Cydney Moreno NP at b/s with Pt's son/wife.  Pt's son initially requesting swallowing evaluation to determine Pt's candidacy for diet upgrade however following discussion re: h/o dysphagia and silent aspiration the son is in agreement to defer swallowing reevaluation on an outpt basis as Pt's status improves.  Pt's son may explore outpt speech and swallow tx post d/c as Pt had previously been receiving outpt tx at [facility]. NP Cydney Moreno NP at b/s with Pt's son/wife.  Pt's son initially requesting swallowing evaluation to determine Pt's candidacy for diet upgrade however following discussion re: h/o dysphagia and silent aspiration the son is in agreement to defer objective swallowing reevaluation  (ie MBS) on an outpt basis as Pt's status improves.  Pt's son may explore outpt speech and swallow tx post d/c as Pt had previously been receiving outpt tx at [facility].

## 2019-03-27 NOTE — DISCHARGE NOTE NURSING/CASE MANAGEMENT/SOCIAL WORK - NSDCDPATPORTLINK_GEN_ALL_CORE
You can access the [facility] Patient Portal, offered by [facility], by registering with the following website: [email or web address]

## 2019-03-27 NOTE — PROGRESS NOTE ADULT - PROBLEM SELECTOR PLAN 5
Spoke with speech and swallow.  Patient showing early signs of dysphagia as evidenced by gurgling and cough while eating.    Re assessed this morning with wife and family at bedside.   Patient more awake post PT.  Did well with pureed diet.   Spoke and educated  wife and son about aspiration precautions.  Discussed potential for feeding tube in the future.   Son verbalized understanding and agrees to continue dysphagia diet at this time exercising necessary precautions.

## 2019-03-27 NOTE — PROGRESS NOTE ADULT - PROBLEM SELECTOR PLAN 6
- pt w/ increased weakness and numbness/tingling concerning for cervical cord compression  - MRI negative for cord compression

## 2019-03-27 NOTE — SWALLOW BEDSIDE ASSESSMENT ADULT - SLP GENERAL OBSERVATIONS
Upon initial encounter, the Pt was eating breakfast; upright in bed with wet vocal quality noted at baseline, in addition the Pt evidenced a delayed cough following tspn of puree applesauce.  However, clinician subsequently called Pt's son to determine Chinese dialect for  phone use and Pt's son was in progress to hospital therefore SLP deferred evaluation til later in am with son and wife at [facility].  When SLP returned, with son and wife present, Palliative care NP was speaking with family at [facility] and SLP participated in discussion.  Pt's son in agreement to continue with conservative PO diet at this time following education by NP Cydney re: aspiration s/s, risks and precautions; ongoing discussions with Palliative care to continue re: possibility of need for enteral feedings vs pleasure feeds in the future.   This family is known to NP as GOC discussions have been ongoing. Upon initial encounter, the Pt was eating breakfast; upright in bed with wet vocal quality noted at baseline, in addition the Pt evidenced a delayed cough following tspn of puree applesauce.  However, clinician subsequently called Pt's son to determine Chinese dialect for  phone use and Pt's son was in progress to hospital therefore SLP deferred evaluation til later in am with son and wife at [facility].  When SLP returned, with son and wife present, Palliative care NP was speaking with family at [facility] and SLP participated in discussion.  Pt's son in agreement to continue with conservative PO diet at this time following education by NP Cydney re: aspiration s/s, risks and precautions; ongoing discussions with Palliative care to continue re: possibility of need for enteral feedings vs pleasure feeds in the future.   This family is known to NP as GOC discussions have been ongoing.  Hypophonia.

## 2019-03-27 NOTE — SWALLOW BEDSIDE ASSESSMENT ADULT - SWALLOW EVAL: SECRETION MANAGEMENT
wet vocal quality upon initial encounter although not evident when SLP returned later in am with Pt seated at edge of bed.

## 2019-03-27 NOTE — PROGRESS NOTE ADULT - PROBLEM SELECTOR PLAN 3
Known to Dr Doyle Marquez , pain management .      Dilaudid  PRN =  6 mg po total; 1 mg IV   Oxycodone ER 20 mg TID increased to 30 mg TID (home dose)  Team increased Neurontin today.  Holding off on increasing ATC back to home dose.   Will reassess tomorrow.

## 2019-03-27 NOTE — SWALLOW BEDSIDE ASSESSMENT ADULT - ORAL PREPARATORY PHASE
mild/Within functional limits/Reduced oral grading mild/Reduced oral grading/Within functional limits

## 2019-03-27 NOTE — PROGRESS NOTE ADULT - PROBLEM SELECTOR PLAN 2
-currently on 3L NC; wean pt off of oxygen   - duonebs q6 PRN, chest PT, acapella, IS w/ PRN hypertonic saline

## 2019-03-27 NOTE — SWALLOW BEDSIDE ASSESSMENT ADULT - ASR SWALLOW ASPIRATION MONITOR
gurgly voice/*If evident, report to MD immediately./pneumonia/upper respiratory infection/fever/throat clearing/change of breathing pattern/cough

## 2019-03-27 NOTE — SWALLOW BEDSIDE ASSESSMENT ADULT - SWALLOW EVAL: DIAGNOSIS
Pt presents. Pt presents with oropharyngeal dysphagia and h/o silent laryngeal penetration/aspiration.  Therefore, aspiration not r/o at b/s and objective testing would be required to assess pharyngeal swallow (ie MBS). However Per d/w MD, Palliative care NP and Ptfamily/HCP, the Pt will continue on the most conservative diet as GOC discussion with Palliative care is in progress.  Hypophonia noted although unable to discern speech intelligibility as Pt's family served as .

## 2019-03-27 NOTE — PROGRESS NOTE ADULT - PROBLEM SELECTOR PLAN 4
Spent>30 minutes patients son Parmjit.  Once again discussed code status at length with son, patients wife .  Patient present for discussion but preferred to defer discussion to wife and son.    MOLST completed , family agree that DNR/DNI is most consistent with patient and family wishes.  Family does not wish to pursue further discussions regarding rehospitalization,  feeding tube, medical interventions.   Will continue to follow for pain management .

## 2019-03-27 NOTE — SWALLOW BEDSIDE ASSESSMENT ADULT - SLP PERTINENT HISTORY OF CURRENT PROBLEM
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  3/27/19 Per MD f/u: on admission, pt meeting criteria for severe sepsis found to have multifocal pneumonia/HAP (hospitalized in Jan) on CT chest ([facility] and [facility]).  -c/w zosyn (Day 5 of 7); Acute blood loss anemia possibly 2/2 Hematuria: can be in the setting of BPH or malignancy; - CT abd/pelvis showed no evidence of renal calculi w/ no hydro - cbc q24h; transfuse for Hbg < 7; c/w decardon for nausea and tumor burden; DM.

## 2019-03-27 NOTE — SWALLOW BEDSIDE ASSESSMENT ADULT - ORAL PHASE
mild/Within functional limits/Decreased anterior-posterior movement of the bolus/Delayed oral transit time Within functional limits

## 2019-03-27 NOTE — SWALLOW BEDSIDE ASSESSMENT ADULT - SWALLOW EVAL: RECOMMENDED DIET
Per d/w MD, the Pt will continue on the most conservative diet (dysphagia 1 with honey thickened liquids) as GOC discussion with Palliative care is in progress; however NPO, with non-oral nutrition, hydration, medications is recommended based upon the results of this examination; therefore would suggest Palliative Care consult to determine the GOC with re: to provision of nutrition in this patient as aspiration not r/o at b/s. Per d/w MD, the Pt will continue on the most conservative diet (dysphagia 1 with honey thickened liquids) as GOC discussion with Palliative care is in progress.

## 2019-03-28 ENCOUNTER — APPOINTMENT (OUTPATIENT)
Dept: MRI IMAGING | Facility: IMAGING CENTER | Age: 79
End: 2019-03-28

## 2019-03-28 ENCOUNTER — APPOINTMENT (OUTPATIENT)
Dept: CT IMAGING | Facility: IMAGING CENTER | Age: 79
End: 2019-03-28

## 2019-03-28 DIAGNOSIS — Z51.5 ENCOUNTER FOR PALLIATIVE CARE: ICD-10-CM

## 2019-03-28 DIAGNOSIS — I47.1 SUPRAVENTRICULAR TACHYCARDIA: ICD-10-CM

## 2019-03-28 LAB
ANION GAP SERPL CALC-SCNC: 12 MMOL/L — SIGNIFICANT CHANGE UP (ref 5–17)
BUN SERPL-MCNC: 15 MG/DL — SIGNIFICANT CHANGE UP (ref 7–23)
CALCIUM SERPL-MCNC: 7.9 MG/DL — LOW (ref 8.4–10.5)
CHLORIDE SERPL-SCNC: 104 MMOL/L — SIGNIFICANT CHANGE UP (ref 96–108)
CO2 SERPL-SCNC: 25 MMOL/L — SIGNIFICANT CHANGE UP (ref 22–31)
CREAT SERPL-MCNC: 1.65 MG/DL — HIGH (ref 0.5–1.3)
CULTURE RESULTS: SIGNIFICANT CHANGE UP
GAS PNL BLDV: SIGNIFICANT CHANGE UP
GLUCOSE SERPL-MCNC: 153 MG/DL — HIGH (ref 70–99)
HCT VFR BLD CALC: 37 % — LOW (ref 39–50)
HGB BLD-MCNC: 11.1 G/DL — LOW (ref 13–17)
MAGNESIUM SERPL-MCNC: 1.5 MG/DL — LOW (ref 1.6–2.6)
MCHC RBC-ENTMCNC: 29.3 PG — SIGNIFICANT CHANGE UP (ref 27–34)
MCHC RBC-ENTMCNC: 30 GM/DL — LOW (ref 32–36)
MCV RBC AUTO: 97.6 FL — SIGNIFICANT CHANGE UP (ref 80–100)
PHOSPHATE SERPL-MCNC: 2.8 MG/DL — SIGNIFICANT CHANGE UP (ref 2.5–4.5)
PLATELET # BLD AUTO: 213 K/UL — SIGNIFICANT CHANGE UP (ref 150–400)
POTASSIUM SERPL-MCNC: 3.2 MMOL/L — LOW (ref 3.5–5.3)
POTASSIUM SERPL-SCNC: 3.2 MMOL/L — LOW (ref 3.5–5.3)
RBC # BLD: 3.79 M/UL — LOW (ref 4.2–5.8)
RBC # FLD: 13.7 % — SIGNIFICANT CHANGE UP (ref 10.3–14.5)
SODIUM SERPL-SCNC: 141 MMOL/L — SIGNIFICANT CHANGE UP (ref 135–145)
SPECIMEN SOURCE: SIGNIFICANT CHANGE UP
WBC # BLD: 18.92 K/UL — HIGH (ref 3.8–10.5)
WBC # FLD AUTO: 18.92 K/UL — HIGH (ref 3.8–10.5)

## 2019-03-28 PROCEDURE — 99291 CRITICAL CARE FIRST HOUR: CPT

## 2019-03-28 PROCEDURE — 99233 SBSQ HOSP IP/OBS HIGH 50: CPT

## 2019-03-28 PROCEDURE — 93010 ELECTROCARDIOGRAM REPORT: CPT

## 2019-03-28 RX ORDER — SODIUM CHLORIDE 9 MG/ML
1000 INJECTION INTRAMUSCULAR; INTRAVENOUS; SUBCUTANEOUS ONCE
Qty: 0 | Refills: 0 | Status: COMPLETED | OUTPATIENT
Start: 2019-03-28 | End: 2019-03-28

## 2019-03-28 RX ORDER — MAGNESIUM SULFATE 500 MG/ML
1 VIAL (ML) INJECTION ONCE
Qty: 0 | Refills: 0 | Status: COMPLETED | OUTPATIENT
Start: 2019-03-28 | End: 2019-03-28

## 2019-03-28 RX ORDER — METOPROLOL TARTRATE 50 MG
12.5 TABLET ORAL
Qty: 0 | Refills: 0 | Status: DISCONTINUED | OUTPATIENT
Start: 2019-03-28 | End: 2019-03-29

## 2019-03-28 RX ORDER — POTASSIUM CHLORIDE 20 MEQ
40 PACKET (EA) ORAL ONCE
Qty: 0 | Refills: 0 | Status: COMPLETED | OUTPATIENT
Start: 2019-03-28 | End: 2019-03-28

## 2019-03-28 RX ORDER — OXYCODONE HYDROCHLORIDE 5 MG/1
25 TABLET ORAL THREE TIMES A DAY
Qty: 0 | Refills: 0 | Status: DISCONTINUED | OUTPATIENT
Start: 2019-03-28 | End: 2019-03-29

## 2019-03-28 RX ORDER — METOPROLOL TARTRATE 50 MG
12.5 TABLET ORAL
Qty: 0 | Refills: 0 | Status: DISCONTINUED | OUTPATIENT
Start: 2019-03-28 | End: 2019-03-28

## 2019-03-28 RX ADMIN — OXYCODONE HYDROCHLORIDE 25 MILLIGRAM(S): 5 TABLET ORAL at 23:48

## 2019-03-28 RX ADMIN — OXYCODONE HYDROCHLORIDE 30 MILLIGRAM(S): 5 TABLET ORAL at 12:15

## 2019-03-28 RX ADMIN — HYDROMORPHONE HYDROCHLORIDE 1 MILLIGRAM(S): 2 INJECTION INTRAMUSCULAR; INTRAVENOUS; SUBCUTANEOUS at 12:45

## 2019-03-28 RX ADMIN — HYDROMORPHONE HYDROCHLORIDE 1 MILLIGRAM(S): 2 INJECTION INTRAMUSCULAR; INTRAVENOUS; SUBCUTANEOUS at 06:30

## 2019-03-28 RX ADMIN — HYDROMORPHONE HYDROCHLORIDE 1 MILLIGRAM(S): 2 INJECTION INTRAMUSCULAR; INTRAVENOUS; SUBCUTANEOUS at 20:35

## 2019-03-28 RX ADMIN — TAMSULOSIN HYDROCHLORIDE 0.4 MILLIGRAM(S): 0.4 CAPSULE ORAL at 22:19

## 2019-03-28 RX ADMIN — PIPERACILLIN AND TAZOBACTAM 25 GRAM(S): 4; .5 INJECTION, POWDER, LYOPHILIZED, FOR SOLUTION INTRAVENOUS at 11:15

## 2019-03-28 RX ADMIN — PANTOPRAZOLE SODIUM 40 MILLIGRAM(S): 20 TABLET, DELAYED RELEASE ORAL at 11:15

## 2019-03-28 RX ADMIN — SODIUM CHLORIDE 1000 MILLILITER(S): 9 INJECTION INTRAMUSCULAR; INTRAVENOUS; SUBCUTANEOUS at 06:09

## 2019-03-28 RX ADMIN — HYDROMORPHONE HYDROCHLORIDE 1 MILLIGRAM(S): 2 INJECTION INTRAMUSCULAR; INTRAVENOUS; SUBCUTANEOUS at 20:20

## 2019-03-28 RX ADMIN — Medication 3 MILLILITER(S): at 20:52

## 2019-03-28 RX ADMIN — HYDROMORPHONE HYDROCHLORIDE 1 MILLIGRAM(S): 2 INJECTION INTRAMUSCULAR; INTRAVENOUS; SUBCUTANEOUS at 05:51

## 2019-03-28 RX ADMIN — Medication 12.5 MILLIGRAM(S): at 17:16

## 2019-03-28 RX ADMIN — GABAPENTIN 200 MILLIGRAM(S): 400 CAPSULE ORAL at 11:14

## 2019-03-28 RX ADMIN — OXYCODONE HYDROCHLORIDE 30 MILLIGRAM(S): 5 TABLET ORAL at 11:15

## 2019-03-28 RX ADMIN — Medication 2 MILLIGRAM(S): at 11:14

## 2019-03-28 RX ADMIN — HYDROMORPHONE HYDROCHLORIDE 1 MILLIGRAM(S): 2 INJECTION INTRAMUSCULAR; INTRAVENOUS; SUBCUTANEOUS at 11:55

## 2019-03-28 RX ADMIN — TENOFOVIR DISOPROXIL FUMARATE 300 MILLIGRAM(S): 300 TABLET, FILM COATED ORAL at 17:16

## 2019-03-28 RX ADMIN — Medication 100 GRAM(S): at 07:11

## 2019-03-28 RX ADMIN — Medication 40 MILLIEQUIVALENT(S): at 11:15

## 2019-03-28 RX ADMIN — OXYCODONE HYDROCHLORIDE 25 MILLIGRAM(S): 5 TABLET ORAL at 22:19

## 2019-03-28 NOTE — PROGRESS NOTE ADULT - PROBLEM SELECTOR PLAN 7
- MRI lumbosacral spine to r/o cord compression - unlikely with normal rectal tone however does have new incontinence and LLE weakness. No saddle paresthesias. - pt w/ increased weakness and numbness/tingling concerning for cervical cord compression  - MRI negative for cord compression

## 2019-03-28 NOTE — PROGRESS NOTE ADULT - PROBLEM SELECTOR PLAN 3
- ISTOP reviewed, most recent oxyER appears to be 20mg TID; given worsening pain, but family concerns over high oxyER 30mg TID dose, will try oxyER 25mg (10mg+15mg pills)

## 2019-03-28 NOTE — PROGRESS NOTE ADULT - SUBJECTIVE AND OBJECTIVE BOX
Internal Medicine Team Progress Note  Phil Davidson, PGY 1  [phone]    ODALIS BOUCHER  Patient is a 78y old  Male who presents with a chief complaint of SOB 2/2 hypoxic respiratory failure (27 Mar 2019 12:12)      INTERVAL HPI / SUBJECTIVE:            MEDICATIONS:   MEDICATIONS  (STANDING):  acetaminophen  IVPB .. 1000 milliGRAM(s) IV Intermittent once  dexamethasone     Tablet 2 milliGRAM(s) Oral daily  gabapentin 200 milliGRAM(s) Oral daily  lactated ringers. 1000 milliLiter(s) (75 mL/Hr) IV Continuous <Continuous>  oxyCODONE  ER Tablet 30 milliGRAM(s) Oral three times a day  pantoprazole    Tablet 40 milliGRAM(s) Oral before breakfast  piperacillin/tazobactam IVPB. 3.375 Gram(s) IV Intermittent every 12 hours  potassium chloride   Solution 40 milliEquivalent(s) Oral once  tamsulosin 0.4 milliGRAM(s) Oral at bedtime  tenofovir disoproxil fumarate (VIREAD) 300 milliGRAM(s) Oral daily    MEDICATIONS  (PRN):  acetaminophen   Tablet .. 650 milliGRAM(s) Oral every 6 hours PRN Temp greater or equal to 38.5C (101.3F)  acetaminophen   Tablet .. 325 milliGRAM(s) Oral every 4 hours PRN Temp greater or equal to 38C (100.4F)  ALBUTerol/ipratropium for Nebulization 3 milliLiter(s) Nebulizer every 6 hours PRN Shortness of Breath and/or Wheezing  HYDROmorphone   Tablet 2 milliGRAM(s) Oral every 6 hours PRN Moderate Pain (4 - 6)  HYDROmorphone  Injectable 1 milliGRAM(s) IV Push every 4 hours PRN Severe Pain (7 - 10)  sodium chloride 0.9% for Nebulization 3 milliLiter(s) Nebulizer every 6 hours PRN thick mucous      ALLERGIES:   Allergies    No Known Drug Allergies  tegaderm (Rash; Urticaria)    Intolerances        OBJECTIVE:  Vital Signs Last 24 Hrs  T(C): 36.6 (28 Mar 2019 08:17), Max: 36.9 (28 Mar 2019 01:26)  T(F): 97.8 (28 Mar 2019 08:17), Max: 98.4 (28 Mar 2019 01:26)  HR: 95 (28 Mar 2019 08:17) (72 - 224)  BP: 118/77 (28 Mar 2019 08:17) (81/45 - 164/100)  BP(mean): --  RR: 18 (28 Mar 2019 08:17) (18 - 28)  SpO2: 97% (28 Mar 2019 08:17) (92% - 97%)    I&O's Summary    27 Mar 2019 07:01  -  28 Mar 2019 07:00  --------------------------------------------------------  IN: 1100 mL / OUT: 1000 mL / NET: 100 mL        PHYSICAL EXAM:  General: Well-appearing, NAD  HEENT:  EOMI, PERRL, conjunctiva and sclera clear, normal oropharynx  Neck:  Supple, no JVD, normal thyroid  Chest/Lungs: CTA B/L, no rales, rhonchi, rub or wheezing  Heart: RRR, normal S1, S2, no murmurs or gallops  Abdomen: Soft, nondistended, NTTP, normoactive bowel sounds  Extremities: Peripheral pulses 2+ B/L, no edema, cyanosis or clubbing  Skin: Warm, well-perfused, no rashes or lesions  Neurological: A&Ox3, no focal deficits      LABS:      IMAGING:       Labs and imaging personally reviewed and interpreted [  ]  Consult notes reviewed [  ]  Case discussed with consultants [  ] Internal Medicine Team Progress Note  Phil Davidson, PGY 1  [phone]    ODALIS BOUCHER  Patient is a 78y old  Male who presents with a chief complaint of SOB 2/2 hypoxic respiratory failure (27 Mar 2019 12:12)      INTERVAL HPI / SUBJECTIVE:    Overnight, pt had RRT called for SVT to 220s, received 1x lopressor 5mg and 1x adenosine 6mg and pt returned to NSR with -110s, transferred to telemetry floor, cardiology consulted and recommended metroprolol 12.5mg BID.  Currently pt isn't complaining of any chest pain and states that the R sided pain is well controlled.             MEDICATIONS:   MEDICATIONS  (STANDING):  acetaminophen  IVPB .. 1000 milliGRAM(s) IV Intermittent once  dexamethasone     Tablet 2 milliGRAM(s) Oral daily  gabapentin 200 milliGRAM(s) Oral daily  lactated ringers. 1000 milliLiter(s) (75 mL/Hr) IV Continuous <Continuous>  oxyCODONE  ER Tablet 30 milliGRAM(s) Oral three times a day  pantoprazole    Tablet 40 milliGRAM(s) Oral before breakfast  piperacillin/tazobactam IVPB. 3.375 Gram(s) IV Intermittent every 12 hours  potassium chloride   Solution 40 milliEquivalent(s) Oral once  tamsulosin 0.4 milliGRAM(s) Oral at bedtime  tenofovir disoproxil fumarate (VIREAD) 300 milliGRAM(s) Oral daily    MEDICATIONS  (PRN):  acetaminophen   Tablet .. 650 milliGRAM(s) Oral every 6 hours PRN Temp greater or equal to 38.5C (101.3F)  acetaminophen   Tablet .. 325 milliGRAM(s) Oral every 4 hours PRN Temp greater or equal to 38C (100.4F)  ALBUTerol/ipratropium for Nebulization 3 milliLiter(s) Nebulizer every 6 hours PRN Shortness of Breath and/or Wheezing  HYDROmorphone   Tablet 2 milliGRAM(s) Oral every 6 hours PRN Moderate Pain (4 - 6)  HYDROmorphone  Injectable 1 milliGRAM(s) IV Push every 4 hours PRN Severe Pain (7 - 10)  sodium chloride 0.9% for Nebulization 3 milliLiter(s) Nebulizer every 6 hours PRN thick mucous      ALLERGIES:   Allergies    No Known Drug Allergies  tegaderm (Rash; Urticaria)    Intolerances        OBJECTIVE:  Vital Signs Last 24 Hrs  T(C): 36.6 (28 Mar 2019 08:17), Max: 36.9 (28 Mar 2019 01:26)  T(F): 97.8 (28 Mar 2019 08:17), Max: 98.4 (28 Mar 2019 01:26)  HR: 95 (28 Mar 2019 08:17) (72 - 224)  BP: 118/77 (28 Mar 2019 08:17) (81/45 - 164/100)  BP(mean): --  RR: 18 (28 Mar 2019 08:17) (18 - 28)  SpO2: 97% (28 Mar 2019 08:17) (92% - 97%)    I&O's Summary    27 Mar 2019 07:01  -  28 Mar 2019 07:00  --------------------------------------------------------  IN: 1100 mL / OUT: 1000 mL / NET: 100 mL        PHYSICAL EXAM:  General: Well-appearing, NAD  HEENT:  EOMI,  conjunctiva and sclera clear, normal oropharynx  Neck:  Supple,   Chest/Lungs: CTA B/L, no rales, rhonchi, rub or wheezing  Heart: RRR, normal S1, S2, no murmurs or gallops  Abdomen: Soft, nondistended, NTTP, normoactive bowel sounds  Extremities: Peripheral pulses 2+ B/L, no edema, cyanosis or clubbing  Skin: Warm, well-perfused, no rashes or lesions  Neurological: A&Ox3, no focal deficits      LABS:  CBC Full  -  ( 28 Mar 2019 09:38 )  WBC Count : 18.92 K/uL  RBC Count : 3.79 M/uL  Hemoglobin : 11.1 g/dL  Hematocrit : 37.0 %  Platelet Count - Automated : 213 K/uL  Mean Cell Volume : 97.6 fl  Mean Cell Hemoglobin : 29.3 pg  Mean Cell Hemoglobin Concentration : 30.0 gm/dL  Auto Neutrophil # : x  Auto Lymphocyte # : x  Auto Monocyte # : x  Auto Eosinophil # : x  Auto Basophil # : x  Auto Neutrophil % : x  Auto Lymphocyte % : x  Auto Monocyte % : x  Auto Eosinophil % : x  Auto Basophil % : x      03-28    141  |  104  |  15  ----------------------------<  153<H>  3.2<L>   |  25  |  1.65<H>  03-27    135  |  97  |  22  ----------------------------<  275<H>  4.1   |  26  |  1.81<H>  03-26    140  |  101  |  26<H>  ----------------------------<  122<H>  3.7   |  28  |  1.85<H>    Ca    7.9<L>      28 Mar 2019 06:03  Ca    8.7      27 Mar 2019 10:59  Ca    8.5      26 Mar 2019 07:19  Phos  2.8     03-28  Mg     1.5     03-28    TPro  6.3  /  Alb  2.4<L>  /  TBili  0.2  /  DBili  x   /  AST  23  /  ALT  22  /  AlkPhos  52  03-26                    Culture - Blood (collected 26 Mar 2019 13:32)  Source: .Blood Blood-Venous  Preliminary Report (27 Mar 2019 14:01):    No growth to date.    Culture - Blood (collected 26 Mar 2019 09:26)  Source: .Blood Blood-Peripheral  Preliminary Report (27 Mar 2019 10:01):    No growth to date.              IMAGING: No new imaging       Labs and imaging personally reviewed and interpreted [x  ]  Consult notes reviewed [x  ]  Case discussed with consultants [ x ]

## 2019-03-28 NOTE — PROGRESS NOTE ADULT - PROBLEM SELECTOR PLAN 3
- concerning for mets w/ cord compression   - MRI lumbosacral spine w/ out IV contrast negative for cord compression -currently on 5L NC; wean pt off of oxygen   - duonebs q6 PRN, chest PT, acapella, IS w/ PRN hypertonic saline

## 2019-03-28 NOTE — CHART NOTE - NSCHARTNOTEFT_GEN_A_CORE
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 RRT was called for SVT. On arrival, pt was noted to have HR up 200 on monitor. EKG showed SVT. Pt is DNR/DNI. /80s--. 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 RRT was called for SVT. On arrival, pt was noted to have HR up 200 on monitor. EKG showed SVT. Pt is DNR/DNI. /80s. NS 1L bolus started. 6mg of adenosine was given with rate briefly went down to 140s but went up to 180s subsequently. 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 RRT was called for SVT. On arrival, pt was noted to have HR up 200 on monitor. EKG showed SVT. Pt is DNR/DNI. /80s. NS 1L bolus started. On exam, pt was in mild distress. Spoke with the patient in [facility] who c/o CP and palpitation. 6mg of adenosine was given with rate briefly went down to 140s but went up to 180s subsequently. 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 Cardiology fellow consulted who examined pt at bedside. IV lopressor 5mg was given and pt went to NSR and HR of 100-110 with SBP of 150s. Patient endorses feeling better. Attempted to call pt's son but unable to reach him. Discussed with patient's primary team who will try calling the son during daytime.    Plan  -per cardiology, may give adenosine 12mg if patient has more episode of tachyarrhythmia  -monitor electrolytes and replete as needed  -transfer to TELE floor for further monitoring       Zach DE JESUS

## 2019-03-28 NOTE — PROGRESS NOTE ADULT - PROBLEM SELECTOR PLAN 1
- Known to Noe [facility]  - Team 3 to have Hem/Onc consult.  Tagrisso on hold in setting of infection per primary team.  Follow up with [facility] upon discharge

## 2019-03-28 NOTE — PROGRESS NOTE ADULT - PROBLEM SELECTOR PLAN 5
- Cr trending down to 1.85 (BL 1.35)  - BUN/Cr ratio <20; likely intrinsic renal disease in setting of hematuria  - bedside bladder scan showed no retention Acute blood loss anemia possibly 2/2 Hematuria: can be in the setting of BPH or malignancy  - CT abd/pelvis showed no evidence of renal calculi w/ no hydro   - cbc q24h; transfuse for Hbg < 7  - outpatient oncologist aware

## 2019-03-28 NOTE — PROGRESS NOTE ADULT - SUBJECTIVE AND OBJECTIVE BOX
SUBJECTIVE AND OBJECTIVE:  INTERVAL HPI/OVERNIGHT EVENTS:  Worsening pain this AM per son, but son also concerned patient is on a higher dose of oxyER compared to his home dose. Had RRT overnight for SVT    DNR on chart: Yes    Allergies    No Known Drug Allergies  tegaderm (Rash; Urticaria)    Intolerances    MEDICATIONS  (STANDING):  acetaminophen  IVPB .. 1000 milliGRAM(s) IV Intermittent once  dexamethasone     Tablet 2 milliGRAM(s) Oral daily  gabapentin 200 milliGRAM(s) Oral daily  lactated ringers. 1000 milliLiter(s) (75 mL/Hr) IV Continuous <Continuous>  metoprolol tartrate 12.5 milliGRAM(s) Oral two times a day  oxyCODONE  ER Tablet 25 milliGRAM(s) Oral three times a day  pantoprazole    Tablet 40 milliGRAM(s) Oral before breakfast  piperacillin/tazobactam IVPB. 3.375 Gram(s) IV Intermittent every 12 hours  tamsulosin 0.4 milliGRAM(s) Oral at bedtime  tenofovir disoproxil fumarate (VIREAD) 300 milliGRAM(s) Oral daily    MEDICATIONS  (PRN):  acetaminophen   Tablet .. 650 milliGRAM(s) Oral every 6 hours PRN Temp greater or equal to 38.5C (101.3F)  acetaminophen   Tablet .. 325 milliGRAM(s) Oral every 4 hours PRN Temp greater or equal to 38C (100.4F)  ALBUTerol/ipratropium for Nebulization 3 milliLiter(s) Nebulizer every 6 hours PRN Shortness of Breath and/or Wheezing  HYDROmorphone   Tablet 2 milliGRAM(s) Oral every 6 hours PRN Moderate Pain (4 - 6)  HYDROmorphone  Injectable 1 milliGRAM(s) IV Push every 4 hours PRN Severe Pain (7 - 10)  sodium chloride 0.9% for Nebulization 3 milliLiter(s) Nebulizer every 6 hours PRN thick mucous      ITEMS UNCHECKED ARE NOT PRESENT    PRESENT SYMPTOMS: [ ]Unable to obtain due to poor mentation   Source if other than patient:  [ ]Family   [ ]Team     Pain (Impact on QOL):    INTERPRETED WITH SON AT BEDSIDE   Location:  shoulders, chest   Minimal acceptable level (0-10 scale):            Aggravating factors: movement   Quality:  dull  Radiation: NONE  Severity (0-10 scale):  At time of my visit 5  Timing:    Dyspnea:                           [ ]Mild [ x]Moderate [ ]Severe  Anxiety:                             [x ]Mild [ ]Moderate [ ]Severe  Fatigue:                             [ ]Mild [ x]Moderate [ ]Severe  Nausea:                             [ ]Mild [ ]Moderate [ ]Severe  Loss of appetite:              [ ]Mild [x ]Moderate [ ]Severe  Constipation:                    [ ]Mild [ ]Moderate [ ]Severe    PAIN AD Score:	  [email or web address]/[email or web address] (Ctrl + left click to view)    Other Symptoms:  [ ]All other review of systems negative     Karnofsky Performance Score/Palliative Performance Status Version 2: 40        %    http://palliative.info/resource_material/[email or web address]    PHYSICAL EXAM:  Vital Signs Last 24 Hrs  T(C): 36.9 (28 Mar 2019 11:50), Max: 36.9 (28 Mar 2019 01:26)  T(F): 98.4 (28 Mar 2019 11:50), Max: 98.4 (28 Mar 2019 01:26)  HR: 106 (28 Mar 2019 11:50) (72 - 224)  BP: 163/83 (28 Mar 2019 11:50) (81/45 - 164/100)  BP(mean): --  RR: 24 (28 Mar 2019 11:50) (18 - 28)  SpO2: 100% (28 Mar 2019 11:50) (92% - 100%)     GENERAL:  [x ]Alert  [ ]Oriented x  4 [ ]Lethargic  x[ ]Cachexia  [ ]Unarousable  [x ]Verbal  [ ]Non-Verbal    Behavioral:   [ ] Anxiety  [ ] Delirium [ ] Agitation [ ] Other    HEENT:  [ x]Normal   [ ]Dry mouth   [ ]ET Tube/Trach  [ ]Oral lesions    PULMONARY: Poor exhange   [x ]Clear [ ]Tachypnea  [ ]Audible excessive secretions   [ ]Rhonchi        [ ]Right [ ]Left [ ]Bilateral  [ ]Crackles        [ ]Right [ ]Left [ ]Bilateral  [ ]Wheezing     [ ]Right [ ]Left [ ]Bilateral    CARDIOVASCULAR:    [ x]Regular [ ]Irregular [ ]Tachy  [ ]Jerman [ ]Murmur [ ]Other    GASTROINTESTINAL:  [ x]Soft  [ ]Distended   [ x]+BS  [x ]Non tender [ ]Tender  [ ]PEG [ ]OGT/ NGT   Last BM:    GENITOURINARY:  [x ]Normal [ ] Incontinent   [ ]Oliguria/Anuria   [ ]Soni    MUSCULOSKELETAL:   [ ]Normal   [ x]Weakness  [ ]Bed/Wheelchair bound [ ]Edema    NEUROLOGIC:   x[ ]No focal deficits  [ ] Cognitive impairment  [ ] Dysphagia [ ]Dysarthria [ ] Paresis [ ]Other     SKIN:   [x ]Normal   [ ]Pressure ulcer(s)  [ ]Rash    CRITICAL CARE:  [ ] Shock Present  [ ]Septic [ ]Cardiogenic [ ]Neurologic [ ]Hypovolemic  [ ]  Vasopressors [ ]  Inotropes   [ ] Respiratory Difficulties present   [ ] Acute  [x ] Chronic [ ] Hypoxic  [ ] Hypercarbic [ ] Other  [ ] Other organ failure     LABS:                                   11.1   18.92 )-----------( 213      ( 28 Mar 2019 09:38 )             37.0     03-28    141  |  104  |  15  ----------------------------<  153<H>  3.2<L>   |  25  |  1.65<H>    Ca    7.9<L>      28 Mar 2019 06:03  Phos  2.8     03-28  Mg     1.5     03-28      RADIOLOGY & ADDITIONAL STUDIES:      INTERPRETATION:  Non-contrast MRI of the brain, cervical and lumbar spine.    CLINICAL INDICATION: Fever, altered mental status, rule out metastatic   disease, history of lung cancer and left frontal lobe metastasis    TECHNIQUE:  Multiplanar, multisequence MR images of the brain were   obtained without the administration of intravenous contrast.      COMPARISON: Brain MRI dated 12/21/2018, cervical spine CT dated 7/5/2018    FINDINGS:    Brain MRI:     No acute hemorrhage or infarct is identified. Age-appropriate   involutional changes and moderate microvascular ischemic changes are   similar in appearance.    The previously noted enhancing left frontal nodule is not able to be   clearly visualized without contrast material. There is persistent signal   abnormality on FLAIR/T2-weighted images in this location.    No hydrocephalus, midline shift or extra-axial collections are identified.    Major flow voids at the skull base are within normal limits.    The orbits, paranasal sinuses and tympanomastoid cavities are not   remarkable in appearance.    Cervical spine MRI:    The normal cervical lordosis is maintained. Other disc spaces are   well-maintained. There are minimal degenerative changes with mild disc   bulging at C5-6 and C6-7 mild anterior spondylosis at C3-4 through C6-7.   No central canal orneural foraminal compromise is present.    The marrow signal is normal. No intrinsic spinal cord abnormality is   identified.    No intrinsic spinal cord abnormality is identified. Left upper lobe   signal abnormality is noted. This is unchanged compared with the chest CT   of 3/23/2019.    Lumbar spine MRI:    The normal lumbar stenosis is maintained. No central canal or neural   foraminal compromise is present.    There is a small focus of decreased signal on T1 increased signal on   T2-weighted images along the posterior superior right aspect of the L2   vertebral body which is nonspecific in appearance. This corresponds with   a very subtle lucency noted on abdominal CTs dated 3/23/2019 and   8/2/2018, suggesting that it is chronic in nature. The remaining marrow   signal is normal.     The disc spaces are well-maintained. There is no evidence for central   canal or neural foraminal comment [place].     The conus is within normal limits.    A right renal cyst is identified.    Impression:    Brain MRI: No acute hemorrhage or infarct. Mild to moderate microvascular   ischemic change. Previously identified left frontal lobe enhancing nodule   is unable to be visualized without contrast. Subtle metastatic disease   cannot be excluded without intravenous contrast material.    Cervical spine MRI: No evidence of osseous metastatic disease.   Leptomeningeal carcinomatosis cannot be excluded without intravenous   contrast material.    Lumbar spine MRI: Small focus of signal abnormality within the right   superior aspect of the L2 vertebral body can be further evaluated with   contrast material. Findings on previous abdominal pelvic CTs suggests   that this is benign in nature. Probable leptomeningeal carcinomatosis   cannot be excluded without intravenous contrast material.        Protein Calorie Malnutrition Present: [ ] yes [ ] no  [ ] PPSV2 < or = 30%  [ ] significant weight loss [ x] poor nutritional intake [ ] anasarca [ ] catabolic state Albumin, Serum: 2.4 g/dL (03-26-19 @ 07:19)  Artificial Nutrition [ ]     REFERRALS:   [ ]Chaplaincy  [ ] Hospice  [ ]Child Life  [ ]Social Work  [ x]Case management [ ]Holistic Therapy   Goals of Care Document:

## 2019-03-28 NOTE — PROGRESS NOTE ADULT - PROBLEM SELECTOR PLAN 6
- pt w/ increased weakness and numbness/tingling concerning for cervical cord compression  - MRI negative for cord compression - Cr trending down to 1.85 (BL 1.35)  - BUN/Cr ratio <20; likely intrinsic renal disease in setting of hematuria  - bedside bladder scan showed no retention

## 2019-03-28 NOTE — PROGRESS NOTE ADULT - ASSESSMENT
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

## 2019-03-28 NOTE — PROGRESS NOTE ADULT - PROBLEM SELECTOR PLAN 4
Acute blood loss anemia possibly 2/2 Hematuria: can be in the setting of BPH or malignancy  - CT abd/pelvis showed no evidence of renal calculi w/ no hydro   - cbc q24h; transfuse for Hbg < 7  - outpatient oncologist aware - concerning for mets w/ cord compression   - MRI lumbosacral spine w/ out IV contrast negative for cord compression

## 2019-03-28 NOTE — PROGRESS NOTE ADULT - PROBLEM SELECTOR PLAN 2
-currently on 3L NC; wean pt off of oxygen   - duonebs q6 PRN, chest PT, acapella, IS w/ PRN hypertonic saline -on admission, pt meeting criteria for severe sepsis found to have multifocal pneumonia/HAP (hospitalized in Jan) on CT chest ([facility] and [facility]).  -c/w zosyn (Day 6 of 7)  -blood cx 03/23 (coag neg sathish); blood cx 03/26: NGTD

## 2019-03-28 NOTE — CHART NOTE - NSCHARTNOTEFT_GEN_A_CORE
RRT called at approximately 5:30AM for chest discomfort and rapid heart rate.     Patient with stage 4 lung ca (mets to brain), admitted with hypoxic respiratory failure due to multifocal PNA. Patient noted to have HR up to 220s, complaining of substernal chest discomfort that started suddenly. Patient alert and able to communicate in Mandarin with Dr. Valdez (MAR) who is a native speaker. Initial VS concerning for hypotension, but subsequent BPs WNL. Initial EKG with SVT potentially with aflutter. Remaining VS were stable. Adenosine 6mg pushed via PIV (although there was some concern about functionality of PIV). Difficult to decipher rhythm strip where adenosine was pushed due to artifact, but HR decreased to 140s to 160s. Appeared irregular. Alternative IV access obtained. Metoprolol 5mg IVP administered and patient's rhythm broke to sinus rhythm 60s-80s. Cardiology consult requested. IVF bolus administers as well as dilaudid 1mg for pain. Patient's son Parmjit updated over the phone. Patient being reassigned to telemetry bed. Hospitalist in charge notified. Code status remains DNR/DNI.    Primary team to follow up morning labs (K on VBG to confirm PIV placement was low) for electrolytes. Critical care time spent 35 minutes.    Joann Jensen MD  [facility] Medicine  [phone] RRT called at approximately 5:30AM for chest discomfort and rapid heart rate.     Patient with stage 4 lung ca (mets to brain), admitted with hypoxic respiratory failure due to multifocal PNA. Patient noted to have HR up to 220s, complaining of substernal chest discomfort that started suddenly. Patient alert and able to communicate in Mandarin with Dr. Valdez (MAR) who is a native speaker. Initial VS concerning for hypotension, but subsequent BPs WNL. Initial EKG with SVT potentially with aflutter. Remaining VS were stable. Adenosine 6mg pushed via PIV (although there was some concern about functionality of PIV). Difficult to decipher rhythm strip where adenosine was pushed due to artifact, but HR decreased to 140s to 160s. Appeared irregular. Alternative IV access obtained. Metoprolol 5mg IVP administered and patient's rhythm broke to sinus rhythm 60s-80s. Cardiology consult requested. IVF bolus administers as well as dilaudid 1mg for pain. Patient's son Parmjit updated over the phone. Patient being reassigned to telemetry bed. Hospitalist in charge notified. Code status remains DNR/DNI.    Primary team to follow up morning labs (K on VBG to confirm PIV placement was low) for electrolytes. Critical care time spent 35 minutes managing supraventricular tachycardia. See RRT sheet for further details.     Joann Jensen MD  [facility] Medicine  [phone]

## 2019-03-28 NOTE — PROGRESS NOTE ADULT - PROBLEM SELECTOR PLAN 8
- pt w/ Stage IV EGFR + NSCLC   - holding Tagrisso in setting of infection; will f/u w/ heme/onc   - c/w decardon for nausea and tumor burden  - pain regimen: oxycontin 20mg TID; hydromorphone 2mg q4hrs prn for moderate pain; hydromorphone 1mg prn for severe pain; continue to give IV tylenol for pain control  - Oncologist is Dr. Norah Gross  - will touch base with palliative regarding increasing oxycodone to home dose of 30mg ER

## 2019-03-29 ENCOUNTER — OUTPATIENT (OUTPATIENT)
Dept: OUTPATIENT SERVICES | Facility: HOSPITAL | Age: 79
LOS: 1 days | Discharge: HOME | End: 2019-03-29

## 2019-03-29 ENCOUNTER — TRANSCRIPTION ENCOUNTER (OUTPATIENT)
Age: 79
End: 2019-03-29

## 2019-03-29 VITALS
RESPIRATION RATE: 18 BRPM | TEMPERATURE: 98 F | OXYGEN SATURATION: 95 % | HEART RATE: 81 BPM | DIASTOLIC BLOOD PRESSURE: 76 MMHG | SYSTOLIC BLOOD PRESSURE: 131 MMHG

## 2019-03-29 DIAGNOSIS — C34.90 MALIGNANT NEOPLASM OF UNSPECIFIED PART OF UNSPECIFIED BRONCHUS OR LUNG: ICD-10-CM

## 2019-03-29 DIAGNOSIS — D04.9 CARCINOMA IN SITU OF SKIN, UNSPECIFIED: Chronic | ICD-10-CM

## 2019-03-29 DIAGNOSIS — C44.311 BASAL CELL CARCINOMA OF SKIN OF NOSE: Chronic | ICD-10-CM

## 2019-03-29 LAB
ANION GAP SERPL CALC-SCNC: 11 MMOL/L — SIGNIFICANT CHANGE UP (ref 5–17)
BUN SERPL-MCNC: 20 MG/DL — SIGNIFICANT CHANGE UP (ref 7–23)
CALCIUM SERPL-MCNC: 8.5 MG/DL — SIGNIFICANT CHANGE UP (ref 8.4–10.5)
CHLORIDE SERPL-SCNC: 100 MMOL/L — SIGNIFICANT CHANGE UP (ref 96–108)
CO2 SERPL-SCNC: 27 MMOL/L — SIGNIFICANT CHANGE UP (ref 22–31)
CREAT SERPL-MCNC: 1.81 MG/DL — HIGH (ref 0.5–1.3)
GLUCOSE SERPL-MCNC: 150 MG/DL — HIGH (ref 70–99)
HCT VFR BLD CALC: 30.9 % — LOW (ref 39–50)
HGB BLD-MCNC: 9.5 G/DL — LOW (ref 13–17)
MAGNESIUM SERPL-MCNC: 2 MG/DL — SIGNIFICANT CHANGE UP (ref 1.6–2.6)
MCHC RBC-ENTMCNC: 29.3 PG — SIGNIFICANT CHANGE UP (ref 27–34)
MCHC RBC-ENTMCNC: 30.7 GM/DL — LOW (ref 32–36)
MCV RBC AUTO: 95.4 FL — SIGNIFICANT CHANGE UP (ref 80–100)
PHOSPHATE SERPL-MCNC: 3.5 MG/DL — SIGNIFICANT CHANGE UP (ref 2.5–4.5)
PLATELET # BLD AUTO: 196 K/UL — SIGNIFICANT CHANGE UP (ref 150–400)
POTASSIUM SERPL-MCNC: 4.1 MMOL/L — SIGNIFICANT CHANGE UP (ref 3.5–5.3)
POTASSIUM SERPL-SCNC: 4.1 MMOL/L — SIGNIFICANT CHANGE UP (ref 3.5–5.3)
RBC # BLD: 3.24 M/UL — LOW (ref 4.2–5.8)
RBC # FLD: 13.8 % — SIGNIFICANT CHANGE UP (ref 10.3–14.5)
SODIUM SERPL-SCNC: 138 MMOL/L — SIGNIFICANT CHANGE UP (ref 135–145)
WBC # BLD: 18.06 K/UL — HIGH (ref 3.8–10.5)
WBC # FLD AUTO: 18.06 K/UL — HIGH (ref 3.8–10.5)

## 2019-03-29 PROCEDURE — 99239 HOSP IP/OBS DSCHRG MGMT >30: CPT

## 2019-03-29 PROCEDURE — 99233 SBSQ HOSP IP/OBS HIGH 50: CPT

## 2019-03-29 RX ORDER — METOPROLOL TARTRATE 50 MG
0.5 TABLET ORAL
Qty: 30 | Refills: 0 | OUTPATIENT
Start: 2019-03-29 | End: 2019-04-27

## 2019-03-29 RX ORDER — GABAPENTIN 400 MG/1
2 CAPSULE ORAL
Qty: 60 | Refills: 0 | OUTPATIENT
Start: 2019-03-29 | End: 2019-04-27

## 2019-03-29 RX ORDER — OXYCODONE HYDROCHLORIDE 5 MG/1
1 TABLET ORAL
Qty: 9 | Refills: 0 | OUTPATIENT
Start: 2019-03-29 | End: 2019-03-31

## 2019-03-29 RX ORDER — ONDANSETRON 8 MG/1
1 TABLET, FILM COATED ORAL
Qty: 0 | Refills: 0 | COMMUNITY

## 2019-03-29 RX ORDER — HYDROMORPHONE HYDROCHLORIDE 2 MG/ML
1 INJECTION INTRAMUSCULAR; INTRAVENOUS; SUBCUTANEOUS
Qty: 24 | Refills: 0 | OUTPATIENT
Start: 2019-03-29 | End: 2019-03-31

## 2019-03-29 RX ADMIN — HYDROMORPHONE HYDROCHLORIDE 1 MILLIGRAM(S): 2 INJECTION INTRAMUSCULAR; INTRAVENOUS; SUBCUTANEOUS at 08:40

## 2019-03-29 RX ADMIN — PANTOPRAZOLE SODIUM 40 MILLIGRAM(S): 20 TABLET, DELAYED RELEASE ORAL at 05:44

## 2019-03-29 RX ADMIN — OXYCODONE HYDROCHLORIDE 25 MILLIGRAM(S): 5 TABLET ORAL at 05:44

## 2019-03-29 RX ADMIN — OXYCODONE HYDROCHLORIDE 25 MILLIGRAM(S): 5 TABLET ORAL at 06:07

## 2019-03-29 RX ADMIN — HYDROMORPHONE HYDROCHLORIDE 1 MILLIGRAM(S): 2 INJECTION INTRAMUSCULAR; INTRAVENOUS; SUBCUTANEOUS at 04:47

## 2019-03-29 RX ADMIN — Medication 2 MILLIGRAM(S): at 06:46

## 2019-03-29 RX ADMIN — Medication 12.5 MILLIGRAM(S): at 17:26

## 2019-03-29 RX ADMIN — PIPERACILLIN AND TAZOBACTAM 25 GRAM(S): 4; .5 INJECTION, POWDER, LYOPHILIZED, FOR SOLUTION INTRAVENOUS at 00:36

## 2019-03-29 RX ADMIN — OXYCODONE HYDROCHLORIDE 25 MILLIGRAM(S): 5 TABLET ORAL at 14:50

## 2019-03-29 RX ADMIN — HYDROMORPHONE HYDROCHLORIDE 1 MILLIGRAM(S): 2 INJECTION INTRAMUSCULAR; INTRAVENOUS; SUBCUTANEOUS at 08:55

## 2019-03-29 RX ADMIN — OXYCODONE HYDROCHLORIDE 25 MILLIGRAM(S): 5 TABLET ORAL at 13:50

## 2019-03-29 RX ADMIN — TENOFOVIR DISOPROXIL FUMARATE 300 MILLIGRAM(S): 300 TABLET, FILM COATED ORAL at 11:09

## 2019-03-29 RX ADMIN — HYDROMORPHONE HYDROCHLORIDE 2 MILLIGRAM(S): 2 INJECTION INTRAMUSCULAR; INTRAVENOUS; SUBCUTANEOUS at 18:25

## 2019-03-29 RX ADMIN — HYDROMORPHONE HYDROCHLORIDE 1 MILLIGRAM(S): 2 INJECTION INTRAMUSCULAR; INTRAVENOUS; SUBCUTANEOUS at 04:32

## 2019-03-29 RX ADMIN — Medication 12.5 MILLIGRAM(S): at 05:43

## 2019-03-29 RX ADMIN — HYDROMORPHONE HYDROCHLORIDE 2 MILLIGRAM(S): 2 INJECTION INTRAMUSCULAR; INTRAVENOUS; SUBCUTANEOUS at 17:26

## 2019-03-29 RX ADMIN — HYDROMORPHONE HYDROCHLORIDE 1 MILLIGRAM(S): 2 INJECTION INTRAMUSCULAR; INTRAVENOUS; SUBCUTANEOUS at 15:30

## 2019-03-29 RX ADMIN — PIPERACILLIN AND TAZOBACTAM 25 GRAM(S): 4; .5 INJECTION, POWDER, LYOPHILIZED, FOR SOLUTION INTRAVENOUS at 11:09

## 2019-03-29 RX ADMIN — GABAPENTIN 200 MILLIGRAM(S): 400 CAPSULE ORAL at 11:09

## 2019-03-29 RX ADMIN — HYDROMORPHONE HYDROCHLORIDE 1 MILLIGRAM(S): 2 INJECTION INTRAMUSCULAR; INTRAVENOUS; SUBCUTANEOUS at 15:15

## 2019-03-29 NOTE — PROGRESS NOTE ADULT - PROBLEM SELECTOR PLAN 3
-currently on 5L NC; wean pt off of oxygen   - duonebs q6 PRN, chest PT, acapella, IS w/ PRN hypertonic saline -currently on 4L NC; wean pt off of oxygen   - duonebs q6 PRN, chest PT, acapella, IS w/ PRN hypertonic saline

## 2019-03-29 NOTE — DIETITIAN INITIAL EVALUATION ADULT. - PHYSICAL APPEARANCE
BMI 19.8, pt with visible muscle wasting to temporal, clavicle, and fat loss to orbital. Nutrition focused physical exam deferred as pt unable to give consent secondary to refusing  phone./other (specify)

## 2019-03-29 NOTE — CHART NOTE - NSCHARTNOTEFT_GEN_A_CORE
Upon Nutritional Assessment by the Registered Dietitian your patient was determined to meet criteria / has evidence of the following diagnosis/diagnoses:          [ ]  Mild Protein Calorie Malnutrition        [x]  Moderate Protein Calorie Malnutrition        [ ] Severe Protein Calorie Malnutrition        [ ] Unspecified Protein Calorie Malnutrition        [ ] Underweight / BMI <19        [ ] Morbid Obesity / BMI > 40      Findings as based on:  [x] Comprehensive nutrition assessment   [x] Nutrition Focused Physical Exam  [x] Other: inadequate protein-energy intake PTA, 19.8% wt loss x 14 months, 12.2% of that wt loss occurred within the past 9 months, muscle wasting/fat loss, dysphagia, BMI 19.8, 84% IBW in setting of increased physiologic demand for nutrients secondary to metastatic catabolic illness      Nutrition Plan/Recommendations:    1. Recommend continue current regular diet, therapeutic diet restrictions not indicated at this time. Texture deferred to SLP  2. Provide Ensure Enlive twice daily (provides additional 700kcal, 40g protein) and recommend trial of 1 SF high protein gelatin, 1 SF health shake, and 1 magic cup. Monitor for pt acceptance  3. Will continue to monitor nutrient intake, wt, labs, f/u per protocol     RD remains available. Felicia Ceja RD, CDN Pager: [phone]       PROVIDER Section:     By signing this assessment you are acknowledging and agree with the diagnosis/diagnoses assigned by the Registered Dietitian    Comments: Upon Nutritional Assessment by the Registered Dietitian your patient was determined to meet criteria / has evidence of the following diagnosis/diagnoses:          [ ]  Mild Protein Calorie Malnutrition        [x]  Moderate Protein Calorie Malnutrition        [ ] Severe Protein Calorie Malnutrition        [ ] Unspecified Protein Calorie Malnutrition        [ ] Underweight / BMI <19        [ ] Morbid Obesity / BMI > 40      Findings as based on:  [x] Comprehensive nutrition assessment   [x] Nutrition Focused Physical Exam  [x] Other: inadequate protein-energy intake PTA, 19.8% wt loss x 14 months, 12.2% of that wt loss occurred within the past 9 months, muscle wasting/fat loss, dysphagia, BMI 19.8, 84% IBW in setting of increased physiologic demand for nutrients secondary to metastatic catabolic illness      Nutrition Plan/Recommendations:    1. Recommend continue current regular diet, therapeutic diet restrictions not indicated at this time. Texture deferred to SLP  2. Provide Ensure Enlive twice daily (provides additional 700kcal, 40g protein) and recommend trial of 1 SF high protein gelatin, 1 SF health shake, and 1 magic cup. Monitor for pt acceptance  3. Will continue to monitor nutrient intake, wt, labs, f/u per protocol     RD remains available. Felicia Ceja RD, CDN Pager: [phone]        PROVIDER Section:     By signing this assessment you are acknowledging and agree with the diagnosis/diagnoses assigned by the Registered Dietitian    Comments:

## 2019-03-29 NOTE — DISCHARGE NOTE PROVIDER - CARE PROVIDER_API CALL
Brandan Gross (MD; MBBS)  Hematology; [facility]; Medical Oncology  [address] [place] [id number]  Phone: [phone]  Fax: [phone]  Follow Up Time: 1 week    Dr. Ivan Meza,   Phone: [phone]  Fax: (   )    -  Follow Up Time: 1 week Brandan Gross; MBBS)  Hematology; HospicePalliative Medicine; Medical Oncology  [address] [place] [address] [id number]  Phone: [phone]  Fax: [phone]  Follow Up Time: 1 week    Dr. Ivan Meza,   Phone: [phone]  Fax: (   )    -  Follow Up Time: 1 week    Nery Kramer)  [facility]; Internal Medicine  [address] [place] [address]  Phone: [phone]  Fax: [phone]  Follow Up Time: 1 week

## 2019-03-29 NOTE — DISCHARGE NOTE PROVIDER - PROVIDER TOKENS
PROVIDER:[TOKEN:[[id number]],FOLLOWUP:[1 week]],FREE:[LAST:[Dr. Ivan Meza],PHONE:[phone]],FAX:[(   )    -],FOLLOWUP:[1 week]] PROVIDER:[TOKEN:[[id number]],FOLLOWUP:[1 week]],FREE:[LAST:[Dr. Ivan Meza],PHONE:[phone]],FAX:[(   )    -],FOLLOWUP:[1 week]],PROVIDER:[TOKEN:[[id number]],FOLLOWUP:[1 week]]

## 2019-03-29 NOTE — DIETITIAN INITIAL EVALUATION ADULT. - ETIOLOGY
inadequate protein-energy intake in setting of increased physiologic demand for nutrients secondary to metastatic catabolic illness

## 2019-03-29 NOTE — PROGRESS NOTE ADULT - PROBLEM SELECTOR PLAN 1
O/N had episode of SVT   -s/p IV metoprolol 5mg and adenosine 6mg   -c/w metoprolol 12.5mg BID -on admission, pt meeting criteria for severe sepsis found to have multifocal pneumonia/HAP (hospitalized in Jan) on CT chest ([facility] and [facility]).  -c/w zosyn (Day 7 of 7)  -blood cx 03/23 (coag neg sathish); blood cx 03/26: NGTD

## 2019-03-29 NOTE — PROGRESS NOTE ADULT - REASON FOR ADMISSION
SOB 2/2 hypoxic respiratory failure

## 2019-03-29 NOTE — DIETITIAN INITIAL EVALUATION ADULT. - PT NOT SOURCE
other (specify)/Pt is Mandarin speaking, wife at bedside is also Mandarin speaking, pt declines  phone and no phone noted at bedside.

## 2019-03-29 NOTE — PROGRESS NOTE ADULT - PROBLEM SELECTOR PLAN 3
- after review of patient's pain management outpatient records, oxyContin 30mg TID was recommended, but son under impression he was on 20mg TID, unclear what he was actually taking at home. Would c/w 25mg TID on discharge  - is on dilaudid 1mg IV here with good relief, equivalent to about 5mg PO; would discharge with 4mg PO dilaudid Q3 PRN mod-severe pain (dilaudid only comes in 2mg increments) instead of 2mg dosing  - outpatient follow-up with Dr. Best Tafoya  - Please note the following on patient's discharge summary: "Please see Dr. Nery Robertson at the [facility] ([phone]) for supportive oncology and pain and symptom management." Please email her with a summary of the patient's hospitalization and pain treatment at the time of discharge. Thank you. - after review of patient's pain management outpatient records, oxyContin 30mg TID was recommended, but son under impression he was on 20mg TID, unclear what he was actually taking at home. Would c/w 30mg TID on discharge, d/w Dr. Barajas.  - is on dilaudid 1mg IV here with good relief, equivalent to about 5mg PO; would discharge with 4mg PO dilaudid Q3 PRN mod-severe pain (dilaudid only comes in 2mg increments) instead of 2mg dosing  - outpatient follow-up with Dr. Best Tafoya  - Please note the following on patient's discharge summary: "Please see Dr. Nery Robertson at the [facility] ([phone]) for supportive oncology and pain and symptom management." Please email her with a summary of the patient's hospitalization and pain treatment at the time of discharge. Thank you.

## 2019-03-29 NOTE — PROGRESS NOTE ADULT - SUBJECTIVE AND OBJECTIVE BOX
SUBJECTIVE AND OBJECTIVE:  INTERVAL HPI/OVERNIGHT EVENTS:  RN translated for patient, who stated his pain was better with IV dilaudid. Planning for discharge today.     DNR on chart: Yes    Allergies    No Known Drug Allergies  tegaderm (Rash; Urticaria)    Intolerances    MEDICATIONS  (STANDING):  acetaminophen  IVPB .. 1000 milliGRAM(s) IV Intermittent once  dexamethasone     Tablet 2 milliGRAM(s) Oral daily  gabapentin 200 milliGRAM(s) Oral daily  lactated ringers. 1000 milliLiter(s) (75 mL/Hr) IV Continuous <Continuous>  metoprolol tartrate 12.5 milliGRAM(s) Oral two times a day  oxyCODONE  ER Tablet 25 milliGRAM(s) Oral three times a day  pantoprazole    Tablet 40 milliGRAM(s) Oral before breakfast  tamsulosin 0.4 milliGRAM(s) Oral at bedtime  tenofovir disoproxil fumarate (VIREAD) 300 milliGRAM(s) Oral daily    MEDICATIONS  (PRN):  acetaminophen   Tablet .. 650 milliGRAM(s) Oral every 6 hours PRN Temp greater or equal to 38.5C (101.3F)  acetaminophen   Tablet .. 325 milliGRAM(s) Oral every 4 hours PRN Temp greater or equal to 38C (100.4F)  ALBUTerol/ipratropium for Nebulization 3 milliLiter(s) Nebulizer every 6 hours PRN Shortness of Breath and/or Wheezing  HYDROmorphone   Tablet 2 milliGRAM(s) Oral every 6 hours PRN Moderate Pain (4 - 6)  HYDROmorphone  Injectable 1 milliGRAM(s) IV Push every 4 hours PRN Severe Pain (7 - 10)  sodium chloride 0.9% for Nebulization 3 milliLiter(s) Nebulizer every 6 hours PRN thick mucous      ITEMS UNCHECKED ARE NOT PRESENT    PRESENT SYMPTOMS: [ ]Unable to obtain due to poor mentation   Source if other than patient:  [ ]Family   [ ]Team     Pain (Impact on QOL):    INTERPRETED WITH SON AT BEDSIDE   Location:  shoulders, chest   Minimal acceptable level (0-10 scale):            Aggravating factors: movement   Quality:  dull  Radiation: NONE  Severity (0-10 scale):   5 after meds  Timing:    Dyspnea:                           [ ]Mild [ x]Moderate [ ]Severe  Anxiety:                             [ ]Mild [ ]Moderate [ ]Severe  Fatigue:                             [ ]Mild [ ]Moderate [ ]Severe  Nausea:                             [ ]Mild [ ]Moderate [ ]Severe  Loss of appetite:              [ ]Mild [ ]Moderate [ ]Severe  Constipation:                    [ ]Mild [ ]Moderate [ ]Severe    PAIN AD Score:	  http://geriatrictoolkit.[place].[email or web address].pdf (Ctrl + left click to view)    Other Symptoms:  [ ]All other review of systems negative     Karnofsky Performance Score/Palliative Performance Status Version 2: 40        %    http://palliative.info/resource_material/[email or web address].pdf    PHYSICAL EXAM:  Vital Signs Last 24 Hrs  T(C): 36.2 (29 Mar 2019 07:46), Max: 36.8 (29 Mar 2019 04:12)  T(F): 97.2 (29 Mar 2019 07:46), Max: 98.2 (29 Mar 2019 04:12)  HR: 73 (29 Mar 2019 07:46) (73 - 78)  BP: 133/75 (29 Mar 2019 07:46) (116/72 - 160/86)  BP(mean): --  RR: 18 (29 Mar 2019 07:46) (18 - 22)  SpO2: 91% (29 Mar 2019 07:46) (91% - 97%)     GENERAL:  [x ]Alert  [ ]Oriented x  4 [ ]Lethargic  x[ ]Cachexia  [ ]Unarousable  [x ]Verbal  [ ]Non-Verbal    Behavioral:   [ ] Anxiety  [ ] Delirium [ ] Agitation [ ] Other    HEENT:  [ x]Normal   [ ]Dry mouth   [ ]ET Tube/Trach  [ ]Oral lesions    PULMONARY: Poor exhange   [x ]Clear [ ]Tachypnea  [ ]Audible excessive secretions   [ ]Rhonchi        [ ]Right [ ]Left [ ]Bilateral  [ ]Crackles        [ ]Right [ ]Left [ ]Bilateral  [ ]Wheezing     [ ]Right [ ]Left [ ]Bilateral    CARDIOVASCULAR:    [ x]Regular [ ]Irregular [ ]Tachy  [ ]Jerman [ ]Murmur [ ]Other    GASTROINTESTINAL:  [ x]Soft  [ ]Distended   [ x]+BS  [x ]Non tender [ ]Tender  [ ]PEG [ ]OGT/ NGT   Last BM:    GENITOURINARY:  [x ]Normal [ ] Incontinent   [ ]Oliguria/Anuria   [ ]Soni    MUSCULOSKELETAL:   [ ]Normal   [ x]Weakness  [ ]Bed/Wheelchair bound [ ]Edema    NEUROLOGIC:   x[ ]No focal deficits  [ ] Cognitive impairment  [ ] Dysphagia [ ]Dysarthria [ ] Paresis [ ]Other     SKIN:   [x ]Normal   [ ]Pressure ulcer(s)  [ ]Rash    CRITICAL CARE:  [ ] Shock Present  [ ]Septic [ ]Cardiogenic [ ]Neurologic [ ]Hypovolemic  [ ]  Vasopressors [ ]  Inotropes   [ ] Respiratory Difficulties present   [ ] Acute  [x ] Chronic [ ] Hypoxic  [ ] Hypercarbic [ ] Other  [ ] Other organ failure     LABS:                                   11.1   18.92 )-----------( 213      ( 28 Mar 2019 09:38 )             37.0     03-28    141  |  104  |  15  ----------------------------<  153<H>  3.2<L>   |  25  |  1.65<H>    Ca    7.9<L>      28 Mar 2019 06:03  Phos  2.8     03-28  Mg     1.5     03-28      RADIOLOGY & ADDITIONAL STUDIES:      INTERPRETATION:  Non-contrast MRI of the brain, cervical and lumbar spine.    CLINICAL INDICATION: Fever, altered mental status, rule out metastatic   disease, history of lung cancer and left frontal lobe metastasis    TECHNIQUE:  Multiplanar, multisequence MR images of the brain were   obtained without the administration of intravenous contrast.      COMPARISON: Brain MRI dated 12/21/2018, cervical spine CT dated 7/5/2018    FINDINGS:    Brain MRI:     No acute hemorrhage or infarct is identified. Age-appropriate   involutional changes and moderate microvascular ischemic changes are   similar in appearance.    The previously noted enhancing left frontal nodule is not able to be   clearly visualized without contrast material. There is persistent signal   abnormality on FLAIR/T2-weighted images in this location.    No hydrocephalus, midline shift or extra-axial collections are identified.    Major flow voids at the skull base are within normal limits.    The orbits, paranasal sinuses and tympanomastoid cavities are not   remarkable in appearance.    Cervical spine MRI:    The normal cervical lordosis is maintained. Other disc spaces are   well-maintained. There are minimal degenerative changes with mild disc   bulging at C5-6 and C6-7 mild anterior spondylosis at C3-4 through C6-7.   No central canal orneural foraminal compromise is present.    The marrow signal is normal. No intrinsic spinal cord abnormality is   identified.    No intrinsic spinal cord abnormality is identified. Left upper lobe   signal abnormality is noted. This is unchanged compared with the chest CT   of 3/23/2019.    Lumbar spine MRI:    The normal lumbar stenosis is maintained. No central canal or neural   foraminal compromise is present.    There is a small focus of decreased signal on T1 increased signal on   T2-weighted images along the posterior superior right aspect of the L2   vertebral body which is nonspecific in appearance. This corresponds with   a very subtle lucency noted on abdominal CTs dated 3/23/2019 and   8/2/2018, suggesting that it is chronic in nature. The remaining marrow   signal is normal.     The disc spaces are well-maintained. There is no evidence for central   canal or neural foraminal comment [place].     The conus is within normal limits.    A right renal cyst is identified.    Impression:    Brain MRI: No acute hemorrhage or infarct. Mild to moderate microvascular   ischemic change. Previously identified left frontal lobe enhancing nodule   is unable to be visualized without contrast. Subtle metastatic disease   cannot be excluded without intravenous contrast material.    Cervical spine MRI: No evidence of osseous metastatic disease.   Leptomeningeal carcinomatosis cannot be excluded without intravenous   contrast material.    Lumbar spine MRI: Small focus of signal abnormality within the right   superior aspect of the L2 vertebral body can be further evaluated with   contrast material. Findings on previous abdominal pelvic CTs suggests   that this is benign in nature. Probable leptomeningeal carcinomatosis   cannot be excluded without intravenous contrast material.        Protein Calorie Malnutrition Present: [ ] yes [ ] no  [ ] PPSV2 < or = 30%  [ ] significant weight loss [ x] poor nutritional intake [ ] anasarca [ ] catabolic state Albumin, Serum: 2.4 g/dL (03-26-19 @ 07:19)  Artificial Nutrition [ ]     REFERRALS:   [ ]Chaplaincy  [ ] Hospice  [ ]Child Life  [ ]Social Work  [ x]Case management [ ]Holistic Therapy   Goals of Care Document:

## 2019-03-29 NOTE — DIETITIAN INITIAL EVALUATION ADULT. - PROBLEM SELECTOR PLAN 7
- may be 2/2 to new chemo drug in setting of recent change  - gabapentin 100mg qd started; will monitor symptoms

## 2019-03-29 NOTE — DIETITIAN INITIAL EVALUATION ADULT. - NS AS NUTRI INTERV MEDICAL AND FOOD SUPPLEMENTS
Provide Ensure Enlive twice daily (provides additional 700kcal, 40g protein) and recommend trial of 1 SF high protein gelatin, 1 SF health shake, and 1 magic cup. Monitor for pt acceptance./Commercial beverage

## 2019-03-29 NOTE — DISCHARGE NOTE PROVIDER - INSTRUCTIONS
Please follow a honey thickened liquid diet. Sit upright, encourage Puree via small teaspoon (5ml) at a time, and honey thick liquids via teaspoon (5ml); two swallows each for puree and honey thick liquids; alternate puree and honey thick liquids.

## 2019-03-29 NOTE — PROGRESS NOTE ADULT - SUBJECTIVE AND OBJECTIVE BOX
Internal Medicine Team Progress Note  Phil Davidson, PGY 1  [phone]    CITLALY SARITAANDRADE  Patient is a 78y old  Male who presents with a chief complaint of SOB 2/2 hypoxic respiratory failure (28 Mar 2019 16:33)      INTERVAL HPI / SUBJECTIVE:    NO acute events overnight.        MEDICATIONS:   MEDICATIONS  (STANDING):  acetaminophen  IVPB .. 1000 milliGRAM(s) IV Intermittent once  dexamethasone     Tablet 2 milliGRAM(s) Oral daily  gabapentin 200 milliGRAM(s) Oral daily  lactated ringers. 1000 milliLiter(s) (75 mL/Hr) IV Continuous <Continuous>  metoprolol tartrate 12.5 milliGRAM(s) Oral two times a day  oxyCODONE  ER Tablet 25 milliGRAM(s) Oral three times a day  pantoprazole    Tablet 40 milliGRAM(s) Oral before breakfast  piperacillin/tazobactam IVPB. 3.375 Gram(s) IV Intermittent every 12 hours  tamsulosin 0.4 milliGRAM(s) Oral at bedtime  tenofovir disoproxil fumarate (VIREAD) 300 milliGRAM(s) Oral daily    MEDICATIONS  (PRN):  acetaminophen   Tablet .. 650 milliGRAM(s) Oral every 6 hours PRN Temp greater or equal to 38.5C (101.3F)  acetaminophen   Tablet .. 325 milliGRAM(s) Oral every 4 hours PRN Temp greater or equal to 38C (100.4F)  ALBUTerol/ipratropium for Nebulization 3 milliLiter(s) Nebulizer every 6 hours PRN Shortness of Breath and/or Wheezing  HYDROmorphone   Tablet 2 milliGRAM(s) Oral every 6 hours PRN Moderate Pain (4 - 6)  HYDROmorphone  Injectable 1 milliGRAM(s) IV Push every 4 hours PRN Severe Pain (7 - 10)  sodium chloride 0.9% for Nebulization 3 milliLiter(s) Nebulizer every 6 hours PRN thick mucous      ALLERGIES:   Allergies    No Known Drug Allergies  tegaderm (Rash; Urticaria)    Intolerances        OBJECTIVE:  Vital Signs Last 24 Hrs  T(C): 36.2 (29 Mar 2019 07:46), Max: 36.9 (28 Mar 2019 11:50)  T(F): 97.2 (29 Mar 2019 07:46), Max: 98.4 (28 Mar 2019 11:50)  HR: 73 (29 Mar 2019 07:46) (73 - 106)  BP: 133/75 (29 Mar 2019 07:46) (116/72 - 163/83)  BP(mean): --  RR: 18 (29 Mar 2019 07:46) (18 - 24)  SpO2: 91% (29 Mar 2019 07:46) (91% - 100%)    I&O's Summary    28 Mar 2019 07:01  -  29 Mar 2019 07:00  --------------------------------------------------------  IN: 720 mL / OUT: 1140 mL / NET: -420 mL        PHYSICAL EXAM:  General: Well-appearing, NAD  HEENT:  EOMI, PERRL, conjunctiva and sclera clear, normal oropharynx  Neck:  Supple, no JVD, normal thyroid  Chest/Lungs: CTA B/L, no rales, rhonchi, rub or wheezing  Heart: RRR, normal S1, S2, no murmurs or gallops  Abdomen: Soft, nondistended, NTTP, normoactive bowel sounds  Extremities: Peripheral pulses 2+ B/L, no edema, cyanosis or clubbing  Skin: Warm, well-perfused, no rashes or lesions  Neurological: A&Ox3, no focal deficits      LABS:      IMAGING: No new imaging.       Labs and imaging personally reviewed and interpreted [ x ]  Consult notes reviewed [ x ]  Case discussed with consultants [ x ] Internal Medicine Team Progress Note  Phil Davidson, PGY 1  [phone]    ODALIS BOUCHER  Patient is a 78y old  Male who presents with a chief complaint of SOB 2/2 hypoxic respiratory failure (28 Mar 2019 16:33)      INTERVAL HPI / SUBJECTIVE:    NO acute events overnight.  Patient states pain in well controlled.  Currently on 4L NC.        MEDICATIONS:   MEDICATIONS  (STANDING):  acetaminophen  IVPB .. 1000 milliGRAM(s) IV Intermittent once  dexamethasone     Tablet 2 milliGRAM(s) Oral daily  gabapentin 200 milliGRAM(s) Oral daily  lactated ringers. 1000 milliLiter(s) (75 mL/Hr) IV Continuous <Continuous>  metoprolol tartrate 12.5 milliGRAM(s) Oral two times a day  oxyCODONE  ER Tablet 25 milliGRAM(s) Oral three times a day  pantoprazole    Tablet 40 milliGRAM(s) Oral before breakfast  piperacillin/tazobactam IVPB. 3.375 Gram(s) IV Intermittent every 12 hours  tamsulosin 0.4 milliGRAM(s) Oral at bedtime  tenofovir disoproxil fumarate (VIREAD) 300 milliGRAM(s) Oral daily    MEDICATIONS  (PRN):  acetaminophen   Tablet .. 650 milliGRAM(s) Oral every 6 hours PRN Temp greater or equal to 38.5C (101.3F)  acetaminophen   Tablet .. 325 milliGRAM(s) Oral every 4 hours PRN Temp greater or equal to 38C (100.4F)  ALBUTerol/ipratropium for Nebulization 3 milliLiter(s) Nebulizer every 6 hours PRN Shortness of Breath and/or Wheezing  HYDROmorphone   Tablet 2 milliGRAM(s) Oral every 6 hours PRN Moderate Pain (4 - 6)  HYDROmorphone  Injectable 1 milliGRAM(s) IV Push every 4 hours PRN Severe Pain (7 - 10)  sodium chloride 0.9% for Nebulization 3 milliLiter(s) Nebulizer every 6 hours PRN thick mucous      ALLERGIES:   Allergies    No Known Drug Allergies  tegaderm (Rash; Urticaria)    Intolerances        OBJECTIVE:  Vital Signs Last 24 Hrs  T(C): 36.2 (29 Mar 2019 07:46), Max: 36.9 (28 Mar 2019 11:50)  T(F): 97.2 (29 Mar 2019 07:46), Max: 98.4 (28 Mar 2019 11:50)  HR: 73 (29 Mar 2019 07:46) (73 - 106)  BP: 133/75 (29 Mar 2019 07:46) (116/72 - 163/83)  BP(mean): --  RR: 18 (29 Mar 2019 07:46) (18 - 24)  SpO2: 91% (29 Mar 2019 07:46) (91% - 100%)    I&O's Summary    28 Mar 2019 07:01  -  29 Mar 2019 07:00  --------------------------------------------------------  IN: 720 mL / OUT: 1140 mL / NET: -420 mL        PHYSICAL EXAM:  General: Well-appearing, NAD  HEENT:  EOMI, conjunctiva and sclera clear, normal oropharynx  Neck:  Supple,   Chest/Lungs: CTA B/L, no rales, rhonchi, rub or wheezing  Heart: RRR, normal S1, S2, no murmurs or gallops  Abdomen: Soft, nondistended, NTTP, normoactive bowel sounds  Extremities: Peripheral pulses 2+ B/L, no edema, cyanosis or clubbing  Skin: Warm, well-perfused, no rashes or lesions  Neurological: A&Ox3, no focal deficits      LABS:    CBC Full  -  ( 29 Mar 2019 09:30 )  WBC Count : 18.06 K/uL  RBC Count : 3.24 M/uL  Hemoglobin : 9.5 g/dL  Hematocrit : 30.9 %  Platelet Count - Automated : 196 K/uL  Mean Cell Volume : 95.4 fl  Mean Cell Hemoglobin : 29.3 pg  Mean Cell Hemoglobin Concentration : 30.7 gm/dL  Auto Neutrophil # : x  Auto Lymphocyte # : x  Auto Monocyte # : x  Auto Eosinophil # : x  Auto Basophil # : x  Auto Neutrophil % : x  Auto Lymphocyte % : x  Auto Monocyte % : x  Auto Eosinophil % : x  Auto Basophil % : x      03-29    138  |  100  |  20  ----------------------------<  150<H>  4.1   |  27  |  1.81<H>  03-28    141  |  104  |  15  ----------------------------<  153<H>  3.2<L>   |  25  |  1.65<H>  03-27    135  |  97  |  22  ----------------------------<  275<H>  4.1   |  26  |  1.81<H>    Ca    8.5      29 Mar 2019 06:56  Ca    7.9<L>      28 Mar 2019 06:03  Ca    8.7      27 Mar 2019 10:59  Phos  3.5     03-29  Mg     2.0     03-29                              IMAGING: No new imaging.       Labs and imaging personally reviewed and interpreted [ x ]  Consult notes reviewed [ x ]  Case discussed with consultants [ x ]

## 2019-03-29 NOTE — PROGRESS NOTE ADULT - PROBLEM SELECTOR PLAN 10
8. Peripheral neuropathy:  may be 2/2 to new chemo drug in setting of recent change  - c/w gabapentin 200 daily; will monitor symptoms  9. Hx of PE - pt has been on Eliquis for >1 year for remote history of PE; will hold in setting of hematuria   10.  Chronic hep B - continue Tenofovir   11.  BPH - c/w flomax   - DVT ppx: SCDs in setting of hematuria   - Diet: dysphagia 1
8. Peripheral neuropathy:  may be 2/2 to new chemo drug in setting of recent change  - gabapentin 100mg qd started; will monitor symptoms  9. Hx of PE - pt has been on Eliquis for >1 year for remote history of PE; will hold in setting of hematuria   10.  Chronic hep B - continue Tenofovir   11.  BPH - c/w flomax   - DVT ppx: SCDs in setting of hematuria   - Diet: dysphagia 1

## 2019-03-29 NOTE — DIETITIAN INITIAL EVALUATION ADULT. - PROBLEM SELECTOR PLAN 8
- pt w/ Stage IV EGFR + NSCLC   - holding Tagrisso in setting of infection; will f/u w/ heme/onc   - c/w decardon for nausea and tumor burden  - c/w home pain management regimen   - will call heme/onc in AM   - Oncologist is Dr. Norah Gross   - oncologist is

## 2019-03-29 NOTE — DIETITIAN INITIAL EVALUATION ADULT. - ENERGY NEEDS
Height: 68 inches, Weight: 130 pounds (dosing)  BMI: 19.8 kg/m2 IBW: 154 pounds (+/-10%), %IBW: 84%  Pertinent Info: No edema noted, no pressure injuries noted at this time in nursing flow sheet.  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 +new fecal incontinence. +LUX. +anemia secondary to hematuria. +dysphagia. Palliative following, GOC being addressed, family deferred conversation regarding artificial nutrition and hydration per chart.

## 2019-03-29 NOTE — DIETITIAN INITIAL EVALUATION ADULT. - NS AS NUTRI INTERV MEALS SNACK
Recommend continue current regular diet, therapeutic diet restrictions not indicated at this time. Texture deferred to SLP./General/healthful diet

## 2019-03-29 NOTE — DIETITIAN INITIAL EVALUATION ADULT. - PROBLEM SELECTOR PLAN 1
- Pt w/ sepsis on admission   - Pt admitted w/ tachycardia, tachypnea, fever, leukocytosis, source of infection and LUX  - Likely 2/2 PNA  - c/w broad spectrum abx (vanc + zosyn)  - U/A neg for LE/nitrites but urine cx and 2 sets of blood cultures are pending  -weight based IVR given, Cap refill 2sec on reassessment.

## 2019-03-29 NOTE — DISCHARGE NOTE PROVIDER - NSDCCPCAREPLAN_GEN_ALL_CORE_FT
PRINCIPAL DISCHARGE DIAGNOSIS  Diagnosis: PNA (pneumonia)  Assessment and Plan of Treatment:       SECONDARY DISCHARGE DIAGNOSES  Diagnosis: Lung cancer  Assessment and Plan of Treatment:     Diagnosis: Upper extremity weakness  Assessment and Plan of Treatment: Upper extremity weakness    Diagnosis: Lower extremity weakness  Assessment and Plan of Treatment: Lower extremity weakness    Diagnosis: Diabetes  Assessment and Plan of Treatment: Diabetes    Diagnosis: BPH (benign prostatic hyperplasia)  Assessment and Plan of Treatment: BPH (benign prostatic hyperplasia)    Diagnosis: Peripheral neuropathy  Assessment and Plan of Treatment: Peripheral neuropathy    Diagnosis: SVT (supraventricular tachycardia)  Assessment and Plan of Treatment: SVT (supraventricular tachycardia) PRINCIPAL DISCHARGE DIAGNOSIS  Diagnosis: PNA (pneumonia)  Assessment and Plan of Treatment: Patient was found to have multifocal pneumonia.  Patient completed a 7 day course of zosyn.  Please return to the ER if patient is febrile or has worsening shortness of breath.      SECONDARY DISCHARGE DIAGNOSES  Diagnosis: Lung cancer  Assessment and Plan of Treatment: Patient was receiving Tagrisso for the Lung cancer.  Please follow up with your oncologist within 1-2 week regarding continuation of chemotherapy.  Patient was found to have microscopic hematuria and oncology was made aware.  Palliative care was consulted and pain was managed adequately.  Patient was titrated to oxycodone 25mg three times a day.  Please follow up with PMD to obtain more pain medications.    Diagnosis: Upper extremity weakness  Assessment and Plan of Treatment: Patient had upper and lower extremity weakness.  MRI cervical/throacic/lumbar spine was done which did not show any signs of cord compression.  Patient received PT and is getting discharged with home PT services.    Diagnosis: Peripheral neuropathy  Assessment and Plan of Treatment: Patient had peripheral neuropathy and pt was started on gabapentin 200mg daily.  Please continue taking it.    Diagnosis: SVT (supraventricular tachycardia)  Assessment and Plan of Treatment: Hospital course complicated by an episode of supraventricular tachycardia.  Patient was given IV metoprolol and IV adenosine.  Cardiology was consulted.  Please continue taking metoprolol 12.5mg two times per day. PRINCIPAL DISCHARGE DIAGNOSIS  Diagnosis: PNA (pneumonia)  Assessment and Plan of Treatment: Patient was found to have multifocal pneumonia.  Patient completed a 7 day course of zosyn.  Please return to the ER if patient is febrile or has worsening shortness of breath.      SECONDARY DISCHARGE DIAGNOSES  Diagnosis: Lung cancer  Assessment and Plan of Treatment: Patient was receiving Tagrisso for the Lung cancer.  Please follow up with your oncologist within 1-2 week regarding continuation of chemotherapy.  Patient was found to have microscopic hematuria and oncology was made aware.  Palliative care was consulted and pain was managed adequately.  Patient was titrated to oxycodone 25mg three times a day and dilaudid 4mg for any breakthrough pain.  Please see Dr. Robertson to obtain further pain medications.    Diagnosis: Upper extremity weakness  Assessment and Plan of Treatment: Patient had upper and lower extremity weakness.  MRI cervical/throacic/lumbar spine was done which did not show any signs of cord compression.  Patient received PT and is getting discharged with home PT services.    Diagnosis: Peripheral neuropathy  Assessment and Plan of Treatment: Patient had peripheral neuropathy and pt was started on gabapentin 200mg daily.  Please continue taking it.    Diagnosis: SVT (supraventricular tachycardia)  Assessment and Plan of Treatment: Hospital course complicated by an episode of supraventricular tachycardia.  Patient was given IV metoprolol and IV adenosine.  Cardiology was consulted.  Please continue taking metoprolol 12.5mg two times per day. PRINCIPAL DISCHARGE DIAGNOSIS  Diagnosis: PNA (pneumonia)  Assessment and Plan of Treatment: Patient was found to have multifocal pneumonia.  Patient completed a 7 day course of zosyn.  Please return to the ER if patient is febrile or has worsening shortness of breath.      SECONDARY DISCHARGE DIAGNOSES  Diagnosis: Lung cancer  Assessment and Plan of Treatment: Patient was receiving Tagrisso for the Lung cancer.  Please follow up with your oncologist within 1-2 week regarding continuation of chemotherapy.  Patient was found to have microscopic hematuria and oncology was made aware.  Palliative care was consulted and pain was managed adequately.  Patient was titrated to oxycodone 25mg three times a day and dilaudid 4mg for any breakthrough pain.  Please see Dr. Nery Robertson at the [facility] ([phone]) for supportive oncology and pain and symptom management.    Diagnosis: Upper extremity weakness  Assessment and Plan of Treatment: Patient had upper and lower extremity weakness.  MRI cervical/throacic/lumbar spine was done which did not show any signs of cord compression.  Patient received PT and is getting discharged with home PT services.    Diagnosis: Peripheral neuropathy  Assessment and Plan of Treatment: Patient had peripheral neuropathy and pt was started on gabapentin 200mg daily.  Please continue taking it.    Diagnosis: SVT (supraventricular tachycardia)  Assessment and Plan of Treatment: Hospital course complicated by an episode of supraventricular tachycardia.  Patient was given IV metoprolol and IV adenosine.  Cardiology was consulted.  Please continue taking metoprolol 12.5mg two times per day. PRINCIPAL DISCHARGE DIAGNOSIS  Diagnosis: PNA (pneumonia)  Assessment and Plan of Treatment: Patient was found to have multifocal pneumonia.  Patient completed a 7 day course of zosyn.  Please return to the ER if patient is febrile or has worsening shortness of breath.      SECONDARY DISCHARGE DIAGNOSES  Diagnosis: Peripheral neuropathy  Assessment and Plan of Treatment: Patient had peripheral neuropathy and pt was started on gabapentin 200mg daily.  Please continue taking it.    Diagnosis: Upper extremity weakness  Assessment and Plan of Treatment: Patient had upper and lower extremity weakness.  MRI cervical/throacic/lumbar spine was done which did not show any signs of cord compression.  Patient received PT and is getting discharged with home PT services.    Diagnosis: SVT (supraventricular tachycardia)  Assessment and Plan of Treatment: Hospital course complicated by an episode of supraventricular tachycardia.  Patient was given IV metoprolol and IV adenosine.  Cardiology was consulted.  Please continue taking metoprolol 12.5mg two times per day.    Diagnosis: Lung cancer  Assessment and Plan of Treatment: Patient was receiving Tagrisso for the Lung cancer.  Please follow up with your oncologist within 1-2 week regarding continuation of chemotherapy.  Patient was found to have microscopic hematuria and oncology was made aware.  Palliative care was consulted and pain was managed adequately.  Patient was titrated to oxycodone 25mg three times a day and dilaudid 4mg for any breakthrough pain.  Please see Dr. Nery Robertson at the [facility] ([phone]) for supportive oncology and pain and symptom management. PRINCIPAL DISCHARGE DIAGNOSIS  Diagnosis: PNA (pneumonia)  Assessment and Plan of Treatment: Patient was found to have multifocal pneumonia.  Patient completed a 7 day course of zosyn.  Please return to the ER if patient is febrile or has worsening shortness of breath.      SECONDARY DISCHARGE DIAGNOSES  Diagnosis: Lung cancer  Assessment and Plan of Treatment: Patient was receiving Tagrisso for the Lung cancer.  Please follow up with your oncologist within 1-2 week regarding continuation of chemotherapy.  Patient was found to have microscopic hematuria and oncology was made aware.  Palliative care was consulted and pain was managed adequately.  Please continue taking oxycodone 30mg three times per day and dilaudid 4mg every 3 hours for any breakthrough pain.  Please see Dr. Nery Robertson at the [facility] ([phone]) for supportive oncology and pain and symptom management.    Diagnosis: Upper extremity weakness  Assessment and Plan of Treatment: Patient had upper and lower extremity weakness.  MRI cervical/throacic/lumbar spine was done which did not show any signs of cord compression.  Patient received PT and is getting discharged with home PT services.    Diagnosis: Peripheral neuropathy  Assessment and Plan of Treatment: Patient had peripheral neuropathy and pt was started on gabapentin 200mg daily.  Please continue taking it.    Diagnosis: SVT (supraventricular tachycardia)  Assessment and Plan of Treatment: Hospital course complicated by an episode of supraventricular tachycardia.  Patient was given IV metoprolol and IV adenosine.  Cardiology was consulted.  Please continue taking metoprolol 12.5mg two times per day.

## 2019-03-29 NOTE — DIETITIAN INITIAL EVALUATION ADULT. - PROBLEM SELECTOR PLAN 2
- likely 2/2 HAP (hospitalized in Jan) and immunocompromised with lung cancer on chemotherapy   - Pt presented w/ fever, leukocytosis and CT findings showing increased opacities/consolidations in VENTURA and LLL; c/w renal dosing of vanc in setting or LUX (vanc trough goal 15-20) and zosyn.  Sputum culture ordered   - Pt w/ history of adenocarcinoma, tachycardia and hypoxia however pt on Eliquis; if pt does not improve w/ abx will consider CTA   - duonebs q6 PRN, chest PT, acapella, IS w/ PRN hypertonic saline  - Pulm consulted

## 2019-03-29 NOTE — DISCHARGE NOTE PROVIDER - HOSPITAL COURSE
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  As per son, on Thursday his father developed a fever and was having subjective chills and was less oriented than normal (only able to know that he was home).  Pt continued to have fevers/chills w/ cough and began developing SOB today prompting the family to call EMS.  Son reports that the cough is productive w/ thick sputum .         Upon arrival to ED vitals were as follows: T 102 rectal, , /72, RR 22 85% on RA as per EMS, 99% on NRB at 15L/min (currently 98% on 4L NC)        CT chest/abd/pelvis sig for opacities/consolidation in VENTURA and LLL (increased from PET/CT 12/24/18) w/ small L and trace R pleural effusions.        Patient was started on a 7 day course of zosyn which he completed on 03/29/19.  Throughout the hospital stay, patient was seen by palliative care and pain was appropriately managed, currently on oxycodone ER 25mg TID.  Patient's hospital course was complicated by an episode of SVT in the 220s.  IV metoprolol 5mg given and IV adenosine 6mg with return to NSR 80-90s.  Pt was seen by cardiology and recommended metoprolol 12.5mg BID.          Initially it was unclear if patient has home O2 but after speaking with the son, pt does have home O2 needs and has everything set up prior to admission.         Patient is medically stable and optimized for discharge. 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  As per son, on Thursday his father developed a fever and was having subjective chills and was less oriented than normal (only able to know that he was home).  Pt continued to have fevers/chills w/ cough and began developing SOB today prompting the family to call EMS.  Son reports that the cough is productive w/ thick sputum .         Upon arrival to ED vitals were as follows: T 102 rectal, , /72, RR 22 85% on RA as per EMS, 99% on NRB at 15L/min (currently 98% on 4L NC)        CT chest/abd/pelvis sig for opacities/consolidation in VENTURA and LLL (increased from PET/CT 12/24/18) w/ small L and trace R pleural effusions.        Patient was started on a 7 day course of zosyn which he completed on 03/29/19.  Throughout the hospital stay, patient was seen by palliative care and pain was appropriately managed, currently on oxycodone ER 25mg TID.  Patient's hospital course was complicated by an episode of SVT in the 220s.  IV metoprolol 5mg given and IV adenosine 6mg with return to NSR 80-90s.  Pt was seen by cardiology and recommended metoprolol 12.5mg BID.          Initially it was unclear if patient has home O2 but after speaking with the son, pt does have home O2 needs and has everything set up prior to discharge.         Patient is medically stable and optimized for discharge.

## 2019-03-29 NOTE — PROGRESS NOTE ADULT - PROBLEM SELECTOR PLAN 2
-on admission, pt meeting criteria for severe sepsis found to have multifocal pneumonia/HAP (hospitalized in Jan) on CT chest (VENTURA and LLL).  -c/w zosyn (Day 7 of 7)  -blood cx 03/23 (coag neg sathish); blood cx 03/26: NGTD Hospital course c/b episode of SVT   -s/p IV metoprolol 5mg and adenosine 6mg   -c/w metoprolol 12.5mg BID

## 2019-03-29 NOTE — DIETITIAN INITIAL EVALUATION ADULT. - OTHER INFO
Pt seen for length of stay on 6TOW. Limited subjective information available. Pt observed with 100% of breakfast consumed this AM and per chart with % consumption of meals during current hospitalization. No acute GI distress noted in chart., last BM 3/28. Pt with dysphagia noted per chart, recommended for pureed diet with honey consistency fluids which he appears to be tolerating. Pt weight history obtained from chart as follows: 162 lbs (reported UBW in 1/2018), 148 lbs (6/4/19), 135.3 lbs (10/31/18), 123.4 lbs (12/27/18), and current dosing wt noted as 130 lbs. Weight history indicative of ~19.8% wt loss x 14 months, 12.2% of that wt loss occurred within the past 9 months per chart. Pt appears to have had additional wt loss back in 12/2018 and may have regained some wt since then given his current dosing wt. Recommend obtain new weight to confirm. Pt with h/o T2DM per chart, last HgbA1c available from 11/2018 was 5.8% indicating adequate BG control at that time, unclear if pt taking an DM meds PTA. See additional recommendations below.

## 2019-03-29 NOTE — PROGRESS NOTE ADULT - PROVIDER SPECIALTY LIST ADULT
Internal Medicine
Palliative Care
Internal Medicine

## 2019-03-29 NOTE — DIETITIAN INITIAL EVALUATION ADULT. - PROBLEM SELECTOR PLAN 10
9. Hx of PE - pt has been on Eliquis for >1 year for remote history of PE; will hold in setting of hematuria   10.  Chronic hep B - continue Tenofovir   11.  BPH - c/w flomax   - DVT ppx: SCDs in setting of hematuria   - Diet: dysphagia 1

## 2019-03-29 NOTE — PROGRESS NOTE ADULT - PROBLEM SELECTOR PLAN 1
- Known to [facility]  - Team 3 to have Hem/Onc consult.  Tagrisso on hold in setting of infection per primary team.  Follow up with long-term upon discharge

## 2019-03-29 NOTE — DISCHARGE NOTE PROVIDER - CARE PROVIDERS DIRECT ADDRESSES
,[email or web address]_Unknown ,[email or web address],[email or web address]

## 2019-04-02 ENCOUNTER — RX RENEWAL (OUTPATIENT)
Age: 79
End: 2019-04-02

## 2019-04-02 RX ORDER — OXYCODONE HYDROCHLORIDE 30 MG/1
30 TABLET, FILM COATED, EXTENDED RELEASE ORAL 3 TIMES DAILY
Qty: 90 | Refills: 0 | Status: ACTIVE | COMMUNITY
Start: 2019-01-29 | End: 1900-01-01

## 2019-04-02 RX ORDER — HYDROMORPHONE HYDROCHLORIDE 2 MG/1
2 TABLET ORAL
Qty: 60 | Refills: 0 | Status: ACTIVE | COMMUNITY
Start: 2019-02-12 | End: 1900-01-01

## 2019-04-05 ENCOUNTER — APPOINTMENT (OUTPATIENT)
Dept: SPEECH THERAPY | Facility: CLINIC | Age: 79
End: 2019-04-05

## 2019-04-05 ENCOUNTER — APPOINTMENT (OUTPATIENT)
Dept: HEMATOLOGY ONCOLOGY | Facility: CLINIC | Age: 79
End: 2019-04-05
Payer: MEDICAID

## 2019-04-05 VITALS
RESPIRATION RATE: 16 BRPM | SYSTOLIC BLOOD PRESSURE: 100 MMHG | OXYGEN SATURATION: 99 % | HEART RATE: 100 BPM | TEMPERATURE: 98.7 F | DIASTOLIC BLOOD PRESSURE: 64 MMHG

## 2019-04-05 DIAGNOSIS — R53.83 OTHER FATIGUE: ICD-10-CM

## 2019-04-05 DIAGNOSIS — R63.0 ANOREXIA: ICD-10-CM

## 2019-04-05 PROCEDURE — 99215 OFFICE O/P EST HI 40 MIN: CPT

## 2019-04-05 RX ORDER — OSIMERTINIB 40 1/1
40 TABLET, FILM COATED ORAL
Qty: 90 | Refills: 0 | Status: ACTIVE | COMMUNITY
Start: 2019-04-05 | End: 1900-01-01

## 2019-04-05 NOTE — PHYSICAL EXAM
[Completely disabled. Cannot carry on any self care. Totally confined to bed or chair] : Status 4- Completely disabled. Cannot carry on any self care. Totally confined to bed or chair [Normal] : affect appropriate [Mucositis] : no mucositis [de-identified] : severely debilitated [de-identified] : clear b/l [de-identified] : very lethargic, somnolent, jerky mobvements of UE

## 2019-04-05 NOTE — ASSESSMENT
[Palliative] : Goals of care discussed with patient: Palliative [Palliative Care Plan] : not applicable at this time [FreeTextEntry1] : 76yo M w/ Stage IV EGFR mutated (no T790M) NSCLC on afatinib, here for follow-up.\par Pt is extremelely lethargic, deconditioned- not a candidate for cancer-directed tx- recommend hospice\par Recent imaging in hospital showed PNA- no cancer progression. BRain MRI showed no mets\par Stop Eliquis- PE in 2017 with no clinical benefits\par Stop Metorprolol and gabapentin started in the hospital\par Supportive care\par Need for hospital bed- needs >30 degree elevation due to dyspnea\par completely non- ambulatory- needs wheel chair\par CAn stop tenofovir\par Conitnue oxycodone\par Stop metformin\par

## 2019-04-05 NOTE — REVIEW OF SYSTEMS
[Fatigue] : fatigue [Recent Change In Weight] : ~T recent weight change [Dysphagia] : dysphagia [SOB on Exertion] : shortness of breath during exertion [Confused] : confusion [Dizziness] : dizziness [Difficulty Walking] : difficulty walking [Negative] : Allergic/Immunologic [Fever] : no fever [Chills] : no chills [Eye Pain] : no eye pain [Odynophagia] : no odynophagia [Mucosal Pain] : no mucosal pain [Chest Pain] : no chest pain [Palpitations] : no palpitations [Leg Claudication] : no intermittent leg claudication [Lower Ext Edema] : no lower extremity edema [Shortness Of Breath] : no shortness of breath [Wheezing] : no wheezing [Cough] : no cough [Abdominal Pain] : no abdominal pain [Vomiting] : no vomiting [Constipation] : no constipation [Diarrhea] : no diarrhea [Dysuria] : no dysuria [Joint Pain] : no joint pain [Joint Stiffness] : no joint stiffness [Muscle Pain] : no muscle pain [Skin Rash] : no skin rash [Fainting] : no fainting [Anxiety] : no anxiety [Depression] : no depression [Muscle Weakness] : no muscle weakness [Easy Bleeding] : no tendency for easy bleeding [Easy Bruising] : no tendency for easy bruising [Swollen Glands] : no swollen glands [FreeTextEntry2] : lost a lot of weight, in wheel chair [FreeTextEntry4] : no recent aspiration episodes on puree diet [FreeTextEntry6] : improved

## 2019-04-05 NOTE — HISTORY OF PRESENT ILLNESS
[Disease: _____________________] : Disease: [unfilled] [M: ___] : M[unfilled] [AJCC Stage: ____] : AJCC Stage: [unfilled] [Cardiovascular] : Cardiovascular [Infectious] : Infectious [Pulmonary] : Pulmonary [de-identified] : Translation provided by Mr. Sainz's sonParmjit at pt's request\par \par Mr. Saizn is a 77 yo man with stage IV EGFR mutated NSCLC s/p progression through erlotinib, carboplatin/pemetrexed, atezolizumab,  now on gilotrif\par Tarceva 5/2016-4/2017\par Carbo/pem - 4/24/17 - 6/26/17\par Atezolizumab 7/13/17- 9/14/17\par Afatinib ~2/12/18 - 12/2018\par Tagrisso- 1/30/19- present admission was 12/27/18 to 1/3/19. Last visit, afatinib was stopped for toxicity (fatigue) and Tagrisso was recommended. There were some insurance-related delays since pt does not have a documented T790M mut. Through appeal process, tagrisso was approved and pt started taking it 2 weeks ago. He also started medical cannabis 3 weeks ago. Overall, he is doing better. He is more ambulatory and communicative at home. He is on oxycontin 30 mg TID and Hydromorphone 2 mg as needed (uses approx. 0-2 times/day). Appetite fair and weight is stable. He is till on puree diet and has follow up with speech therapy tomorrow. \par \par 3/12/19: Pt presents for follow up. Pt has been on Tagrisso and is tolerating well. Today however he c/o severe diffuse pain worse in thorax but also extends to abdomen. BM normal qod. Just took short acting (hydrocodone) a few minutes ago. Sleeping ok. Spends most of time iin bed. Appetite fair. Breathing has been ok. Using thick it for all liquids. Last imaging in December. \par \par 4/5/19: Was hospitalized for 7 days 2 weeks ago for PNA, discharged on 3/29. Since then, he has progressively weak. HE is very lethargic, very weak, and lays in bed most of the time. He has no pain- complete lack of energy. He has jerky movements of the body. He is taking gabapentin 100 mg BID and oxycontin 30 mg TID. He is still on dexamethasone 2 mg daily. He is continuos home O2. Complaint with honey thick liquids and puree. HE has lost significant amount of weight and is very anorexic. 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

## 2019-04-06 ENCOUNTER — INPATIENT (INPATIENT)
Facility: HOSPITAL | Age: 79
LOS: 9 days | Discharge: HOME HEALTH | DRG: 871 | End: 2019-04-16
Attending: HOSPITALIST | Admitting: HOSPITALIST
Payer: MEDICAID

## 2019-04-06 VITALS
RESPIRATION RATE: 18 BRPM | HEART RATE: 101 BPM | DIASTOLIC BLOOD PRESSURE: 66 MMHG | HEIGHT: 69 IN | TEMPERATURE: 98 F | OXYGEN SATURATION: 92 % | SYSTOLIC BLOOD PRESSURE: 129 MMHG | WEIGHT: 125.66 LBS

## 2019-04-06 DIAGNOSIS — A41.9 SEPSIS, UNSPECIFIED ORGANISM: ICD-10-CM

## 2019-04-06 DIAGNOSIS — E11.9 TYPE 2 DIABETES MELLITUS WITHOUT COMPLICATIONS: ICD-10-CM

## 2019-04-06 DIAGNOSIS — I10 ESSENTIAL (PRIMARY) HYPERTENSION: ICD-10-CM

## 2019-04-06 DIAGNOSIS — C44.311 BASAL CELL CARCINOMA OF SKIN OF NOSE: Chronic | ICD-10-CM

## 2019-04-06 DIAGNOSIS — N17.9 ACUTE KIDNEY FAILURE, UNSPECIFIED: ICD-10-CM

## 2019-04-06 DIAGNOSIS — R31.9 HEMATURIA, UNSPECIFIED: ICD-10-CM

## 2019-04-06 DIAGNOSIS — J44.9 CHRONIC OBSTRUCTIVE PULMONARY DISEASE, UNSPECIFIED: ICD-10-CM

## 2019-04-06 DIAGNOSIS — C34.90 MALIGNANT NEOPLASM OF UNSPECIFIED PART OF UNSPECIFIED BRONCHUS OR LUNG: ICD-10-CM

## 2019-04-06 DIAGNOSIS — D04.9 CARCINOMA IN SITU OF SKIN, UNSPECIFIED: Chronic | ICD-10-CM

## 2019-04-06 DIAGNOSIS — Z29.9 ENCOUNTER FOR PROPHYLACTIC MEASURES, UNSPECIFIED: ICD-10-CM

## 2019-04-06 DIAGNOSIS — N40.0 BENIGN PROSTATIC HYPERPLASIA WITHOUT LOWER URINARY TRACT SYMPTOMS: ICD-10-CM

## 2019-04-06 DIAGNOSIS — G93.41 METABOLIC ENCEPHALOPATHY: ICD-10-CM

## 2019-04-06 LAB
ALBUMIN SERPL ELPH-MCNC: 2.7 G/DL — LOW (ref 3.3–5)
ALP SERPL-CCNC: 70 U/L — SIGNIFICANT CHANGE UP (ref 40–120)
ALT FLD-CCNC: 10 U/L — SIGNIFICANT CHANGE UP (ref 10–45)
AMMONIA BLD-MCNC: 20 UMOL/L — SIGNIFICANT CHANGE UP (ref 11–55)
ANION GAP SERPL CALC-SCNC: 13 MMOL/L — SIGNIFICANT CHANGE UP (ref 5–17)
APPEARANCE UR: ABNORMAL
APTT BLD: 38.4 SEC — HIGH (ref 27.5–36.3)
AST SERPL-CCNC: 28 U/L — SIGNIFICANT CHANGE UP (ref 10–40)
BASOPHILS # BLD AUTO: 0 K/UL — SIGNIFICANT CHANGE UP (ref 0–0.2)
BILIRUB SERPL-MCNC: 0.5 MG/DL — SIGNIFICANT CHANGE UP (ref 0.2–1.2)
BILIRUB UR-MCNC: NEGATIVE — SIGNIFICANT CHANGE UP
BUN SERPL-MCNC: 28 MG/DL — HIGH (ref 7–23)
CALCIUM SERPL-MCNC: 9.1 MG/DL — SIGNIFICANT CHANGE UP (ref 8.4–10.5)
CHLORIDE SERPL-SCNC: 99 MMOL/L — SIGNIFICANT CHANGE UP (ref 96–108)
CO2 SERPL-SCNC: 25 MMOL/L — SIGNIFICANT CHANGE UP (ref 22–31)
COLOR SPEC: SIGNIFICANT CHANGE UP
CREAT SERPL-MCNC: 2.7 MG/DL — HIGH (ref 0.5–1.3)
DIFF PNL FLD: ABNORMAL
EOSINOPHIL # BLD AUTO: 0.1 K/UL — SIGNIFICANT CHANGE UP (ref 0–0.5)
GAS PNL BLDV: SIGNIFICANT CHANGE UP
GLUCOSE SERPL-MCNC: 149 MG/DL — HIGH (ref 70–99)
GLUCOSE UR QL: NEGATIVE — SIGNIFICANT CHANGE UP
HCT VFR BLD CALC: 33.6 % — LOW (ref 39–50)
HGB BLD-MCNC: 10.8 G/DL — LOW (ref 13–17)
INR BLD: 1.8 RATIO — HIGH (ref 0.88–1.16)
KETONES UR-MCNC: NEGATIVE — SIGNIFICANT CHANGE UP
LACTATE BLDV-MCNC: 1.2 MMOL/L — SIGNIFICANT CHANGE UP (ref 0.7–2)
LEUKOCYTE ESTERASE UR-ACNC: ABNORMAL
LYMPHOCYTES # BLD AUTO: 1.5 K/UL — SIGNIFICANT CHANGE UP (ref 1–3.3)
LYMPHOCYTES # BLD AUTO: 9 % — LOW (ref 13–44)
MCHC RBC-ENTMCNC: 29.8 PG — SIGNIFICANT CHANGE UP (ref 27–34)
MCHC RBC-ENTMCNC: 32.1 GM/DL — SIGNIFICANT CHANGE UP (ref 32–36)
MCV RBC AUTO: 92.8 FL — SIGNIFICANT CHANGE UP (ref 80–100)
MONOCYTES # BLD AUTO: 1.4 K/UL — HIGH (ref 0–0.9)
MONOCYTES NFR BLD AUTO: 5 % — SIGNIFICANT CHANGE UP (ref 2–14)
NEUTROPHILS # BLD AUTO: 18.4 K/UL — HIGH (ref 1.8–7.4)
NEUTROPHILS NFR BLD AUTO: 83 % — HIGH (ref 43–77)
NITRITE UR-MCNC: NEGATIVE — SIGNIFICANT CHANGE UP
PH UR: 6.5 — SIGNIFICANT CHANGE UP (ref 5–8)
PLATELET # BLD AUTO: 151 K/UL — SIGNIFICANT CHANGE UP (ref 150–400)
POTASSIUM SERPL-MCNC: 4.9 MMOL/L — SIGNIFICANT CHANGE UP (ref 3.5–5.3)
POTASSIUM SERPL-SCNC: 4.9 MMOL/L — SIGNIFICANT CHANGE UP (ref 3.5–5.3)
PROT SERPL-MCNC: 8.1 G/DL — SIGNIFICANT CHANGE UP (ref 6–8.3)
PROT UR-MCNC: ABNORMAL
PROTHROM AB SERPL-ACNC: 21.1 SEC — HIGH (ref 10–12.9)
RAPID RVP RESULT: SIGNIFICANT CHANGE UP
RBC # BLD: 3.62 M/UL — LOW (ref 4.2–5.8)
RBC # FLD: 14.1 % — SIGNIFICANT CHANGE UP (ref 10.3–14.5)
SODIUM SERPL-SCNC: 137 MMOL/L — SIGNIFICANT CHANGE UP (ref 135–145)
SP GR SPEC: 1.01 — SIGNIFICANT CHANGE UP (ref 1.01–1.02)
UROBILINOGEN FLD QL: NEGATIVE — SIGNIFICANT CHANGE UP
WBC # BLD: 21.3 K/UL — HIGH (ref 3.8–10.5)
WBC # FLD AUTO: 21.3 K/UL — HIGH (ref 3.8–10.5)

## 2019-04-06 PROCEDURE — 71045 X-RAY EXAM CHEST 1 VIEW: CPT | Mod: 26

## 2019-04-06 PROCEDURE — 99291 CRITICAL CARE FIRST HOUR: CPT

## 2019-04-06 PROCEDURE — 70450 CT HEAD/BRAIN W/O DYE: CPT | Mod: 26

## 2019-04-06 PROCEDURE — 99223 1ST HOSP IP/OBS HIGH 75: CPT | Mod: GC

## 2019-04-06 PROCEDURE — 93010 ELECTROCARDIOGRAM REPORT: CPT

## 2019-04-06 RX ORDER — APIXABAN 2.5 MG/1
2.5 TABLET, FILM COATED ORAL EVERY 12 HOURS
Qty: 0 | Refills: 0 | Status: DISCONTINUED | OUTPATIENT
Start: 2019-04-06 | End: 2019-04-15

## 2019-04-06 RX ORDER — PANTOPRAZOLE SODIUM 20 MG/1
40 TABLET, DELAYED RELEASE ORAL
Qty: 0 | Refills: 0 | Status: DISCONTINUED | OUTPATIENT
Start: 2019-04-06 | End: 2019-04-16

## 2019-04-06 RX ORDER — DEXAMETHASONE 0.5 MG/5ML
2 ELIXIR ORAL DAILY
Qty: 0 | Refills: 0 | Status: DISCONTINUED | OUTPATIENT
Start: 2019-04-06 | End: 2019-04-16

## 2019-04-06 RX ORDER — IPRATROPIUM/ALBUTEROL SULFATE 18-103MCG
3 AEROSOL WITH ADAPTER (GRAM) INHALATION EVERY 6 HOURS
Qty: 0 | Refills: 0 | Status: DISCONTINUED | OUTPATIENT
Start: 2019-04-06 | End: 2019-04-16

## 2019-04-06 RX ORDER — TENOFOVIR DISOPROXIL FUMARATE 300 MG/1
300 TABLET, FILM COATED ORAL
Qty: 0 | Refills: 0 | Status: DISCONTINUED | OUTPATIENT
Start: 2019-04-06 | End: 2019-04-16

## 2019-04-06 RX ORDER — CIPROFLOXACIN LACTATE 400MG/40ML
400 VIAL (ML) INTRAVENOUS ONCE
Qty: 0 | Refills: 0 | Status: COMPLETED | OUTPATIENT
Start: 2019-04-06 | End: 2019-04-06

## 2019-04-06 RX ORDER — SODIUM CHLORIDE 9 MG/ML
1000 INJECTION INTRAMUSCULAR; INTRAVENOUS; SUBCUTANEOUS
Qty: 0 | Refills: 0 | Status: DISCONTINUED | OUTPATIENT
Start: 2019-04-06 | End: 2019-04-07

## 2019-04-06 RX ORDER — HYDROMORPHONE HYDROCHLORIDE 2 MG/ML
1 INJECTION INTRAMUSCULAR; INTRAVENOUS; SUBCUTANEOUS EVERY 6 HOURS
Qty: 0 | Refills: 0 | Status: DISCONTINUED | OUTPATIENT
Start: 2019-04-06 | End: 2019-04-11

## 2019-04-06 RX ORDER — SODIUM CHLORIDE 9 MG/ML
1750 INJECTION INTRAMUSCULAR; INTRAVENOUS; SUBCUTANEOUS ONCE
Qty: 0 | Refills: 0 | Status: COMPLETED | OUTPATIENT
Start: 2019-04-06 | End: 2019-04-06

## 2019-04-06 RX ORDER — PIPERACILLIN AND TAZOBACTAM 4; .5 G/20ML; G/20ML
3.38 INJECTION, POWDER, LYOPHILIZED, FOR SOLUTION INTRAVENOUS ONCE
Qty: 0 | Refills: 0 | Status: COMPLETED | OUTPATIENT
Start: 2019-04-06 | End: 2019-04-06

## 2019-04-06 RX ORDER — LIDOCAINE 4 G/100G
1 CREAM TOPICAL ONCE
Qty: 0 | Refills: 0 | Status: COMPLETED | OUTPATIENT
Start: 2019-04-06 | End: 2019-04-06

## 2019-04-06 RX ORDER — TAMSULOSIN HYDROCHLORIDE 0.4 MG/1
0.4 CAPSULE ORAL AT BEDTIME
Qty: 0 | Refills: 0 | Status: DISCONTINUED | OUTPATIENT
Start: 2019-04-06 | End: 2019-04-16

## 2019-04-06 RX ORDER — HYDROMORPHONE HYDROCHLORIDE 2 MG/ML
2 INJECTION INTRAMUSCULAR; INTRAVENOUS; SUBCUTANEOUS EVERY 6 HOURS
Qty: 0 | Refills: 0 | Status: DISCONTINUED | OUTPATIENT
Start: 2019-04-06 | End: 2019-04-11

## 2019-04-06 RX ORDER — PIPERACILLIN AND TAZOBACTAM 4; .5 G/20ML; G/20ML
3.38 INJECTION, POWDER, LYOPHILIZED, FOR SOLUTION INTRAVENOUS EVERY 12 HOURS
Qty: 0 | Refills: 0 | Status: DISCONTINUED | OUTPATIENT
Start: 2019-04-06 | End: 2019-04-11

## 2019-04-06 RX ORDER — ACETAMINOPHEN 500 MG
650 TABLET ORAL ONCE
Qty: 0 | Refills: 0 | Status: COMPLETED | OUTPATIENT
Start: 2019-04-06 | End: 2019-04-06

## 2019-04-06 RX ORDER — VANCOMYCIN HCL 1 G
1000 VIAL (EA) INTRAVENOUS ONCE
Qty: 0 | Refills: 0 | Status: COMPLETED | OUTPATIENT
Start: 2019-04-06 | End: 2019-04-06

## 2019-04-06 RX ORDER — OXYCODONE HYDROCHLORIDE 5 MG/1
25 TABLET ORAL EVERY 12 HOURS
Qty: 0 | Refills: 0 | Status: DISCONTINUED | OUTPATIENT
Start: 2019-04-06 | End: 2019-04-13

## 2019-04-06 RX ADMIN — Medication 650 MILLIGRAM(S): at 08:41

## 2019-04-06 RX ADMIN — Medication 250 MILLIGRAM(S): at 15:15

## 2019-04-06 RX ADMIN — Medication 200 MILLIGRAM(S): at 11:39

## 2019-04-06 RX ADMIN — PIPERACILLIN AND TAZOBACTAM 200 GRAM(S): 4; .5 INJECTION, POWDER, LYOPHILIZED, FOR SOLUTION INTRAVENOUS at 09:46

## 2019-04-06 RX ADMIN — TAMSULOSIN HYDROCHLORIDE 0.4 MILLIGRAM(S): 0.4 CAPSULE ORAL at 22:30

## 2019-04-06 RX ADMIN — APIXABAN 2.5 MILLIGRAM(S): 2.5 TABLET, FILM COATED ORAL at 22:30

## 2019-04-06 RX ADMIN — SODIUM CHLORIDE 1750 MILLILITER(S): 9 INJECTION INTRAMUSCULAR; INTRAVENOUS; SUBCUTANEOUS at 09:45

## 2019-04-06 RX ADMIN — Medication 650 MILLIGRAM(S): at 09:40

## 2019-04-06 RX ADMIN — SODIUM CHLORIDE 75 MILLILITER(S): 9 INJECTION INTRAMUSCULAR; INTRAVENOUS; SUBCUTANEOUS at 17:21

## 2019-04-06 RX ADMIN — PIPERACILLIN AND TAZOBACTAM 25 GRAM(S): 4; .5 INJECTION, POWDER, LYOPHILIZED, FOR SOLUTION INTRAVENOUS at 17:21

## 2019-04-06 RX ADMIN — OXYCODONE HYDROCHLORIDE 25 MILLIGRAM(S): 5 TABLET ORAL at 17:22

## 2019-04-06 NOTE — H&P ADULT - NSICDXPASTMEDICALHX_GEN_ALL_CORE_FT
PAST MEDICAL HISTORY:  BPH (benign prostatic hyperplasia)     BPH (benign prostatic hypertrophy)     COPD (chronic obstructive pulmonary disease)     Diabetes     DM (diabetes mellitus)     GERD (gastroesophageal reflux disease)     HLD (hyperlipidemia)     HLD (hyperlipidemia)     HTN (hypertension)     Hypertension     Lung cancer

## 2019-04-06 NOTE — ED ADULT NURSE NOTE - OBJECTIVE STATEMENT
78 yr old male with son and wife from home by EMS with c/o lethargy, confusion since waking up this morning, d/jack from [facility] with dx: pna x 1 week ago, upon returning home, pt was mentating normally and able to get OOB to chair with assist, ate pureed foods and honey thickened water - fed by son, however this morning, pt was woken up by wife but not answering questions, staring instead of following commands, +febrile orally up to 100.5 but not treated, found to be too weak to get OOB to chair, at present A&Ox2 (person, : "december"), minimally conversive, following simple commands, grossly diminished breath sounds bilaterally, skin wdi, rectal temp: 101.5* F, abd soft nontender, no specific c/o, +hx stage 4 lung cancer - undergoing treatment, chinese speaking, son prefers to translate

## 2019-04-06 NOTE — H&P ADULT - PROBLEM SELECTOR PLAN 1
Patient with leukocytosis (WBC 21.3), fever at home, tachycardia, and suspected source (PNA).   - CXR showing persistent lower left lung consolidation.  - UA/UCx/BCx pending  - RVP negative  - Start vancomycin and zosyn Patient with leukocytosis (WBC 21.3), fever (101.5), tachycardia, and suspected source (PNA).   - CXR showing persistent lower left lung consolidation.  - UA/UCx/BCx pending  - RVP negative  - Start vancomycin and zosyn Patient with leukocytosis (WBC 21.3), fever (101.5), tachycardia, and suspected source (PNA). DDX: PNA vs UTI vs tumor fever  - CXR showing persistent lower left lung consolidation.  - UA/UCx/BCx pending  - RVP negative  - Vanc by level  - Zosyn Patient with leukocytosis (WBC 21.3), fever (101.5), tachycardia, and suspected source (PNA). DDX: PNA vs UTI vs tumor fever  - CXR showing persistent lower left lung consolidation.  - UCx/BCx pending  - Patient received 1.75L IVF, now on maintenance fluids  - Patient's lactate trended down from 2.2 --> 1.1 after fluid  - RVP negative  - Vanc by level  - Zosyn

## 2019-04-06 NOTE — H&P ADULT - PROBLEM SELECTOR PLAN 8
DVT PPx: Lovenox  GOC: DNR/DNI last admission, will confirm C/W Flomax Patient with DM on oral antihyperglyemics  - FS/ISS

## 2019-04-06 NOTE — H&P ADULT - PROBLEM SELECTOR PLAN 9
DVT PPx: Lovenox  GOC: DNR/DNI last admission, will confirm DVT PPx: Lovenox  GOC: DNR/DNI, MOLST in chart C/W Flomax

## 2019-04-06 NOTE — H&P ADULT - PROBLEM SELECTOR PLAN 2
Creatinine 2.7 from baseline 1.6 last month, likely in setting of dehydration 2/2 decreased PO intake  - IVF  - Renally dose nephrotoxic medications likely multifactoral 2/2 fever/dehydration/infection/LUX  c/w gentle IV hydration  zosyn+vancomycin as above  per son, pt's appears to be slowly returning to baseline mental status

## 2019-04-06 NOTE — ED PROVIDER NOTE - OBJECTIVE STATEMENT
78M BIB family for AMS/ Lethargy. Pt has hx of Lung CA stage 4, mets to brain EGFR + NSCLC (on Tagrisso, started 1/10/19), Hep B, BPH presenting with change in mental status this morning. Pt was admitted and treated for PNA 2 weeks ago. Since d/c pt's been weak, and lethargic. As of yesterday, pt was following direction, being fed pureed food. This morning, pt was confused, not following directions and weak. Found to have fever of 100.5 at home. No nausea, vomiting endorsed. + Chills. No diarrhea or constipation. Endorsed to tremors baseline. + cough with productive sputum.

## 2019-04-06 NOTE — H&P ADULT - NSHPREVIEWOFSYSTEMS_GEN_ALL_CORE
REVIEW OF SYSTEMS  General: no sweating; fever; chills; anorexia; weight loss: malaise  Skin/Breast: no rash; no itching; no dryness	  Ophthalmologic: no diplopia; no photophobia; no lacrimation L; no lacrimation R; No eye pain, no visual changes	  ENMT: no hearing difficulty; no ear pain; no tinnitus; no vertigo; no sinus symptoms: no throat pain  oral pain with lesions.  Respiratory and Thorax: no wheezing; no dyspnea; no cough; no hemoptysis, no sputum production, no stridor	  Cardiovascular: no chest pain; no palpitations; no dyspnea on exertion; no orthopnea, no pedal edema  Gastrointestinal: no nausea; no vomiting; no melena; no hematochezia; no jaundice; no diarrhea, no abdominal pain.   Genitourinary: no hematuria; no renal colic; no flank pain L; no flank pain R; no urine discoloration; no dysuria  Musculoskeletal: no arthralgia; no arthritis; no joint swelling; no myalgia  Neurological: no weakness; no headache; no loss of consciousness; no confusion  Psychiatric: no suicidal ideation; no depression; no anxiety; no insomnia  Hematology/Lymphatics: no gum bleeding; no nose bleeding; no skin lumps  Endocrine: No heat/cold intolerance, no polydipsia, or polyuria. REVIEW OF SYSTEMS  General: no sweating; fever; chills; anorexia; weight loss: malaise  Skin/Breast: no rash; no itching; no dryness	  Ophthalmologic: no diplopia; no photophobia; no lacrimation L; no lacrimation R; No eye pain, no visual changes	  ENMT: no hearing difficulty; no ear pain; no tinnitus; no vertigo; no sinus symptoms: no throat pain  oral pain with lesions.  Respiratory and Thorax: no wheezing; no dyspnea; +cough; no hemoptysis, no sputum production, no stridor	  Cardiovascular: no chest pain; no palpitations; no dyspnea on exertion; no orthopnea, no pedal edema  Gastrointestinal: no nausea; no vomiting; no melena; no hematochezia; no jaundice; no diarrhea, no abdominal pain.   Genitourinary: no hematuria; no renal colic; no flank pain L; no flank pain R; no urine discoloration; no dysuria  Musculoskeletal: no arthralgia; no arthritis; no joint swelling; no myalgia  Neurological: no weakness; no headache; no loss of consciousness; no confusion  Psychiatric: no suicidal ideation; no depression; no anxiety; no insomnia  Hematology/Lymphatics: no gum bleeding; no nose bleeding; no skin lumps  Endocrine: No heat/cold intolerance, no polydipsia, or polyuria. UNABLE TO OBTAIN ROS FROM PATIENT    ROS per son as above

## 2019-04-06 NOTE — H&P ADULT - PROBLEM SELECTOR PLAN 4
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 Norah Gross -- will contact

## 2019-04-06 NOTE — ED PROVIDER NOTE - CARE PLAN
Principal Discharge DX:	Sepsis  Secondary Diagnosis:	LUX (acute kidney injury)  Secondary Diagnosis:	PNA (pneumonia)  Secondary Diagnosis:	Dehydration

## 2019-04-06 NOTE — H&P ADULT - PROBLEM SELECTOR PLAN 3
Patient with stage 4 lung cancer with metastases to brain on Tagrisso, started 1/10/19)  - Oncologist is Dr. Norah Gross -- will contact Creatinine 2.7 from baseline 1.6 last month, likely in setting of dehydration 2/2 decreased PO intake  - IVF  - Renally dose nephrotoxic medications

## 2019-04-06 NOTE — H&P ADULT - PROBLEM SELECTOR PLAN 6
Patient with DM on oral antihyperglyemics  - FS/ISS Patient with HTN on metoprolol  - Hold in setting of possible sepsis Patient with hematuria on UA, also present last admission  - Currently Hb 10.8  - CBC q24H and transfuse if anemic

## 2019-04-06 NOTE — H&P ADULT - HISTORY OF PRESENT ILLNESS
78m with h/o stage 4 lung cancer with brain mets (on tagrisso, started 1/10/19), hep B, bph presenting with altered mental status. Patient admitted and treated for pneumonia two weeks ago. During hospital stay, patient made DNR/DNI. Since discharge, patient has been weak and lethargic. He was tolerating PO and following directions until yesterday, but was not responding to commands this AM. Per family, patient febrile to 100.5. Patient has cough at baseline due to lung cancer. 78m with h/o stage 4 lung cancer with brain mets (on tagrisso, started 1/10/19), hep B, bph presenting with altered mental status. Patient admitted and treated for pneumonia two weeks ago. During hospital stay, patient made DNR/DNI. Since discharge, patient has been weak and lethargic. He was tolerating PO and following directions until yesterday, but was not responding to commands this AM. Per family, patient febrile to 100.5. Patient has cough at baseline due to lung cancer.    In ED, patient afebrile, slightly tachycardic, otherwise VSS. 78m with h/o stage 4 lung cancer with brain mets (on tagrisso, started 1/10/19), hep B, bph presenting with altered mental status. Patient admitted and treated for pneumonia two weeks ago. During hospital stay, patient made DNR/DNI. Since discharge, patient has been weak and lethargic. He was tolerating PO and following directions until yesterday, but was not responding to commands this AM. Per family, patient febrile to 100.5. Patient has cough at baseline due to lung cancer.    In ED, patient febrile to 101.5, slightly tachycardic, otherwise VSS. 78m with h/o stage 4 lung cancer with brain mets (on tagrisso, started 1/10/19), hep B, bph presenting with altered mental status. Patient admitted and treated for pneumonia two weeks ago. During hospital stay, patient made DNR/DNI. Since discharge, patient has been weak and lethargic. He was tolerating PO and following directions until yesterday, but was not responding to commands this AM. Per family, patient febrile to 100.5. Patient has cough at baseline due to lung cancer.    In ED, patient febrile to 101.5, slightly tachycardic, Satting 95% on 4L NC

## 2019-04-06 NOTE — ED PROVIDER NOTE - ATTENDING CONTRIBUTION TO CARE
pt w lung ca met to brain, ams / weak  pt non-focal exam, drowsy.  suspect pna, suspect ich vs brain edema. check labs , pan cx, sepsis w/u. ct brain.

## 2019-04-06 NOTE — H&P ADULT - PROBLEM SELECTOR PLAN 5
Patient with HTN on metoprolol  - Hold in setting of possible sepsis Patient with hematuria on UA, also present last admission  - Currently Hb 10.8  - CBC q24H and transfuse if anemic Patient with COPD  - On O2 at home  - Currently on 4L NC, hypoxia not new  - Wally

## 2019-04-06 NOTE — H&P ADULT - NSHPPHYSICALEXAM_GEN_ALL_CORE
GENERAL: Ill-appearing  HEAD:  Atraumatic, Normocephalic  EYES: PERRL. EOMI,  conjunctiva and sclera clear.  NECK: Supple  CHEST/LUNG: Clear to auscultation bilaterally.  HEART: Regular rate and rhythm. Normal sounding S1, S2. No murmurs, rubs, or gallops  ABDOMEN: Soft, nontender, nondistended; Bowel sounds present.  EXTREMITIES:  No clubbing, cyanosis, or edema. Peripheral pulses 2+ and symmetric.  PSYCH: AAOx3  NEUROLOGY: non-focal  SKIN: No rashes or lesions GENERAL: Ill-appearing  HEAD:  Atraumatic, Normocephalic  EYES: PERRL. EOMI,  conjunctiva and sclera clear.  NECK: Supple  CHEST/LUNG: Clear to auscultation bilaterally.  HEART: Regular rate and rhythm. Normal sounding S1, S2. No murmurs, rubs, or gallops  ABDOMEN: Soft, nontender, nondistended; Bowel sounds present.  EXTREMITIES:  No clubbing, cyanosis, or edema. Peripheral pulses 2+ and symmetric. Capillary refill >2 seconds.  PSYCH: AAOx3  NEUROLOGY: non-focal  SKIN: No rashes or lesions

## 2019-04-06 NOTE — H&P ADULT - PROBLEM SELECTOR PLAN 7
C/W Flomax Patient with DM on oral antihyperglyemics  - FS/ISS Patient with HTN on metoprolol  - Hold in setting of possible sepsis

## 2019-04-06 NOTE — H&P ADULT - ATTENDING COMMENTS
Pt seen and examined with son at bedside. Briefly, 78M with h/o stage 4 lung cancer with brain mets (on Tagrisso, started 1/10/19),COPD on home O2, Hep B, BPH presenting with altered mental status. Of note he was recently admitted to [facility] (3/23- 3/29) for  pneumonia. Per his son, pt has remained weak and deconditioned since discharge with decreased PO intake. Yesterday pt was noted be confused and lethargic. He is aaox 3 at baseline. Per informant pt did not recognize him and appeared to be staring into space which prompted presentation to the ED. He denies sob, fever/chills, diarrhea, URI symptoms. Denies coughing while eating. Per son pt tolerates dysphagia diet at home. Also c/o right sided chest wall pain. Physical exam is notable for fever Tmax 101.5F, cachexia, NAD, O2 84% on room air, improves to 92% on 3L, decreased air entry b/l, no rhonchi, b/l UE resting tremors, abd is benign, no LE edema. +reproducible right chest wall tenderness. Labs notable for leukocytosis, hematuria, LUX. CXR shows interval decrease in left pleural effusion, persistent upper left lung mass and persistent lower left lung consolidation. EKG is unremarkable.  Assessment : Sepsis 2/2 PNA ( aspiration vs obstructive), ?UTI?tumour fever ; Metabolic encephalopathy likely multifactoral 2/2 fever/dehydration/infection/LUX  Plan : s/p adequate fluid resuscitation, c/w IV hydration, broadspectrum abx coverage, f/up bld cx/urine cx, pain control, aspiration precautions, hold Tagrisso and  beta blocker in light of ongoing sepsis; continue other home meds, monitor bmp. PT eval, DVT ppx

## 2019-04-06 NOTE — H&P ADULT - ASSESSMENT
78m with h/o stage 4 lung cancer with brain mets (on tagrisso, started 1/10/19), hep B, bph presenting with altered mental status likely secondary to sepsis.

## 2019-04-06 NOTE — ED PROVIDER NOTE - CLINICAL SUMMARY MEDICAL DECISION MAKING FREE TEXT BOX
78M hx of stage 4 lung CA on chemo, PNA 2 weeks ago with increased lethargy and change in mental status in the setting of fever - will do sepsis workup, CT head, Ammonia level, Abx for HCAP and admit.

## 2019-04-06 NOTE — ED PROVIDER NOTE - CRITICAL CARE PROVIDED
interpretation of diagnostic studies/documentation/additional history taking/consult w/ pt's family directly relating to pts condition/direct patient care (not related to procedure)

## 2019-04-06 NOTE — H&P ADULT - NSHPLABSRESULTS_GEN_ALL_CORE
10.8   21.3  )-----------( 151      ( 2019 09:00 )             33.6     -    137  |  99  |  28<H>  ----------------------------<  149<H>  4.9   |  25  |  2.70<H>    Ca    9.1      2019 09:00    TPro  8.1  /  Alb  2.7<L>  /  TBili  0.5  /  DBili  x   /  AST  28  /  ALT  10  /  AlkPhos  70          LIVER FUNCTIONS - ( 2019 09:00 )  Alb: 2.7 g/dL / Pro: 8.1 g/dL / ALK PHOS: 70 U/L / ALT: 10 U/L / AST: 28 U/L / GGT: x           PT/INR - ( 2019 09:46 )   PT: 21.1 sec;   INR: 1.80 ratio         PTT - ( 2019 09:46 )  PTT:38.4 sec  Urinalysis Basic - ( 2019 09:00 )    Color: BROWN / Appearance: Slightly Turbid / S.015 / pH: x  Gluc: x / Ketone: Negative  / Bili: Negative / Urobili: Negative   Blood: x / Protein: 100 mg/dL / Nitrite: Negative   Leuk Esterase: Small / RBC: 4362 /hpf / WBC 36 /HPF   Sq Epi: x / Non Sq Epi: 2 /hpf / Bacteria: Negative          Blood, Urine: Large ( @ 09:00)                            EKG:     RADIOLOGY STUDIES:    < from: Xray Chest 1 View AP/PA (19 @ 10:04) >    IMPRESSION:   Interval decrease in left pleural effusion.  Persistent upper left lung mass.  Persistent lower left lung consolidation.    < end of copied text >

## 2019-04-07 ENCOUNTER — TRANSCRIPTION ENCOUNTER (OUTPATIENT)
Age: 79
End: 2019-04-07

## 2019-04-07 LAB
ANION GAP SERPL CALC-SCNC: 14 MMOL/L — SIGNIFICANT CHANGE UP (ref 5–17)
BUN SERPL-MCNC: 27 MG/DL — HIGH (ref 7–23)
CALCIUM SERPL-MCNC: 8.1 MG/DL — LOW (ref 8.4–10.5)
CHLORIDE SERPL-SCNC: 100 MMOL/L — SIGNIFICANT CHANGE UP (ref 96–108)
CO2 SERPL-SCNC: 25 MMOL/L — SIGNIFICANT CHANGE UP (ref 22–31)
CREAT ?TM UR-MCNC: 129 MG/DL — SIGNIFICANT CHANGE UP
CREAT SERPL-MCNC: 2.89 MG/DL — HIGH (ref 0.5–1.3)
CULTURE RESULTS: SIGNIFICANT CHANGE UP
GLUCOSE SERPL-MCNC: 235 MG/DL — HIGH (ref 70–99)
HCT VFR BLD CALC: 28.5 % — LOW (ref 39–50)
HGB BLD-MCNC: 8.6 G/DL — LOW (ref 13–17)
MCHC RBC-ENTMCNC: 29.4 PG — SIGNIFICANT CHANGE UP (ref 27–34)
MCHC RBC-ENTMCNC: 30.2 GM/DL — LOW (ref 32–36)
MCV RBC AUTO: 97.3 FL — SIGNIFICANT CHANGE UP (ref 80–100)
PLATELET # BLD AUTO: 143 K/UL — LOW (ref 150–400)
POTASSIUM SERPL-MCNC: 3.9 MMOL/L — SIGNIFICANT CHANGE UP (ref 3.5–5.3)
POTASSIUM SERPL-SCNC: 3.9 MMOL/L — SIGNIFICANT CHANGE UP (ref 3.5–5.3)
RBC # BLD: 2.93 M/UL — LOW (ref 4.2–5.8)
RBC # FLD: 14.4 % — SIGNIFICANT CHANGE UP (ref 10.3–14.5)
SODIUM SERPL-SCNC: 139 MMOL/L — SIGNIFICANT CHANGE UP (ref 135–145)
SODIUM UR-SCNC: 52 MMOL/L — SIGNIFICANT CHANGE UP
SPECIMEN SOURCE: SIGNIFICANT CHANGE UP
VANCOMYCIN FLD-MCNC: 10.5 UG/ML — SIGNIFICANT CHANGE UP
WBC # BLD: 22.02 K/UL — HIGH (ref 3.8–10.5)
WBC # FLD AUTO: 22.02 K/UL — HIGH (ref 3.8–10.5)

## 2019-04-07 PROCEDURE — 99233 SBSQ HOSP IP/OBS HIGH 50: CPT | Mod: GC

## 2019-04-07 RX ORDER — SODIUM CHLORIDE 9 MG/ML
1000 INJECTION INTRAMUSCULAR; INTRAVENOUS; SUBCUTANEOUS
Qty: 0 | Refills: 0 | Status: DISCONTINUED | OUTPATIENT
Start: 2019-04-07 | End: 2019-04-09

## 2019-04-07 RX ORDER — VANCOMYCIN HCL 1 G
1250 VIAL (EA) INTRAVENOUS ONCE
Qty: 0 | Refills: 0 | Status: COMPLETED | OUTPATIENT
Start: 2019-04-07 | End: 2019-04-07

## 2019-04-07 RX ADMIN — SODIUM CHLORIDE 75 MILLILITER(S): 9 INJECTION INTRAMUSCULAR; INTRAVENOUS; SUBCUTANEOUS at 15:09

## 2019-04-07 RX ADMIN — APIXABAN 2.5 MILLIGRAM(S): 2.5 TABLET, FILM COATED ORAL at 22:48

## 2019-04-07 RX ADMIN — Medication 2 MILLIGRAM(S): at 06:48

## 2019-04-07 RX ADMIN — PIPERACILLIN AND TAZOBACTAM 25 GRAM(S): 4; .5 INJECTION, POWDER, LYOPHILIZED, FOR SOLUTION INTRAVENOUS at 06:47

## 2019-04-07 RX ADMIN — OXYCODONE HYDROCHLORIDE 25 MILLIGRAM(S): 5 TABLET ORAL at 06:48

## 2019-04-07 RX ADMIN — TENOFOVIR DISOPROXIL FUMARATE 300 MILLIGRAM(S): 300 TABLET, FILM COATED ORAL at 09:23

## 2019-04-07 RX ADMIN — PANTOPRAZOLE SODIUM 40 MILLIGRAM(S): 20 TABLET, DELAYED RELEASE ORAL at 06:48

## 2019-04-07 RX ADMIN — OXYCODONE HYDROCHLORIDE 25 MILLIGRAM(S): 5 TABLET ORAL at 19:20

## 2019-04-07 RX ADMIN — PIPERACILLIN AND TAZOBACTAM 25 GRAM(S): 4; .5 INJECTION, POWDER, LYOPHILIZED, FOR SOLUTION INTRAVENOUS at 18:35

## 2019-04-07 RX ADMIN — APIXABAN 2.5 MILLIGRAM(S): 2.5 TABLET, FILM COATED ORAL at 09:23

## 2019-04-07 RX ADMIN — OXYCODONE HYDROCHLORIDE 25 MILLIGRAM(S): 5 TABLET ORAL at 07:30

## 2019-04-07 RX ADMIN — TAMSULOSIN HYDROCHLORIDE 0.4 MILLIGRAM(S): 0.4 CAPSULE ORAL at 22:48

## 2019-04-07 RX ADMIN — OXYCODONE HYDROCHLORIDE 25 MILLIGRAM(S): 5 TABLET ORAL at 18:36

## 2019-04-07 RX ADMIN — Medication 166.67 MILLIGRAM(S): at 13:07

## 2019-04-07 NOTE — PROGRESS NOTE ADULT - PROBLEM SELECTOR PLAN 3
Creatinine 2.7 from baseline 1.6 last month, likely in setting of dehydration 2/2 decreased PO intake  - IVF  - Renally dose nephrotoxic medications

## 2019-04-07 NOTE — DISCHARGE NOTE PROVIDER - PROVIDER TOKENS
FREE:[LAST:[Billy],FIRST:[Susana],PHONE:[phone]],FAX:[(   )    -],ADDRESS:[Primary care doctor]]

## 2019-04-07 NOTE — DISCHARGE NOTE PROVIDER - HOSPITAL COURSE
HPI:    78m with h/o stage 4 lung cancer with brain mets (on tagrisso, started 1/10/19), hep B, bph presenting with altered mental status. Patient admitted and treated for pneumonia two weeks ago. During hospital stay, patient made DNR/DNI. Since discharge, patient has been weak and lethargic. He was tolerating PO and following directions until yesterday, but was not responding to commands this AM. Per family, patient febrile to 100.5. Patient has cough at baseline due to lung cancer.        In ED, patient febrile to 101.5, slightly tachycardic, Satting 95% on 4L NC (06 Apr 2019 12:01)        Hospital Course:    Patient responding well to fluid resuscitation, now back to baseline. Will need to be accepted to [facility]. HPI:    78m with h/o stage 4 lung cancer with brain mets (on tagrisso, started 1/10/19), hep B, bph presenting with altered mental status. Patient admitted and treated for pneumonia two weeks ago. During hospital stay, patient made DNR/DNI. Since discharge, patient has been weak and lethargic. He was tolerating PO and following directions until yesterday, but was not responding to commands this AM. Per family, patient febrile to 100.5. Patient has cough at baseline due to lung cancer.        In ED, patient febrile to 101.5, slightly tachycardic, Satting 95% on 4L NC (06 Apr 2019 12:01)        Hospital Course:    Patient responding well to fluid resuscitation, now back to baseline mental status. Still with LUX. Will need to be accepted to [facility] -- son Parmjit wants a specific rehab center. 78m with h/o stage 4 lung cancer with brain mets (on tagrisso, started 1/10/19), hep B, bph presenting with altered mental status likely secondary to sepsis 2/2 PNA, currently improving pending REGGIE.          Problem/Plan - 1:    ·  Problem: Debility.  Plan: - discharge planning to long term care facility    - Goal for Hospice transition afterwards     -  CM facilitating     -C/w PT as tolerated.         Problem/Plan - 2:    ·  Problem: Pain management.  Plan: -Pain related to lung cancer.     -c/w oxycontin 25mg BID    -c/w dilaudid PO 4mg q6h ATC, with breakthrough pain IV dilaudid 1mg q3h PRN. Hold for sedation or respiratory depression ; Titrating based on need.     -C/w lidocaine patch.          Problem/Plan - 3:    ·  Problem: Lung cancer.  Plan: Patient with stage 4 lung cancer on Tagrisso, started 1/10/19), currently per oncology, no longer offering further chemotherapy given current functional status, recommending hospice.     - Patient family aware of plan, pending REGGIE per family wish.          Problem/Plan - 4:    ·  Problem: Acute kidney injury superimposed on CKD.  Plan: Creatinine 2.7 from baseline 1.6 last month, likely in setting of dehydration 2/2 decreased PO intake    - Creatinine currently stabilized around 2.5, ? progression of CKD    - Renally dose nephrotoxic medications. Trend daily Cr.          Problem/Plan - 5:    ·  Problem: Aspiration pneumonia.  Plan: -Patient with hx of multiple admission of aspiration, currently admitted for sepsis likely 2/2 recurrent aspiration. Prior speech and swallow recommend dysphagia 1 diet.     -S/p 6 days of zosyn. 78m with h/o stage 4 lung cancer with brain mets (on tagrisso, started 1/10/19), hep B, bph presenting with altered mental status likely secondary to sepsis 2/2 PNA, currently improving pending REGGIE.              Debility.  - discharge planning to long term care facility    - Goal for Hospice transition afterwards         Pain management.  Plan: -Pain related to lung cancer.     -c/w oxycontin 25mg BID    -c/w dilaudid PO 4mg q6h ATC. Hold for sedation or respiratory depression ; Titrate based on need.     -C/w lidocaine patch.          Lung cancer.  Patient with stage 4 lung cancer on Tagrisso, started 1/10/19), currently per oncology, no longer offering further chemotherapy given current functional status, recommending hospice.         Acute kidney injury superimposed on CKD.  Creatinine 2.7 from baseline 1.6 last month, likely in setting of dehydration 2/2 decreased PO intake    - Creatinine currently stabilized around 2.5, ? progression of CKD    - Renally dose nephrotoxic medications. Trend daily Cr.         Aspiration pneumonia.  Plan: -Patient with hx of multiple admission of aspiration, currently admitted for sepsis likely 2/2 recurrent aspiration. Prior speech and swallow recommend dysphagia 1 diet.     -S/p 6 days of zosyn.         DVT ppx: on Eliquis    Pt had hx of PE several years ago and was started on Eliquis. Pt going to hospice and may not require ongoing AC.     Discussed with Hem/onc ; recc to stop Eliquis

## 2019-04-07 NOTE — PROGRESS NOTE ADULT - PROBLEM SELECTOR PLAN 6
Patient with hematuria on UA, also present last admission  - Currently Hb 10.8  - CBC q24H and transfuse if anemic

## 2019-04-07 NOTE — PROGRESS NOTE ADULT - PROBLEM SELECTOR PLAN 1
Patient with leukocytosis (WBC 21.3), fever (101.5), tachycardia, and suspected source (PNA). DDX: PNA vs UTI vs tumor fever  - CXR showing persistent lower left lung consolidation.  - UCx/BCx pending  - Patient received 1.75L IVF, now on maintenance fluids  - Patient's lactate trended down from 2.2 --> 1.1 after fluid  - RVP negative  - Vanc by level  - Zosyn

## 2019-04-07 NOTE — PROGRESS NOTE ADULT - SUBJECTIVE AND OBJECTIVE BOX
Solo Lowery, PGY-1  Internal Medicine  Pager:  [phone]    Patient is a 78y old  Male who presents with a chief complaint of AMS (2019 12:01)      SUBJECTIVE / OVERNIGHT EVENTS:   Patient seen and examined at bedside. No acute events overnight.     MEDICATIONS  (STANDING):  apixaban 2.5 milliGRAM(s) Oral every 12 hours  dexamethasone     Tablet 2 milliGRAM(s) Oral daily  lidocaine   Patch 1 Patch Transdermal once  oxyCODONE  ER Tablet 25 milliGRAM(s) Oral every 12 hours  pantoprazole    Tablet 40 milliGRAM(s) Oral before breakfast  piperacillin/tazobactam IVPB. 3.375 Gram(s) IV Intermittent every 12 hours  sodium chloride 0.9%. 1000 milliLiter(s) (75 mL/Hr) IV Continuous <Continuous>  tamsulosin 0.4 milliGRAM(s) Oral at bedtime  tenofovir disoproxil fumarate (VIREAD) 300 milliGRAM(s) Oral <User Schedule>    MEDICATIONS  (PRN):  ALBUTerol/ipratropium for Nebulization 3 milliLiter(s) Nebulizer every 6 hours PRN Shortness of Breath and/or Wheezing  HYDROmorphone   Tablet 2 milliGRAM(s) Oral every 6 hours PRN Moderate Pain (4 - 6)  HYDROmorphone  Injectable 1 milliGRAM(s) IV Push every 6 hours PRN Severe Pain (7 - 10)      CAPILLARY BLOOD GLUCOSE          I&O's Summary    2019 07:01  -  2019 07:00  --------------------------------------------------------  IN: 1020 mL / OUT: 0 mL / NET: 1020 mL        T(C): 36.7 (19 @ 04:16), Max: 38.6 (19 @ 08:00)  HR: 94 (19 @ 04:16) (94 - 101)  BP: 122/63 (19 @ 04:16) (110/59 - 133/74)  RR: 18 (19 @ 04:16) (18 - 20)  SpO2: 96% (04-07-19 @ 04:16) (92% - 96%)    PHYSICAL EXAM:  GENERAL: NAD, ill-appearing  HEAD:  Atraumatic, Normocephalic  EYES: PERRL. EOMI,  conjunctiva and sclera clear.  NECK: Supple  CHEST/LUNG: Clear to auscultation bilaterally.  HEART: Regular rate and rhythm. Normal sounding S1, S2. No murmurs, rubs, or gallops  ABDOMEN: Soft, nontender, nondistended; Bowel sounds present.  EXTREMITIES:  No clubbing, cyanosis, or edema. Peripheral pulses 2+ and symmetric.  PSYCH: AAOx3  NEUROLOGY: non-focal  SKIN: No rashes or lesions    LABS:                        10.8   21.3  )-----------( 151      ( 2019 09:00 )             33.6     WBC Trend: 21.3<--  04-    137  |  99  |  28<H>  ----------------------------<  149<H>  4.9   |  25  |  2.70<H>    Ca    9.1      2019 09:00    TPro  8.1  /  Alb  2.7<L>  /  TBili  0.5  /  DBili  x   /  AST  28  /  ALT  10  /  AlkPhos  70      Creatinine Trend: 2.70<--, 1.81<--, 1.65<--, 1.81<--, 1.85<--, 1.84<--  PT/INR - ( 2019 09:46 )   PT: 21.1 sec;   INR: 1.80 ratio         PTT - ( 2019 09:46 )  PTT:38.4 sec      Urinalysis Basic - ( 2019 09:00 )    Color: BROWN / Appearance: Slightly Turbid / S.015 / pH: x  Gluc: x / Ketone: Negative  / Bili: Negative / Urobili: Negative   Blood: x / Protein: 100 mg/dL / Nitrite: Negative   Leuk Esterase: Small / RBC: 4362 /hpf / WBC 36 /HPF   Sq Epi: x / Non Sq Epi: 2 /hpf / Bacteria: Negative        RADIOLOGY & ADDITIONAL TESTS:    Imaging Personally Reviewed:    Consultant(s) Notes Reviewed:      Care Discussed with Consultants/Other Providers:

## 2019-04-07 NOTE — PROGRESS NOTE ADULT - PROBLEM SELECTOR PLAN 4
Patient with stage 4 lung cancer with metastases to brain on Tagrisso, started 1/10/19)  - Oncologist is Dr. Norah Gross -- will contact

## 2019-04-07 NOTE — DISCHARGE NOTE PROVIDER - INSTRUCTIONS
Dysphagia 1 pureed- Honey consistency fluid Dysphagia 1 pureed- Honey consistency fluid  Ensure Enlive 2 can daily  Aspiration Precaution

## 2019-04-07 NOTE — DISCHARGE NOTE PROVIDER - NSDCFUADDINST_GEN_ALL_CORE_FT
Follow up with your Primary care doctor Follow up with your Primary care doctor  Follow up with your oncologist- Ginny Gipson. [phone]

## 2019-04-07 NOTE — DISCHARGE NOTE PROVIDER - NSDCCPCAREPLAN_GEN_ALL_CORE_FT
PRINCIPAL DISCHARGE DIAGNOSIS  Diagnosis: Debility  Assessment and Plan of Treatment: Likely from lung cancer and recurrent hospitalization.  Seen by Physical Therapy. Plan is for long term care facility      SECONDARY DISCHARGE DIAGNOSES  Diagnosis: Acute kidney injury superimposed on CKD  Assessment and Plan of Treatment: Creatinine currently stable    Diagnosis: Aspiration pneumonia  Assessment and Plan of Treatment: Completed course of antibiotics    Diagnosis: Lung cancer  Assessment and Plan of Treatment: continue with pain control.    Diagnosis: Diabetes  Assessment and Plan of Treatment: 4/11: HgbA1c- 6.4. Continue with your home diabetic medications PRINCIPAL DISCHARGE DIAGNOSIS  Diagnosis: Debility  Assessment and Plan of Treatment: Likely from lung cancer and recurrent hospitalization.  Seen by Physical Therapy. Plan is for long term care facility      SECONDARY DISCHARGE DIAGNOSES  Diagnosis: Acute kidney injury superimposed on CKD  Assessment and Plan of Treatment: Creatinine currently stable    Diagnosis: Aspiration pneumonia  Assessment and Plan of Treatment: Completed course of antibiotics  Aspiration precaution    Diagnosis: Lung cancer  Assessment and Plan of Treatment: continue with pain control.    Diagnosis: Diabetes  Assessment and Plan of Treatment: 4/11: HgbA1c- 6.4. Continue with your home diabetic medications

## 2019-04-07 NOTE — PROGRESS NOTE ADULT - PROBLEM SELECTOR PLAN 2
likely multifactoral 2/2 fever/dehydration/infection/LUX  - c/w gentle IV hydration  - zosyn+vancomycin as above  - per son, pt's appears to be slowly returning to baseline mental status

## 2019-04-07 NOTE — PROGRESS NOTE ADULT - ATTENDING COMMENTS
Pt seen and examined with family at bedside. No fever spikes overnight.  Mental status much improved. c/w zosyn/vancomycin. Aspiration precautions.  Bld cx negative ( only aerobic bottle sent from ED).   PT eval pending.

## 2019-04-08 ENCOUNTER — MESSAGE (OUTPATIENT)
Age: 79
End: 2019-04-08

## 2019-04-08 LAB
ANION GAP SERPL CALC-SCNC: 17 MMOL/L — SIGNIFICANT CHANGE UP (ref 5–17)
BUN SERPL-MCNC: 27 MG/DL — HIGH (ref 7–23)
CALCIUM SERPL-MCNC: 8 MG/DL — LOW (ref 8.4–10.5)
CHLORIDE SERPL-SCNC: 100 MMOL/L — SIGNIFICANT CHANGE UP (ref 96–108)
CO2 SERPL-SCNC: 22 MMOL/L — SIGNIFICANT CHANGE UP (ref 22–31)
CREAT SERPL-MCNC: 2.75 MG/DL — HIGH (ref 0.5–1.3)
GLUCOSE SERPL-MCNC: 182 MG/DL — HIGH (ref 70–99)
HCT VFR BLD CALC: 29.8 % — LOW (ref 39–50)
HGB BLD-MCNC: 9.1 G/DL — LOW (ref 13–17)
MCHC RBC-ENTMCNC: 28.7 PG — SIGNIFICANT CHANGE UP (ref 27–34)
MCHC RBC-ENTMCNC: 30.5 GM/DL — LOW (ref 32–36)
MCV RBC AUTO: 94 FL — SIGNIFICANT CHANGE UP (ref 80–100)
PLATELET # BLD AUTO: 162 K/UL — SIGNIFICANT CHANGE UP (ref 150–400)
POTASSIUM SERPL-MCNC: 4 MMOL/L — SIGNIFICANT CHANGE UP (ref 3.5–5.3)
POTASSIUM SERPL-SCNC: 4 MMOL/L — SIGNIFICANT CHANGE UP (ref 3.5–5.3)
RBC # BLD: 3.17 M/UL — LOW (ref 4.2–5.8)
RBC # FLD: 14 % — SIGNIFICANT CHANGE UP (ref 10.3–14.5)
SODIUM SERPL-SCNC: 139 MMOL/L — SIGNIFICANT CHANGE UP (ref 135–145)
VANCOMYCIN FLD-MCNC: 18.4 UG/ML — SIGNIFICANT CHANGE UP
VANCOMYCIN TROUGH SERPL-MCNC: 20.1 UG/ML — HIGH (ref 10–20)
WBC # BLD: 19.94 K/UL — HIGH (ref 3.8–10.5)
WBC # FLD AUTO: 19.94 K/UL — HIGH (ref 3.8–10.5)

## 2019-04-08 PROCEDURE — 76775 US EXAM ABDO BACK WALL LIM: CPT | Mod: 26

## 2019-04-08 PROCEDURE — 99233 SBSQ HOSP IP/OBS HIGH 50: CPT

## 2019-04-08 RX ORDER — LIDOCAINE 4 G/100G
1 CREAM TOPICAL DAILY
Qty: 0 | Refills: 0 | Status: DISCONTINUED | OUTPATIENT
Start: 2019-04-08 | End: 2019-04-16

## 2019-04-08 RX ORDER — VANCOMYCIN HCL 1 G
1250 VIAL (EA) INTRAVENOUS ONCE
Qty: 0 | Refills: 0 | Status: COMPLETED | OUTPATIENT
Start: 2019-04-08 | End: 2019-04-08

## 2019-04-08 RX ADMIN — TAMSULOSIN HYDROCHLORIDE 0.4 MILLIGRAM(S): 0.4 CAPSULE ORAL at 21:51

## 2019-04-08 RX ADMIN — OXYCODONE HYDROCHLORIDE 25 MILLIGRAM(S): 5 TABLET ORAL at 06:34

## 2019-04-08 RX ADMIN — OXYCODONE HYDROCHLORIDE 25 MILLIGRAM(S): 5 TABLET ORAL at 18:33

## 2019-04-08 RX ADMIN — Medication 30 MILLILITER(S): at 20:22

## 2019-04-08 RX ADMIN — APIXABAN 2.5 MILLIGRAM(S): 2.5 TABLET, FILM COATED ORAL at 21:51

## 2019-04-08 RX ADMIN — Medication 166.67 MILLIGRAM(S): at 17:38

## 2019-04-08 RX ADMIN — PANTOPRAZOLE SODIUM 40 MILLIGRAM(S): 20 TABLET, DELAYED RELEASE ORAL at 05:49

## 2019-04-08 RX ADMIN — PIPERACILLIN AND TAZOBACTAM 25 GRAM(S): 4; .5 INJECTION, POWDER, LYOPHILIZED, FOR SOLUTION INTRAVENOUS at 18:33

## 2019-04-08 RX ADMIN — OXYCODONE HYDROCHLORIDE 25 MILLIGRAM(S): 5 TABLET ORAL at 05:49

## 2019-04-08 RX ADMIN — Medication 2 MILLIGRAM(S): at 05:48

## 2019-04-08 RX ADMIN — PIPERACILLIN AND TAZOBACTAM 25 GRAM(S): 4; .5 INJECTION, POWDER, LYOPHILIZED, FOR SOLUTION INTRAVENOUS at 05:49

## 2019-04-08 RX ADMIN — APIXABAN 2.5 MILLIGRAM(S): 2.5 TABLET, FILM COATED ORAL at 09:26

## 2019-04-08 NOTE — CHART NOTE - NSCHARTNOTEFT_GEN_A_CORE
Spoke to son Parmjit regarding GOC, together with the patient's wife and patient himself. The son states that he understood that the patient currently is very weak. The patient has been getting different chemotherapy in the past three years with good response. The patient last follow up with Dr. Gross (oncologist) prior to coming to the hospital. The son is aware that Dr. Gross is currently not offering any chemotherapy given patient's current state, and recommending hospice evaluation. The son states that he have heard hospice in the past and has discussed with the other family members. Currently, the son is open to hospice eval Spoke to son Parmjit regarding GOC, together with the patient's wife and patient himself. The son states that he understood that the patient currently is very weak. The patient has been getting different chemotherapy in the past three years with good response. The patient last follow up with Dr. Gross (oncologist) prior to coming to the hospital. The son is aware that Dr. Gross is currently not offering any chemotherapy given patient's current state, and recommending hospice evaluation. The son states that he have heard hospice in the past and has discussed with the other family members. Currently, the son is open to hospice evaluation. Will reach out to hospice.     He Rachel - PGY2  Internal Medicine Resident

## 2019-04-08 NOTE — GOALS OF CARE CONVERSATION - PERSONAL ADVANCE DIRECTIVE - CONVERSATION DETAILS
[facility] RN Note    Met with pts son at bedside today. Pt was being transported to ultrasound and son wanted to go with him. RN rescheduled meeting for 10:30am on 4/9. Will follow up.      Deepika Brewer RN

## 2019-04-08 NOTE — PROGRESS NOTE ADULT - PROBLEM SELECTOR PLAN 2
likely multifactoral 2/2 fever/dehydration/infection/LUX  - c/w gentle IV hydration  - zosyn+vancomycin as above  - per son, pt's appears to be slowly returning to baseline mental status likely multifactoral 2/2 fever/dehydration/infection/LUX  - c/w gentle IV hydration  - zosyn+vancomycin as above  - per son, pt's appears to be at baseline mental status

## 2019-04-08 NOTE — PROGRESS NOTE ADULT - PROBLEM SELECTOR PLAN 4
Patient with stage 4 lung cancer with metastases to brain on Tagrisso, started 1/10/19)  - Oncologist is Dr. Norah Gross -- will contact Patient with stage 4 lung cancer with metastases to brain on Tagrisso, started 1/10/19)  - Oncologist is Dr. Norah Gross -- not offering further chemo due to progression of disease

## 2019-04-08 NOTE — PROGRESS NOTE ADULT - SUBJECTIVE AND OBJECTIVE BOX
Kayden Hilliard MD RENEE  Internal Medicine PGY-1  Pager [facility]: [phone]; [facility] [id number]    Patient is a 78y old  Male who presents with a chief complaint of AMS (2019 10:52)      SUBJECTIVE/OVERNIGHT EVENTS   No acute events overnight. Patient tolerating meals, without n/v. Reports having daily BM. Denies fevers, chills, SOB. ROS otherwise negative.     OBJECTIVE  Vital Signs Last 24 Hrs  T(C): 36.7 (2019 04:27), Max: 36.8 (2019 14:35)  T(F): 98.1 (2019 04:27), Max: 98.2 (2019 14:35)  HR: 84 (2019 04:27) (79 - 92)  BP: 119/64 (2019 04:27) (95/58 - 119/64)  BP(mean): --  RR: 18 (2019 04:27) (18 - 18)  SpO2: 97% (2019 04:27) (97% - 98%)    I&O's Summary    2019 07:01  -  2019 07:00  --------------------------------------------------------  IN: 2770 mL / OUT: 600 mL / NET: 2170 mL    PHYSICAL EXAM:  GENERAL: NAD, well-developed  HEAD:  Atraumatic, Normocephalic  EYES: EOMI, conjunctiva and sclera clear  NECK: Supple, No JVD  CHEST/LUNG: Clear to auscultation bilaterally; No wheeze  HEART: Regular rate and rhythm; No murmurs, rubs, or gallops  ABDOMEN: Soft, Nontender, Nondistended; Bowel sounds present  EXTREMITIES:  2+ Peripheral Pulses, No clubbing, cyanosis, or edema  PSYCH: AAOx3  NEUROLOGY: non-focal  SKIN: No rashes or lesions    LABS                        8.6    22.02 )-----------( 143      ( 2019 12:52 )             28.5                         10.8   21.3  )-----------( 151      ( 2019 09:00 )             33.6         139  |  100  |  27<H>  ----------------------------<  182<H>  4.0   |  22  |  2.75<H>      139  |  100  |  27<H>  ----------------------------<  235<H>  3.9   |  25  |  2.89<H>    Ca    8.0<L>      2019 07:01  Ca    8.1<L>      2019 13:00    TPro  8.1  /  Alb  2.7<L>  /  TBili  0.5  /  DBili  x   /  AST  28  /  ALT  10  /  AlkPhos  70  04-06    CAPILLARY BLOOD GLUCOSE        PT/INR - ( 2019 09:46 )   PT: 21.1 sec;   INR: 1.80 ratio         PTT - ( 2019 09:46 )  PTT:38.4 sec   28 Mar 2019 06:03 Troponin x     / CK 25 U/L / CKMB x     / CKMB Units 1.1 ng/mL    Urinalysis Basic - ( 2019 09:00 )    Color: BROWN / Appearance: Slightly Turbid / S.015 / pH: x  Gluc: x / Ketone: Negative  / Bili: Negative / Urobili: Negative   Blood: x / Protein: 100 mg/dL / Nitrite: Negative   Leuk Esterase: Small / RBC: 4362 /hpf / WBC 36 /HPF   Sq Epi: x / Non Sq Epi: 2 /hpf / Bacteria: Negative    MEDICATIONS  (STANDING):  apixaban 2.5 milliGRAM(s) Oral every 12 hours  dexamethasone     Tablet 2 milliGRAM(s) Oral daily  oxyCODONE  ER Tablet 25 milliGRAM(s) Oral every 12 hours  pantoprazole    Tablet 40 milliGRAM(s) Oral before breakfast  piperacillin/tazobactam IVPB. 3.375 Gram(s) IV Intermittent every 12 hours  sodium chloride 0.9%. 1000 milliLiter(s) (75 mL/Hr) IV Continuous <Continuous>  tamsulosin 0.4 milliGRAM(s) Oral at bedtime  tenofovir disoproxil fumarate (VIREAD) 300 milliGRAM(s) Oral <User Schedule>    MEDICATIONS  (PRN):  ALBUTerol/ipratropium for Nebulization 3 milliLiter(s) Nebulizer every 6 hours PRN Shortness of Breath and/or Wheezing  HYDROmorphone   Tablet 2 milliGRAM(s) Oral every 6 hours PRN Moderate Pain (4 - 6)  HYDROmorphone  Injectable 1 milliGRAM(s) IV Push every 6 hours PRN Severe Pain (7 - 10)  lidocaine   Patch 1 Patch Transdermal daily PRN pain Kayden Hilliard MD RENEE  Internal Medicine PGY-1  Pager [facility]: [phone]; [facility] [id number]    Patient is a 78y old  Male who presents with a chief complaint of AMS (2019 10:52)      SUBJECTIVE/OVERNIGHT EVENTS   No acute events overnight. Patient tolerating meals, without n/v. Endorsing RLE pain. Denies fevers, chills, SOB. ROS otherwise negative.     OBJECTIVE  Vital Signs Last 24 Hrs  T(C): 36.7 (2019 04:27), Max: 36.8 (2019 14:35)  T(F): 98.1 (2019 04:27), Max: 98.2 (2019 14:35)  HR: 84 (2019 04:27) (79 - 92)  BP: 119/64 (2019 04:27) (95/58 - 119/64)  BP(mean): --  RR: 18 (2019 04:27) (18 - 18)  SpO2: 97% (2019 04:27) (97% - 98%)    I&O's Summary    2019 07:01  -  2019 07:00  --------------------------------------------------------  IN: 2770 mL / OUT: 600 mL / NET: 2170 mL    PHYSICAL EXAM:  GENERAL: NAD  HEAD:  Atraumatic, Normocephalic  EYES: EOMI, conjunctiva and sclera clear  NECK: Supple, No JVD  CHEST/LUNG: Clear to auscultation bilaterally; No wheeze  HEART: Regular rate and rhythm; No murmurs, rubs, or gallops  ABDOMEN: Soft, Nontender, Nondistended; Bowel sounds present  EXTREMITIES:  2+ Peripheral Pulses, No clubbing, cyanosis, or edema  PSYCH: AAOx3  NEUROLOGY: non-focal  SKIN: No rashes or lesions    LABS                        8.6    22.02 )-----------( 143      ( 2019 12:52 )             28.5                         10.8   21.3  )-----------( 151      ( 2019 09:00 )             33.6         139  |  100  |  27<H>  ----------------------------<  182<H>  4.0   |  22  |  2.75<H>      139  |  100  |  27<H>  ----------------------------<  235<H>  3.9   |  25  |  2.89<H>    Ca    8.0<L>      2019 07:01  Ca    8.1<L>      2019 13:00    TPro  8.1  /  Alb  2.7<L>  /  TBili  0.5  /  DBili  x   /  AST  28  /  ALT  10  /  AlkPhos  70  04-06  PT/INR - ( 2019 09:46 )   PT: 21.1 sec;   INR: 1.80 ratio    PTT - ( 2019 09:46 )  PTT:38.4 sec   28 Mar 2019 06:03 Troponin x     / CK 25 U/L / CKMB x     / CKMB Units 1.1 ng/mL    Urinalysis Basic - ( 2019 09:00 )    Color: BROWN / Appearance: Slightly Turbid / S.015 / pH: x  Gluc: x / Ketone: Negative  / Bili: Negative / Urobili: Negative   Blood: x / Protein: 100 mg/dL / Nitrite: Negative   Leuk Esterase: Small / RBC: 4362 /hpf / WBC 36 /HPF   Sq Epi: x / Non Sq Epi: 2 /hpf / Bacteria: Negative    MEDICATIONS  (STANDING):  apixaban 2.5 milliGRAM(s) Oral every 12 hours  dexamethasone     Tablet 2 milliGRAM(s) Oral daily  oxyCODONE  ER Tablet 25 milliGRAM(s) Oral every 12 hours  pantoprazole    Tablet 40 milliGRAM(s) Oral before breakfast  piperacillin/tazobactam IVPB. 3.375 Gram(s) IV Intermittent every 12 hours  sodium chloride 0.9%. 1000 milliLiter(s) (75 mL/Hr) IV Continuous <Continuous>  tamsulosin 0.4 milliGRAM(s) Oral at bedtime  tenofovir disoproxil fumarate (VIREAD) 300 milliGRAM(s) Oral <User Schedule>    MEDICATIONS  (PRN):  ALBUTerol/ipratropium for Nebulization 3 milliLiter(s) Nebulizer every 6 hours PRN Shortness of Breath and/or Wheezing  HYDROmorphone   Tablet 2 milliGRAM(s) Oral every 6 hours PRN Moderate Pain (4 - 6)  HYDROmorphone  Injectable 1 milliGRAM(s) IV Push every 6 hours PRN Severe Pain (7 - 10)  lidocaine   Patch 1 Patch Transdermal daily PRN pain

## 2019-04-08 NOTE — PROGRESS NOTE ADULT - ATTENDING COMMENTS
seen, examined the patient  - Sitting in bed, very weak, remains afebrile    Reviewed labs, imaging  - On IV Zosyn/Vanco for Pna- LLL/sepsis, likely aspiration  - No plan from Oncology given progression of disease (stage 4 Ca Lung, brain mets)    Recommended Hospice consult  - spoke to SonYanet Parnell at bedside, agrees with [facility]  - DNR

## 2019-04-08 NOTE — PROGRESS NOTE ADULT - ASSESSMENT
78m with h/o stage 4 lung cancer with brain mets (on tagrisso, started 1/10/19), hep B, bph presenting with altered mental status likely secondary to sepsis 2/2 PNA vs UTI

## 2019-04-08 NOTE — PROGRESS NOTE ADULT - PROBLEM SELECTOR PLAN 10
DVT PPx: Lovenox  GOC: DNR/DNI, MOLST in chart DVT PPx: Lovenox  GOC: DNR/DNI, MOLST in chart, Hospice consulted will see patient

## 2019-04-08 NOTE — PROGRESS NOTE ADULT - PROBLEM SELECTOR PLAN 3
Creatinine 2.7 from baseline 1.6 last month, likely in setting of dehydration 2/2 decreased PO intake  - IVF  - Renally dose nephrotoxic medications Creatinine 2.7 from baseline 1.6 last month, likely in setting of dehydration 2/2 decreased PO intake  - c/w IVF  - Renally dose nephrotoxic medications

## 2019-04-08 NOTE — PROGRESS NOTE ADULT - PROBLEM SELECTOR PLAN 1
Patient with leukocytosis (WBC 21.3), fever (101.5), tachycardia, and suspected source (PNA). DDX: PNA vs UTI vs tumor fever  - CXR showing persistent lower left lung consolidation.  - UCx/BCx pending  - Patient received 1.75L IVF, now on maintenance fluids  - Patient's lactate trended down from 2.2 --> 1.1 after fluid  - RVP negative  - Vanc by level  - Zosyn Patient with leukocytosis (WBC 21.3), fever (101.5), tachycardia, and suspected source (PNA). DDX: PNA vs UTI vs tumor fever  - CXR showing persistent lower left lung consolidation HCAP   - UCx/BCx pending  - Patient received 1.75L IVF, now on maintenance fluids  - Patient's lactate trended down from 2.2 --> 1.1 after fluid  - RVP negative  - c/w Vanc by level  - Zosyn

## 2019-04-09 LAB
ALBUMIN SERPL ELPH-MCNC: 2.2 G/DL — LOW (ref 3.3–5)
ALP SERPL-CCNC: 58 U/L — SIGNIFICANT CHANGE UP (ref 40–120)
ALT FLD-CCNC: 9 U/L — LOW (ref 10–45)
ANION GAP SERPL CALC-SCNC: 15 MMOL/L — SIGNIFICANT CHANGE UP (ref 5–17)
AST SERPL-CCNC: 16 U/L — SIGNIFICANT CHANGE UP (ref 10–40)
BASOPHILS # BLD AUTO: 0.04 K/UL — SIGNIFICANT CHANGE UP (ref 0–0.2)
BASOPHILS NFR BLD AUTO: 0.2 % — SIGNIFICANT CHANGE UP (ref 0–2)
BILIRUB SERPL-MCNC: 0.2 MG/DL — SIGNIFICANT CHANGE UP (ref 0.2–1.2)
BLD GP AB SCN SERPL QL: NEGATIVE — SIGNIFICANT CHANGE UP
BUN SERPL-MCNC: 28 MG/DL — HIGH (ref 7–23)
CALCIUM SERPL-MCNC: 8.1 MG/DL — LOW (ref 8.4–10.5)
CHLORIDE SERPL-SCNC: 99 MMOL/L — SIGNIFICANT CHANGE UP (ref 96–108)
CO2 SERPL-SCNC: 25 MMOL/L — SIGNIFICANT CHANGE UP (ref 22–31)
CREAT SERPL-MCNC: 2.59 MG/DL — HIGH (ref 0.5–1.3)
EOSINOPHIL # BLD AUTO: 0.03 K/UL — SIGNIFICANT CHANGE UP (ref 0–0.5)
EOSINOPHIL NFR BLD AUTO: 0.2 % — SIGNIFICANT CHANGE UP (ref 0–6)
GLUCOSE SERPL-MCNC: 106 MG/DL — HIGH (ref 70–99)
HCT VFR BLD CALC: 27.6 % — LOW (ref 39–50)
HGB BLD-MCNC: 8.6 G/DL — LOW (ref 13–17)
IMM GRANULOCYTES NFR BLD AUTO: 0.6 % — SIGNIFICANT CHANGE UP (ref 0–1.5)
LYMPHOCYTES # BLD AUTO: 1.54 K/UL — SIGNIFICANT CHANGE UP (ref 1–3.3)
LYMPHOCYTES # BLD AUTO: 8.6 % — LOW (ref 13–44)
MAGNESIUM SERPL-MCNC: 1.6 MG/DL — SIGNIFICANT CHANGE UP (ref 1.6–2.6)
MCHC RBC-ENTMCNC: 28.7 PG — SIGNIFICANT CHANGE UP (ref 27–34)
MCHC RBC-ENTMCNC: 31.2 GM/DL — LOW (ref 32–36)
MCV RBC AUTO: 92 FL — SIGNIFICANT CHANGE UP (ref 80–100)
MONOCYTES # BLD AUTO: 0.96 K/UL — HIGH (ref 0–0.9)
MONOCYTES NFR BLD AUTO: 5.4 % — SIGNIFICANT CHANGE UP (ref 2–14)
NEUTROPHILS # BLD AUTO: 15.26 K/UL — HIGH (ref 1.8–7.4)
NEUTROPHILS NFR BLD AUTO: 85 % — HIGH (ref 43–77)
PHOSPHATE SERPL-MCNC: 2.6 MG/DL — SIGNIFICANT CHANGE UP (ref 2.5–4.5)
PLATELET # BLD AUTO: 172 K/UL — SIGNIFICANT CHANGE UP (ref 150–400)
POTASSIUM SERPL-MCNC: 3.7 MMOL/L — SIGNIFICANT CHANGE UP (ref 3.5–5.3)
POTASSIUM SERPL-SCNC: 3.7 MMOL/L — SIGNIFICANT CHANGE UP (ref 3.5–5.3)
PROT SERPL-MCNC: 6.3 G/DL — SIGNIFICANT CHANGE UP (ref 6–8.3)
RBC # BLD: 3 M/UL — LOW (ref 4.2–5.8)
RBC # FLD: 14 % — SIGNIFICANT CHANGE UP (ref 10.3–14.5)
RH IG SCN BLD-IMP: POSITIVE — SIGNIFICANT CHANGE UP
SODIUM SERPL-SCNC: 139 MMOL/L — SIGNIFICANT CHANGE UP (ref 135–145)
WBC # BLD: 17.93 K/UL — HIGH (ref 3.8–10.5)
WBC # FLD AUTO: 17.93 K/UL — HIGH (ref 3.8–10.5)

## 2019-04-09 PROCEDURE — 99233 SBSQ HOSP IP/OBS HIGH 50: CPT

## 2019-04-09 RX ORDER — MAGNESIUM OXIDE 400 MG ORAL TABLET 241.3 MG
400 TABLET ORAL ONCE
Qty: 0 | Refills: 0 | Status: COMPLETED | OUTPATIENT
Start: 2019-04-09 | End: 2019-04-09

## 2019-04-09 RX ADMIN — TAMSULOSIN HYDROCHLORIDE 0.4 MILLIGRAM(S): 0.4 CAPSULE ORAL at 21:10

## 2019-04-09 RX ADMIN — Medication 30 MILLILITER(S): at 09:28

## 2019-04-09 RX ADMIN — HYDROMORPHONE HYDROCHLORIDE 2 MILLIGRAM(S): 2 INJECTION INTRAMUSCULAR; INTRAVENOUS; SUBCUTANEOUS at 20:23

## 2019-04-09 RX ADMIN — OXYCODONE HYDROCHLORIDE 25 MILLIGRAM(S): 5 TABLET ORAL at 05:49

## 2019-04-09 RX ADMIN — LIDOCAINE 1 PATCH: 4 CREAM TOPICAL at 09:19

## 2019-04-09 RX ADMIN — TENOFOVIR DISOPROXIL FUMARATE 300 MILLIGRAM(S): 300 TABLET, FILM COATED ORAL at 09:19

## 2019-04-09 RX ADMIN — PIPERACILLIN AND TAZOBACTAM 25 GRAM(S): 4; .5 INJECTION, POWDER, LYOPHILIZED, FOR SOLUTION INTRAVENOUS at 05:51

## 2019-04-09 RX ADMIN — Medication 2 MILLIGRAM(S): at 05:50

## 2019-04-09 RX ADMIN — HYDROMORPHONE HYDROCHLORIDE 1 MILLIGRAM(S): 2 INJECTION INTRAMUSCULAR; INTRAVENOUS; SUBCUTANEOUS at 15:21

## 2019-04-09 RX ADMIN — HYDROMORPHONE HYDROCHLORIDE 1 MILLIGRAM(S): 2 INJECTION INTRAMUSCULAR; INTRAVENOUS; SUBCUTANEOUS at 15:50

## 2019-04-09 RX ADMIN — OXYCODONE HYDROCHLORIDE 25 MILLIGRAM(S): 5 TABLET ORAL at 17:24

## 2019-04-09 RX ADMIN — APIXABAN 2.5 MILLIGRAM(S): 2.5 TABLET, FILM COATED ORAL at 21:10

## 2019-04-09 RX ADMIN — OXYCODONE HYDROCHLORIDE 25 MILLIGRAM(S): 5 TABLET ORAL at 06:26

## 2019-04-09 RX ADMIN — LIDOCAINE 1 PATCH: 4 CREAM TOPICAL at 19:41

## 2019-04-09 RX ADMIN — MAGNESIUM OXIDE 400 MG ORAL TABLET 400 MILLIGRAM(S): 241.3 TABLET ORAL at 09:28

## 2019-04-09 RX ADMIN — LIDOCAINE 1 PATCH: 4 CREAM TOPICAL at 21:20

## 2019-04-09 RX ADMIN — APIXABAN 2.5 MILLIGRAM(S): 2.5 TABLET, FILM COATED ORAL at 09:28

## 2019-04-09 RX ADMIN — HYDROMORPHONE HYDROCHLORIDE 2 MILLIGRAM(S): 2 INJECTION INTRAMUSCULAR; INTRAVENOUS; SUBCUTANEOUS at 21:07

## 2019-04-09 RX ADMIN — PIPERACILLIN AND TAZOBACTAM 25 GRAM(S): 4; .5 INJECTION, POWDER, LYOPHILIZED, FOR SOLUTION INTRAVENOUS at 17:25

## 2019-04-09 RX ADMIN — PANTOPRAZOLE SODIUM 40 MILLIGRAM(S): 20 TABLET, DELAYED RELEASE ORAL at 05:50

## 2019-04-09 NOTE — PROGRESS NOTE ADULT - PROBLEM SELECTOR PLAN 10
DVT PPx: Lovenox  GOC: DNR/DNI, MOLST in chart, Hospice consulted will see patient DVT PPx: Lovenox  GOC: DNR/DNI, MOLST in chart, Hospice pending review

## 2019-04-09 NOTE — CDI QUERY NOTE - NSCDIOTHERTXTBX_GEN_ALL_CORE_HH
Clinical evidence indicates that the patient may have a more specific nutritional diagnosis.      Mild protein calorie malnutrition/ 1st degree    Moderate protein calorie malnutrition/ 2nd degree    Severe protein calorie malnutrition/ 3rd degree    Nutritional marasmus    Other (specify)    Unknown      Supporting Documentation and/or Clinical Evidence:    - notes document pt w/cachexia  - has lung cancer w/mets to brain     BMI  BMI (kg/m2): 18.6 (04-06 @ 07:31)    Dietician initial evaluation note:  - not ordered    Supplement orders: ( i.e. Parenteral nutrition, TPN, Multivitamins)  - pt receiving Ensure PO supplement w/meals      Tube feedings:       Diet:  Diet, Dysphagia 1 Pureed-Honey Consistency Fluid:   Supplement Feeding Modality:  Oral  Ensure Enlive Servings Per Day:  2       Frequency:  Daily (04-06-19 @ 17:21)  -

## 2019-04-09 NOTE — PROGRESS NOTE ADULT - PROBLEM SELECTOR PLAN 1
Patient with leukocytosis (WBC 21.3), fever (101.5), tachycardia, and suspected source (PNA). DDX: PNA vs UTI vs tumor fever  - CXR showing persistent lower left lung consolidation HCAP   - UCx/BCx pending  - Patient received 1.75L IVF, now on maintenance fluids  - Patient's lactate trended down from 2.2 --> 1.1 after fluid  - RVP negative  - c/w Vanc by level  - Zosyn Resolved.  Patient with leukocytosis (WBC 21.3), fever (101.5), tachycardia, and suspected source (PNA). DDX: PNA vs UTI vs tumor fever  - CXR showing persistent lower left lung consolidation HCAP   - UCx/BCx NGTD  - RVP negative  - d/c Vanc s/p 3 days of abx  - Zosyn Resolved.  Patient with leukocytosis (WBC 21.3), fever (101.5), tachycardia, and suspected source (PNA). DDX: PNA vs UTI vs tumor fever  - CXR showing persistent lower left lung consolidation HCAP   - UCx/BCx NGTD  - RVP negative  - d/c Vanc s/p 3 days of abx  - Zosyn continued for possible aspiration pna

## 2019-04-09 NOTE — PHYSICAL THERAPY INITIAL EVALUATION ADULT - PERTINENT HX OF CURRENT PROBLEM, REHAB EVAL
HCT 4/6/18 no acute event , moderae white matter microvascular dz ; US Renal : increase cortical echogeniocity , Bilateral pleural effusion;  GOALS OF CARE CONVERSATION TODAY W/ FAMILY  ; Home Hospice referral HCT 4/6/18 no acute event , moderate white matter microvascular dz ; US Renal : increase cortical echogeniocity , Bilateral pleural effusion;  GOALS OF CARE CONVERSATION TODAY W/ FAMILY  ; Home Hospice referral

## 2019-04-09 NOTE — PHYSICAL THERAPY INITIAL EVALUATION ADULT - IMPAIRMENTS CONTRIBUTING TO GAIT DEVIATIONS, PT EVAL
impaired balance/decreased endurance , min-mod unsteady , pt easily distracted need to redirect w/ chinese  via free phone service/decreased strength/impaired postural control/cognition

## 2019-04-09 NOTE — PROGRESS NOTE ADULT - PROBLEM SELECTOR PLAN 2
likely multifactoral 2/2 fever/dehydration/infection/LUX  - c/w gentle IV hydration  - zosyn+vancomycin as above  - per son, pt's appears to be at baseline mental status Resolved, at baseline mental status per son  likely multifactoral 2/2 fever/dehydration/infection/LUX  - improved s/p gentle IV hydration  - per son, pt's appears to be at baseline mental status

## 2019-04-09 NOTE — PHYSICAL THERAPY INITIAL EVALUATION ADULT - GENERAL OBSERVATIONS, REHAB EVAL
pt received in bed reports pain shoulders , upper traps , down arms and generalized pain all over , R knee ; [place] [present pt had pain meds at 9 am , pain patch placed R knee recent and now pt request additional pain meds , pt to get dilaudid iv; pt recent oob in chair for a couple of hrs recent back to bed with min assist of 1 per PCA Colette chair to bed ,pt defer oob at this time pt received in bed reports pain shoulders , upper traps , down arms and generalized pain all over , R knee ; [place] [present pt had pain meds at 9 am , pain patch placed R knee recent and now pt request additional pain meds , pt to get dilaudid iv; pt recent oob in chair for a couple of hrs recent back to bed with min assist of 1 per PCA Colette chair to bed ,pt defer oob at this time; 4/15/19 pt received in bed top rails up and 1 siderail HOB 40 bed alarm active , pt report feeling cold PT got second gown for pt , pt wanted pants and put on in sitting on eob with cg of 1 , pt reports wanting pain meds but had 30 min ago per [place] generalized pain all over

## 2019-04-09 NOTE — PHYSICAL THERAPY INITIAL EVALUATION ADULT - REFERRING PHYSICIAN, REHAB EVAL
Khan , Mohsinuzzaman MD ORDER PT EVAL AND TREAT ; amb w/ assist ; pt speaks Fugilese, Cantonese, Mandarin and some English

## 2019-04-09 NOTE — GOALS OF CARE CONVERSATION - PERSONAL ADVANCE DIRECTIVE - CONVERSATION DETAILS
[facility] RN Note    Met with pts son/hcp Parmjit. Explained hcn services. He signed consents, and pt was approved for home hospice. Pts son is unsure if he wants to take pt home or go to SNF ([facility] [place]). No DME ordered as yet. Will f/u.    Deepika Brewer RN

## 2019-04-09 NOTE — PHYSICAL THERAPY INITIAL EVALUATION ADULT - ACTIVE RANGE OF MOTION EXAMINATION, REHAB EVAL
aarom Sh flex aarom wfl's R , rest arom wfl's ; aarom wfl's le's/bilateral upper extremity Active ROM was WFL (within functional limits)/bilateral  lower extremity Active ROM was WFL (within functional limits)

## 2019-04-09 NOTE — PHYSICAL THERAPY INITIAL EVALUATION ADULT - PRECAUTIONS/LIMITATIONS, REHAB EVAL
Briefly, 78M with h/o stage 4 lung cancer with brain mets (on Tagrisso, started 1/10/19),COPD on home O2, Hep B, BPH presenting with altered mental status. Of note he was recently admitted to [facility] (3/23- 3/29) for  pneumonia. Per his son, pt has remained weak and deconditioned since discharge with decreased PO intake. Yesterday pt was noted be confused and lethargic. He is aaox 3 at baseline. Per informant pt did not recognize him and appeared to be staring into space which prompted presentation to the ED. He denies sob, fever/chills, diarrhea, URI symptoms. Denies coughing while eating. Per son pt tolerates dysphagia diet at home. Also c/o right sided chest wall pain. Physical exam is notable for fever Tmax 101.5F, cachexia, NAD, O2 84% on room air, improves to 92% on 3L, decreased air entry b/l, no rhonchi, b/l UE resting tremors, abd is benign, no LE edema. +reproducible right chest wall tenderness. Labs notable for leukocytosis, hematuria, LUX. CXR shows interval decrease in left pleural effusion, persistent upper left lung mass and persistent lower left lung consolidation. EKG is unremarkable. fall precautions/Briefly, 78M with h/o stage 4 lung cancer with brain mets (on Tagrisso, started 1/10/19),COPD on home O2, Hep B, BPH presenting with altered mental status. Of note he was recently admitted to [facility] (3/23- 3/29) for  pneumonia. Per his son, pt has remained weak and deconditioned since discharge with decreased PO intake. Yesterday pt was noted be confused and lethargic. He is aaox 3 at baseline. Per informant pt did not recognize him and appeared to be staring into space which prompted presentation to the ED. He denies sob, fever/chills, diarrhea, URI symptoms. Denies coughing while eating. Per son pt tolerates dysphagia diet at home. Also c/o right sided chest wall pain. Physical exam is notable for fever Tmax 101.5F, cachexia, NAD, O2 84% on room air, improves to 92% on 3L, decreased air entry b/l, no rhonchi, b/l UE resting tremors, abd is benign, no LE edema. +reproducible right chest wall tenderness. Labs notable for leukocytosis, hematuria, LUX. CXR shows interval decrease in left pleural effusion, persistent upper left lung mass and persistent lower left lung consolidation. EKG is unremarkable. fall precautions/aspiration precautions/Briefly, 78M with h/o stage 4 lung cancer with brain mets (on Tagrisso, started 1/10/19),COPD on home O2, Hep B, BPH presenting with altered mental status. Of note he was recently admitted to [facility] (3/23- 3/29) for  pneumonia. Per his son, pt has remained weak and deconditioned since discharge with decreased PO intake. Yesterday pt was noted be confused and lethargic. He is aaox 3 at baseline. Per informant pt did not recognize him and appeared to be staring into space which prompted presentation to the ED. He denies sob, fever/chills, diarrhea, URI symptoms. Denies coughing while eating. Per son pt tolerates dysphagia diet at home. Also c/o right sided chest wall pain. Physical exam is notable for fever Tmax 101.5F, cachexia, NAD, O2 84% on room air, improves to 92% on 3L, decreased air entry b/l, no rhonchi, b/l UE resting tremors, abd is benign, no LE edema. +reproducible right chest wall tenderness. Labs notable for leukocytosis, hematuria, LUX. CXR shows interval decrease in left pleural effusion, persistent upper left lung mass and persistent lower left lung consolidation. EKG is unremarkable.

## 2019-04-09 NOTE — PHYSICAL THERAPY INITIAL EVALUATION ADULT - TRANSFER SAFETY CONCERNS NOTED: SIT/STAND, REHAB EVAL
losing balance/decreased weight-shifting ability/decreased balance during turns/decreased safety awareness/decreased step length

## 2019-04-09 NOTE — PROGRESS NOTE ADULT - PROBLEM SELECTOR PLAN 3
Creatinine 2.7 from baseline 1.6 last month, likely in setting of dehydration 2/2 decreased PO intake  - c/w IVF  - Renally dose nephrotoxic medications Creatinine 2.7 from baseline 1.6 last month, likely in setting of dehydration 2/2 decreased PO intake  - Creatinine downtrending   - Renally dose nephrotoxic medications

## 2019-04-09 NOTE — PHYSICAL THERAPY INITIAL EVALUATION ADULT - DISCHARGE DISPOSITION, PT EVAL
TBD once functional eval can be completed TBD once functional eval can be completed; 4/15/19 prior PT recommend LTC , dw/ cm Beraliza re case plan for LTC placement w/ PT based on prognosis , CM working w/ family re and transition to hospice care

## 2019-04-09 NOTE — PHYSICAL THERAPY INITIAL EVALUATION ADULT - GAIT DEVIATIONS NOTED, PT EVAL
increased time in double stance/decreased stride length/decreased step length/decreased jl/decreased weight-shifting ability

## 2019-04-09 NOTE — PHYSICAL THERAPY INITIAL EVALUATION ADULT - IMPAIRMENTS FOUND, PT EVAL
aerobic capacity/endurance/joint integrity and mobility/muscle strength/pain limiting factor pain limiting factor/gait, locomotion, and balance/aerobic capacity/endurance/cognitive impairment/joint integrity and mobility/sensory integrity/muscle strength

## 2019-04-09 NOTE — PROGRESS NOTE ADULT - ATTENDING COMMENTS
seen, examined the patient  - Sitting in bed, very weak, remains afebrile    Reviewed labs, imaging  - On IV Zosyn for Pna- LLL/sepsis, likely aspiration, on dysphagia diet, aspiration precaution  - Needed Oncology input for progression of disease (stage 4 Ca Lung, brain mets)    Hospice consult called  - spoke to SonYanet Parnell at bedside, agrees with [facility]  - DNR.

## 2019-04-09 NOTE — PHYSICAL THERAPY INITIAL EVALUATION ADULT - ADDITIONAL COMMENTS
pt lives in house with spouse and son Parmjit (Parmjit Sainz Id as caregiver [phone]) ; pt has 13 steps with rail to enter then 10 steps with rail to bedroom level ; pt semi dependent w/ adl's and personal care and pt uses rolling walker to amb ; pt has a working glucometer and independent with , pt has Home O2 on 3 L:

## 2019-04-09 NOTE — PHYSICAL THERAPY INITIAL EVALUATION ADULT - MANUAL MUSCLE TESTING RESULTS, REHAB EVAL
R shoulder 3-/5 , rest ue's 3/5 ; RLE 3-/5 , L LE 2+ /5 hip/knee , 3-/5 L foot/ankle R shoulder 3-/5 , rest ue's 3/5 ; RLE 3-/5 , L LE 2+ /5 hip/knee , 3-/5 L foot/ankle; 4/15/19 le's 3/5

## 2019-04-09 NOTE — PHYSICAL THERAPY INITIAL EVALUATION ADULT - IMPAIRED TRANSFERS: SIT/STAND, REHAB EVAL
impaired postural control/impaired balance/decreased strength/cognition/decreased endurance , min -mod unsteady

## 2019-04-09 NOTE — PROGRESS NOTE ADULT - SUBJECTIVE AND OBJECTIVE BOX
Kayden Hilliard MD RENEE  Internal Medicine PGY-1  Pager [facility]: [phone]; [facility] [id number]    Patient is a 78y old  Male who presents with a chief complaint of AMS (08 Apr 2019 08:00)      SUBJECTIVE/OVERNIGHT EVENTS   No acute events overnight. Patient tolerating meals, without n/v. Reports having daily BM. Denies fevers, chills, SOB. ROS otherwise negative.     OBJECTIVE  Vital Signs Last 24 Hrs  T(C): 36.4 (09 Apr 2019 05:04), Max: 36.9 (08 Apr 2019 20:29)  T(F): 97.5 (09 Apr 2019 05:04), Max: 98.4 (08 Apr 2019 20:29)  HR: 70 (09 Apr 2019 05:04) (70 - 74)  BP: 131/76 (09 Apr 2019 05:04) (97/58 - 131/76)  BP(mean): --  RR: 18 (09 Apr 2019 05:04) (18 - 18)  SpO2: 93% (09 Apr 2019 05:04) (93% - 97%)    I&O's Summary    08 Apr 2019 07:01  -  09 Apr 2019 07:00  --------------------------------------------------------  IN: 1570 mL / OUT: 1220 mL / NET: 350 mL    PHYSICAL EXAM:  GENERAL: NAD, well-developed  HEAD:  Atraumatic, Normocephalic  EYES: EOMI, conjunctiva and sclera clear  NECK: Supple, No JVD  CHEST/LUNG: Clear to auscultation bilaterally; No wheeze  HEART: Regular rate and rhythm; No murmurs, rubs, or gallops  ABDOMEN: Soft, Nontender, Nondistended; Bowel sounds present  EXTREMITIES:  2+ Peripheral Pulses, No clubbing, cyanosis, or edema  PSYCH: AAOx3  NEUROLOGY: non-focal  SKIN: No rashes or lesions    LABS                        9.1    19.94 )-----------( 162      ( 08 Apr 2019 08:54 )             29.8                         8.6    22.02 )-----------( 143      ( 07 Apr 2019 12:52 )             28.5     04-09    139  |  99  |  28<H>  ----------------------------<  106<H>  3.7   |  25  |  2.59<H>  04-08    139  |  100  |  27<H>  ----------------------------<  182<H>  4.0   |  22  |  2.75<H>    Ca    8.1<L>      09 Apr 2019 06:43  Ca    8.0<L>      08 Apr 2019 07:01  Phos  2.6     04-09  Mg     1.6     04-09    TPro  6.3  /  Alb  2.2<L>  /  TBili  0.2  /  DBili  x   /  AST  16  /  ALT  9<L>  /  AlkPhos  58  04-09   28 Mar 2019 06:03 Troponin x     / CK 25 U/L / CKMB x     / CKMB Units 1.1 ng/mL    RADIOLOGY & ADDITIONAL TESTS:  < from: US Renal (04.08.19 @ 16:33) >  IMPRESSION:     Increased cortical echogenicity, compatible with medical renal disease.   No hydronephrosis. Bilateral renal cysts.    Bilateral pleural effusions.    < end of copied text >    MEDICATIONS  (STANDING):  apixaban 2.5 milliGRAM(s) Oral every 12 hours  dexamethasone     Tablet 2 milliGRAM(s) Oral daily  oxyCODONE  ER Tablet 25 milliGRAM(s) Oral every 12 hours  pantoprazole    Tablet 40 milliGRAM(s) Oral before breakfast  piperacillin/tazobactam IVPB. 3.375 Gram(s) IV Intermittent every 12 hours  tamsulosin 0.4 milliGRAM(s) Oral at bedtime  tenofovir disoproxil fumarate (VIREAD) 300 milliGRAM(s) Oral <User Schedule>    MEDICATIONS  (PRN):  ALBUTerol/ipratropium for Nebulization 3 milliLiter(s) Nebulizer every 6 hours PRN Shortness of Breath and/or Wheezing  aluminum hydroxide/magnesium hydroxide/simethicone Suspension 30 milliLiter(s) Oral every 4 hours PRN Dyspepsia  HYDROmorphone   Tablet 2 milliGRAM(s) Oral every 6 hours PRN Moderate Pain (4 - 6)  HYDROmorphone  Injectable 1 milliGRAM(s) IV Push every 6 hours PRN Severe Pain (7 - 10)  lidocaine   Patch 1 Patch Transdermal daily PRN pain Kayden Hilliard MD RENEE  Internal Medicine PGY-1  Pager [facility]: [phone]; [facility] [id number]    Patient is a 78y old  Male who presents with a chief complaint of AMS (08 Apr 2019 08:00)      SUBJECTIVE/OVERNIGHT EVENTS   No acute events overnight. Patient appears more lethargic today. Denies fevers, chills, SOB. ROS otherwise negative.     OBJECTIVE  Vital Signs Last 24 Hrs  T(C): 36.4 (09 Apr 2019 05:04), Max: 36.9 (08 Apr 2019 20:29)  T(F): 97.5 (09 Apr 2019 05:04), Max: 98.4 (08 Apr 2019 20:29)  HR: 70 (09 Apr 2019 05:04) (70 - 74)  BP: 131/76 (09 Apr 2019 05:04) (97/58 - 131/76)  BP(mean): --  RR: 18 (09 Apr 2019 05:04) (18 - 18)  SpO2: 93% (09 Apr 2019 05:04) (93% - 97%)    I&O's Summary    08 Apr 2019 07:01  -  09 Apr 2019 07:00  --------------------------------------------------------  IN: 1570 mL / OUT: 1220 mL / NET: 350 mL    PHYSICAL EXAM:  GENERAL: NAD, lethargic   HEAD:  Atraumatic, Normocephalic  EYES: EOMI, conjunctiva and sclera clear  NECK: Supple, No JVD  CHEST/LUNG: Clear to auscultation bilaterally; No wheeze  HEART: Regular rate and rhythm; No murmurs, rubs, or gallops  ABDOMEN: Soft, Nontender, Nondistended; Bowel sounds present  EXTREMITIES:  palapable Peripheral Pulses, No clubbing, cyanosis, or edema  PSYCH: AAOx3  NEUROLOGY: non-focal  SKIN: No rashes or lesions    LABS                        9.1    19.94 )-----------( 162      ( 08 Apr 2019 08:54 )             29.8                         8.6    22.02 )-----------( 143      ( 07 Apr 2019 12:52 )             28.5     04-09    139  |  99  |  28<H>  ----------------------------<  106<H>  3.7   |  25  |  2.59<H>  04-08    139  |  100  |  27<H>  ----------------------------<  182<H>  4.0   |  22  |  2.75<H>    Ca    8.1<L>      09 Apr 2019 06:43  Ca    8.0<L>      08 Apr 2019 07:01  Phos  2.6     04-09  Mg     1.6     04-09    TPro  6.3  /  Alb  2.2<L>  /  TBili  0.2  /  DBili  x   /  AST  16  /  ALT  9<L>  /  AlkPhos  58  04-09   28 Mar 2019 06:03 Troponin x     / CK 25 U/L / CKMB x     / CKMB Units 1.1 ng/mL    RADIOLOGY & ADDITIONAL TESTS:  < from: US Renal (04.08.19 @ 16:33) >  IMPRESSION:     Increased cortical echogenicity, compatible with medical renal disease.   No hydronephrosis. Bilateral renal cysts.    Bilateral pleural effusions.    < end of copied text >    MEDICATIONS  (STANDING):  apixaban 2.5 milliGRAM(s) Oral every 12 hours  dexamethasone     Tablet 2 milliGRAM(s) Oral daily  oxyCODONE  ER Tablet 25 milliGRAM(s) Oral every 12 hours  pantoprazole    Tablet 40 milliGRAM(s) Oral before breakfast  piperacillin/tazobactam IVPB. 3.375 Gram(s) IV Intermittent every 12 hours  tamsulosin 0.4 milliGRAM(s) Oral at bedtime  tenofovir disoproxil fumarate (VIREAD) 300 milliGRAM(s) Oral <User Schedule>    MEDICATIONS  (PRN):  ALBUTerol/ipratropium for Nebulization 3 milliLiter(s) Nebulizer every 6 hours PRN Shortness of Breath and/or Wheezing  aluminum hydroxide/magnesium hydroxide/simethicone Suspension 30 milliLiter(s) Oral every 4 hours PRN Dyspepsia  HYDROmorphone   Tablet 2 milliGRAM(s) Oral every 6 hours PRN Moderate Pain (4 - 6)  HYDROmorphone  Injectable 1 milliGRAM(s) IV Push every 6 hours PRN Severe Pain (7 - 10)  lidocaine   Patch 1 Patch Transdermal daily PRN pain

## 2019-04-10 LAB
ANION GAP SERPL CALC-SCNC: 10 MMOL/L — SIGNIFICANT CHANGE UP (ref 5–17)
BASOPHILS # BLD AUTO: 0.02 K/UL — SIGNIFICANT CHANGE UP (ref 0–0.2)
BASOPHILS NFR BLD AUTO: 0.1 % — SIGNIFICANT CHANGE UP (ref 0–2)
BUN SERPL-MCNC: 30 MG/DL — HIGH (ref 7–23)
CALCIUM SERPL-MCNC: 8.3 MG/DL — LOW (ref 8.4–10.5)
CHLORIDE SERPL-SCNC: 102 MMOL/L — SIGNIFICANT CHANGE UP (ref 96–108)
CO2 SERPL-SCNC: 26 MMOL/L — SIGNIFICANT CHANGE UP (ref 22–31)
CREAT SERPL-MCNC: 2.59 MG/DL — HIGH (ref 0.5–1.3)
EOSINOPHIL # BLD AUTO: 0.03 K/UL — SIGNIFICANT CHANGE UP (ref 0–0.5)
EOSINOPHIL NFR BLD AUTO: 0.2 % — SIGNIFICANT CHANGE UP (ref 0–6)
GLUCOSE SERPL-MCNC: 128 MG/DL — HIGH (ref 70–99)
HCT VFR BLD CALC: 28.8 % — LOW (ref 39–50)
HGB BLD-MCNC: 9 G/DL — LOW (ref 13–17)
IMM GRANULOCYTES NFR BLD AUTO: 0.4 % — SIGNIFICANT CHANGE UP (ref 0–1.5)
LYMPHOCYTES # BLD AUTO: 1.47 K/UL — SIGNIFICANT CHANGE UP (ref 1–3.3)
LYMPHOCYTES # BLD AUTO: 10.7 % — LOW (ref 13–44)
MAGNESIUM SERPL-MCNC: 1.8 MG/DL — SIGNIFICANT CHANGE UP (ref 1.6–2.6)
MCHC RBC-ENTMCNC: 29.2 PG — SIGNIFICANT CHANGE UP (ref 27–34)
MCHC RBC-ENTMCNC: 31.3 GM/DL — LOW (ref 32–36)
MCV RBC AUTO: 93.5 FL — SIGNIFICANT CHANGE UP (ref 80–100)
MONOCYTES # BLD AUTO: 0.75 K/UL — SIGNIFICANT CHANGE UP (ref 0–0.9)
MONOCYTES NFR BLD AUTO: 5.5 % — SIGNIFICANT CHANGE UP (ref 2–14)
NEUTROPHILS # BLD AUTO: 11.36 K/UL — HIGH (ref 1.8–7.4)
NEUTROPHILS NFR BLD AUTO: 83.1 % — HIGH (ref 43–77)
PHOSPHATE SERPL-MCNC: 2.6 MG/DL — SIGNIFICANT CHANGE UP (ref 2.5–4.5)
PLATELET # BLD AUTO: 170 K/UL — SIGNIFICANT CHANGE UP (ref 150–400)
POTASSIUM SERPL-MCNC: 4.1 MMOL/L — SIGNIFICANT CHANGE UP (ref 3.5–5.3)
POTASSIUM SERPL-SCNC: 4.1 MMOL/L — SIGNIFICANT CHANGE UP (ref 3.5–5.3)
RBC # BLD: 3.08 M/UL — LOW (ref 4.2–5.8)
RBC # FLD: 13.8 % — SIGNIFICANT CHANGE UP (ref 10.3–14.5)
SODIUM SERPL-SCNC: 138 MMOL/L — SIGNIFICANT CHANGE UP (ref 135–145)
WBC # BLD: 13.69 K/UL — HIGH (ref 3.8–10.5)
WBC # FLD AUTO: 13.69 K/UL — HIGH (ref 3.8–10.5)

## 2019-04-10 RX ADMIN — PIPERACILLIN AND TAZOBACTAM 25 GRAM(S): 4; .5 INJECTION, POWDER, LYOPHILIZED, FOR SOLUTION INTRAVENOUS at 17:14

## 2019-04-10 RX ADMIN — HYDROMORPHONE HYDROCHLORIDE 2 MILLIGRAM(S): 2 INJECTION INTRAMUSCULAR; INTRAVENOUS; SUBCUTANEOUS at 16:30

## 2019-04-10 RX ADMIN — TAMSULOSIN HYDROCHLORIDE 0.4 MILLIGRAM(S): 0.4 CAPSULE ORAL at 22:19

## 2019-04-10 RX ADMIN — PANTOPRAZOLE SODIUM 40 MILLIGRAM(S): 20 TABLET, DELAYED RELEASE ORAL at 06:09

## 2019-04-10 RX ADMIN — HYDROMORPHONE HYDROCHLORIDE 2 MILLIGRAM(S): 2 INJECTION INTRAMUSCULAR; INTRAVENOUS; SUBCUTANEOUS at 09:00

## 2019-04-10 RX ADMIN — APIXABAN 2.5 MILLIGRAM(S): 2.5 TABLET, FILM COATED ORAL at 09:49

## 2019-04-10 RX ADMIN — OXYCODONE HYDROCHLORIDE 25 MILLIGRAM(S): 5 TABLET ORAL at 17:15

## 2019-04-10 RX ADMIN — HYDROMORPHONE HYDROCHLORIDE 2 MILLIGRAM(S): 2 INJECTION INTRAMUSCULAR; INTRAVENOUS; SUBCUTANEOUS at 15:46

## 2019-04-10 RX ADMIN — LIDOCAINE 1 PATCH: 4 CREAM TOPICAL at 22:18

## 2019-04-10 RX ADMIN — APIXABAN 2.5 MILLIGRAM(S): 2.5 TABLET, FILM COATED ORAL at 22:19

## 2019-04-10 RX ADMIN — PIPERACILLIN AND TAZOBACTAM 25 GRAM(S): 4; .5 INJECTION, POWDER, LYOPHILIZED, FOR SOLUTION INTRAVENOUS at 06:11

## 2019-04-10 RX ADMIN — LIDOCAINE 1 PATCH: 4 CREAM TOPICAL at 11:09

## 2019-04-10 RX ADMIN — HYDROMORPHONE HYDROCHLORIDE 1 MILLIGRAM(S): 2 INJECTION INTRAMUSCULAR; INTRAVENOUS; SUBCUTANEOUS at 11:45

## 2019-04-10 RX ADMIN — OXYCODONE HYDROCHLORIDE 25 MILLIGRAM(S): 5 TABLET ORAL at 06:13

## 2019-04-10 RX ADMIN — Medication 2 MILLIGRAM(S): at 06:09

## 2019-04-10 RX ADMIN — OXYCODONE HYDROCHLORIDE 25 MILLIGRAM(S): 5 TABLET ORAL at 18:45

## 2019-04-10 RX ADMIN — HYDROMORPHONE HYDROCHLORIDE 2 MILLIGRAM(S): 2 INJECTION INTRAMUSCULAR; INTRAVENOUS; SUBCUTANEOUS at 08:23

## 2019-04-10 RX ADMIN — HYDROMORPHONE HYDROCHLORIDE 1 MILLIGRAM(S): 2 INJECTION INTRAMUSCULAR; INTRAVENOUS; SUBCUTANEOUS at 11:17

## 2019-04-10 RX ADMIN — LIDOCAINE 1 PATCH: 4 CREAM TOPICAL at 19:00

## 2019-04-11 DIAGNOSIS — R53.81 OTHER MALAISE: ICD-10-CM

## 2019-04-11 DIAGNOSIS — R52 PAIN, UNSPECIFIED: ICD-10-CM

## 2019-04-11 DIAGNOSIS — N17.9 ACUTE KIDNEY FAILURE, UNSPECIFIED: ICD-10-CM

## 2019-04-11 DIAGNOSIS — J69.0 PNEUMONITIS DUE TO INHALATION OF FOOD AND VOMIT: ICD-10-CM

## 2019-04-11 LAB
ANION GAP SERPL CALC-SCNC: 12 MMOL/L — SIGNIFICANT CHANGE UP (ref 5–17)
BUN SERPL-MCNC: 30 MG/DL — HIGH (ref 7–23)
CALCIUM SERPL-MCNC: 8.6 MG/DL — SIGNIFICANT CHANGE UP (ref 8.4–10.5)
CHLORIDE SERPL-SCNC: 99 MMOL/L — SIGNIFICANT CHANGE UP (ref 96–108)
CO2 SERPL-SCNC: 28 MMOL/L — SIGNIFICANT CHANGE UP (ref 22–31)
CREAT SERPL-MCNC: 2.51 MG/DL — HIGH (ref 0.5–1.3)
CULTURE RESULTS: SIGNIFICANT CHANGE UP
CULTURE RESULTS: SIGNIFICANT CHANGE UP
GLUCOSE SERPL-MCNC: 104 MG/DL — HIGH (ref 70–99)
HBA1C BLD-MCNC: 6.4 % — HIGH (ref 4–5.6)
HCT VFR BLD CALC: 28.4 % — LOW (ref 39–50)
HGB BLD-MCNC: 9 G/DL — LOW (ref 13–17)
MCHC RBC-ENTMCNC: 29.1 PG — SIGNIFICANT CHANGE UP (ref 27–34)
MCHC RBC-ENTMCNC: 31.7 GM/DL — LOW (ref 32–36)
MCV RBC AUTO: 91.9 FL — SIGNIFICANT CHANGE UP (ref 80–100)
PLATELET # BLD AUTO: 191 K/UL — SIGNIFICANT CHANGE UP (ref 150–400)
POTASSIUM SERPL-MCNC: 3.8 MMOL/L — SIGNIFICANT CHANGE UP (ref 3.5–5.3)
POTASSIUM SERPL-SCNC: 3.8 MMOL/L — SIGNIFICANT CHANGE UP (ref 3.5–5.3)
RBC # BLD: 3.09 M/UL — LOW (ref 4.2–5.8)
RBC # FLD: 13.6 % — SIGNIFICANT CHANGE UP (ref 10.3–14.5)
SODIUM SERPL-SCNC: 139 MMOL/L — SIGNIFICANT CHANGE UP (ref 135–145)
SPECIMEN SOURCE: SIGNIFICANT CHANGE UP
SPECIMEN SOURCE: SIGNIFICANT CHANGE UP
WBC # BLD: 12.12 K/UL — HIGH (ref 3.8–10.5)
WBC # FLD AUTO: 12.12 K/UL — HIGH (ref 3.8–10.5)

## 2019-04-11 PROCEDURE — 99233 SBSQ HOSP IP/OBS HIGH 50: CPT

## 2019-04-11 RX ORDER — DOCUSATE SODIUM 100 MG
100 CAPSULE ORAL THREE TIMES A DAY
Qty: 0 | Refills: 0 | Status: DISCONTINUED | OUTPATIENT
Start: 2019-04-11 | End: 2019-04-16

## 2019-04-11 RX ORDER — HYDROMORPHONE HYDROCHLORIDE 2 MG/ML
1 INJECTION INTRAMUSCULAR; INTRAVENOUS; SUBCUTANEOUS
Qty: 0 | Refills: 0 | Status: DISCONTINUED | OUTPATIENT
Start: 2019-04-11 | End: 2019-04-16

## 2019-04-11 RX ORDER — HYDROMORPHONE HYDROCHLORIDE 2 MG/ML
4 INJECTION INTRAMUSCULAR; INTRAVENOUS; SUBCUTANEOUS EVERY 4 HOURS
Qty: 0 | Refills: 0 | Status: DISCONTINUED | OUTPATIENT
Start: 2019-04-11 | End: 2019-04-11

## 2019-04-11 RX ORDER — HYDROMORPHONE HYDROCHLORIDE 2 MG/ML
4 INJECTION INTRAMUSCULAR; INTRAVENOUS; SUBCUTANEOUS EVERY 6 HOURS
Qty: 0 | Refills: 0 | Status: DISCONTINUED | OUTPATIENT
Start: 2019-04-11 | End: 2019-04-16

## 2019-04-11 RX ORDER — SENNA PLUS 8.6 MG/1
2 TABLET ORAL AT BEDTIME
Qty: 0 | Refills: 0 | Status: DISCONTINUED | OUTPATIENT
Start: 2019-04-11 | End: 2019-04-16

## 2019-04-11 RX ADMIN — HYDROMORPHONE HYDROCHLORIDE 4 MILLIGRAM(S): 2 INJECTION INTRAMUSCULAR; INTRAVENOUS; SUBCUTANEOUS at 23:24

## 2019-04-11 RX ADMIN — LIDOCAINE 1 PATCH: 4 CREAM TOPICAL at 19:00

## 2019-04-11 RX ADMIN — Medication 30 MILLILITER(S): at 05:07

## 2019-04-11 RX ADMIN — OXYCODONE HYDROCHLORIDE 25 MILLIGRAM(S): 5 TABLET ORAL at 17:11

## 2019-04-11 RX ADMIN — PANTOPRAZOLE SODIUM 40 MILLIGRAM(S): 20 TABLET, DELAYED RELEASE ORAL at 05:07

## 2019-04-11 RX ADMIN — OXYCODONE HYDROCHLORIDE 25 MILLIGRAM(S): 5 TABLET ORAL at 05:06

## 2019-04-11 RX ADMIN — APIXABAN 2.5 MILLIGRAM(S): 2.5 TABLET, FILM COATED ORAL at 09:24

## 2019-04-11 RX ADMIN — HYDROMORPHONE HYDROCHLORIDE 2 MILLIGRAM(S): 2 INJECTION INTRAMUSCULAR; INTRAVENOUS; SUBCUTANEOUS at 09:00

## 2019-04-11 RX ADMIN — HYDROMORPHONE HYDROCHLORIDE 2 MILLIGRAM(S): 2 INJECTION INTRAMUSCULAR; INTRAVENOUS; SUBCUTANEOUS at 08:13

## 2019-04-11 RX ADMIN — Medication 100 MILLIGRAM(S): at 22:26

## 2019-04-11 RX ADMIN — LIDOCAINE 1 PATCH: 4 CREAM TOPICAL at 16:09

## 2019-04-11 RX ADMIN — Medication 30 MILLILITER(S): at 22:24

## 2019-04-11 RX ADMIN — OXYCODONE HYDROCHLORIDE 25 MILLIGRAM(S): 5 TABLET ORAL at 17:46

## 2019-04-11 RX ADMIN — HYDROMORPHONE HYDROCHLORIDE 1 MILLIGRAM(S): 2 INJECTION INTRAMUSCULAR; INTRAVENOUS; SUBCUTANEOUS at 11:33

## 2019-04-11 RX ADMIN — OXYCODONE HYDROCHLORIDE 25 MILLIGRAM(S): 5 TABLET ORAL at 06:00

## 2019-04-11 RX ADMIN — PIPERACILLIN AND TAZOBACTAM 25 GRAM(S): 4; .5 INJECTION, POWDER, LYOPHILIZED, FOR SOLUTION INTRAVENOUS at 17:10

## 2019-04-11 RX ADMIN — Medication 2 MILLIGRAM(S): at 05:08

## 2019-04-11 RX ADMIN — APIXABAN 2.5 MILLIGRAM(S): 2.5 TABLET, FILM COATED ORAL at 22:23

## 2019-04-11 RX ADMIN — HYDROMORPHONE HYDROCHLORIDE 1 MILLIGRAM(S): 2 INJECTION INTRAMUSCULAR; INTRAVENOUS; SUBCUTANEOUS at 12:00

## 2019-04-11 RX ADMIN — TENOFOVIR DISOPROXIL FUMARATE 300 MILLIGRAM(S): 300 TABLET, FILM COATED ORAL at 08:17

## 2019-04-11 RX ADMIN — PIPERACILLIN AND TAZOBACTAM 25 GRAM(S): 4; .5 INJECTION, POWDER, LYOPHILIZED, FOR SOLUTION INTRAVENOUS at 05:09

## 2019-04-11 RX ADMIN — HYDROMORPHONE HYDROCHLORIDE 2 MILLIGRAM(S): 2 INJECTION INTRAMUSCULAR; INTRAVENOUS; SUBCUTANEOUS at 17:10

## 2019-04-11 RX ADMIN — SENNA PLUS 2 TABLET(S): 8.6 TABLET ORAL at 22:26

## 2019-04-11 RX ADMIN — Medication 30 MILLILITER(S): at 09:23

## 2019-04-11 RX ADMIN — HYDROMORPHONE HYDROCHLORIDE 2 MILLIGRAM(S): 2 INJECTION INTRAMUSCULAR; INTRAVENOUS; SUBCUTANEOUS at 17:46

## 2019-04-11 RX ADMIN — TAMSULOSIN HYDROCHLORIDE 0.4 MILLIGRAM(S): 0.4 CAPSULE ORAL at 22:23

## 2019-04-11 NOTE — PROGRESS NOTE ADULT - PROBLEM SELECTOR PLAN 9
C/W Flomax DVT PPx: on apixiban (patient family questioning about why patient is continuing on this medication, will clarify with PMD)  GOC: DNR/DNI, MOLST in chart, pending REGGIE

## 2019-04-11 NOTE — DIETITIAN INITIAL EVALUATION ADULT. - NUTRITION INTERVENTION
Medical Food Supplements/Feeding Assistance Feeding Assistance/Medical Food Supplements/Meals and Snack

## 2019-04-11 NOTE — PROGRESS NOTE ADULT - PROBLEM SELECTOR PLAN 6
Patient with hematuria on UA, also present last admission  - Currently Hb 10.8  - CBC q24H and transfuse if anemic Patient with HTN on metoprolol, Hold for now given relatively normotensive.

## 2019-04-11 NOTE — PROGRESS NOTE ADULT - SUBJECTIVE AND OBJECTIVE BOX
Kayden Hilliard MD RENEE  Internal Medicine PGY-1  Pager [facility]: [phone]; [facility] [id number]    Patient is a 78y old  Male who presents with a chief complaint of AMS (08 Apr 2019 08:00)      SUBJECTIVE/OVERNIGHT EVENTS   No acute events overnight. Patient appears more lethargic today. Denies fevers, chills, SOB. ROS otherwise negative.     OBJECTIVE  Vital Signs Last 24 Hrs  T(C): 36.4 (09 Apr 2019 05:04), Max: 36.9 (08 Apr 2019 20:29)  T(F): 97.5 (09 Apr 2019 05:04), Max: 98.4 (08 Apr 2019 20:29)  HR: 70 (09 Apr 2019 05:04) (70 - 74)  BP: 131/76 (09 Apr 2019 05:04) (97/58 - 131/76)  BP(mean): --  RR: 18 (09 Apr 2019 05:04) (18 - 18)  SpO2: 93% (09 Apr 2019 05:04) (93% - 97%)    I&O's Summary    08 Apr 2019 07:01  -  09 Apr 2019 07:00  --------------------------------------------------------  IN: 1570 mL / OUT: 1220 mL / NET: 350 mL    PHYSICAL EXAM:  GENERAL: NAD, lethargic   HEAD:  Atraumatic, Normocephalic  EYES: EOMI, conjunctiva and sclera clear  NECK: Supple, No JVD  CHEST/LUNG: Clear to auscultation bilaterally; No wheeze  HEART: Regular rate and rhythm; No murmurs, rubs, or gallops  ABDOMEN: Soft, Nontender, Nondistended; Bowel sounds present  EXTREMITIES:  palapable Peripheral Pulses, No clubbing, cyanosis, or edema  PSYCH: AAOx3  NEUROLOGY: non-focal  SKIN: No rashes or lesions    LABS                        9.1    19.94 )-----------( 162      ( 08 Apr 2019 08:54 )             29.8                         8.6    22.02 )-----------( 143      ( 07 Apr 2019 12:52 )             28.5     04-09    139  |  99  |  28<H>  ----------------------------<  106<H>  3.7   |  25  |  2.59<H>  04-08    139  |  100  |  27<H>  ----------------------------<  182<H>  4.0   |  22  |  2.75<H>    Ca    8.1<L>      09 Apr 2019 06:43  Ca    8.0<L>      08 Apr 2019 07:01  Phos  2.6     04-09  Mg     1.6     04-09    TPro  6.3  /  Alb  2.2<L>  /  TBili  0.2  /  DBili  x   /  AST  16  /  ALT  9<L>  /  AlkPhos  58  04-09   28 Mar 2019 06:03 Troponin x     / CK 25 U/L / CKMB x     / CKMB Units 1.1 ng/mL    RADIOLOGY & ADDITIONAL TESTS:  < from: US Renal (04.08.19 @ 16:33) >  IMPRESSION:     Increased cortical echogenicity, compatible with medical renal disease.   No hydronephrosis. Bilateral renal cysts.    Bilateral pleural effusions.    < end of copied text >    MEDICATIONS  (STANDING):  apixaban 2.5 milliGRAM(s) Oral every 12 hours  dexamethasone     Tablet 2 milliGRAM(s) Oral daily  oxyCODONE  ER Tablet 25 milliGRAM(s) Oral every 12 hours  pantoprazole    Tablet 40 milliGRAM(s) Oral before breakfast  piperacillin/tazobactam IVPB. 3.375 Gram(s) IV Intermittent every 12 hours  tamsulosin 0.4 milliGRAM(s) Oral at bedtime  tenofovir disoproxil fumarate (VIREAD) 300 milliGRAM(s) Oral <User Schedule>    MEDICATIONS  (PRN):  ALBUTerol/ipratropium for Nebulization 3 milliLiter(s) Nebulizer every 6 hours PRN Shortness of Breath and/or Wheezing  aluminum hydroxide/magnesium hydroxide/simethicone Suspension 30 milliLiter(s) Oral every 4 hours PRN Dyspepsia  HYDROmorphone   Tablet 2 milliGRAM(s) Oral every 6 hours PRN Moderate Pain (4 - 6)  HYDROmorphone  Injectable 1 milliGRAM(s) IV Push every 6 hours PRN Severe Pain (7 - 10)  lidocaine   Patch 1 Patch Transdermal daily PRN pain Contact Info:  Alex Ramos M.D., PGY-2  Pager: ns- [phone], [facility] [phone]      Chief Complaint: Patient is a 78y old  Male who presents with a chief complaint of AMS (11 Apr 2019 17:28)        INTERVAL HPI/OVERNIGHT EVENTS:   Patient states that he has been feeling good. Stating that the pain is well controlled when the medications are given on time, however, if delay in giving, he will have increasing pain. Having daily regular BM, not eating as much but able to tolerate diet.       MEDICATIONS  (STANDING):  apixaban 2.5 milliGRAM(s) Oral every 12 hours  dexamethasone     Tablet 2 milliGRAM(s) Oral daily  oxyCODONE  ER Tablet 25 milliGRAM(s) Oral every 12 hours  pantoprazole    Tablet 40 milliGRAM(s) Oral before breakfast  piperacillin/tazobactam IVPB. 3.375 Gram(s) IV Intermittent every 12 hours  tamsulosin 0.4 milliGRAM(s) Oral at bedtime  tenofovir disoproxil fumarate (VIREAD) 300 milliGRAM(s) Oral <User Schedule>    MEDICATIONS  (PRN):  ALBUTerol/ipratropium for Nebulization 3 milliLiter(s) Nebulizer every 6 hours PRN Shortness of Breath and/or Wheezing  aluminum hydroxide/magnesium hydroxide/simethicone Suspension 30 milliLiter(s) Oral every 4 hours PRN Dyspepsia  HYDROmorphone   Tablet 2 milliGRAM(s) Oral every 6 hours PRN Moderate Pain (4 - 6)  HYDROmorphone  Injectable 1 milliGRAM(s) IV Push every 6 hours PRN Severe Pain (7 - 10)  lidocaine   Patch 1 Patch Transdermal daily PRN pain      Vital Signs Last 24 Hrs  T(C): 36.4 (11 Apr 2019 11:54), Max: 36.9 (10 Apr 2019 20:18)  T(F): 97.6 (11 Apr 2019 11:54), Max: 98.5 (10 Apr 2019 20:18)  HR: 82 (11 Apr 2019 11:54) (68 - 82)  BP: 122/70 (11 Apr 2019 11:54) (122/70 - 135/61)  BP(mean): --  RR: 18 (11 Apr 2019 11:54) (18 - 18)  SpO2: 92% (11 Apr 2019 11:54) (92% - 98%)  Supplemental O2: [ ] No, on Room Air [ ] Yes,     I&O's Detail    10 Apr 2019 07:01  -  11 Apr 2019 07:00  --------------------------------------------------------  IN:    IV PiggyBack: 100 mL    Oral Fluid: 860 mL  Total IN: 960 mL    OUT:    Voided: 1740 mL  Total OUT: 1740 mL    Total NET: -780 mL      11 Apr 2019 07:01  -  11 Apr 2019 17:45  --------------------------------------------------------  IN:    Oral Fluid: 480 mL  Total IN: 480 mL    OUT:    Voided: 550 mL  Total OUT: 550 mL    Total NET: -70 mL        CAPILLARY BLOOD GLUCOSE          PHYSICAL EXAM:  GENERAL: NAD, cachectic  HEAD:  Atraumatic, Normocephalic  EYES: EOMI, conjunctiva and sclera clear  NECK: Supple, No JVD  CHEST/LUNG: Clear to auscultation bilaterally; No wheeze  HEART: Regular rate and rhythm; No murmurs, rubs, or gallops  ABDOMEN: Soft, Nontender, Nondistended; Bowel sounds present  EXTREMITIES:  palapable Peripheral Pulses, No clubbing, cyanosis, or edema  PSYCH: AAOx3, appropriate mood and affect.   NEUROLOGY: non-focal, no sensory or motor deficit.   SKIN: No rashes or lesions      LABS:                        9.0    12.12 )-----------( 191      ( 11 Apr 2019 08:20 )             28.4     04-11    139  |  99  |  30<H>  ----------------------------<  104<H>  3.8   |  28  |  2.51<H>    Ca    8.6      11 Apr 2019 06:32  Phos  2.6     04-10  Mg     1.8     04-10      LIVER FUNCTIONS - ( 09 Apr 2019 06:43 )  Alb: 2.2 g/dL / Pro: 6.3 g/dL / ALK PHOS: 58 U/L / ALT: 9 U/L / AST: 16 U/L / GGT: x     / T. Bili 0.2 mg/dL / D. Bili x               Imaging Personally Reviewed:  [ ] YES  [ ] NO  Consultant(s) Notes Reviewed:  [X] YES  [ ] NO  Care Discussed with Consultants/Other Providers [ ] YES  [ ] NO

## 2019-04-11 NOTE — PROGRESS NOTE ADULT - ATTENDING COMMENTS
seen, examined the patient with house staff  - Sitting in bed, very weak, remains afebrile, c/o painin whole body    Reviewed labs, imaging  - On IV Zosyn for Pna- LLL/sepsis, likely aspiration, on dysphagia diet, aspiration precaution  - Oncology input for progression of disease (stage 4 Ca Lung, brain mets)    Readjusted analgesics  - spoke to Family at bedside, d/c planning in LTC per family request  - DNR. seen, examined the patient with house staff  - Sitting in bed, very weak, remains afebrile, c/o pain in whole body    Reviewed labs, imaging  - On IV Zosyn for Pna- LLL/sepsis, likely aspiration, on dysphagia diet, aspiration precaution  - Oncology input for progression of disease (stage 4 Ca Lung, brain mets)    Readjusted analgesics  - spoke to Family at bedside, d/c planning in LTC per family request  - DNR.

## 2019-04-11 NOTE — PROGRESS NOTE ADULT - PROBLEM SELECTOR PLAN 5
Patient with COPD  - On O2 at home  - Currently on 4L NC, hypoxia not new  - Wally -Patient with hx of multiple admission of aspiration, currently admitted for sepsis likely 2/2 recurrent aspiration. Prior speech and swallow recommend dysphagia 1 diet.   -S/p 6 days of zosyn (d/c'ed today)  -Consider repeat speech and swallow eval.

## 2019-04-11 NOTE — DIETITIAN INITIAL EVALUATION ADULT. - SOURCE
patient/able to converse with simple phrases, patient states he cannot eat because he has pain as he gestures his arms across his torso and abdomen patient/able to converse with simple phrases

## 2019-04-11 NOTE — DIETITIAN INITIAL EVALUATION ADULT. - PHYSICAL APPEARANCE
muscvlw wasting, yellowed loose skin/underweight/emaciated underweight/emaciated/muscle wasting, yellowed loose skin

## 2019-04-11 NOTE — PROGRESS NOTE ADULT - ASSESSMENT
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

## 2019-04-11 NOTE — DIETITIAN INITIAL EVALUATION ADULT. - ENERGY NEEDS
Dx: Lung Cancer/AMS  78m with h/o stage 4 lung cancer with brain mets (on tagrisso, started 1/10/19), hep B, bph presenting with altered mental status likely secondary to sepsis. Noted: 4/11 c/w zosyn(d6) for poss asp pna, copd- home 02. Hospice: pt approved for home hospice           sienna on ckd

## 2019-04-11 NOTE — PROGRESS NOTE ADULT - PROBLEM SELECTOR PLAN 3
Creatinine 2.7 from baseline 1.6 last month, likely in setting of dehydration 2/2 decreased PO intake  - Creatinine downtrending   - Renally dose nephrotoxic medications Patient with stage 4 lung cancer on Tagrisso, started 1/10/19), currently per oncology, no longer offering further chemotherapy given current functional status, recommending hospice.   - Patient family aware of plan, pending REGGIE per family wish

## 2019-04-11 NOTE — PROGRESS NOTE ADULT - PROBLEM SELECTOR PLAN 7
Patient with HTN on metoprolol  - Hold in setting of possible sepsis Patient with DM on oral antihyperglyemics  - FS/ISS

## 2019-04-11 NOTE — DIETITIAN INITIAL EVALUATION ADULT. - OTHER INFO
patient seen for nutrition consult however family has agreed to discharge patient to Home hospice as noted. Patient is not eating as stated secondary to pain, stating "no eat, all pain" patient seen for nutrition consult, patient with h/o malnutrtion secondary to metastatic cancer, noticable weight loss, fat depletion, muscle wasting. Patient was last  admitted in march 2019 and now has further weight loss. Family is having discussions with MD team for hospice vs LTC hospice.

## 2019-04-11 NOTE — DIETITIAN INITIAL EVALUATION ADULT. - NS FNS REASON FOR WEIGHT CHANG
H/o severe malnutrtion with weight loss from previous visits, last visit 3/2019 H/o severe malnutrition with weight loss from previous visits, last visit 3/2019

## 2019-04-11 NOTE — PROGRESS NOTE ADULT - PROBLEM SELECTOR PLAN 1
Resolved.  Patient with leukocytosis (WBC 21.3), fever (101.5), tachycardia, and suspected source (PNA). DDX: PNA vs UTI vs tumor fever  - CXR showing persistent lower left lung consolidation HCAP   - UCx/BCx NGTD  - RVP negative  - d/c Vanc s/p 3 days of abx  - Zosyn continued for possible aspiration pna -In the setting of lung cancer and recurrent hospitalization. Recommended by PT for Rehab. Previously discussed with patient family regarding hospice but family concern about their ability to take care of patient at home. Currently would like for REGGEI.   -CM to follow up with REGGIE placement  -C/w PT while in hospital.

## 2019-04-11 NOTE — CHART NOTE - NSCHARTNOTEFT_GEN_A_CORE
Upon Nutritional Assessment by the Registered Dietitian your patient was determined to meet criteria / has evidence of the following diagnosis/diagnoses:          [ ]  Mild Protein Calorie Malnutrition        [ ]  Moderate Protein Calorie Malnutrition        [ X] Severe Protein Calorie Malnutrition        [ ] Unspecified Protein Calorie Malnutrition        [ ] Underweight / BMI <19        [ ] Morbid Obesity / BMI > 40      Findings as based on:  [C ] Comprehensive nutrition assessment   [ X] Nutrition Focused Physical Exam  severe muscle wasting, fat depletion, poor skin turgor  [X ] Other: BMI=18, weight loss 15% body weight in 10 months, poor PO intake, <75% of meals      Nutrition Plan/Recommendations:  dsysphagia diet honey thickened liquids, ensure  2x daily        PROVIDER Section:     By signing this assessment you are acknowledging and agree with the diagnosis/diagnoses assigned by the Registered Dietitian    Comments:

## 2019-04-11 NOTE — PROGRESS NOTE ADULT - PROBLEM SELECTOR PLAN 2
Resolved, at baseline mental status per son  likely multifactoral 2/2 fever/dehydration/infection/LUX  - improved s/p gentle IV hydration  - per son, pt's appears to be at baseline mental status -Pain related to lung cancer.   -Patient stating pain not well control given PRN   -Will continue with oxycontin 25mg BID  -Will switch IV dilaudid 2mg q6h prn to PO 4mg q4h, with breakthrough pain IV dilaudid 1mg q3h PRN. Hold for sedation or respiratory depression.   -C/w lidocaine patch   -Consider palliative c/s to help in pain management. -Pain related to lung cancer.   -Patient stating pain not well control given PRN   -Will continue with oxycontin 25mg BID  -Will switch IV dilaudid 2mg q6h prn to PO 4mg q6h ATC, with breakthrough pain IV dilaudid 1mg q3h PRN. Hold for sedation or respiratory depression. Titrating base on need.   -C/w lidocaine patch   -Consider palliative c/s to help in pain management.

## 2019-04-11 NOTE — PROGRESS NOTE ADULT - PROBLEM SELECTOR PLAN 4
Patient with stage 4 lung cancer with metastases to brain on Tagrisso, started 1/10/19)  - Oncologist is Dr. Norah Gross -- not offering further chemo due to progression of disease  - Patient family agreeable to hospice care Creatinine 2.7 from baseline 1.6 last month, likely in setting of dehydration 2/2 decreased PO intake  - Creatinine currently stabilized around 2.5, ? progression of CKD  - Renally dose nephrotoxic medications. Trend daily Cr

## 2019-04-11 NOTE — DIETITIAN INITIAL EVALUATION ADULT. - PROBLEM SELECTOR PLAN 2
Patient and I discussed recent labs (see below; mixed dyslipidemia, uncontrolled) and that new labs are necessary to re-evaluate.    Lab Results   Component Value Date/Time    CHOLSTRLTOT 197 10/05/2018 10:08 AM    CHOLSTRLTOT 260 (H) 09/27/2010 07:15 AM     (H) 10/05/2018 10:08 AM     (H) 09/27/2010 07:15 AM    HDL 63 10/05/2018 10:08 AM    HDL 61 09/27/2010 07:15 AM    TRIGLYCERIDE 143 10/05/2018 10:08 AM    TRIGLYCERIDE 163 (H) 09/27/2010 07:15 AM      likely multifactoral 2/2 fever/dehydration/infection/LUX  c/w gentle IV hydration  zosyn+vancomycin as above  per son, pt's appears to be slowly returning to baseline mental status

## 2019-04-11 NOTE — DIETITIAN INITIAL EVALUATION ADULT. - FACTORS AFF FOOD INTAKE
difficulty swallowing difficulty swallowing/pain/change in mental status pain/change in mental status/difficulty swallowing/patient eating with feeding  assistance

## 2019-04-12 ENCOUNTER — APPOINTMENT (OUTPATIENT)
Dept: HEMATOLOGY ONCOLOGY | Facility: CLINIC | Age: 79
End: 2019-04-12

## 2019-04-12 LAB
ANION GAP SERPL CALC-SCNC: 14 MMOL/L — SIGNIFICANT CHANGE UP (ref 5–17)
BUN SERPL-MCNC: 32 MG/DL — HIGH (ref 7–23)
CALCIUM SERPL-MCNC: 9 MG/DL — SIGNIFICANT CHANGE UP (ref 8.4–10.5)
CHLORIDE SERPL-SCNC: 98 MMOL/L — SIGNIFICANT CHANGE UP (ref 96–108)
CO2 SERPL-SCNC: 27 MMOL/L — SIGNIFICANT CHANGE UP (ref 22–31)
CREAT SERPL-MCNC: 2.35 MG/DL — HIGH (ref 0.5–1.3)
GLUCOSE SERPL-MCNC: 105 MG/DL — HIGH (ref 70–99)
HCT VFR BLD CALC: 32.4 % — LOW (ref 39–50)
HGB BLD-MCNC: 10.1 G/DL — LOW (ref 13–17)
MCHC RBC-ENTMCNC: 28.9 PG — SIGNIFICANT CHANGE UP (ref 27–34)
MCHC RBC-ENTMCNC: 31.2 GM/DL — LOW (ref 32–36)
MCV RBC AUTO: 92.8 FL — SIGNIFICANT CHANGE UP (ref 80–100)
PLATELET # BLD AUTO: 211 K/UL — SIGNIFICANT CHANGE UP (ref 150–400)
POTASSIUM SERPL-MCNC: 3.7 MMOL/L — SIGNIFICANT CHANGE UP (ref 3.5–5.3)
POTASSIUM SERPL-SCNC: 3.7 MMOL/L — SIGNIFICANT CHANGE UP (ref 3.5–5.3)
RBC # BLD: 3.49 M/UL — LOW (ref 4.2–5.8)
RBC # FLD: 13.5 % — SIGNIFICANT CHANGE UP (ref 10.3–14.5)
SODIUM SERPL-SCNC: 139 MMOL/L — SIGNIFICANT CHANGE UP (ref 135–145)
WBC # BLD: 13.78 K/UL — HIGH (ref 3.8–10.5)
WBC # FLD AUTO: 13.78 K/UL — HIGH (ref 3.8–10.5)

## 2019-04-12 PROCEDURE — 93010 ELECTROCARDIOGRAM REPORT: CPT

## 2019-04-12 PROCEDURE — 99233 SBSQ HOSP IP/OBS HIGH 50: CPT

## 2019-04-12 RX ADMIN — HYDROMORPHONE HYDROCHLORIDE 4 MILLIGRAM(S): 2 INJECTION INTRAMUSCULAR; INTRAVENOUS; SUBCUTANEOUS at 06:04

## 2019-04-12 RX ADMIN — HYDROMORPHONE HYDROCHLORIDE 4 MILLIGRAM(S): 2 INJECTION INTRAMUSCULAR; INTRAVENOUS; SUBCUTANEOUS at 06:48

## 2019-04-12 RX ADMIN — APIXABAN 2.5 MILLIGRAM(S): 2.5 TABLET, FILM COATED ORAL at 09:22

## 2019-04-12 RX ADMIN — HYDROMORPHONE HYDROCHLORIDE 1 MILLIGRAM(S): 2 INJECTION INTRAMUSCULAR; INTRAVENOUS; SUBCUTANEOUS at 01:36

## 2019-04-12 RX ADMIN — HYDROMORPHONE HYDROCHLORIDE 4 MILLIGRAM(S): 2 INJECTION INTRAMUSCULAR; INTRAVENOUS; SUBCUTANEOUS at 00:22

## 2019-04-12 RX ADMIN — Medication 30 MILLILITER(S): at 16:28

## 2019-04-12 RX ADMIN — HYDROMORPHONE HYDROCHLORIDE 4 MILLIGRAM(S): 2 INJECTION INTRAMUSCULAR; INTRAVENOUS; SUBCUTANEOUS at 18:19

## 2019-04-12 RX ADMIN — Medication 2 MILLIGRAM(S): at 06:05

## 2019-04-12 RX ADMIN — Medication 100 MILLIGRAM(S): at 21:44

## 2019-04-12 RX ADMIN — PANTOPRAZOLE SODIUM 40 MILLIGRAM(S): 20 TABLET, DELAYED RELEASE ORAL at 06:05

## 2019-04-12 RX ADMIN — HYDROMORPHONE HYDROCHLORIDE 1 MILLIGRAM(S): 2 INJECTION INTRAMUSCULAR; INTRAVENOUS; SUBCUTANEOUS at 02:58

## 2019-04-12 RX ADMIN — Medication 100 MILLIGRAM(S): at 06:04

## 2019-04-12 RX ADMIN — LIDOCAINE 1 PATCH: 4 CREAM TOPICAL at 23:01

## 2019-04-12 RX ADMIN — OXYCODONE HYDROCHLORIDE 25 MILLIGRAM(S): 5 TABLET ORAL at 07:30

## 2019-04-12 RX ADMIN — OXYCODONE HYDROCHLORIDE 25 MILLIGRAM(S): 5 TABLET ORAL at 18:19

## 2019-04-12 RX ADMIN — TAMSULOSIN HYDROCHLORIDE 0.4 MILLIGRAM(S): 0.4 CAPSULE ORAL at 21:45

## 2019-04-12 RX ADMIN — HYDROMORPHONE HYDROCHLORIDE 4 MILLIGRAM(S): 2 INJECTION INTRAMUSCULAR; INTRAVENOUS; SUBCUTANEOUS at 12:36

## 2019-04-12 RX ADMIN — APIXABAN 2.5 MILLIGRAM(S): 2.5 TABLET, FILM COATED ORAL at 21:45

## 2019-04-12 RX ADMIN — LIDOCAINE 1 PATCH: 4 CREAM TOPICAL at 03:31

## 2019-04-12 RX ADMIN — SENNA PLUS 2 TABLET(S): 8.6 TABLET ORAL at 21:44

## 2019-04-12 RX ADMIN — OXYCODONE HYDROCHLORIDE 25 MILLIGRAM(S): 5 TABLET ORAL at 06:49

## 2019-04-12 RX ADMIN — Medication 100 MILLIGRAM(S): at 12:36

## 2019-04-12 NOTE — CHART NOTE - NSCHARTNOTEFT_GEN_A_CORE
Nutrition follow up     Pt seen for malnutrition follow up.     Pt forgetful/disoriented as per documentation - son at bedside provided information. Charts and labs reviewed. Son reports pt with fair appetite and PO intake. Noted 50-75% PO intake as per flow sheets. Reports pt drinking Ensure Enlive 2xday. Reports pt tolerating pureed diet + honey thickened liquids. Denies pt with acute GI distress, last BM yesterday (04/11). Obtained food preferences and provided recommendations to optimize PO and protein intake in and outside the hospital as pt's son with multiple questions - made aware RD remains available. Will continue current diet and supplementation as ordered. Defer diet/fluid consistencies to medical team/SLP recommendations.     RD remains available.   Nathalie Sofia MS, RDN, #[phone]

## 2019-04-12 NOTE — PROGRESS NOTE ADULT - PROBLEM SELECTOR PLAN 3
Patient with stage 4 lung cancer on Tagrisso, started 1/10/19), currently per oncology, no longer offering further chemotherapy given current functional status, recommending hospice.   - Patient family aware of plan, pending REGGIE per family wish

## 2019-04-12 NOTE — PROGRESS NOTE ADULT - SUBJECTIVE AND OBJECTIVE BOX
Patient is a 78y old  Male who presents with a chief complaint of AMS (11 Apr 2019 17:28)      SUBJECTIVE / OVERNIGHT EVENTS:  Pt seen and examined. No acute events overnight. He denies cp, sob, fever.    MEDICATIONS  (STANDING):  apixaban 2.5 milliGRAM(s) Oral every 12 hours  dexamethasone     Tablet 2 milliGRAM(s) Oral daily  docusate sodium 100 milliGRAM(s) Oral three times a day  HYDROmorphone   Tablet 4 milliGRAM(s) Oral every 6 hours  oxyCODONE  ER Tablet 25 milliGRAM(s) Oral every 12 hours  pantoprazole    Tablet 40 milliGRAM(s) Oral before breakfast  senna 2 Tablet(s) Oral at bedtime  tamsulosin 0.4 milliGRAM(s) Oral at bedtime  tenofovir disoproxil fumarate (VIREAD) 300 milliGRAM(s) Oral <User Schedule>    MEDICATIONS  (PRN):  ALBUTerol/ipratropium for Nebulization 3 milliLiter(s) Nebulizer every 6 hours PRN Shortness of Breath and/or Wheezing  aluminum hydroxide/magnesium hydroxide/simethicone Suspension 30 milliLiter(s) Oral every 4 hours PRN Dyspepsia  HYDROmorphone  Injectable 1 milliGRAM(s) IV Push every 3 hours PRN for breakthrough pain  lidocaine   Patch 1 Patch Transdermal daily PRN pain      Vital Signs Last 24 Hrs  T(C): 36.3 (12 Apr 2019 11:46), Max: 36.5 (12 Apr 2019 05:56)  T(F): 97.4 (12 Apr 2019 11:46), Max: 97.7 (12 Apr 2019 05:56)  HR: 76 (12 Apr 2019 11:46) (66 - 84)  BP: 126/77 (12 Apr 2019 11:46) (121/68 - 132/73)  BP(mean): --  RR: 18 (12 Apr 2019 11:46) (18 - 18)  SpO2: 98% (12 Apr 2019 11:46) (95% - 99%)  CAPILLARY BLOOD GLUCOSE        I&O's Summary    11 Apr 2019 07:01  -  12 Apr 2019 07:00  --------------------------------------------------------  IN: 920 mL / OUT: 900 mL / NET: 20 mL    12 Apr 2019 07:01  -  12 Apr 2019 12:28  --------------------------------------------------------  IN: 240 mL / OUT: 600 mL / NET: -360 mL        PHYSICAL EXAM:  GENERAL: NAD, cachectic  HEAD:  Atraumatic, Normocephalic  EYES: EOMI, conjunctiva and sclera clear  NECK: Supple, No JVD  CHEST/LUNG: Clear to auscultation bilaterally; No wheeze  HEART: Regular rate and rhythm; No murmurs, rubs, or gallops  ABDOMEN: Soft, Nontender, Nondistended; Bowel sounds present  EXTREMITIES:  palapable Peripheral Pulses, No clubbing, cyanosis, or edema  PSYCH: AAOx3, appropriate mood and affect.   NEUROLOGY: non-focal, no sensory or motor deficit.   SKIN: No rashes or lesions    LABS:                        10.1   13.78 )-----------( 211      ( 12 Apr 2019 09:32 )             32.4     04-12    139  |  98  |  32<H>  ----------------------------<  105<H>  3.7   |  27  |  2.35<H>    Ca    9.0      12 Apr 2019 07:09                RADIOLOGY & ADDITIONAL TESTS:    Imaging Personally Reviewed:    Consultant(s) Notes Reviewed:      Care Discussed with Consultants/Other Providers:

## 2019-04-12 NOTE — PROGRESS NOTE ADULT - PROBLEM SELECTOR PLAN 5
-Patient with hx of multiple admission of aspiration, currently admitted for sepsis likely 2/2 recurrent aspiration. Prior speech and swallow recommend dysphagia 1 diet.   -S/p 6 days of zosyn

## 2019-04-12 NOTE — PROGRESS NOTE ADULT - PROBLEM SELECTOR PLAN 1
-In the setting of lung cancer and recurrent hospitalization. Recommended by PT for Rehab. Previously discussed with patient family regarding hospice but family concern about their ability to take care of patient at home. Currently would like for [facility].   -CM to follow up with [facility] placement  -C/w PT while in hospital.

## 2019-04-12 NOTE — PROGRESS NOTE ADULT - PROBLEM SELECTOR PLAN 2
-Pain related to lung cancer.   -c/w oxycontin 25mg BID  -c/w IV dilaudid 2mg q6h prn to PO 4mg q6h ATC, with breakthrough pain IV dilaudid 1mg q3h PRN. Hold for sedation or respiratory depression ; Titrating based on need.   -C/w lidocaine patch

## 2019-04-12 NOTE — PROGRESS NOTE ADULT - ASSESSMENT
78m with h/o stage 4 lung cancer with brain mets (on tagrisso, started 1/10/19), hep B, bph presenting with altered mental status likely secondary to sepsis 2/2 PNA, currently improving pending [facility].

## 2019-04-12 NOTE — CHART NOTE - NSCHARTNOTEFT_GEN_A_CORE
Per RN Pt with c/o cp  Pt seen and reported that his belly was hurting but feels better. Denies cp, sob or n/v abd pain  Ekg without significant changes. vital signs stable.   Discussed with attending Per RN Pt with c/o cp  Pt seen and reported that his belly was hurting but feels better. Denies cp, sob or n/v abd pain  Ekg without significant changes. vital signs stable.

## 2019-04-12 NOTE — PROGRESS NOTE ADULT - PROBLEM SELECTOR PLAN 4
Creatinine 2.7 from baseline 1.6 last month, likely in setting of dehydration 2/2 decreased PO intake  - Creatinine currently stabilized around 2.5, ? progression of CKD  - Renally dose nephrotoxic medications. Trend daily Cr

## 2019-04-13 PROCEDURE — 99233 SBSQ HOSP IP/OBS HIGH 50: CPT

## 2019-04-13 RX ORDER — ONDANSETRON 8 MG/1
4 TABLET, FILM COATED ORAL ONCE
Qty: 0 | Refills: 0 | Status: DISCONTINUED | OUTPATIENT
Start: 2019-04-13 | End: 2019-04-16

## 2019-04-13 RX ORDER — OXYCODONE HYDROCHLORIDE 5 MG/1
25 TABLET ORAL EVERY 12 HOURS
Qty: 0 | Refills: 0 | Status: DISCONTINUED | OUTPATIENT
Start: 2019-04-13 | End: 2019-04-16

## 2019-04-13 RX ORDER — LIDOCAINE 4 G/100G
1 CREAM TOPICAL DAILY
Qty: 0 | Refills: 0 | Status: DISCONTINUED | OUTPATIENT
Start: 2019-04-13 | End: 2019-04-16

## 2019-04-13 RX ADMIN — LIDOCAINE 1 PATCH: 4 CREAM TOPICAL at 21:34

## 2019-04-13 RX ADMIN — HYDROMORPHONE HYDROCHLORIDE 4 MILLIGRAM(S): 2 INJECTION INTRAMUSCULAR; INTRAVENOUS; SUBCUTANEOUS at 05:59

## 2019-04-13 RX ADMIN — OXYCODONE HYDROCHLORIDE 25 MILLIGRAM(S): 5 TABLET ORAL at 05:19

## 2019-04-13 RX ADMIN — HYDROMORPHONE HYDROCHLORIDE 4 MILLIGRAM(S): 2 INJECTION INTRAMUSCULAR; INTRAVENOUS; SUBCUTANEOUS at 05:20

## 2019-04-13 RX ADMIN — HYDROMORPHONE HYDROCHLORIDE 1 MILLIGRAM(S): 2 INJECTION INTRAMUSCULAR; INTRAVENOUS; SUBCUTANEOUS at 03:29

## 2019-04-13 RX ADMIN — HYDROMORPHONE HYDROCHLORIDE 4 MILLIGRAM(S): 2 INJECTION INTRAMUSCULAR; INTRAVENOUS; SUBCUTANEOUS at 00:30

## 2019-04-13 RX ADMIN — SENNA PLUS 2 TABLET(S): 8.6 TABLET ORAL at 21:32

## 2019-04-13 RX ADMIN — PANTOPRAZOLE SODIUM 40 MILLIGRAM(S): 20 TABLET, DELAYED RELEASE ORAL at 05:21

## 2019-04-13 RX ADMIN — HYDROMORPHONE HYDROCHLORIDE 4 MILLIGRAM(S): 2 INJECTION INTRAMUSCULAR; INTRAVENOUS; SUBCUTANEOUS at 00:50

## 2019-04-13 RX ADMIN — Medication 2 MILLIGRAM(S): at 05:20

## 2019-04-13 RX ADMIN — LIDOCAINE 1 PATCH: 4 CREAM TOPICAL at 11:00

## 2019-04-13 RX ADMIN — HYDROMORPHONE HYDROCHLORIDE 4 MILLIGRAM(S): 2 INJECTION INTRAMUSCULAR; INTRAVENOUS; SUBCUTANEOUS at 13:16

## 2019-04-13 RX ADMIN — Medication 100 MILLIGRAM(S): at 21:33

## 2019-04-13 RX ADMIN — APIXABAN 2.5 MILLIGRAM(S): 2.5 TABLET, FILM COATED ORAL at 21:33

## 2019-04-13 RX ADMIN — OXYCODONE HYDROCHLORIDE 25 MILLIGRAM(S): 5 TABLET ORAL at 05:59

## 2019-04-13 RX ADMIN — Medication 100 MILLIGRAM(S): at 05:18

## 2019-04-13 RX ADMIN — OXYCODONE HYDROCHLORIDE 25 MILLIGRAM(S): 5 TABLET ORAL at 18:05

## 2019-04-13 RX ADMIN — APIXABAN 2.5 MILLIGRAM(S): 2.5 TABLET, FILM COATED ORAL at 10:47

## 2019-04-13 RX ADMIN — HYDROMORPHONE HYDROCHLORIDE 4 MILLIGRAM(S): 2 INJECTION INTRAMUSCULAR; INTRAVENOUS; SUBCUTANEOUS at 18:05

## 2019-04-13 RX ADMIN — TAMSULOSIN HYDROCHLORIDE 0.4 MILLIGRAM(S): 0.4 CAPSULE ORAL at 21:32

## 2019-04-13 RX ADMIN — Medication 100 MILLIGRAM(S): at 13:16

## 2019-04-13 RX ADMIN — TENOFOVIR DISOPROXIL FUMARATE 300 MILLIGRAM(S): 300 TABLET, FILM COATED ORAL at 10:47

## 2019-04-13 RX ADMIN — HYDROMORPHONE HYDROCHLORIDE 1 MILLIGRAM(S): 2 INJECTION INTRAMUSCULAR; INTRAVENOUS; SUBCUTANEOUS at 03:59

## 2019-04-13 RX ADMIN — LIDOCAINE 1 PATCH: 4 CREAM TOPICAL at 07:30

## 2019-04-13 NOTE — PROGRESS NOTE ADULT - SUBJECTIVE AND OBJECTIVE BOX
Pt seen and examined at bedside by Dr. Dara Mccurdy -- Pager [phone]    Patient is a 78y old  Male who presents with a chief complaint of AMS (12 Apr 2019 12:28)    SUBJECTIVE / OVERNIGHT EVENTS:  Pt feels okay, slept well, ate breakfast, no complaints, no pain    REVIEW OF SYSTEMS:    CONSTITUTIONAL: No weakness, fevers or chills  RESPIRATORY: No cough, wheezing, hemoptysis; No shortness of breath  CARDIOVASCULAR: No chest pain or palpitations  GASTROINTESTINAL: No abdominal or epigastric pain. No nausea, vomiting, or hematemesis; No diarrhea or constipation. No melena or hematochezia.  GENITOURINARY: No dysuria, frequency or hematuria  NEUROLOGICAL: No numbness or weakness  EXT: No edema, deformities, or weakness       MEDICATIONS  (STANDING):  apixaban 2.5 milliGRAM(s) Oral every 12 hours  dexamethasone     Tablet 2 milliGRAM(s) Oral daily  docusate sodium 100 milliGRAM(s) Oral three times a day  HYDROmorphone   Tablet 4 milliGRAM(s) Oral every 6 hours  oxyCODONE  ER Tablet 25 milliGRAM(s) Oral every 12 hours  pantoprazole    Tablet 40 milliGRAM(s) Oral before breakfast  senna 2 Tablet(s) Oral at bedtime  tamsulosin 0.4 milliGRAM(s) Oral at bedtime  tenofovir disoproxil fumarate (VIREAD) 300 milliGRAM(s) Oral <User Schedule>    MEDICATIONS  (PRN):  ALBUTerol/ipratropium for Nebulization 3 milliLiter(s) Nebulizer every 6 hours PRN Shortness of Breath and/or Wheezing  aluminum hydroxide/magnesium hydroxide/simethicone Suspension 30 milliLiter(s) Oral every 4 hours PRN Dyspepsia  HYDROmorphone  Injectable 1 milliGRAM(s) IV Push every 3 hours PRN for breakthrough pain  lidocaine   Patch 1 Patch Transdermal daily PRN pain      Vital Signs Last 24 Hrs  T(C): 36.9 (13 Apr 2019 08:44), Max: 36.9 (13 Apr 2019 08:44)  T(F): 98.4 (13 Apr 2019 08:44), Max: 98.4 (13 Apr 2019 08:44)  HR: 79 (13 Apr 2019 08:44) (79 - 93)  BP: 149/77 (13 Apr 2019 08:44) (145/83 - 153/81)  BP(mean): --  RR: 16 (13 Apr 2019 08:44) (16 - 18)  SpO2: 96% (13 Apr 2019 08:44) (95% - 96%)  CAPILLARY BLOOD GLUCOSE        I&O's Summary    12 Apr 2019 07:01  -  13 Apr 2019 07:00  --------------------------------------------------------  IN: 600 mL / OUT: 1000 mL / NET: -400 mL          PHYSICAL EXAM  GENERAL: NAD, well-developed  NECK: Supple, No JVD  CHEST/LUNG: Clear to auscultation bilaterally; No wheeze  HEART: Regular rate and rhythm; No murmurs, rubs, or gallops  ABDOMEN: Soft, Nontender, Nondistended; Bowel sounds present  EXTREMITIES:  2+ Peripheral Pulses, No clubbing, cyanosis; No edema  PSYCH: AAOx1          LABS:                        10.1   13.78 )-----------( 211      ( 12 Apr 2019 09:32 )             32.4     04-12    139  |  98  |  32<H>  ----------------------------<  105<H>  3.7   |  27  |  2.35<H>    Ca    9.0      12 Apr 2019 07:09                RADIOLOGY & ADDITIONAL TESTS:    Imaging Personally Reviewed:  Consultant(s) Notes Reviewed:    Care Discussed with Consultants/Other Providers: Medicine BRUCE Zeta

## 2019-04-13 NOTE — PROGRESS NOTE ADULT - PROBLEM SELECTOR PLAN 1
-In the setting of lung cancer and recurrent hospitalization. Recommended by PT for Rehab. Previously discussed with patient family regarding hospice but family concern about their ability to take care of patient at home. Currently would like for [facility].   -CM to follow up with [facility] placement   - Goal for Hospice transition afterwards   -C/w PT while in hospital.

## 2019-04-13 NOTE — PROGRESS NOTE ADULT - PROBLEM SELECTOR PLAN 2
-Pain related to lung cancer.   -c/w oxycontin 25mg BID  -c/w dilaudid PO 4mg q6h ATC, with breakthrough pain IV dilaudid 1mg q3h PRN. Hold for sedation or respiratory depression ; Titrating based on need.   -C/w lidocaine patch

## 2019-04-14 PROCEDURE — 99233 SBSQ HOSP IP/OBS HIGH 50: CPT

## 2019-04-14 RX ADMIN — APIXABAN 2.5 MILLIGRAM(S): 2.5 TABLET, FILM COATED ORAL at 21:10

## 2019-04-14 RX ADMIN — SENNA PLUS 2 TABLET(S): 8.6 TABLET ORAL at 21:10

## 2019-04-14 RX ADMIN — HYDROMORPHONE HYDROCHLORIDE 4 MILLIGRAM(S): 2 INJECTION INTRAMUSCULAR; INTRAVENOUS; SUBCUTANEOUS at 18:18

## 2019-04-14 RX ADMIN — Medication 100 MILLIGRAM(S): at 21:10

## 2019-04-14 RX ADMIN — OXYCODONE HYDROCHLORIDE 25 MILLIGRAM(S): 5 TABLET ORAL at 05:47

## 2019-04-14 RX ADMIN — TAMSULOSIN HYDROCHLORIDE 0.4 MILLIGRAM(S): 0.4 CAPSULE ORAL at 21:10

## 2019-04-14 RX ADMIN — Medication 2 MILLIGRAM(S): at 05:48

## 2019-04-14 RX ADMIN — HYDROMORPHONE HYDROCHLORIDE 1 MILLIGRAM(S): 2 INJECTION INTRAMUSCULAR; INTRAVENOUS; SUBCUTANEOUS at 10:10

## 2019-04-14 RX ADMIN — APIXABAN 2.5 MILLIGRAM(S): 2.5 TABLET, FILM COATED ORAL at 09:43

## 2019-04-14 RX ADMIN — Medication 100 MILLIGRAM(S): at 05:48

## 2019-04-14 RX ADMIN — LIDOCAINE 1 PATCH: 4 CREAM TOPICAL at 11:42

## 2019-04-14 RX ADMIN — HYDROMORPHONE HYDROCHLORIDE 4 MILLIGRAM(S): 2 INJECTION INTRAMUSCULAR; INTRAVENOUS; SUBCUTANEOUS at 07:00

## 2019-04-14 RX ADMIN — HYDROMORPHONE HYDROCHLORIDE 4 MILLIGRAM(S): 2 INJECTION INTRAMUSCULAR; INTRAVENOUS; SUBCUTANEOUS at 05:48

## 2019-04-14 RX ADMIN — HYDROMORPHONE HYDROCHLORIDE 4 MILLIGRAM(S): 2 INJECTION INTRAMUSCULAR; INTRAVENOUS; SUBCUTANEOUS at 11:41

## 2019-04-14 RX ADMIN — HYDROMORPHONE HYDROCHLORIDE 4 MILLIGRAM(S): 2 INJECTION INTRAMUSCULAR; INTRAVENOUS; SUBCUTANEOUS at 23:36

## 2019-04-14 RX ADMIN — OXYCODONE HYDROCHLORIDE 25 MILLIGRAM(S): 5 TABLET ORAL at 07:00

## 2019-04-14 RX ADMIN — PANTOPRAZOLE SODIUM 40 MILLIGRAM(S): 20 TABLET, DELAYED RELEASE ORAL at 05:48

## 2019-04-14 RX ADMIN — LIDOCAINE 1 PATCH: 4 CREAM TOPICAL at 23:35

## 2019-04-14 RX ADMIN — OXYCODONE HYDROCHLORIDE 25 MILLIGRAM(S): 5 TABLET ORAL at 18:18

## 2019-04-14 RX ADMIN — LIDOCAINE 1 PATCH: 4 CREAM TOPICAL at 19:40

## 2019-04-14 RX ADMIN — Medication 100 MILLIGRAM(S): at 11:41

## 2019-04-14 RX ADMIN — LIDOCAINE 1 PATCH: 4 CREAM TOPICAL at 07:35

## 2019-04-14 RX ADMIN — HYDROMORPHONE HYDROCHLORIDE 1 MILLIGRAM(S): 2 INJECTION INTRAMUSCULAR; INTRAVENOUS; SUBCUTANEOUS at 09:43

## 2019-04-14 RX ADMIN — LIDOCAINE 1 PATCH: 4 CREAM TOPICAL at 09:44

## 2019-04-14 NOTE — PROGRESS NOTE ADULT - PROBLEM SELECTOR PLAN 9
DVT PPx: on Apixiban   Apparently pt had hx of PE years ago, and was on Apixiban.  Previous attending was unable to reach PCP.  Pt going to hospice and therefore, would not recommend continuing anticoagulation.   GOC: DNR/DNI, TORY in chart, pending [facility]

## 2019-04-14 NOTE — PROGRESS NOTE ADULT - SUBJECTIVE AND OBJECTIVE BOX
Pt seen and examined at bedside by Dr. Dara Mccurdy -- Pager [phone]    Patient is a 78y old  Male who presents with a chief complaint of AMS (13 Apr 2019 13:16)      SUBJECTIVE / OVERNIGHT EVENTS:  Pt feels fine, no complaints     REVIEW OF SYSTEMS:    CONSTITUTIONAL: No weakness, fevers or chills  EYES/ENT: No visual changes;  No vertigo or throat pain   NECK: No pain or stiffness  RESPIRATORY: No cough, wheezing, hemoptysis; No shortness of breath  CARDIOVASCULAR: No chest pain or palpitations  GASTROINTESTINAL: No abdominal or epigastric pain. No nausea, vomiting, or hematemesis; No diarrhea or constipation. No melena or hematochezia.  GENITOURINARY: No dysuria, frequency or hematuria  NEUROLOGICAL: No numbness or weakness  SKIN: No itching, rashes  EXT: No edema, deformities, or weakness  HEME/ONC: No bleeding, no weight loss, no lymphadenopathy, no night sweats.     MEDICATIONS  (STANDING):  apixaban 2.5 milliGRAM(s) Oral every 12 hours  dexamethasone     Tablet 2 milliGRAM(s) Oral daily  docusate sodium 100 milliGRAM(s) Oral three times a day  HYDROmorphone   Tablet 4 milliGRAM(s) Oral every 6 hours  ondansetron Injectable 4 milliGRAM(s) IV Push once  oxyCODONE  ER Tablet 25 milliGRAM(s) Oral every 12 hours  pantoprazole    Tablet 40 milliGRAM(s) Oral before breakfast  senna 2 Tablet(s) Oral at bedtime  tamsulosin 0.4 milliGRAM(s) Oral at bedtime  tenofovir disoproxil fumarate (VIREAD) 300 milliGRAM(s) Oral <User Schedule>    MEDICATIONS  (PRN):  ALBUTerol/ipratropium for Nebulization 3 milliLiter(s) Nebulizer every 6 hours PRN Shortness of Breath and/or Wheezing  aluminum hydroxide/magnesium hydroxide/simethicone Suspension 30 milliLiter(s) Oral every 4 hours PRN Dyspepsia  HYDROmorphone  Injectable 1 milliGRAM(s) IV Push every 3 hours PRN for breakthrough pain  lidocaine   Patch 1 Patch Transdermal daily PRN pain  lidocaine   Patch 1 Patch Transdermal daily PRN knee pain      Vital Signs Last 24 Hrs  T(C): 36.7 (14 Apr 2019 05:33), Max: 36.7 (14 Apr 2019 05:33)  T(F): 98 (14 Apr 2019 05:33), Max: 98 (14 Apr 2019 05:33)  HR: 80 (14 Apr 2019 05:33) (76 - 82)  BP: 131/74 (14 Apr 2019 05:33) (119/79 - 139/74)  BP(mean): --  RR: 18 (14 Apr 2019 05:33) (16 - 18)  SpO2: 93% (14 Apr 2019 05:33) (93% - 98%)  CAPILLARY BLOOD GLUCOSE        I&O's Summary    13 Apr 2019 07:01  -  14 Apr 2019 07:00  --------------------------------------------------------  IN: 985 mL / OUT: 450 mL / NET: 535 mL    14 Apr 2019 07:01  -  14 Apr 2019 12:11  --------------------------------------------------------  IN: 0 mL / OUT: 400 mL / NET: -400 mL          PHYSICAL EXAM  GENERAL: NAD, well-developed  HEAD:  Atraumatic, Normocephalic  EYES: EOMI, PERRLA, conjunctiva and sclera clear  NECK: Supple, No JVD  CHEST/LUNG: Clear to auscultation bilaterally; No wheeze  HEART: Regular rate and rhythm; No murmurs, rubs, or gallops  ABDOMEN: Soft, Nontender, Nondistended; Bowel sounds present  EXTREMITIES:  2+ Peripheral Pulses, No clubbing, cyanosis; No edema  PSYCH: AAOx1  NEURO: 5/5 Muscle strength x4.  Sensation intact     LABS:                    RADIOLOGY & ADDITIONAL TESTS:    Imaging Personally Reviewed:  Consultant(s) Notes Reviewed:    Care Discussed with Consultants/Other Providers: Medicine BRUCE Zuleyka

## 2019-04-15 LAB
ANION GAP SERPL CALC-SCNC: 13 MMOL/L — SIGNIFICANT CHANGE UP (ref 5–17)
BUN SERPL-MCNC: 33 MG/DL — HIGH (ref 7–23)
CALCIUM SERPL-MCNC: 9.3 MG/DL — SIGNIFICANT CHANGE UP (ref 8.4–10.5)
CHLORIDE SERPL-SCNC: 94 MMOL/L — LOW (ref 96–108)
CO2 SERPL-SCNC: 31 MMOL/L — SIGNIFICANT CHANGE UP (ref 22–31)
CREAT SERPL-MCNC: 2.4 MG/DL — HIGH (ref 0.5–1.3)
GLUCOSE SERPL-MCNC: 153 MG/DL — HIGH (ref 70–99)
HCT VFR BLD CALC: 32.4 % — LOW (ref 39–50)
HGB BLD-MCNC: 9.9 G/DL — LOW (ref 13–17)
MCHC RBC-ENTMCNC: 29 PG — SIGNIFICANT CHANGE UP (ref 27–34)
MCHC RBC-ENTMCNC: 30.6 GM/DL — LOW (ref 32–36)
MCV RBC AUTO: 95 FL — SIGNIFICANT CHANGE UP (ref 80–100)
PLATELET # BLD AUTO: 240 K/UL — SIGNIFICANT CHANGE UP (ref 150–400)
POTASSIUM SERPL-MCNC: 3.9 MMOL/L — SIGNIFICANT CHANGE UP (ref 3.5–5.3)
POTASSIUM SERPL-SCNC: 3.9 MMOL/L — SIGNIFICANT CHANGE UP (ref 3.5–5.3)
RBC # BLD: 3.41 M/UL — LOW (ref 4.2–5.8)
RBC # FLD: 14.4 % — SIGNIFICANT CHANGE UP (ref 10.3–14.5)
SODIUM SERPL-SCNC: 138 MMOL/L — SIGNIFICANT CHANGE UP (ref 135–145)
WBC # BLD: 21.45 K/UL — HIGH (ref 3.8–10.5)
WBC # FLD AUTO: 21.45 K/UL — HIGH (ref 3.8–10.5)

## 2019-04-15 PROCEDURE — 99233 SBSQ HOSP IP/OBS HIGH 50: CPT

## 2019-04-15 RX ORDER — OMEPRAZOLE 10 MG/1
1 CAPSULE, DELAYED RELEASE ORAL
Qty: 0 | Refills: 0 | COMMUNITY

## 2019-04-15 RX ORDER — IPRATROPIUM/ALBUTEROL SULFATE 18-103MCG
3 AEROSOL WITH ADAPTER (GRAM) INHALATION
Qty: 30 | Refills: 0 | OUTPATIENT
Start: 2019-04-15

## 2019-04-15 RX ORDER — HYDROMORPHONE HYDROCHLORIDE 2 MG/ML
1 INJECTION INTRAMUSCULAR; INTRAVENOUS; SUBCUTANEOUS
Qty: 0 | Refills: 0 | COMMUNITY
Start: 2019-04-15

## 2019-04-15 RX ORDER — DOCUSATE SODIUM 100 MG
1 CAPSULE ORAL
Qty: 0 | Refills: 0 | COMMUNITY
Start: 2019-04-15

## 2019-04-15 RX ORDER — TENOFOVIR DISOPROXIL FUMARATE 300 MG/1
1 TABLET, FILM COATED ORAL
Qty: 0 | Refills: 0 | COMMUNITY

## 2019-04-15 RX ORDER — TAMSULOSIN HYDROCHLORIDE 0.4 MG/1
1 CAPSULE ORAL
Qty: 0 | Refills: 0 | COMMUNITY
Start: 2019-04-15

## 2019-04-15 RX ORDER — SENNA PLUS 8.6 MG/1
2 TABLET ORAL
Qty: 0 | Refills: 0 | COMMUNITY
Start: 2019-04-15

## 2019-04-15 RX ORDER — DEXAMETHASONE 0.5 MG/5ML
1 ELIXIR ORAL
Qty: 0 | Refills: 0 | COMMUNITY
Start: 2019-04-15

## 2019-04-15 RX ORDER — DEXAMETHASONE 0.5 MG/5ML
2 ELIXIR ORAL
Qty: 0 | Refills: 0 | COMMUNITY

## 2019-04-15 RX ORDER — TENOFOVIR DISOPROXIL FUMARATE 300 MG/1
1 TABLET, FILM COATED ORAL
Qty: 0 | Refills: 0 | COMMUNITY
Start: 2019-04-15

## 2019-04-15 RX ORDER — OXYCODONE HYDROCHLORIDE 5 MG/1
1 TABLET ORAL
Qty: 9 | Refills: 0 | OUTPATIENT
Start: 2019-04-15 | End: 2019-04-17

## 2019-04-15 RX ORDER — LIDOCAINE 4 G/100G
1 CREAM TOPICAL
Qty: 14 | Refills: 0 | OUTPATIENT
Start: 2019-04-15 | End: 2019-04-28

## 2019-04-15 RX ORDER — OXYCODONE HYDROCHLORIDE 5 MG/1
25 TABLET ORAL
Qty: 0 | Refills: 0 | COMMUNITY
Start: 2019-04-15

## 2019-04-15 RX ORDER — ACETAMINOPHEN 500 MG
20 TABLET ORAL
Qty: 0 | Refills: 0 | COMMUNITY

## 2019-04-15 RX ADMIN — Medication 100 MILLIGRAM(S): at 13:12

## 2019-04-15 RX ADMIN — HYDROMORPHONE HYDROCHLORIDE 1 MILLIGRAM(S): 2 INJECTION INTRAMUSCULAR; INTRAVENOUS; SUBCUTANEOUS at 15:50

## 2019-04-15 RX ADMIN — APIXABAN 2.5 MILLIGRAM(S): 2.5 TABLET, FILM COATED ORAL at 09:29

## 2019-04-15 RX ADMIN — Medication 100 MILLIGRAM(S): at 21:26

## 2019-04-15 RX ADMIN — Medication 100 MILLIGRAM(S): at 05:34

## 2019-04-15 RX ADMIN — OXYCODONE HYDROCHLORIDE 25 MILLIGRAM(S): 5 TABLET ORAL at 06:00

## 2019-04-15 RX ADMIN — HYDROMORPHONE HYDROCHLORIDE 1 MILLIGRAM(S): 2 INJECTION INTRAMUSCULAR; INTRAVENOUS; SUBCUTANEOUS at 16:26

## 2019-04-15 RX ADMIN — PANTOPRAZOLE SODIUM 40 MILLIGRAM(S): 20 TABLET, DELAYED RELEASE ORAL at 05:33

## 2019-04-15 RX ADMIN — SENNA PLUS 2 TABLET(S): 8.6 TABLET ORAL at 21:26

## 2019-04-15 RX ADMIN — OXYCODONE HYDROCHLORIDE 25 MILLIGRAM(S): 5 TABLET ORAL at 18:07

## 2019-04-15 RX ADMIN — HYDROMORPHONE HYDROCHLORIDE 4 MILLIGRAM(S): 2 INJECTION INTRAMUSCULAR; INTRAVENOUS; SUBCUTANEOUS at 05:34

## 2019-04-15 RX ADMIN — Medication 2 MILLIGRAM(S): at 05:34

## 2019-04-15 RX ADMIN — OXYCODONE HYDROCHLORIDE 25 MILLIGRAM(S): 5 TABLET ORAL at 18:42

## 2019-04-15 RX ADMIN — HYDROMORPHONE HYDROCHLORIDE 4 MILLIGRAM(S): 2 INJECTION INTRAMUSCULAR; INTRAVENOUS; SUBCUTANEOUS at 12:47

## 2019-04-15 RX ADMIN — OXYCODONE HYDROCHLORIDE 25 MILLIGRAM(S): 5 TABLET ORAL at 05:34

## 2019-04-15 RX ADMIN — HYDROMORPHONE HYDROCHLORIDE 4 MILLIGRAM(S): 2 INJECTION INTRAMUSCULAR; INTRAVENOUS; SUBCUTANEOUS at 23:39

## 2019-04-15 RX ADMIN — TAMSULOSIN HYDROCHLORIDE 0.4 MILLIGRAM(S): 0.4 CAPSULE ORAL at 21:26

## 2019-04-15 RX ADMIN — HYDROMORPHONE HYDROCHLORIDE 4 MILLIGRAM(S): 2 INJECTION INTRAMUSCULAR; INTRAVENOUS; SUBCUTANEOUS at 00:02

## 2019-04-15 RX ADMIN — HYDROMORPHONE HYDROCHLORIDE 4 MILLIGRAM(S): 2 INJECTION INTRAMUSCULAR; INTRAVENOUS; SUBCUTANEOUS at 11:49

## 2019-04-15 RX ADMIN — HYDROMORPHONE HYDROCHLORIDE 4 MILLIGRAM(S): 2 INJECTION INTRAMUSCULAR; INTRAVENOUS; SUBCUTANEOUS at 23:08

## 2019-04-15 RX ADMIN — HYDROMORPHONE HYDROCHLORIDE 4 MILLIGRAM(S): 2 INJECTION INTRAMUSCULAR; INTRAVENOUS; SUBCUTANEOUS at 06:00

## 2019-04-15 RX ADMIN — TENOFOVIR DISOPROXIL FUMARATE 300 MILLIGRAM(S): 300 TABLET, FILM COATED ORAL at 09:29

## 2019-04-15 NOTE — CHART NOTE - NSCHARTNOTEFT_GEN_A_CORE
Nutrition Follow Up Note  Patient seen for: Malnutrition follow up    Source: Medical record/team    Diet : Dysphagia1 Honey thickened liquids       PO intake : small bites, sips ensure with encouragement. Patient eats better with family present     Source for PO intake: staff        Daily Weight in k.6 (04-)  Weight Change no new weight    Pertinent Medications: MEDICATIONS  (STANDING):  dexamethasone     Tablet 2 milliGRAM(s) Oral daily  docusate sodium 100 milliGRAM(s) Oral three times a day  HYDROmorphone   Tablet 4 milliGRAM(s) Oral every 6 hours  ondansetron Injectable 4 milliGRAM(s) IV Push once  oxyCODONE  ER Tablet 25 milliGRAM(s) Oral every 12 hours  pantoprazole    Tablet 40 milliGRAM(s) Oral before breakfast  senna 2 Tablet(s) Oral at bedtime  tamsulosin 0.4 milliGRAM(s) Oral at bedtime  tenofovir disoproxil fumarate (VIREAD) 300 milliGRAM(s) Oral <User Schedule>    MEDICATIONS  (PRN):  ALBUTerol/ipratropium for Nebulization 3 milliLiter(s) Nebulizer every 6 hours PRN Shortness of Breath and/or Wheezing  aluminum hydroxide/magnesium hydroxide/simethicone Suspension 30 milliLiter(s) Oral every 4 hours PRN Dyspepsia  HYDROmorphone  Injectable 1 milliGRAM(s) IV Push every 3 hours PRN for breakthrough pain  lidocaine   Patch 1 Patch Transdermal daily PRN pain  lidocaine   Patch 1 Patch Transdermal daily PRN knee pain    Pertinent Labs: 04-15 @ 07:02: Na 138, BUN 33<H>, Cr 2.40<H>, <H>, K+ 3.9, Phos --, Mg --, Alk Phos --, ALT/SGPT --, AST/SGOT --, HbA1c --  GI BM x1     Skin per nursing documentation: no pressure breakdown    Estimated Needs:   [X ] no change since previous assessment  [ ] recalculated:     Previous Nutrition Diagnosis: Malnutrition  Nutrition Diagnosis is: addressed        Recommend  1) continue ensure enlive 2x daily.  2. Monitor/encourage PO intake.   3) assist with tray prep, menus  4) continue zofran   5) continue bowel regimen  Discussed with team     Monitoring and Evaluation:     Continue to monitor Nutritional intake, Tolerance to diet prescription, weights, labs, skin integrity    RD remains available upon request and will follow up per protocol

## 2019-04-15 NOTE — PROGRESS NOTE ADULT - PROBLEM SELECTOR PLAN 9
DVT ppx: on Eliquis  Pt had hx of PE several years ago and was started on Eliquis. Pt going to hospice and may not require ongoing AC. Will f/up Hem/onc re: stopping AC.  GOC: DNR/DNI, MOL in chart, pending discharge to long term care with eventual hospice. DVT ppx: on Eliquis  Pt had hx of PE several years ago and was started on Eliquis. Pt going to hospice and may not require ongoing AC.   Discussed with Hem/onc ; recc to stop Eliquis  GOC: DNR/DNI, TORY in chart, pending discharge to long term care with eventual hospice.

## 2019-04-15 NOTE — PROGRESS NOTE ADULT - SUBJECTIVE AND OBJECTIVE BOX
Patient is a 78y old  Male who presents with a chief complaint of AMS (14 Apr 2019 12:11)      SUBJECTIVE / OVERNIGHT EVENTS:  Pt seen and examined. No acute events overnight. Denies cp, sob.    MEDICATIONS  (STANDING):  apixaban 2.5 milliGRAM(s) Oral every 12 hours  dexamethasone     Tablet 2 milliGRAM(s) Oral daily  docusate sodium 100 milliGRAM(s) Oral three times a day  HYDROmorphone   Tablet 4 milliGRAM(s) Oral every 6 hours  ondansetron Injectable 4 milliGRAM(s) IV Push once  oxyCODONE  ER Tablet 25 milliGRAM(s) Oral every 12 hours  pantoprazole    Tablet 40 milliGRAM(s) Oral before breakfast  senna 2 Tablet(s) Oral at bedtime  tamsulosin 0.4 milliGRAM(s) Oral at bedtime  tenofovir disoproxil fumarate (VIREAD) 300 milliGRAM(s) Oral <User Schedule>    MEDICATIONS  (PRN):  ALBUTerol/ipratropium for Nebulization 3 milliLiter(s) Nebulizer every 6 hours PRN Shortness of Breath and/or Wheezing  aluminum hydroxide/magnesium hydroxide/simethicone Suspension 30 milliLiter(s) Oral every 4 hours PRN Dyspepsia  HYDROmorphone  Injectable 1 milliGRAM(s) IV Push every 3 hours PRN for breakthrough pain  lidocaine   Patch 1 Patch Transdermal daily PRN pain  lidocaine   Patch 1 Patch Transdermal daily PRN knee pain      Vital Signs Last 24 Hrs  T(C): 36.6 (15 Apr 2019 11:28), Max: 36.6 (14 Apr 2019 14:17)  T(F): 97.8 (15 Apr 2019 11:28), Max: 97.9 (14 Apr 2019 14:17)  HR: 78 (15 Apr 2019 13:41) (74 - 99)  BP: 107/66 (15 Apr 2019 13:41) (105/62 - 126/78)  BP(mean): --  RR: 18 (15 Apr 2019 13:41) (18 - 18)  SpO2: 95% (15 Apr 2019 13:41) (93% - 96%)  CAPILLARY BLOOD GLUCOSE        I&O's Summary    14 Apr 2019 07:01  -  15 Apr 2019 07:00  --------------------------------------------------------  IN: 960 mL / OUT: 700 mL / NET: 260 mL    15 Apr 2019 07:01  -  15 Apr 2019 14:01  --------------------------------------------------------  IN: 120 mL / OUT: 0 mL / NET: 120 mL        PHYSICAL EXAM:  GENERAL: NAD, cachectic  HEAD:  Atraumatic, Normocephalic  EYES: EOMI, conjunctiva and sclera clear  NECK: Supple, No JVD  CHEST/LUNG: Clear to auscultation bilaterally; No wheeze  HEART: Regular rate and rhythm; No murmurs, rubs, or gallops  ABDOMEN: Soft, Nontender, Nondistended; Bowel sounds present  EXTREMITIES:  palapable Peripheral Pulses, No clubbing, cyanosis, or edema  PSYCH: AAOx3, appropriate mood and affect.   NEUROLOGY: non-focal, no sensory or motor deficit.   SKIN: No rashes or lesions    LABS:                        9.9    21.45 )-----------( 240      ( 15 Apr 2019 11:23 )             32.4     04-15    138  |  94<L>  |  33<H>  ----------------------------<  153<H>  3.9   |  31  |  2.40<H>    Ca    9.3      15 Apr 2019 07:02

## 2019-04-15 NOTE — PROGRESS NOTE ADULT - PROBLEM SELECTOR PLAN 1
- discharge to long term care facility ; pt's family [place] in [place]  - CM facilitating   - Goal for Hospice transition afterwards   -C/w PT as tolerated - discharge planning to long term care facility  - Goal for Hospice transition afterwards   -  CM facilitating   -C/w PT as tolerated

## 2019-04-16 ENCOUNTER — TRANSCRIPTION ENCOUNTER (OUTPATIENT)
Age: 79
End: 2019-04-16

## 2019-04-16 VITALS
TEMPERATURE: 97 F | RESPIRATION RATE: 18 BRPM | DIASTOLIC BLOOD PRESSURE: 71 MMHG | SYSTOLIC BLOOD PRESSURE: 134 MMHG | HEART RATE: 65 BPM | OXYGEN SATURATION: 97 %

## 2019-04-16 LAB
HCT VFR BLD CALC: 31.6 % — LOW (ref 39–50)
HGB BLD-MCNC: 9.7 G/DL — LOW (ref 13–17)
MCHC RBC-ENTMCNC: 28.7 PG — SIGNIFICANT CHANGE UP (ref 27–34)
MCHC RBC-ENTMCNC: 30.7 GM/DL — LOW (ref 32–36)
MCV RBC AUTO: 93.5 FL — SIGNIFICANT CHANGE UP (ref 80–100)
PLATELET # BLD AUTO: 226 K/UL — SIGNIFICANT CHANGE UP (ref 150–400)
RBC # BLD: 3.38 M/UL — LOW (ref 4.2–5.8)
RBC # FLD: 14.8 % — HIGH (ref 10.3–14.5)
WBC # BLD: 20.97 K/UL — HIGH (ref 3.8–10.5)
WBC # FLD AUTO: 20.97 K/UL — HIGH (ref 3.8–10.5)

## 2019-04-16 PROCEDURE — 99239 HOSP IP/OBS DSCHRG MGMT >30: CPT

## 2019-04-16 RX ADMIN — OXYCODONE HYDROCHLORIDE 25 MILLIGRAM(S): 5 TABLET ORAL at 05:34

## 2019-04-16 RX ADMIN — HYDROMORPHONE HYDROCHLORIDE 4 MILLIGRAM(S): 2 INJECTION INTRAMUSCULAR; INTRAVENOUS; SUBCUTANEOUS at 11:10

## 2019-04-16 RX ADMIN — OXYCODONE HYDROCHLORIDE 25 MILLIGRAM(S): 5 TABLET ORAL at 18:00

## 2019-04-16 RX ADMIN — HYDROMORPHONE HYDROCHLORIDE 4 MILLIGRAM(S): 2 INJECTION INTRAMUSCULAR; INTRAVENOUS; SUBCUTANEOUS at 16:45

## 2019-04-16 RX ADMIN — HYDROMORPHONE HYDROCHLORIDE 4 MILLIGRAM(S): 2 INJECTION INTRAMUSCULAR; INTRAVENOUS; SUBCUTANEOUS at 11:40

## 2019-04-16 RX ADMIN — PANTOPRAZOLE SODIUM 40 MILLIGRAM(S): 20 TABLET, DELAYED RELEASE ORAL at 05:34

## 2019-04-16 RX ADMIN — Medication 100 MILLIGRAM(S): at 05:34

## 2019-04-16 RX ADMIN — HYDROMORPHONE HYDROCHLORIDE 4 MILLIGRAM(S): 2 INJECTION INTRAMUSCULAR; INTRAVENOUS; SUBCUTANEOUS at 06:00

## 2019-04-16 RX ADMIN — HYDROMORPHONE HYDROCHLORIDE 4 MILLIGRAM(S): 2 INJECTION INTRAMUSCULAR; INTRAVENOUS; SUBCUTANEOUS at 16:17

## 2019-04-16 RX ADMIN — HYDROMORPHONE HYDROCHLORIDE 4 MILLIGRAM(S): 2 INJECTION INTRAMUSCULAR; INTRAVENOUS; SUBCUTANEOUS at 05:34

## 2019-04-16 RX ADMIN — Medication 2 MILLIGRAM(S): at 05:34

## 2019-04-16 RX ADMIN — Medication 100 MILLIGRAM(S): at 13:51

## 2019-04-16 RX ADMIN — OXYCODONE HYDROCHLORIDE 25 MILLIGRAM(S): 5 TABLET ORAL at 17:26

## 2019-04-16 RX ADMIN — OXYCODONE HYDROCHLORIDE 25 MILLIGRAM(S): 5 TABLET ORAL at 06:00

## 2019-04-16 NOTE — DISCHARGE NOTE NURSING/CASE MANAGEMENT/SOCIAL WORK - NSDCDPATPORTLINK_GEN_ALL_CORE
You can access the [facility] Portal, offered by [facility], by registering with the following website: [email or web address]

## 2019-04-16 NOTE — PROGRESS NOTE ADULT - PROBLEM SELECTOR PLAN 9
DVT ppx:  Eliquis d/jack yesterday per Hem/onc reccs   GOC: DNR/DNI, MOLST in chart  Pending discharge to long term care with eventual hospice.

## 2019-04-16 NOTE — PROGRESS NOTE ADULT - NSHPATTENDINGPLANDISCUSS_GEN_ALL_CORE
Covering NP
house staff
Team 1 Resident
house staff
house staff

## 2019-04-16 NOTE — PROGRESS NOTE ADULT - PROBLEM SELECTOR PLAN 3
Patient with stage 4 lung cancer on Tagrisso, started 1/10/19), currently per oncology, no longer offering further chemotherapy given current functional status, recommending hospice.   - discharge planning to long term care facility

## 2019-04-16 NOTE — PROGRESS NOTE ADULT - PROVIDER SPECIALTY LIST ADULT
Hospitalist
Internal Medicine
Hospitalist
Internal Medicine

## 2019-04-16 NOTE — PROGRESS NOTE ADULT - SUBJECTIVE AND OBJECTIVE BOX
Patient is a 78y old  Male who presents with a chief complaint of AMS (15 Apr 2019 14:01)      SUBJECTIVE / OVERNIGHT EVENTS:  Pt seen and examined. No acute events overnight. Denies cp, sob.    MEDICATIONS  (STANDING):  dexamethasone     Tablet 2 milliGRAM(s) Oral daily  docusate sodium 100 milliGRAM(s) Oral three times a day  HYDROmorphone   Tablet 4 milliGRAM(s) Oral every 6 hours  ondansetron Injectable 4 milliGRAM(s) IV Push once  oxyCODONE  ER Tablet 25 milliGRAM(s) Oral every 12 hours  pantoprazole    Tablet 40 milliGRAM(s) Oral before breakfast  senna 2 Tablet(s) Oral at bedtime  tamsulosin 0.4 milliGRAM(s) Oral at bedtime  tenofovir disoproxil fumarate (VIREAD) 300 milliGRAM(s) Oral <User Schedule>    MEDICATIONS  (PRN):  ALBUTerol/ipratropium for Nebulization 3 milliLiter(s) Nebulizer every 6 hours PRN Shortness of Breath and/or Wheezing  aluminum hydroxide/magnesium hydroxide/simethicone Suspension 30 milliLiter(s) Oral every 4 hours PRN Dyspepsia  lidocaine   Patch 1 Patch Transdermal daily PRN pain  lidocaine   Patch 1 Patch Transdermal daily PRN knee pain      Vital Signs Last 24 Hrs  T(C): 36.6 (16 Apr 2019 11:56), Max: 36.6 (15 Apr 2019 20:45)  T(F): 97.9 (16 Apr 2019 11:56), Max: 97.9 (16 Apr 2019 11:56)  HR: 96 (16 Apr 2019 11:56) (78 - 100)  BP: 117/69 (16 Apr 2019 11:56) (107/66 - 150/67)  BP(mean): --  RR: 18 (16 Apr 2019 11:56) (18 - 18)  SpO2: 97% (16 Apr 2019 11:56) (94% - 97%)  CAPILLARY BLOOD GLUCOSE        I&O's Summary    15 Apr 2019 07:01  -  16 Apr 2019 07:00  --------------------------------------------------------  IN: 960 mL / OUT: 200 mL / NET: 760 mL    16 Apr 2019 07:01  -  16 Apr 2019 12:30  --------------------------------------------------------  IN: 120 mL / OUT: 0 mL / NET: 120 mL        PHYSICAL EXAM:  GENERAL: NAD, frail, up in chair  HEAD:  Atraumatic, Normocephalic  EYES: EOMI, conjunctiva and sclera clear  NECK: Supple, No JVD  CHEST/LUNG: Clear to auscultation bilaterally; No wheeze  HEART: Regular rate and rhythm; No murmurs, rubs, or gallops  ABDOMEN: Soft, Nontender, Nondistended; Bowel sounds present  EXTREMITIES:  palpable Peripheral Pulses, No clubbing, cyanosis, or edema  PSYCH: AAOx3, appropriate mood and affect.   NEUROLOGY: non-focal, no sensory or motor deficit.   SKIN: No rashes or lesions    LABS:                        9.7    20.97 )-----------( 226      ( 16 Apr 2019 09:14 )             31.6     04-15    138  |  94<L>  |  33<H>  ----------------------------<  153<H>  3.9   |  31  |  2.40<H>    Ca    9.3      15 Apr 2019 07:02

## 2019-04-16 NOTE — PROGRESS NOTE ADULT - PROBLEM SELECTOR PLAN 1
- discharge planning to long term care facility  - Goal for Hospice transition afterwards   -  CM facilitating   -C/w PT as tolerated

## 2019-04-17 NOTE — CHART NOTE - NSCHARTNOTEFT_GEN_A_CORE
Attending addendum-  4/17/19, 9am    The patient was evaluated by Nutrition Service and was found to have severe protein calorie malnutrition.  Appropriate plan and care was provided with nutrition supplements per recommendation.    - Mohsin Khan, MD

## 2019-04-30 ENCOUNTER — OUTPATIENT (OUTPATIENT)
Dept: OUTPATIENT SERVICES | Facility: HOSPITAL | Age: 79
LOS: 1 days | Discharge: HOME | End: 2019-04-30

## 2019-04-30 DIAGNOSIS — D04.9 CARCINOMA IN SITU OF SKIN, UNSPECIFIED: Chronic | ICD-10-CM

## 2019-04-30 DIAGNOSIS — C34.90 MALIGNANT NEOPLASM OF UNSPECIFIED PART OF UNSPECIFIED BRONCHUS OR LUNG: ICD-10-CM

## 2019-04-30 DIAGNOSIS — C44.311 BASAL CELL CARCINOMA OF SKIN OF NOSE: Chronic | ICD-10-CM

## 2019-05-01 PROCEDURE — G9005: CPT

## 2019-05-03 ENCOUNTER — APPOINTMENT (OUTPATIENT)
Dept: HEMATOLOGY ONCOLOGY | Facility: CLINIC | Age: 79
End: 2019-05-03
Payer: MEDICAID

## 2019-05-03 VITALS
OXYGEN SATURATION: 96 % | TEMPERATURE: 98.3 F | SYSTOLIC BLOOD PRESSURE: 113 MMHG | RESPIRATION RATE: 16 BRPM | HEART RATE: 104 BPM | DIASTOLIC BLOOD PRESSURE: 66 MMHG

## 2019-05-03 PROCEDURE — 99214 OFFICE O/P EST MOD 30 MIN: CPT

## 2019-05-09 NOTE — PHYSICAL EXAM
[Completely disabled. Cannot carry on any self care. Totally confined to bed or chair] : Status 4- Completely disabled. Cannot carry on any self care. Totally confined to bed or chair [Normal] : full range of motion and no deformities appreciated [de-identified] : clear b/l [de-identified] : severely debilitated [Mucositis] : no mucositis [de-identified] : very lethargic, somnolent, jerky mobvements of UE

## 2019-05-09 NOTE — ASSESSMENT
[Palliative] : Goals of care discussed with patient: Palliative [Palliative Care Plan] : not applicable at this time [FreeTextEntry1] : 76yo M w/ Stage IV EGFR mutated (no T790M) NSCLC on afatinib, here for follow-up.\par Pt is extremelely lethargic, deconditioned- not a candidate for cancer-directed tx- recommend hospice\par Recent imaging in hospital showed PNA- no cancer progression. BRain MRI showed no mets\par would not resume any cancer directed therapy. \par Conitnue current pain management. \par Would recommend hospice eval and extension of support services. \par If patient shows evidence of stabilization and improvement can revisit cancer directed therapy. \par \par

## 2019-05-09 NOTE — HISTORY OF PRESENT ILLNESS
[Disease: _____________________] : Disease: [unfilled] [M: ___] : M[unfilled] [AJCC Stage: ____] : AJCC Stage: [unfilled] [Cardiovascular] : Cardiovascular [Infectious] : Infectious [Pulmonary] : Pulmonary [de-identified] : Translation provided by Mr. Sainz's sonParmjit at pt's request\par \par Mr. Sainz is a 77 yo man with stage IV EGFR mutated NSCLC s/p progression through erlotinib, carboplatin/pemetrexed, atezolizumab,  now on gilotrif\par Tarceva 5/2016-4/2017\par Carbo/pem - 4/24/17 - 6/26/17\par Atezolizumab 7/13/17- 9/14/17\par Afatinib ~2/12/18 - 12/2018\par Tagrisso- 1/30/19- present admission was 12/27/18 to 1/3/19. Last visit, afatinib was stopped for toxicity (fatigue) and Tagrisso was recommended. There were some insurance-related delays since pt does not have a documented T790M mut. Through appeal process, tagrisso was approved and pt started taking it 2 weeks ago. He also started medical cannabis 3 weeks ago. Overall, he is doing better. He is more ambulatory and communicative at home. He is on oxycontin 30 mg TID and Hydromorphone 2 mg as needed (uses approx. 0-2 times/day). Appetite fair and weight is stable. He is till on puree diet and has follow up with speech therapy tomorrow. \par \par 3/12/19: Pt presents for follow up. Pt has been on Tagrisso and is tolerating well. Today however he c/o severe diffuse pain worse in thorax but also extends to abdomen. BM normal qod. Just took short acting (hydrocodone) a few minutes ago. Sleeping ok. Spends most of time iin bed. Appetite fair. Breathing has been ok. Using thick it for all liquids. Last imaging in December. \par \par 4/5/19: Was hospitalized for 7 days 2 weeks ago for PNA, discharged on 3/29. Since then, he has progressively weak. HE is very lethargic, very weak, and lays in bed most of the time. He has no pain- complete lack of energy. He has jerky movements of the body. He is taking gabapentin 100 mg BID and oxycontin 30 mg TID. He is still on dexamethasone 2 mg daily. He is continuos home O2. Complaint with honey thick liquids and puree. HE has lost significant amount of weight and is very anorexic. \par \par 5/3/19: Pt returns for re-evaluation from rehab. He is profoundly weak. He is mostly confined to bed and had been re-admitted for pneumonia (?aspiration). He has copious pooling secretions. Currently afebrile. 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

## 2019-05-09 NOTE — REVIEW OF SYSTEMS
[Fatigue] : fatigue [Recent Change In Weight] : ~T recent weight change [Dysphagia] : dysphagia [SOB on Exertion] : shortness of breath during exertion [Confused] : confusion [Difficulty Walking] : difficulty walking [Dizziness] : dizziness [Negative] : Heme/Lymph [Fever] : no fever [Odynophagia] : no odynophagia [Eye Pain] : no eye pain [Chills] : no chills [Mucosal Pain] : no mucosal pain [Chest Pain] : no chest pain [Palpitations] : no palpitations [Lower Ext Edema] : no lower extremity edema [Shortness Of Breath] : no shortness of breath [Leg Claudication] : no intermittent leg claudication [Cough] : no cough [Wheezing] : no wheezing [Constipation] : no constipation [Abdominal Pain] : no abdominal pain [Vomiting] : no vomiting [Joint Pain] : no joint pain [Diarrhea] : no diarrhea [Dysuria] : no dysuria [Joint Stiffness] : no joint stiffness [Muscle Pain] : no muscle pain [Anxiety] : no anxiety [Fainting] : no fainting [Skin Rash] : no skin rash [Muscle Weakness] : no muscle weakness [Easy Bleeding] : no tendency for easy bleeding [Depression] : no depression [Swollen Glands] : no swollen glands [Easy Bruising] : no tendency for easy bruising [FreeTextEntry2] : lost a lot of weight, in wheel chair [FreeTextEntry6] : improved [FreeTextEntry4] : no recent aspiration episodes on puree diet

## 2019-05-15 ENCOUNTER — APPOINTMENT (OUTPATIENT)
Dept: OTOLARYNGOLOGY | Facility: CLINIC | Age: 79
End: 2019-05-15
Payer: MEDICAID

## 2019-05-15 VITALS — DIASTOLIC BLOOD PRESSURE: 69 MMHG | HEART RATE: 91 BPM | HEIGHT: 68 IN | SYSTOLIC BLOOD PRESSURE: 107 MMHG

## 2019-05-15 DIAGNOSIS — J38.00 PARALYSIS OF VOCAL CORDS AND LARYNX, UNSPECIFIED: ICD-10-CM

## 2019-05-15 DIAGNOSIS — R49.0 DYSPHONIA: ICD-10-CM

## 2019-05-15 DIAGNOSIS — C34.90 MALIGNANT NEOPLASM OF UNSPECIFIED PART OF UNSPECIFIED BRONCHUS OR LUNG: ICD-10-CM

## 2019-05-15 DIAGNOSIS — J38.3 OTHER DISEASES OF VOCAL CORDS: ICD-10-CM

## 2019-05-15 DIAGNOSIS — R06.00 DYSPNEA, UNSPECIFIED: ICD-10-CM

## 2019-05-15 DIAGNOSIS — R13.10 DYSPHAGIA, UNSPECIFIED: ICD-10-CM

## 2019-05-15 DIAGNOSIS — R05 COUGH: ICD-10-CM

## 2019-05-15 PROCEDURE — 31579 LARYNGOSCOPY TELESCOPIC: CPT

## 2019-05-15 PROCEDURE — 69210 REMOVE IMPACTED EAR WAX UNI: CPT

## 2019-05-15 PROCEDURE — 99214 OFFICE O/P EST MOD 30 MIN: CPT | Mod: 25

## 2019-05-15 RX ORDER — OXYCODONE HYDROCHLORIDE 20 MG/1
20 TABLET, FILM COATED, EXTENDED RELEASE ORAL
Qty: 60 | Refills: 0 | Status: DISCONTINUED | COMMUNITY
Start: 2018-12-18 | End: 2019-05-15

## 2019-05-15 RX ORDER — NYSTATIN 100000 [USP'U]/ML
100000 SUSPENSION ORAL 3 TIMES DAILY
Qty: 1 | Refills: 1 | Status: ACTIVE | COMMUNITY
Start: 2019-05-15 | End: 1900-01-01

## 2019-05-15 RX ORDER — ACETAMINOPHEN 650 MG/20.3ML
650 SOLUTION ORAL EVERY 6 HOURS
Qty: 119 | Refills: 0 | Status: DISCONTINUED | COMMUNITY
Start: 2018-12-03 | End: 2019-05-15

## 2019-05-15 RX ORDER — LOPERAMIDE HCL 2 MG/1
2 TABLET, FILM COATED ORAL
Qty: 1 | Refills: 5 | Status: DISCONTINUED | COMMUNITY
Start: 2018-03-05 | End: 2019-05-15

## 2019-05-15 RX ORDER — APIXABAN 2.5 MG/1
2.5 TABLET, FILM COATED ORAL TWICE DAILY
Qty: 60 | Refills: 6 | Status: DISCONTINUED | COMMUNITY
Start: 2018-08-31 | End: 2019-05-15

## 2019-05-15 RX ORDER — OXYCODONE 10 MG/1
10 TABLET ORAL
Qty: 84 | Refills: 0 | Status: DISCONTINUED | COMMUNITY
Start: 2017-05-01 | End: 2019-05-15

## 2019-05-15 NOTE — CONSULT LETTER
[Dear  ___] : Dear  [unfilled], [Consult Closing:] : Thank you very much for allowing me to participate in the care of this patient.  If you have any questions, please do not hesitate to contact me. [Consult Letter:] : I had the pleasure of evaluating your patient, [unfilled]. [Please see my note below.] : Please see my note below. [Sincerely,] : Sincerely, [FreeTextEntry3] : Soto Prado MD, PhD\par Chief, Division of Laryngology\par Department of Otolaryngology\par [facility]\par Pediatric Otolaryngology, [facility]\par  of Otolaryngology\par [employer]\par \par \par

## 2019-05-15 NOTE — HISTORY OF PRESENT ILLNESS
[de-identified] : 79yo M s/p injection laryngoplasty 3 months ago. Currently recovering from pna, was hospitalized multiple times from rehab. Had aspiration PNA leading to hospitalizations. Voice is strong but still on pureed foods. MBS showed penetration to honey thick. Is in hospice care now as per recommendation of hemeonc. Pt not a candidate for treatment at this time due to malcondition, currently taking pain meds. Did not use nystatin. Lots phlegm. Not using suction machine.\par

## 2019-05-15 NOTE — REASON FOR VISIT
[Family Member] : family member [Subsequent Evaluation] : a subsequent evaluation for [FreeTextEntry2] : dysphonia

## 2019-05-15 NOTE — PHYSICAL EXAM
[Midline] : trachea located in midline position [Normal] : no rashes [Laryngoscopy Performed] : laryngoscopy was performed, see procedure section for findings

## 2019-06-04 ENCOUNTER — OUTPATIENT (OUTPATIENT)
Dept: OUTPATIENT SERVICES | Facility: HOSPITAL | Age: 79
LOS: 1 days | Discharge: HOME | End: 2019-06-04

## 2019-06-04 DIAGNOSIS — C44.311 BASAL CELL CARCINOMA OF SKIN OF NOSE: Chronic | ICD-10-CM

## 2019-06-04 DIAGNOSIS — C34.90 MALIGNANT NEOPLASM OF UNSPECIFIED PART OF UNSPECIFIED BRONCHUS OR LUNG: ICD-10-CM

## 2019-06-04 DIAGNOSIS — D04.9 CARCINOMA IN SITU OF SKIN, UNSPECIFIED: Chronic | ICD-10-CM

## 2019-06-07 ENCOUNTER — APPOINTMENT (OUTPATIENT)
Dept: HEMATOLOGY ONCOLOGY | Facility: CLINIC | Age: 79
End: 2019-06-07

## 2019-06-19 NOTE — PROGRESS NOTE ADULT - ASSESSMENT
78 y/o w Stage IV EGFR mutated NSCLC/adenocarcinoma (on 4th line of tx w/ PO Afatinib daily since 2/2018), essential HTN, DM2, HBV (on Tenofovir), COPD (not on home O2) w/ recent MICU admission for Septic shock 2/2 gram negative/MRSA PNA and acute hypoxic resp failure admitted for Sepsis (Recurrent)

## 2019-06-27 ENCOUNTER — APPOINTMENT (OUTPATIENT)
Dept: HEMATOLOGY ONCOLOGY | Facility: CLINIC | Age: 79
End: 2019-06-27

## 2019-07-10 PROCEDURE — 76775 US EXAM ABDO BACK WALL LIM: CPT

## 2019-07-10 PROCEDURE — 86850 RBC ANTIBODY SCREEN: CPT

## 2019-07-10 PROCEDURE — 97116 GAIT TRAINING THERAPY: CPT

## 2019-07-10 PROCEDURE — 87581 M.PNEUMON DNA AMP PROBE: CPT

## 2019-07-10 PROCEDURE — 80048 BASIC METABOLIC PNL TOTAL CA: CPT

## 2019-07-10 PROCEDURE — 85730 THROMBOPLASTIN TIME PARTIAL: CPT

## 2019-07-10 PROCEDURE — 87798 DETECT AGENT NOS DNA AMP: CPT

## 2019-07-10 PROCEDURE — 81001 URINALYSIS AUTO W/SCOPE: CPT

## 2019-07-10 PROCEDURE — 80202 ASSAY OF VANCOMYCIN: CPT

## 2019-07-10 PROCEDURE — 85027 COMPLETE CBC AUTOMATED: CPT

## 2019-07-10 PROCEDURE — 97110 THERAPEUTIC EXERCISES: CPT

## 2019-07-10 PROCEDURE — 82947 ASSAY GLUCOSE BLOOD QUANT: CPT

## 2019-07-10 PROCEDURE — 83036 HEMOGLOBIN GLYCOSYLATED A1C: CPT

## 2019-07-10 PROCEDURE — 99285 EMERGENCY DEPT VISIT HI MDM: CPT | Mod: 25

## 2019-07-10 PROCEDURE — 85014 HEMATOCRIT: CPT

## 2019-07-10 PROCEDURE — 80053 COMPREHEN METABOLIC PANEL: CPT

## 2019-07-10 PROCEDURE — 83735 ASSAY OF MAGNESIUM: CPT

## 2019-07-10 PROCEDURE — 82140 ASSAY OF AMMONIA: CPT

## 2019-07-10 PROCEDURE — 71045 X-RAY EXAM CHEST 1 VIEW: CPT

## 2019-07-10 PROCEDURE — 87486 CHLMYD PNEUM DNA AMP PROBE: CPT

## 2019-07-10 PROCEDURE — 82330 ASSAY OF CALCIUM: CPT

## 2019-07-10 PROCEDURE — 87086 URINE CULTURE/COLONY COUNT: CPT

## 2019-07-10 PROCEDURE — 96374 THER/PROPH/DIAG INJ IV PUSH: CPT

## 2019-07-10 PROCEDURE — 86901 BLOOD TYPING SEROLOGIC RH(D): CPT

## 2019-07-10 PROCEDURE — 97530 THERAPEUTIC ACTIVITIES: CPT

## 2019-07-10 PROCEDURE — 84300 ASSAY OF URINE SODIUM: CPT

## 2019-07-10 PROCEDURE — 84295 ASSAY OF SERUM SODIUM: CPT

## 2019-07-10 PROCEDURE — 84100 ASSAY OF PHOSPHORUS: CPT

## 2019-07-10 PROCEDURE — 83605 ASSAY OF LACTIC ACID: CPT

## 2019-07-10 PROCEDURE — 93005 ELECTROCARDIOGRAM TRACING: CPT

## 2019-07-10 PROCEDURE — 87040 BLOOD CULTURE FOR BACTERIA: CPT

## 2019-07-10 PROCEDURE — 82570 ASSAY OF URINE CREATININE: CPT

## 2019-07-10 PROCEDURE — 82803 BLOOD GASES ANY COMBINATION: CPT

## 2019-07-10 PROCEDURE — 82435 ASSAY OF BLOOD CHLORIDE: CPT

## 2019-07-10 PROCEDURE — 84132 ASSAY OF SERUM POTASSIUM: CPT

## 2019-07-10 PROCEDURE — 97162 PT EVAL MOD COMPLEX 30 MIN: CPT

## 2019-07-10 PROCEDURE — 87633 RESP VIRUS 12-25 TARGETS: CPT

## 2019-07-10 PROCEDURE — 70450 CT HEAD/BRAIN W/O DYE: CPT

## 2019-07-10 PROCEDURE — 85610 PROTHROMBIN TIME: CPT

## 2019-07-10 PROCEDURE — 86900 BLOOD TYPING SEROLOGIC ABO: CPT

## 2019-07-18 ENCOUNTER — APPOINTMENT (OUTPATIENT)
Dept: OTOLARYNGOLOGY | Facility: CLINIC | Age: 79
End: 2019-07-18

## 2019-07-26 NOTE — DISCHARGE NOTE ADULT - VISION (WITH CORRECTIVE LENSES IF THE PATIENT USUALLY WEARS THEM):
Partially impaired: cannot see medication labels or newsprint, but can see obstacles in path, and the surrounding layout; can count fingers at arm's length Otc Regimen: Start Differin 0.1% gel QHS for comedones on the forehead and nose Detail Level: Zone Plan: Pt overall very happy with results Continue Regimen: MARLENA 3mg- 0.02mg one po Qd Discontinue Regimen: Aczone 7.5% gel qd (pt never filled because too expensive)

## 2019-08-28 NOTE — PROGRESS NOTE ADULT - PROBLEM SELECTOR PLAN 1
Preventive Care at the 11 - 14 Year Visit    Growth Percentiles & Measurements   Weight: 0 lbs 0 oz / 42.9 kg (actual weight) / No weight on file for this encounter.  Length: Data Unavailable / 0 cm No height on file for this encounter.   BMI: There is no height or weight on file to calculate BMI. No height and weight on file for this encounter.     Next Visit    Continue to see your health care provider every year for preventive care.    Nutrition    It s very important to eat breakfast. This will help you make it through the morning.    Sit down with your family for a meal on a regular basis.    Eat healthy meals and snacks, including fruits and vegetables. Avoid salty and sugary snack foods.    Be sure to eat foods that are high in calcium and iron.    Avoid or limit caffeine (often found in soda pop).    Sleeping    Your body needs about 9 hours of sleep each night.    Keep screens (TV, computer, and video) out of the bedroom / sleeping area.  They can lead to poor sleep habits and increased obesity.    Health    Limit TV, computer and video time to one to two hours per day.    Set a goal to be physically fit.  Do some form of exercise every day.  It can be an active sport like skating, running, swimming, team sports, etc.    Try to get 30 to 60 minutes of exercise at least three times a week.    Make healthy choices: don t smoke or drink alcohol; don t use drugs.    In your teen years, you can expect . . .    To develop or strengthen hobbies.    To build strong friendships.    To be more responsible for yourself and your actions.    To be more independent.    To use words that best express your thoughts and feelings.    To develop self-confidence and a sense of self.    To see big differences in how you and your friends grow and develop.    To have body odor from perspiration (sweating).  Use underarm deodorant each day.    To have some acne, sometimes or all the time.  (Talk with your doctor or nurse about  this.)    Girls will usually begin puberty about two years before boys.  o Girls will develop breasts and pubic hair. They will also start their menstrual periods.  o Boys will develop a larger penis and testicles, as well as pubic hair. Their voices will change, and they ll start to have  wet dreams.     Sexuality    It is normal to have sexual feelings.    Find a supportive person who can answer questions about puberty, sexual development, sex, abstinence (choosing not to have sex), sexually transmitted diseases (STDs) and birth control.    Think about how you can say no to sex.    Safety    Accidents are the greatest threat to your health and life.    Always wear a seat belt in the car.    Practice a fire escape plan at home.  Check smoke detector batteries twice a year.    Keep electric items (like blow dryers, razors, curling irons, etc.) away from water.    Wear a helmet and other protective gear when bike riding, skating, skateboarding, etc.    Use sunscreen to reduce your risk of skin cancer.    Learn first aid and CPR (cardiopulmonary resuscitation).    Avoid dangerous behaviors and situations.  For example, never get in a car if the  has been drinking or using drugs.    Avoid peers who try to pressure you into risky activities.    Learn skills to manage stress, anger and conflict.    Do not use or carry any kind of weapon.    Find a supportive person (teacher, parent, health provider, counselor) whom you can talk to when you feel sad, angry, lonely or like hurting yourself.    Find help if you are being abused physically or sexually, or if you fear being hurt by others.    As a teenager, you will be given more responsibility for your health and health care decisions.  While your parent or guardian still has an important role, you will likely start spending some time alone with your health care provider as you get older.  Some teen health issues are actually considered confidential, and are protected  by law.  Your health care team will discuss this and what it means with you.  Our goal is for you to become comfortable and confident caring for your own health.  ==============================================================   -on admission, pt meeting criteria for severe sepsis found to have multifocal pneumonia/HAP (hospitalized in Jan) on CT chest ([facility]).  -c/w zosyn (Day 6 of 7)  -blood cx 03/23 (coag neg staph)  -f/u 03/26 cx O/N had episode of SVT   -s/p IV metoprolol 5mg and adenosine 6mg   -c/w metoprolol 12.5mg BID

## 2019-09-03 NOTE — PATIENT PROFILE ADULT. - PATIENT REPRESENTATIVE: ( YOU CAN CHOOSE ANY PERSON THAT CAN ASSIST YOU WITH YOUR HEALTH CARE PREFERENCES, DOES NOT HAVE TO BE A SPOUSE, IMMEDIATE FAMILY OR SIGNIFICANT OTHER/PARTNER)
Jeanmarie Szymanski nephew of patient calling to give update on patient change in condition. Jeanmarie is requesting a call back to get information on what should next step be. Please call Jeanmarie regarding this [phone]   Yes

## 2019-10-11 NOTE — ED PROVIDER NOTE - CARDIAC, MLM
Normal rate, regular rhythm.  Heart sounds S1, S2.tachycardic 110  No murmurs, rubs or gallops.
spouse

## 2019-10-31 PROCEDURE — 82550 ASSAY OF CK (CPK): CPT

## 2019-10-31 PROCEDURE — 85027 COMPLETE CBC AUTOMATED: CPT

## 2019-10-31 PROCEDURE — 85014 HEMATOCRIT: CPT

## 2019-10-31 PROCEDURE — 84295 ASSAY OF SERUM SODIUM: CPT

## 2019-10-31 PROCEDURE — 96375 TX/PRO/DX INJ NEW DRUG ADDON: CPT | Mod: XU

## 2019-10-31 PROCEDURE — 71045 X-RAY EXAM CHEST 1 VIEW: CPT

## 2019-10-31 PROCEDURE — 87040 BLOOD CULTURE FOR BACTERIA: CPT

## 2019-10-31 PROCEDURE — 82570 ASSAY OF URINE CREATININE: CPT

## 2019-10-31 PROCEDURE — 84132 ASSAY OF SERUM POTASSIUM: CPT

## 2019-10-31 PROCEDURE — 87581 M.PNEUMON DNA AMP PROBE: CPT

## 2019-10-31 PROCEDURE — 83735 ASSAY OF MAGNESIUM: CPT

## 2019-10-31 PROCEDURE — 84484 ASSAY OF TROPONIN QUANT: CPT

## 2019-10-31 PROCEDURE — 87486 CHLMYD PNEUM DNA AMP PROBE: CPT

## 2019-10-31 PROCEDURE — 82803 BLOOD GASES ANY COMBINATION: CPT

## 2019-10-31 PROCEDURE — 80053 COMPREHEN METABOLIC PANEL: CPT

## 2019-10-31 PROCEDURE — 71250 CT THORAX DX C-: CPT

## 2019-10-31 PROCEDURE — 83935 ASSAY OF URINE OSMOLALITY: CPT

## 2019-10-31 PROCEDURE — 82962 GLUCOSE BLOOD TEST: CPT

## 2019-10-31 PROCEDURE — 87086 URINE CULTURE/COLONY COUNT: CPT

## 2019-10-31 PROCEDURE — 85610 PROTHROMBIN TIME: CPT

## 2019-10-31 PROCEDURE — 80048 BASIC METABOLIC PNL TOTAL CA: CPT

## 2019-10-31 PROCEDURE — 82435 ASSAY OF BLOOD CHLORIDE: CPT

## 2019-10-31 PROCEDURE — 81001 URINALYSIS AUTO W/SCOPE: CPT

## 2019-10-31 PROCEDURE — 94640 AIRWAY INHALATION TREATMENT: CPT

## 2019-10-31 PROCEDURE — 83605 ASSAY OF LACTIC ACID: CPT

## 2019-10-31 PROCEDURE — 82553 CREATINE MB FRACTION: CPT

## 2019-10-31 PROCEDURE — 86850 RBC ANTIBODY SCREEN: CPT

## 2019-10-31 PROCEDURE — 86900 BLOOD TYPING SEROLOGIC ABO: CPT

## 2019-10-31 PROCEDURE — 72148 MRI LUMBAR SPINE W/O DYE: CPT

## 2019-10-31 PROCEDURE — 99291 CRITICAL CARE FIRST HOUR: CPT | Mod: 25

## 2019-10-31 PROCEDURE — 97530 THERAPEUTIC ACTIVITIES: CPT

## 2019-10-31 PROCEDURE — 74176 CT ABD & PELVIS W/O CONTRAST: CPT

## 2019-10-31 PROCEDURE — 84100 ASSAY OF PHOSPHORUS: CPT

## 2019-10-31 PROCEDURE — 84300 ASSAY OF URINE SODIUM: CPT

## 2019-10-31 PROCEDURE — 87798 DETECT AGENT NOS DNA AMP: CPT

## 2019-10-31 PROCEDURE — 80202 ASSAY OF VANCOMYCIN: CPT

## 2019-10-31 PROCEDURE — 82947 ASSAY GLUCOSE BLOOD QUANT: CPT

## 2019-10-31 PROCEDURE — 72141 MRI NECK SPINE W/O DYE: CPT

## 2019-10-31 PROCEDURE — 97116 GAIT TRAINING THERAPY: CPT

## 2019-10-31 PROCEDURE — 70551 MRI BRAIN STEM W/O DYE: CPT

## 2019-10-31 PROCEDURE — 85730 THROMBOPLASTIN TIME PARTIAL: CPT

## 2019-10-31 PROCEDURE — 92610 EVALUATE SWALLOWING FUNCTION: CPT

## 2019-10-31 PROCEDURE — 97162 PT EVAL MOD COMPLEX 30 MIN: CPT

## 2019-10-31 PROCEDURE — 51701 INSERT BLADDER CATHETER: CPT

## 2019-10-31 PROCEDURE — 86901 BLOOD TYPING SEROLOGIC RH(D): CPT

## 2019-10-31 PROCEDURE — 82330 ASSAY OF CALCIUM: CPT

## 2019-10-31 PROCEDURE — 87633 RESP VIRUS 12-25 TARGETS: CPT

## 2019-10-31 PROCEDURE — 93005 ELECTROCARDIOGRAM TRACING: CPT

## 2019-10-31 PROCEDURE — 96374 THER/PROPH/DIAG INJ IV PUSH: CPT | Mod: XU

## 2019-10-31 PROCEDURE — 87150 DNA/RNA AMPLIFIED PROBE: CPT

## 2019-12-02 NOTE — PATIENT PROFILE ADULT - NSASFALLNEEDSASSISTWITH_GEN_A_NUR
Patient seen in clinic with LATA KHAN for follow up. Physical assessment deferred to MD. Patient had a fall yesterday evening, bruised forehead, nose, did not go to Urgent care or ED. Patient complained of abdominal discomfort and planned a paracentesis for tomorrow at [facility], which he is also having a liver biopsy. Plan for patient is:  - Increase Furosemide to 40 mg daily  - Increase Spironolactone to 100 mg daily  - Decrease Propranolol to 20 mg twice a day  - Increase fluid intake   - Go for lab work (BMP) 12/16/19  Follow up with Dr. Finley in 1/2020.   standing/toileting/walking

## 2019-12-26 NOTE — PHYSICAL THERAPY INITIAL EVALUATION ADULT - IMPAIRMENTS CONTRIBUTING IMPAIRED BED MOBILITY, REHAB EVAL
Endometrial biopsy  Date/Time: 12/26/2019 3:13 PM  Performed by: Skip Mendoza MD  Authorized by: Skip Mendoza MD     Consent:     Consent obtained:  Verbal and written    Consent given by:  Patient    Procedural risks discussed:  Infection, repeat procedure and bleeding    Patient questions answered: yes      Patient agrees, verbalizes understanding, and wants to proceed: yes      Instructions and paperwork completed: yes    Indication:     Indications:  Other disorder of menstruation and other abnormal bleeding from female genital tract    Pre-procedure:     Pre-procedure timeout performed: yes    Procedure:     Procedure: endometrial biopsy with Pipelle      A bivalve speculum was placed in the vagina: yes      Cervix cleaned and prepped: yes      The cervix was dilated: no      Uterus sounded: yes      Uterus sound depth (cm):  7    Curettes used:  1    Specimen collected: specimen collected and sent to pathology      Patient tolerated procedure well with no complications: yes    Findings:     Uterus size:  Non-gravid    Cervix: normal      Adnexa: normal decreased strength sat x 5 min no complaints except wanted pants on , pt put on pants cg -min of 1 balance fair -/impaired balance/impaired postural control/decreased strength

## 2020-03-18 NOTE — PHYSICAL THERAPY INITIAL EVALUATION ADULT - LEVEL OF CONSCIOUSNESS, REHAB EVAL
Notified Dr. Crystal Jones via [employer] of BP 96/50. HR 62. Pt is asymptomatic at this time. Awaiting orders at this time. alert

## 2020-03-31 NOTE — DIETITIAN INITIAL EVALUATION ADULT. - PROBLEM SELECTOR PROBLEM 7
[phone]-CANCER ([phone])  ? Ochoa Sherman, MS, [facility] [phone]  ? Kassidy Sullivan, MS, [facility]  [phone]  ? Ana Montes, MS, [facility]  [phone]  ? Eugenia Parker, MS, [facility] [phone]  ? Chantal Daniela, MS, [facility] [phone]  ? Foster Moon, MS, [facility]  [phone]  ? Argelia Brewer, MS, [facility]  [phone]    References  1. Aracelis COHEN, Surinder PDP, Clemente S, Gerard GRANT, Niels JE, Kenyetta JL, Juan Manuel N, Shiloh H, Abdullahi O, Maria Del Carmen A, Edwinini B, Radialek P, Maninezkierica S, Osmar DM, Ricks N, Zuleyka E, Hay H, Enio E, Chad J, Gronbrennan J, Crissy B, Ayseus H, Thorlacius S, Eerola H, Nevericalinna H, Rajeev K, Debby Michele LANDA, Carmela I, Shubham J, Jeffrey E, Luly ER, Josef Hilario FLORES, Aziza J, Marzena J, Sheyla LA, Cassandra RUTH, Tolu Viola A, Nicko D, Saundra S, Theresa P, Tenzin T, Tracee M, Alberto B, Tommy H, Theodore R, Francine K, Crystal L, Michele DG, Osmar D, Adonay DF, Chelly MR, The Breast Cancer Susceptibility Collaboration (UK) & Tanvir BALTAZAR. SUSAN mutations that cause ataxia-telangiectasia are breast cancer susceptibility alleles. Nature Genetics. 2006;38:873-875  9. Wyatt N , Sander Y, Melisa J, Beverley L, Javier GM , Maira ML, Gallinger S, Montenegro AG, Syngal S, Radha ML, Rodolfo J , Flory R, Keshia SZ, Jimi JR, Ivette VE, Fermin M, Vojay B, Jane N, Deidra RH, Lyndsay KW, and Kameron AP. SUSAN Becky, 74 (2004), pp. 2567-5707  12. Mihai T, Marvel S, Case K, et al. Spectrum of Mutations in BRCA1, BRCA2, CHEK2, and TP53 in Families at High Risk of Breast Cancer. MICHOACANO. 2006;295(12):1118-7084.   13. Richard C, Jj D, Glenn A, [phone].  14. Anderson H, Joaquin E, Cari SJ, Preventive measure

## 2020-07-08 NOTE — H&P ADULT. - NEGATIVE ENDOCRINE SYMPTOMS
Rx refill request   Last refill:  ergocalciferol (DRISDOL) 1.25 mg (50,000 units) capsule 2 capsule 0 6/22/2020     Sig - Route: Take 1 capsule by mouth 1 day a week    Last office visit:3/30/20  Future visit: 10/1/20  Last labs:8/7/19    Denied. Patient needs blood work  Called patient to notify  
no cold intolerance/no heat intolerance

## 2020-07-24 NOTE — DIETITIAN INITIAL EVALUATION ADULT. - PROBLEM SELECTOR PROBLEM 3
Requested updates within Care Everywhere.  Patient's chart was reviewed for overdue PROMISE topics.  Immunizations reconciled.       LUX (acute kidney injury)

## 2020-08-20 PROBLEM — R21 RASH OF HANDS: Status: ACTIVE | Noted: 2017-09-25

## 2020-08-21 NOTE — DISCHARGE NOTE ADULT - NSFTFSERV2RD_GEN_ALL_CORE
15+3/7 weeks gestational age presenting post fall.  Patient reports tripping down stairs but catching her fall with her knee last night at 930pm. She reports that she did hit her abdomen some but only slightly.  Denies any abdomina or pelvic pain at this time.  Reports having some cramping last night that has since resolved.  Denies any vaginal bleeding.  Denies any fetal quickening.  FHT: 145 bpm  A/P:  Work note prepared  Counseled patient on signs/symptoms that warrant immediate ER visit  Anatomy scan ordered  Follow-up next week for routine OB visit    Katya Reynolds MD    
Nursing/Physical Therapy

## 2020-12-07 NOTE — PATIENT PROFILE ADULT. - FUNCTIONAL LEVEL PRIOR: DRESSING
Additional Notes: Recommend patient continue Tretinoin, every night if possible. \\nDiscussed stopping Spironolactone if patient remains clear. \\nExplained treatment and risks in detail with patient. Detail Level: Detailed (0) independent

## 2020-12-19 NOTE — CHART NOTE - NSCHARTNOTEFT_GEN_A_CORE
MAR accept note  77 year old man with NSCLC (stage IV, dx'd 05/2016, currently being treated with chemotherapy), HBV (on Tenofovir) COPD, HTN, HLD, DM2 (not on insulin), presented with lethargy, fever, coughing. Admitted to MICU with septic shock secondary to pneumonia. Patient received 4L IVF in ER then was started on levophed. Patient received vancomycin, azithromycin, cefepime with improvement in symptoms. Is now at baseline mental status and hemodynamically stable off pressors.     For Follow up:  -c/w antibiotics for possible post obstructive pneumonia   -hole PO chemotherapy while ongoing treatment for infection  -frequent suctioning  -LUX resolving, continue to monitor Cr    Jennifer Pineda, PGY 3  Internal Medicine  Pager [phone] | [phone] Intact

## 2021-01-12 NOTE — PHYSICAL THERAPY INITIAL EVALUATION ADULT - PHYSICAL ASSIST/NONPHYSICAL ASSIST, REHAB EVAL
Per phone note \"start levothyroxine 75mcg 6 tabs q week- TSH in 8 weeks per note 10/8/20  Refill sent , next appointment march 21\" and pt was called and vm left to get her labs done  
verbal cues/nonverbal cues (demo/gestures)/1 person assist

## 2021-01-13 NOTE — DIETITIAN INITIAL EVALUATION ADULT. - ENERGY NEEDS
Strong peripheral pulses ht: 5'8", wt: 149.6 pounds, BMI: 22.7 kg/m2, IBW: 154pounds +/- 10%    pt admitted for acute PE, pt c mets lung cancer, possible plan to continue c Tarceva on DC, Oncology following.

## 2021-02-12 NOTE — SWALLOW BEDSIDE ASSESSMENT ADULT - ASR SWALLOW LABIAL MOBILITY
within functional limits [FreeTextEntry1] : Reassurance, does not need any limitations in physical activity. Cardiology f/u in 6 months or PRN if she has recuurent complaints of chest pain or any other complaints related to the heart.

## 2021-02-25 NOTE — PHYSICAL THERAPY INITIAL EVALUATION ADULT - FOLLOWS COMMANDS/ANSWERS QUESTIONS, REHAB EVAL
Chief complaint:   Chief Complaint   Patient presents with   • Heart Problem     HISTORY OF PRESENT ILLNESS     Patient is a 79 year old female with a history of Coronary Artery Disease, Peripheral Arterial Disease (renal, carotid), Valvular Heart Disease, Pulmonary Hypertension, Hyperlipidemia, Diabetes Mellitus and Chronic Kidney Disease who presents for follow-up.    Severe peripheral vascular disease on aortogram 7/2019 with patent kissing aortoiliac stents, serial severe stenosis of right common femoral to common iliac artery and bilateral SFA occlusions ostially with distal reconstitution via collaterals, faint three vessel runoff.     S/P Cath 7/2019 with PCI to RCA with x3 IVETTE from distal to ostium. On 09/28/2020 Li underwent an aorta bifemoral angiogram with PTA and stenting of R EIA and R CFA.     Patient had right femoral endarterectomy 01/07/2020 with Dr. Perez.    Peripheral Angiogram was performed 09/28/20 of which demonstrated severe PAD in the right common iliac, external iliac and common femorals S/P PTA/STenting of the proximal R CFA, R EIA which dramatically improved RLE flow post-procedure.     REED 11/2020 could not be calculated due to non-compressibility.     Today, she complains of shortness of breath. She attributes symptoms to weight gain due to COVID pandemic and not being not very active. Still does daily chores/activities. Remains compliant on current medication regimen with no adverse effects. Denies any recent cardiac hospitalizations or syncopal episodes. Also denies chest pain, orthopnea, claudication, bilateral lower extremity edema, palpitations or dizziness. No further complaints at this time.    PAST MEDICAL, FAMILY AND SOCIAL HISTORY     I have personally reviewed and updated the following EPIC sections: Current medications, Allergies, Problem list, Past Medical History, Past Surgical History, Social History and Family History    REVIEW OF SYSTEMS     Review of Systems    Constitutional: Negative.  Negative for activity change, fatigue and unexpected weight change.   Respiratory: Positive for shortness of breath. Negative for chest tightness.    Cardiovascular: Negative.  Negative for chest pain, palpitations and leg swelling.   Endocrine: Negative.    Musculoskeletal: Negative.    Skin: Negative.    Neurological: Negative.  Negative for dizziness, syncope and light-headedness.   Hematological: Negative.  Does not bruise/bleed easily.   All other systems reviewed and are negative.    PHYSICAL EXAM     Vitals:    02/25/21 0823   BP: (!) 142/70   Pulse: 66   Resp: 14   SpO2: 98%   Weight: 78.5 kg   Height: 5' 2\" (1.575 m)      Physical Exam  Vitals signs reviewed.   Constitutional:       General: She is not in acute distress.     Appearance: She is well-developed. She is not diaphoretic.   HENT:      Head: Normocephalic and atraumatic.      Right Ear: External ear normal.      Left Ear: External ear normal.   Eyes:      General: No scleral icterus.        Right eye: No discharge.         Left eye: No discharge.      Conjunctiva/sclera: Conjunctivae normal.   Neck:      Musculoskeletal: Normal range of motion and neck supple.      Thyroid: No thyromegaly.      Vascular: No JVD.   Cardiovascular:      Rate and Rhythm: Normal rate and regular rhythm.      Pulses: Normal pulses.      Heart sounds: S1 normal and S2 normal. Murmur present. Systolic murmur present with a grade of 2/6.   Pulmonary:      Effort: Pulmonary effort is normal. No respiratory distress.      Breath sounds: Normal breath sounds. No wheezing or rales.   Abdominal:      General: Bowel sounds are normal. There is no distension.      Palpations: Abdomen is soft.   Skin:     General: Skin is warm and dry.      Findings: No erythema or rash.   Neurological:      Mental Status: She is alert and oriented to person, place, and time.       IMAGING/RESULTS     Recent Labs   Lab 01/18/21  1349 11/17/20  1445 09/10/20  0629  09/09/20  0830 09/08/20  1826 08/26/20  0925 07/20/20  0959   HGB 12.6  --   --  13.2 14.7  --  13.2   BUN  --   --   --  18 25* 33*  --    Creatinine  --   --   --  0.91 1.00* 1.26*  --    Hemoglobin A1C 6.0*  --   --   --   --   --  6.7*   Potassium  --   --  4.2 3.7 3.6 4.3  --    Cholesterol  --  175  --   --   --   --   --    HDL  --  93  --   --   --   --   --    Cholesterol/ HDL Ratio  --  1.9  --   --   --   --   --    Triglycerides  --  54  --   --   --   --   --    CALCLDL  --  71  --   --   --   --   --    GOT/AST  --   --   --   --  15  --   --    GPT  --   --   --   --  18  --   --        Right and left heart cath: 3/2011        Abdominal Aortic Aortagram 7/24/2019  · Ost RCA to Dist RCA lesion with 90% in-stent restenosis.  · Mid Cx to Dist Cx lesion with 50% stenosis.  1. Severe 90% ostium to distal RCA stenosis that is heavily calcified.  2. Mild to moderate obstructive disease in remaining vessels  3. Severe peripheral vascular disease               -Patent kissing aortoiliac stents               -Serial severe stenosis of right common femoral to common iliac artery               -Bilateral SFA occlusions ostially with distal reconstitution via collaterals, faint three vessel runoff  4. Severe 70% stenosis of ostial right renal artery, 60 mmHg pressure gradient across the lesion     Cardiac Cath 7/31/2019  · Prox RCA to Dist RCA lesion with 90% stenosis.  · Ost RCA to Prox RCA lesion with 90% stenosis.  S/p PCI to RCA - 3 IVETTE from distal to ostium post dilated with 3.5 NC balloon  Procedure complicated by rotablator aria entrapment requiring guide liner to remove  After ballooning patient became bradycardiac and TVP was placed.  AT the end of the case underlying rhythm assessed and there was no underlying rhythm or atrial activity appreciated on tele    Echocardiogram 7/30/2020  Normal left ventricular size and systolic function, with no regional wall motion abnormalities.  Left ventricular ejection  fraction, 62 %.  Mildly decreased right ventricular systolic function.  Moderately increased left atrial volume 46.6 ml/m².  Trace mitral valve regurgitation.  Mild aortic valve stenosis, mean gradient 9.8 mmHg, JONNY 1.5 cm² and with no regurgitation.  Dimensionless index by VTI 0.4.  Mild tricuspid valve regurgitation.  Normal right ventricular systolic pressure.  Normal pericardium without effusion.  No significant change since the prior study from 8/04/2019    CT Abdomen Pelvis 9/8/2020  1. Falsely elevated ABIs bilaterally, consistent with underlying arterial  calcinosis. 0 mmHg toe pressure, right, indicating severe arterial  insufficiency. This has decreased from prior.  2. Severely decreased left toe pressure of 36 mmHg, previously 80 mmHg    EKG 9/8/2020    AORTA BIFEMORAL ANGIOGRAM 9/28/2020  · Dist R BELÉN to Prox R EIA lesion with 90% stenosis.  · Dist R EIA to Prox R CFA lesion with 90% stenosis.  · Prox R CFA to Mid R CFA lesion with 99% stenosis.  · Prox R SFA to Dist R SFA lesion with 100% stenosis.  · Ost R JATINDER to Mid R JATINDER lesion with 50% stenosis.  · Prox R TP Trunk to Mid R TP Trunk lesion with 50% stenosis.  · Previous endarterectomy at R profunda artery with subtotal occlusion at the proximal site.  · PTA and stenting of R EIA and R CFA.     INTERVENTION:  1. Unable to obtain access at contralateral CFA due to bilateral kissing common iliac artery stents extending proximally into distal abdominal aorta.  2. Unable to obtain access at R CFA due to absent pulse and poor flow on ultrasound.  3. L radial access, 6F R2P sheath inserted into R BELÉN for angiography. Multiple focal, severely calcified stenoses in the R BELÉN, R EIA, and R CFA.  4. Using a mirEgodeuss exchange wire all subtotal occlusions were crossed. PTA of R CFA using a 4.0 x 30mm Euphora balloon. Multiple inflations up to 16 josr.  5. Finecross inserted into the Profunda. 0.14 wire exchanged for Supracore. PTA of the R EIA and BELÉN using an  [place] 5.0 x 40mm balloon.  6. A 6 x 100mm Everflex stent was deployed in the R CFA extending proximally into the R EIA. Post dilatation using a 5mm and 6mm Mathis balloon up to 20 josr.  7. Small gap between EIA stent and BELÉN stent. A 7 x 20mm Everflex stent was inserted with PTA using a 6 x 20mm Mathis balloon up to 20 josr.    US REED 11/17/2020  IMPRESSION:   1. The right and left REED could not be calculated due to vascular  noncompressibility.  2. The toe pressure on the right is significantly increased compared to the  prior examination, but remains moderately decreased.    US Carotid Duplex Bilateral 11/17/2020  IMPRESSION:  1. RIGHT CAROTID: Based upon modified NASCET criteria adapted to duplex ultrasound, narrowing across the right internal carotid artery is estimated  to result in 50-69% stenosis. Based upon grayscale imaging, the actual stenosis is estimated to reside at the lower end of that range.   2. LEFT CAROTID: Left carotid stenosis is estimated to result in 50-69% stenosis.  3. Vertebral artery flow is antegrade bilaterally    ASSESSMENT/PLAN     Peripheral Vascular Disease  Iliac stenting many years ago (probably in 2006) which were patent on CT angiogram 11/2016.  SFA disease was confirmed on CT scan which showed bilateral SFA occlusions.  Severe peripheral vascular disease on aortagram 7/2019 with patent kissing aortoiliac stents, serial severe stenosis of right common femoral to common iliac artery and bilateral SFA occlusions ostially with distal reconstitution via collaterals, faint three vessel runoff.   Right ileofemoral endarterectomy 01/07/2020  Presented with rest pain again in September at which time angiogram showed subtotal occlusion of the right external iliac artery as well as subtotal occlusion at the site of endarterectomy involving the profundus.  She underwent stenting of the iliac artery and angioplasty of the profunda.  Has chronically occluded SFA.  S/P PTA to the proximal R  CFA, R EIA 09/2020  REED 11/2020 non-compressible  Denies any claudication.  Continue aspirin/statin therapy.    Coronary Artery Disease  History of Taxus stent to proximal RCA, PCTA of mid RCA in 2011.  Cath 7/2019 with PCI to RCA with x3 IVETTE from distal to ostium.   Complains of shortness of breath.  Will obtain stress test to rule out ischemia.    Aortic Valve Stenosis  Echo 3/2019: Mild aortic valve stenosis, mean gradient 12.7 mmHg, JONNY 1.4 cm².  III/VI ESM on exam.  Echo 8/2019 with mild AS ( mean gradient is 11.9 mmHg. DVI = 0.5 . Aortic valve area is 1.6 cm² ).  Patient complains of progressive shortness of breath.  Will repeat echocardiogram to reassess/monitor valvular functions, prior echo >2 years     Pulmonary Hypertension  Echo 6/2017: Mild pulmonary hypertension, RVSP 40.7 mmHg.  Echo 3/2019: Moderate pulmonary hypertension, RVSP 51.2 mmHg.  Echo 8/2019 with RVSP 40-45 mmHg.  No overt signs of right heart failure.  Will repeat echocardiogram.    Carotid Artery Stenosis  Status post right carotid endarterectomy.  Carotid Duplex 11/2020 reviewed  Denies any TIA like symptoms.  On ASA/statin therapy.  Followed by Dr. Perez     Hypertension   Borderline controlled in clinic on current medication regimen, stable at prior office visits and better controlled at home.    Hyperlipidemia  Last FLP reviewed, continue atorvastatin 80 mg daily.      Return pending results. Call or return to clinic as needed if these symptoms worsen, fail to improve as anticipated, or if new symptoms develop.    On 2/25/2021, Juice SOLIS scribed the services personally performed by Elan Tafoya MD    Note: The documentation recorded by the scribe accurately and completely reflects the service(s) I personally performed and the decisions made MD Elan Estrada MD   Cardiology           able to follow single-step instructions/100% of the time

## 2021-07-17 NOTE — ED PROVIDER NOTE - EKG ADDITIONAL QUESTION - PERFORMED INDEPENDENT VISUALIZATION
Bedside shift change report given to WENCESLAO Hartman (oncoming nurse) by Elmer Moore RN (offgoing nurse).  Report included the following information Robinson PATRICK Yes

## 2021-09-05 NOTE — PROGRESS NOTE ADULT - PROBLEM SELECTOR PLAN 3
- concerning for mets w/ cord compression   - MRI lumbosacral spine w/ out IV contrast in setting of LUX Medications

## 2021-09-18 NOTE — PROGRESS NOTE ADULT - PROBLEM SELECTOR PLAN 7
Problem: Altered Mood, Depressive Behavior:  Goal: Ability to disclose and discuss suicidal ideas will improve  Description: Ability to disclose and discuss suicidal ideas will improve  9/17/2021 2255 by Ruth Prasad RN  Outcome: Ongoing   Patient denies suicidal ideations. Patient agrees to notify staff if symptoms arise. Patient remains safe on unit. Patient safety maintained q15 minute checks. Problem: Altered Mood, Depressive Behavior:  Goal: Able to verbalize and/or display a decrease in depressive symptoms  Description: Able to verbalize and/or display a decrease in depressive symptoms  9/17/2021 2255 by Ruth Prasad RN  Outcome: Ongoing   Patient reports depression and anxiety. Patient is focused on discharge. Patient is isolative to self. Patient is compliant with her medications. On Lovenox as above

## 2021-10-12 NOTE — DISCHARGE NOTE PROVIDER - NSDCHHATTENDCERT_GEN_ALL_CORE
My signature below certifies that the above stated patient is homebound and upon completion of the Face-To-Face encounter, has the need for intermittent skilled nursing, physical therapy and/or speech or occupational therapy services in their home for their current diagnosis as outlined in their initial plan of care. These services will continue to be monitored by myself or another physician. SOB

## 2021-10-13 NOTE — CONSULT NOTE ADULT - PROBLEM SELECTOR RECOMMENDATION 4
Dc instructions provided yb ED provider. All questions addressed at this time. DC home with father   c/w Tessalon Perle 100mg PO TID PRN

## 2022-03-07 NOTE — PROGRESS NOTE ADULT - PROBLEM SELECTOR PROBLEM 1
Rib fractures
Lung cancer
Rib fractures
severe

## 2022-03-30 NOTE — PROGRESS NOTE ADULT - MINUTES
[FreeTextEntry1] : 1. T2DM - mild hyperglycemia by history, worsening COPD, Intolerant to januvia and jardiace\par - stop MF due to worsening COPD, elevated CO2 levels and risks of lactic acidosis\par - try Glipizide Er 10 mg daily\par - increase FS chekcks 3x daily, send logs in 1 month\par - repeat A1c this month and in 3 month\par - yearly eye exam with ophthalmology\par - declined CDE apt\par \par 2. stable left thyroid nodule - RTO thyroid sono 2022(later this year)\par \par 3. hypothyroid - TSH low-normal, euthyroid on exam\par - decrease LT4 125 mcg daily, repeat TFTs 1 week before next visit\par \par 4. hyperlipidemia - LDL okay, cont statin\par 5. Left adrenal gland thickening on Ct 9/2020 and  MRI done after showed normal adrenal gland, no further testing needed\par \par 
35

## 2022-06-13 NOTE — DIETITIAN INITIAL EVALUATION ADULT. - EST PROTEIN NEEDS6
HPI and Plan:   See dictation [id number]    Orders Placed This Encounter   Procedures     Zio Patch Monitor       No orders of the defined types were placed in this encounter.      Medications Discontinued During This Encounter   Medication Reason     cephALEXin (KEFLEX) 500 MG capsule          Encounter Diagnosis   Name Primary?     Paroxysmal atrial fibrillation (H)        CURRENT MEDICATIONS:  Current Outpatient Prescriptions   Medication Sig Dispense Refill     AMLODIPINE BESYLATE PO Take 7.5 mg by mouth daily       warfarin (COUMADIN) 2 MG tablet Take 4 mg on Mon, Wed, Fri; 3 mg all other days or as directed by the Anticoagulation Clinic 150 tablet 0     allopurinol (ZYLOPRIM) 100 MG tablet Take 1 tablet (100 mg) by mouth daily 90 tablet 3     Calcium-Vitamin D 600-200 MG-UNIT TABS Take 1 tablet by mouth 2 times daily       miconazole (MICATIN; MICRO GUARD) 2 % powder Apply topically 2 times daily Reported on 2/13/2017       Warfarin Therapy Reminder 1 each continuous prn       Fexofenadine HCl (ALLEGRA PO) Take 180 mg by mouth daily        Probiotic Product (PROBIOTIC DAILY PO) Take 1 capsule by mouth daily        brimonidine (ALPHAGAN-P) 0.15 % ophthalmic solution Place 1 drop Into the left eye 2 times daily        latanoprost (XALATAN) 0.005 % ophthalmic solution Place 1 drop into both eyes At Bedtime        ONE-A-DAY WOMENS OR TABS 1 TABLET ORALLY DAILY       COSOPT 2-0.5 % OP SOLN 1 DROP INTO Left Eye twice daily       traMADol (ULTRAM) 50 MG tablet Take 1 tablet (50 mg) by mouth every 6 hours as needed (Patient not taking: Reported on 3/7/2017) 50 tablet 0     senna-docusate (SENOKOT-S;PERICOLACE) 8.6-50 MG per tablet Take 1-2 tablets by mouth 2 times daily (Patient not taking: Reported on 3/7/2017) 100 tablet      triamcinolone (KENALOG) 0.1 % ointment Apply  topically 3 times daily. Apply sparingly to affected area. (Patient not taking: Reported on 3/7/2017) 30 g 0       ALLERGIES     Allergies   Allergen 
Reactions     Penicillins Hives       PAST MEDICAL HISTORY:  Past Medical History   Diagnosis Date     Atrial fibrillation (H)      Chronic kidney disease      Disorders of bursae and tendons in shoulder region, unspecified      right shoulder     Hemorrhage of gastrointestinal tract, unspecified      Other malignant neoplasm of skin of lower limb, including hip      Other specified glaucoma      Renal insufficiency      Sinus node dysfunction (H)      Unspecified essential hypertension        PAST SURGICAL HISTORY:  Past Surgical History   Procedure Laterality Date     Surgical history of -   11/1992     skin cancer, nose     Surgical history of -   1978     right, vein stripping     Surgical history of -   1968     breast biopsy     Surgical history of -   1993     laparoscopic cholecystectomy     Surgical history of -        tonsillectomy and adenoidectomy     Surgical history of -   04/2004     left cataract     Surgical history of -   08/09/2004     right total shoulder arthroplasty     Open reduction internal fixation femur proximal Left 8/19/2016     Procedure: OPEN REDUCTION INTERNAL FIXATION FEMUR PROXIMAL;  Surgeon: Kael Rojas MD;  Location: [facility] OR     Arthroplasty revision hip Left 8/19/2016     Procedure: ARTHROPLASTY REVISION HIP;  Surgeon: Kael Rojas MD;  Location: [facility] OR     C pelvis/hip joint surgery unlisted       C shoulder surg proc unlisted       C stomach surgery procedure unlisted       Hc vascular surgery procedure unlist         FAMILY HISTORY:  Family History   Problem Relation Age of Onset     HEART DISEASE Mother      CHF     Hypertension Mother      Hypertension Father      Hypertension Maternal Grandmother      Hypertension Maternal Grandfather      Hypertension Son      HEART DISEASE Son      MI       SOCIAL HISTORY:  Social History     Social History     Marital status:      Spouse name: N/A     Number of children: N/A     Years of education: N/A     Social History 
Main Topics     Smoking status: Never Smoker     Smokeless tobacco: Never Used     Alcohol use Yes      Comment: rare     Drug use: No     Sexual activity: Not Currently     Birth control/ protection: None     Other Topics Concern     Parent/Sibling W/ Cabg, Mi Or Angioplasty Before 65f 55m? Yes     Social History Narrative       Review of Systems:  Skin:  Positive for itching;rash     Eyes:  Positive for glasses;glaucoma    ENT:  Positive for hearing loss    Respiratory:  Negative for dyspnea on exertion     Cardiovascular:  chest pain;Negative for;palpitations;edema;syncope or near-syncope;fatigue;lightheadedness;dizziness      Gastroenterology: Negative      Genitourinary:  Positive for   Renal insuffiecency, CKD  Musculoskeletal:  Positive for joint pain;joint swelling;joint stiffness    Neurologic:  Positive for numbness or tingling of feet    Psychiatric:  Negative      Heme/Lymph/Imm:  Negative      Endocrine:  Negative        Physical Exam:  Vitals: /66 (BP Location: Right arm, Patient Position: Chair, Cuff Size: Adult Regular)  Pulse 60  Wt 62.6 kg (138 lb)  LMP 07/07/1960  SpO2 100%  BMI 27.45 kg/m2    Constitutional:  cooperative, alert and oriented, well developed, well nourished, in no acute distress        Skin:  warm and dry to the touch, no apparent skin lesions or masses noted        Head:  normocephalic, no masses or lesions        Eyes:  pupils equal and round, conjunctivae and lids unremarkable, sclera white, no xanthalasma, EOMS intact, no nystagmus        ENT:  no pallor or cyanosis, dentition good        Neck:  carotid pulses are full and equal bilaterally, JVP normal, no carotid bruit, no thyromegaly        Chest:  normal breath sounds, clear to auscultation, normal A-P diameter, normal symmetry, normal respiratory excursion, no use of accessory muscles          Cardiac: regular rhythm, normal S1/S2, no S3 or S4, apical impulse not displaced, no murmurs, gallops or rubs              
    Abdomen:  abdomen soft, non-tender, BS normoactive, no mass, no HSM, no bruits        Vascular: pulses full and equal, no bruits auscultated                                        Extremities and Back:  no deformities, clubbing, cyanosis, erythema observed              Neurological:  affect appropriate, oriented to time, person and place              CC  Adna Franks MD   [facility]  [address]  [place] [address]              
show
79.68

## 2022-08-26 NOTE — DIETITIAN INITIAL EVALUATION ADULT. - PROBLEM/PLAN-3
Detail Level: Zone Discontinue Regimen: Tretinoin 0.025% Plan: Increasing tretinoin to 0.05% qhs, continue azelaic acid, begin seysara 100 mg daily, instructed to stop if becomes pregnant,follow up in three months Initiate Treatment: Seysara 100 mg qd( if too expensive switch to generic doxycycline) Continue Regimen: Azelaic acid Render In Strict Bullet Format?: No Modify Regimen: Increase tretinoin to 0.05% DISPLAY PLAN FREE TEXT

## 2022-08-30 NOTE — ED PROVIDER NOTE - DISPOSITION TYPE
[facility]: Cancer Care                                                                                          RN Cancer Care Coordinator noted that patient's lab appt has been canceled for tomorrow via MyChart - but it looks like he needed both lab and RTC visit canceled. Left message for him clarify this.   Also, his note states he prefers Monday, however Dr. Escobar is at [facility] on Mondays. Relayed this challenge in the vm to him.     Left phone numbers to return call - either to scheduling or to RNCC.     Signature:  Gale Borges RN    ADDENDUM:  Pt calls writer back and explains that he indeed cannot come to appt tomorrow, and that this visit with Dr. Escobar is really for a 2nd opinion. Writer explained that I will discuss with Dr. Escobar tomorrow, and see about the feasibility of getting labs drawn and then having a virtual visit. He agrees and we can reach out to him via phone or MyChart.     Gale Borges RN   Cancer Care Nurse Coordinator  [facility]  [phone]    
ADMIT

## 2022-12-21 NOTE — PATIENT PROFILE ADULT - BRADEN NUTRITION
[address]     Physical Therapy  Cancellation/No-show Note  Patient Name:  Luis Hubbard  :  1954   Date:  2022  Cancelled visits to date: 0  No-shows to date:     Patient status for today's appointment patient:  []  Cancelled  []  Rescheduled appointment  [x]  No-show     Reason given by patient:  []  Patient ill  []  Conflicting appointment  []  No transportation    []  Conflict with work  [x]  No reason given  []  Other:     Comments:      Phone call information:   []  Phone call made today to patient at _ time at number provided:      []  Patient answered, conversation as follows:    []  Patient did not answer, message left as follows:  [x]  Phone call not made today  []  Phone call not needed - pt contacted us to cancel and provided reason for cancellation.      Electronically signed by:  Orion Sanchez PTA (3) adequate

## 2023-01-01 NOTE — PROGRESS NOTE ADULT - PROBLEM SELECTOR PLAN 4
Problem: Discharge Planning  Goal: Discharge to home or other facility with appropriate resources  2023 0413 by Shirley Franco RN  Outcome: Progressing  Flowsheets (Taken 2023 0413)  Discharge to home or other facility with appropriate resources: Identify barriers to discharge with patient and caregiver     Problem: Pain -   Goal: Displays adequate comfort level or baseline comfort level  2023 0413 by Shirley Franco RN  Outcome: Progressing  Note: Infant does not exhibits pain/ discomfort. Infant soothes easily. Problem: Thermoregulation - [place]/Pediatrics  Goal: Maintains normal body temperature  2023 0413 by Shirley Franco RN  Outcome: Progressing  Flowsheets (Taken 2023 0413)  Maintains Normal Body Temperature:   Monitor temperature (axillary for Newborns) as ordered   Monitor for signs of hypothermia or hyperthermia     Problem: Safety -   Goal: Free from fall injury  2023 0413 by Shirley Franco RN  Outcome: Progressing  Flowsheets (Taken 2023 0413)  Free From Fall Injury: Instruct family/caregiver on patient safety     Problem: Normal   Goal: [place] experiences normal transition  2023 0413 by Shirley Franco RN  Outcome: Progressing  Flowsheets (Taken 2023 0413)  Experiences Normal Transition:   Monitor vital signs   Maintain thermoregulation     Problem: Normal   Goal: Total Weight Loss Less than 10% of birth weight  2023 0413 by Shirley Franco RN  Outcome: Progressing  Flowsheets (Taken 2023 0413)  Total Weight Loss Less Than 10% of Birth Weight:   Assess feeding patterns   Weigh daily   Plan of care discussed with mother and she contributes to goal setting and voices understanding of plan of care. Patient with stage 4 lung cancer with metastases to brain on [place], started 1/10/19)  - Oncologist is Dr. Norah Gross -- not offering further chemo due to progression of disease Patient with stage 4 lung cancer with metastases to brain on [place], started 1/10/19)  - Oncologist is Dr. Norah Gross -- not offering further chemo due to progression of disease  - Patient family agreeable to hospice care

## 2023-01-18 NOTE — H&P ADULT - NSHPREVIEWOFSYSTEMS_GEN_ALL_CORE
Procedure(s):  RIGHT KNEE ARTHROSCOPY; LATERAL RELEASE, AND MELITON OSTEOTOMY.     general    Anesthesia Post Evaluation      Multimodal analgesia: multimodal analgesia used between 6 hours prior to anesthesia start to PACU discharge  Patient location during evaluation: PACU  Patient participation: complete - patient participated  Level of consciousness: awake  Pain score: 0  Pain management: adequate  Airway patency: patent  Anesthetic complications: no  Cardiovascular status: acceptable  Respiratory status: acceptable  Hydration status: acceptable  Post anesthesia nausea and vomiting:  controlled  Final Post Anesthesia Temperature Assessment:  Normothermia (36.0-37.5 degrees C)      INITIAL Post-op Vital signs:   Vitals Value Taken Time   /77 01/18/23 1334   Temp 37.1 °C (98.7 °F) 01/18/23 1319   Pulse 87 01/18/23 1329   Resp 16 01/18/23 1329   SpO2 100 % 01/18/23 1329 REVIEW OF SYSTEMS:    CONSTITUTIONAL: No weakness, fevers or chills  EYES/ENT: No visual changes;  No vertigo or throat pain   NECK: No pain or stiffness  RESPIRATORY: No cough, wheezing, hemoptysis; No shortness of breath  CARDIOVASCULAR: No chest pain or palpitations  GASTROINTESTINAL: No abdominal or epigastric pain. No nausea, vomiting, or hematemesis; No diarrhea or constipation. No melena or hematochezia.  GENITOURINARY: No dysuria, frequency or hematuria  NEUROLOGICAL: No numbness or weakness  SKIN: No itching, rashes REVIEW OF SYSTEMS:    CONSTITUTIONAL: +weakness, no  fevers or chills  EYES/ENT: No visual changes;  No vertigo or throat pain   NECK: No pain or stiffness  RESPIRATORY: No cough, wheezing, hemoptysis; No shortness of breath  CARDIOVASCULAR: No chest pain or palpitations  GASTROINTESTINAL: No abdominal or epigastric pain. No nausea, vomiting, or hematemesis; No diarrhea or constipation. No melena or hematochezia.  GENITOURINARY: No dysuria, frequency or hematuria  NEUROLOGICAL: No numbness or weakness  SKIN: No itching, rashes

## 2023-02-01 NOTE — ED PROVIDER NOTE - EKG #1 DATE/TIME
[facility] Cardiology Office Visit    Patient: Mario Shipley  MRN: [id number]   : 1946   REFERRED BY: Ronny Luu DO  PCP: Ronny Luu DO   CHIEF COMPLAINT:   Chief Complaint   Patient presents with   • Office Visit   • Consultation       ASSESSMENT/PLAN & RECOMMENDATIONS    1. Atypical chest pain  Multiple risk factors for CAD  Heart murmur    - TRANSTHORACIC ECHO(TTE) COMPLETE W/ W/O IMAGING AGENT; Future  - STRESS TEST WITH MYOCARDIAL PERFUSION; Future    2. Primary hypertension  Uncontrolled  Goal less than than 140  CKD and sinus bradycardia limitations to most of the medications with reactions to amlodipine with swelling of lower extremities  Discontinue lisinopril twice daily    - NIFEdipine CC (ADALAT CC) 30 MG 24 hr tablet; Take 2 tablets by mouth daily.  Dispense: 60 tablet; Refill: 11  - valsartan (DIOVAN) 80 MG tablet; Take 1 tablet by mouth daily.  Dispense: 30 tablet; Refill: 11  - BASIC METABOLIC PANEL; Future    3. Trifascicular block  No syncope or presyncope  Discussed with EP    - HOLTER MONITOR; Future    4. Sinus bradycardia  Not on any not beta-blocker  - HOLTER MONITOR; Future      6. Screening for AAA (abdominal aortic aneurysm)  Next for but needs ultrasound abdomen and carotid    7. Former smoker  Next for but needs ultrasound abdomen and carotid    8. COPD mixed type (CMS/HCC)  Dyspnea on inhalers      -Follow up in 6 weeks    HISTORY OF PRESENT ILLNESS  A very pleasant 76 year old male who presented for cardiology evaluation for: HTN and RBBB      · Dizziness / Lightheadedness: no  · Dyspnea:  yes (COPD-on onh)  · PND: no  · Orthopnea: no, pillows  · Palpitations: yes  · Chest pain: mild after starting hydralazine, no radiation, sharp 4/10  · GI: denies nausea, early satiety, lack of appetite, bloating  · Other: none  · Claudications: no  · Edema: no  · Varicose veins: no    SHx: former smoker, no drugs no etoh  SX: shoulder  Fhx: mom from CHF, brother HTN    HISTORIES  Past  Medical History:   Diagnosis Date   • Benign essential hypertension    • Chronic gout without tophus    • Chronic kidney disease, stage III (moderate) (CMS/[facility])    • Chronic obstructive pulmonary disease (CMS/[facility])    • Diabetes mellitus type 2, diet-controlled (CMS/[facility])    • Glaucoma    • H/O fracture of tibia    • Laceration of left little finger    • Macular degeneration right eye    • Peripheral polyneuropathy    • Primary osteoarthritis of both knees    • Vitamin D deficiency        Past Surgical History:   Procedure Laterality Date   • Colonoscopy     • Joint replacement Right 1996    shoulder       Family History   Problem Relation Age of Onset   • Cancer Sister        Social History     Socioeconomic History   • Marital status:      Spouse name: Not on file   • Number of children: Not on file   • Years of education: Not on file   • Highest education level: Not on file   Occupational History   • Not on file   Tobacco Use   • Smoking status: Never   • Smokeless tobacco: Never   Substance and Sexual Activity   • Alcohol use: No   • Drug use: No   • Sexual activity: Not Currently   Other Topics Concern   • Not on file   Social History Narrative   • Not on file     Social Determinants of Health     Financial Resource Strain: Not on file   Food Insecurity: Not on file   Transportation Needs: Not on file   Physical Activity: Not on file   Stress: Not on file   Social Connections: Not on file   Intimate Partner Violence: Not At Risk   • Social Determinants: Intimate Partner Violence Past Fear: No   • Social Determinants: Intimate Partner Violence Current Fear: No        ALLERGIES  ALLERGIES:  No Known Allergies    MEDICATIONS  has a current medication list which includes the following prescription(s): hydralazine, fosinopril, ergocalciferol, furosemide, allopurinol, fluticasone-salmeterol, albuterol, and albuterol.    REVIEW OF SYSTEMS   Review of Systems   All other systems reviewed and are  negative.      PHYSICAL EXAM  Blood pressure (!) 158/64, pulse (!) 49, height 5' 9\" (1.753 m), weight 88.2 kg (194 lb 6.4 oz).     Physical Exam  Vitals reviewed.   Constitutional:       Appearance: He is well-developed.   HENT:      Head: Normocephalic and atraumatic.      Right Ear: External ear normal.      Left Ear: External ear normal.   Eyes:      Pupils: Pupils are equal, round, and reactive to light.   Neck:      Vascular: No JVD.   Cardiovascular:      Rate and Rhythm: Normal rate and regular rhythm.      Chest Wall: PMI is not displaced.      Pulses:           Carotid pulses are 2+ on the right side and 2+ on the left side.       Radial pulses are 2+ on the right side and 2+ on the left side.        Femoral pulses are 2+ on the right side and 2+ on the left side.       Popliteal pulses are 2+ on the right side and 2+ on the left side.        Dorsalis pedis pulses are 2+ on the right side and 2+ on the left side.        Posterior tibial pulses are 2+ on the right side and 2+ on the left side.      Heart sounds: S1 normal and S2 normal. Murmur heard.    Systolic murmur is present with a grade of 2/6.    No friction rub. No gallop. No S3 or S4 sounds.   Pulmonary:      Effort: Pulmonary effort is normal.      Breath sounds: Normal breath sounds.   Abdominal:      General: Bowel sounds are normal.      Palpations: Abdomen is soft.   Musculoskeletal:         General: Normal range of motion.      Cervical back: Normal range of motion and neck supple.      Right lower leg: No edema.      Left lower leg: No edema.   Skin:     General: Skin is warm and dry.   Neurological:      Mental Status: He is alert and oriented to person, place, and time.      Deep Tendon Reflexes: Reflexes are normal and symmetric.   Psychiatric:         Behavior: Behavior normal.         Thought Content: Thought content normal.         Judgment: Judgment normal.         LABORATORY  I have reviewed the pertinent laboratory tests. These are  the pertinent findings:    Sodium (mmol/L)   Date Value   12/08/2022 140     Potassium (mmol/L)   Date Value   12/08/2022 4.3     Chloride (mmol/L)   Date Value   12/08/2022 103     Glucose (mg/dL)   Date Value   12/08/2022 76     Calcium (mg/dL)   Date Value   12/08/2022 9.9     Carbon Dioxide (mmol/L)   Date Value   12/08/2022 31     BUN (mg/dL)   Date Value   12/08/2022 25 (H)     Creatinine (mg/dL)   Date Value   12/08/2022 1.48 (H)     WBC (K/mcL)   Date Value   12/08/2022 11.0     RBC (mil/mcL)   Date Value   12/08/2022 4.74     HCT (%)   Date Value   12/08/2022 46.6     HGB (g/dL)   Date Value   12/08/2022 14.7     PLT (K/mcL)   Date Value   12/08/2022 307     Cholesterol (mg/dL)   Date Value   06/08/2022 176     HDL (mg/dL)   Date Value   06/08/2022 46     Cholesterol/ HDL Ratio (no units)   Date Value   06/08/2022 3.8     Triglycerides (mg/dL)   Date Value   06/08/2022 88     LDL (mg/dL)   Date Value   06/08/2022 112         IMAGING The following testing were personally visualized/interpreted and/or reports were reviewed.  ECG:  trifasc with dilshad  Holter: never done  Echocardiogram: never done  Stress test: never done  Cardiac cath: never done  The 10-year ASCVD risk score (Juarez DK, et al., 2019) is: 30%    Values used to calculate the score:      Age: 76 years      Sex: Male      Is Non- : Yes      Diabetic: No      Tobacco smoker: No      Systolic Blood Pressure: 158 mmHg      Is BP treated: Yes      HDL Cholesterol: 46 mg/dL      Total Cholesterol: 176 mg/dL    LAST ECHO:  No results found for this or any previous visit.      STRESS TEST:   No results found for this or any previous visit.    LAST EKG:  No results found for this or any previous visit (from the past 4464 hour(s)).    CATH REPORT:  No results found for this or any previous visit.       Thank You,  Walt Storm MD [facility]  Vascular and Interventional cardiologist  2/1/2023      CC:   Ronny Luu,  DO    01-Aug-2018 17:57

## 2023-02-09 NOTE — DISCHARGE NOTE ADULT - PAIN PRESENT
Previous Labs: No [Patient] : patient How Did The Hair Loss Occur?: gradual in onset How Severe Is Your Hair Loss?: mild Yes

## 2023-04-03 NOTE — ED PROVIDER NOTE - PHYSICAL EXAMINATION
Gen: NAD, AOx3  Head: NCAT  HEENT: PERRL, oral mucosa moist, normal conjunctiva  Lung: CTAB, no respiratory distress  CV: rrr, no murmurs, Normal perfusion  Abd: soft, NTND, no CVA tenderness  MSK: No edema, no visible deformities  Neuro: No focal neurologic deficits  Skin: No rash   Psych: normal affect No

## 2023-04-15 NOTE — PROGRESS NOTE ADULT - SUBJECTIVE AND OBJECTIVE BOX
Internal Medicine Progress Note    -----------------------------------------------  CONTACT INFORMATION:  Stew Mata MD PGY1  Pager:  [phone] ([facility]), [phone] ([facility])  ------------------------------------------------    Patient is a 78y old  Male who presents with a chief complaint of left side rib pain (27 Dec 2018 05:46)      SUBJECTIVE / OVERNIGHT EVENTS:    Interview was performed with patient's grandson, who translated, at bedside. He noted that the patient has been having throat pain at the same time that he had the rib pain, 2 days ago. He notes that the patient has been having dysphagia that worsens when he eats. Patient's pain remitted with IV Dilaudid. This AM patient was experiencing 6-7/10 left rib and chest pain this AM, requiring PRN oxy. Patient reports that he has been coughing more over the past few days. Denies fevers, night sweats. Patient reports that he typically has 4-5/10 chronic chest pain and back pain since his cancer diagnosis. Patient denies any nausea/vomiting, diarrhea, or constipation. Patient denies lower extremity pain. Patient denies hematuria, epistaxis, melena or BRBPR.     MEDICATIONS  (STANDING):  acetaminophen    Suspension .. 640 milliGRAM(s) Oral every 6 hours  afatinib 20 milliGRAM(s) Oral daily  ALBUTerol/ipratropium for Nebulization 3 milliLiter(s) Nebulizer every 6 hours  dextrose 5%. 1000 milliLiter(s) (50 mL/Hr) IV Continuous <Continuous>  dextrose 50% Injectable 12.5 Gram(s) IV Push once  dextrose 50% Injectable 25 Gram(s) IV Push once  dextrose 50% Injectable 25 Gram(s) IV Push once  heparin  Infusion.  Unit(s)/Hr (11 mL/Hr) IV Continuous <Continuous>  insulin lispro (HumaLOG) corrective regimen sliding scale   SubCutaneous three times a day before meals  insulin lispro (HumaLOG) corrective regimen sliding scale   SubCutaneous at bedtime  pantoprazole    Tablet 40 milliGRAM(s) Oral before breakfast  tamsulosin 0.4 milliGRAM(s) Oral at bedtime  tenofovir disoproxil fumarate (VIREAD) 300 milliGRAM(s) Oral daily    MEDICATIONS  (PRN):  benzonatate 100 milliGRAM(s) Oral three times a day PRN Cough  dextrose 40% Gel 15 Gram(s) Oral once PRN Blood Glucose LESS THAN 70 milliGRAM(s)/deciliter  glucagon  Injectable 1 milliGRAM(s) IntraMuscular once PRN Glucose LESS THAN 70 milligrams/deciliter  heparin  Injectable 4500 Unit(s) IV Push every 6 hours PRN For aPTT less than 40  heparin  Injectable 2000 Unit(s) IV Push every 6 hours PRN For aPTT between 40 - 57  oxyCODONE    IR 10 milliGRAM(s) Oral every 6 hours PRN Moderate Pain (4 - 6)    Vital Signs Last 24 Hrs  T(C): 36.4 (27 Dec 2018 07:44), Max: 36.7 (26 Dec 2018 21:44)  T(F): 97.5 (27 Dec 2018 07:44), Max: 98.1 (26 Dec 2018 22:06)  HR: 83 (27 Dec 2018 07:44) (76 - 102)  BP: 112/74 (27 Dec 2018 07:44) (104/74 - 116/69)  BP(mean): --  RR: 18 (27 Dec 2018 07:44) (16 - 21)  SpO2: 94% (27 Dec 2018 07:44) (92% - 96%)    CAPILLARY BLOOD GLUCOSE        I&O's Summary      PHYSICAL EXAM  GENERAL: NAD  HEAD:  Atraumatic  EYES: EOMI, PERRLA, conjunctiva and sclera clear  NECK: Supple, No JVD  CHEST/LUNG: Clear to auscultation bilaterally; No wheeze  HEART: Regular rate and rhythm; No murmurs, rubs, or gallops  ABDOMEN: Soft, Nontender, Nondistended; Bowel sounds present  EXTREMITIES:  2+ Peripheral Pulses, No clubbing, cyanosis, or edema  NEURO/PSYCH: AAOx3, nonfocal  SKIN: No rashes or lesions      LABS:                        11.6   23.68 )-----------( 180      ( 27 Dec 2018 08:04 )             36.9     Hemoglobin: 11.6 g/dL (12-27 @ 08:04)  Hemoglobin: 12.9 g/dL (12-26 @ 22:42)    CBC Full  -  ( 27 Dec 2018 08:04 )  WBC Count : 23.68 K/uL  Hemoglobin : 11.6 g/dL  Hematocrit : 36.9 %  Platelet Count - Automated : 180 K/uL  Mean Cell Volume : 93.7 fl  Mean Cell Hemoglobin : 29.4 pg  Mean Cell Hemoglobin Concentration : 31.4 gm/dL  Auto Neutrophil # : x  Auto Lymphocyte # : x  Auto Monocyte # : x  Auto Eosinophil # : x  Auto Basophil # : x  Auto Neutrophil % : x  Auto Lymphocyte % : x  Auto Monocyte % : x  Auto Eosinophil % : x  Auto Basophil % : x    12-27    140  |  103  |  47<H>  ----------------------------<  108<H>  4.0   |  24  |  1.35<H>    Ca    9.3      27 Dec 2018 06:55  Phos  3.9     12-27  Mg     1.7     12-27    TPro  7.6  /  Alb  3.4  /  TBili  0.5  /  DBili  x   /  AST  12  /  ALT  14  /  AlkPhos  129<H>  12-26    Creatinine Trend: 1.35<--, 1.49<--  LIVER FUNCTIONS - ( 26 Dec 2018 22:42 )  Alb: 3.4 g/dL / Pro: 7.6 g/dL / ALK PHOS: 129 U/L / ALT: 14 U/L / AST: 12 U/L / GGT: x           PT/INR - ( 27 Dec 2018 09:27 )   PT: 14.9 sec;   INR: 1.32 ratio         PTT - ( 27 Dec 2018 03:43 )  PTT:33.7 sec          22:42 - VBG - pH: 7.44  | pCO2: 37    | pO2: 87    | Lactate: 2.3            EKG:   MICROBIOLOGY:    IMAGING:     < from: CT Chest No Cont (12.26.18 @ 23:42) >  Nondisplaced fractures of the left 10th through 12th ribs with mild   callus formation. These are new since 9/15/2018 and were present on   12/24/2018.    Interval increase in the degree of bilateral confluent and nodular   airspace opacitieswhich may be combination of neoplastic disease and   infection. New nodules likely represent metastatic foci. Follow-up   recommended.    < end of copied text >      CONSULTS: Internal Medicine Progress Note    -----------------------------------------------  CONTACT INFORMATION:  Stew Mata MD PGY1  Pager:  [phone] ([facility]), [phone] ([facility])  ------------------------------------------------    Patient is a 78y old  Male who presents with a chief complaint of left side rib pain (27 Dec 2018 05:46)      SUBJECTIVE / OVERNIGHT EVENTS:    Interview was performed with patient's grandson, who translated, at bedside. He noted that the patient has been having throat pain at the same time that he had the rib pain, 2 days ago. He notes that the patient has been having dysphagia that worsens when he eats. Patient's pain remitted with IV Dilaudid. This AM patient was experiencing 6-7/10 left rib and chest pain this AM, requiring PRN oxy. Patient reports that he has been coughing more over the past few days. Denies fevers, night sweats. Patient reports that he typically has 4-5/10 chronic chest pain and back pain since his cancer diagnosis. Patient denies any nausea/vomiting, diarrhea, or constipation. Patient denies lower extremity pain. Patient denies hematuria, epistaxis, melena or BRBPR.     MEDICATIONS  (STANDING):  acetaminophen    Suspension .. 640 milliGRAM(s) Oral every 6 hours  afatinib 20 milliGRAM(s) Oral daily  ALBUTerol/ipratropium for Nebulization 3 milliLiter(s) Nebulizer every 6 hours  dextrose 5%. 1000 milliLiter(s) (50 mL/Hr) IV Continuous <Continuous>  dextrose 50% Injectable 12.5 Gram(s) IV Push once  dextrose 50% Injectable 25 Gram(s) IV Push once  dextrose 50% Injectable 25 Gram(s) IV Push once  heparin  Infusion.  Unit(s)/Hr (11 mL/Hr) IV Continuous <Continuous>  insulin lispro (HumaLOG) corrective regimen sliding scale   SubCutaneous three times a day before meals  insulin lispro (HumaLOG) corrective regimen sliding scale   SubCutaneous at bedtime  pantoprazole    Tablet 40 milliGRAM(s) Oral before breakfast  tamsulosin 0.4 milliGRAM(s) Oral at bedtime  tenofovir disoproxil fumarate (VIREAD) 300 milliGRAM(s) Oral daily    MEDICATIONS  (PRN):  benzonatate 100 milliGRAM(s) Oral three times a day PRN Cough  dextrose 40% Gel 15 Gram(s) Oral once PRN Blood Glucose LESS THAN 70 milliGRAM(s)/deciliter  glucagon  Injectable 1 milliGRAM(s) IntraMuscular once PRN Glucose LESS THAN 70 milligrams/deciliter  heparin  Injectable 4500 Unit(s) IV Push every 6 hours PRN For aPTT less than 40  heparin  Injectable 2000 Unit(s) IV Push every 6 hours PRN For aPTT between 40 - 57  oxyCODONE    IR 10 milliGRAM(s) Oral every 6 hours PRN Moderate Pain (4 - 6)    Vital Signs Last 24 Hrs  T(C): 36.4 (27 Dec 2018 07:44), Max: 36.7 (26 Dec 2018 21:44)  T(F): 97.5 (27 Dec 2018 07:44), Max: 98.1 (26 Dec 2018 22:06)  HR: 83 (27 Dec 2018 07:44) (76 - 102)  BP: 112/74 (27 Dec 2018 07:44) (104/74 - 116/69)  BP(mean): --  RR: 18 (27 Dec 2018 07:44) (16 - 21)  SpO2: 94% (27 Dec 2018 07:44) (92% - 96%)    CAPILLARY BLOOD GLUCOSE        I&O's Summary      PHYSICAL EXAM  GENERAL: NAD, elderly male, lying back on NC  HEAD:  Atraumatic  EYES: EOMI, PERRLA, conjunctiva and sclera clear  NECK: Supple, No JVD  CHEST/LUNG: Clear to auscultation bilaterally; No wheeze  HEART: Regular rate and rhythm; No murmurs, rubs, or gallops  ABDOMEN: Soft, Nontender, Nondistended; Bowel sounds present  EXTREMITIES:  2+ Peripheral Pulses, No clubbing, cyanosis, or edema  NEURO/PSYCH: AAOx3, nonfocal  SKIN: No rashes or lesions      LABS:                        11.6   23.68 )-----------( 180      ( 27 Dec 2018 08:04 )             36.9     Hemoglobin: 11.6 g/dL (12-27 @ 08:04)  Hemoglobin: 12.9 g/dL (12-26 @ 22:42)    CBC Full  -  ( 27 Dec 2018 08:04 )  WBC Count : 23.68 K/uL  Hemoglobin : 11.6 g/dL  Hematocrit : 36.9 %  Platelet Count - Automated : 180 K/uL  Mean Cell Volume : 93.7 fl  Mean Cell Hemoglobin : 29.4 pg  Mean Cell Hemoglobin Concentration : 31.4 gm/dL  Auto Neutrophil # : x  Auto Lymphocyte # : x  Auto Monocyte # : x  Auto Eosinophil # : x  Auto Basophil # : x  Auto Neutrophil % : x  Auto Lymphocyte % : x  Auto Monocyte % : x  Auto Eosinophil % : x  Auto Basophil % : x    12-27    140  |  103  |  47<H>  ----------------------------<  108<H>  4.0   |  24  |  1.35<H>    Ca    9.3      27 Dec 2018 06:55  Phos  3.9     12-27  Mg     1.7     12-27    TPro  7.6  /  Alb  3.4  /  TBili  0.5  /  DBili  x   /  AST  12  /  ALT  14  /  AlkPhos  129<H>  12-26    Creatinine Trend: 1.35<--, 1.49<--  LIVER FUNCTIONS - ( 26 Dec 2018 22:42 )  Alb: 3.4 g/dL / Pro: 7.6 g/dL / ALK PHOS: 129 U/L / ALT: 14 U/L / AST: 12 U/L / GGT: x           PT/INR - ( 27 Dec 2018 09:27 )   PT: 14.9 sec;   INR: 1.32 ratio         PTT - ( 27 Dec 2018 03:43 )  PTT:33.7 sec          22:42 - VBG - pH: 7.44  | pCO2: 37    | pO2: 87    | Lactate: 2.3            EKG:   MICROBIOLOGY:    IMAGING:     < from: CT Chest No Cont (12.26.18 @ 23:42) >  Nondisplaced fractures of the left 10th through 12th ribs with mild   callus formation. These are new since 9/15/2018 and were present on   12/24/2018.    Interval increase in the degree of bilateral confluent and nodular   airspace opacitieswhich may be combination of neoplastic disease and   infection. New nodules likely represent metastatic foci. Follow-up   recommended.    < end of copied text >      CONSULTS: Not applicable

## 2023-06-23 NOTE — ED PROVIDER NOTE - GASTROINTESTINAL [+], MLM
Patient presents to ED for SOB. Pt hx of COPD wears 6L O2 at baseline. Pt was discharged yestserday from  for left arm fracture, pt left arm in sling. pt denies chest pain, LOC, fever/chills. EKg in process. O2 81% on 6L. VOMITING

## 2023-07-05 NOTE — SWALLOW VFSS/MBS ASSESSMENT ADULT - ASPIRATION PRECAUTIONS
yes Erythromycin Counseling:  I discussed with the patient the risks of erythromycin including but not limited to GI upset, allergic reaction, drug rash, diarrhea, increase in liver enzymes, and yeast infections.

## 2023-07-20 NOTE — PHYSICAL THERAPY INITIAL EVALUATION ADULT - PLANNED THERAPY INTERVENTIONS, PT EVAL
details… transfer training/GOAL: Stair Negotiation Training: Patient will be able to negotiate up & down 1 flight of stairs with unilateral rails, step to gait pattern, in 2 weeks./gait training/strengthening/balance training/bed mobility training

## 2023-10-30 NOTE — PROGRESS NOTE ADULT - PROBLEM SELECTOR PROBLEM 4
----- Message from Carlitos Segura MD sent at 10/30/2023 11:17 AM CDT -----  Thyroid ultrasound reviewed, nodules are stable and non concerning.  Recommend repeat thyroid ultrasound in 1 year and follow up with me at that time.  Thank you   Advance care planning

## 2023-11-03 NOTE — ED PROVIDER NOTE - CROS ED CARDIOVAS ALL NEG
- - - Attending MD Levin:  I personally have seen and examined this patient.  Resident note reviewed and agree on plan of care and except where noted.  Please see my MDM for further details.

## 2024-03-13 NOTE — DISCHARGE NOTE ADULT - PATIENT PORTAL LINK FT
Rest and drink plenty of water/fluid  Please use a humidifier or   use warm  mist  in a   hot shower ...repeat   3-4 times a day for approximatelly .... 10 minutes at a time..... this lessens congestion....  Use salt water sprays..... as directed... saline sprays  Apply a warm moist wash...cloth to  your face  3-4 times a day  Avoid tobacco smoke and other environmental irritants    You can access the [employer] Patient Portal, offered by [facility] [place], by registering with the following website: [email or web address]

## 2024-04-10 NOTE — H&P ADULT - NSICDXPASTMEDICALHX_GEN_ALL_CORE_FT
PAST MEDICAL HISTORY:  BPH (benign prostatic hyperplasia)     BPH (benign prostatic hypertrophy)     COPD (chronic obstructive pulmonary disease)     Diabetes     DM (diabetes mellitus)     GERD (gastroesophageal reflux disease)     HLD (hyperlipidemia)     HLD (hyperlipidemia)     HTN (hypertension)     Hypertension     Lung cancer
No

## 2024-08-06 NOTE — ED ADULT NURSE NOTE - ED STAT RN HANDOFF DETAILS
arousal, attention, and cognition/gait, locomotion, and balance/poor safety awareness
Report given to [facility] RNs Gene. Patient and family aware of admission and plan to transfer to holding

## 2025-02-20 NOTE — PROGRESS NOTE ADULT - PROBLEM SELECTOR PLAN 5
Reviewed chart, discussed in rounds. Cardiology added Eliquis and amiodarone. Pt spiked fever last night, on abx. On room air. CM following.     1620 - met with pt and daughter at bedside. Plan for [facility] at [place]. Ref called Sadiq at [facility].    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

## 2025-05-01 NOTE — ED PROVIDER NOTE - PR
Problem and/or Symptoms are currently stable and/or improving on current treatment plan.  Will continue and have patient follow up as directed.    Pertinent labs reviewed/pending; pertinent meds RF X 6 M   134